# Patient Record
Sex: FEMALE | Race: WHITE | Employment: FULL TIME | URBAN - METROPOLITAN AREA
[De-identification: names, ages, dates, MRNs, and addresses within clinical notes are randomized per-mention and may not be internally consistent; named-entity substitution may affect disease eponyms.]

---

## 2019-12-02 ENCOUNTER — HOSPITAL ENCOUNTER (OUTPATIENT)
Dept: GENERAL RADIOLOGY | Age: 59
Discharge: HOME | End: 2019-12-02
Attending: SURGERY
Payer: COMMERCIAL

## 2019-12-02 ENCOUNTER — HOSPITAL ENCOUNTER (OUTPATIENT)
Dept: PREADMISSION TESTING | Age: 59
Discharge: HOME | End: 2019-12-02
Payer: COMMERCIAL

## 2019-12-02 ENCOUNTER — ANESTHESIA EVENT (OUTPATIENT)
Dept: SURGERY | Age: 59
DRG: 254 | End: 2019-12-02
Payer: COMMERCIAL

## 2019-12-02 VITALS
WEIGHT: 114 LBS | DIASTOLIC BLOOD PRESSURE: 81 MMHG | TEMPERATURE: 98 F | BODY MASS INDEX: 20.98 KG/M2 | HEIGHT: 62 IN | HEART RATE: 80 BPM | SYSTOLIC BLOOD PRESSURE: 119 MMHG

## 2019-12-02 LAB
ALBUMIN SERPL-MCNC: 3.7 G/DL (ref 3.5–5)
ALBUMIN/GLOB SERPL: 1.1 {RATIO} (ref 1.1–2.2)
ALP SERPL-CCNC: 55 U/L (ref 45–117)
ALT SERPL-CCNC: 26 U/L (ref 12–78)
ANION GAP SERPL CALC-SCNC: 6 MMOL/L (ref 5–15)
APTT PPP: 26.7 SEC (ref 22.1–32)
AST SERPL-CCNC: 16 U/L (ref 15–37)
ATRIAL RATE: 76 BPM
BASOPHILS # BLD: 0.1 K/UL (ref 0–0.1)
BASOPHILS NFR BLD: 1 % (ref 0–1)
BILIRUB SERPL-MCNC: 0.3 MG/DL (ref 0.2–1)
BUN SERPL-MCNC: 11 MG/DL (ref 6–20)
BUN/CREAT SERPL: 17 (ref 12–20)
CALCIUM SERPL-MCNC: 9.1 MG/DL (ref 8.5–10.1)
CALCULATED P AXIS, ECG09: 72 DEGREES
CALCULATED R AXIS, ECG10: 69 DEGREES
CALCULATED T AXIS, ECG11: 70 DEGREES
CHLORIDE SERPL-SCNC: 109 MMOL/L (ref 97–108)
CO2 SERPL-SCNC: 24 MMOL/L (ref 21–32)
CREAT SERPL-MCNC: 0.64 MG/DL (ref 0.55–1.02)
DIAGNOSIS, 93000: NORMAL
DIFFERENTIAL METHOD BLD: NORMAL
EOSINOPHIL # BLD: 0.4 K/UL (ref 0–0.4)
EOSINOPHIL NFR BLD: 4 % (ref 0–7)
ERYTHROCYTE [DISTWIDTH] IN BLOOD BY AUTOMATED COUNT: 12.2 % (ref 11.5–14.5)
EST. AVERAGE GLUCOSE BLD GHB EST-MCNC: 131 MG/DL
GLOBULIN SER CALC-MCNC: 3.3 G/DL (ref 2–4)
GLUCOSE SERPL-MCNC: 127 MG/DL (ref 65–100)
HBA1C MFR BLD: 6.2 % (ref 4–5.6)
HCT VFR BLD AUTO: 45.5 % (ref 35–47)
HGB BLD-MCNC: 15.3 G/DL (ref 11.5–16)
IMM GRANULOCYTES # BLD AUTO: 0 K/UL (ref 0–0.04)
IMM GRANULOCYTES NFR BLD AUTO: 0 % (ref 0–0.5)
INR PPP: 1 (ref 0.9–1.1)
LYMPHOCYTES # BLD: 3 K/UL (ref 0.8–3.5)
LYMPHOCYTES NFR BLD: 29 % (ref 12–49)
MCH RBC QN AUTO: 30.4 PG (ref 26–34)
MCHC RBC AUTO-ENTMCNC: 33.6 G/DL (ref 30–36.5)
MCV RBC AUTO: 90.5 FL (ref 80–99)
MONOCYTES # BLD: 0.7 K/UL (ref 0–1)
MONOCYTES NFR BLD: 7 % (ref 5–13)
NEUTS SEG # BLD: 6.2 K/UL (ref 1.8–8)
NEUTS SEG NFR BLD: 59 % (ref 32–75)
NRBC # BLD: 0 K/UL (ref 0–0.01)
NRBC BLD-RTO: 0 PER 100 WBC
P-R INTERVAL, ECG05: 166 MS
PLATELET # BLD AUTO: 340 K/UL (ref 150–400)
PMV BLD AUTO: 12 FL (ref 8.9–12.9)
POTASSIUM SERPL-SCNC: 4.3 MMOL/L (ref 3.5–5.1)
PROT SERPL-MCNC: 7 G/DL (ref 6.4–8.2)
PROTHROMBIN TIME: 9.7 SEC (ref 9–11.1)
Q-T INTERVAL, ECG07: 370 MS
QRS DURATION, ECG06: 74 MS
QTC CALCULATION (BEZET), ECG08: 416 MS
RBC # BLD AUTO: 5.03 M/UL (ref 3.8–5.2)
SODIUM SERPL-SCNC: 139 MMOL/L (ref 136–145)
THERAPEUTIC RANGE,PTTT: NORMAL SECS (ref 58–77)
VENTRICULAR RATE, ECG03: 76 BPM
WBC # BLD AUTO: 10.4 K/UL (ref 3.6–11)

## 2019-12-02 PROCEDURE — 80053 COMPREHEN METABOLIC PANEL: CPT

## 2019-12-02 PROCEDURE — 71046 X-RAY EXAM CHEST 2 VIEWS: CPT

## 2019-12-02 PROCEDURE — 85610 PROTHROMBIN TIME: CPT

## 2019-12-02 PROCEDURE — 36415 COLL VENOUS BLD VENIPUNCTURE: CPT

## 2019-12-02 PROCEDURE — 93005 ELECTROCARDIOGRAM TRACING: CPT

## 2019-12-02 PROCEDURE — 85730 THROMBOPLASTIN TIME PARTIAL: CPT

## 2019-12-02 PROCEDURE — 85025 COMPLETE CBC W/AUTO DIFF WBC: CPT

## 2019-12-02 PROCEDURE — 83036 HEMOGLOBIN GLYCOSYLATED A1C: CPT

## 2019-12-02 RX ORDER — GLIPIZIDE 10 MG/1
10 TABLET ORAL DAILY
COMMUNITY
End: 2021-10-18 | Stop reason: ALTCHOICE

## 2019-12-02 RX ORDER — ATORVASTATIN CALCIUM 20 MG/1
20 TABLET, FILM COATED ORAL DAILY
COMMUNITY
End: 2021-10-21

## 2019-12-03 ENCOUNTER — HOSPITAL ENCOUNTER (INPATIENT)
Age: 59
LOS: 1 days | Discharge: HOME | DRG: 254 | End: 2019-12-04
Attending: SURGERY | Admitting: SURGERY
Payer: COMMERCIAL

## 2019-12-03 ENCOUNTER — ANESTHESIA (OUTPATIENT)
Dept: SURGERY | Age: 59
DRG: 254 | End: 2019-12-03
Payer: COMMERCIAL

## 2019-12-03 DIAGNOSIS — I99.8 ISCHEMIA OF RIGHT LOWER EXTREMITY: Primary | ICD-10-CM

## 2019-12-03 LAB
GLUCOSE BLD STRIP.AUTO-MCNC: 157 MG/DL (ref 65–100)
GLUCOSE BLD STRIP.AUTO-MCNC: 197 MG/DL (ref 65–100)
SERVICE CMNT-IMP: ABNORMAL
SERVICE CMNT-IMP: ABNORMAL

## 2019-12-03 PROCEDURE — 74011250636 HC RX REV CODE- 250/636: Performed by: NURSE ANESTHETIST, CERTIFIED REGISTERED

## 2019-12-03 PROCEDURE — 65660000000 HC RM CCU STEPDOWN

## 2019-12-03 PROCEDURE — 74011250636 HC RX REV CODE- 250/636

## 2019-12-03 PROCEDURE — 76210000017 HC OR PH I REC 1.5 TO 2 HR: Performed by: SURGERY

## 2019-12-03 PROCEDURE — 77030014008 HC SPNG HEMSTAT J&J -C: Performed by: SURGERY

## 2019-12-03 PROCEDURE — 74011250636 HC RX REV CODE- 250/636: Performed by: SURGERY

## 2019-12-03 PROCEDURE — 77030020256 HC SOL INJ NACL 0.9%  500ML: Performed by: SURGERY

## 2019-12-03 PROCEDURE — 77030031139 HC SUT VCRL2 J&J -A: Performed by: SURGERY

## 2019-12-03 PROCEDURE — 74011000250 HC RX REV CODE- 250: Performed by: NURSE ANESTHETIST, CERTIFIED REGISTERED

## 2019-12-03 PROCEDURE — 82962 GLUCOSE BLOOD TEST: CPT

## 2019-12-03 PROCEDURE — 74011250636 HC RX REV CODE- 250/636: Performed by: ANESTHESIOLOGY

## 2019-12-03 PROCEDURE — 77030040922 HC BLNKT HYPOTHRM STRY -A

## 2019-12-03 PROCEDURE — 77030002986 HC SUT PROL J&J -A: Performed by: SURGERY

## 2019-12-03 PROCEDURE — 041K0JL BYPASS RIGHT FEMORAL ARTERY TO POPLITEAL ARTERY WITH SYNTHETIC SUBSTITUTE, OPEN APPROACH: ICD-10-PCS | Performed by: SURGERY

## 2019-12-03 PROCEDURE — 77030008462 HC STPLR SKN PROX J&J -A: Performed by: SURGERY

## 2019-12-03 PROCEDURE — 77030005513 HC CATH URETH FOL11 MDII -B: Performed by: SURGERY

## 2019-12-03 PROCEDURE — 77030008684 HC TU ET CUF COVD -B: Performed by: NURSE ANESTHETIST, CERTIFIED REGISTERED

## 2019-12-03 PROCEDURE — 76060000035 HC ANESTHESIA 2 TO 2.5 HR: Performed by: SURGERY

## 2019-12-03 PROCEDURE — 77030026438 HC STYL ET INTUB CARD -A: Performed by: NURSE ANESTHETIST, CERTIFIED REGISTERED

## 2019-12-03 PROCEDURE — 74011250637 HC RX REV CODE- 250/637: Performed by: ANESTHESIOLOGY

## 2019-12-03 PROCEDURE — 76010000162 HC OR TIME 1.5 TO 2 HR INTENSV-TIER 1: Performed by: SURGERY

## 2019-12-03 PROCEDURE — C1768 GRAFT, VASCULAR: HCPCS | Performed by: SURGERY

## 2019-12-03 PROCEDURE — 74011250637 HC RX REV CODE- 250/637: Performed by: SURGERY

## 2019-12-03 PROCEDURE — 77030018846 HC SOL IRR STRL H20 ICUM -A: Performed by: SURGERY

## 2019-12-03 PROCEDURE — 77030011640 HC PAD GRND REM COVD -A: Performed by: SURGERY

## 2019-12-03 DEVICE — PROPATEN VASCULAR GRAFT TW 7MMX40CM HEPARIN
Type: IMPLANTABLE DEVICE | Site: LEG | Status: FUNCTIONAL
Brand: GORE PROPATEN VASCULAR GRAFT

## 2019-12-03 RX ORDER — MIDAZOLAM HYDROCHLORIDE 1 MG/ML
1 INJECTION, SOLUTION INTRAMUSCULAR; INTRAVENOUS
Status: DISCONTINUED | OUTPATIENT
Start: 2019-12-03 | End: 2019-12-03 | Stop reason: HOSPADM

## 2019-12-03 RX ORDER — SODIUM CHLORIDE 0.9 % (FLUSH) 0.9 %
5-40 SYRINGE (ML) INJECTION AS NEEDED
Status: DISCONTINUED | OUTPATIENT
Start: 2019-12-03 | End: 2019-12-03 | Stop reason: HOSPADM

## 2019-12-03 RX ORDER — NALOXONE HYDROCHLORIDE 0.4 MG/ML
0.4 INJECTION, SOLUTION INTRAMUSCULAR; INTRAVENOUS; SUBCUTANEOUS AS NEEDED
Status: DISCONTINUED | OUTPATIENT
Start: 2019-12-03 | End: 2019-12-04 | Stop reason: HOSPADM

## 2019-12-03 RX ORDER — PROTAMINE SULFATE 10 MG/ML
INJECTION, SOLUTION INTRAVENOUS AS NEEDED
Status: DISCONTINUED | OUTPATIENT
Start: 2019-12-03 | End: 2019-12-03 | Stop reason: HOSPADM

## 2019-12-03 RX ORDER — SODIUM CHLORIDE, SODIUM LACTATE, POTASSIUM CHLORIDE, CALCIUM CHLORIDE 600; 310; 30; 20 MG/100ML; MG/100ML; MG/100ML; MG/100ML
125 INJECTION, SOLUTION INTRAVENOUS CONTINUOUS
Status: DISCONTINUED | OUTPATIENT
Start: 2019-12-03 | End: 2019-12-03 | Stop reason: HOSPADM

## 2019-12-03 RX ORDER — SODIUM CHLORIDE 0.9 % (FLUSH) 0.9 %
5-40 SYRINGE (ML) INJECTION EVERY 8 HOURS
Status: DISCONTINUED | OUTPATIENT
Start: 2019-12-03 | End: 2019-12-03 | Stop reason: HOSPADM

## 2019-12-03 RX ORDER — HEPARIN SODIUM 1000 [USP'U]/ML
INJECTION, SOLUTION INTRAVENOUS; SUBCUTANEOUS AS NEEDED
Status: DISCONTINUED | OUTPATIENT
Start: 2019-12-03 | End: 2019-12-03 | Stop reason: HOSPADM

## 2019-12-03 RX ORDER — POTASSIUM CHLORIDE AND SODIUM CHLORIDE 450; 150 MG/100ML; MG/100ML
INJECTION, SOLUTION INTRAVENOUS CONTINUOUS
Status: DISCONTINUED | OUTPATIENT
Start: 2019-12-03 | End: 2019-12-04 | Stop reason: HOSPADM

## 2019-12-03 RX ORDER — LIDOCAINE HYDROCHLORIDE 10 MG/ML
0.5 INJECTION, SOLUTION EPIDURAL; INFILTRATION; INTRACAUDAL; PERINEURAL AS NEEDED
Status: DISCONTINUED | OUTPATIENT
Start: 2019-12-03 | End: 2019-12-03 | Stop reason: HOSPADM

## 2019-12-03 RX ORDER — ONDANSETRON 2 MG/ML
INJECTION INTRAMUSCULAR; INTRAVENOUS AS NEEDED
Status: DISCONTINUED | OUTPATIENT
Start: 2019-12-03 | End: 2019-12-03 | Stop reason: HOSPADM

## 2019-12-03 RX ORDER — DEXTROSE, SODIUM CHLORIDE, SODIUM LACTATE, POTASSIUM CHLORIDE, AND CALCIUM CHLORIDE 5; .6; .31; .03; .02 G/100ML; G/100ML; G/100ML; G/100ML; G/100ML
125 INJECTION, SOLUTION INTRAVENOUS CONTINUOUS
Status: DISCONTINUED | OUTPATIENT
Start: 2019-12-03 | End: 2019-12-03 | Stop reason: HOSPADM

## 2019-12-03 RX ORDER — ATORVASTATIN CALCIUM 20 MG/1
20 TABLET, FILM COATED ORAL DAILY
Status: DISCONTINUED | OUTPATIENT
Start: 2019-12-03 | End: 2019-12-04 | Stop reason: HOSPADM

## 2019-12-03 RX ORDER — DIPHENHYDRAMINE HYDROCHLORIDE 50 MG/ML
12.5 INJECTION, SOLUTION INTRAMUSCULAR; INTRAVENOUS AS NEEDED
Status: DISCONTINUED | OUTPATIENT
Start: 2019-12-03 | End: 2019-12-03 | Stop reason: HOSPADM

## 2019-12-03 RX ORDER — SODIUM CHLORIDE 0.9 % (FLUSH) 0.9 %
5-40 SYRINGE (ML) INJECTION AS NEEDED
Status: DISCONTINUED | OUTPATIENT
Start: 2019-12-03 | End: 2019-12-04 | Stop reason: HOSPADM

## 2019-12-03 RX ORDER — GLIPIZIDE 5 MG/1
10 TABLET, FILM COATED, EXTENDED RELEASE ORAL DAILY
Status: DISCONTINUED | OUTPATIENT
Start: 2019-12-03 | End: 2019-12-04 | Stop reason: HOSPADM

## 2019-12-03 RX ORDER — OXYCODONE HYDROCHLORIDE 5 MG/1
5 TABLET ORAL AS NEEDED
Status: DISCONTINUED | OUTPATIENT
Start: 2019-12-03 | End: 2019-12-03 | Stop reason: HOSPADM

## 2019-12-03 RX ORDER — PHENYLEPHRINE HCL IN 0.9% NACL 0.4MG/10ML
SYRINGE (ML) INTRAVENOUS AS NEEDED
Status: DISCONTINUED | OUTPATIENT
Start: 2019-12-03 | End: 2019-12-03 | Stop reason: HOSPADM

## 2019-12-03 RX ORDER — MIDAZOLAM HYDROCHLORIDE 1 MG/ML
1 INJECTION, SOLUTION INTRAMUSCULAR; INTRAVENOUS AS NEEDED
Status: DISCONTINUED | OUTPATIENT
Start: 2019-12-03 | End: 2019-12-03 | Stop reason: HOSPADM

## 2019-12-03 RX ORDER — PROPOFOL 10 MG/ML
INJECTION, EMULSION INTRAVENOUS AS NEEDED
Status: DISCONTINUED | OUTPATIENT
Start: 2019-12-03 | End: 2019-12-03 | Stop reason: HOSPADM

## 2019-12-03 RX ORDER — MIDAZOLAM HYDROCHLORIDE 1 MG/ML
INJECTION, SOLUTION INTRAMUSCULAR; INTRAVENOUS AS NEEDED
Status: DISCONTINUED | OUTPATIENT
Start: 2019-12-03 | End: 2019-12-03 | Stop reason: HOSPADM

## 2019-12-03 RX ORDER — DEXAMETHASONE SODIUM PHOSPHATE 4 MG/ML
INJECTION, SOLUTION INTRA-ARTICULAR; INTRALESIONAL; INTRAMUSCULAR; INTRAVENOUS; SOFT TISSUE AS NEEDED
Status: DISCONTINUED | OUTPATIENT
Start: 2019-12-03 | End: 2019-12-03 | Stop reason: HOSPADM

## 2019-12-03 RX ORDER — DEXMEDETOMIDINE HYDROCHLORIDE 100 UG/ML
INJECTION, SOLUTION INTRAVENOUS AS NEEDED
Status: DISCONTINUED | OUTPATIENT
Start: 2019-12-03 | End: 2019-12-03 | Stop reason: HOSPADM

## 2019-12-03 RX ORDER — LIDOCAINE HYDROCHLORIDE 20 MG/ML
INJECTION, SOLUTION EPIDURAL; INFILTRATION; INTRACAUDAL; PERINEURAL AS NEEDED
Status: DISCONTINUED | OUTPATIENT
Start: 2019-12-03 | End: 2019-12-03 | Stop reason: HOSPADM

## 2019-12-03 RX ORDER — SUCCINYLCHOLINE CHLORIDE 20 MG/ML
INJECTION INTRAMUSCULAR; INTRAVENOUS AS NEEDED
Status: DISCONTINUED | OUTPATIENT
Start: 2019-12-03 | End: 2019-12-03 | Stop reason: HOSPADM

## 2019-12-03 RX ORDER — POTASSIUM CHLORIDE AND SODIUM CHLORIDE 450; 150 MG/100ML; MG/100ML
INJECTION, SOLUTION INTRAVENOUS
Status: COMPLETED
Start: 2019-12-03 | End: 2019-12-03

## 2019-12-03 RX ORDER — METOPROLOL TARTRATE 5 MG/5ML
INJECTION INTRAVENOUS AS NEEDED
Status: DISCONTINUED | OUTPATIENT
Start: 2019-12-03 | End: 2019-12-03

## 2019-12-03 RX ORDER — TRAMADOL HYDROCHLORIDE 50 MG/1
50 TABLET ORAL
Status: DISCONTINUED | OUTPATIENT
Start: 2019-12-03 | End: 2019-12-04 | Stop reason: HOSPADM

## 2019-12-03 RX ORDER — ACETAMINOPHEN 325 MG/1
650 TABLET ORAL
Status: DISCONTINUED | OUTPATIENT
Start: 2019-12-03 | End: 2019-12-04 | Stop reason: HOSPADM

## 2019-12-03 RX ORDER — HYDROMORPHONE HYDROCHLORIDE 1 MG/ML
0.5 INJECTION, SOLUTION INTRAMUSCULAR; INTRAVENOUS; SUBCUTANEOUS
Status: DISCONTINUED | OUTPATIENT
Start: 2019-12-03 | End: 2019-12-04 | Stop reason: HOSPADM

## 2019-12-03 RX ORDER — LISINOPRIL 20 MG/1
20 TABLET ORAL DAILY
Status: DISCONTINUED | OUTPATIENT
Start: 2019-12-03 | End: 2019-12-04 | Stop reason: HOSPADM

## 2019-12-03 RX ORDER — ESMOLOL HYDROCHLORIDE 10 MG/ML
INJECTION INTRAVENOUS AS NEEDED
Status: DISCONTINUED | OUTPATIENT
Start: 2019-12-03 | End: 2019-12-03 | Stop reason: HOSPADM

## 2019-12-03 RX ORDER — EPHEDRINE SULFATE/0.9% NACL/PF 50 MG/5 ML
SYRINGE (ML) INTRAVENOUS AS NEEDED
Status: DISCONTINUED | OUTPATIENT
Start: 2019-12-03 | End: 2019-12-03 | Stop reason: HOSPADM

## 2019-12-03 RX ORDER — SODIUM CHLORIDE 0.9 % (FLUSH) 0.9 %
5-40 SYRINGE (ML) INJECTION EVERY 8 HOURS
Status: DISCONTINUED | OUTPATIENT
Start: 2019-12-03 | End: 2019-12-04 | Stop reason: HOSPADM

## 2019-12-03 RX ORDER — FENTANYL CITRATE 50 UG/ML
25 INJECTION, SOLUTION INTRAMUSCULAR; INTRAVENOUS
Status: COMPLETED | OUTPATIENT
Start: 2019-12-03 | End: 2019-12-03

## 2019-12-03 RX ORDER — FENTANYL CITRATE 50 UG/ML
50 INJECTION, SOLUTION INTRAMUSCULAR; INTRAVENOUS AS NEEDED
Status: DISCONTINUED | OUTPATIENT
Start: 2019-12-03 | End: 2019-12-03 | Stop reason: HOSPADM

## 2019-12-03 RX ORDER — ROCURONIUM BROMIDE 10 MG/ML
INJECTION, SOLUTION INTRAVENOUS AS NEEDED
Status: DISCONTINUED | OUTPATIENT
Start: 2019-12-03 | End: 2019-12-03 | Stop reason: HOSPADM

## 2019-12-03 RX ORDER — ONDANSETRON 2 MG/ML
4 INJECTION INTRAMUSCULAR; INTRAVENOUS AS NEEDED
Status: DISCONTINUED | OUTPATIENT
Start: 2019-12-03 | End: 2019-12-03 | Stop reason: HOSPADM

## 2019-12-03 RX ORDER — MORPHINE SULFATE 10 MG/ML
2 INJECTION, SOLUTION INTRAMUSCULAR; INTRAVENOUS
Status: DISCONTINUED | OUTPATIENT
Start: 2019-12-03 | End: 2019-12-03 | Stop reason: HOSPADM

## 2019-12-03 RX ORDER — FENTANYL CITRATE 50 UG/ML
INJECTION, SOLUTION INTRAMUSCULAR; INTRAVENOUS AS NEEDED
Status: DISCONTINUED | OUTPATIENT
Start: 2019-12-03 | End: 2019-12-03 | Stop reason: HOSPADM

## 2019-12-03 RX ORDER — ENOXAPARIN SODIUM 100 MG/ML
40 INJECTION SUBCUTANEOUS EVERY 24 HOURS
Status: DISCONTINUED | OUTPATIENT
Start: 2019-12-04 | End: 2019-12-04 | Stop reason: HOSPADM

## 2019-12-03 RX ORDER — ACETAMINOPHEN 325 MG/1
650 TABLET ORAL ONCE
Status: COMPLETED | OUTPATIENT
Start: 2019-12-03 | End: 2019-12-03

## 2019-12-03 RX ADMIN — FENTANYL CITRATE 25 MCG: 50 INJECTION, SOLUTION INTRAMUSCULAR; INTRAVENOUS at 08:58

## 2019-12-03 RX ADMIN — FENTANYL CITRATE 25 MCG: 50 INJECTION, SOLUTION INTRAMUSCULAR; INTRAVENOUS at 10:30

## 2019-12-03 RX ADMIN — FENTANYL CITRATE 25 MCG: 50 INJECTION, SOLUTION INTRAMUSCULAR; INTRAVENOUS at 09:49

## 2019-12-03 RX ADMIN — POTASSIUM CHLORIDE AND SODIUM CHLORIDE 75 ML: 450; 150 INJECTION, SOLUTION INTRAVENOUS at 10:47

## 2019-12-03 RX ADMIN — PROTAMINE SULFATE 30 MG: 10 INJECTION, SOLUTION INTRAVENOUS at 08:54

## 2019-12-03 RX ADMIN — Medication 80 MCG: at 08:15

## 2019-12-03 RX ADMIN — Medication 80 MCG: at 08:21

## 2019-12-03 RX ADMIN — ONDANSETRON HYDROCHLORIDE 4 MG: 2 INJECTION, SOLUTION INTRAMUSCULAR; INTRAVENOUS at 09:01

## 2019-12-03 RX ADMIN — ROCURONIUM BROMIDE 25 MG: 10 SOLUTION INTRAVENOUS at 07:51

## 2019-12-03 RX ADMIN — Medication 40 MCG: at 08:43

## 2019-12-03 RX ADMIN — FENTANYL CITRATE 25 MCG: 50 INJECTION, SOLUTION INTRAMUSCULAR; INTRAVENOUS at 10:00

## 2019-12-03 RX ADMIN — SUGAMMADEX 200 MG: 100 INJECTION, SOLUTION INTRAVENOUS at 09:04

## 2019-12-03 RX ADMIN — DEXAMETHASONE SODIUM PHOSPHATE 8 MG: 4 INJECTION, SOLUTION INTRAMUSCULAR; INTRAVENOUS at 08:01

## 2019-12-03 RX ADMIN — ROCURONIUM BROMIDE 5 MG: 10 SOLUTION INTRAVENOUS at 07:38

## 2019-12-03 RX ADMIN — LIDOCAINE HYDROCHLORIDE 60 MG: 20 INJECTION, SOLUTION EPIDURAL; INFILTRATION; INTRACAUDAL; PERINEURAL at 07:38

## 2019-12-03 RX ADMIN — HYDROMORPHONE HYDROCHLORIDE 0.5 MG: 1 INJECTION, SOLUTION INTRAMUSCULAR; INTRAVENOUS; SUBCUTANEOUS at 20:28

## 2019-12-03 RX ADMIN — Medication 80 MCG: at 08:23

## 2019-12-03 RX ADMIN — FENTANYL CITRATE 25 MCG: 50 INJECTION, SOLUTION INTRAMUSCULAR; INTRAVENOUS at 07:55

## 2019-12-03 RX ADMIN — ESMOLOL HYDROCHLORIDE 30 MG: 10 INJECTION, SOLUTION INTRAVENOUS at 08:02

## 2019-12-03 RX ADMIN — TRAMADOL HYDROCHLORIDE 50 MG: 50 TABLET, FILM COATED ORAL at 18:18

## 2019-12-03 RX ADMIN — DEXMEDETOMIDINE HYDROCHLORIDE 4 MCG: 100 INJECTION, SOLUTION, CONCENTRATE INTRAVENOUS at 07:56

## 2019-12-03 RX ADMIN — PROPOFOL 40 MG: 10 INJECTION, EMULSION INTRAVENOUS at 08:57

## 2019-12-03 RX ADMIN — LISINOPRIL 20 MG: 20 TABLET ORAL at 12:09

## 2019-12-03 RX ADMIN — SUCCINYLCHOLINE CHLORIDE 120 MG: 20 INJECTION, SOLUTION INTRAMUSCULAR; INTRAVENOUS at 07:38

## 2019-12-03 RX ADMIN — ROCURONIUM BROMIDE 10 MG: 10 SOLUTION INTRAVENOUS at 08:30

## 2019-12-03 RX ADMIN — GLIPIZIDE 10 MG: 5 TABLET, FILM COATED, EXTENDED RELEASE ORAL at 12:09

## 2019-12-03 RX ADMIN — Medication 40 MCG: at 08:41

## 2019-12-03 RX ADMIN — ACETAMINOPHEN 650 MG: 325 TABLET ORAL at 15:50

## 2019-12-03 RX ADMIN — Medication 10 MG: at 09:36

## 2019-12-03 RX ADMIN — PROPOFOL 130 MG: 10 INJECTION, EMULSION INTRAVENOUS at 07:38

## 2019-12-03 RX ADMIN — HYDROMORPHONE HYDROCHLORIDE 0.5 MG: 1 INJECTION, SOLUTION INTRAMUSCULAR; INTRAVENOUS; SUBCUTANEOUS at 15:50

## 2019-12-03 RX ADMIN — PROPOFOL 30 MG: 10 INJECTION, EMULSION INTRAVENOUS at 08:35

## 2019-12-03 RX ADMIN — ATORVASTATIN CALCIUM 20 MG: 20 TABLET, FILM COATED ORAL at 12:09

## 2019-12-03 RX ADMIN — FENTANYL CITRATE 25 MCG: 50 INJECTION, SOLUTION INTRAMUSCULAR; INTRAVENOUS at 10:45

## 2019-12-03 RX ADMIN — DEXMEDETOMIDINE HYDROCHLORIDE 4 MCG: 100 INJECTION, SOLUTION, CONCENTRATE INTRAVENOUS at 09:08

## 2019-12-03 RX ADMIN — VANCOMYCIN HYDROCHLORIDE 1000 MG: 1 INJECTION, POWDER, LYOPHILIZED, FOR SOLUTION INTRAVENOUS at 20:28

## 2019-12-03 RX ADMIN — MIDAZOLAM 2 MG: 1 INJECTION INTRAMUSCULAR; INTRAVENOUS at 07:31

## 2019-12-03 RX ADMIN — Medication 10 MG: at 09:26

## 2019-12-03 RX ADMIN — SODIUM CHLORIDE, SODIUM LACTATE, POTASSIUM CHLORIDE, AND CALCIUM CHLORIDE 125 ML/HR: 600; 310; 30; 20 INJECTION, SOLUTION INTRAVENOUS at 07:27

## 2019-12-03 RX ADMIN — DEXMEDETOMIDINE HYDROCHLORIDE 4 MCG: 100 INJECTION, SOLUTION, CONCENTRATE INTRAVENOUS at 07:53

## 2019-12-03 RX ADMIN — FENTANYL CITRATE 50 MCG: 50 INJECTION, SOLUTION INTRAMUSCULAR; INTRAVENOUS at 09:05

## 2019-12-03 RX ADMIN — ACETAMINOPHEN 650 MG: 325 TABLET ORAL at 07:19

## 2019-12-03 RX ADMIN — HEPARIN SODIUM 7500 UNITS: 1000 INJECTION, SOLUTION INTRAVENOUS; SUBCUTANEOUS at 08:08

## 2019-12-03 RX ADMIN — DEXMEDETOMIDINE HYDROCHLORIDE 4 MCG: 100 INJECTION, SOLUTION, CONCENTRATE INTRAVENOUS at 08:02

## 2019-12-03 RX ADMIN — VANCOMYCIN HYDROCHLORIDE 1 G: 1 INJECTION, POWDER, LYOPHILIZED, FOR SOLUTION INTRAVENOUS at 07:47

## 2019-12-03 RX ADMIN — PHENYLEPHRINE HYDROCHLORIDE 80 MCG/MIN: 10 INJECTION INTRAVENOUS at 08:25

## 2019-12-03 NOTE — BRIEF OP NOTE
BRIEF OPERATIVE NOTE    Date of Procedure: 12/3/2019   Preoperative Diagnosis: ARTERIAL OCCLUSION  Postoperative Diagnosis: ARTERIAL OCCLUSION    Procedure(s):  RIGHT FEMORAL-POPLITEAL BYPASS WITH POLYTETRAFLUOROETHYLENE (PTFE)  Surgeon(s) and Role:     * Madisyn Gomez MD - Primary         Surgical Assistant: *Chuyita Meier**    Surgical Staff:  Circ-1: Sander Boyd RN  Circ-Relief: Mesfin Carpenter RN  Scrub Tech-1: Kieran Yip  Surg Asst-1: Yeni Quesada  Event Time In Time Out   Incision Start 0801    Incision Close 0911      Anesthesia: General   Estimated Blood Loss: **100mls*  Specimens: * No specimens in log *   Findings: **good flow*   Complications: *nne**  Implants:   Implant Name Type Inv.  Item Serial No.  Lot No. LRB No. Used Action   GRAFT TW STRTCH [id number] -- Shruti Profit  GRAFT TW STRTCH [id number] -- Naty Berkowitz [id number]  [employer] NA Right 1 Implanted

## 2019-12-03 NOTE — ANESTHESIA PREPROCEDURE EVALUATION
Relevant Problems   No relevant active problems       Anesthetic History   No history of anesthetic complications            Review of Systems / Medical History  Patient summary reviewed, nursing notes reviewed and pertinent labs reviewed    Pulmonary  Within defined limits        Smoker         Neuro/Psych   Within defined limits           Cardiovascular  Within defined limits  Hypertension                   GI/Hepatic/Renal  Within defined limits              Endo/Other  Within defined limits  Diabetes    Arthritis     Other Findings              Physical Exam    Airway  Mallampati: II  TM Distance: > 6 cm  Neck ROM: normal range of motion   Mouth opening: Normal     Cardiovascular  Regular rate and rhythm,  S1 and S2 normal,  no murmur, click, rub, or gallop             Dental  No notable dental hx       Pulmonary  Breath sounds clear to auscultation               Abdominal  GI exam deferred       Other Findings            Anesthetic Plan    ASA: 2  Anesthesia type: general          Induction: Intravenous  Anesthetic plan and risks discussed with: Patient

## 2019-12-03 NOTE — PERIOP NOTES
TRANSFER - OUT REPORT:    Verbal report given to Milena(name) duong Craft  being transferred to 440(unit) for routine post - op       Report consisted of patients Situation, Background, Assessment and   Recommendations(SBAR). Time Pre op antibiotic ZAFLA:9670  Anesthesia Stop time: 1679  Blair Present on Transfer to floor:no  Order for Blair on Chart:no  Discharge Prescriptions with Chart:no    Information from the following report(s) Procedure Summary and Accordion was reviewed with the receiving nurse. Opportunity for questions and clarification was provided. Is the patient on 02? NO       Is the patient on a monitor? YES    Is the nurse transporting with the patient? YES    Surgical Waiting Area notified of patient's transfer from PACU?  YES      The following personal items collected during your admission accompanied patient upon transfer:   Dental Appliance: Dental Appliances: Uppers  Vision:    Hearing Aid:    Jewelry:    Clothes, 1 denture, and glasses returned to pt in pacu  Other Valuables:    Valuables sent to safe:    1

## 2019-12-03 NOTE — PROGRESS NOTES
TRANSFER - IN REPORT:    Verbal report received from Daisy(name) on Irais Cantor  being received from [facility]) for routine post - op      Report consisted of patients Situation, Background, Assessment and   Recommendations(SBAR). Information from the following report(s) SBAR, OR Summary, Procedure Summary, MAR and Cardiac Rhythm normal sinus was reviewed with the receiving nurse. Opportunity for questions and clarification was provided. Assessment completed upon patients arrival to unit and care assumed.

## 2019-12-03 NOTE — OP NOTES
[address]   OPERATIVE REPORT    Name:  Cheng Crowley  MR#:  [id number]  :  1960  ACCOUNT #:  [de-identified]  DATE OF SERVICE:  2019      PREOPERATIVE DIAGNOSES:  Ischemic rest pain and short distance claudication, right leg. POSTOPERATIVE DIAGNOSES:  Ischemic rest pain and short distance claudication, right leg. PROCEDURE PERFORMED:  Right common femoral to above-knee popliteal bypass with 7 mm Propaten PTFE. SURGEON:  Fly Edwards MD    ASSISTANT:  Chucho Agarwal    ANESTHESIA:  General endotracheal.    ANESTHESIOLOGIST:  Dyan Smith MD    COMPLICATIONS:  None. SPECIMENS REMOVED:  None. IMPLANTS:  7-mm Propaten PTFE [place]fo graft. ESTIMATED BLOOD LOSS:  100 mL. DRAINS:  None. INDICATIONS:  A 78-year-old woman with heavy smoking history who presented following an arteriogram with long segment SFA occlusion, short distance claudication, and some nighttime tingling at her foot. PROCEDURE:  After informed consent, she was taken to the operating room and under general endotracheal anesthesia, Blair was placed. She was prepped and draped and a thorough time-out was done. Incisions were made first in the femoral triangle and in the distal thigh medially. The common femoral, superficial femoral, and profunda femoral vessels were dissected free as was the above-knee popliteal.  The patient was given 7500 units heparin. Three minutes later, the above-knee popliteal artery was clamped and opened. A 7-mm PTFE Propaten bypass graft was fashioned and sutured end-to-side with 5-0 Prolene. One additional suture was required in the heel. It was then tunneled using an aortic clamp to the groin where the common femoral artery was clamped and opened. An end-to-side anastomosis with running 5-0 Prolene was performed. Flow was then restored with a good Doppler signal that was biphasic.   Hemostasis was obtained with 30 mg of protamine sulfate and Surgicel. One additional suture was required at the heel of the graft in the groin also. Wounds were then irrigated. Final lap, Ray-Nadia, instrument, and needle counts were announced as correct twice. The wound was then closed with 2 running layers of 2-0 Vicryl and the skin with staples. Sterile dressings were applied. The patient tolerated the procedure well and was taken to recovery room with the warm foot.         Jr Dickens MD      [id number]  D:  12/03/2019 9:19  T:  12/03/2019 11:38  JOB #:  [id number]

## 2019-12-03 NOTE — ROUTINE PROCESS
Patient: Zackary Dennis MRN: [id number]  SSN: [id number] YOB: 1960  Age: 63 y.o. Sex: female Patient is status post Procedure(s): RIGHT FEMORAL-POPLITEAL BYPASS WITH POLYTETRAFLUOROETHYLENE (PTFE). Surgeon(s) and Role: Jj Thomas MD - Primary Local/Dose/Irrigation:   
 
             
Peripheral IV 12/03/19 Left Wrist (Active) Airway - Endotracheal Tube 12/03/19 Oral (Active) Dressing/Packing:  Wound Leg Right-Dressing Type: 4 x 4;Special tape (comment); Staples(Medipore tape) (12/03/19 0906) Splint/Cast:  ] Other:

## 2019-12-04 VITALS
OXYGEN SATURATION: 99 % | HEART RATE: 79 BPM | TEMPERATURE: 98.1 F | RESPIRATION RATE: 18 BRPM | DIASTOLIC BLOOD PRESSURE: 71 MMHG | SYSTOLIC BLOOD PRESSURE: 128 MMHG | BODY MASS INDEX: 20.77 KG/M2 | HEIGHT: 62 IN | WEIGHT: 112.88 LBS

## 2019-12-04 LAB
ANION GAP SERPL CALC-SCNC: 7 MMOL/L (ref 5–15)
BUN SERPL-MCNC: 10 MG/DL (ref 6–20)
BUN/CREAT SERPL: 16 (ref 12–20)
CALCIUM SERPL-MCNC: 8.3 MG/DL (ref 8.5–10.1)
CHLORIDE SERPL-SCNC: 107 MMOL/L (ref 97–108)
CO2 SERPL-SCNC: 22 MMOL/L (ref 21–32)
CREAT SERPL-MCNC: 0.63 MG/DL (ref 0.55–1.02)
ERYTHROCYTE [DISTWIDTH] IN BLOOD BY AUTOMATED COUNT: 12.3 % (ref 11.5–14.5)
GLUCOSE SERPL-MCNC: 100 MG/DL (ref 65–100)
HCT VFR BLD AUTO: 39 % (ref 35–47)
HGB BLD-MCNC: 12.8 G/DL (ref 11.5–16)
MCH RBC QN AUTO: 29.9 PG (ref 26–34)
MCHC RBC AUTO-ENTMCNC: 32.8 G/DL (ref 30–36.5)
MCV RBC AUTO: 91.1 FL (ref 80–99)
NRBC # BLD: 0 K/UL (ref 0–0.01)
NRBC BLD-RTO: 0 PER 100 WBC
PLATELET # BLD AUTO: 300 K/UL (ref 150–400)
PMV BLD AUTO: 11.9 FL (ref 8.9–12.9)
POTASSIUM SERPL-SCNC: 4.9 MMOL/L (ref 3.5–5.1)
RBC # BLD AUTO: 4.28 M/UL (ref 3.8–5.2)
SODIUM SERPL-SCNC: 136 MMOL/L (ref 136–145)
WBC # BLD AUTO: 17.9 K/UL (ref 3.6–11)

## 2019-12-04 PROCEDURE — 85027 COMPLETE CBC AUTOMATED: CPT

## 2019-12-04 PROCEDURE — 80048 BASIC METABOLIC PNL TOTAL CA: CPT

## 2019-12-04 PROCEDURE — 74011250636 HC RX REV CODE- 250/636: Performed by: SURGERY

## 2019-12-04 PROCEDURE — 97161 PT EVAL LOW COMPLEX 20 MIN: CPT

## 2019-12-04 PROCEDURE — 97116 GAIT TRAINING THERAPY: CPT

## 2019-12-04 PROCEDURE — 36415 COLL VENOUS BLD VENIPUNCTURE: CPT

## 2019-12-04 PROCEDURE — 74011250637 HC RX REV CODE- 250/637: Performed by: SURGERY

## 2019-12-04 RX ORDER — GUAIFENESIN 100 MG/5ML
81 LIQUID (ML) ORAL DAILY
Status: DISCONTINUED | OUTPATIENT
Start: 2019-12-04 | End: 2019-12-04 | Stop reason: HOSPADM

## 2019-12-04 RX ORDER — CLOPIDOGREL BISULFATE 75 MG/1
75 TABLET ORAL
Status: COMPLETED | OUTPATIENT
Start: 2019-12-04 | End: 2019-12-04

## 2019-12-04 RX ORDER — CLOPIDOGREL BISULFATE 75 MG/1
75 TABLET ORAL DAILY
Status: DISCONTINUED | OUTPATIENT
Start: 2019-12-04 | End: 2019-12-04 | Stop reason: HOSPADM

## 2019-12-04 RX ORDER — TRAMADOL HYDROCHLORIDE 50 MG/1
50 TABLET ORAL
Qty: 15 TAB | Refills: 1 | Status: SHIPPED | OUTPATIENT
Start: 2019-12-04 | End: 2019-12-18

## 2019-12-04 RX ORDER — GUAIFENESIN 100 MG/5ML
81 LIQUID (ML) ORAL DAILY
Qty: 60 TAB | Refills: 5 | Status: SHIPPED | OUTPATIENT
Start: 2019-12-04 | End: 2021-10-18 | Stop reason: ALTCHOICE

## 2019-12-04 RX ORDER — CLOPIDOGREL BISULFATE 75 MG/1
75 TABLET ORAL DAILY
Qty: 60 TAB | Refills: 5 | Status: SHIPPED | OUTPATIENT
Start: 2019-12-04 | End: 2021-10-18 | Stop reason: ALTCHOICE

## 2019-12-04 RX ADMIN — CLOPIDOGREL BISULFATE 75 MG: 75 TABLET ORAL at 08:33

## 2019-12-04 RX ADMIN — HYDROMORPHONE HYDROCHLORIDE 0.5 MG: 1 INJECTION, SOLUTION INTRAMUSCULAR; INTRAVENOUS; SUBCUTANEOUS at 05:00

## 2019-12-04 RX ADMIN — CLOPIDOGREL BISULFATE 75 MG: 75 TABLET ORAL at 07:05

## 2019-12-04 RX ADMIN — HYDROMORPHONE HYDROCHLORIDE 0.5 MG: 1 INJECTION, SOLUTION INTRAMUSCULAR; INTRAVENOUS; SUBCUTANEOUS at 00:56

## 2019-12-04 RX ADMIN — Medication 10 ML: at 05:04

## 2019-12-04 RX ADMIN — POTASSIUM CHLORIDE AND SODIUM CHLORIDE: 450; 150 INJECTION, SOLUTION INTRAVENOUS at 04:36

## 2019-12-04 RX ADMIN — GLIPIZIDE 10 MG: 5 TABLET, FILM COATED, EXTENDED RELEASE ORAL at 08:45

## 2019-12-04 RX ADMIN — ASPIRIN 81 MG 81 MG: 81 TABLET ORAL at 08:33

## 2019-12-04 RX ADMIN — LISINOPRIL 20 MG: 20 TABLET ORAL at 08:33

## 2019-12-04 RX ADMIN — ENOXAPARIN SODIUM 40 MG: 40 INJECTION SUBCUTANEOUS at 08:33

## 2019-12-04 RX ADMIN — ATORVASTATIN CALCIUM 20 MG: 20 TABLET, FILM COATED ORAL at 08:33

## 2019-12-04 RX ADMIN — TRAMADOL HYDROCHLORIDE 50 MG: 50 TABLET, FILM COATED ORAL at 08:37

## 2019-12-04 NOTE — PROGRESS NOTES
Problem: Falls - Risk of  Goal: *Absence of Falls  Description  Document Otilia Mojica Fall Risk and appropriate interventions in the flowsheet.   Outcome: Progressing Towards Goal  Note: Fall Risk Interventions:  Mobility Interventions: Patient to call before getting OOB         Medication Interventions: Patient to call before getting OOB, Teach patient to arise slowly                   Problem: Patient Education: Go to Patient Education Activity  Goal: Patient/Family Education  Outcome: Progressing Towards Goal     Problem: Patient Education: Go to Patient Education Activity  Goal: Patient/Family Education  Outcome: Progressing Towards Goal     Problem: LE Bypass: Day of Surgery/Post-Op (Initiate SCIP Measures for Post-Op Care)  Goal: Off Pathway (Use only if patient is Off Pathway)  Outcome: Progressing Towards Goal  Goal: Activity/Safety  Outcome: Progressing Towards Goal  Goal: Consults, if ordered  Outcome: Progressing Towards Goal  Goal: Nutrition/Diet  Outcome: Progressing Towards Goal  Goal: Medications  Outcome: Progressing Towards Goal  Goal: Respiratory  Outcome: Progressing Towards Goal  Goal: Treatments/Interventions/Procedures  Outcome: Progressing Towards Goal  Goal: Psychosocial  Outcome: Progressing Towards Goal  Goal: *Lungs clear or at baseline  Outcome: Progressing Towards Goal  Goal: *Hemodynamically stable  Outcome: Progressing Towards Goal  Goal: *Optimal pain control at patient's stated goal  Outcome: Progressing Towards Goal  Goal: *Tolerating diet  Outcome: Progressing Towards Goal  Goal: *Demonstrates progressive activity  Outcome: Progressing Towards Goal     Problem: LE Bypass: Post-Op Day 1  Goal: Off Pathway (Use only if patient is Off Pathway)  Outcome: Progressing Towards Goal  Goal: Activity/Safety  Outcome: Progressing Towards Goal  Goal: Consults, if ordered  Outcome: Progressing Towards Goal  Goal: Diagnostic Test/Procedures  Outcome: Progressing Towards Goal  Goal: Nutrition/Diet  Outcome: Progressing Towards Goal  Goal: Discharge Planning  Outcome: Progressing Towards Goal  Goal: Medications  Outcome: Progressing Towards Goal  Goal: Respiratory  Outcome: Progressing Towards Goal  Goal: Treatments/Interventions/Procedures  Outcome: Progressing Towards Goal  Goal: Psychosocial  Outcome: Progressing Towards Goal  Goal: *Lungs clear or at baseline  Outcome: Progressing Towards Goal  Goal: *Hemodynamically stable  Outcome: Progressing Towards Goal  Goal: *Optimal pain control at patient's stated goal  Outcome: Progressing Towards Goal  Goal: *Tolerating diet  Outcome: Progressing Towards Goal  Goal: *Demonstrates progressive activity  Outcome: Progressing Towards Goal  Goal: *Adequate urinary output (equal to or greater than 30 milliliters/hour)  Outcome: Progressing Towards Goal     Problem: LE Bypass: Post-Op Day 2  Goal: Off Pathway (Use only if patient is Off Pathway)  Outcome: Progressing Towards Goal  Goal: Activity/Safety  Outcome: Progressing Towards Goal  Goal: Nutrition/Diet  Outcome: Progressing Towards Goal  Goal: Discharge Planning  Outcome: Progressing Towards Goal  Goal: Medications  Outcome: Progressing Towards Goal  Goal: Respiratory  Outcome: Progressing Towards Goal  Goal: Treatments/Interventions/Procedures  Outcome: Progressing Towards Goal  Goal: Psychosocial  Outcome: Progressing Towards Goal  Goal: *Lungs clear or at baseline  Outcome: Progressing Towards Goal  Goal: *Hemodynamically stable  Outcome: Progressing Towards Goal  Goal: *Optimal pain control at patient's stated goal  Outcome: Progressing Towards Goal  Goal: *Tolerating diet  Outcome: Progressing Towards Goal  Goal: *Demonstrates progressive activity  Outcome: Progressing Towards Goal     Problem: LE Bypass: Post-Op Day 3  Goal: Off Pathway (Use only if patient is Off Pathway)  Outcome: Progressing Towards Goal  Goal: Activity/Safety  Outcome: Progressing Towards Goal  Goal: Nutrition/Diet  Outcome: Progressing Towards Goal  Goal: Discharge Planning  Outcome: Progressing Towards Goal  Goal: Medications  Outcome: Progressing Towards Goal  Goal: Respiratory  Outcome: Progressing Towards Goal  Goal: Treatments/Interventions/Procedures  Outcome: Progressing Towards Goal  Goal: Psychosocial  Outcome: Progressing Towards Goal  Goal: *Lungs clear or at baseline  Outcome: Progressing Towards Goal  Goal: *Hemodynamically stable  Outcome: Progressing Towards Goal  Goal: *Optimal pain control at patient's stated goal  Outcome: Progressing Towards Goal  Goal: *Tolerating diet  Outcome: Progressing Towards Goal  Goal: *Demonstrates progressive activity  Outcome: Progressing Towards Goal     Problem: LE Bypass: Post-Op Day 4  Goal: Off Pathway (Use only if patient is Off Pathway)  Outcome: Progressing Towards Goal  Goal: Activity/Safety  Outcome: Progressing Towards Goal  Goal: Nutrition/Diet  Outcome: Progressing Towards Goal  Goal: Discharge Planning  Outcome: Progressing Towards Goal  Goal: Medications  Outcome: Progressing Towards Goal  Goal: Respiratory  Outcome: Progressing Towards Goal  Goal: Treatments/Interventions/Procedures  Outcome: Progressing Towards Goal  Goal: Psychosocial  Outcome: Progressing Towards Goal     Problem: LE Bypass: Discharge Outcomes  Goal: *Lungs clear or at baseline  Outcome: Progressing Towards Goal  Goal: *Hemodynamically stable  Outcome: Progressing Towards Goal  Goal: *Optimal pain control at patient's stated goal  Outcome: Progressing Towards Goal  Goal: *Tolerating diet  Outcome: Progressing Towards Goal  Goal: *Demonstrates progressive activity  Outcome: Progressing Towards Goal  Goal: *Verbalizes name, dosage, time, side effects, and number of days to continue medications  Outcome: Progressing Towards Goal

## 2019-12-04 NOTE — PROGRESS NOTES
Bedside and Verbal shift change report given to Myesha (oncoming nurse) by Gaye Diehl (offgoing nurse). Report included the following information SBAR, OR Summary, Procedure Summary, Intake/Output, MAR and Cardiac Rhythm normal sinus.

## 2019-12-04 NOTE — PROGRESS NOTES
Bedside shift change report given to [address] [place] [address] (oncoming nurse) by Renee Childers RN (offgoing nurse). Report included the following information SBAR, Kardex, OR Summary, Recent Results and Cardiac Rhythm NSR.

## 2019-12-04 NOTE — PROGRESS NOTES
Vascular  VSS  Voiding well  Foot hot and pink with good DP signal  Labs OK  Ambulatory already  Wishes to go home on plavix and aspiring  Return to office in 2 weeks

## 2019-12-04 NOTE — PROGRESS NOTES
Orders received, chart reviewed and patient evaluated by physical therapy. Pending progression with skilled acute physical therapy, recommend:  No skilled physical therapy/ follow up rehabilitation needs identified at this time. Recommend with nursing patient to complete as able in order to maintain strength, endurance and independence: OOB to chair 3x/day with SBA and ambulating with SBA, no device. Thank you for your assistance. Full evaluation to follow.      Siomara Diallo PT, DPT  Geriatric Clinical Specialist

## 2019-12-04 NOTE — PROGRESS NOTES
PHYSICAL THERAPY EVALUATION/DISCHARGE  Patient: Sean Gerber (80 y.o. female)  Date: 12/4/2019  Primary Diagnosis: Ischemia of right lower extremity [I99.8]  Procedure(s) (LRB):  RIGHT FEMORAL-POPLITEAL BYPASS WITH POLYTETRAFLUOROETHYLENE (PTFE) (Right) 1 Day Post-Op   Precautions:          ASSESSMENT  Based on the objective data described below, the patient presents with RLE pain s/p right fem pop bypass yesterday. Though antalgic gait pattern is noticeable, patient is ambulating well in the hallway without device. Cleared the stairs. Discussed using a cane if pain continues. However, patient is cleared to return home with assist from neighbors and Mu-[employer] as needed. /77 post activity. No further PT needs identified. Functional Outcome Measure: The patient scored 22/28 on the Tinetti balance outcome measure which is indicative of low fall risk. Other factors to consider for discharge: lives alone, but friends will be with her upon discharge for smooth transition home     Further skilled acute physical therapy is not indicated at this time. PLAN :  Recommendation for discharge: (in order for the patient to meet his/her long term goals)  No skilled physical therapy/ follow up rehabilitation needs identified at this time. This discharge recommendation:  Has been made in collaboration with the attending provider and/or case management    IF patient discharges home will need the following DME: none       SUBJECTIVE:   Patient stated It hurts, but it will get better.     OBJECTIVE DATA SUMMARY:   HISTORY:    Past Medical History:   Diagnosis Date    Arthritis     Diabetes ([facility] 2019    Hypertension     Peripheral vascular disease ([facility] [address] [facility]      Past Surgical History:   Procedure Laterality Date    HX CATARACT REMOVAL  2019       Prior level of function: independent without device, driving, no falls  Personal factors and/or comorbidities impacting plan of care: HTN, PVD, DM    Home Situation  Home Environment: Trailer/mobile home  # Steps to Enter: 6  Rails to Enter: Yes  Hand Rails : Bilateral  One/Two Story Residence: One story  Living Alone: Yes  Support Systems: Aundrea Grant / [employer] community, Friends \ neighbors  Patient Expects to be Discharged to[de-identified] Trailer/mobile home  Current DME Used/Available at Home: None    EXAMINATION/PRESENTATION/DECISION MAKING:   Hearing:   Auditory  Auditory Impairment: None    Strength:    Strength: Generally decreased, functional    Tone & Sensation:   Tone: Normal    Coordination:  Coordination: Within functional limits    Functional Mobility:  Bed Mobility:  Supine to Sit: Supervision  Sit to Supine: Supervision     Transfers:  Sit to Stand: Stand-by assistance  Stand to Sit: Stand-by assistance  Bed to Chair: Stand-by assistance     Balance:   Sitting: Intact  Standing: Intact     Ambulation/Gait Training:  Distance (ft): 180 Feet (ft)  Assistive Device: Gait belt  Ambulation - Level of Assistance: Stand-by assistance  Gait Abnormalities: Antalgic;Decreased step clearance  Base of Support: Widened  Stance: Right decreased  Speed/Ebonie: Slow  Step Length: Left shortened     Stairs:  Number of Stairs Trained: 4  Stairs - Level of Assistance: Stand-by assistance   Rail Use: Right       Functional Measure:  Tinetti test:    Sitting Balance: 1  Arises: 1  Attempts to Rise: 2  Immediate Standing Balance: 2  Standing Balance: 2  Nudged: 2  Eyes Closed: 1  Turn 360 Degrees - Continuous/Discontinuous: 1  Turn 360 Degrees - Steady/Unsteady: 1  Sitting Down: 1  Balance Score: 14 Balance total score  Indication of Gait: 1  R Step Length/Height: 0  L Step Length/Height: 1  R Foot Clearance: 1  L Foot Clearance: 1  Step Symmetry: 0  Step Continuity: 1  Path: 1  Trunk: 2  Walking Time: 0  Gait Score: 8 Gait total score  Total Score: 22/28 Overall total score         Tinetti Tool Score Risk of Falls  <19 = High Fall Risk  19-24 = Moderate Fall Risk  25-28 = Low Fall Risk  Isaac WERNER. Performance-Oriented Assessment of Mobility Problems in Elderly Patients. [facility]; [id number]. (Scoring Description: PT Bulletin Feb. 10, 1993)    Older adults: Miya Carson et al, 2009; n = 1000 [facility] elderly evaluated with ABC, LOUIS, ADL, and IADL)  · Mean LOUIS score for males aged 69-68 years = 26.21(3.40)  · Mean LOUIS score for females age 69-68 years = 25.16(4.30)  · Mean LOUIS score for males over 80 years = 23.29(6.02)  · Mean LOUIS score for females over 80 years = 17.20(8.32)          Physical Therapy Evaluation Charge Determination   History Examination Presentation Decision-Making   MEDIUM  Complexity : 1-2 comorbidities / personal factors will impact the outcome/ POC  LOW Complexity : 1-2 Standardized tests and measures addressing body structure, function, activity limitation and / or participation in recreation  LOW Complexity : Stable, uncomplicated  LOW Complexity : FOTO score of       Based on the above components, the patient evaluation is determined to be of the following complexity level: LOW     Pain Ratin/10 with mobility    Activity Tolerance:   Good and SpO2 stable on RA  Please refer to the flowsheet for vital signs taken during this treatment. After treatment patient left in no apparent distress:   Supine in bed and Call bell within reach    COMMUNICATION/EDUCATION:   The patients plan of care was discussed with: Registered Nurse and . Fall prevention education was provided and the patient/caregiver indicated understanding., Patient/family have participated as able in goal setting and plan of care. and Patient/family agree to work toward stated goals and plan of care.     Thank you for this referral.  Kandace Poole PT, DPT  Geriatric Clinical Specialist    Time Calculation: 25 mins

## 2019-12-04 NOTE — DISCHARGE INSTRUCTIONS
Patient Education        Femoropopliteal Bypass: What to Expect at [address] will have some pain from the cuts (incisions) the doctor made. This usually gets better after about 1 week. Your doctor will give you pain medicine for this. You can expect your leg to be swollen at first. This is a normal part of recovery and may last 2 or 3 months. You will have stitches or staples in the incisions. If you have stitches, they may dissolve on their own. Or your doctor may take them out 7 to 14 days after your surgery. You will need to take it easy for 2 to 6 weeks at home. It may take 6 to 12 weeks to fully recover. After surgery, blood may flow better throughout your leg, which can decrease leg pain, numbness, and cramping. You will need to have regular checkups with your doctor to make sure the graft is working. This care sheet gives you a general idea about how long it will take for you to recover. But each person recovers at a different pace. Follow the steps below to get better as quickly as possible. How can you care for yourself at home? Activity    · Rest when you feel tired. Getting enough sleep will help you recover.     · Try to walk each day or as often as your doctor tells you. Start by walking a little more than you did the day before. Bit by bit, increase the amount you walk. Walking boosts blood flow and helps prevent pneumonia and constipation.     · Avoid strenuous activities, such as bicycle riding, jogging, weight lifting, or aerobic exercise, until your doctor says it is okay.     · Ask your doctor when you can drive again.     · If you work, you will probably need to take 2 to 6 weeks off, depending on your job.     · You may shower, if your doctor says it is okay. Do not take a bath for the first 2 weeks, or until your doctor tells you it is okay. Diet    · You can eat your normal diet.  If your stomach is upset, try bland, low-fat foods like plain rice, broiled chicken, toast, and yogurt.     · Drink plenty of fluids (unless your doctor tells you not to).     · You may notice that your bowel movements are not regular right after your surgery. This is common. You may want to take a fiber supplement every day. If you have not had a bowel movement after a couple of days, ask your doctor about taking a mild laxative. Medicines    · Your doctor will tell you if and when you can restart your medicines. He or she will also give you instructions about taking any new medicines.     · If you take blood thinners, such as warfarin (Coumadin), clopidogrel (Plavix), or aspirin, be sure to talk to your doctor. He or she will tell you if and when to start taking those medicines again. Make sure that you understand exactly what your doctor wants you to do.     · Be safe with medicines. Take your medicines exactly as prescribed. Call your doctor if you think you are having a problem with your medicine.     · Take pain medicines exactly as directed. ? If the doctor gave you a prescription medicine for pain, take it as prescribed. ? If you are not taking a prescription pain medicine, ask your doctor if you can take an over-the-counter medicine.     · If you think your pain medicine is making you sick to your stomach:  ? Take your medicine after meals (unless your doctor has told you not to). ? Ask your doctor for a different pain medicine.     · If your doctor prescribed antibiotics, take them as directed. Do not stop taking them just because you feel better. You need to take the full course of antibiotics.     · Your doctor may prescribe a blood thinner when you go home. This helps prevent blood clots. Be sure you get instructions about how to take your medicine safely. Blood thinners can cause serious bleeding problems.    Incision care    · If you have bandages on the incisions, follow your doctor's instructions about changing them.     · If you have strips of tape on the incisions, leave the tape on for a week or until it falls off.     · Wash the area daily with warm, soapy water, and pat it dry. Don't use hydrogen peroxide or alcohol, which can slow healing. You may cover the area with a gauze bandage if it weeps or rubs against clothing. Change the bandage every day.     · Keep the area clean and dry.    Elevation    · Prop up your leg on a pillow anytime you sit or lie down for the first 3 days. Try to keep it above the level of your heart. This will help reduce swelling. Follow-up care is a key part of your treatment and safety. Be sure to make and go to all appointments, and call your doctor if you are having problems. It's also a good idea to know your test results and keep a list of the medicines you take. When should you call for help? Call 911 anytime you think you may need emergency care. For example, call if:    · You passed out (lost consciousness).     · You have trouble breathing.    Call your doctor now or seek immediate medical care if:    · You have severe pain in your leg, or it becomes cold, pale, blue, tingly, or numb.     · You have pain that does not get better after you take pain medicine.     · You have loose stitches, or your incisions come open.     · You are bleeding a lot from the incisions.     · You have signs of infection, such as:  ? Increased pain, swelling, warmth, or redness. ? Red streaks leading from the incision. ? Pus draining from the incision. ? A fever.     · You are sick to your stomach or cannot keep fluids down.    Watch closely for any changes in your health, and be sure to contact your doctor if:    · You do not get better as expected. Where can you learn more? Go to http://rhoda-armand.info/. Enter D448 in the search box to learn more about \"Femoropopliteal Bypass: What to Expect at Home. \"  Current as of: April 9, 2019  Content Version: 12.2  © 1215-2696 [employer].  Care instructions adapted under license by [employer] (which disclaims liability or warranty for this information). If you have questions about a medical condition or this instruction, always ask your healthcare professional. [address] any warranty or liability for your use of this information.

## 2019-12-05 NOTE — DISCHARGE SUMMARY
[facility] [address] SUMMARY    Name:  Renee Sal  MR#:  [id number]  :  1960  ACCOUNT #:  [de-identified]  ADMIT DATE:  2019  DISCHARGE DATE:  2019      ADMITTING DIAGNOSES:  Ischemic rest pain and short distance claudication, right leg. PROCEDURE:  Right common femoral to above-knee popliteal bypass with 7 mm Propaten polytetrafluoroethylene. HOSPITAL COURSE:  A 46-year-old who had a recent arteriogram, which showed long segment SFA occlusion and severe tibial disease. It was opted to proceed with femoral popliteal bypass. On the day of surgery, she underwent an uncomplicated above-knee femoral popliteal bypass. Postoperatively, she had a warm pink foot with good dorsalis pedis, Doppler signal, and weak pulse. She was able to void. She was able to ambulate with minimal assistance. Her wounds were dressed but will be changed prior to her discharge. She was given instructions regarding the activity and hygiene. She was sent home on tramadol for pain and started on aspirin and Plavix. I will see the patient in 2 weeks.       Quoc Thompson MD      [id number]  D:  2019 6:18  T:  2019 2:32  JOB #:  [id number]  CC:  MD Kishan Kent MD

## 2020-01-17 ENCOUNTER — IP HISTORICAL/CONVERTED ENCOUNTER (OUTPATIENT)
Dept: OTHER | Age: 60
End: 2020-01-17

## 2020-02-19 LAB — CREATININE, EXTERNAL: 0.66

## 2020-03-06 ENCOUNTER — OFFICE VISIT (OUTPATIENT)
Dept: ENDOCRINOLOGY | Age: 60
End: 2020-03-06

## 2020-03-06 VITALS
HEART RATE: 96 BPM | WEIGHT: 111.2 LBS | RESPIRATION RATE: 20 BRPM | HEIGHT: 62 IN | BODY MASS INDEX: 20.46 KG/M2 | DIASTOLIC BLOOD PRESSURE: 95 MMHG | OXYGEN SATURATION: 98 % | TEMPERATURE: 97.5 F | SYSTOLIC BLOOD PRESSURE: 146 MMHG

## 2020-03-06 DIAGNOSIS — D49.7 ADRENAL TUMOR: Primary | ICD-10-CM

## 2020-03-06 RX ORDER — ACETAMINOPHEN 325 MG/1
TABLET ORAL
COMMUNITY

## 2020-03-06 NOTE — PROGRESS NOTES
HISTORY OF PRESENT ILLNESS  Katherine Keys is a 63 y.o. female. HPI  Initial visit  For adrenal nodule   Referred by pcp   Incidentally found on CT abdomen which was done in the hospital      Pt got hospitalized for hematochezia -   On plavix  And  ASA  Since she had a repair for iliac stenosis     She bled until  Her Hgb  Dropped down  to  3 gm .    She got transfusions   She is being posted  For colonoscopy  As OP    She has abdominal pain and holding her belly up in order to work    she has pain on right  LE - where she had stent placed on right iliac artery           Past Medical History:   Diagnosis Date    Arthritis     Diabetes ([facility] [address] 2019    Hypertension     [facility]        Social History     Socioeconomic History    Marital status: SINGLE     Spouse name: Not on file    Number of children: Not on file    Years of education: Not on file    Highest education level: Not on file   Occupational History    Not on file   Social Needs    Financial resource strain: Not on file    Food insecurity:     Worry: Not on file     Inability: Not on file    Transportation needs:     Medical: Not on file     Non-medical: Not on file   Tobacco Use    Smoking status: Current Every Day Smoker     Packs/day: 1.00     Years: 40.00     Pack years: 40.00    Smokeless tobacco: Never Used   Substance and Sexual Activity    Alcohol use: Never     Frequency: Never    Drug use: Never    Sexual activity: Not on file   Lifestyle    Physical activity:     Days per week: Not on file     Minutes per session: Not on file    Stress: Not on file   Relationships    Social connections:     Talks on phone: Not on file     Gets together: Not on file     Attends Mandaen service: Not on file     Active member of club or organization: Not on file     Attends meetings of clubs or organizations: Not on file     Relationship status: Not on file    Intimate partner violence:     Fear of current or ex partner: Not on file     Emotionally abused: Not on file     Physically abused: Not on file     Forced sexual activity: Not on file   Other Topics Concern    Not on file   Social History Narrative    Not on file       Family History   Problem Relation Age of Onset    Cancer Mother         BREAST    Lung Disease Mother     COPD Mother     Heart Disease Mother     Hypertension Mother     Diabetes Mother     Arthritis-osteo Mother     Parkinson's Disease Father     Lung Disease Father     Hypertension Father     Heart Disease Father     Diabetes Father     COPD Other     Parkinson's Disease Other     MS Other     Cancer Brother         PROSTATE    Heart Disease Brother     No Known Problems Son     No Known Problems Son    Ruel Martin MS Son     Anesth Problems Neg Hx        Review of Systems   Constitutional: Positive for malaise/fatigue and weight loss. HENT: Negative. Eyes: Negative. Respiratory: Negative. Cardiovascular: Negative. Gastrointestinal: Positive for abdominal pain. Genitourinary: Negative. Musculoskeletal: Negative. Skin: Negative. Neurological: Negative. Endo/Heme/Allergies: Negative. Psychiatric/Behavioral: Negative. Physical Exam  Constitutional:       Appearance: She is well-developed. HENT:      Head: Normocephalic and atraumatic. Eyes:      Conjunctiva/sclera: Conjunctivae normal.      Pupils: Pupils are equal, round, and reactive to light. Neck:      Musculoskeletal: Normal range of motion and neck supple. Cardiovascular:      Rate and Rhythm: Normal rate and regular rhythm. Pulmonary:      Effort: Pulmonary effort is normal.      Breath sounds: Normal breath sounds. Abdominal:      Tenderness: There is abdominal tenderness. Musculoskeletal: Normal range of motion. Skin:     General: Skin is warm and dry. Neurological:      Mental Status: She is alert and oriented to person, place, and time.       Deep Tendon Reflexes: Reflexes are normal and symmetric. ASSESSMENT and PLAN    1. Left adrenal nodule : 2 cm by 1.3 cm , Incidentally found on CT abdomen  Jan 17 2020  Educated her about the management plan of the nodule from labs, to surgery   Patient is in lot of pain, requiring that be addressed first   Will assess for functionality of the tumor after her abdominal pain is addressed . Labs ordered for future   Will do f/u Ct adrenal in 6 months to 1 year     2. Has severe atherosclerotic disease ( cutting back on smoking )  Has h/o stent on right LE  And splanchnic circulation     3.  Diabetes : managed by pcp     > 50 % of time is spent on counseling   Patient voiced understanding her plan of care

## 2020-03-06 NOTE — LETTER
3/8/20 Patient: Getachew Bennett YOB: 1960 Date of Visit: 3/6/2020 Polina Youngblood MD 
[address] [facility] [address] [facility] [address] VIA Facsimile: [phone] Dear Polina Youngblood MD, Thank you for referring Ms. Getachew Bennett to [address] [place] [address] for evaluation. My notes for this consultation are attached. If you have questions, please do not hesitate to call me. I look forward to following your patient along with you. Sincerely, Dixon Padilla MD

## 2020-03-06 NOTE — PROGRESS NOTES
1. Have you been to the ER, urgent care clinic since your last visit? [facility]/January 2020/GI Bleed Hospitalized since your last visit? ICU 4 days    2. Have you seen or consulted any other health care providers outside of the [address] [place] [address] since your last visit? Include any pap smears or colon screening. No    Wt Readings from Last 3 Encounters:   03/06/20 111 lb 3.2 oz (50.4 kg)   12/04/19 112 lb 14 oz (51.2 kg)   12/02/19 114 lb (51.7 kg)     Temp Readings from Last 3 Encounters:   03/06/20 97.5 °F (36.4 °C) (Oral)   12/04/19 98.1 °F (36.7 °C)   12/02/19 98 °F (36.7 °C)     BP Readings from Last 3 Encounters:   03/06/20 (!) 146/95   12/04/19 128/71   12/02/19 119/81     Pulse Readings from Last 3 Encounters:   03/06/20 96   12/04/19 79   12/02/19 80     Patient c/o severe abdominal pain.

## 2020-03-06 NOTE — PATIENT INSTRUCTIONS
Glipizide has to be taken twenty minutes before eating b-fast and dinner   Do not take it if you are not eating   Cut the pill to half if eating lesser than normal   Do not avoid lunches

## 2020-03-13 ENCOUNTER — OP HISTORICAL/CONVERTED ENCOUNTER (OUTPATIENT)
Dept: OTHER | Age: 60
End: 2020-03-13

## 2020-10-16 ENCOUNTER — OFFICE VISIT (OUTPATIENT)
Dept: ENDOCRINOLOGY | Age: 60
End: 2020-10-16
Payer: COMMERCIAL

## 2020-10-16 VITALS
OXYGEN SATURATION: 100 % | BODY MASS INDEX: 20.83 KG/M2 | SYSTOLIC BLOOD PRESSURE: 183 MMHG | HEART RATE: 74 BPM | RESPIRATION RATE: 18 BRPM | HEIGHT: 62 IN | WEIGHT: 113.2 LBS | DIASTOLIC BLOOD PRESSURE: 101 MMHG | TEMPERATURE: 98.6 F

## 2020-10-16 DIAGNOSIS — I10 ESSENTIAL HYPERTENSION: ICD-10-CM

## 2020-10-16 DIAGNOSIS — D49.7 ADRENAL TUMOR: Primary | ICD-10-CM

## 2020-10-16 LAB
ALBUMIN SERPL-MCNC: 4.4 G/DL (ref 3.5–5)
ALBUMIN/GLOB SERPL: 1.3 {RATIO} (ref 1.1–2.2)
ALP SERPL-CCNC: 68 U/L (ref 45–117)
ALT SERPL-CCNC: 22 U/L (ref 12–78)
ANION GAP SERPL CALC-SCNC: 6 MMOL/L (ref 5–15)
AST SERPL-CCNC: 19 U/L (ref 15–37)
BILIRUB SERPL-MCNC: 0.3 MG/DL (ref 0.2–1)
BUN SERPL-MCNC: 11 MG/DL (ref 6–20)
BUN/CREAT SERPL: 18 (ref 12–20)
CALCIUM SERPL-MCNC: 10 MG/DL (ref 8.5–10.1)
CHLORIDE SERPL-SCNC: 105 MMOL/L (ref 97–108)
CO2 SERPL-SCNC: 29 MMOL/L (ref 21–32)
CREAT SERPL-MCNC: 0.6 MG/DL (ref 0.55–1.02)
GLOBULIN SER CALC-MCNC: 3.3 G/DL (ref 2–4)
GLUCOSE SERPL-MCNC: 76 MG/DL (ref 65–100)
POTASSIUM SERPL-SCNC: 4.8 MMOL/L (ref 3.5–5.1)
PROT SERPL-MCNC: 7.7 G/DL (ref 6.4–8.2)
SODIUM SERPL-SCNC: 140 MMOL/L (ref 136–145)

## 2020-10-16 PROCEDURE — 99214 OFFICE O/P EST MOD 30 MIN: CPT | Performed by: INTERNAL MEDICINE

## 2020-10-16 RX ORDER — PANTOPRAZOLE SODIUM 40 MG/1
40 TABLET, DELAYED RELEASE ORAL DAILY
COMMUNITY

## 2020-10-16 RX ORDER — DEXAMETHASONE 0.5 MG/1
TABLET ORAL
Qty: 1 TAB | Refills: 0 | Status: SHIPPED | OUTPATIENT
Start: 2020-10-16 | End: 2020-11-09 | Stop reason: SDUPTHER

## 2020-10-16 NOTE — PROGRESS NOTES
1. Have you been to the ER, urgent care clinic since your last visit? No Hospitalized since your last visit? No    2. Have you seen or consulted any other health care providers outside of the [address] [place] [address] since your last visit? Include any pap smears or colon screening.  No    Wt Readings from Last 3 Encounters:   10/16/20 113 lb 3.2 oz (51.3 kg)   03/06/20 111 lb 3.2 oz (50.4 kg)   12/04/19 112 lb 14 oz (51.2 kg)     Temp Readings from Last 3 Encounters:   10/16/20 98.6 °F (37 °C) (Temporal)   03/06/20 97.5 °F (36.4 °C) (Oral)   12/04/19 98.1 °F (36.7 °C)     BP Readings from Last 3 Encounters:   10/16/20 (!) 183/101   03/06/20 (!) 146/95   12/04/19 128/71     Pulse Readings from Last 3 Encounters:   10/16/20 74   03/06/20 96   12/04/19 79

## 2020-10-16 NOTE — LETTER
10/18/20 Patient: Dang Thomas YOB: 1960 Date of Visit: 10/16/2020 Mari Pritchett MD 
[address] [facility] [address] [place] [address] VIA Facsimile: [phone] Dear Mari Pritchett MD, Thank you for referring Ms. Bella Brenner to [address] [place] [address] for evaluation. My notes for this consultation are attached. If you have questions, please do not hesitate to call me. I look forward to following your patient along with you. Sincerely, Jodi Ortega MD

## 2020-10-16 NOTE — PROGRESS NOTES
HISTORY OF PRESENT ILLNESS  Sage Ellington is a 63 y.o. female. HPI  First follow  Up after  Initial visit  For adrenal nodule  From Feb 2020    Pt works in [employer]   She lost weight during [employer] time as she was not eating and wearing jump suits every day   Her abdominal pain at last visit was from GERD       March 2020 :    Referred by pcp   Incidentally found on CT abdomen which was done in the hospital    Pt got hospitalized for hematochezia -   On plavix  And  ASA  Since she had a repair for iliac stenosis   She bled until  Her Hgb  Dropped down  to  3 gm .    She got transfusions   She is being posted  For colonoscopy  As OP  She has abdominal pain and holding her belly up in order to work    she has pain on right  LE - where she had stent placed on right iliac artery           Past Medical History:   Diagnosis Date    Arthritis     Diabetes ([facility] [address] 2019    Hypertension     Peripheral vascular disease ([facility]        Social History     Socioeconomic History    Marital status:      Spouse name: Not on file    Number of children: Not on file    Years of education: Not on file    Highest education level: Not on file   Occupational History    Not on file   Social Needs    Financial resource strain: Not on file    Food insecurity     Worry: Not on file     Inability: Not on file    Transportation needs     Medical: Not on file     Non-medical: Not on file   Tobacco Use    Smoking status: Current Every Day Smoker     Packs/day: 1.00     Years: 40.00     Pack years: 40.00    Smokeless tobacco: Never Used   Substance and Sexual Activity    Alcohol use: Never     Frequency: Never    Drug use: Never    Sexual activity: Not on file   Lifestyle    Physical activity     Days per week: Not on file     Minutes per session: Not on file    Stress: Not on file   Relationships    Social connections     Talks on phone: Not on file     Gets together: Not on file     Attends Mormonism service: Not on file Active member of club or organization: Not on file     Attends meetings of clubs or organizations: Not on file     Relationship status: Not on file    Intimate partner violence     Fear of current or ex partner: Not on file     Emotionally abused: Not on file     Physically abused: Not on file     Forced sexual activity: Not on file   Other Topics Concern    Not on file   Social History Narrative    Not on file       Family History   Problem Relation Age of Onset    Cancer Mother         BREAST    Lung Disease Mother     COPD Mother     Heart Disease Mother     Hypertension Mother     Diabetes Mother     Arthritis-osteo Mother     Parkinson's Disease Father     Lung Disease Father     Hypertension Father     Heart Disease Father     Diabetes Father     COPD Other     Parkinson's Disease Other     MS Other     Cancer Brother         PROSTATE    Heart Disease Brother     No Known Problems Son     No Known Problems Son    [facility] MS Son     Anesth Problems Neg Hx        Review of Systems   HENT: Negative. Eyes: Negative. Respiratory: Negative. Cardiovascular: Negative. Genitourinary: Negative. Musculoskeletal: Negative. Skin: Negative. Neurological: Negative. Endo/Heme/Allergies: Negative. Psychiatric/Behavioral: Negative. Physical Exam  Constitutional:       Appearance: She is well-developed. HENT:      Head: Normocephalic and atraumatic. Eyes:      Conjunctiva/sclera: Conjunctivae normal.      Pupils: Pupils are equal, round, and reactive to light. Neck:      Musculoskeletal: Normal range of motion and neck supple. Cardiovascular:      Rate and Rhythm: Normal rate and regular rhythm. Pulmonary:      Effort: Pulmonary effort is normal.      Breath sounds: Normal breath sounds. Musculoskeletal: Normal range of motion. Skin:     General: Skin is warm and dry. Neurological:      Mental Status: She is alert and oriented to person, place, and time. Deep Tendon Reflexes: Reflexes are normal and symmetric. ASSESSMENT and PLAN      1. Repeat falls : she says from circulation issues in LE     2. Left adrenal nodule : 2 cm by 1.3 cm , Incidentally found on CT abdomen  Jan 17 2020  Educated her about the management plan of the nodule from labs, to surgery     March 2020 : Patient is in lot of pain, requiring that be addressed first   Will assess for functionality of the tumor after her abdominal pain is addressed . Labs ordered for future   Will do f/u Ct adrenal in 6 months to 1 year     Oct 2020 : pending  bio-chemical evaluation   and I  ordered CT adrenal  Today       2. Has severe atherosclerotic disease ( cutting back on smoking )  Has h/o stent on right LE  And splanchnic circulation     3. Diabetes : managed by pcp     4.  HTN : not well controlled -  pt claims it is  because of  \" rainy day \"  And having to drive to new location   Only on one med - lisinopril 20 mg        > 50 % of time is spent on counseling   Patient voiced understanding her plan of care

## 2020-10-23 LAB
METANEPH FREE SERPL-MCNC: 21.8 PG/ML (ref 0–88)
NORMETANEPHRINE SERPL-MCNC: 158.5 PG/ML (ref 0–136.8)

## 2020-11-06 ENCOUNTER — HOSPITAL ENCOUNTER (OUTPATIENT)
Dept: CT IMAGING | Age: 60
Discharge: HOME | End: 2020-11-06
Attending: INTERNAL MEDICINE
Payer: COMMERCIAL

## 2020-11-06 DIAGNOSIS — D49.7 ADRENAL TUMOR: ICD-10-CM

## 2020-11-06 PROCEDURE — 74150 CT ABDOMEN W/O CONTRAST: CPT

## 2020-11-09 ENCOUNTER — TELEPHONE (OUTPATIENT)
Dept: ENDOCRINOLOGY | Age: 60
End: 2020-11-09

## 2020-11-09 ENCOUNTER — DOCUMENTATION ONLY (OUTPATIENT)
Dept: ENDOCRINOLOGY | Age: 60
End: 2020-11-09

## 2020-11-09 DIAGNOSIS — I10 ESSENTIAL HYPERTENSION: ICD-10-CM

## 2020-11-09 DIAGNOSIS — D49.7 ADRENAL TUMOR: Primary | ICD-10-CM

## 2020-11-09 RX ORDER — DEXAMETHASONE 0.5 MG/1
TABLET ORAL
Qty: 2 TAB | Refills: 0 | Status: SHIPPED | OUTPATIENT
Start: 2020-11-09 | End: 2020-12-28 | Stop reason: SDUPTHER

## 2020-11-09 NOTE — PROGRESS NOTES
Inform patient that the left adrenal lesion is of the same size which is 2 cm x 1.3 cm.     The adrenal tumor has not grown in size but she has abnormal labs and I need to repeat labs on her    She needs -     Salivary cortisol random nights     24 hr urine for free cortisol     AFTER THE ABOVE TWO TESTS, 11 pm decadron  Pill of 1 mg - AM cortisol next day

## 2020-11-09 NOTE — PROGRESS NOTES
Her labs which are done for adrenal tumor has come back to be ABnormal.  I have ordered repeat patient of labs as sometimes the labs could be falsely positive     I NEED REPEAT PLASMA METANEPHRINES TOO AGAIN - forgot it when I just sent the result notes   Separate lab draw in AM, fasting     She likely needs explanation for all labs - write down the details with pen and label as 1, 2, 3           Boyd Sanches MD

## 2020-11-20 ENCOUNTER — DOCUMENTATION ONLY (OUTPATIENT)
Dept: ENDOCRINOLOGY | Age: 60
End: 2020-11-20

## 2020-11-20 NOTE — PROGRESS NOTES
Patient has left adrenal adenoma, Hounsfield units are not mentioned on the CT scan she had  She does have elevated plasma metanephrines mild and  Abnormal 1 mg DST      Await repeat  Xochitl Garvey MD

## 2020-11-29 LAB
CORTIS F 24H UR-MRATE: 28 UG/24 HR (ref 6–42)
CORTIS F UR-MCNC: 14 UG/L

## 2020-12-01 LAB
METANEPH FREE SERPL-MCNC: <10 PG/ML (ref 0–88)
NORMETANEPHRINE SERPL-MCNC: 100.3 PG/ML (ref 0–136.8)

## 2020-12-04 NOTE — PROGRESS NOTES
Inform pt that repeat metanephrines came back normal , thus far only one cortisol AM CAME ABNORMAL   It requires a repeat -  Taking 1 mg decadron at 11 pm- next day AM cortisol- does she have a lab slip and pill  ?

## 2020-12-04 NOTE — PROGRESS NOTES
Spoke to patient, patient states she took Decadron pill last night and had fasting labs drawn this morning.

## 2020-12-05 LAB — CORTIS AM PEAK SERPL-MCNC: 8.7 UG/DL (ref 6.2–19.4)

## 2020-12-28 DIAGNOSIS — I10 ESSENTIAL HYPERTENSION: ICD-10-CM

## 2020-12-28 DIAGNOSIS — D49.7 ADRENAL TUMOR: Primary | ICD-10-CM

## 2020-12-28 RX ORDER — DEXAMETHASONE 0.5 MG/1
TABLET ORAL
Qty: 2 TAB | Refills: 0 | Status: CANCELLED | OUTPATIENT
Start: 2020-12-28

## 2020-12-28 RX ORDER — DEXAMETHASONE 0.5 MG/1
TABLET ORAL
Qty: 2 TAB | Refills: 0 | Status: SHIPPED | OUTPATIENT
Start: 2020-12-28 | End: 2021-10-18 | Stop reason: ALTCHOICE

## 2020-12-28 NOTE — TELEPHONE ENCOUNTER
Spoke with pt, informed AM Cortisol needs to be repeated, per pt lab order printed and mailed to home address. Rx for Decadron refilled.

## 2020-12-28 NOTE — TELEPHONE ENCOUNTER
----- Message from Laura Buitrago MD sent at 12/28/2020  8:19 AM EST -----  In November - a prescription for 1 mg decadron was given and the next day  AM cortisol CAME ABNORMAL   Looks like we had another cortisol drawn, but by its level I CAN SAY IT WAS just AM cortisol , without decadron the previous night   I just sent for a repeat AM cortisol after 11 pm decadron at night

## 2021-03-18 LAB
ALDOST SERPL-MCNC: <1 NG/DL (ref 0–30)
ALDOST/RENIN PLAS-RTO: <0.9 {RATIO} (ref 0–30)
RENIN PLAS-CCNC: 1.09 NG/ML/HR (ref 0.17–5.38)

## 2021-10-18 ENCOUNTER — APPOINTMENT (OUTPATIENT)
Dept: CT IMAGING | Age: 61
DRG: 065 | End: 2021-10-18
Attending: EMERGENCY MEDICINE
Payer: COMMERCIAL

## 2021-10-18 ENCOUNTER — APPOINTMENT (OUTPATIENT)
Dept: MRI IMAGING | Age: 61
DRG: 065 | End: 2021-10-18
Attending: INTERNAL MEDICINE
Payer: COMMERCIAL

## 2021-10-18 ENCOUNTER — APPOINTMENT (OUTPATIENT)
Dept: CT IMAGING | Age: 61
DRG: 065 | End: 2021-10-18
Attending: INTERNAL MEDICINE
Payer: COMMERCIAL

## 2021-10-18 ENCOUNTER — HOSPITAL ENCOUNTER (INPATIENT)
Age: 61
LOS: 3 days | Discharge: HOME | DRG: 065 | End: 2021-10-21
Attending: EMERGENCY MEDICINE | Admitting: INTERNAL MEDICINE
Payer: COMMERCIAL

## 2021-10-18 DIAGNOSIS — I63.9 CEREBROVASCULAR ACCIDENT (CVA), UNSPECIFIED MECHANISM (HCC): Primary | ICD-10-CM

## 2021-10-18 DIAGNOSIS — R47.9 DIFFICULTY WITH SPEECH: ICD-10-CM

## 2021-10-18 DIAGNOSIS — R29.898 LEFT ARM WEAKNESS: ICD-10-CM

## 2021-10-18 LAB
ALBUMIN SERPL-MCNC: 3.7 G/DL (ref 3.5–5)
ALBUMIN/GLOB SERPL: 1 {RATIO} (ref 1.1–2.2)
ALP SERPL-CCNC: 72 U/L (ref 45–117)
ALT SERPL-CCNC: 18 U/L (ref 12–78)
ANION GAP SERPL CALC-SCNC: 8 MMOL/L (ref 5–15)
APTT PPP: 31.9 SEC (ref 21.2–34.1)
AST SERPL W P-5'-P-CCNC: 14 U/L (ref 15–37)
BASOPHILS # BLD: 0.1 K/UL (ref 0–0.1)
BASOPHILS NFR BLD: 1 % (ref 0–1)
BILIRUB SERPL-MCNC: 0.2 MG/DL (ref 0.2–1)
BUN SERPL-MCNC: 16 MG/DL (ref 6–20)
BUN/CREAT SERPL: 22 (ref 12–20)
CA-I BLD-MCNC: 9.5 MG/DL (ref 8.5–10.1)
CHLORIDE SERPL-SCNC: 108 MMOL/L (ref 97–108)
CO2 SERPL-SCNC: 22 MMOL/L (ref 21–32)
CREAT SERPL-MCNC: 0.74 MG/DL (ref 0.55–1.02)
DIFFERENTIAL METHOD BLD: NORMAL
EOSINOPHIL # BLD: 0.2 K/UL (ref 0–0.4)
EOSINOPHIL NFR BLD: 2 % (ref 0–7)
ERYTHROCYTE [DISTWIDTH] IN BLOOD BY AUTOMATED COUNT: 12.5 % (ref 11.5–14.5)
EST. AVERAGE GLUCOSE BLD GHB EST-MCNC: 143 MG/DL
GLOBULIN SER CALC-MCNC: 3.7 G/DL (ref 2–4)
GLUCOSE BLD STRIP.AUTO-MCNC: 143 MG/DL (ref 65–117)
GLUCOSE BLD STRIP.AUTO-MCNC: 144 MG/DL (ref 65–117)
GLUCOSE SERPL-MCNC: 149 MG/DL (ref 65–100)
HBA1C MFR BLD: 6.6 % (ref 4–5.6)
HCT VFR BLD AUTO: 40.6 % (ref 35–47)
HGB BLD-MCNC: 13 G/DL (ref 11.5–16)
IMM GRANULOCYTES # BLD AUTO: 0 K/UL (ref 0–0.04)
IMM GRANULOCYTES NFR BLD AUTO: 0 % (ref 0–0.5)
INR PPP: 0.9 (ref 0.9–1.1)
LYMPHOCYTES # BLD: 2.7 K/UL (ref 0.8–3.5)
LYMPHOCYTES NFR BLD: 33 % (ref 12–49)
MCH RBC QN AUTO: 29.1 PG (ref 26–34)
MCHC RBC AUTO-ENTMCNC: 32 G/DL (ref 30–36.5)
MCV RBC AUTO: 91 FL (ref 80–99)
MONOCYTES # BLD: 0.8 K/UL (ref 0–1)
MONOCYTES NFR BLD: 10 % (ref 5–13)
NEUTS SEG # BLD: 4.5 K/UL (ref 1.8–8)
NEUTS SEG NFR BLD: 54 % (ref 32–75)
NRBC # BLD: 0 K/UL (ref 0–0.01)
NRBC BLD-RTO: 0 PER 100 WBC
PERFORMED BY, TECHID: ABNORMAL
PERFORMED BY, TECHID: ABNORMAL
PLATELET # BLD AUTO: 369 K/UL (ref 150–400)
PMV BLD AUTO: 11.7 FL (ref 8.9–12.9)
POTASSIUM SERPL-SCNC: 4.5 MMOL/L (ref 3.5–5.1)
PROT SERPL-MCNC: 7.4 G/DL (ref 6.4–8.2)
PROTHROMBIN TIME: 11.6 SEC (ref 11.9–14.7)
RBC # BLD AUTO: 4.46 M/UL (ref 3.8–5.2)
SODIUM SERPL-SCNC: 138 MMOL/L (ref 136–145)
THERAPEUTIC RANGE,PTTT: NORMAL SEC (ref 82–109)
TROPONIN-HIGH SENSITIVITY: 9 NG/L (ref 0–51)
WBC # BLD AUTO: 8.2 K/UL (ref 3.6–11)

## 2021-10-18 PROCEDURE — 36415 COLL VENOUS BLD VENIPUNCTURE: CPT

## 2021-10-18 PROCEDURE — 99222 1ST HOSP IP/OBS MODERATE 55: CPT | Performed by: SURGERY

## 2021-10-18 PROCEDURE — 74011636637 HC RX REV CODE- 636/637: Performed by: INTERNAL MEDICINE

## 2021-10-18 PROCEDURE — 74011250637 HC RX REV CODE- 250/637: Performed by: INTERNAL MEDICINE

## 2021-10-18 PROCEDURE — 92610 EVALUATE SWALLOWING FUNCTION: CPT

## 2021-10-18 PROCEDURE — 84484 ASSAY OF TROPONIN QUANT: CPT

## 2021-10-18 PROCEDURE — 99285 EMERGENCY DEPT VISIT HI MDM: CPT

## 2021-10-18 PROCEDURE — 70498 CT ANGIOGRAPHY NECK: CPT

## 2021-10-18 PROCEDURE — 85730 THROMBOPLASTIN TIME PARTIAL: CPT

## 2021-10-18 PROCEDURE — 74011000636 HC RX REV CODE- 636: Performed by: INTERNAL MEDICINE

## 2021-10-18 PROCEDURE — 74011250636 HC RX REV CODE- 250/636: Performed by: INTERNAL MEDICINE

## 2021-10-18 PROCEDURE — 85610 PROTHROMBIN TIME: CPT

## 2021-10-18 PROCEDURE — 85025 COMPLETE CBC W/AUTO DIFF WBC: CPT

## 2021-10-18 PROCEDURE — 70551 MRI BRAIN STEM W/O DYE: CPT

## 2021-10-18 PROCEDURE — 65270000029 HC RM PRIVATE

## 2021-10-18 PROCEDURE — 70450 CT HEAD/BRAIN W/O DYE: CPT

## 2021-10-18 PROCEDURE — 96372 THER/PROPH/DIAG INJ SC/IM: CPT

## 2021-10-18 PROCEDURE — 82962 GLUCOSE BLOOD TEST: CPT

## 2021-10-18 PROCEDURE — 80053 COMPREHEN METABOLIC PANEL: CPT

## 2021-10-18 PROCEDURE — 83036 HEMOGLOBIN GLYCOSYLATED A1C: CPT

## 2021-10-18 RX ORDER — MAGNESIUM SULFATE 100 %
4 CRYSTALS MISCELLANEOUS AS NEEDED
Status: DISCONTINUED | OUTPATIENT
Start: 2021-10-18 | End: 2021-10-21 | Stop reason: HOSPADM

## 2021-10-18 RX ORDER — ACETAMINOPHEN 650 MG/1
650 SUPPOSITORY RECTAL
Status: DISCONTINUED | OUTPATIENT
Start: 2021-10-18 | End: 2021-10-21 | Stop reason: HOSPADM

## 2021-10-18 RX ORDER — ATORVASTATIN CALCIUM 20 MG/1
20 TABLET, FILM COATED ORAL DAILY
Status: DISCONTINUED | OUTPATIENT
Start: 2021-10-18 | End: 2021-10-18

## 2021-10-18 RX ORDER — PANTOPRAZOLE SODIUM 40 MG/1
40 TABLET, DELAYED RELEASE ORAL DAILY
Status: DISCONTINUED | OUTPATIENT
Start: 2021-10-18 | End: 2021-10-21 | Stop reason: HOSPADM

## 2021-10-18 RX ORDER — INSULIN LISPRO 100 [IU]/ML
INJECTION, SOLUTION INTRAVENOUS; SUBCUTANEOUS
Status: DISCONTINUED | OUTPATIENT
Start: 2021-10-18 | End: 2021-10-18 | Stop reason: SDUPTHER

## 2021-10-18 RX ORDER — DEXTROSE 50 % IN WATER (D50W) INTRAVENOUS SYRINGE
25-50 AS NEEDED
Status: DISCONTINUED | OUTPATIENT
Start: 2021-10-18 | End: 2021-10-18 | Stop reason: SDUPTHER

## 2021-10-18 RX ORDER — GLIPIZIDE 10 MG/1
10 TABLET, FILM COATED, EXTENDED RELEASE ORAL DAILY
COMMUNITY
Start: 2021-08-13

## 2021-10-18 RX ORDER — MAGNESIUM SULFATE 100 %
4 CRYSTALS MISCELLANEOUS AS NEEDED
Status: DISCONTINUED | OUTPATIENT
Start: 2021-10-18 | End: 2021-10-18 | Stop reason: SDUPTHER

## 2021-10-18 RX ORDER — ATORVASTATIN CALCIUM 40 MG/1
80 TABLET, FILM COATED ORAL DAILY
Status: DISCONTINUED | OUTPATIENT
Start: 2021-10-19 | End: 2021-10-19 | Stop reason: SDUPTHER

## 2021-10-18 RX ORDER — DEXTROSE 50 % IN WATER (D50W) INTRAVENOUS SYRINGE
25-50 AS NEEDED
Status: DISCONTINUED | OUTPATIENT
Start: 2021-10-18 | End: 2021-10-21 | Stop reason: HOSPADM

## 2021-10-18 RX ORDER — ACETAMINOPHEN 325 MG/1
650 TABLET ORAL
Status: DISCONTINUED | OUTPATIENT
Start: 2021-10-18 | End: 2021-10-21 | Stop reason: HOSPADM

## 2021-10-18 RX ORDER — LISINOPRIL 20 MG/1
20 TABLET ORAL DAILY
Status: DISCONTINUED | OUTPATIENT
Start: 2021-10-18 | End: 2021-10-21 | Stop reason: HOSPADM

## 2021-10-18 RX ORDER — INSULIN LISPRO 100 [IU]/ML
INJECTION, SOLUTION INTRAVENOUS; SUBCUTANEOUS
Status: DISCONTINUED | OUTPATIENT
Start: 2021-10-18 | End: 2021-10-21 | Stop reason: HOSPADM

## 2021-10-18 RX ORDER — MULTIVITAMIN
1 TABLET ORAL DAILY
Status: DISCONTINUED | OUTPATIENT
Start: 2021-10-18 | End: 2021-10-21 | Stop reason: HOSPADM

## 2021-10-18 RX ORDER — HEPARIN SODIUM 5000 [USP'U]/ML
5000 INJECTION, SOLUTION INTRAVENOUS; SUBCUTANEOUS EVERY 8 HOURS
Status: DISCONTINUED | OUTPATIENT
Start: 2021-10-18 | End: 2021-10-21 | Stop reason: HOSPADM

## 2021-10-18 RX ADMIN — IOPAMIDOL 100 ML: 755 INJECTION, SOLUTION INTRAVENOUS at 08:24

## 2021-10-18 RX ADMIN — MULTIVITAMIN TABLET 1 TABLET: TABLET at 08:53

## 2021-10-18 RX ADMIN — ATORVASTATIN CALCIUM 20 MG: 20 TABLET, FILM COATED ORAL at 08:53

## 2021-10-18 RX ADMIN — HEPARIN SODIUM 5000 UNITS: 5000 INJECTION INTRAVENOUS; SUBCUTANEOUS at 08:53

## 2021-10-18 RX ADMIN — LISINOPRIL 20 MG: 20 TABLET ORAL at 08:53

## 2021-10-18 RX ADMIN — ACETAMINOPHEN 650 MG: 325 TABLET ORAL at 21:21

## 2021-10-18 RX ADMIN — INSULIN LISPRO 2 UNITS: 100 INJECTION, SOLUTION INTRAVENOUS; SUBCUTANEOUS at 17:46

## 2021-10-18 RX ADMIN — PANTOPRAZOLE SODIUM 40 MG: 40 TABLET, DELAYED RELEASE ORAL at 08:53

## 2021-10-18 NOTE — CONSULTS
Consult Date: 10/18/2021    Consults Ms. Shirlene Maza is a 62year old woman with DM, adrenal gland tumor, HTN, PVD, smoker who comes in with sudden onset slurred speech that started at 230 pm yesterday but has since resolved. She awoke at 230 am this morning with left arm and leg weakness and she could not control the movements in her left arm. She came to ER where Ct head was WNL and CTa head and neck showed no LVO. She has never had a stroke before. CHARLIE elevated at 174/106.      Subjective     Past Medical History:   Diagnosis Date    Arthritis     Diabetes ([facility] [address] 2019    Hypertension     Peripheral vascular disease ([facility] [address]       Past Surgical History:   Procedure Laterality Date    HX CATARACT REMOVAL  2019     Family History   Problem Relation Age of Onset    Cancer Mother         BREAST    Lung Disease Mother     COPD Mother     Heart Disease Mother     Hypertension Mother     Diabetes Mother     Arthritis-osteo Mother     Parkinson's Disease Father     Lung Disease Father     Hypertension Father     Heart Disease Father     Diabetes Father     COPD Other     Parkinson's Disease Other     MS Other     Cancer Brother         PROSTATE    Heart Disease Brother     No Known Problems Son     No Known Problems Son    Junior Macias MS Son     Anesth Problems Neg Hx       Social History     Tobacco Use    Smoking status: Current Every Day Smoker     Packs/day: 1.00     Years: 40.00     Pack years: 40.00    Smokeless tobacco: Never Used   Substance Use Topics    Alcohol use: Never       Current Facility-Administered Medications   Medication Dose Route Frequency Provider Last Rate Last Admin    pantoprazole (PROTONIX) tablet 40 mg  40 mg Oral DAILY Don Wong MD        multivitamin with folic acid (ONE DAILY WITH FOLIC ACID) tablet 1 Tablet  1 Tablet Oral DAILY Don Wong MD        lisinopriL (PRINIVIL, ZESTRIL) tablet 20 mg  20 mg Oral DAILY Marvin, 93Sergo Brush MD        atorvastatin (LIPITOR) tablet 20 mg  20 mg Oral DAILY Don Wong MD        glucose chewable tablet 16 g  4 Tablet Oral PRN Rodríguez Blackwell MD        dextrose (D50W) injection syrg 12.5-25 g  25-50 mL IntraVENous PRN Rodríguez Blackwell MD        glucagon [facility]) injection 1 mg  1 mg IntraMUSCular PRN Rodríguez Blackwell MD        insulin lispro (HUMALOG) injection   SubCUTAneous AC&HS Rodríguez Blackwell MD        acetaminophen (TYLENOL) tablet 650 mg  650 mg Oral Q4H PRN Rodríguez Blackwell MD        Or    acetaminophen (TYLENOL) solution 650 mg  650 mg Per NG tube Q4H PRN Rodríguez Blackwell MD        Or    acetaminophen (TYLENOL) suppository 650 mg  650 mg Rectal Q4H PRN Rodríguez Blackwell MD        heparin (porcine) injection 5,000 Units  5,000 Units SubCUTAneous Filipe Nelson MD         Current Outpatient Medications   Medication Sig Dispense Refill    dexAMETHasone (DECADRON) 0.5 mg tablet Take 2 tabs at 11pm and go to lab next day for fasting labs 2 Tab 0    pantoprazole (PROTONIX) 40 mg tablet Take 40 mg by mouth daily.  acetaminophen (TYLENOL) 325 mg tablet Take  by mouth every four (4) hours as needed for Pain.  mv-mn/folic acid/vit [id number] (ALIVE ONCE DAILY WOMEN 50 PLUS PO) Take  by mouth.  aspirin 81 mg chewable tablet Take 1 Tab by mouth daily. Indications: stroke prevention 60 Tab 5    clopidogrel (PLAVIX) 75 mg tab Take 1 Tab by mouth daily. Indications: Treatment to Prevent Peripheral Artery Thromboembolism 60 Tab 5    atorvastatin (LIPITOR) 20 mg tablet Take 20 mg by mouth daily.  glipiZIDE (GLUCOTROL) 10 mg tablet Take 10 mg by mouth daily.  lisinopril (PRINIVIL, ZESTRIL) 20 mg tablet Take  by mouth daily. Review of Systems   Neurological: Positive for weakness and numbness.        Objective     Vital signs for last 24 hours:  Visit Vitals  BP (!) 174/106   Pulse 94   Temp 98.3 °F (36.8 °C)   Resp 16   Ht 5' 1\" (1.549 m)   Wt 61.2 kg (135 lb)   SpO2 98%   BMI 25.51 kg/m²       Intake/Output this shift:  Current Shift: No intake/output data recorded. Last 3 Shifts: No intake/output data recorded. Data Review:   Recent Results (from the past 24 hour(s))   CBC WITH AUTOMATED DIFF    Collection Time: 10/18/21  7:00 AM   Result Value Ref Range    WBC 8.2 3.6 - 11.0 K/uL    RBC 4.46 3.80 - 5.20 M/uL    HGB 13.0 11.5 - 16.0 g/dL    HCT 40.6 35.0 - 47.0 %    MCV 91.0 80.0 - 99.0 FL    MCH 29.1 26.0 - 34.0 PG    MCHC 32.0 30.0 - 36.5 g/dL    RDW 12.5 11.5 - 14.5 %    PLATELET 232 493 - 241 K/uL    MPV 11.7 8.9 - 12.9 FL    NRBC 0.0 0.0  WBC    ABSOLUTE NRBC 0.00 0.00 - 0.01 K/uL    NEUTROPHILS 54 32 - 75 %    LYMPHOCYTES 33 12 - 49 %    MONOCYTES 10 5 - 13 %    EOSINOPHILS 2 0 - 7 %    BASOPHILS 1 0 - 1 %    IMMATURE GRANULOCYTES 0 0 - 0.5 %    ABS. NEUTROPHILS 4.5 1.8 - 8.0 K/UL    ABS. LYMPHOCYTES 2.7 0.8 - 3.5 K/UL    ABS. MONOCYTES 0.8 0.0 - 1.0 K/UL    ABS. EOSINOPHILS 0.2 0.0 - 0.4 K/UL    ABS. BASOPHILS 0.1 0.0 - 0.1 K/UL    ABS. IMM. GRANS. 0.0 0.00 - 0.04 K/UL    DF AUTOMATED     METABOLIC PANEL, COMPREHENSIVE    Collection Time: 10/18/21  7:00 AM   Result Value Ref Range    Sodium 138 136 - 145 mmol/L    Potassium 4.5 3.5 - 5.1 mmol/L    Chloride 108 97 - 108 mmol/L    CO2 22 21 - 32 mmol/L    Anion gap 8 5 - 15 mmol/L    Glucose 149 (H) 65 - 100 mg/dL    BUN 16 6 - 20 mg/dL    Creatinine 0.74 0.55 - 1.02 mg/dL    BUN/Creatinine ratio 22 (H) 12 - 20      GFR est AA >60 >60 ml/min/1.73m2    GFR est non-AA >60 >60 ml/min/1.73m2    Calcium 9.5 8.5 - 10.1 mg/dL    Bilirubin, total 0.2 0.2 - 1.0 mg/dL    AST (SGOT) 14 (L) 15 - 37 U/L    ALT (SGPT) 18 12 - 78 U/L    Alk.  phosphatase 72 45 - 117 U/L    Protein, total 7.4 6.4 - 8.2 g/dL    Albumin 3.7 3.5 - 5.0 g/dL    Globulin 3.7 2.0 - 4.0 g/dL    A-G Ratio 1.0 (L) 1.1 - 2.2     PROTHROMBIN TIME + INR    Collection Time: 10/18/21  7:00 AM   Result Value Ref Range    Prothrombin time 11.6 (L) 11.9 - 14.7 sec    INR 0.9 0.9 - 1.1     PTT    Collection Time: 10/18/21  7:00 AM   Result Value Ref Range    aPTT 31.9 21.2 - 34.1 sec    aPTT, therapeutic range   82 - 109 sec       Physical Exam    Neuro Physical Exam      General: Well developed, well nourished. Patient in no apparent distress. Neurological Exam:  Mental Status: AAOX4, normal speech   Cranial Nerves:   Intact visual fields. PERRL, EOM's full, no nystagmus, no ptosis. Facial sensation is normal. Facial movement is symmetric. Palate is midline. Normal sternocleidomastoid strength. Tongue is midline. Hearing is intact bilaterally. Motor:  5/5 RUE and RLE. 4/5 biceps and triceps, weak hand graso. RLE: 4-/ hip flexion, 4/5 dorsi flexion   Reflexes:   Deep tendon reflexes 2+/4 and symmetrical.   Sensory:   Decreased light touch in left arm and leg    Gait:  Not tested    Tremor:   No tremor noted. Cerebellar:  No cerebellar signs present. Babinski:      Down b/l     Assessment and Plan:  is a 62year old woman who is an active smoker, HTN, DM who comes in with left hemiparesis. Likely has a right basal ganglia lacunar stroke. - MRI brain w/o contrast  - TTE  - cannot have antiplt or OAC because of prior h/o GI bleed reported by patient.    - atorvastatin 80mg  - BP goal < 150/90  - hga1c and lipid panel  - cardiac monitoring  -pt/ot/speech

## 2021-10-18 NOTE — ED TRIAGE NOTES
Started yesterday at approx [phone] [phone] with slurred speech, woke up around 0230 this am with involuntary movements in lt arm, pt reports unsteady gait. Pt complaining of headaches.

## 2021-10-18 NOTE — ROUTINE PROCESS
TRANSFER - OUT REPORT:    Verbal report given to Jeanna Damon RN(name) on Best Peterson  being transferred to [facility](unit) for routine progression of care       Report consisted of patients Situation, Background, Assessment and   Recommendations(SBAR). Information from the following report(s) SBAR was reviewed with the receiving nurse. Lines:       Opportunity for questions and clarification was provided.       Patient transported with:   [employer]

## 2021-10-18 NOTE — PROGRESS NOTES
Problem: Dysphagia (Adult)  Goal: *Acute Goals and Plan of Care (Insert Text)  Description: Speech Therapy Goals  Initiated 10/18/2021  -Patient will tolerate regular diet with thin liquids without signs/symptoms of aspiration given no cues within 7 day(s). [ ] Not met  [ ]  MET   [ ] Progressing  [ ] Dmitri Coral  -Patient will demonstrate understanding of swallow safety precautions and aspiration precautions, diet recs with no cues within 7 day(s). [ ] Not met  [ ]  MET   [ ] Tessie Madden  [ ] Discontinue  Patient will participate in cog-ling assessment within 7 days. Outcome: Not Met   SPEECH LANGUAGE PATHOLOGY BEDSIDE SWALLOW EVALUATION  Patient: Emily Farmer (41 y.o. female)  Date: 10/18/2021  Primary Diagnosis: CVA (cerebral vascular accident) [facility]) [I63.9]        Precautions: aspiration       ASSESSMENT :  Based on the objective data described below, the patient presents with oropharyngeal sw function WNL. Conversational word finding deficits and frustration noted, mild. CVA workup in progress, MRI results noted. Pt seen for bedside sw evaluation, positioned upright in bed. Pt with top dentures, no bottom teeth, oral motor function and voicing WNL. Pt is a smoker. Pt is able to self-feed with right hand trials of thin via cup/straw, puree and solids. Oropharyngeal sw function WNL and pt tolerates all trials without overt s/s aspiration. Pt reports speech production has returned to baseline but is noting difficulty with cognition and word finding. Patient will benefit from skilled intervention to address the above impairments. Patients rehabilitation potential is considered to be Excellent     PLAN :  Recommendations and Planned Interventions: At this time, recommend cont current regular/thin diet with aspiration and GERD precautions. Recommend complete cog-ling assessment, follow-up after d/c from acute care.    Frequency/Duration: Patient will be followed by speech-language pathology 3 times a week to address goals. Discharge Recommendations: recommend SLP intervention after d/c from acute. SUBJECTIVE:   Patient alert, agreeable, pleasant. Pt works as a CNA at [employer]. OBJECTIVE:     CT Results  (Last 48 hours)                 10/18/21 0823  CTA HEAD NECK W CONT Final result    Impression: Atherosclerotic changes aortic arch and proximal brachiocephalic   vessels as described above. Prominent atherosclerotic changes left subclavian   artery. Narrowing of the origin of the left vertebral artery. High-grade stenoses proximal right internal carotid artery according to the   NASCET as described above. Atherosclerotic changes proximal left internal carotid artery as described above   and with no hemodynamically significant stenoses demonstrated. Focal chronic   dissection left carotid bulb. INTRACRANIAL CTA WITH AND WITHOUT CONTRAST AND WITH 3-D RECONSTRUCTIONS :          All CT scans at this facility are performed using dose reduction optimization   techniques as appropriate to a performed exam including the following: Automated   exposure control, adjustments of the mA and/or kV according to patient size, or   use of iterative reconstruction technique. 100 cc of Isovue-370 were injected intravenously. Thin axial and coronal and sagittal reconstructions were obtained. 3-D   reconstructions were also obtained. CLINICAL HISTORY: Left upper extremity weakness, slurred speech. Prominent atherosclerotic changes of the carotid siphons are noted bilaterally   with almost 60% stenoses of the lumen bilaterally. There are atherosclerotic   changes of the M1 segments of both middle cerebral arteries although no   high-grade stenoses is demonstrated. Atherosclerotic changes of the distal   branches of the anterior and middle cerebral arteries are noted.        There is a hypoplastic right vertebral artery and a dominant left vertebral artery. The basilar artery is patent with no significant stenoses demonstrated. Both posterior cerebral arteries are patent with no significant stenoses   demonstrated. No aneurysm was demonstrated. Major dural venous sinuses are patent. The   superior sagittal sinus drains preferentially into the left transverse sinus   with the left transverse sinus being larger than the right and representing a   normal variant. IMPRESSION: Atherosclerotic changes carotid siphons and proximal and distal   anterior and middle cerebral arteries as described above. No major arterial   occlusion was demonstrated. Report has been communicated to Dr. Rock Mauro. Narrative:      CTA NECK WITH AND WITHOUT CONTRAST AND WITH 2 D CORONAL AND SAGITTAL   RECONSTRUCTIONS:       All CT scans at this facility are performed using dose reduction optimization   techniques as appropriate to a performed exam including the following: Automated   exposure control, adjustments of the mA and/or kV according to patient size, or   use of iterative reconstruction technique. 100 cc of Isovue-370 were injected intravenously. Thin axial and coronal and   sagittal reconstructions were obtained. CLINICAL HISTORY: Left upper extremity weakness, slurred speech. AORTIC ARCH: There are atherosclerotic changes of the aortic arch and proximal   brachiocephalic vessels. There is almost 50% stenoses of the origin of the left   common carotid artery. There is a smooth plaque involving the midportion of the   right common carotid artery with approximately 40% stenoses of the lumen. Calcified plaque at the origin of the left subclavian artery is narrowing the   lumen by 50%. Both vertebral arteries are patent. Narrowing of the origin of the   left vertebral artery by 60% is noted. The right vertebral artery is a   hypoplastic vessel.  There are atherosclerotic changes of the subclavian arteries   that are more pronounced on the left with almost 60-70% stenoses of the distal   left subclavian artery (5 mm in length plaque). The distal right subclavian   artery is not visualized due to overlying right subclavian vein       RIGHT CAROTID BIFURCATION: There is a calcified plaque in the distal common   carotid artery narrowing the lumen by 50%. 50% stenoses of the lumen of the   origin of the right internal carotid artery is also noted. 5 mm above the origin   of the right internal carotid artery, there is a plaque that is 3 mm in length   and shows 80-90% stenoses of the lumen and according to the NASCET criteria. More distally the right internal carotid artery is patent with no significant   stenoses demonstrated. LEFT CAROTID BIFURCATION: There is a calcified plaque at the origin of the left   internal carotid artery with less than 50% stenoses and according to the NASCET   criteria. In the region of the carotid bulb there appears to be a chronic   linear dissection in the posterior wall that does not extend into the more   distal segment. More distally the left internal carotid artery is patent with no   significant stenoses demonstrated. There is high-grade stenoses of the proximal   left external carotid artery. There are changes of cervical spondylosis at C5-C6 and C6-7 levels with moderate   stenoses of the neuroforamina. 10/18/21 0719  CT CODE NEURO HEAD WO CONTRAST Final result    Impression:  No acute intracranial process. Other findings as above. Findings conveyed to [employer], charge nurse on behalf of Dr. Diego Graf, on   10/18/2021 7:41 AM.               Narrative:  CT head, 10/18/2021       History: Slurred speech. Left arm involuntary movements. Altered gait. Technique: No intravenous contrast was administered. Multiple contiguous axial   images were acquired from the vertex to the skull base.  Coronal and sagittal   reconstruction was made.       All CT scans at this facility are performed using dose reduction optimization   techniques as appropriate to perform the exam including the following: Automated   exposure control, adjustments of the mA and/or kV according to patient size, or   use iterative reconstruction technique. Comparison: CT head 7/29/2009. Findings:  Cortical volume and ventricular system size are within normal limits. Periventricular and subcortical low attenuation is noted, probably small vessel   ischemic disease. Gray-white differentiation is preserved. No acute   intracranial hemorrhage or midline shift is identified. The calvarium is intact. The included orbits and globes are intact. The visualized paranasal sinuses and   mastoid air cells are clear. Past Medical History:   Diagnosis Date    Arthritis     Diabetes ([facility] [address] 2019    Hypertension     Peripheral vascular disease ([facility] [address]      Past Surgical History:   Procedure Laterality Date    HX CATARACT REMOVAL  2019     Prior Level of Function/Home Situation:   Home Situation  Home Environment: Private residence  One/Two Story Residence: One story  Living Alone: No  Support Systems: Other Family Member(s), Child(maykel)  Patient Expects to be Discharged to[de-identified] House  Current DME Used/Available at Home: None  Diet prior to admission: regular/thin  Current Diet:  regular/thin   Cognitive and Communication Status:  Neurologic State: Alert  Orientation Level: Oriented X4  Cognition: Appropriate decision making, Appropriate for age attention/concentration, Appropriate safety awareness, Follows commands      Pain:  Pain Scale 1: Numeric (0 - 10)  Pain Intensity 1: 8  Pain Location 1: Head    After treatment:   Patient left in no apparent distress in bed, Call bell within reach, and Nursing notified    COMMUNICATION/EDUCATION:   Patient was educated regarding purpose of SLP assessment, POC, diet recs and sw safety precautions.  Patient demonstrated Good understanding as evidenced by verbal responsiveness. The patient's plan of care including recommendations, planned interventions, and recommended diet changes were discussed with: Registered nurse. Patient/family have participated as able in goal setting and plan of care. Patient/family agree to work toward stated goals and plan of care.     Thank you for this referral.  Shelley Milligan M.S. CCC-SLP  Time Calculation: 11 mins

## 2021-10-18 NOTE — CONSULTS
Gastroenterology Consult     Referring Physician: Parag Duque MD     Consult Date: 10/18/2021     Subjective:     Chief Complaint: Extremity weakness    History of Present Illness: Marvel Babin is a 62 y.o. female who is seen in consultation for History of GI bleed. Patient was admitted to the hospital with complaints of slurred speech and left arm weakness. Patient started with slurred speech yesterday about 2:30 pm for 30 minutes that resolved. Around 2:30 this morning she woked up with  involuntary movement of left arm and arm weakness. In the ED, CT head was negative, CTA neck showed Atherosclerotic changes carotid siphons and proximal and distal anterior and middle cerebral arteries as described above. No major arterial occlusion was demonstrated. MRI brain showed Acute infarctions right frontal lobe, parietal lobe including right  inferior parietal lobule and posterior temporal lobe. Denies history of  Stroke. She was a level 2 stroke alert as she was not a candidate for TPA. Neurology saw patient and noted cannot have antiplatelet due to prior GI bleed. -Initial labs showed normal CBC, normal liver and kidney function tests, elevated A1C 6.6. On exam, patient is awake and alert. Denies acute pain. She has left sided weakness and ataxia. Complaints of headache. Vascular surgery saw her for a possible carotid stenosis surgery.    Past Medical History:   Diagnosis Date    Arthritis     Diabetes ([address] 2019    Hypertension     Peripheral vascular disease ([address]      Past Surgical History:   Procedure Laterality Date    HX CATARACT REMOVAL  2019      Family History   Problem Relation Age of Onset    Cancer Mother         BREAST    Lung Disease Mother     COPD Mother     Heart Disease Mother     Hypertension Mother     Diabetes Mother     Arthritis-osteo Mother     Parkinson's Disease Father     Lung Disease Father     Hypertension Father     Heart Disease Father     Diabetes Father  COPD Other     Parkinson's Disease Other     MS Other     Cancer Brother         PROSTATE    Heart Disease Brother     No Known Problems Son     No Known Problems Son    [facility] MS Son     Anesth Problems Neg Hx      Social History     Tobacco Use    Smoking status: Current Every Day Smoker     Packs/day: 1.00     Years: 40.00     Pack years: 40.00    Smokeless tobacco: Never Used   Substance Use Topics    Alcohol use: Never      Allergies   Allergen Reactions    Codeine Other (comments)     Makes me \"loopy\"    Pcn [Penicillins] Nausea and Vomiting     Current Facility-Administered Medications   Medication Dose Route Frequency    pantoprazole (PROTONIX) tablet 40 mg  40 mg Oral DAILY    multivitamin with folic acid (ONE DAILY WITH FOLIC ACID) tablet 1 Tablet  1 Tablet Oral DAILY    lisinopriL (PRINIVIL, ZESTRIL) tablet 20 mg  20 mg Oral DAILY    insulin lispro (HUMALOG) injection   SubCUTAneous AC&HS    acetaminophen (TYLENOL) tablet 650 mg  650 mg Oral Q4H PRN    Or    acetaminophen (TYLENOL) solution 650 mg  650 mg Per NG tube Q4H PRN    Or    acetaminophen (TYLENOL) suppository 650 mg  650 mg Rectal Q4H PRN    heparin (porcine) injection 5,000 Units  5,000 Units SubCUTAneous Q8H    [START ON 10/19/2021] atorvastatin (LIPITOR) tablet 80 mg  80 mg Oral DAILY    influenza vaccine 2021-22 (6 mos+)(PF) (FLUARIX/FLULAVAL/FLUZONE QUAD) injection 0.5 mL  1 Each IntraMUSCular PRIOR TO DISCHARGE    glucose chewable tablet 16 g  4 Tablet Oral PRN    dextrose (D50W) injection syrg 12.5-25 g  25-50 mL IntraVENous PRN    glucagon (GLUCAGEN) injection 1 mg  1 mg IntraMUSCular PRN        Review of Systems:  A detailed 10 organ review of systems is obtained with pertinent positives as listed in the History of Present Illness and Past Medical History. All others are negative.     Objective:     Physical Exam:  Visit Vitals  /71 (BP 1 Location: Right upper arm, BP Patient Position: At rest)   Pulse 88 Temp 97.5 °F (36.4 °C)   Resp 18   Ht 5' 1\" (1.549 m)   Wt 61.2 kg (135 lb)   SpO2 99%   BMI 25.51 kg/m²        Skin:  Extremities and face reveal no rashes. No velasquez erythema. No telangiectasias on the chest wall. HEENT: Sclerae anicteric. Extra-occular muscles are intact. No oral ulcers. No abnormal pigmentation of the lips. The neck is supple. Cardiovascular: Regular rate and rhythm. No murmurs, gallops, or rubs. PMI nondisplaced. Carotids without bruits. Respiratory:  Comfortable breathing with no accessory muscle use. Clear breath sounds with no wheezes, rales, or rhonchi. GI:  Abdomen nondistended, soft, and nontender. Normal active bowel sounds. No enlargement of the liver or spleen. No masses palpable. Rectal:  Deferred  Musculoskeletal:  No pitting edema of the lower legs. Extremities have good range of motion. No costovertebral tenderness. Neurological:  Gross memory appears intact. Patient is alert and oriented. Psychiatric:  Mood appears appropriate with judgement intact. Lymphatic:  No cervical or supraclavicular adenopathy. Lab/Data Review:  Recent Results (from the past 24 hour(s))   CBC WITH AUTOMATED DIFF    Collection Time: 10/18/21  7:00 AM   Result Value Ref Range    WBC 8.2 3.6 - 11.0 K/uL    RBC 4.46 3.80 - 5.20 M/uL    HGB 13.0 11.5 - 16.0 g/dL    HCT 40.6 35.0 - 47.0 %    MCV 91.0 80.0 - 99.0 FL    MCH 29.1 26.0 - 34.0 PG    MCHC 32.0 30.0 - 36.5 g/dL    RDW 12.5 11.5 - 14.5 %    PLATELET 042 890 - 219 K/uL    MPV 11.7 8.9 - 12.9 FL    NRBC 0.0 0.0  WBC    ABSOLUTE NRBC 0.00 0.00 - 0.01 K/uL    NEUTROPHILS 54 32 - 75 %    LYMPHOCYTES 33 12 - 49 %    MONOCYTES 10 5 - 13 %    EOSINOPHILS 2 0 - 7 %    BASOPHILS 1 0 - 1 %    IMMATURE GRANULOCYTES 0 0 - 0.5 %    ABS. NEUTROPHILS 4.5 1.8 - 8.0 K/UL    ABS. LYMPHOCYTES 2.7 0.8 - 3.5 K/UL    ABS. MONOCYTES 0.8 0.0 - 1.0 K/UL    ABS. EOSINOPHILS 0.2 0.0 - 0.4 K/UL    ABS. BASOPHILS 0.1 0.0 - 0.1 K/UL    ABS. IMM.  GRANS. 0.0 0.00 - 0.04 K/UL    DF AUTOMATED     METABOLIC PANEL, COMPREHENSIVE    Collection Time: 10/18/21  7:00 AM   Result Value Ref Range    Sodium 138 136 - 145 mmol/L    Potassium 4.5 3.5 - 5.1 mmol/L    Chloride 108 97 - 108 mmol/L    CO2 22 21 - 32 mmol/L    Anion gap 8 5 - 15 mmol/L    Glucose 149 (H) 65 - 100 mg/dL    BUN 16 6 - 20 mg/dL    Creatinine 0.74 0.55 - 1.02 mg/dL    BUN/Creatinine ratio 22 (H) 12 - 20      GFR est AA >60 >60 ml/min/1.73m2    GFR est non-AA >60 >60 ml/min/1.73m2    Calcium 9.5 8.5 - 10.1 mg/dL    Bilirubin, total 0.2 0.2 - 1.0 mg/dL    AST (SGOT) 14 (L) 15 - 37 U/L    ALT (SGPT) 18 12 - 78 U/L    Alk. phosphatase 72 45 - 117 U/L    Protein, total 7.4 6.4 - 8.2 g/dL    Albumin 3.7 3.5 - 5.0 g/dL    Globulin 3.7 2.0 - 4.0 g/dL    A-G Ratio 1.0 (L) 1.1 - 2.2     PROTHROMBIN TIME + INR    Collection Time: 10/18/21  7:00 AM   Result Value Ref Range    Prothrombin time 11.6 (L) 11.9 - 14.7 sec    INR 0.9 0.9 - 1.1     PTT    Collection Time: 10/18/21  7:00 AM   Result Value Ref Range    aPTT 31.9 21.2 - 34.1 sec    aPTT, therapeutic range   82 - 109 sec   TROPONIN-HIGH SENSITIVITY    Collection Time: 10/18/21  9:43 AM   Result Value Ref Range    Troponin-High Sensitivity 9 0 - 51 ng/L   HEMOGLOBIN A1C WITH EAG    Collection Time: 10/18/21  9:43 AM   Result Value Ref Range    Hemoglobin A1c 6.6 (H) 4.0 - 5.6 %    Est. average glucose 143 mg/dL        MRI BRAIN WO CONT   Final Result   Acute infarctions right frontal lobe, parietal lobe including right   inferior parietal lobule and posterior temporal lobe as described above. CTA HEAD NECK W CONT   Final Result   Atherosclerotic changes aortic arch and proximal brachiocephalic   vessels as described above. Prominent atherosclerotic changes left subclavian   artery. Narrowing of the origin of the left vertebral artery. High-grade stenoses proximal right internal carotid artery according to the   NASCET as described above. Atherosclerotic changes proximal left internal carotid artery as described above   and with no hemodynamically significant stenoses demonstrated. Focal chronic   dissection left carotid bulb. INTRACRANIAL CTA WITH AND WITHOUT CONTRAST AND WITH 3-D RECONSTRUCTIONS :         All CT scans at this facility are performed using dose reduction optimization   techniques as appropriate to a performed exam including the following: Automated   exposure control, adjustments of the mA and/or kV according to patient size, or   use of iterative reconstruction technique. 100 cc of Isovue-370 were injected intravenously. Thin axial and coronal and sagittal reconstructions were obtained. 3-D   reconstructions were also obtained. CLINICAL HISTORY: Left upper extremity weakness, slurred speech. Prominent atherosclerotic changes of the carotid siphons are noted bilaterally   with almost 60% stenoses of the lumen bilaterally. There are atherosclerotic   changes of the M1 segments of both middle cerebral arteries although no   high-grade stenoses is demonstrated. Atherosclerotic changes of the distal   branches of the anterior and middle cerebral arteries are noted. There is a hypoplastic right vertebral artery and a dominant left vertebral   artery. The basilar artery is patent with no significant stenoses demonstrated. Both posterior cerebral arteries are patent with no significant stenoses   demonstrated. No aneurysm was demonstrated. Major dural venous sinuses are patent. The   superior sagittal sinus drains preferentially into the left transverse sinus   with the left transverse sinus being larger than the right and representing a   normal variant. IMPRESSION: Atherosclerotic changes carotid siphons and proximal and distal   anterior and middle cerebral arteries as described above. No major arterial   occlusion was demonstrated. Report has been communicated to Dr. Lance Sanchez. CT CODE NEURO HEAD WO CONTRAST   Final Result   No acute intracranial process. Other findings as above. Findings conveyed to [facility], charge nurse on behalf of Dr. Tamara Prasad, on   10/18/2021 7:41 AM.                    Assessment/Plan:   1. History of GI bleed      -Denies active bleeding.      -Hgb stable at 13.0      -concerns for GI bleed with regards to starting antiplatelet in relation to recent stroke.      -We recommend starting antiplatelet therapy. Monitor hgb and bleeding episodes. 2. CVA       -came in with stroke symptoms. Denies history of stroke. -CT head negative,       -CTA neck and head with Atherosclerotic changes carotid siphons and proximal and distal anterior and middle cerebral arteries. -MRI brain Acute infarctions right frontal lobe, parietal lobe including right  inferior parietal lobule and posterior temporal lobe.      -Dr. Bolanos seen patient for a possible carotid stenosis surgery.       -Neurology is following, input appreciated. -PT/OT consult has been placed  3. Hypertension      -Continue anti-hypertensive medication. 4. Diabetes       -insulin sliding scale. 5.GERD      -continue PPI. Active Problems:    CVA (cerebral vascular accident) ([facility] [address] (10/18/2021)         IP CONSULT TO HOSPITALIST  IP CONSULT TO NEUROLOGY  IP CONSULT TO GASTROENTEROLOGY  IP CONSULT TO VASCULAR SURGERY       Patient discussed with Dr Hansel Marion and agrees to above impression and plan.   Thank you for allowing me to participate in this patients care      Cc Referring Physician   Marquis Trinity MD

## 2021-10-18 NOTE — PROGRESS NOTES
Reason for Admission:  CVA                     RUR Score:    8%                 Plan for utilizing home health:  Awaiting rehab  Evals/recommendations/uses no DME/has no home health. PCP: First and Last name:  Kory Rodriguez MD     Name of Practice:    Are you a current patient: Yes/No: Yes   Approximate date of last visit: Seen a month ago, f/u 11/11/21. Can you participate in a virtual visit with your PCP: Yes/Call                    Current Advanced Directive/Advance Care Plan: Full Code      Healthcare Decision Maker:                Primary Decision Maker: [facility] [phone]                  Transition of Care Plan: D/C Pln is home & a family member to transport upon discharge.

## 2021-10-18 NOTE — PROGRESS NOTES
Problem: Falls - Risk of  Goal: *Absence of Falls  Description: Document Abdifatah Armijo Fall Risk and appropriate interventions in the flowsheet.   Outcome: Progressing Towards Goal  Note: Fall Risk Interventions:  Mobility Interventions: Bed/chair exit alarm, OT consult for ADLs, PT Consult for mobility concerns                             Problem: Patient Education: Go to Patient Education Activity  Goal: Patient/Family Education  Outcome: Progressing Towards Goal     Problem: Patient Education: Go to Patient Education Activity  Goal: Patient/Family Education  Outcome: Progressing Towards Goal     Problem: TIA/CVA Stroke: 0-24 hours  Goal: Off Pathway (Use only if patient is Off Pathway)  Outcome: Progressing Towards Goal  Goal: Activity/Safety  Outcome: Progressing Towards Goal  Goal: Consults, if ordered  Outcome: Progressing Towards Goal  Goal: Diagnostic Test/Procedures  Outcome: Progressing Towards Goal  Goal: Nutrition/Diet  Outcome: Progressing Towards Goal  Goal: Discharge Planning  Outcome: Progressing Towards Goal  Goal: Medications  Outcome: Progressing Towards Goal  Goal: Respiratory  Outcome: Progressing Towards Goal  Goal: Treatments/Interventions/Procedures  Outcome: Progressing Towards Goal  Goal: Minimize risk of bleeding post-thrombolytic infusion  Outcome: Progressing Towards Goal  Goal: Monitor for complications post-thrombolytic infusion  Outcome: Progressing Towards Goal  Goal: Psychosocial  Outcome: Progressing Towards Goal  Goal: *Hemodynamically stable  Outcome: Progressing Towards Goal  Goal: *Neurologically stable  Description: Absence of additional neurological deficits    Outcome: Progressing Towards Goal  Goal: *Verbalizes anxiety and depression are reduced or absent  Outcome: Progressing Towards Goal  Goal: *Absence of Signs of Aspiration on Current Diet  Outcome: Progressing Towards Goal  Goal: *Absence of deep venous thrombosis signs and symptoms(Stroke Metric)  Outcome: Progressing Towards Goal  Goal: *Ability to perform ADLs and demonstrates progressive mobility and function  Outcome: Progressing Towards Goal  Goal: *Stroke education started(Stroke Metric)  Outcome: Progressing Towards Goal  Goal: *Dysphagia screen performed(Stroke Metric)  Outcome: Progressing Towards Goal  Goal: *Rehab consulted(Stroke Metric)  Outcome: Progressing Towards Goal     Problem: TIA/CVA Stroke: Day 2 Until Discharge  Goal: Off Pathway (Use only if patient is Off Pathway)  Outcome: Progressing Towards Goal  Goal: Activity/Safety  Outcome: Progressing Towards Goal  Goal: Diagnostic Test/Procedures  Outcome: Progressing Towards Goal  Goal: Nutrition/Diet  Outcome: Progressing Towards Goal  Goal: Discharge Planning  Outcome: Progressing Towards Goal  Goal: Medications  Outcome: Progressing Towards Goal  Goal: Respiratory  Outcome: Progressing Towards Goal  Goal: Treatments/Interventions/Procedures  Outcome: Progressing Towards Goal  Goal: Psychosocial  Outcome: Progressing Towards Goal  Goal: *Verbalizes anxiety and depression are reduced or absent  Outcome: Progressing Towards Goal  Goal: *Absence of aspiration  Outcome: Progressing Towards Goal  Goal: *Absence of deep venous thrombosis signs and symptoms(Stroke Metric)  Outcome: Progressing Towards Goal  Goal: *Optimal pain control at patient's stated goal  Outcome: Progressing Towards Goal  Goal: *Tolerating diet  Outcome: Progressing Towards Goal  Goal: *Ability to perform ADLs and demonstrates progressive mobility and function  Outcome: Progressing Towards Goal  Goal: *Stroke education continued(Stroke Metric)  Outcome: Progressing Towards Goal     Problem: Ischemic Stroke: Discharge Outcomes  Goal: *Verbalizes anxiety and depression are reduced or absent  Outcome: Progressing Towards Goal  Goal: *Verbalize understanding of risk factor modification(Stroke Metric)  Outcome: Progressing Towards Goal  Goal: *Hemodynamically stable  Outcome: Progressing Towards Goal  Goal: *Absence of aspiration pneumonia  Outcome: Progressing Towards Goal  Goal: *Aware of needed dietary changes  Outcome: Progressing Towards Goal  Goal: *Verbalize understanding of prescribed medications including anti-coagulants, anti-lipid, and/or anti-platelets(Stroke Metric)  Outcome: Progressing Towards Goal  Goal: *Tolerating diet  Outcome: Progressing Towards Goal  Goal: *Aware of follow-up diagnostics related to anticoagulants  Outcome: Progressing Towards Goal  Goal: *Ability to perform ADLs and demonstrates progressive mobility and function  Outcome: Progressing Towards Goal  Goal: *Absence of DVT(Stroke Metric)  Outcome: Progressing Towards Goal  Goal: *Absence of aspiration  Outcome: Progressing Towards Goal  Goal: *Optimal pain control at patient's stated goal  Outcome: Progressing Towards Goal  Goal: *Home safety concerns addressed  Outcome: Progressing Towards Goal  Goal: *Describes available resources and support systems  Outcome: Progressing Towards Goal  Goal: *Verbalizes understanding of activation of EMS(911) for stroke symptoms(Stroke Metric)  Outcome: Progressing Towards Goal  Goal: *Understands and describes signs and symptoms to report to providers(Stroke Metric)  Outcome: Progressing Towards Goal  Goal: *Neurolgocially stable (absence of additional neurological deficits)  Outcome: Progressing Towards Goal  Goal: *Verbalizes importance of follow-up with primary care physician(Stroke Metric)  Outcome: Progressing Towards Goal  Goal: *Smoking cessation discussed,if applicable(Stroke Metric)  Outcome: Progressing Towards Goal  Goal: *Depression screening completed(Stroke Metric)  Outcome: Progressing Towards Goal

## 2021-10-18 NOTE — PROGRESS NOTES
10/18/21. PCP is MATHIEU Stewart - seen last month & f/u 11/11/21. D/C Plan is home & a family member to transport upon discharge. Awaiting rehab evals/recommendations. Uses no DME/has no home health.

## 2021-10-18 NOTE — H&P
GENERAL GENERIC H&P/CONSULT  Presenting complaint: Slurred speech/Left sided weakness    Subjective: 67-year-old female with past medical history multiple comorbidities presents to [facility] with complaints of slurred speech as well as left-sided weakness. Patient states she was in her usual state of health until yesterday evening when she start sprinting sudden onset persistent slurred speech, patient states subsequently when she woke up this morning she experienced left upper as well as left lower extremity weakness which was sudden onset persistent nonresolving following which patient presented to the ED. Patient denies any fevers chills nausea vomiting lightheadedness dizziness dyspnea orthopnea paroxysmal nocturnal dyspnea chest pain palpitations headache loss of sensation auditory or visual symptoms abdominal stool or urinary complaints or any other associated symptoms. Patient endorses no recent sick contacts or travel activity  Past Medical History:   Diagnosis Date    Arthritis     Diabetes ([facility] [address] 2019    Hypertension     Peripheral vascular disease ([facility] [address]       Past Surgical History:   Procedure Laterality Date    HX CATARACT REMOVAL  2019      Prior to Admission medications    Medication Sig Start Date End Date Taking? Authorizing Provider   glipiZIDE SR (GLUCOTROL XL) 10 mg CR tablet Take 10 mg by mouth daily. 8/13/21  Yes Provider, Historical   pantoprazole (PROTONIX) 40 mg tablet Take 40 mg by mouth daily. Yes Provider, Historical   mv-mn/folic acid/vit [id number] (ALIVE ONCE DAILY WOMEN 50 PLUS PO) Take 1 Tablet by mouth daily (with breakfast). Yes Provider, Historical   atorvastatin (LIPITOR) 20 mg tablet Take 20 mg by mouth daily. Yes Provider, Historical   lisinopril (PRINIVIL, ZESTRIL) 20 mg tablet Take  by mouth daily. Yes Other, MD Rene   acetaminophen (TYLENOL) 325 mg tablet Take  by mouth every four (4) hours as needed for Pain.     Provider, Historical Allergies   Allergen Reactions    Codeine Other (comments)     Makes me \"loopy\"    Pcn [Penicillins] Nausea and Vomiting      Social History     Tobacco Use    Smoking status: Current Every Day Smoker     Packs/day: 1.00     Years: 40.00     Pack years: 40.00    Smokeless tobacco: Never Used   Substance Use Topics    Alcohol use: Never      Family History   Problem Relation Age of Onset    Cancer Mother         BREAST    Lung Disease Mother     COPD Mother     Heart Disease Mother     Hypertension Mother     Diabetes Mother     Arthritis-osteo Mother     Parkinson's Disease Father     Lung Disease Father     Hypertension Father     Heart Disease Father     Diabetes Father     COPD Other     Parkinson's Disease Other     MS Other     Cancer Brother         PROSTATE    Heart Disease Brother     No Known Problems Son     No Known Problems Son    [place] MS Son     Anesth Problems Neg Hx       Review of Systems   Constitutional: Positive for activity change, appetite change and fatigue. Negative for chills, diaphoresis, fever and unexpected weight change. HENT: Negative for congestion, dental problem, drooling, ear discharge, ear pain, facial swelling, hearing loss, mouth sores, nosebleeds, postnasal drip, rhinorrhea, sinus pressure, sinus pain, sneezing, sore throat, tinnitus, trouble swallowing and voice change. Eyes: Negative for photophobia, pain, discharge, redness, itching and visual disturbance. Respiratory: Negative for apnea, cough, choking, chest tightness, shortness of breath, wheezing and stridor. Cardiovascular: Negative for chest pain, palpitations and leg swelling. Gastrointestinal: Negative for abdominal distention, abdominal pain, anal bleeding, blood in stool, constipation, diarrhea, nausea, rectal pain and vomiting. Endocrine: Negative for cold intolerance, heat intolerance, polydipsia, polyphagia and polyuria.    Genitourinary: Negative for decreased urine volume, difficulty urinating, dyspareunia, dysuria, enuresis, flank pain, frequency, genital sores, hematuria, menstrual problem, pelvic pain, urgency, vaginal bleeding, vaginal discharge and vaginal pain. Musculoskeletal: Negative for arthralgias, back pain, gait problem, joint swelling, myalgias, neck pain and neck stiffness. Skin: Negative for color change, pallor, rash and wound. Allergic/Immunologic: Negative for environmental allergies, food allergies and immunocompromised state. Neurological: Positive for speech difficulty and weakness. Negative for dizziness, tremors, seizures, syncope, facial asymmetry, light-headedness, numbness and headaches. Hematological: Negative for adenopathy. Does not bruise/bleed easily. Psychiatric/Behavioral: Negative for agitation, behavioral problems, confusion, decreased concentration, dysphoric mood, hallucinations, self-injury, sleep disturbance and suicidal ideas. The patient is not nervous/anxious and is not hyperactive. Objective:    No intake/output data recorded. No intake/output data recorded. Patient Vitals for the past 8 hrs:   BP Temp Pulse Resp SpO2   10/18/21 1440 118/71 97.5 °F (36.4 °C) 88 18 99 %   10/18/21 0730     98 %     Physical Exam  Vitals reviewed. Constitutional:       General: She is not in acute distress. Appearance: She is normal weight. She is ill-appearing. She is not toxic-appearing or diaphoretic. HENT:      Head: Normocephalic and atraumatic. Nose: Nose normal. No congestion or rhinorrhea. Mouth/Throat:      Mouth: Mucous membranes are moist.      Pharynx: Oropharynx is clear. No oropharyngeal exudate or posterior oropharyngeal erythema. Eyes:      General: No scleral icterus. Right eye: No discharge. Left eye: No discharge. Extraocular Movements: Extraocular movements intact. Conjunctiva/sclera: Conjunctivae normal.      Pupils: Pupils are equal, round, and reactive to light. Neck:      Vascular: No carotid bruit. Cardiovascular:      Rate and Rhythm: Normal rate and regular rhythm. Pulses: Normal pulses. Heart sounds: Normal heart sounds. No murmur heard. No friction rub. No gallop. Pulmonary:      Effort: Pulmonary effort is normal. No respiratory distress. Breath sounds: Normal breath sounds. No stridor. No wheezing, rhonchi or rales. Chest:      Chest wall: No tenderness. Abdominal:      General: Abdomen is flat. Bowel sounds are normal. There is no distension. Palpations: Abdomen is soft. There is no mass. Tenderness: There is no abdominal tenderness. There is no right CVA tenderness, left CVA tenderness, guarding or rebound. Hernia: No hernia is present. Musculoskeletal:         General: No swelling, tenderness, deformity or signs of injury. Normal range of motion. Cervical back: Normal range of motion and neck supple. No rigidity or tenderness. Right lower leg: No edema. Left lower leg: No edema. Lymphadenopathy:      Cervical: No cervical adenopathy. Skin:     General: Skin is warm. Capillary Refill: Capillary refill takes less than 2 seconds. Coloration: Skin is not jaundiced or pale. Findings: No bruising, erythema, lesion or rash. Neurological:      Mental Status: She is alert and oriented to person, place, and time. Cranial Nerves: No cranial nerve deficit. Sensory: No sensory deficit. Motor: Weakness (Power 4/5 for all movements of LUE/LLE) present. Coordination: Coordination normal.      Gait: Gait normal.      Deep Tendon Reflexes: Reflexes normal.   Psychiatric:         Mood and Affect: Mood normal.         Behavior: Behavior normal.         Thought Content:  Thought content normal.         Judgment: Judgment normal.          Labs:    Recent Results (from the past 24 hour(s))   CBC WITH AUTOMATED DIFF    Collection Time: 10/18/21  7:00 AM   Result Value Ref Range    WBC 8.2 3.6 - 11.0 K/uL    RBC 4.46 3.80 - 5.20 M/uL    HGB 13.0 11.5 - 16.0 g/dL    HCT 40.6 35.0 - 47.0 %    MCV 91.0 80.0 - 99.0 FL    MCH 29.1 26.0 - 34.0 PG    MCHC 32.0 30.0 - 36.5 g/dL    RDW 12.5 11.5 - 14.5 %    PLATELET 959 493 - 998 K/uL    MPV 11.7 8.9 - 12.9 FL    NRBC 0.0 0.0  WBC    ABSOLUTE NRBC 0.00 0.00 - 0.01 K/uL    NEUTROPHILS 54 32 - 75 %    LYMPHOCYTES 33 12 - 49 %    MONOCYTES 10 5 - 13 %    EOSINOPHILS 2 0 - 7 %    BASOPHILS 1 0 - 1 %    IMMATURE GRANULOCYTES 0 0 - 0.5 %    ABS. NEUTROPHILS 4.5 1.8 - 8.0 K/UL    ABS. LYMPHOCYTES 2.7 0.8 - 3.5 K/UL    ABS. MONOCYTES 0.8 0.0 - 1.0 K/UL    ABS. EOSINOPHILS 0.2 0.0 - 0.4 K/UL    ABS. BASOPHILS 0.1 0.0 - 0.1 K/UL    ABS. IMM. GRANS. 0.0 0.00 - 0.04 K/UL    DF AUTOMATED     METABOLIC PANEL, COMPREHENSIVE    Collection Time: 10/18/21  7:00 AM   Result Value Ref Range    Sodium 138 136 - 145 mmol/L    Potassium 4.5 3.5 - 5.1 mmol/L    Chloride 108 97 - 108 mmol/L    CO2 22 21 - 32 mmol/L    Anion gap 8 5 - 15 mmol/L    Glucose 149 (H) 65 - 100 mg/dL    BUN 16 6 - 20 mg/dL    Creatinine 0.74 0.55 - 1.02 mg/dL    BUN/Creatinine ratio 22 (H) 12 - 20      GFR est AA >60 >60 ml/min/1.73m2    GFR est non-AA >60 >60 ml/min/1.73m2    Calcium 9.5 8.5 - 10.1 mg/dL    Bilirubin, total 0.2 0.2 - 1.0 mg/dL    AST (SGOT) 14 (L) 15 - 37 U/L    ALT (SGPT) 18 12 - 78 U/L    Alk.  phosphatase 72 45 - 117 U/L    Protein, total 7.4 6.4 - 8.2 g/dL    Albumin 3.7 3.5 - 5.0 g/dL    Globulin 3.7 2.0 - 4.0 g/dL    A-G Ratio 1.0 (L) 1.1 - 2.2     PROTHROMBIN TIME + INR    Collection Time: 10/18/21  7:00 AM   Result Value Ref Range    Prothrombin time 11.6 (L) 11.9 - 14.7 sec    INR 0.9 0.9 - 1.1     PTT    Collection Time: 10/18/21  7:00 AM   Result Value Ref Range    aPTT 31.9 21.2 - 34.1 sec    aPTT, therapeutic range   82 - 109 sec   TROPONIN-HIGH SENSITIVITY    Collection Time: 10/18/21  9:43 AM   Result Value Ref Range    Troponin-High Sensitivity 9 0 - 51 ng/L       ECG: nonspecific ST and T waves changes     CT head:IMPRESSION  No acute intracranial process. Other findings as above. CTA head and neck:IMPRESSION: Atherosclerotic changes carotid siphons and proximal and distal  anterior and middle cerebral arteries as described above. No major arterial  occlusion was demonstrated. MRI brain:IMPRESSION  Acute infarctions right frontal lobe, parietal lobe including right  inferior parietal lobule and posterior temporal lobe as described above. Assessment:  Active Problems:    CVA (cerebral vascular accident) ([address] (10/18/2021)        Plan:    Cerebrovascular accidentpatient presents with above-mentioned symptomatology and based on patient's clinical presentation since his cerebrovascular accident, of note concerns for embolic phenomenon given multiple right-sided acute infarctions with evidence of critical carotid artery stenosis  Will need to have discussion with neurology as well as gastroenterology with regards to starting antiplatelets when patient has a history of severe GI bleed  Increase Lipitor to 80 mg once daily  CTA head reviewed  Obtain transthoracic echo to rule out PFO  Aspiration seizure precautions  Physical therapy Occupational Therapy evaluation  Neurology consult further evaluation    Diabetesrecommend insulin sliding scale    Hypertensioncontinue home antihypertensive medications    Gastroesophageal reflux diseasecontinue medications    ProphylaxisHSQ  FENcardiac diet, replete potassium and magnesium  Full code, will clarify about surrogate decision-maker, admitted to telemetry further management      Signed:   Michelle Rinne, MD 10/18/2021

## 2021-10-18 NOTE — ACP (ADVANCE CARE PLANNING)
Advance Care Planning   Healthcare Decision Maker:       Primary Decision Maker:  Adelfo.[facility] - [phone]

## 2021-10-18 NOTE — ED PROVIDER NOTES
EMERGENCY DEPARTMENT HISTORY AND PHYSICAL EXAM      Date: 10/18/2021  Patient Name: Cathy Duque      History of Presenting Illness     Chief Complaint   Patient presents with    Extremity Weakness       History Provided By: Patient    HPI: Cathy Duque, 62 y.o. female with a past medical history significant diabetes and hypertension presents to the ED with cc of arm weakness. Patient states yesterday at 2:30 PM she had slurred speech for about 30 minutes. It resolved. She had the rest the day was normal however 2:30 a.m. this morning she woke up with left arm weakness and clumsiness. She has no pain. No history of stroke. Otherwise been in her normal state of health. There are no other complaints, changes, or physical findings at this time.     PCP: Pastor Gary MD    Current Facility-Administered Medications   Medication Dose Route Frequency Provider Last Rate Last Admin    pantoprazole (PROTONIX) tablet 40 mg  40 mg Oral DAILY Don Wong MD   40 mg at 10/18/21 1991    multivitamin with folic acid (ONE DAILY WITH FOLIC ACID) tablet 1 Tablet  1 Tablet Oral DAILY Don Wong MD   1 Tablet at 10/18/21 0853    lisinopriL (PRINIVIL, ZESTRIL) tablet 20 mg  20 mg Oral DAILY Lexi Thayer MD   20 mg at 10/18/21 3886    insulin lispro (HUMALOG) injection   SubCUTAneous AC&HS Lexi Thayer MD        acetaminophen (TYLENOL) tablet 650 mg  650 mg Oral Q4H PRN Lexi Thayer MD        Or    acetaminophen (TYLENOL) solution 650 mg  650 mg Per NG tube Q4H PRN Lexi Thayer MD        Or    acetaminophen (TYLENOL) suppository 650 mg  650 mg Rectal Q4H PRN Lexi Thayer MD        heparin (porcine) injection 5,000 Units  5,000 Units SubCUTAneous Ronaldo Noel MD   5,000 Units at 10/18/21 0853    [START ON 10/19/2021] atorvastatin (LIPITOR) tablet 80 mg  80 mg Oral DAILY Fany Camacho MD  influenza vaccine 2021-22 (6 mos+)(PF) (FLUARIX/FLULAVAL/FLUZONE QUAD) injection 0.5 mL  1 Each IntraMUSCular PRIOR TO DISCHARGE Don Wong MD        glucose chewable tablet 16 g  4 Tablet Oral PRN Mady Nation MD        dextrose (D50W) injection syrg 12.5-25 g  25-50 mL IntraVENous PRN Don Wong MD        glucagon [facility] [place]) injection 1 mg  1 mg IntraMUSCular PRN Lulu Spicer MD           Past History     Past Medical History:  Past Medical History:   Diagnosis Date    Arthritis     Diabetes ([address] 2019    Hypertension     Peripheral vascular disease ([facility] [address]        Past Surgical History:  Past Surgical History:   Procedure Laterality Date    HX CATARACT REMOVAL  2019       Family History:  Family History   Problem Relation Age of Onset    Cancer Mother         BREAST    Lung Disease Mother     COPD Mother     Heart Disease Mother     Hypertension Mother     Diabetes Mother     Arthritis-osteo Mother     Parkinson's Disease Father     Lung Disease Father     Hypertension Father     Heart Disease Father     Diabetes Father     COPD Other     Parkinson's Disease Other     MS Other     Cancer Brother         PROSTATE    Heart Disease Brother     No Known Problems Son     No Known Problems Son    Harshal Mejias MS Son     Anesth Problems Neg Hx        Social History:  Social History     Tobacco Use    Smoking status: Current Every Day Smoker     Packs/day: 1.00     Years: 40.00     Pack years: 40.00    Smokeless tobacco: Never Used   Substance Use Topics    Alcohol use: Never    Drug use: Never       Allergies: Allergies   Allergen Reactions    Codeine Other (comments)     Makes me \"loopy\"    Pcn [Penicillins] Nausea and Vomiting         Review of Systems     Review of Systems   Constitutional: Negative for activity change and fever. HENT: Negative for rhinorrhea and sore throat. Eyes: Negative for visual disturbance. Respiratory: Negative for cough. Cardiovascular: Negative for chest pain. Gastrointestinal: Negative for abdominal pain, diarrhea, nausea and vomiting. Genitourinary: Negative for dysuria. Musculoskeletal: Negative for arthralgias and myalgias. Skin: Negative for rash and wound. Neurological: Positive for speech difficulty and weakness. Negative for syncope and headaches. Psychiatric/Behavioral: Negative for confusion. All other systems reviewed and are negative. Physical Exam     Physical Exam  Vitals and nursing note reviewed. Constitutional:       Appearance: Normal appearance. She is normal weight. HENT:      Head: Normocephalic and atraumatic. Nose: Nose normal.      Mouth/Throat:      Mouth: Mucous membranes are moist.   Eyes:      Conjunctiva/sclera: Conjunctivae normal.   Cardiovascular:      Rate and Rhythm: Normal rate. Pulses: Normal pulses. Pulmonary:      Effort: Pulmonary effort is normal. No respiratory distress. Musculoskeletal:         General: No swelling or deformity. Normal range of motion. Skin:     General: Skin is warm and dry. Findings: No rash. Neurological:      Mental Status: She is alert and oriented to person, place, and time. Cranial Nerves: No cranial nerve deficit. Sensory: No sensory deficit. Motor: Weakness (Left upper extremity) present. Comments: Normal speech. No facial droop.    Psychiatric:         Mood and Affect: Mood normal.         Behavior: Behavior normal.         Lab and Diagnostic Study Results     Labs -     Recent Results (from the past 12 hour(s))   CBC WITH AUTOMATED DIFF    Collection Time: 10/18/21  7:00 AM   Result Value Ref Range    WBC 8.2 3.6 - 11.0 K/uL    RBC 4.46 3.80 - 5.20 M/uL    HGB 13.0 11.5 - 16.0 g/dL    HCT 40.6 35.0 - 47.0 %    MCV 91.0 80.0 - 99.0 FL    MCH 29.1 26.0 - 34.0 PG    MCHC 32.0 30.0 - 36.5 g/dL    RDW 12.5 11.5 - 14.5 %    PLATELET 803 265 - 997 K/uL    MPV 11.7 8.9 - 12.9 FL    NRBC 0.0 0.0  WBC    ABSOLUTE NRBC 0.00 0.00 - 0.01 K/uL    NEUTROPHILS 54 32 - 75 %    LYMPHOCYTES 33 12 - 49 %    MONOCYTES 10 5 - 13 %    EOSINOPHILS 2 0 - 7 %    BASOPHILS 1 0 - 1 %    IMMATURE GRANULOCYTES 0 0 - 0.5 %    ABS. NEUTROPHILS 4.5 1.8 - 8.0 K/UL    ABS. LYMPHOCYTES 2.7 0.8 - 3.5 K/UL    ABS. MONOCYTES 0.8 0.0 - 1.0 K/UL    ABS. EOSINOPHILS 0.2 0.0 - 0.4 K/UL    ABS. BASOPHILS 0.1 0.0 - 0.1 K/UL    ABS. IMM. GRANS. 0.0 0.00 - 0.04 K/UL    DF AUTOMATED     METABOLIC PANEL, COMPREHENSIVE    Collection Time: 10/18/21  7:00 AM   Result Value Ref Range    Sodium 138 136 - 145 mmol/L    Potassium 4.5 3.5 - 5.1 mmol/L    Chloride 108 97 - 108 mmol/L    CO2 22 21 - 32 mmol/L    Anion gap 8 5 - 15 mmol/L    Glucose 149 (H) 65 - 100 mg/dL    BUN 16 6 - 20 mg/dL    Creatinine 0.74 0.55 - 1.02 mg/dL    BUN/Creatinine ratio 22 (H) 12 - 20      GFR est AA >60 >60 ml/min/1.73m2    GFR est non-AA >60 >60 ml/min/1.73m2    Calcium 9.5 8.5 - 10.1 mg/dL    Bilirubin, total 0.2 0.2 - 1.0 mg/dL    AST (SGOT) 14 (L) 15 - 37 U/L    ALT (SGPT) 18 12 - 78 U/L    Alk. phosphatase 72 45 - 117 U/L    Protein, total 7.4 6.4 - 8.2 g/dL    Albumin 3.7 3.5 - 5.0 g/dL    Globulin 3.7 2.0 - 4.0 g/dL    A-G Ratio 1.0 (L) 1.1 - 2.2     PROTHROMBIN TIME + INR    Collection Time: 10/18/21  7:00 AM   Result Value Ref Range    Prothrombin time 11.6 (L) 11.9 - 14.7 sec    INR 0.9 0.9 - 1.1     PTT    Collection Time: 10/18/21  7:00 AM   Result Value Ref Range    aPTT 31.9 21.2 - 34.1 sec    aPTT, therapeutic range   82 - 109 sec   TROPONIN-HIGH SENSITIVITY    Collection Time: 10/18/21  9:43 AM   Result Value Ref Range    Troponin-High Sensitivity 9 0 - 51 ng/L       Radiologic Studies -   [unfilled]  CT Results  (Last 48 hours)               10/18/21 0823  CTA HEAD NECK W CONT Final result    Impression: Atherosclerotic changes aortic arch and proximal brachiocephalic   vessels as described above.  Prominent atherosclerotic changes left subclavian   artery. Narrowing of the origin of the left vertebral artery. High-grade stenoses proximal right internal carotid artery according to the   NASCET as described above. Atherosclerotic changes proximal left internal carotid artery as described above   and with no hemodynamically significant stenoses demonstrated. Focal chronic   dissection left carotid bulb. INTRACRANIAL CTA WITH AND WITHOUT CONTRAST AND WITH 3-D RECONSTRUCTIONS :          All CT scans at this facility are performed using dose reduction optimization   techniques as appropriate to a performed exam including the following: Automated   exposure control, adjustments of the mA and/or kV according to patient size, or   use of iterative reconstruction technique. 100 cc of Isovue-370 were injected intravenously. Thin axial and coronal and sagittal reconstructions were obtained. 3-D   reconstructions were also obtained. CLINICAL HISTORY: Left upper extremity weakness, slurred speech. Prominent atherosclerotic changes of the carotid siphons are noted bilaterally   with almost 60% stenoses of the lumen bilaterally. There are atherosclerotic   changes of the M1 segments of both middle cerebral arteries although no   high-grade stenoses is demonstrated. Atherosclerotic changes of the distal   branches of the anterior and middle cerebral arteries are noted. There is a hypoplastic right vertebral artery and a dominant left vertebral   artery. The basilar artery is patent with no significant stenoses demonstrated. Both posterior cerebral arteries are patent with no significant stenoses   demonstrated. No aneurysm was demonstrated. Major dural venous sinuses are patent. The   superior sagittal sinus drains preferentially into the left transverse sinus   with the left transverse sinus being larger than the right and representing a   normal variant. IMPRESSION: Atherosclerotic changes carotid siphons and proximal and distal   anterior and middle cerebral arteries as described above. No major arterial   occlusion was demonstrated. Report has been communicated to Dr. Joseph Garcia. Narrative:      CTA NECK WITH AND WITHOUT CONTRAST AND WITH 2 D CORONAL AND SAGITTAL   RECONSTRUCTIONS:       All CT scans at this facility are performed using dose reduction optimization   techniques as appropriate to a performed exam including the following: Automated   exposure control, adjustments of the mA and/or kV according to patient size, or   use of iterative reconstruction technique. 100 cc of Isovue-370 were injected intravenously. Thin axial and coronal and   sagittal reconstructions were obtained. CLINICAL HISTORY: Left upper extremity weakness, slurred speech. AORTIC ARCH: There are atherosclerotic changes of the aortic arch and proximal   brachiocephalic vessels. There is almost 50% stenoses of the origin of the left   common carotid artery. There is a smooth plaque involving the midportion of the   right common carotid artery with approximately 40% stenoses of the lumen. Calcified plaque at the origin of the left subclavian artery is narrowing the   lumen by 50%. Both vertebral arteries are patent. Narrowing of the origin of the   left vertebral artery by 60% is noted. The right vertebral artery is a   hypoplastic vessel. There are atherosclerotic changes of the subclavian arteries   that are more pronounced on the left with almost 60-70% stenoses of the distal   left subclavian artery (5 mm in length plaque). The distal right subclavian   artery is not visualized due to overlying right subclavian vein       RIGHT CAROTID BIFURCATION: There is a calcified plaque in the distal common   carotid artery narrowing the lumen by 50%.  50% stenoses of the lumen of the   origin of the right internal carotid artery is also noted. 5 mm above the origin   of the right internal carotid artery, there is a plaque that is 3 mm in length   and shows 80-90% stenoses of the lumen and according to the NASCET criteria. More distally the right internal carotid artery is patent with no significant   stenoses demonstrated. LEFT CAROTID BIFURCATION: There is a calcified plaque at the origin of the left   internal carotid artery with less than 50% stenoses and according to the NASCET   criteria. In the region of the carotid bulb there appears to be a chronic   linear dissection in the posterior wall that does not extend into the more   distal segment. More distally the left internal carotid artery is patent with no   significant stenoses demonstrated. There is high-grade stenoses of the proximal   left external carotid artery. There are changes of cervical spondylosis at C5-C6 and C6-7 levels with moderate   stenoses of the neuroforamina. 10/18/21 0719  CT CODE NEURO HEAD WO CONTRAST Final result    Impression:  No acute intracranial process. Other findings as above. Findings conveyed to [employer], charge nurse on behalf of Dr. Leroy Steven, on   10/18/2021 7:41 AM.               Narrative:  CT head, 10/18/2021       History: Slurred speech. Left arm involuntary movements. Altered gait. Technique: No intravenous contrast was administered. Multiple contiguous axial   images were acquired from the vertex to the skull base. Coronal and sagittal   reconstruction was made. All CT scans at this facility are performed using dose reduction optimization   techniques as appropriate to perform the exam including the following: Automated   exposure control, adjustments of the mA and/or kV according to patient size, or   use iterative reconstruction technique. Comparison: CT head 7/29/2009. Findings:  Cortical volume and ventricular system size are within normal limits. Periventricular and subcortical low attenuation is noted, probably small vessel   ischemic disease. Gray-white differentiation is preserved. No acute   intracranial hemorrhage or midline shift is identified. The calvarium is intact. The included orbits and globes are intact. The visualized paranasal sinuses and   mastoid air cells are clear. CXR Results  (Last 48 hours)    None          Medical Decision Making and ED Course   - I am the first and primary provider for this patient AND AM THE PRIMARY PROVIDER OF RECORD. - I reviewed the vital signs, available nursing notes, past medical history, past surgical history, family history and social history. - Initial assessment performed. The patients presenting problems have been discussed, and the staff are in agreement with the care plan formulated and outlined with them. I have encouraged them to ask questions as they arise throughout their visit. Vital Signs-Reviewed the patient's vital signs. Patient Vitals for the past 12 hrs:   Temp Pulse Resp BP SpO2   10/18/21 1440 97.5 °F (36.4 °C) 88 18 118/71 99 %   10/18/21 0730     98 %   10/18/21 0655 98.3 °F (36.8 °C) 94 16 (!) 174/106 98 %     Records Reviewed: Nursing Notes and Old Medical Records        ED Course:       ED Course as of Oct 18 1519   Mon Oct 18, 2021   17 67-year-old female who presents for evaluation of slurred speech yesterday at 2:30 PM x 30 mins as well of the left lower extremity weakness that started 2:30 AM.  Patient endorses compliance of medications diet. No history of CVA. On exam does have 5 out of 5 strength bilaterally but her left upper extremity feels weaker to her and her fine motor skills are very difficult on that side. Speech is normal.  Concern for TIA versus CVA versus hypoglycemia. Getting labs including CBC, BMP, troponin, coags as well as CT scan. Patient will likely require admission for MRI and further testing.   She is a level 2 stroke alert as she will not be a candidate for TPA. [LW]   [id number] Dr. Adri White from teleneuro at bedside. Recommends MRI, carotid imaging, as well as echo. No ASA/plavix for now 2/2 hx of major GI bleeding. Dr. Lori Ordonez accepts admission. [LW]      ED Course User Index  [LW] Wale Zepeda MD           Consultations:       Consultations: -  Hospitalist Consultant: Dr. Lori Ordonez: We have asked for emergent assistance with regard to this patient. We have discussed the patients HPI, ROS, PE and results this far. They will come and evaluate the patient for admission. and - Tele-Neurology Consultant: Dr. Adri White: We have asked for emergent assistance with regard to this patient. We have discussed the acute and any chronic neurologic presentations of this patient with our Neurologist partner as well as the findings on the imaging and labs studies available thus far. They are recommending MRI, carotid imaging, echo          Disposition     Disposition: Admitted to Floor Medical Floor the case was discussed with the admitting physician Lori Ordonez. Admitted      Diagnosis     Clinical Impression:   1. Cerebrovascular accident (CVA), unspecified mechanism ([address]    2. Left arm weakness    3. Difficulty with speech        Attestations:    Shanice Felix MD    Please note that this dictation was completed with [employer], the computer voice recognition software. Quite often unanticipated grammatical, syntax, homophones, and other interpretive errors are inadvertently transcribed by the computer software. Please disregard these errors. Please excuse any errors that have escaped final proofreading. Thank you.

## 2021-10-19 ENCOUNTER — APPOINTMENT (OUTPATIENT)
Dept: NON INVASIVE DIAGNOSTICS | Age: 61
DRG: 065 | End: 2021-10-19
Attending: INTERNAL MEDICINE
Payer: COMMERCIAL

## 2021-10-19 LAB
ANION GAP SERPL CALC-SCNC: 7 MMOL/L (ref 5–15)
BUN SERPL-MCNC: 15 MG/DL (ref 6–20)
BUN/CREAT SERPL: 22 (ref 12–20)
CA-I BLD-MCNC: 9.5 MG/DL (ref 8.5–10.1)
CHLORIDE SERPL-SCNC: 108 MMOL/L (ref 97–108)
CHOLEST SERPL-MCNC: 156 MG/DL
CO2 SERPL-SCNC: 23 MMOL/L (ref 21–32)
CREAT SERPL-MCNC: 0.68 MG/DL (ref 0.55–1.02)
ECHO AO ROOT DIAM: 2.5 CM
ECHO AV PEAK GRADIENT: 8 MMHG
ECHO AV PEAK VELOCITY: 143 CM/S
ECHO EST RA PRESSURE: 3 MMHG
ECHO LA AREA 4C: 13.37 CM2
ECHO LA MAJOR AXIS: 2.7 CM
ECHO LA MINOR AXIS: 1.69 CM
ECHO LV E' SEPTAL VELOCITY: 8.19 CM/S
ECHO LV EDV A2C: 25.2 CM3
ECHO LV EJECTION FRACTION BIPLANE: 73 % (ref 55–100)
ECHO LV ESV A2C: 5.27 CM3
ECHO LV INTERNAL DIMENSION DIASTOLIC: 2.93 CM (ref 3.9–5.3)
ECHO LV INTERNAL DIMENSION SYSTOLIC: 1.74 CM
ECHO LV IVSD: 1.17 CM (ref 0.6–0.9)
ECHO LV MASS 2D: 93.3 G (ref 67–162)
ECHO LV MASS INDEX 2D: 58.3 G/M2 (ref 43–95)
ECHO LV POSTERIOR WALL DIASTOLIC: 1.05 CM (ref 0.6–0.9)
ECHO LVOT PEAK GRADIENT: 4 MMHG
ECHO LVOT PEAK VELOCITY: 98.7 CM/S
ECHO MV A VELOCITY: 112 CM/S
ECHO MV AREA PHT: 2.59 CM2
ECHO MV E DECELERATION TIME (DT): 250 MS
ECHO MV E VELOCITY: 79.6 CM/S
ECHO MV E/A RATIO: 0.71
ECHO MV E/E' SEPTAL: 9.72
ECHO MV MAX VELOCITY: 109 CM/S
ECHO MV MEAN GRADIENT: 2 MMHG
ECHO MV PEAK GRADIENT: 5 MMHG
ECHO MV PRESSURE HALF TIME (PHT): 85 MS
ECHO MV VTI: 23.8 CM
ECHO PV MAX VELOCITY: 104 CM/S
ECHO PV PEAK INSTANTANEOUS GRADIENT SYSTOLIC: 4 MMHG
ECHO RA AREA 4C: 9.85 CM2
ECHO RIGHT VENTRICULAR SYSTOLIC PRESSURE (RVSP): 21 MMHG
ECHO RV INTERNAL DIMENSION: 2.69 CM
ECHO RV TAPSE: 1.4 CM (ref 1.5–2)
ECHO TV MAX VELOCITY: 210 CM/S
ECHO TV REGURGITANT PEAK GRADIENT: 18 MMHG
ERYTHROCYTE [DISTWIDTH] IN BLOOD BY AUTOMATED COUNT: 12.5 % (ref 11.5–14.5)
EST. AVERAGE GLUCOSE BLD GHB EST-MCNC: 148 MG/DL
GLUCOSE BLD STRIP.AUTO-MCNC: 143 MG/DL (ref 65–117)
GLUCOSE BLD STRIP.AUTO-MCNC: 146 MG/DL (ref 65–117)
GLUCOSE BLD STRIP.AUTO-MCNC: 152 MG/DL (ref 65–117)
GLUCOSE BLD STRIP.AUTO-MCNC: 152 MG/DL (ref 65–117)
GLUCOSE SERPL-MCNC: 150 MG/DL (ref 65–100)
HBA1C MFR BLD: 6.8 % (ref 4–5.6)
HCT VFR BLD AUTO: 40.7 % (ref 35–47)
HDLC SERPL-MCNC: 54 MG/DL
HDLC SERPL: 2.9 {RATIO} (ref 0–5)
HGB BLD-MCNC: 13 G/DL (ref 11.5–16)
LDLC SERPL CALC-MCNC: 72.2 MG/DL (ref 0–100)
LIPID PROFILE,FLP: NORMAL
MAGNESIUM SERPL-MCNC: 2.2 MG/DL (ref 1.6–2.4)
MCH RBC QN AUTO: 28.9 PG (ref 26–34)
MCHC RBC AUTO-ENTMCNC: 31.9 G/DL (ref 30–36.5)
MCV RBC AUTO: 90.4 FL (ref 80–99)
MV DEC SLOPE: 2860 MM/S2
MV DEC SLOPE: 2860 MM/S2
NRBC # BLD: 0 K/UL (ref 0–0.01)
NRBC BLD-RTO: 0 PER 100 WBC
PERFORMED BY, TECHID: ABNORMAL
PLATELET # BLD AUTO: 374 K/UL (ref 150–400)
PMV BLD AUTO: 11.4 FL (ref 8.9–12.9)
POTASSIUM SERPL-SCNC: 4.8 MMOL/L (ref 3.5–5.1)
RBC # BLD AUTO: 4.5 M/UL (ref 3.8–5.2)
SODIUM SERPL-SCNC: 138 MMOL/L (ref 136–145)
TRIGL SERPL-MCNC: 149 MG/DL (ref ?–150)
VLDLC SERPL CALC-MCNC: 29.8 MG/DL
WBC # BLD AUTO: 9.9 K/UL (ref 3.6–11)

## 2021-10-19 PROCEDURE — 36415 COLL VENOUS BLD VENIPUNCTURE: CPT

## 2021-10-19 PROCEDURE — 97161 PT EVAL LOW COMPLEX 20 MIN: CPT

## 2021-10-19 PROCEDURE — 97530 THERAPEUTIC ACTIVITIES: CPT

## 2021-10-19 PROCEDURE — 74011636637 HC RX REV CODE- 636/637: Performed by: INTERNAL MEDICINE

## 2021-10-19 PROCEDURE — 82962 GLUCOSE BLOOD TEST: CPT

## 2021-10-19 PROCEDURE — 96374 THER/PROPH/DIAG INJ IV PUSH: CPT

## 2021-10-19 PROCEDURE — 74011250637 HC RX REV CODE- 250/637: Performed by: INTERNAL MEDICINE

## 2021-10-19 PROCEDURE — 74011250636 HC RX REV CODE- 250/636: Performed by: INTERNAL MEDICINE

## 2021-10-19 PROCEDURE — 80061 LIPID PANEL: CPT

## 2021-10-19 PROCEDURE — 97165 OT EVAL LOW COMPLEX 30 MIN: CPT

## 2021-10-19 PROCEDURE — 80048 BASIC METABOLIC PNL TOTAL CA: CPT

## 2021-10-19 PROCEDURE — 85027 COMPLETE CBC AUTOMATED: CPT

## 2021-10-19 PROCEDURE — 65270000029 HC RM PRIVATE

## 2021-10-19 PROCEDURE — 83735 ASSAY OF MAGNESIUM: CPT

## 2021-10-19 PROCEDURE — 74011250637 HC RX REV CODE- 250/637: Performed by: SURGERY

## 2021-10-19 PROCEDURE — 99232 SBSQ HOSP IP/OBS MODERATE 35: CPT | Performed by: SURGERY

## 2021-10-19 PROCEDURE — 83036 HEMOGLOBIN GLYCOSYLATED A1C: CPT

## 2021-10-19 RX ORDER — ASPIRIN 325 MG
325 TABLET, DELAYED RELEASE (ENTERIC COATED) ORAL DAILY
Status: DISCONTINUED | OUTPATIENT
Start: 2021-10-19 | End: 2021-10-21 | Stop reason: HOSPADM

## 2021-10-19 RX ORDER — ATORVASTATIN CALCIUM 40 MG/1
80 TABLET, FILM COATED ORAL DAILY
Status: DISCONTINUED | OUTPATIENT
Start: 2021-10-19 | End: 2021-10-21 | Stop reason: HOSPADM

## 2021-10-19 RX ADMIN — ATORVASTATIN CALCIUM 80 MG: 40 TABLET, FILM COATED ORAL at 09:52

## 2021-10-19 RX ADMIN — INSULIN LISPRO 2 UNITS: 100 INJECTION, SOLUTION INTRAVENOUS; SUBCUTANEOUS at 12:37

## 2021-10-19 RX ADMIN — ASPIRIN 325 MG: 325 TABLET, COATED ORAL at 09:52

## 2021-10-19 RX ADMIN — HEPARIN SODIUM 5000 UNITS: 5000 INJECTION INTRAVENOUS; SUBCUTANEOUS at 17:26

## 2021-10-19 RX ADMIN — LISINOPRIL 20 MG: 20 TABLET ORAL at 09:52

## 2021-10-19 RX ADMIN — PANTOPRAZOLE SODIUM 40 MG: 40 TABLET, DELAYED RELEASE ORAL at 09:52

## 2021-10-19 RX ADMIN — INSULIN LISPRO 2 UNITS: 100 INJECTION, SOLUTION INTRAVENOUS; SUBCUTANEOUS at 09:52

## 2021-10-19 RX ADMIN — HEPARIN SODIUM 5000 UNITS: 5000 INJECTION INTRAVENOUS; SUBCUTANEOUS at 09:52

## 2021-10-19 RX ADMIN — MULTIVITAMIN TABLET 1 TABLET: TABLET at 09:52

## 2021-10-19 RX ADMIN — HEPARIN SODIUM 5000 UNITS: 5000 INJECTION INTRAVENOUS; SUBCUTANEOUS at 23:18

## 2021-10-19 RX ADMIN — INSULIN LISPRO 2 UNITS: 100 INJECTION, SOLUTION INTRAVENOUS; SUBCUTANEOUS at 17:26

## 2021-10-19 NOTE — CONSULTS
History and Physical    Chief complaints: Stroke   History of Presenting Illness:  Ardyth Moritz is a 62 y.o. very pleasant woman presents emergency room with unable to speak. With left arm weakness. Patient is examined this afternoon I was requested to evaluate patient because of high-grade stenosis right internal carotid artery. Patient examined at bedside. Patient says her speech has returned to almost normal. However left arm has curled up unable to stretch. Patient looks comfortable patient is coherent. Patient also states that she is very forgetful. Patient denies any chest pain shortness of breath. Patient is a smoker. Patient is also diabetic. Patient also has a history of peripheral vascular disease     Per records the patient apparently had a symptoms of GI bleed cannot have antiplatelet therapy. Past Medical History:   Diagnosis Date    Arthritis     Diabetes ([facility] 2019    Hypertension     Peripheral vascular disease ([facility] [address]       Past Surgical History:   Procedure Laterality Date    HX CATARACT REMOVAL  2019     Family History   Problem Relation Age of Onset    Cancer Mother         BREAST    Lung Disease Mother     COPD Mother     Heart Disease Mother     Hypertension Mother     Diabetes Mother     Arthritis-osteo Mother     Parkinson's Disease Father     Lung Disease Father     Hypertension Father     Heart Disease Father     Diabetes Father     COPD Other     Parkinson's Disease Other     MS Other     Cancer Brother         PROSTATE    Heart Disease Brother     No Known Problems Son     No Known Problems Son    [facility] MS Son     Anesth Problems Neg Hx       Social History     Tobacco Use    Smoking status: Current Every Day Smoker     Packs/day: 1.00     Years: 40.00     Pack years: 40.00    Smokeless tobacco: Never Used   Substance Use Topics    Alcohol use: Never       Prior to Admission medications    Medication Sig Start Date End Date Taking?  Authorizing Provider   glipiZIDE SR (GLUCOTROL XL) 10 mg CR tablet Take 10 mg by mouth daily. 8/13/21  Yes Provider, Historical   pantoprazole (PROTONIX) 40 mg tablet Take 40 mg by mouth daily. Yes Provider, Historical   mv-mn/folic acid/vit E/DQNT630 (ALIVE ONCE DAILY WOMEN 50 PLUS PO) Take 1 Tablet by mouth daily (with breakfast). Yes Provider, Historical   atorvastatin (LIPITOR) 20 mg tablet Take 20 mg by mouth daily. Yes Provider, Historical   lisinopril (PRINIVIL, ZESTRIL) 20 mg tablet Take  by mouth daily. Yes Other, MD Rene   acetaminophen (TYLENOL) 325 mg tablet Take  by mouth every four (4) hours as needed for Pain. Provider, Historical     Allergies   Allergen Reactions    Codeine Other (comments)     Makes me \"loopy\"    Pcn [Penicillins] Nausea and Vomiting        Review of Systems:  Pertinent review of systems discussed in HPI, and rest of organ systems personally reviewed and they are negative. Objective:   Vital signs reviewed:      Visit Vitals  /70 (BP 1 Location: Left upper arm, BP Patient Position: At rest)   Pulse 100   Temp 98.2 °F (36.8 °C)   Resp 17   Ht 5' 1\" (1.549 m)   Wt 135 lb (61.2 kg)   SpO2 98%   BMI 25.51 kg/m²       Physical Exam:   General appearance:   Patient is awake and alert  Head and neck atraumatic normocephalic  Cardiac system regular rate  Pulmonary no audible wheeze  ENT oral mucosa normal no jaundice no hoarse voice  Chest wall excursion normal with respiration cycle  Eyes pupil equal gaze appropriate. Abdomen soft nontender distended  Neurologically motor function deficit noted on the left arm. Cranial nerve seems to be intact  Musculoskeletal system otherwise lower extremities moving  Skin is warm and moist.  Hematologic system no bruising  Psychosocial appropriate cooperative  Vascular distally well perfused with warm foot. Data Review: Labs are reviewed.  Discussed  Recent Results (from the past 24 hour(s))   CBC WITH AUTOMATED DIFF Collection Time: 10/18/21  7:00 AM   Result Value Ref Range    WBC 8.2 3.6 - 11.0 K/uL    RBC 4.46 3.80 - 5.20 M/uL    HGB 13.0 11.5 - 16.0 g/dL    HCT 40.6 35.0 - 47.0 %    MCV 91.0 80.0 - 99.0 FL    MCH 29.1 26.0 - 34.0 PG    MCHC 32.0 30.0 - 36.5 g/dL    RDW 12.5 11.5 - 14.5 %    PLATELET 489 007 - 394 K/uL    MPV 11.7 8.9 - 12.9 FL    NRBC 0.0 0.0  WBC    ABSOLUTE NRBC 0.00 0.00 - 0.01 K/uL    NEUTROPHILS 54 32 - 75 %    LYMPHOCYTES 33 12 - 49 %    MONOCYTES 10 5 - 13 %    EOSINOPHILS 2 0 - 7 %    BASOPHILS 1 0 - 1 %    IMMATURE GRANULOCYTES 0 0 - 0.5 %    ABS. NEUTROPHILS 4.5 1.8 - 8.0 K/UL    ABS. LYMPHOCYTES 2.7 0.8 - 3.5 K/UL    ABS. MONOCYTES 0.8 0.0 - 1.0 K/UL    ABS. EOSINOPHILS 0.2 0.0 - 0.4 K/UL    ABS. BASOPHILS 0.1 0.0 - 0.1 K/UL    ABS. IMM. GRANS. 0.0 0.00 - 0.04 K/UL    DF AUTOMATED     METABOLIC PANEL, COMPREHENSIVE    Collection Time: 10/18/21  7:00 AM   Result Value Ref Range    Sodium 138 136 - 145 mmol/L    Potassium 4.5 3.5 - 5.1 mmol/L    Chloride 108 97 - 108 mmol/L    CO2 22 21 - 32 mmol/L    Anion gap 8 5 - 15 mmol/L    Glucose 149 (H) 65 - 100 mg/dL    BUN 16 6 - 20 mg/dL    Creatinine 0.74 0.55 - 1.02 mg/dL    BUN/Creatinine ratio 22 (H) 12 - 20      GFR est AA >60 >60 ml/min/1.73m2    GFR est non-AA >60 >60 ml/min/1.73m2    Calcium 9.5 8.5 - 10.1 mg/dL    Bilirubin, total 0.2 0.2 - 1.0 mg/dL    AST (SGOT) 14 (L) 15 - 37 U/L    ALT (SGPT) 18 12 - 78 U/L    Alk.  phosphatase 72 45 - 117 U/L    Protein, total 7.4 6.4 - 8.2 g/dL    Albumin 3.7 3.5 - 5.0 g/dL    Globulin 3.7 2.0 - 4.0 g/dL    A-G Ratio 1.0 (L) 1.1 - 2.2     PROTHROMBIN TIME + INR    Collection Time: 10/18/21  7:00 AM   Result Value Ref Range    Prothrombin time 11.6 (L) 11.9 - 14.7 sec    INR 0.9 0.9 - 1.1     PTT    Collection Time: 10/18/21  7:00 AM   Result Value Ref Range    aPTT 31.9 21.2 - 34.1 sec    aPTT, therapeutic range   82 - 109 sec   TROPONIN-HIGH SENSITIVITY    Collection Time: 10/18/21  9:43 AM Result Value Ref Range    Troponin-High Sensitivity 9 0 - 51 ng/L   HEMOGLOBIN A1C WITH EAG    Collection Time: 10/18/21  9:43 AM   Result Value Ref Range    Hemoglobin A1c 6.6 (H) 4.0 - 5.6 %    Est. average glucose 143 mg/dL   GLUCOSE, POC    Collection Time: 10/18/21  5:07 PM   Result Value Ref Range    Glucose (POC) 143 (H) 65 - 117 mg/dL    Performed by [address], POC    Collection Time: 10/18/21  8:56 PM   Result Value Ref Range    Glucose (POC) 144 (H) 65 - 117 mg/dL    Performed by [id number] reviewed: discussed as below. Assessment:     Active Problems:    CVA (cerebral vascular accident) ([address] (10/18/2021)        Plan:     I reviewed the MRI as well. Patient has a significant right frontal and occipital parietal multiple focal ischemic changes. However with the size of the multiple infarct in the MRI clinically patient seems to be reasonably stable doing pretty good. Her speech has returned normal almost.  Patient does have a high-grade stenosis what looks like a soft and hard calcified plaque right proximal ICA and carotid bifurcation area. Distal ICA seems to be patent on the right side system. I do recommend either Plavix and aspirin due to significant infarct and right hemispheric brain. Probably a good idea to have a GI consultation look at stomach probably as outpatient after she recovers from stroke. Patient is currently not on statin medication. I recommended. I also recommend a cardiac consultation. Echocardiogram is currently pending. Full cardiac work-up if the patient is reasonable risk, I will offer patient right-sided carotid endarterectomy. Try to avoid secondary insult such as hypotension and hypoxia. I thank you for consultation and let me participate in the care of this patient.

## 2021-10-19 NOTE — PROGRESS NOTES
Progress Note    Patient: Ade Shine MRN: [id number]  SSN: [id number]    YOB: 1960  Age: 62 y.o. Sex: female      Admit Date: 10/18/2021    LOS: 1 day     Subjective:   GI in consultation for history of GI Bleed    Patient seen eating lunch. Her speech has improved today. She does have better control over her left arm today but it remains weak. She is followed by Neurology. MRI shows significant right frontal and occipital parietal multiple focal ischemic changes. Vascular surgery is following. Patient reports she is scheduled for right sided carotid endarterectomy next week. HgB remains stable, she denies rectal bleeding. She is currently on aspirin. Patient was admitted to the hospital with complaints of slurred speech and left arm weakness. Patient started with slurred speech yesterday about 2:30 pm for 30 minutes that resolved. Around 2:30 this morning she woked up with  involuntary movement of left arm and arm weakness. In the ED, CT head was negative, CTA neck showed Atherosclerotic changes carotid siphons and proximal and distal anterior and middle cerebral arteries as described above. No major arterial occlusion was demonstrated. MRI brain showed Acute infarctions right frontal lobe, parietal lobe including right inferior parietal lobule and posterior temporal lobe. Denies history of  Stroke. She was a level 2 stroke alert as she was not a candidate for TPA. Neurology saw patient and noted cannot have antiplatelet due to prior GI bleed. Objective:     Vitals:    10/19/21 0720 10/19/21 0800 10/19/21 0949 10/19/21 1200   BP: 98/65  104/66    Pulse: 87 90 90 92   Resp: 16  18    Temp: 98 °F (36.7 °C)      SpO2: 97%  99%    Weight:       Height:            Intake and Output:  Current Shift: No intake/output data recorded. Last three shifts: No intake/output data recorded. Physical Exam:   Skin:  Extremities and face reveal no rashes. No velasquez erythema.    HEENT: Sclerae anicteric. Extra-occular muscles are intact. No abnormal pigmentation of the lips. The neck is supple. Cardiovascular: Regular rate and rhythm. Respiratory:  Comfortable breathing with no accessory muscle use. GI:  Abdomen nondistended, soft, and nontender. No enlargement of the liver or spleen. No masses palpable. Rectal:  Deferred  Musculoskeletal: Left side weakness  Neurological:  Gross memory appears intact. Patient is alert and oriented. Psychiatric:  Mood appears appropriate with judgement intact.   Lymphatic:  No visible adenopathy      Lab/Data Review:  Recent Results (from the past 24 hour(s))   GLUCOSE, POC    Collection Time: 10/18/21  5:07 PM   Result Value Ref Range    Glucose (POC) 143 (H) 65 - 117 mg/dL    Performed by [address], POC    Collection Time: 10/18/21  8:56 PM   Result Value Ref Range    Glucose (POC) 144 (H) 65 - 117 mg/dL    Performed by MASHA AJ    LIPID PANEL    Collection Time: 10/19/21  6:55 AM   Result Value Ref Range    LIPID PROFILE        Cholesterol, total 156 <200 mg/dL    Triglyceride 149 <150 mg/dL    HDL Cholesterol 54 mg/dL    LDL, calculated 72.2 0 - 100 mg/dL    VLDL, calculated 29.8 mg/dL    CHOL/HDL Ratio 2.9 0.0 - 5.0     CBC W/O DIFF    Collection Time: 10/19/21  6:55 AM   Result Value Ref Range    WBC 9.9 3.6 - 11.0 K/uL    RBC 4.50 3.80 - 5.20 M/uL    HGB 13.0 11.5 - 16.0 g/dL    HCT 40.7 35.0 - 47.0 %    MCV 90.4 80.0 - 99.0 FL    MCH 28.9 26.0 - 34.0 PG    MCHC 31.9 30.0 - 36.5 g/dL    RDW 12.5 11.5 - 14.5 %    PLATELET 287 103 - 438 K/uL    MPV 11.4 8.9 - 12.9 FL    NRBC 0.0 0.0  WBC    ABSOLUTE NRBC 0.00 0.00 - 7.41 K/uL   METABOLIC PANEL, BASIC    Collection Time: 10/19/21  6:55 AM   Result Value Ref Range    Sodium 138 136 - 145 mmol/L    Potassium 4.8 3.5 - 5.1 mmol/L    Chloride 108 97 - 108 mmol/L    CO2 23 21 - 32 mmol/L    Anion gap 7 5 - 15 mmol/L    Glucose 150 (H) 65 - 100 mg/dL    BUN 15 6 - 20 mg/dL    Creatinine 0. 68 0.55 - 1.02 mg/dL    BUN/Creatinine ratio 22 (H) 12 - 20      GFR est AA >60 >60 ml/min/1.73m2    GFR est non-AA >60 >60 ml/min/1.73m2    Calcium 9.5 8.5 - 10.1 mg/dL   MAGNESIUM    Collection Time: 10/19/21  6:55 AM   Result Value Ref Range    Magnesium 2.2 1.6 - 2.4 mg/dL   GLUCOSE, POC    Collection Time: 10/19/21  7:30 AM   Result Value Ref Range    Glucose (POC) 152 (H) 65 - 117 mg/dL    Performed by Dana Galindo    ECHO ADULT COMPLETE    Collection Time: 10/19/21  9:24 AM   Result Value Ref Range    Aortic Valve Systolic Peak Velocity 664.40 cm/s    AoV PG 8.00 mmHg    Ao Root D 2.50 cm    IVSd 1.17 (A) 0.60 - 0.90 cm    LVIDd 2.93 (A) 3.90 - 5.30 cm    LVIDs 1.74 cm    LVOT Peak Velocity 98.70 cm/s    LVOT Peak Gradient 4.00 mmHg    LVPWd 1.05 (A) 0.60 - 0.90 cm    LV E' Septal Velocity 8.19 cm/s    LV ED Vol A2C 25.20 cm3    LV ES Vol A2C 5.27 cm3    E/E' septal 9.72     Left Atrium Major Axis 2.70 cm    Mitral Valve Deceleration Jessamine 2,860.00 mm/s2    Mitral Valve Deceleration Jessamine 2,860.00 mm/s2    Mitral Valve E Wave Deceleration Time 250.00 ms    Mitral Valve Pressure Half-time 85.00 ms    MV A Elroy 112.00 cm/s    MV E Elroy 79.60 cm/s    MV Mean Gradient 2.00 mmHg    Mitral Valve Annulus Velocity Time Integral 23.80 cm    Mitral Valve Max Velocity 109.00 cm/s    MV Peak Gradient 5.00 mmHg    MVA (PHT) 2.59 cm2    MV E/A 0.71     Pulmonic Valve Max Velocity 104.00 cm/s    Pulmonic Valve Systolic Peak Instantaneous Gradient 4.00 mmHg    Est. RA Pressure 3.00 mmHg    RVIDd 2.69 cm    RVSP 21.00 mmHg    Tricuspid Valve Max Velocity 210.00 cm/s    Triscuspid Valve Regurgitation Peak Gradient 18.00 mmHg    Tapse 1.40 (A) 1.50 - 2.00 cm    Right Atrial Area 4C 9.85 cm2    LA Area 4C 13.37 cm2    BP EF 73.0 55.0 - 100.0 %    LV Mass AL 93.3 67.0 - 162.0 g    LV Mass AL Index 58.3 43.0 - 95.0 g/m2    Left Atrium Minor Axis 1.69 cm   GLUCOSE, POC    Collection Time: 10/19/21 11:27 AM   Result Value Ref Range    Glucose (POC) 143 (H) 65 - 117 mg/dL    Performed by Baljinder Boggs               MRI BRAIN WO CONT   Final Result   Acute infarctions right frontal lobe, parietal lobe including right   inferior parietal lobule and posterior temporal lobe as described above. CTA HEAD NECK W CONT   Final Result   Atherosclerotic changes aortic arch and proximal brachiocephalic   vessels as described above. Prominent atherosclerotic changes left subclavian   artery. Narrowing of the origin of the left vertebral artery. High-grade stenoses proximal right internal carotid artery according to the   NASCET as described above. Atherosclerotic changes proximal left internal carotid artery as described above   and with no hemodynamically significant stenoses demonstrated. Focal chronic   dissection left carotid bulb. INTRACRANIAL CTA WITH AND WITHOUT CONTRAST AND WITH 3-D RECONSTRUCTIONS :         All CT scans at this facility are performed using dose reduction optimization   techniques as appropriate to a performed exam including the following: Automated   exposure control, adjustments of the mA and/or kV according to patient size, or   use of iterative reconstruction technique. 100 cc of Isovue-370 were injected intravenously. Thin axial and coronal and sagittal reconstructions were obtained. 3-D   reconstructions were also obtained. CLINICAL HISTORY: Left upper extremity weakness, slurred speech. Prominent atherosclerotic changes of the carotid siphons are noted bilaterally   with almost 60% stenoses of the lumen bilaterally. There are atherosclerotic   changes of the M1 segments of both middle cerebral arteries although no   high-grade stenoses is demonstrated. Atherosclerotic changes of the distal   branches of the anterior and middle cerebral arteries are noted. There is a hypoplastic right vertebral artery and a dominant left vertebral   artery.  The basilar artery is patent with no significant stenoses demonstrated. Both posterior cerebral arteries are patent with no significant stenoses   demonstrated. No aneurysm was demonstrated. Major dural venous sinuses are patent. The   superior sagittal sinus drains preferentially into the left transverse sinus   with the left transverse sinus being larger than the right and representing a   normal variant. IMPRESSION: Atherosclerotic changes carotid siphons and proximal and distal   anterior and middle cerebral arteries as described above. No major arterial   occlusion was demonstrated. Report has been communicated to Dr. Lori Ordonez. CT CODE NEURO HEAD WO CONTRAST   Final Result   No acute intracranial process. Other findings as above. Findings conveyed to [email or web address], charge nurse on behalf of Dr. Tho Kim, on   10/18/2021 7:41 AM.                 Assessment:     Active Problems:    CVA (cerebral vascular accident) ([address] (10/18/2021)        Plan:   1. Hx of GI Bleed    Denies active bleeding     HgB stable at 13.0     Started on aspirin 325 mg daily      Concerns for GI bleed with regards to starting antiplatelet in relation to recent stroke       GI recommends starting antiplatelet therapy       Monitor HgB and bleeding   2. CVA     CT head negative     CTA neck and head with Atherosclerotic changes carotid siphons and proximal distal anterior and middle cerebral arteries      MRI Brain: Acute infarctions right frontal lobe, parietal lobe including right inferior parietal lobule and posterior temporal lobe. Vascular surgery input appreciated      Neurology input appreciated      right sided carotid endarterectomy next week with Dr. Moira Pfeiffer  3. Hypertension      Continue current antihypertensive medications  4. Type 2 Diabetes      HgB A1C 6.6       Continue bedside glucose monitoring       Sliding scale coverage    5.  GERD        Continue PPI    Thank you for allowing me to participate in this patients care   Plan discussed with  and he approves.     Signed By: Vj Miller NP     October 19, 2021

## 2021-10-19 NOTE — PROGRESS NOTES
PHYSICAL THERAPY EVALUATION  Patient: Kingston Huang (77 y.o. female)  Date: 10/19/2021  Primary Diagnosis: CVA (cerebral vascular accident) [facility]) [I63.9]        Precautions:falls    ASSESSMENT  This is a 61 y/o female who came to  [address] [place] [address]  ED with c/o slurred speech as well as left-sided weakness , admitted on 10/19/21 for CVA. In the ED, CT head was negative . MRI brain shows acute infarctions right frontal lobe, parietal lobe including right  inferior parietal lobule and posterior temporal lobe . Pt has PMH of arthritis , DM , HTN , and PVD. Pt received semi-supine in bed , agreeable for PT eval/tx . Pt is A& O x 4 , able to tell year with cues , per pt report , she lives with her mother  in a trailer with 5 steps to enter , HR on L side when going up  ,  was IND with ADL's and IND for functional transfers/mobility with no AD prior to admission . Based on the objective data described below, the patient presents with  decreased strength , 3+/5 grossly in L LE , R LE - WFL ,  balance deficits , generalized weakness , coordination deficits  , weak  on L hand , decreased activity tolerance and increased need for assist with functional mobility ( needs SBA for rolling from side to side  , SBA for supine >sit transfers , intact static sitting balance ,CGA for sit <>stand and SPT , good static  standing balance with support , is able to ambulate - 15' with RW, CGA/Deanne with slow ita and  decreased step length ) . Pt tearful , provided with emotional support . Pt  educated on sourav L E HEP in prep for safe transfers/mobility  with pt demonstrating and verbalizing understanding. Pt would benefit from continued skilled PT services to address current impairments and improve overall IND and safety with  functional transfers/mobility. Recommend d/c to IRF when medically appropriate .     Current Level of Function Impacting Discharge (mobility/balance): Requires CGA for transfers and CGA/Deanne for ambulation. Functional Outcome Measure: The patient scored 18 on the AM PAC basic mobility outcome measure which is indicative of medium complexity. Other factors to consider for discharge: PLOF , severity of deficits , time since onset         PLAN :  Recommendations and Planned Interventions: bed mobility training, transfer training, gait training, therapeutic exercises, neuromuscular re-education, patient and family training/education and therapeutic activities      Frequency/Duration: Patient will be followed by physical therapy:  3-5x/week to address goals. Recommendation for discharge: (in order for the patient to meet his/her long term goals)  [facility]     This discharge recommendation:  Has been made in collaboration with the attending provider and/or case management    IF patient discharges home will need the following DME: rolling walker         SUBJECTIVE:   Patient stated My L side is weak    OBJECTIVE DATA SUMMARY:   HISTORY:    Past Medical History:   Diagnosis Date    Arthritis     Diabetes ([facility] 2019    Hypertension     Peripheral vascular disease ([facility]      Past Surgical History:   Procedure Laterality Date    HX CATARACT REMOVAL  2019       Personal factors and/or comorbidities impacting plan of care:     Home Situation  Home Environment: Trailer/mobile home  # Steps to Enter: 5  Rails to Enter: Yes  Hand Rails : Left  Wheelchair Ramp: No  One/Two Story Residence: One story  Living Alone: No  Support Systems: Parent(s) (mother lives with her )  Patient Expects to be Discharged to[de-identified] House  Current DME Used/Available at Home: None    PLOF: Pt IND for ADLS/IADLS, IND with mobility prior to admission. EXAMINATION/PRESENTATION/DECISION MAKING:   Critical Behavior:  Neurologic State: Alert  Orientation Level: Oriented X4 (able to tell time with cues)  Cognition: Follows commands     Hearing:   Auditory  Auditory Impairment: None  Skin:  Intact where exposed    Range Of Motion:  AROM: Within functional limits  Strength:    Strength: Generally decreased, functional ( 3+/5 grossly for L LE , R LE - WFL)  Tone & Sensation:   Tone: Normal  Sensation: Intact    Coordination:  Coordination: Generally decreased, functional (L LE)  Functional Mobility:  Bed Mobility:  Rolling: Stand-by assistance  Supine to Sit: Stand-by assistance  Sit to Supine: Stand-by assistance  Scooting: Stand-by assistance  Transfers:  Sit to Stand: Contact guard assistance  Stand to Sit: Contact guard assistance        Bed to Chair: Contact guard assistance  Balance:   Sitting: Intact; Without support  Standing: Impaired; Without support  Standing - Static: Good;Constant support  Standing - Dynamic : Fair;Constant support  Ambulation/Gait Training:  Distance (ft): 15 Feet (ft)  Assistive Device: Walker, rolling  Ambulation - Level of Assistance: Contact guard assistance;Minimal assistance  Speed/Ebonie: Slow  Step Length: Right shortened;Left shortened    Functional Measure:    [address] [place] [id number] AM-PAC 6 Clicks         Basic Mobility Inpatient Short Form  How much difficulty does the patient currently have. .. Unable A Lot A Little None   1. Turning over in bed (including adjusting bedclothes, sheets and blankets)? [] 1   [] 2   [x] 3   [] 4   2. Sitting down on and standing up from a chair with arms ( e.g., wheelchair, bedside commode, etc.)   [] 1   [] 2   [x] 3   [] 4   3. Moving from lying on back to sitting on the side of the bed? [] 1   [] 2   [x] 3   [] 4          How much help from another person does the patient currently need. .. Total A Lot A Little None   4. Moving to and from a bed to a chair (including a wheelchair)? [] 1   [] 2   [x] 3   [] 4   5. Need to walk in hospital room? [] 1   [] 2   [x] 3   [] 4   6. Climbing 3-5 steps with a railing? [] 1   [] 2   [x] 3   [] 4   © 2007, Trustees of [address] [place] [id number], under license to [email or web address].  All rights reserved Score:  Initial: 18 Most Recent: X (Date: -10/19/21- )   Interpretation of Tool:  Represents activities that are increasingly more difficult (i.e. Bed mobility, Transfers, Gait). Score 24 23 22-20 19-15 14-10 9-7 6   Modifier CH CI CJ CK CL CM CN          Physical Therapy Evaluation Charge Determination   History Examination Presentation Decision-Making   MEDIUM  Complexity : 1-2 comorbidities / personal factors will impact the outcome/ POC  MEDIUM Complexity : 3 Standardized tests and measures addressing body structure, function, activity limitation and / or participation in recreation  LOW Complexity : Stable, uncomplicated  Other Functional Measure [facility] 6 medium complexity      Based on the above components, the patient evaluation is determined to be of the following complexity level: LOW     Pain Ratin/10    Activity Tolerance:   Fair  Please refer to the flowsheet for vital signs taken during this treatment. After treatment patient left in no apparent distress:   Sitting in chair and Call bell within reach    COMMUNICATION/EDUCATION:   The patients plan of care was discussed with: Occupational therapist.     Fall prevention education was provided and the patient/caregiver indicated understanding. and Patient/family agree to work toward stated goals and plan of care. Problem: Mobility Impaired (Adult and Pediatric)  Goal: *Acute Goals and Plan of Care (Insert Text)  Description: Pt will be I with LE HEP in 7 days. Pt will perform bed mobility with mod I in 7 days. Pt will perform transfers with mod I in 7 days. Pt will amb >200 feet with LRAD safely with mod I in 7 days.       Outcome: Not Met       Thank you for this referral.  Monet Melgar   Time Calculation: 38 mins

## 2021-10-19 NOTE — PROGRESS NOTES
Cm met with patient at bedside to discuss recommendations for IRF. Patient is aware of IRF recommendations but is politely refusing at this time. Patient is agreeable to home health with choice obtained for 'no preference'. CM informed patient that it may be difficult to find home health with her insurance. If home health cannot be secured prior to discharge, patient agrees with out patient therapy. CM team will continue to follow case. Current pending accepting home health company.

## 2021-10-19 NOTE — PROGRESS NOTES
Progress Note    Patient: Abby Jaramillo MRN: [id number]  SSN: [id number]    YOB: 1960  Age: 62 y.o. Sex: female      Admit Date: 10/18/2021    LOS: 1 day     Subjective:       60M, h/o HTN and DMII with slurred speech and left sided weakness s/t right MCA embolic infarct    Right carotid severe stenosis    Started on Aspirin 325, with H/o GI bleed in past, Gi was consulted and recommend outpatient f/u    Plans for CEA planned next Wednesday after cardiac clearance    PT recommended IRF, but family politely declined. Plans for DC tomorrow [facility] PT         Review of Systems:  Constitutional: Positive for activity change, appetite change and fatigue. Negative for chills, diaphoresis, fever and unexpected weight change. HENT: Negative for congestion, dental problem, drooling, ear discharge, ear pain, facial swelling, hearing loss, mouth sores, nosebleeds, postnasal drip, rhinorrhea, sinus pressure, sinus pain, sneezing, sore throat, tinnitus, trouble swallowing and voice change. Eyes: Negative for photophobia, pain, discharge, redness, itching and visual disturbance. Respiratory: Negative for apnea, cough, choking, chest tightness, shortness of breath, wheezing and stridor. Cardiovascular: Negative for chest pain, palpitations and leg swelling. Gastrointestinal: Negative for abdominal distention, abdominal pain, anal bleeding, blood in stool, constipation, diarrhea, nausea, rectal pain and vomiting. Endocrine: Negative for cold intolerance, heat intolerance, polydipsia, polyphagia and polyuria. Genitourinary: Negative for decreased urine volume, difficulty urinating, dyspareunia, dysuria, enuresis, flank pain, frequency, genital sores, hematuria, menstrual problem, pelvic pain, urgency, vaginal bleeding, vaginal discharge and vaginal pain. Musculoskeletal: Negative for arthralgias, back pain, gait problem, joint swelling, myalgias, neck pain and neck stiffness.    Skin: Negative for color change, pallor, rash and wound. Allergic/Immunologic: Negative for environmental allergies, food allergies and immunocompromised state. Neurological: Positive for speech difficulty and weakness. Negative for dizziness, tremors, seizures, syncope, facial asymmetry, light-headedness, numbness and headaches. Hematological: Negative for adenopathy. Does not bruise/bleed easily. Psychiatric/Behavioral: Negative for agitation, behavioral problems, confusion, decreased concentration, dysphoric mood, hallucinations, self-injury, sleep disturbance and suicidal ideas. The patient is not nervous/anxious and is not hyperactive. Objective:     Vitals:    10/19/21 0800 10/19/21 0949 10/19/21 1200 10/19/21 1446   BP:  104/66  111/75   Pulse: 90 90 92 93   Resp:  18  18   Temp:    98.5 °F (36.9 °C)   SpO2:  99%  97%   Weight:       Height:            Intake and Output:  Current Shift: No intake/output data recorded. Last three shifts: No intake/output data recorded. Physical Exam:   Physical Exam  Vitals reviewed. Constitutional:       General: She is not in acute distress. Appearance: She is normal weight. She is ill-appearing. She is not toxic-appearing or diaphoretic. HENT:      Head: Normocephalic and atraumatic. Nose: Nose normal. No congestion or rhinorrhea. Mouth/Throat:      Mouth: Mucous membranes are moist.      Pharynx: Oropharynx is clear. No oropharyngeal exudate or posterior oropharyngeal erythema. Eyes:      General: No scleral icterus. Right eye: No discharge. Left eye: No discharge. Extraocular Movements: Extraocular movements intact. Conjunctiva/sclera: Conjunctivae normal.      Pupils: Pupils are equal, round, and reactive to light. Neck:      Vascular: No carotid bruit. Cardiovascular:      Rate and Rhythm: Normal rate and regular rhythm. Pulses: Normal pulses. Heart sounds: Normal heart sounds. No murmur heard. No friction rub. No gallop. Pulmonary:      Effort: Pulmonary effort is normal. No respiratory distress. Breath sounds: Normal breath sounds. No stridor. No wheezing, rhonchi or rales. Chest:      Chest wall: No tenderness. Abdominal:      General: Abdomen is flat. Bowel sounds are normal. There is no distension. Palpations: Abdomen is soft. There is no mass. Tenderness: There is no abdominal tenderness. There is no right CVA tenderness, left CVA tenderness, guarding or rebound. Hernia: No hernia is present. Musculoskeletal:         General: No swelling, tenderness, deformity or signs of injury. Normal range of motion. Cervical back: Normal range of motion and neck supple. No rigidity or tenderness. Right lower leg: No edema. Left lower leg: No edema. Lymphadenopathy:      Cervical: No cervical adenopathy. Skin:     General: Skin is warm. Capillary Refill: Capillary refill takes less than 2 seconds. Coloration: Skin is not jaundiced or pale. Findings: No bruising, erythema, lesion or rash. Neurological:      Mental Status: She is alert and oriented to person, place, and time. Cranial Nerves: No cranial nerve deficit. Sensory: No sensory deficit. Motor: Weakness (Power 4/5 for all movements of LUE/LLE) present. Coordination: Coordination normal.      Gait: Gait normal.      Deep Tendon Reflexes: Reflexes normal.   Psychiatric:         Mood and Affect: Mood normal.         Behavior: Behavior normal.         Thought Content:  Thought content normal.         Judgment: Judgment normal.          Lab/Data Review:  Recent Results (from the past 24 hour(s))   GLUCOSE, POC    Collection Time: 10/18/21  5:07 PM   Result Value Ref Range    Glucose (POC) 143 (H) 65 - 117 mg/dL    Performed by [address] [place] [address] POC    Collection Time: 10/18/21  8:56 PM   Result Value Ref Range    Glucose (POC) 144 (H) 65 - 117 mg/dL    Performed by MASHA AJ    LIPID PANEL    Collection Time: 10/19/21  6:55 AM   Result Value Ref Range    LIPID PROFILE        Cholesterol, total 156 <200 mg/dL    Triglyceride 149 <150 mg/dL    HDL Cholesterol 54 mg/dL    LDL, calculated 72.2 0 - 100 mg/dL    VLDL, calculated 29.8 mg/dL    CHOL/HDL Ratio 2.9 0.0 - 5.0     CBC W/O DIFF    Collection Time: 10/19/21  6:55 AM   Result Value Ref Range    WBC 9.9 3.6 - 11.0 K/uL    RBC 4.50 3.80 - 5.20 M/uL    HGB 13.0 11.5 - 16.0 g/dL    HCT 40.7 35.0 - 47.0 %    MCV 90.4 80.0 - 99.0 FL    MCH 28.9 26.0 - 34.0 PG    MCHC 31.9 30.0 - 36.5 g/dL    RDW 12.5 11.5 - 14.5 %    PLATELET 210 309 - 808 K/uL    MPV 11.4 8.9 - 12.9 FL    NRBC 0.0 0.0  WBC    ABSOLUTE NRBC 0.00 0.00 - 5.73 K/uL   METABOLIC PANEL, BASIC    Collection Time: 10/19/21  6:55 AM   Result Value Ref Range    Sodium 138 136 - 145 mmol/L    Potassium 4.8 3.5 - 5.1 mmol/L    Chloride 108 97 - 108 mmol/L    CO2 23 21 - 32 mmol/L    Anion gap 7 5 - 15 mmol/L    Glucose 150 (H) 65 - 100 mg/dL    BUN 15 6 - 20 mg/dL    Creatinine 0.68 0.55 - 1.02 mg/dL    BUN/Creatinine ratio 22 (H) 12 - 20      GFR est AA >60 >60 ml/min/1.73m2    GFR est non-AA >60 >60 ml/min/1.73m2    Calcium 9.5 8.5 - 10.1 mg/dL   MAGNESIUM    Collection Time: 10/19/21  6:55 AM   Result Value Ref Range    Magnesium 2.2 1.6 - 2.4 mg/dL   GLUCOSE, POC    Collection Time: 10/19/21  7:30 AM   Result Value Ref Range    Glucose (POC) 152 (H) 65 - 117 mg/dL    Performed by Luisito Poole    ECHO ADULT COMPLETE    Collection Time: 10/19/21  9:24 AM   Result Value Ref Range    Aortic Valve Systolic Peak Velocity 006.69 cm/s    AoV PG 8.00 mmHg    Ao Root D 2.50 cm    IVSd 1.17 (A) 0.60 - 0.90 cm    LVIDd 2.93 (A) 3.90 - 5.30 cm    LVIDs 1.74 cm    LVOT Peak Velocity 98.70 cm/s    LVOT Peak Gradient 4.00 mmHg    LVPWd 1.05 (A) 0.60 - 0.90 cm    LV E' Septal Velocity 8.19 cm/s    LV ED Vol A2C 25.20 cm3    LV ES Vol A2C 5.27 cm3    E/E' septal 9.72 Left Atrium Major Axis 2.70 cm    Mitral Valve Deceleration Hale 2,860.00 mm/s2    Mitral Valve Deceleration Hale 2,860.00 mm/s2    Mitral Valve E Wave Deceleration Time 250.00 ms    Mitral Valve Pressure Half-time 85.00 ms    MV A Elroy 112.00 cm/s    MV E Elroy 79.60 cm/s    MV Mean Gradient 2.00 mmHg    Mitral Valve Annulus Velocity Time Integral 23.80 cm    Mitral Valve Max Velocity 109.00 cm/s    MV Peak Gradient 5.00 mmHg    MVA (PHT) 2.59 cm2    MV E/A 0.71     Pulmonic Valve Max Velocity 104.00 cm/s    Pulmonic Valve Systolic Peak Instantaneous Gradient 4.00 mmHg    Est. RA Pressure 3.00 mmHg    RVIDd 2.69 cm    RVSP 21.00 mmHg    Tricuspid Valve Max Velocity 210.00 cm/s    Triscuspid Valve Regurgitation Peak Gradient 18.00 mmHg    Tapse 1.40 (A) 1.50 - 2.00 cm    Right Atrial Area 4C 9.85 cm2    LA Area 4C 13.37 cm2    BP EF 73.0 55.0 - 100.0 %    LV Mass AL 93.3 67.0 - 162.0 g    LV Mass AL Index 58.3 43.0 - 95.0 g/m2    Left Atrium Minor Axis 1.69 cm   GLUCOSE, POC    Collection Time: 10/19/21 11:27 AM   Result Value Ref Range    Glucose (POC) 143 (H) 65 - 117 mg/dL    Performed by [address]          Assessment and plan:        (1) stroke, s/t right MCA embolic infarct. On aspirin. GI consulted due to h/o GI bleed. On statin.     (2) high grade carotid stenosis: CEA planned on next Wednesday    (3) HTN: lisinopril     (4) HLD: atorvastatin    DVT ppx: [facility] tomorrow    Signed By: Amy Grubbs MD     October 19, 2021

## 2021-10-19 NOTE — PROGRESS NOTES
Problem: Falls - Risk of  Goal: *Absence of Falls  Description: Document Harmeet Sol Fall Risk and appropriate interventions in the flowsheet.   Outcome: Progressing Towards Goal  Note: Fall Risk Interventions:  Mobility Interventions: Bed/chair exit alarm, OT consult for ADLs, PT Consult for mobility concerns              Elimination Interventions: Bed/chair exit alarm, Call light in reach              Problem: Patient Education: Go to Patient Education Activity  Goal: Patient/Family Education  Outcome: Progressing Towards Goal     Problem: Patient Education: Go to Patient Education Activity  Goal: Patient/Family Education  Outcome: Progressing Towards Goal     Problem: TIA/CVA Stroke: 0-24 hours  Goal: Off Pathway (Use only if patient is Off Pathway)  Outcome: Progressing Towards Goal  Goal: Activity/Safety  Outcome: Progressing Towards Goal  Goal: Consults, if ordered  Outcome: Progressing Towards Goal  Goal: Diagnostic Test/Procedures  Outcome: Progressing Towards Goal  Goal: Nutrition/Diet  Outcome: Progressing Towards Goal  Goal: Discharge Planning  Outcome: Progressing Towards Goal  Goal: Medications  Outcome: Progressing Towards Goal  Goal: Respiratory  Outcome: Progressing Towards Goal  Goal: Treatments/Interventions/Procedures  Outcome: Progressing Towards Goal  Goal: Minimize risk of bleeding post-thrombolytic infusion  Outcome: Progressing Towards Goal  Goal: Monitor for complications post-thrombolytic infusion  Outcome: Progressing Towards Goal  Goal: Psychosocial  Outcome: Progressing Towards Goal  Goal: *Hemodynamically stable  Outcome: Progressing Towards Goal  Goal: *Neurologically stable  Description: Absence of additional neurological deficits    Outcome: Progressing Towards Goal  Goal: *Verbalizes anxiety and depression are reduced or absent  Outcome: Progressing Towards Goal  Goal: *Absence of Signs of Aspiration on Current Diet  Outcome: Progressing Towards Goal  Goal: *Absence of deep venous thrombosis signs and symptoms(Stroke Metric)  Outcome: Progressing Towards Goal  Goal: *Ability to perform ADLs and demonstrates progressive mobility and function  Outcome: Progressing Towards Goal  Goal: *Stroke education started(Stroke Metric)  Outcome: Progressing Towards Goal  Goal: *Dysphagia screen performed(Stroke Metric)  Outcome: Progressing Towards Goal  Goal: *Rehab consulted(Stroke Metric)  Outcome: Progressing Towards Goal     Problem: TIA/CVA Stroke: Day 2 Until Discharge  Goal: Off Pathway (Use only if patient is Off Pathway)  Outcome: Progressing Towards Goal  Goal: Activity/Safety  Outcome: Progressing Towards Goal  Goal: Diagnostic Test/Procedures  Outcome: Progressing Towards Goal  Goal: Nutrition/Diet  Outcome: Progressing Towards Goal  Goal: Discharge Planning  Outcome: Progressing Towards Goal  Goal: Medications  Outcome: Progressing Towards Goal  Goal: Respiratory  Outcome: Progressing Towards Goal  Goal: Treatments/Interventions/Procedures  Outcome: Progressing Towards Goal  Goal: Psychosocial  Outcome: Progressing Towards Goal  Goal: *Verbalizes anxiety and depression are reduced or absent  Outcome: Progressing Towards Goal  Goal: *Absence of aspiration  Outcome: Progressing Towards Goal  Goal: *Absence of deep venous thrombosis signs and symptoms(Stroke Metric)  Outcome: Progressing Towards Goal  Goal: *Optimal pain control at patient's stated goal  Outcome: Progressing Towards Goal  Goal: *Tolerating diet  Outcome: Progressing Towards Goal  Goal: *Ability to perform ADLs and demonstrates progressive mobility and function  Outcome: Progressing Towards Goal  Goal: *Stroke education continued(Stroke Metric)  Outcome: Progressing Towards Goal     Problem: Ischemic Stroke: Discharge Outcomes  Goal: *Verbalizes anxiety and depression are reduced or absent  Outcome: Progressing Towards Goal  Goal: *Verbalize understanding of risk factor modification(Stroke Metric)  Outcome: Progressing Towards Goal  Goal: *Hemodynamically stable  Outcome: Progressing Towards Goal  Goal: *Absence of aspiration pneumonia  Outcome: Progressing Towards Goal  Goal: *Aware of needed dietary changes  Outcome: Progressing Towards Goal  Goal: *Verbalize understanding of prescribed medications including anti-coagulants, anti-lipid, and/or anti-platelets(Stroke Metric)  Outcome: Progressing Towards Goal  Goal: *Tolerating diet  Outcome: Progressing Towards Goal  Goal: *Aware of follow-up diagnostics related to anticoagulants  Outcome: Progressing Towards Goal  Goal: *Ability to perform ADLs and demonstrates progressive mobility and function  Outcome: Progressing Towards Goal  Goal: *Absence of DVT(Stroke Metric)  Outcome: Progressing Towards Goal  Goal: *Absence of aspiration  Outcome: Progressing Towards Goal  Goal: *Optimal pain control at patient's stated goal  Outcome: Progressing Towards Goal  Goal: *Home safety concerns addressed  Outcome: Progressing Towards Goal  Goal: *Describes available resources and support systems  Outcome: Progressing Towards Goal  Goal: *Verbalizes understanding of activation of EMS(911) for stroke symptoms(Stroke Metric)  Outcome: Progressing Towards Goal  Goal: *Understands and describes signs and symptoms to report to providers(Stroke Metric)  Outcome: Progressing Towards Goal  Goal: *Neurolgocially stable (absence of additional neurological deficits)  Outcome: Progressing Towards Goal  Goal: *Verbalizes importance of follow-up with primary care physician(Stroke Metric)  Outcome: Progressing Towards Goal  Goal: *Smoking cessation discussed,if applicable(Stroke Metric)  Outcome: Progressing Towards Goal  Goal: *Depression screening completed(Stroke Metric)  Outcome: Progressing Towards Goal     Problem: Diabetes Self-Management  Goal: *Disease process and treatment process  Description: Define diabetes and identify own type of diabetes; list 3 options for treating diabetes.   Outcome: Progressing Towards Goal  Goal: *Incorporating nutritional management into lifestyle  Description: Describe effect of type, amount and timing of food on blood glucose; list 3 methods for planning meals. Outcome: Progressing Towards Goal  Goal: *Incorporating physical activity into lifestyle  Description: State effect of exercise on blood glucose levels. Outcome: Progressing Towards Goal  Goal: *Developing strategies to promote health/change behavior  Description: Define the ABC's of diabetes; identify appropriate screenings, schedule and personal plan for screenings. Outcome: Progressing Towards Goal  Goal: *Using medications safely  Description: State effect of diabetes medications on diabetes; name diabetes medication taking, action and side effects. Outcome: Progressing Towards Goal  Goal: *Monitoring blood glucose, interpreting and using results  Description: Identify recommended blood glucose targets  and personal targets. Outcome: Progressing Towards Goal  Goal: *Prevention, detection, treatment of acute complications  Description: List symptoms of hyper- and hypoglycemia; describe how to treat low blood sugar and actions for lowering  high blood glucose level. Outcome: Progressing Towards Goal  Goal: *Prevention, detection and treatment of chronic complications  Description: Define the natural course of diabetes and describe the relationship of blood glucose levels to long term complications of diabetes.   Outcome: Progressing Towards Goal  Goal: *Developing strategies to address psychosocial issues  Description: Describe feelings about living with diabetes; identify support needed and support network  Outcome: Progressing Towards Goal  Goal: *Insulin pump training  Outcome: Progressing Towards Goal  Goal: *Sick day guidelines  Outcome: Progressing Towards Goal  Goal: *Patient Specific Goal (EDIT GOAL, INSERT TEXT)  Outcome: Progressing Towards Goal     Problem: Patient Education: Go to Patient Education Activity  Goal: Patient/Family Education  Outcome: Progressing Towards Goal

## 2021-10-19 NOTE — PROGRESS NOTES
Two person admission skin assessment performed by Rae Caceres LPN and Kimani Gonsales RN. Patient's skin clean, dry and intact.

## 2021-10-19 NOTE — PROGRESS NOTES
Problem: Self Care Deficits Care Plan (Adult)  Goal: *Acute Goals and Plan of Care (Insert Text)  Description: Pt will be MI sup<->sit in prep for EOB ADL's  Pt will be MI  LB dressing sitting/standing level  Pt will be MI  grooming EOB level  Pt will be Mi  sit<-> prep for toilet transfer  Pt will be Mi  toilet transfer with LRAD  Pt will be MI  toileting/cloth mgmt LRAD  Pt will be MI  grooming standing sink  Pt will be MI bathing sitting/standing sink LRAD  Pt will be MI sourav UE HEP in prep for self care tasks    Outcome: Not Met     OCCUPATIONAL THERAPY EVALUATION  Patient: Estefany Martin (31 y.o. female)  Date: 10/19/2021  Primary Diagnosis: CVA (cerebral vascular accident) [facility]) [I63.9]        Precautions: falls       ASSESSMENT  Patient is 61 y/o female came to [facility] with sudden onset slurred speech 2:30pm prior day but since resolve,d woke up at 2:30am with LUE/LE weakness and adm 10/18/2021 for CVA (MRI showing acute infarctions right frontal lobe, parietal lobe including inferior parietal lobile and posterior temporal lobe, per neuro note right MCA embolic strokes), right ICA critical stenosis awaiting carotid endarterectomy next wednesday (per Dr. Luke Sandhu note). Pt has hx of arthritis, DM, HTN, PVD, cataract removal. Pt received semi supine in bed A&Ox4 and agreeable for OT eval.tx. Per pt report, pt lives with mother (pt assists with care for mother, currently son staying with mother) in mobile home with 5 steps left rail to enter and is independent for self care and functional transfers/mobility (has shower chair if needed).      Pt currently presents with decreased balance, decreased activity tolerance, decreased left sided coordination (finger to nose and finger opposition slow/impaired), impaired light touch sensation LUE (mid forearm and distally light touch impaired/lighter compared to RUE), left sided weakness (LUE 3/5) and increased need for assist with self care (SBA simple grooming EOB increased time, SBA self feeding with set up, min A LB dressing EOB) and functional transfers/mobility (SBA sup->sit and scooting EOB, CGA sit<->stand and toilet transfer with Rw and gait belt). Pt would benefit from skilled OT services while at [facility] in order to increase safety and independence with self care and functional transfers/mobility. Recommend discharge to IRF when medically appropriate. Other factors to consider for discharge: time since onset, severity of deficits, PLOF independent        PLAN :  Recommendations and Planned Interventions: self care training, functional mobility training, therapeutic exercise, balance training, therapeutic activities, endurance activities, patient education, and home safety training    Frequency/Duration: Patient will be followed by occupational therapy 3-5x/week to address goals. Recommendation for discharge: (in order for the patient to meet his/her long term goals)  [address] [facility]     This discharge recommendation:  Has been made in collaboration with the attending provider and/or case management    IF patient discharges home will need the following DME: TBD       SUBJECTIVE:   Patient stated I really want to go home.     OBJECTIVE DATA SUMMARY:   HISTORY:   Past Medical History:   Diagnosis Date    Arthritis     Diabetes ([facility] [address] 2019    Hypertension     Peripheral vascular disease ([facility] [address]      Past Surgical History:   Procedure Laterality Date    HX CATARACT REMOVAL  2019       Expanded or extensive additional review of patient history:     Home Situation  Home Environment: Trailer/mobile home  # Steps to Enter: 5  Rails to Enter: Yes  Hand Rails : Left  Wheelchair Ramp: No  One/Two Story Residence: One story  Living Alone: No  Support Systems: Parent(s), Child(maykel) (lives with mother, helps care for her)  Patient Expects to be Discharged to[de-identified] Trailer/mobile home  Current DME Used/Available at Home: Shower chair  Tub or Shower Type: Tub/Shower combination    PLOF: Pt independent for ADLS/IADLS, independent with mobility prior to admission. Hand dominance: Right    EXAMINATION OF PERFORMANCE DEFICITS:  Cognitive/Behavioral Status:  Neurologic State: Alert  Orientation Level: Oriented X4  Cognition: Appropriate decision making; Appropriate for age attention/concentration; Appropriate safety awareness; Follows commands     Hearing: Auditory  Auditory Impairment: None    Range of Motion:  AROM: Within functional limits     Strength:  Strength: Generally decreased, functional (RUE WFL, LUE 3/5)     Coordination:  Coordination: Generally decreased, functional (LUE finger opposition slower, finger to nose impaired)       Tone & Sensation:  Tone: Normal  Sensation: Impaired (LUE light touch sensation impaired forearm and distally)     Balance:  Sitting: Intact; Without support  Standing: Impaired; With support  Standing - Static: Constant support;Good  Standing - Dynamic : Constant support; Fair    Functional Mobility and Transfers for ADLs:  Bed Mobility:  Rolling: Stand-by assistance  Supine to Sit: Stand-by assistance  Sit to Supine: Stand-by assistance  Scooting: Stand-by assistance    Transfers:  Sit to Stand: Contact guard assistance  Stand to Sit: Contact guard assistance  Bed to Chair: Contact guard assistance  Bathroom Mobility: Contact guard assistance  Toilet Transfer : Contact guard assistance  Assistive Device : Gait Belt;Walker, rolling    ADL Assessment:  Feeding: Stand-by assistance (increased time)    Oral Facial Hygiene/Grooming: Stand-by assistance (seated EOB increased time)    Lower Body Dressing: Minimum assistance (EOB)    ADL Intervention and task modifications:  Feeding  Feeding Assistance: Stand-by assistance  Container Management: Set-up  Cutting Food: Set-up  Utensil Management: Stand-by assistance  Food to Mouth: Stand-by assistance  Drink to Mouth: Stand-by assistance    Grooming  Grooming Assistance: Stand-by assistance  Position Performed: Seated edge of bed  Washing Face: Stand-by assistance  Washing Hands: Stand-by assistance    Lower Body Dressing Assistance  Socks: Minimum assistance  Leg Crossed Method Used: Yes  Position Performed: Seated edge of bed    Therapeutic Exercise:  Pt educated on LUE HEP for coordination training and strength in prep for self care tasks     [address] [place] [id number] AM-PACTM \"6 Clicks\"                                                       Daily Activity Inpatient Short Form  How much help from another person does the patient currently need. .. Total; A Lot A Little None   1. Putting on and taking off regular lower body clothing? []  1 []  2 [x]  3 []  4   2. Bathing (including washing, rinsing, drying)? []  1 []  2 [x]  3 []  4   3. Toileting, which includes using toilet, bedpan or urinal? [] 1 []  2 [x]  3 []  4   4. Putting on and taking off regular upper body clothing? []  1 []  2 [x]  3 []  4   5. Taking care of personal grooming such as brushing teeth? []  1 []  2 [x]  3 []  4   6. Eating meals? []  1 []  2 []  3 [x]  4   © 2007, Trustees of [address] [place] [address] [address] [address], under license to [employer]. All rights reserved     Score: 19/24     Interpretation of Tool:  Represents clinically-significant functional categories (i.e. Activities of daily living). Percentage of Impairment CH    0%   CI    1-19% CJ    20-39% CK    40-59% CL    60-79% CM    80-99% CN     100%   [facility]  Score 6-24 24 23 20-22 15-19 10-14 7-9 6        Occupational Therapy Evaluation Charge Determination   History Examination Decision-Making   LOW Complexity : Brief history review  MEDIUM Complexity : 3-5 performance deficits relating to physical, cognitive , or psychosocial skils that result in activity limitations and / or participation restrictions MEDIUM Complexity : Patient may present with comorbidities that affect occupational performnce.  Miniml to moderate modification of tasks or assistance (eg, physical or verbal ) with assesment(s) is necessary to enable patient to complete evaluation       Based on the above components, the patient evaluation is determined to be of the following complexity level: LOW   Pain Ratin/10 neck pain    Activity Tolerance:   Good  Please refer to the flowsheet for vital signs taken during this treatment. After treatment patient left in no apparent distress:    Supine in bed, Call bell within reach, and Bed / chair alarm activated    COMMUNICATION/EDUCATION:   The patients plan of care was discussed with: Physical therapist, Registered nurse, and Case management. Home safety education was provided and the patient/caregiver indicated understanding. This patients plan of care is appropriate for delegation to [facility].     Thank you for this referral.  Clive Garnett  Time Calculation: 30 mins

## 2021-10-19 NOTE — PROGRESS NOTES
Problem: Falls - Risk of  Goal: *Absence of Falls  Description: Document Leafy Powell Fall Risk and appropriate interventions in the flowsheet.   Outcome: Progressing Towards Goal  Note: Fall Risk Interventions:  Mobility Interventions: Bed/chair exit alarm, Patient to call before getting OOB

## 2021-10-19 NOTE — PROGRESS NOTES
Spoke with patient's niece- Faye Retana [phone]. Niece stated that she was going to try to be present when doctor makes rounds tomorrow morning to have all questions answered. Also stated that if she happens to miss the doctor she would appreciate a phone call from the doctor to keep her updated.

## 2021-10-19 NOTE — PROGRESS NOTES
PROGRESS NOTE      Chief Complaints:  No new complaints. HPI and  Objective:    Patient is doing pretty good. Her speech is pretty good as well. Patient's left arm strength and movement and range of motion has a significant improved as well. I talked to her about her bleeding complication from antiplatelet therapy. She was hospitalized with. She had a both upper and lower endoscopy could not find source of bleeding. Currently patient's hemoglobin is 13. Hematocrit 40%. There is no signs of GI bleeding]. Patient denies chest pain shortness breath abdominal pain. Review of Systems:  Rest of review systems negative, personally reviewed. EXAM:  Visit Vitals  BP 98/65 (BP 1 Location: Left upper arm, BP Patient Position: Lying)   Pulse 87   Temp 98 °F (36.7 °C)   Resp 16   Ht 5' 1\" (1.549 m)   Wt 135 lb (61.2 kg)   SpO2 97%   BMI 25.51 kg/m²       Patient is awake   Head and neck atraumatic, normocephalic. ENT: No hoarse voice  Cardiac system regular rate rhythm. Pulmonary no audible wheeze  Chest wall excursion normal with respiration cycle  Abdomen is soft not particularly distended. Neurologically nonfocal.  Skin is warm and moist.  Psychosocial: Cooperative. Vascular examination as previously noted no changes.     Recent Results (from the past 24 hour(s))   TROPONIN-HIGH SENSITIVITY    Collection Time: 10/18/21  9:43 AM   Result Value Ref Range    Troponin-High Sensitivity 9 0 - 51 ng/L   HEMOGLOBIN A1C WITH EAG    Collection Time: 10/18/21  9:43 AM   Result Value Ref Range    Hemoglobin A1c 6.6 (H) 4.0 - 5.6 %    Est. average glucose 143 mg/dL   GLUCOSE, POC    Collection Time: 10/18/21  5:07 PM   Result Value Ref Range    Glucose (POC) 143 (H) 65 - 117 mg/dL    Performed by [address] [place], POC    Collection Time: 10/18/21  8:56 PM   Result Value Ref Range    Glucose (POC) 144 (H) 65 - 117 mg/dL    Performed by MASHA AJ        ASSESSMENT:   Patient is 62 y.o. with diagnosis of : Active Problems:    CVA (cerebral vascular accident) ([facility] [address] (10/18/2021)        PLAN:                 I would recommend start on aspirin 325 mg daily for now. And arrange outpatient upper and lower endoscopy once the patient completely recovered from stroke. And also recommend a cardiac consultation. And atorvastatin 80 mg. Try to avoid secondary insult such as hypoxia and hypertension, hypotension, and also blood pressure target goal 130/80. Most likely patient will be arranged carotid endarterectomy next Wednesday if patient cardiac performance is reasonable.

## 2021-10-19 NOTE — CONSULTS
CONSULTATION    REASON FOR CONSULT:  Pre-operative risk assessment    REQUESTING PROVIDER:  Dr. Cris Norwood:    Chief Complaint   Patient presents with    Extremity Weakness         HISTORY OF PRESENT ILLNESS:  Jhonathan Kramer is a 62y.o. year-old female with past medical history significant for hypertension, hyperlipidemia, arthritis, and PVD  who presented to ED for evaluation of slurred speech and left-sided weakness. CT of the head/neck that showed high-grade stenoses of the proximal internal carotid artery. A cardiac consult was called for pre-operative clearance for a possible carotid endarterectomy. Records from hospital admission course thus far reviewed. PAST MEDICAL HISTORY:    Past Medical History:   Diagnosis Date    Arthritis     Diabetes ([facility] [address] 2019    Hypertension     Peripheral vascular disease ([facility] [address]        PAST SURGICAL HISTORY:   Past Surgical History:   Procedure Laterality Date    HX CATARACT REMOVAL  2019       ALLERGIES:    Allergies   Allergen Reactions    Codeine Other (comments)     Makes me \"loopy\"    Pcn [Penicillins] Nausea and Vomiting       FAMILY HISTORY:    Family History   Problem Relation Age of Onset   Jhonathan Kramer Cancer Mother         BREAST    Lung Disease Mother     COPD Mother     Heart Disease Mother     Hypertension Mother     Diabetes Mother    Jhonathan Kramer Arthritis-osteo Mother     Parkinson's Disease Father     Lung Disease Father     Hypertension Father     Heart Disease Father     Diabetes Father     COPD Other     Parkinson's Disease Other     MS Other     Cancer Brother         PROSTATE    Heart Disease Brother     No Known Problems Son     No Known Problems Son    Jhonathan Self MS Son     Anesth Problems Neg Hx        SOCIAL HISTORY:    Tobacco: no   Drugs: no  ETOH: no    HOME MEDICATIONS:    Prior to Admission Medications   Prescriptions Last Dose Informant Patient Reported?  Taking?   acetaminophen (TYLENOL) 325 mg tablet   Yes No   Sig: Take by mouth every four (4) hours as needed for Pain. atorvastatin (LIPITOR) 20 mg tablet 10/17/2021 at Unknown time  Yes Yes   Sig: Take 20 mg by mouth daily. glipiZIDE SR (GLUCOTROL XL) 10 mg CR tablet 10/17/2021 at Unknown time  Yes Yes   Sig: Take 10 mg by mouth daily. lisinopril (PRINIVIL, ZESTRIL) 20 mg tablet 10/17/2021 at Unknown time  Yes Yes   Sig: Take  by mouth daily. mv-mn/folic acid/vit [id number] (ALIVE ONCE DAILY WOMEN 50 PLUS PO) 10/17/2021 at Unknown time  Yes Yes   Sig: Take 1 Tablet by mouth daily (with breakfast). pantoprazole (PROTONIX) 40 mg tablet 10/17/2021 at Unknown time  Yes Yes   Sig: Take 40 mg by mouth daily. Facility-Administered Medications: None       REVIEW OF SYSTEMS:  Complete review of systems performed, pertinents noted above, all other systems are negative. Patient Vitals for the past 24 hrs:   Temp Pulse Resp BP SpO2   10/19/21 1446 98.5 °F (36.9 °C) 93 18 111/75 97 %   10/19/21 1200  92      10/19/21 0949  90 18 104/66 99 %   10/19/21 0800  90      10/19/21 0720 98 °F (36.7 °C) 87 16 98/65 97 %   10/19/21 0040 98.2 °F (36.8 °C) 100 17 115/70 98 %   10/18/21 1951 98.4 °F (36.9 °C) (!) 102 17 136/81 99 %   10/18/21 1600  92          PHYSICAL EXAMINATION:    General: Well nourished, well developed, NAD, A&O  HEENT: Normocephalic, PERRL, no drainage  Neck: Supple, Trachea midline, No JVD  RESP: CTA bilaterally. + Symmetrical chest movement. No SOB or distress. On room air  Cardiovascular: RRR no MRG  PVS: No rubor, cyanosis, no edema  ABD:  soft, NT, Normoactive BS  Derm: Warm/Dry/Intact with no lesions  Neuro: positive for weakness and numbness  PSYCH: No anxiety or agitation    Recent labs results and imaging reviewed.       Recent Results (from the past 24 hour(s))   GLUCOSE, POC    Collection Time: 10/18/21  5:07 PM   Result Value Ref Range    Glucose (POC) 143 (H) 65 - 117 mg/dL    Performed by [facility] [place], POC    Collection Time: 10/18/21  8:56 PM   Result Value Ref Range    Glucose (POC) 144 (H) 65 - 117 mg/dL    Performed by MASHA AJ    LIPID PANEL    Collection Time: 10/19/21  6:55 AM   Result Value Ref Range    LIPID PROFILE        Cholesterol, total 156 <200 mg/dL    Triglyceride 149 <150 mg/dL    HDL Cholesterol 54 mg/dL    LDL, calculated 72.2 0 - 100 mg/dL    VLDL, calculated 29.8 mg/dL    CHOL/HDL Ratio 2.9 0.0 - 5.0     CBC W/O DIFF    Collection Time: 10/19/21  6:55 AM   Result Value Ref Range    WBC 9.9 3.6 - 11.0 K/uL    RBC 4.50 3.80 - 5.20 M/uL    HGB 13.0 11.5 - 16.0 g/dL    HCT 40.7 35.0 - 47.0 %    MCV 90.4 80.0 - 99.0 FL    MCH 28.9 26.0 - 34.0 PG    MCHC 31.9 30.0 - 36.5 g/dL    RDW 12.5 11.5 - 14.5 %    PLATELET 291 073 - 262 K/uL    MPV 11.4 8.9 - 12.9 FL    NRBC 0.0 0.0  WBC    ABSOLUTE NRBC 0.00 0.00 - 9.62 K/uL   METABOLIC PANEL, BASIC    Collection Time: 10/19/21  6:55 AM   Result Value Ref Range    Sodium 138 136 - 145 mmol/L    Potassium 4.8 3.5 - 5.1 mmol/L    Chloride 108 97 - 108 mmol/L    CO2 23 21 - 32 mmol/L    Anion gap 7 5 - 15 mmol/L    Glucose 150 (H) 65 - 100 mg/dL    BUN 15 6 - 20 mg/dL    Creatinine 0.68 0.55 - 1.02 mg/dL    BUN/Creatinine ratio 22 (H) 12 - 20      GFR est AA >60 >60 ml/min/1.73m2    GFR est non-AA >60 >60 ml/min/1.73m2    Calcium 9.5 8.5 - 10.1 mg/dL   MAGNESIUM    Collection Time: 10/19/21  6:55 AM   Result Value Ref Range    Magnesium 2.2 1.6 - 2.4 mg/dL   GLUCOSE, POC    Collection Time: 10/19/21  7:30 AM   Result Value Ref Range    Glucose (POC) 152 (H) 65 - 117 mg/dL    Performed by Lizabeth Cooks    ECHO ADULT COMPLETE    Collection Time: 10/19/21  9:24 AM   Result Value Ref Range    Aortic Valve Systolic Peak Velocity 458.99 cm/s    AoV PG 8.00 mmHg    Ao Root D 2.50 cm    IVSd 1.17 (A) 0.60 - 0.90 cm    LVIDd 2.93 (A) 3.90 - 5.30 cm    LVIDs 1.74 cm    LVOT Peak Velocity 98.70 cm/s    LVOT Peak Gradient 4.00 mmHg    LVPWd 1.05 (A) 0.60 - 0.90 cm    LV E' Septal Velocity 8.19 cm/s    LV ED Vol A2C 25.20 cm3    LV ES Vol A2C 5.27 cm3    E/E' septal 9.72     Left Atrium Major Axis 2.70 cm    Mitral Valve Deceleration [place] 2,860.00 mm/s2    Mitral Valve Deceleration [place] 2,860.00 mm/s2    Mitral Valve E Wave Deceleration Time 250.00 ms    Mitral Valve Pressure Half-time 85.00 ms    MV A [place] 112.00 cm/s    MV E [place] 79.60 cm/s    MV Mean Gradient 2.00 mmHg    Mitral Valve Annulus Velocity Time Integral 23.80 cm    Mitral Valve Max Velocity 109.00 cm/s    MV Peak Gradient 5.00 mmHg    MVA (PHT) 2.59 cm2    MV E/A 0.71     Pulmonic Valve Max Velocity 104.00 cm/s    Pulmonic Valve Systolic Peak Instantaneous Gradient 4.00 mmHg    Est. RA Pressure 3.00 mmHg    RVIDd 2.69 cm    RVSP 21.00 mmHg    Tricuspid Valve Max Velocity 210.00 cm/s    Triscuspid Valve Regurgitation Peak Gradient 18.00 mmHg    Tapse 1.40 (A) 1.50 - 2.00 cm    Right Atrial Area 4C 9.85 cm2    LA Area 4C 13.37 cm2    BP EF 73.0 55.0 - 100.0 %    LV Mass AL 93.3 67.0 - 162.0 g    LV Mass AL Index 58.3 43.0 - 95.0 g/m2    Left Atrium Minor Axis 1.69 cm   GLUCOSE, POC    Collection Time: 10/19/21 11:27 AM   Result Value Ref Range    Glucose (POC) 143 (H) 65 - 117 mg/dL    Performed by CHRISTOPH DE LA CRUZ        XR Results (maximum last 3): Results from [facility] encounter on 03/18/20    XR CHEST PORT      Results from [place] encounter on 12/02/19    XR CHEST PA LAT    Impression  IMPRESSION:    No acute cardiopulmonary process. Increased thoracic spine dextroscoliosis. Results from [place] encounter on 07/29/09    XR CHEST PORTABLE      CT Results (maximum last 3): Results from [place] encounter on 10/18/21    CTA HEAD NECK W CONT    Impression  Atherosclerotic changes aortic arch and proximal brachiocephalic  vessels as described above. Prominent atherosclerotic changes left subclavian  artery. Narrowing of the origin of the left vertebral artery.     High-grade stenoses proximal right internal carotid artery according to the  NASCET as described above. Atherosclerotic changes proximal left internal carotid artery as described above  and with no hemodynamically significant stenoses demonstrated. Focal chronic  dissection left carotid bulb. INTRACRANIAL CTA WITH AND WITHOUT CONTRAST AND WITH 3-D RECONSTRUCTIONS :    All CT scans at this facility are performed using dose reduction optimization  techniques as appropriate to a performed exam including the following: Automated  exposure control, adjustments of the mA and/or kV according to patient size, or  use of iterative reconstruction technique. 100 cc of Isovue-370 were injected intravenously. Thin axial and coronal and sagittal reconstructions were obtained. 3-D  reconstructions were also obtained. CLINICAL HISTORY: Left upper extremity weakness, slurred speech. Prominent atherosclerotic changes of the carotid siphons are noted bilaterally  with almost 60% stenoses of the lumen bilaterally. There are atherosclerotic  changes of the M1 segments of both middle cerebral arteries although no  high-grade stenoses is demonstrated. Atherosclerotic changes of the distal  branches of the anterior and middle cerebral arteries are noted. There is a hypoplastic right vertebral artery and a dominant left vertebral  artery. The basilar artery is patent with no significant stenoses demonstrated. Both posterior cerebral arteries are patent with no significant stenoses  demonstrated. No aneurysm was demonstrated. Major dural venous sinuses are patent. The  superior sagittal sinus drains preferentially into the left transverse sinus  with the left transverse sinus being larger than the right and representing a  normal variant. IMPRESSION: Atherosclerotic changes carotid siphons and proximal and distal  anterior and middle cerebral arteries as described above.  No major arterial  occlusion was demonstrated. Report has been communicated to Dr. Ariadne Lund. CT CODE NEURO HEAD WO CONTRAST    Impression  No acute intracranial process. Other findings as above. Findings conveyed to [facility], charge nurse on behalf of Dr. Yadira Muñiz, on  10/18/2021 7:41 AM.      Results from [place] encounter on 11/06/20    CT ABD WO CONT    Impression  IMPRESSION:  Left adrenal adenoma without significant change in size. Justine Bogota MRI Results (maximum last 3): Results from [place] encounter on 10/18/21    MRI BRAIN WO CONT    Impression  Acute infarctions right frontal lobe, parietal lobe including right  inferior parietal lobule and posterior temporal lobe as described above.       Results from Hospital Encounter encounter on 09/04/09    MRI LUMBAR SPINE W/O CONT        Current Facility-Administered Medications:     aspirin delayed-release tablet 325 mg, 325 mg, Oral, DAILY, Andrea, Jesusita Garrido MD, 325 mg at 10/19/21 7649    atorvastatin (LIPITOR) tablet 80 mg, 80 mg, Oral, DAILY, Andrea, Jesusita Garrido MD, 80 mg at 10/19/21 6416    pantoprazole (PROTONIX) tablet 40 mg, 40 mg, Oral, DAILY, Filipe Taylor MD, 40 mg at 10/19/21 0582    multivitamin with folic acid (ONE DAILY WITH FOLIC ACID) tablet 1 Tablet, 1 Tablet, Oral, DAILY, Rodríguez Blackwell MD, 1 Tablet at 10/19/21 6859    lisinopriL (PRINIVIL, ZESTRIL) tablet 20 mg, 20 mg, Oral, DAILY, Rodríguez Blackwell MD, 20 mg at 10/19/21 1107    insulin lispro (HUMALOG) injection, , SubCUTAneous, AC&HS, Rodríguez Blackwell MD, 2 Units at 10/19/21 1237    acetaminophen (TYLENOL) tablet 650 mg, 650 mg, Oral, Q4H PRN, 650 mg at 10/18/21 2121 **OR** acetaminophen (TYLENOL) solution 650 mg, 650 mg, Per NG tube, Q4H PRN **OR** acetaminophen (TYLENOL) suppository 650 mg, 650 mg, Rectal, Q4H PRN, Rodríguez Blackwell MD    heparin (porcine) injection 5,000 Units, 5,000 Units, SubCUTAneous, Filipe Vogel MD, 5,000 Units at 10/19/21 0952    influenza vaccine 2021-22 (6 mos+)(PF) (FLUARIX/FLULAVAL/FLUZONE QUAD) injection 0.5 mL, 1 Each, IntraMUSCular, PRIOR TO DISCHARGE, Don Wong MD    glucose chewable tablet 16 g, 4 Tablet, Oral, PRN, Don Wong MD    dextrose (D50W) injection syrg 12.5-25 g, 25-50 mL, IntraVENous, PRN, Don Wong MD    glucagon [facility]) injection 1 mg, 1 mg, IntraMUSCular, PRN, Noe Myers MD    Case discussed with collaborating physician Dr. Cathy Andrews and our impression and recommendations are as follows:     1. Pre-operative risk assessment:   - Co-morbidities include hypertension, hyperlipidemia, and DM type II.   - 2-D echo showed EF 73% with normal diastolic function. No ASD with bubble study.   - Patient is considered low risk for cardiac events given lack of shortness of breath, palpitations, or chest pain. Patient is able to perform 4 METS. 2. Hypertension:   - blood pressure at goal.     3. Hyperlipidemia:   - continue high dose statin. 4. CVA:   - neurology on the case. - continue asa and high dose statin    Thank you for involving us in the care of this patient. Please do not hesitate to call if additional questions arise. If after hours please call [phone].     Bobbi Bunch MD, MAVIS Hennessy  Structural Heart Disease  Endovascular and Vascular Medicine  Interventional Cardiology  [facility]  [phone]

## 2021-10-20 LAB
GLUCOSE BLD STRIP.AUTO-MCNC: 160 MG/DL (ref 65–117)
GLUCOSE BLD STRIP.AUTO-MCNC: 162 MG/DL (ref 65–117)
GLUCOSE BLD STRIP.AUTO-MCNC: 170 MG/DL (ref 65–117)
GLUCOSE BLD STRIP.AUTO-MCNC: 175 MG/DL (ref 65–117)
PERFORMED BY, TECHID: ABNORMAL

## 2021-10-20 PROCEDURE — 74011250636 HC RX REV CODE- 250/636: Performed by: INTERNAL MEDICINE

## 2021-10-20 PROCEDURE — 74011250637 HC RX REV CODE- 250/637: Performed by: INTERNAL MEDICINE

## 2021-10-20 PROCEDURE — 97110 THERAPEUTIC EXERCISES: CPT

## 2021-10-20 PROCEDURE — 97530 THERAPEUTIC ACTIVITIES: CPT

## 2021-10-20 PROCEDURE — 97116 GAIT TRAINING THERAPY: CPT

## 2021-10-20 PROCEDURE — 74011636637 HC RX REV CODE- 636/637: Performed by: INTERNAL MEDICINE

## 2021-10-20 PROCEDURE — 99232 SBSQ HOSP IP/OBS MODERATE 35: CPT | Performed by: SURGERY

## 2021-10-20 PROCEDURE — 65270000029 HC RM PRIVATE

## 2021-10-20 PROCEDURE — 82962 GLUCOSE BLOOD TEST: CPT

## 2021-10-20 PROCEDURE — 74011250637 HC RX REV CODE- 250/637: Performed by: SURGERY

## 2021-10-20 RX ORDER — ASPIRIN 325 MG
325 TABLET, DELAYED RELEASE (ENTERIC COATED) ORAL DAILY
Qty: 30 TABLET | Refills: 5 | Status: SHIPPED | OUTPATIENT
Start: 2021-10-21

## 2021-10-20 RX ORDER — ATORVASTATIN CALCIUM 80 MG/1
80 TABLET, FILM COATED ORAL DAILY
Qty: 30 TABLET | Refills: 4 | Status: SHIPPED | OUTPATIENT
Start: 2021-10-20

## 2021-10-20 RX ORDER — ATORVASTATIN CALCIUM 80 MG/1
80 TABLET, FILM COATED ORAL DAILY
Qty: 30 TABLET | Refills: 4 | Status: SHIPPED | OUTPATIENT
Start: 2021-10-20 | End: 2021-10-20 | Stop reason: SDUPTHER

## 2021-10-20 RX ADMIN — INSULIN LISPRO 2 UNITS: 100 INJECTION, SOLUTION INTRAVENOUS; SUBCUTANEOUS at 18:05

## 2021-10-20 RX ADMIN — ASPIRIN 325 MG: 325 TABLET, COATED ORAL at 08:03

## 2021-10-20 RX ADMIN — PANTOPRAZOLE SODIUM 40 MG: 40 TABLET, DELAYED RELEASE ORAL at 08:03

## 2021-10-20 RX ADMIN — HEPARIN SODIUM 5000 UNITS: 5000 INJECTION INTRAVENOUS; SUBCUTANEOUS at 18:05

## 2021-10-20 RX ADMIN — INSULIN LISPRO 2 UNITS: 100 INJECTION, SOLUTION INTRAVENOUS; SUBCUTANEOUS at 08:06

## 2021-10-20 RX ADMIN — ATORVASTATIN CALCIUM 80 MG: 40 TABLET, FILM COATED ORAL at 08:03

## 2021-10-20 RX ADMIN — HEPARIN SODIUM 5000 UNITS: 5000 INJECTION INTRAVENOUS; SUBCUTANEOUS at 22:59

## 2021-10-20 RX ADMIN — HEPARIN SODIUM 5000 UNITS: 5000 INJECTION INTRAVENOUS; SUBCUTANEOUS at 08:03

## 2021-10-20 RX ADMIN — MULTIVITAMIN TABLET 1 TABLET: TABLET at 08:03

## 2021-10-20 RX ADMIN — ACETAMINOPHEN 650 MG: 325 TABLET ORAL at 22:09

## 2021-10-20 RX ADMIN — LISINOPRIL 20 MG: 20 TABLET ORAL at 08:03

## 2021-10-20 RX ADMIN — INSULIN LISPRO 2 UNITS: 100 INJECTION, SOLUTION INTRAVENOUS; SUBCUTANEOUS at 11:55

## 2021-10-20 NOTE — ROUTINE PROCESS
Two person LOS skin assessment performed by myself and Wilian Coe RN. Patient's skin is clean, dry, and intact with no signs of breakdown.

## 2021-10-20 NOTE — DISCHARGE INSTRUCTIONS
INSTRUCTIONS ON DISCHARGE     (1) Please note that the following are new medications added to your regimen:                     ASPIRIN 325mg daily                      LIPITOR 80 mg daily (you were taking lipitor 20mg, we increased it to 80 mg)\    (2) you do has blockage in the right neck vein, Dr. Maryanne Hernandez has arranged a vascular procedure next Wednesday. Some one from the office will reach out to you    (3) please follow-up with Dr. Helen Bustillo and gastroenterology in 1-2 weeks.     (4) please also note that given the history of bleeding, if you notice lightheadedness, blood in stools,please call [phone]

## 2021-10-20 NOTE — PROGRESS NOTES
Problem: Self Care Deficits Care Plan (Adult)  Goal: *Acute Goals and Plan of Care (Insert Text)  Description: Pt will be MI sup<->sit in prep for EOB ADL's  Pt will be MI  LB dressing sitting/standing level  Pt will be MI  grooming EOB level  Pt will be Mi  sit<-> prep for toilet transfer  Pt will be Mi  toilet transfer with LRAD  Pt will be MI  toileting/cloth mgmt LRAD  Pt will be MI  grooming standing sink  Pt will be MI bathing sitting/standing sink LRAD  Pt will be MI sourav UE HEP in prep for self care tasks    OCCUPATIONAL THERAPY TREATMENT  Patient: Yaneli Reid (3 y.o. female)  Date: 10/20/2021  Diagnosis: CVA (cerebral vascular accident) ([facility] [address] [I63.9] <principal problem not specified>       Precautions: FALL  Chart, occupational therapy assessment, plan of care, and goals were reviewed. ASSESSMENT  Patient continues with skilled OT services and is progressing towards goals. Patient received semi supine in bed upon BORREGO'S arrival and agreeable to work with therapist.  Patient mod I for bed mobility, supine to sit EOB and scooting. MOD I for LB dressing, EOB. STS using gait belt only with CGA, bed ->sink->toilet->bed tf with CGA and vc's to pace safe for safety. Standing at sink pt required CGA for grooming task (oral/facial care and combing hair). No LOB noted. SBA for toileting hygiene and cloth mgmt. Pt returned to EOB, Patient educated on and completed bilateral UE HEP to increase strength/endurance needed for ADL'S and functional transfers, see grid below. Pt educated on neuroplasticity and the importance of repetitive movements to form new pathways. Pt continues to be limited by decrease L UE coordination and decreased motor planning. Pt min a for donning pull over shirt 2/2 decreased motor planning and [facility]. Pt informed therapist that she would like to go to [facility] now as before she declined.  Patient would benefit from continued skilled Occupational services while at [facility] in order to increase safety and independence with self care and functional tranfers/mobility. Recommend at discharge to IRF when medically appropriate. Goal #1 met    Current Level of Function Impacting Discharge (ADLs): CGA for grooming, SBA for toileting, min A for LB up dressing and MOD I for LB dressing    Other factors to consider for discharge: Time of onset, medical prognosis/diagnosis, severity of deficits, PLOF, home environment, and family support          PLAN :  Patient continues to benefit from skilled intervention to address the above impairments. Continue treatment per established plan of care. to address goals. Recommend next OT session: HEP for L UE    Recommendation for discharge: (in order for the patient to meet his/her long term goals)  [facility]     This discharge recommendation:  Has been made in collaboration with the attending provider and/or case management    IF patient discharges home will need the following DME: TBD       SUBJECTIVE:   Patient stated i'm doing better    OBJECTIVE DATA SUMMARY:   Cognitive/Behavioral Status:  Neurologic State: Alert  Orientation Level: Oriented X4  Cognition: Appropriate for age attention/concentration             Functional Mobility and Transfers for ADLs:  Bed Mobility:  Rolling: Modified independent  Supine to Sit: Modified independent  Scooting: Modified independent    Transfers:  Sit to Stand: Contact guard assistance  Functional Transfers  Bathroom Mobility: Contact guard assistance  Toilet Transfer : Contact guard assistance       Balance:  Sitting: Intact; Without support  Standing: Impaired  Standing - Static: Good  Standing - Dynamic : Fair    ADL Intervention:       Grooming  Grooming Assistance: Contact guard assistance  Position Performed: Standing  Washing Face: Contact guard assistance  Washing Hands: Contact guard assistance  Brushing Teeth: Contact guard assistance  Brushing/Combing Hair: Contact guard assistance Upper Body Dressing Assistance  Dressing Assistance: Minimum assistance  Pullover Shirt: Minimum assistance  Cues: Verbal cues provided    Lower Body Dressing Assistance  Dressing Assistance: Modified independent  Socks: Modified independent  Position Performed: Seated edge of bed    Toileting  Toileting Assistance: Stand-by assistance  Bladder Hygiene: Stand-by assistance  Clothing Management: Stand-by assistance         Therapeutic Exercises:   Exercise Sets Reps AROM AAROM PROM Self PROM Comments   Shoulder flex/ext 1 10 [] [x] [] [] L UE, to achieve full ROM   Towel walks 1 5 [x] [] [] [] L hand   Opposition  1 10 [x] [] [] []    Hand F/E 1 10 [] [] [] []         Pain:  8/10 anterior side of shoulder    Activity Tolerance:   Good  Please refer to the flowsheet for vital signs taken during this treatment. After treatment patient left in no apparent distress:   Seated EOB with family and call bell    COMMUNICATION/COLLABORATION:   The patients plan of care was discussed with: Registered nurse.      ELMO Garcia  Time Calculation: 26 mins

## 2021-10-20 NOTE — PROGRESS NOTES
Progress Note    Patient: Dave Martínez MRN: [id number]  SSN: [id number]    YOB: 1960  Age: 62 y.o. Sex: female      Admit Date: 10/18/2021    LOS: 2 days     Subjective:   GI in consultation for history of GI Bleed     Patient seen resting comfortably. She states her left arm weakness is improving. She does have better control. She states she has decided to go to In patient rehab when discharged. MRI shows significant right frontal and occipital parietal multiple focal ischemic changes. Vascular surgery is following. Patient reports she is scheduled for right sided carotid endarterectomy next week. HgB remains stable, she denies rectal bleeding. She is currently on aspirin. Patient was admitted to the hospital with complaints of slurred speech and left arm weakness. Patient started with slurred speech yesterday about 2:30 pm for 30 minutes that resolved. Around 2:30 this morning she woked up with  involuntary movement of left arm and arm weakness. In the ED, CT head was negative, CTA neck showed Atherosclerotic changes carotid siphons and proximal and distal anterior and middle cerebral arteries as described above. No major arterial occlusion was demonstrated. MRI brain showed Acute infarctions right frontal lobe, parietal lobe including right inferior parietal lobule and posterior temporal lobe. Denies history of  Stroke. She was a level 2 stroke alert as she was not a candidate for TPA. Neurology saw patient and noted cannot have antiplatelet due to prior GI bleed. Objective:     Vitals:    10/19/21 2000 10/20/21 0000 10/20/21 0751 10/20/21 1526   BP:   108/79 124/73   Pulse: 71 68 96 85   Resp:   16 18   Temp:   98.4 °F (36.9 °C) 98.3 °F (36.8 °C)   SpO2:   95% 99%   Weight:       Height:            Intake and Output:  Current Shift: No intake/output data recorded. Last three shifts: No intake/output data recorded.     Physical Exam:   Skin:  Extremities and face reveal no rashes. No velasquez erythema. HEENT: Sclerae anicteric. Extra-occular muscles are intact. No abnormal pigmentation of the lips. The neck is supple. Cardiovascular: Regular rate and rhythm. Respiratory:  Comfortable breathing with no accessory muscle use. GI:  Abdomen nondistended, soft, and nontender. No enlargement of the liver or spleen. No masses palpable. Rectal:  Deferred  Musculoskeletal: Left side weakness  Neurological:  Gross memory appears intact. Patient is alert and oriented. Psychiatric:  Mood appears appropriate with judgement intact. Lymphatic:  No visible adenopathy      Lab/Data Review:  Recent Results (from the past 24 hour(s))   GLUCOSE, POC    Collection Time: 10/19/21  4:30 PM   Result Value Ref Range    Glucose (POC) 146 (H) 65 - 117 mg/dL    Performed by Rodriguez Villanueva, POC    Collection Time: 10/19/21  7:19 PM   Result Value Ref Range    Glucose (POC) 152 (H) 65 - 117 mg/dL    Performed by Kati Rutledge, POC    Collection Time: 10/20/21  7:56 AM   Result Value Ref Range    Glucose (POC) 175 (H) 65 - 117 mg/dL    Performed by Varney Gitelman, POC    Collection Time: 10/20/21 11:23 AM   Result Value Ref Range    Glucose (POC) 160 (H) 65 - 117 mg/dL    Performed by Cindie Phoenix               MRI BRAIN WO CONT   Final Result   Acute infarctions right frontal lobe, parietal lobe including right   inferior parietal lobule and posterior temporal lobe as described above. CTA HEAD NECK W CONT   Final Result   Atherosclerotic changes aortic arch and proximal brachiocephalic   vessels as described above. Prominent atherosclerotic changes left subclavian   artery. Narrowing of the origin of the left vertebral artery. High-grade stenoses proximal right internal carotid artery according to the   NASCET as described above.       Atherosclerotic changes proximal left internal carotid artery as described above   and with no hemodynamically significant stenoses demonstrated. Focal chronic   dissection left carotid bulb. INTRACRANIAL CTA WITH AND WITHOUT CONTRAST AND WITH 3-D RECONSTRUCTIONS :         All CT scans at this facility are performed using dose reduction optimization   techniques as appropriate to a performed exam including the following: Automated   exposure control, adjustments of the mA and/or kV according to patient size, or   use of iterative reconstruction technique. 100 cc of Isovue-370 were injected intravenously. Thin axial and coronal and sagittal reconstructions were obtained. 3-D   reconstructions were also obtained. CLINICAL HISTORY: Left upper extremity weakness, slurred speech. Prominent atherosclerotic changes of the carotid siphons are noted bilaterally   with almost 60% stenoses of the lumen bilaterally. There are atherosclerotic   changes of the M1 segments of both middle cerebral arteries although no   high-grade stenoses is demonstrated. Atherosclerotic changes of the distal   branches of the anterior and middle cerebral arteries are noted. There is a hypoplastic right vertebral artery and a dominant left vertebral   artery. The basilar artery is patent with no significant stenoses demonstrated. Both posterior cerebral arteries are patent with no significant stenoses   demonstrated. No aneurysm was demonstrated. Major dural venous sinuses are patent. The   superior sagittal sinus drains preferentially into the left transverse sinus   with the left transverse sinus being larger than the right and representing a   normal variant. IMPRESSION: Atherosclerotic changes carotid siphons and proximal and distal   anterior and middle cerebral arteries as described above. No major arterial   occlusion was demonstrated. Report has been communicated to Dr. Harmony Bowser. CT CODE NEURO HEAD WO CONTRAST   Final Result   No acute intracranial process. Other findings as above. Findings conveyed to [facility] [employer], charge nurse on behalf of Dr. Rose Marie Quintero, on   10/18/2021 7:41 AM.                 Assessment:     Active Problems:    CVA (cerebral vascular accident) ([address] (10/18/2021)        Plan:   1. Hx of GI Bleed    Denies active bleeding     HgB stable at 13.0     Started on aspirin 325 mg daily      Concerns for GI bleed with regards to starting antiplatelet in relation to recent stroke       GI recommends starting antiplatelet therapy       Monitor HgB and bleeding   2. CVA     CT head negative     CTA neck and head with Atherosclerotic changes carotid siphons and proximal distal anterior and middle cerebral arteries      MRI Brain: Acute infarctions right frontal lobe, parietal lobe including right inferior parietal lobule and posterior temporal lobe. Vascular surgery input appreciated      Neurology input appreciated      right sided carotid endarterectomy next week with Dr. Rocio Blanc  3. Hypertension      Continue current antihypertensive medications  4. Type 2 Diabetes      HgB A1C 6.6       Continue bedside glucose monitoring       Sliding scale coverage    5. GERD        Continue PPI    Patient's hgB remains stable. From GI standpoint patient is clear for discharge. GI signing off. Please re-consult if needed. Thank you for allowing me to participate in this patients care. Plan discussed with Dr. Phillip Rothman and he approves.     Signed By: Alejandro Vasquez NP     October 20, 2021

## 2021-10-20 NOTE — PROGRESS NOTES
Problem: Mobility Impaired (Adult and Pediatric)  Goal: *Acute Goals and Plan of Care (Insert Text)  Description: Pt will be I with LE HEP in 7 days. Pt will perform bed mobility with mod I in 7 days. Pt will perform transfers with mod I in 7 days. Pt will amb >200 feet with LRAD safely with mod I in 7 days. Outcome: Progressing Towards Goal   PHYSICAL THERAPY TREATMENT  Patient: Jaswant Loser (58 y.o. female)  Date: 10/20/2021  Diagnosis: CVA (cerebral vascular accident) ([facility] [address] [I63.9] <principal problem not specified>       Precautions:    Chart, physical therapy assessment, plan of care and goals were reviewed. ASSESSMENT  Patient continues with skilled PT services and is progressing towards goals. Pt. Continues to demonstrate L sided weakness in UE/LE. Pt. Pt. Able to Tf to chair with CGA X 1. Pt. Ambulated 200' with RW and CGA X 1. Pt. Required min assist while  turning with RW due to LOB X 1. Pt. Ambulated 20' without RW and needed min assist X 1 for episodes of LOB. Pt. Wanted to try to ambulate without RW depite PT recommendation. Pt. Nima Agent at this time it is not safe to ambulate without RW due to safety. Pt. With decreased safety awareness with ambulation. Current Level of Function Impacting Discharge (mobility/balance): Decreased mobility and assistance needed    Other factors to consider for discharge: safety         PLAN :  Patient continues to benefit from skilled intervention to address the above impairments. Continue treatment per established plan of care. to address goals. Recommendation for discharge: (in order for the patient to meet his/her long term goals)  [address] [facility]     This discharge recommendation:  Has been made in collaboration with the attending provider and/or case management    IF patient discharges home will need the following DME: rolling walker       SUBJECTIVE:   Patient stated I want to walk without this walker. \" I want to do all I can for myself. \"    OBJECTIVE DATA SUMMARY:   Critical Behavior:  Neurologic State: Alert  Orientation Level: Oriented X4  Cognition: Appropriate for age attention/concentration, Follows commands     Functional Mobility Training:  Bed Mobility:  Rolling: Modified independent  Supine to Sit: Modified independent     Scooting: Modified independent        Transfers:  Sit to Stand: Contact guard assistance  Stand to Sit: Contact guard assistance        Bed to Chair: Contact guard assistance                    Balance:  Sitting: Intact; Without support  Standing: Impaired; With support  Standing - Static: Good  Standing - Dynamic : Fair  Ambulation/Gait Training:  Distance (ft): 200 Feet (ft)  Assistive Device: Walker, rolling  Ambulation - Level of Assistance: Contact guard assistance                       Speed/Ebonie: Slow  Step Length: Right shortened;Left shortened              Therapeutic Exercises: Therapeutic Exercises:       EXERCISE   Sets   Reps   Active Active Assist   Passive Self ROM   Comments   Ankle Pumps  20 [x] [] [] []    Quad Sets/Glut Sets   [] [] [] []    Hamstring Sets   [] [] [] []    Short Arc Quads   [] [] [] []    Heel Slides   [] [] [] []    Straight Leg Raises   [] [] [] []    Hip abd/add  12 [x] [] [] [] standing   Long Arc Quads  20 [x] [] [] [] seated   Marching  20 [x] [] [] [] seated   Side stepping  Heel raises  SLB on L and R with holding onto window sill  Hip extension  25'  12  5 each    12 [x] [] [] [] standing        Pain Ratin/10      Activity Tolerance:   Good  Please refer to the flowsheet for vital signs taken during this treatment. After treatment patient left in no apparent distress:   Sitting in chair, Call bell within reach, Caregiver / family present, and Notified RN of RX.    COMMUNICATION/COLLABORATION:   The patients plan of care was discussed with: Registered nurse.      Alyssia Appiah   Time Calculation: 27 mins

## 2021-10-20 NOTE — DISCHARGE SUMMARY
Physician Discharge Summary     Patient ID:    Zannie Cowden  [id number]  8 y.o.  1960    Admit date: 10/18/2021    Discharge date : 10/20/2021    Chronic Diagnoses:    Problem List as of 10/20/2021 Date Reviewed: 10/18/2020        Codes Class Noted - Resolved    CVA (cerebral vascular accident) [facility]) ICD-10-CM: I63.9  ICD-9-CM: 434.91  10/18/2021 - Present        Ischemia of right lower extremity ICD-10-CM: I99.8  ICD-9-CM: 459.9  12/3/2019 - Present          22    Final Diagnoses:   CVA (cerebral vascular accident) ([facility] [address] [I63.9]    Reason for Hospitalization:  (1) stroke, s/t right MCA embolic infarct. On aspirin. GI consulted due to h/o GI bleed. On statin.     (2) high grade carotid stenosis: CEA planned on next Wednesday     (3) HTN: lisinopril      (4) HLD: atorvastatin      Hospital Course:         60M, h/o HTN and DMII with slurred speech and left sided weakness s/t right MCA embolic infarct     Right carotid severe stenosis     Started on Aspirin 325, with H/o GI bleed in past, Gi was consulted and recommend outpatient f/u     Plans for CEA planned next Wednesday after cardiac clearance     PT recommended IRF, but family politely declined. INSTRUCTIONS ON DISCHARGE     (1) Please note that the following are new medications added to your regimen:                     ASPIRIN 325mg daily                      LIPITOR 80 mg daily (you were taking lipitor 20mg, we increased it to 80 mg)\    (2) you do has blockage in the right neck vein, Dr. Soto Mendoza has arranged a vascular procedure next Wednesday. Some one from the office will reach out to you    (3) please follow-up with Dr. Isa Mi and gastroenterology in 1-2 weeks.     (4) please also note that given the history of bleeding, if you notice lightheadedness, blood in stools,please call [phone]        Discharge Medications:   Current Discharge Medication List      START taking these medications    Details   aspirin delayed-release 325 mg tablet Take 1 Tablet by mouth daily. Qty: 30 Tablet, Refills: 5  Start date: 10/21/2021         CONTINUE these medications which have CHANGED    Details   atorvastatin (Lipitor) 80 mg tablet Take 1 Tablet by mouth daily. Qty: 30 Tablet, Refills: 4  Start date: 10/20/2021         CONTINUE these medications which have NOT CHANGED    Details   glipiZIDE SR (GLUCOTROL XL) 10 mg CR tablet Take 10 mg by mouth daily. pantoprazole (PROTONIX) 40 mg tablet Take 40 mg by mouth daily. mv-mn/folic acid/vit [id number] (ALIVE ONCE DAILY WOMEN 50 PLUS PO) Take 1 Tablet by mouth daily (with breakfast). lisinopril (PRINIVIL, ZESTRIL) 20 mg tablet Take  by mouth daily. acetaminophen (TYLENOL) 325 mg tablet Take  by mouth every four (4) hours as needed for Pain. STOP taking these medications       dexAMETHasone (DECADRON) 0.5 mg tablet Comments:   Reason for Stopping:         aspirin 81 mg chewable tablet Comments:   Reason for Stopping:         clopidogrel (PLAVIX) 75 mg tab Comments:   Reason for Stopping:         glipiZIDE (GLUCOTROL) 10 mg tablet Comments:   Reason for Stopping: Follow up Care:    1. Evy Gonsales MD in 1-2 weeks. Please call to set up an appointment shortly after discharge. Diet:  Diabetic     Disposition:  HH PT OT     Advanced Directive:   FULL x   DNR      Discharge Exam:  Physical Exam  Vitals reviewed. Constitutional:       General: She is not in acute distress.     Appearance: She is normal weight. She is ill-appearing. She is not toxic-appearing or diaphoretic. HENT:      Head: Normocephalic and atraumatic.      Nose: Nose normal. No congestion or rhinorrhea.      Mouth/Throat:      Mouth: Mucous membranes are moist.      Pharynx: Oropharynx is clear. No oropharyngeal exudate or posterior oropharyngeal erythema. Eyes:      General: No scleral icterus.         Right eye: No discharge.         Left eye: No discharge.      Extraocular Movements: Extraocular movements intact.      Conjunctiva/sclera: Conjunctivae normal.      Pupils: Pupils are equal, round, and reactive to light. Neck:      Vascular: No carotid bruit. Cardiovascular:      Rate and Rhythm: Normal rate and regular rhythm.      Pulses: Normal pulses.      Heart sounds: Normal heart sounds. No murmur heard. No friction rub. No gallop.    Pulmonary:      Effort: Pulmonary effort is normal. No respiratory distress.      Breath sounds: Normal breath sounds. No stridor. No wheezing, rhonchi or rales. Chest:      Chest wall: No tenderness. Abdominal:      General: Abdomen is flat. Bowel sounds are normal. There is no distension.      Palpations: Abdomen is soft. There is no mass.      Tenderness: There is no abdominal tenderness. There is no right CVA tenderness, left CVA tenderness, guarding or rebound.      Hernia: No hernia is present. Musculoskeletal:         General: No swelling, tenderness, deformity or signs of injury. Normal range of motion.      Cervical back: Normal range of motion and neck supple. No rigidity or tenderness.      Right lower leg: No edema.      Left lower leg: No edema. Lymphadenopathy:      Cervical: No cervical adenopathy. Skin:     General: Skin is warm.      Capillary Refill: Capillary refill takes less than 2 seconds.      Coloration: Skin is not jaundiced or pale.      Findings: No bruising, erythema, lesion or rash. Neurological:      Mental Status: She is alert and oriented to person, place, and time.      Cranial Nerves: No cranial nerve deficit.      Sensory: No sensory deficit.      Motor: Weakness (Power 4/5 for all movements of LUE/LLE) present.      Coordination: Coordination normal.      Gait: Gait normal.      Deep Tendon Reflexes: Reflexes normal.   Psychiatric:         Mood and Affect: Mood normal.         BehaviorRicci Ruby.         Thought Content:  Thought content normal.         Judgment: Judgment normal.     CONSULTATIONS: cardiology, neurology, vascular     Significant Diagnostic Studies:     Radiologic Studies -   Results from Abstract encounter on 03/18/20    XR CHEST PORT     CT Results  (Last 48 hours)    None              Discharge time spent 35 minutes    Signed:  Oneil Luciano MD  10/20/2021  9:48 AM

## 2021-10-20 NOTE — PROGRESS NOTES
CM met with patient at bedside. Patient is now agreeable to IRF. Choice obtained for [facility] in [place], [place]. CM sent referral, pending acceptance and insurance authorization.

## 2021-10-20 NOTE — PROGRESS NOTES
Problem: Falls - Risk of  Goal: *Absence of Falls  Description: Document Juan Manzano Fall Risk and appropriate interventions in the flowsheet.   Outcome: Progressing Towards Goal  Note: Fall Risk Interventions:  Mobility Interventions: Bed/chair exit alarm              Elimination Interventions: Bed/chair exit alarm

## 2021-10-20 NOTE — PROGRESS NOTES
PROGRESS NOTE      Chief Complaints:  Patient has no new complaints. HPI and  Objective:  Patient states that her speech and left arm strength become almost normal.  Patient denies chest pain shortness breath denies nausea abdominal pain. Patient was started on aspirin therapy yesterday. No signs of GI bleed. Review of Systems:  Rest of review of system negative personally reviewed. EXAM:  Visit Vitals  /72 (BP 1 Location: Right upper arm, BP Patient Position: At rest)   Pulse 68   Temp 98.2 °F (36.8 °C)   Resp 18   Ht 5' 1\" (1.549 m)   Wt 135 lb (61.2 kg)   SpO2 97%   BMI 25.51 kg/m²       Patient is awake   Head and neck atraumatic, normocephalic. ENT: No hoarse voice  Cardiac system regular rate rhythm. Pulmonary no audible wheeze  Chest wall excursion normal with respiration cycle  Abdomen is soft not particularly distended. Neurologically nonfocal.  Skin is warm and moist.  Psychosocial: Cooperative. Vascular examination as previously noted no changes.     Recent Results (from the past 24 hour(s))   ECHO ADULT COMPLETE    Collection Time: 10/19/21  9:24 AM   Result Value Ref Range    Aortic Valve Systolic Peak Velocity 249.70 cm/s    AoV PG 8.00 mmHg    Ao Root D 2.50 cm    IVSd 1.17 (A) 0.60 - 0.90 cm    LVIDd 2.93 (A) 3.90 - 5.30 cm    LVIDs 1.74 cm    LVOT Peak Velocity 98.70 cm/s    LVOT Peak Gradient 4.00 mmHg    LVPWd 1.05 (A) 0.60 - 0.90 cm    LV E' Septal Velocity 8.19 cm/s    LV ED Vol A2C 25.20 cm3    LV ES Vol A2C 5.27 cm3    E/E' septal 9.72     Left Atrium Major Axis 2.70 cm    Mitral Valve Deceleration Giles 2,860.00 mm/s2    Mitral Valve Deceleration Giles 2,860.00 mm/s2    Mitral Valve E Wave Deceleration Time 250.00 ms    Mitral Valve Pressure Half-time 85.00 ms    MV A Elroy 112.00 cm/s    MV E Elroy 79.60 cm/s    MV Mean Gradient 2.00 mmHg    Mitral Valve Annulus Velocity Time Integral 23.80 cm    Mitral Valve Max Velocity 109.00 cm/s    MV Peak Gradient 5.00 mmHg    MVA (PHT) 2.59 cm2    MV E/A 0.71     Pulmonic Valve Max Velocity 104.00 cm/s    Pulmonic Valve Systolic Peak Instantaneous Gradient 4.00 mmHg    Est. RA Pressure 3.00 mmHg    RVIDd 2.69 cm    RVSP 21.00 mmHg    Tricuspid Valve Max Velocity 210.00 cm/s    Triscuspid Valve Regurgitation Peak Gradient 18.00 mmHg    Tapse 1.40 (A) 1.50 - 2.00 cm    Right Atrial Area 4C 9.85 cm2    LA Area 4C 13.37 cm2    BP EF 73.0 55.0 - 100.0 %    LV Mass AL 93.3 67.0 - 162.0 g    LV Mass AL Index 58.3 43.0 - 95.0 g/m2    Left Atrium Minor Axis 1.69 cm   GLUCOSE, POC    Collection Time: 10/19/21 11:27 AM   Result Value Ref Range    Glucose (POC) 143 (H) 65 - 117 mg/dL    Performed by Lg Balderrama, POC    Collection Time: 10/19/21  4:30 PM   Result Value Ref Range    Glucose (POC) 146 (H) 65 - 117 mg/dL    Performed by Mamadou Wolff, POC    Collection Time: 10/19/21  7:19 PM   Result Value Ref Range    Glucose (POC) 152 (H) 65 - 117 mg/dL    Performed by Aislinn Arizmendi        ASSESSMENT:   Patient is 62 y.o. with diagnosis of : Active Problems:    CVA (cerebral vascular accident) ([facility] [address] (10/18/2021)        PLAN:                 Cardiology input appreciated. Continue aspirin therapy high-dose statin. Patient is a scheduled for right carotid endarterectomy next Wednesday 11:00. Patient can go home today with outpatient physical therapy. Patient prefers going home with outpatient physical therapy as an inpatient. If PT says outpatient therapy is okay I think that is reasonable. Please discharge patient home with aspirin 325 mg and do not stop this medication during perioperatively. Please set up appointment to come see Dr. Priti Benoit next Monday finalize perioperative assessment. Again patient scheduled for right carotid endarterectomy next Wednesday.

## 2021-10-20 NOTE — PROGRESS NOTES
Problem: Falls - Risk of  Goal: *Absence of Falls  Description: Document Green Salvia Fall Risk and appropriate interventions in the flowsheet.   10/20/2021 0944 by Alicja Estrada RN  Outcome: Progressing Towards Goal  Note: Fall Risk Interventions:  Mobility Interventions: Bed/chair exit alarm              Elimination Interventions: Bed/chair exit alarm           10/20/2021 0941 by Alicja Estrada RN  Outcome: Progressing Towards Goal  Note: Fall Risk Interventions:  Mobility Interventions: Bed/chair exit alarm              Elimination Interventions: Bed/chair exit alarm

## 2021-10-21 VITALS
SYSTOLIC BLOOD PRESSURE: 108 MMHG | BODY MASS INDEX: 25.49 KG/M2 | TEMPERATURE: 98.3 F | DIASTOLIC BLOOD PRESSURE: 73 MMHG | WEIGHT: 135 LBS | OXYGEN SATURATION: 97 % | HEART RATE: 69 BPM | RESPIRATION RATE: 18 BRPM | HEIGHT: 61 IN

## 2021-10-21 LAB
BASOPHILS # BLD: 0 K/UL (ref 0–0.1)
BASOPHILS NFR BLD: 1 % (ref 0–1)
DIFFERENTIAL METHOD BLD: NORMAL
EOSINOPHIL # BLD: 0.2 K/UL (ref 0–0.4)
EOSINOPHIL NFR BLD: 2 % (ref 0–7)
ERYTHROCYTE [DISTWIDTH] IN BLOOD BY AUTOMATED COUNT: 12 % (ref 11.5–14.5)
GLUCOSE BLD STRIP.AUTO-MCNC: 135 MG/DL (ref 65–117)
GLUCOSE BLD STRIP.AUTO-MCNC: 147 MG/DL (ref 65–117)
HCT VFR BLD AUTO: 39.2 % (ref 35–47)
HGB BLD-MCNC: 12.6 G/DL (ref 11.5–16)
IMM GRANULOCYTES # BLD AUTO: 0 K/UL (ref 0–0.04)
IMM GRANULOCYTES NFR BLD AUTO: 0 % (ref 0–0.5)
LYMPHOCYTES # BLD: 2.5 K/UL (ref 0.8–3.5)
LYMPHOCYTES NFR BLD: 40 % (ref 12–49)
MCH RBC QN AUTO: 29 PG (ref 26–34)
MCHC RBC AUTO-ENTMCNC: 32.1 G/DL (ref 30–36.5)
MCV RBC AUTO: 90.3 FL (ref 80–99)
MONOCYTES # BLD: 0.8 K/UL (ref 0–1)
MONOCYTES NFR BLD: 13 % (ref 5–13)
NEUTS SEG # BLD: 2.7 K/UL (ref 1.8–8)
NEUTS SEG NFR BLD: 44 % (ref 32–75)
NRBC # BLD: 0 K/UL (ref 0–0.01)
NRBC BLD-RTO: 0 PER 100 WBC
PERFORMED BY, TECHID: ABNORMAL
PERFORMED BY, TECHID: ABNORMAL
PLATELET # BLD AUTO: 332 K/UL (ref 150–400)
PMV BLD AUTO: 11.9 FL (ref 8.9–12.9)
RBC # BLD AUTO: 4.34 M/UL (ref 3.8–5.2)
WBC # BLD AUTO: 6.2 K/UL (ref 3.6–11)

## 2021-10-21 PROCEDURE — 90686 IIV4 VACC NO PRSV 0.5 ML IM: CPT | Performed by: INTERNAL MEDICINE

## 2021-10-21 PROCEDURE — 74011250636 HC RX REV CODE- 250/636: Performed by: INTERNAL MEDICINE

## 2021-10-21 PROCEDURE — 36415 COLL VENOUS BLD VENIPUNCTURE: CPT

## 2021-10-21 PROCEDURE — 90471 IMMUNIZATION ADMIN: CPT

## 2021-10-21 PROCEDURE — 82962 GLUCOSE BLOOD TEST: CPT

## 2021-10-21 PROCEDURE — 74011250637 HC RX REV CODE- 250/637: Performed by: SURGERY

## 2021-10-21 PROCEDURE — 74011250637 HC RX REV CODE- 250/637: Performed by: INTERNAL MEDICINE

## 2021-10-21 PROCEDURE — 85025 COMPLETE CBC W/AUTO DIFF WBC: CPT

## 2021-10-21 PROCEDURE — 74011636637 HC RX REV CODE- 636/637: Performed by: INTERNAL MEDICINE

## 2021-10-21 RX ADMIN — ATORVASTATIN CALCIUM 80 MG: 40 TABLET, FILM COATED ORAL at 08:50

## 2021-10-21 RX ADMIN — MULTIVITAMIN TABLET 1 TABLET: TABLET at 08:50

## 2021-10-21 RX ADMIN — INFLUENZA VIRUS VACCINE 0.5 ML: 15; 15; 15; 15 SUSPENSION INTRAMUSCULAR at 11:23

## 2021-10-21 RX ADMIN — PANTOPRAZOLE SODIUM 40 MG: 40 TABLET, DELAYED RELEASE ORAL at 08:50

## 2021-10-21 RX ADMIN — LISINOPRIL 20 MG: 20 TABLET ORAL at 08:50

## 2021-10-21 RX ADMIN — INSULIN LISPRO 2 UNITS: 100 INJECTION, SOLUTION INTRAVENOUS; SUBCUTANEOUS at 07:30

## 2021-10-21 RX ADMIN — HEPARIN SODIUM 5000 UNITS: 5000 INJECTION INTRAVENOUS; SUBCUTANEOUS at 08:50

## 2021-10-21 RX ADMIN — ASPIRIN 325 MG: 325 TABLET, COATED ORAL at 08:50

## 2021-10-21 NOTE — PROGRESS NOTES
Patient discharged home self-care. Education on medications and follow up care (Carotid  endarterectomy) next week. Patient verbalized understanding. Discharge plan of care/case management plan validated with provider discharge order.

## 2021-10-21 NOTE — PROGRESS NOTES
Patient denied for admission to IRF due to pending Carotid endarterectomy procedure. Patient informed and would like to discharge home at this time. Patient will discharge home/self. Patients niece will  this afternoon.

## 2021-10-21 NOTE — CONSULTS
Consult Date: 10/21/2021    Consults Ms. Mohini Irene is a 62year old woman with DM, adrenal gland tumor, HTN, PVD, smoker who comes in with sudden onset slurred speech that started at 230 pm yesterday but has since resolved. She awoke at 230 am this morning with left arm and leg weakness and she could not control the movements in her left arm. She came to ER where Ct head was WNL and CTa head and neck showed no LVO. She has never had a stroke before. BP elevated at 174/106. Subjective  right ICA critical stenosis. Right mca embolic strokes. Results dw patient. Complains of left hemiparesis.      Past Medical History:   Diagnosis Date    Arthritis     Diabetes ([address] 2019    Hypertension     Peripheral vascular disease ([address]       Past Surgical History:   Procedure Laterality Date    HX CATARACT REMOVAL  2019     Family History   Problem Relation Age of Onset    Cancer Mother         BREAST    Lung Disease Mother     COPD Mother     Heart Disease Mother     Hypertension Mother     Diabetes Mother     Arthritis-osteo Mother     Parkinson's Disease Father     Lung Disease Father     Hypertension Father     Heart Disease Father     Diabetes Father     COPD Other     Parkinson's Disease Other     MS Other     Cancer Brother         PROSTATE    Heart Disease Brother     No Known Problems Son     No Known Problems Son    [facility] MS Son     Anesth Problems Neg Hx       Social History     Tobacco Use    Smoking status: Current Every Day Smoker     Packs/day: 1.00     Years: 40.00     Pack years: 40.00    Smokeless tobacco: Never Used   Substance Use Topics    Alcohol use: Never       Current Facility-Administered Medications   Medication Dose Route Frequency Provider Last Rate Last Admin    aspirin delayed-release tablet 325 mg  325 mg Oral DAILY Andrea, Bob Meade MD   325 mg at 10/21/21 0850    atorvastatin (LIPITOR) tablet 80 mg  80 mg Oral DAILY Bob Mendez MD   80 mg at 10/21/21 0850    pantoprazole (PROTONIX) tablet 40 mg  40 mg Oral DAILY Don Wong MD   40 mg at 10/21/21 0850    multivitamin with folic acid (ONE DAILY WITH FOLIC ACID) tablet 1 Tablet  1 Tablet Oral DAILY Don Wong MD   1 Tablet at 10/21/21 0850    lisinopriL (PRINIVIL, ZESTRIL) tablet 20 mg  20 mg Oral DAILY Don Wong MD   20 mg at 10/21/21 0850    insulin lispro (HUMALOG) injection   SubCUTAneous AC&HS Shabana Diego MD   2 Units at 10/20/21 1805    acetaminophen (TYLENOL) tablet 650 mg  650 mg Oral Q4H PRN Shabana Diego MD   650 mg at 10/20/21 2209    Or    acetaminophen (TYLENOL) solution 650 mg  650 mg Per NG tube Q4H PRN Shabana Diego MD        Or    acetaminophen (TYLENOL) suppository 650 mg  650 mg Rectal Q4H PRN Shabana Diego MD        heparin (porcine) injection 5,000 Units  5,000 Units SubCUTAneous Don Reinoso MD   5,000 Units at 10/21/21 0850    influenza vaccine 2021-22 (6 mos+)(PF) (FLUARIX/FLULAVAL/FLUZONE QUAD) injection 0.5 mL  1 Each IntraMUSCular PRIOR TO DISCHARGE Don Wong MD        glucose chewable tablet 16 g  4 Tablet Oral PRN Shabana Diego MD        dextrose (D50W) injection syrg 12.5-25 g  25-50 mL IntraVENous PRN Shabana Diego MD        glucagon [facility]) injection 1 mg  1 mg IntraMUSCular PRN Shabana Diego MD            Review of Systems   Neurological: Positive for weakness and numbness. Objective     Vital signs for last 24 hours:  Visit Vitals  /71 (BP 1 Location: Left upper arm, BP Patient Position: At rest)   Pulse 92   Temp 97.9 °F (36.6 °C)   Resp 18   Ht 5' 1\" (1.549 m)   Wt 61.2 kg (135 lb)   SpO2 99%   BMI 25.51 kg/m²       Intake/Output this shift:  Current Shift: No intake/output data recorded. Last 3 Shifts: No intake/output data recorded.     Data Review: Recent Results (from the past 24 hour(s))   GLUCOSE, POC    Collection Time: 10/20/21 11:23 AM   Result Value Ref Range    Glucose (POC) 160 (H) 65 - 117 mg/dL    Performed by Antonio 150, POC    Collection Time: 10/20/21  3:29 PM   Result Value Ref Range    Glucose (POC) 162 (H) 65 - 117 mg/dL    Performed by Antonio 150, POC    Collection Time: 10/20/21  7:24 PM   Result Value Ref Range    Glucose (POC) 170 (H) 65 - 117 mg/dL    Performed by Kathlean Dubin    CBC WITH AUTOMATED DIFF    Collection Time: 10/21/21  7:20 AM   Result Value Ref Range    WBC 6.2 3.6 - 11.0 K/uL    RBC 4.34 3.80 - 5.20 M/uL    HGB 12.6 11.5 - 16.0 g/dL    HCT 39.2 35.0 - 47.0 %    MCV 90.3 80.0 - 99.0 FL    MCH 29.0 26.0 - 34.0 PG    MCHC 32.1 30.0 - 36.5 g/dL    RDW 12.0 11.5 - 14.5 %    PLATELET 782 381 - 977 K/uL    MPV 11.9 8.9 - 12.9 FL    NRBC 0.0 0.0  WBC    ABSOLUTE NRBC 0.00 0.00 - 0.01 K/uL    NEUTROPHILS 44 32 - 75 %    LYMPHOCYTES 40 12 - 49 %    MONOCYTES 13 5 - 13 %    EOSINOPHILS 2 0 - 7 %    BASOPHILS 1 0 - 1 %    IMMATURE GRANULOCYTES 0 0 - 0.5 %    ABS. NEUTROPHILS 2.7 1.8 - 8.0 K/UL    ABS. LYMPHOCYTES 2.5 0.8 - 3.5 K/UL    ABS. MONOCYTES 0.8 0.0 - 1.0 K/UL    ABS. EOSINOPHILS 0.2 0.0 - 0.4 K/UL    ABS. BASOPHILS 0.0 0.0 - 0.1 K/UL    ABS. IMM. GRANS. 0.0 0.00 - 0.04 K/UL    DF AUTOMATED     GLUCOSE, POC    Collection Time: 10/21/21  8:01 AM   Result Value Ref Range    Glucose (POC) 147 (H) 65 - 117 mg/dL    Performed by Letitia Cortez        Physical Exam    Neuro Physical Exam      General: Well developed, well nourished. Patient in no apparent distress. Neurological Exam:  Mental Status: AAOX4, normal speech   Cranial Nerves:   Intact visual fields. PERRL, EOM's full, no nystagmus, no ptosis. Facial sensation is normal. Facial movement is symmetric. Palate is midline. Normal sternocleidomastoid strength. Tongue is midline. Hearing is intact bilaterally.    Motor: 5/5 RUE and RLE. 4/5 biceps and triceps, weak hand graso. RLE: 4-/ hip flexion, 4/5 dorsi flexion   Reflexes:   Deep tendon reflexes 2+/4 and symmetrical.   Sensory:   Decreased light touch in left arm and leg    Gait:  Not tested    Tremor:   No tremor noted. Cerebellar:  No cerebellar signs present. Babinski:      Down b/l     Assessment and Plan:  is a 62year old woman who is an active smoker, HTN, DM who comes in with left hemiparesis. Likely has a right basal ganglia lacunar stroke. - MRI brain w/o contrast: right MCA embolic strokes  - TTE: EF 73%. No PFO  - stared on Asa 325mg  - atorvastatin 80mg  - BP goal < 150/90  - hga1c: 6.8  and lipid panel: LDL 72  - cardiac monitoring  -pt/ot/speech  - right ICA stenosis: CEA planned.    - advised to quit smoking

## 2021-10-22 ENCOUNTER — TELEPHONE (OUTPATIENT)
Dept: SURGERY | Age: 61
End: 2021-10-22

## 2021-10-22 NOTE — TELEPHONE ENCOUNTER
I tried to call patient to inform of estimate for surgery. $1500 Ded/0 met $6550 oop/105 met 20% coinsurance. $3238 is estimate of cost at 20% is $647.60. I left message for patient to call back.

## 2021-10-25 ENCOUNTER — HOSPITAL ENCOUNTER (OUTPATIENT)
Dept: PREADMISSION TESTING | Age: 61
Discharge: HOME | End: 2021-10-25
Payer: COMMERCIAL

## 2021-10-25 ENCOUNTER — TELEPHONE (OUTPATIENT)
Dept: SURGERY | Age: 61
End: 2021-10-25

## 2021-10-25 VITALS
RESPIRATION RATE: 18 BRPM | OXYGEN SATURATION: 100 % | TEMPERATURE: 98.5 F | HEIGHT: 62 IN | SYSTOLIC BLOOD PRESSURE: 140 MMHG | BODY MASS INDEX: 24.88 KG/M2 | DIASTOLIC BLOOD PRESSURE: 76 MMHG | HEART RATE: 81 BPM | WEIGHT: 135.2 LBS

## 2021-10-25 LAB
ABO + RH BLD: NORMAL
ALBUMIN SERPL-MCNC: 3.7 G/DL (ref 3.5–5)
ALBUMIN/GLOB SERPL: 1.1 {RATIO} (ref 1.1–2.2)
ALP SERPL-CCNC: 68 U/L (ref 45–117)
ALT SERPL-CCNC: 30 U/L (ref 12–78)
ANION GAP SERPL CALC-SCNC: 7 MMOL/L (ref 5–15)
APTT PPP: 32.7 SEC (ref 21.2–34.1)
AST SERPL W P-5'-P-CCNC: 21 U/L (ref 15–37)
ATRIAL RATE: 68 BPM
BILIRUB SERPL-MCNC: 0.3 MG/DL (ref 0.2–1)
BLOOD GROUP ANTIBODIES SERPL: NORMAL
BLOOD GROUP ANTIBODIES SERPL: POSITIVE
BUN SERPL-MCNC: 12 MG/DL (ref 6–20)
BUN/CREAT SERPL: 16 (ref 12–20)
CA-I BLD-MCNC: 9.2 MG/DL (ref 8.5–10.1)
CALCULATED P AXIS, ECG09: 68 DEGREES
CALCULATED R AXIS, ECG10: 58 DEGREES
CALCULATED T AXIS, ECG11: 76 DEGREES
CHLORIDE SERPL-SCNC: 108 MMOL/L (ref 97–108)
CO2 SERPL-SCNC: 21 MMOL/L (ref 21–32)
CREAT SERPL-MCNC: 0.75 MG/DL (ref 0.55–1.02)
DIAGNOSIS, 93000: NORMAL
GLOBULIN SER CALC-MCNC: 3.5 G/DL (ref 2–4)
GLUCOSE SERPL-MCNC: 146 MG/DL (ref 65–100)
INR PPP: 1 (ref 0.9–1.1)
MRSA DNA SPEC QL NAA+PROBE: NOT DETECTED
P-R INTERVAL, ECG05: 182 MS
POTASSIUM SERPL-SCNC: 4.7 MMOL/L (ref 3.5–5.1)
PROT SERPL-MCNC: 7.2 G/DL (ref 6.4–8.2)
PROTHROMBIN TIME: 12.8 SEC (ref 11.9–14.7)
Q-T INTERVAL, ECG07: 396 MS
QRS DURATION, ECG06: 66 MS
QTC CALCULATION (BEZET), ECG08: 421 MS
SARS-COV-2, COV2: NORMAL
SODIUM SERPL-SCNC: 136 MMOL/L (ref 136–145)
SPECIMEN EXP DATE BLD: NORMAL
THERAPEUTIC RANGE,PTTT: NORMAL SEC (ref 82–109)
VENTRICULAR RATE, ECG03: 68 BPM
XXAUTO CONTROL: NEGATIVE

## 2021-10-25 PROCEDURE — 80053 COMPREHEN METABOLIC PANEL: CPT

## 2021-10-25 PROCEDURE — 85730 THROMBOPLASTIN TIME PARTIAL: CPT

## 2021-10-25 PROCEDURE — 36415 COLL VENOUS BLD VENIPUNCTURE: CPT

## 2021-10-25 PROCEDURE — 86901 BLOOD TYPING SEROLOGIC RH(D): CPT

## 2021-10-25 PROCEDURE — 87641 MR-STAPH DNA AMP PROBE: CPT

## 2021-10-25 PROCEDURE — 93005 ELECTROCARDIOGRAM TRACING: CPT

## 2021-10-25 PROCEDURE — 86870 RBC ANTIBODY IDENTIFICATION: CPT

## 2021-10-25 PROCEDURE — 85610 PROTHROMBIN TIME: CPT

## 2021-10-25 PROCEDURE — U0005 INFEC AGEN DETEC AMPLI PROBE: HCPCS

## 2021-10-25 NOTE — TELEPHONE ENCOUNTER
Telma Moreno from preadmission testing called stated patient is there now for her appointment this morning. Stated needs orders for the patient.  Please call preadmission test [phone]

## 2021-10-26 LAB
SARS-COV-2, XPLCVT: NOT DETECTED
SOURCE, COVRS: NORMAL

## 2021-10-27 ENCOUNTER — ANESTHESIA (OUTPATIENT)
Dept: SURGERY | Age: 61
End: 2021-10-27
Payer: COMMERCIAL

## 2021-10-27 ENCOUNTER — HOSPITAL ENCOUNTER (OUTPATIENT)
Age: 61
Setting detail: OBSERVATION
Discharge: HOME | End: 2021-10-28
Attending: SURGERY | Admitting: SURGERY
Payer: COMMERCIAL

## 2021-10-27 ENCOUNTER — ANESTHESIA EVENT (OUTPATIENT)
Dept: SURGERY | Age: 61
End: 2021-10-27
Payer: COMMERCIAL

## 2021-10-27 PROBLEM — Z48.812 POSTOP CAROTID ENDARTERECTOMY SURVEILLANCE, ENCOUNTER FOR: Status: ACTIVE | Noted: 2021-10-27

## 2021-10-27 PROBLEM — Z86.2 HISTORY OF BLEEDING DISORDER: Status: ACTIVE | Noted: 2021-10-27

## 2021-10-27 PROBLEM — I63.9 STROKE (HCC): Status: ACTIVE | Noted: 2021-10-27

## 2021-10-27 LAB
GLUCOSE BLD STRIP.AUTO-MCNC: 149 MG/DL (ref 65–117)
GLUCOSE BLD STRIP.AUTO-MCNC: 152 MG/DL (ref 65–117)
PERFORMED BY, TECHID: ABNORMAL
PERFORMED BY, TECHID: ABNORMAL

## 2021-10-27 PROCEDURE — 74011250637 HC RX REV CODE- 250/637: Performed by: SURGERY

## 2021-10-27 PROCEDURE — 99218 HC RM OBSERVATION: CPT

## 2021-10-27 PROCEDURE — 36415 COLL VENOUS BLD VENIPUNCTURE: CPT

## 2021-10-27 PROCEDURE — 96374 THER/PROPH/DIAG INJ IV PUSH: CPT

## 2021-10-27 PROCEDURE — 99219 PR INITIAL OBSERVATION CARE/DAY 50 MINUTES: CPT | Performed by: SURGERY

## 2021-10-27 PROCEDURE — 74011250636 HC RX REV CODE- 250/636: Performed by: SURGERY

## 2021-10-27 PROCEDURE — 82962 GLUCOSE BLOOD TEST: CPT

## 2021-10-27 RX ORDER — SODIUM CHLORIDE 0.9 % (FLUSH) 0.9 %
5-40 SYRINGE (ML) INJECTION AS NEEDED
Status: DISCONTINUED | OUTPATIENT
Start: 2021-10-27 | End: 2021-10-28 | Stop reason: HOSPADM

## 2021-10-27 RX ORDER — SODIUM CHLORIDE 0.9 % (FLUSH) 0.9 %
5-40 SYRINGE (ML) INJECTION EVERY 8 HOURS
Status: DISCONTINUED | OUTPATIENT
Start: 2021-10-27 | End: 2021-10-28 | Stop reason: HOSPADM

## 2021-10-27 RX ORDER — SODIUM CHLORIDE, SODIUM LACTATE, POTASSIUM CHLORIDE, CALCIUM CHLORIDE 600; 310; 30; 20 MG/100ML; MG/100ML; MG/100ML; MG/100ML
20 INJECTION, SOLUTION INTRAVENOUS CONTINUOUS
Status: DISCONTINUED | OUTPATIENT
Start: 2021-10-27 | End: 2021-10-28 | Stop reason: HOSPADM

## 2021-10-27 RX ORDER — ONDANSETRON 2 MG/ML
4 INJECTION INTRAMUSCULAR; INTRAVENOUS
Status: DISCONTINUED | OUTPATIENT
Start: 2021-10-27 | End: 2021-10-28 | Stop reason: HOSPADM

## 2021-10-27 RX ORDER — MULTIVITAMIN
1 TABLET ORAL
Status: DISCONTINUED | OUTPATIENT
Start: 2021-10-28 | End: 2021-10-28 | Stop reason: HOSPADM

## 2021-10-27 RX ORDER — SODIUM CHLORIDE 9 MG/ML
75 INJECTION, SOLUTION INTRAVENOUS CONTINUOUS
Status: DISCONTINUED | OUTPATIENT
Start: 2021-10-27 | End: 2021-10-28 | Stop reason: HOSPADM

## 2021-10-27 RX ORDER — DIPHENHYDRAMINE HCL 25 MG
25 CAPSULE ORAL
Status: DISCONTINUED | OUTPATIENT
Start: 2021-10-27 | End: 2021-10-28 | Stop reason: HOSPADM

## 2021-10-27 RX ORDER — ACETAMINOPHEN 650 MG/1
650 SUPPOSITORY RECTAL
Status: DISCONTINUED | OUTPATIENT
Start: 2021-10-27 | End: 2021-10-28 | Stop reason: HOSPADM

## 2021-10-27 RX ORDER — LISINOPRIL 20 MG/1
20 TABLET ORAL DAILY
Status: DISCONTINUED | OUTPATIENT
Start: 2021-10-28 | End: 2021-10-28 | Stop reason: HOSPADM

## 2021-10-27 RX ORDER — ASPIRIN 325 MG
325 TABLET, DELAYED RELEASE (ENTERIC COATED) ORAL DAILY
Status: DISCONTINUED | OUTPATIENT
Start: 2021-10-28 | End: 2021-10-28 | Stop reason: HOSPADM

## 2021-10-27 RX ORDER — POLYETHYLENE GLYCOL 3350 17 G/17G
17 POWDER, FOR SOLUTION ORAL DAILY PRN
Status: DISCONTINUED | OUTPATIENT
Start: 2021-10-27 | End: 2021-10-28 | Stop reason: HOSPADM

## 2021-10-27 RX ORDER — ONDANSETRON 4 MG/1
4 TABLET, ORALLY DISINTEGRATING ORAL
Status: DISCONTINUED | OUTPATIENT
Start: 2021-10-27 | End: 2021-10-28 | Stop reason: HOSPADM

## 2021-10-27 RX ORDER — ATORVASTATIN CALCIUM 40 MG/1
80 TABLET, FILM COATED ORAL DAILY
Status: DISCONTINUED | OUTPATIENT
Start: 2021-10-28 | End: 2021-10-28 | Stop reason: HOSPADM

## 2021-10-27 RX ORDER — GLIPIZIDE 5 MG/1
10 TABLET ORAL
Status: DISCONTINUED | OUTPATIENT
Start: 2021-10-28 | End: 2021-10-28 | Stop reason: HOSPADM

## 2021-10-27 RX ORDER — PANTOPRAZOLE SODIUM 40 MG/1
40 TABLET, DELAYED RELEASE ORAL DAILY
Status: DISCONTINUED | OUTPATIENT
Start: 2021-10-28 | End: 2021-10-28 | Stop reason: HOSPADM

## 2021-10-27 RX ORDER — ACETAMINOPHEN 325 MG/1
325 TABLET ORAL
Status: DISCONTINUED | OUTPATIENT
Start: 2021-10-27 | End: 2021-10-27 | Stop reason: SDUPTHER

## 2021-10-27 RX ORDER — ACETAMINOPHEN 325 MG/1
650 TABLET ORAL
Status: DISCONTINUED | OUTPATIENT
Start: 2021-10-27 | End: 2021-10-28 | Stop reason: HOSPADM

## 2021-10-27 RX ADMIN — Medication 10 ML: at 14:22

## 2021-10-27 RX ADMIN — DIPHENHYDRAMINE HYDROCHLORIDE 25 MG: 25 CAPSULE ORAL at 14:48

## 2021-10-27 RX ADMIN — SODIUM CHLORIDE 75 ML/HR: 9 INJECTION, SOLUTION INTRAVENOUS at 14:21

## 2021-10-27 RX ADMIN — SODIUM CHLORIDE, POTASSIUM CHLORIDE, SODIUM LACTATE AND CALCIUM CHLORIDE 20 ML/HR: 600; 310; 30; 20 INJECTION, SOLUTION INTRAVENOUS at 10:35

## 2021-10-27 RX ADMIN — Medication 10 ML: at 21:09

## 2021-10-27 RX ADMIN — VANCOMYCIN HYDROCHLORIDE 1000 MG: 1 INJECTION, POWDER, LYOPHILIZED, FOR SOLUTION INTRAVENOUS at 10:36

## 2021-10-27 RX ADMIN — DIPHENHYDRAMINE HYDROCHLORIDE 25 MG: 25 CAPSULE ORAL at 21:07

## 2021-10-27 RX ADMIN — Medication 10 ML: at 14:21

## 2021-10-27 NOTE — ANESTHESIA PREPROCEDURE EVALUATION
Relevant Problems   NEUROLOGY   (+) CVA (cerebral vascular accident) [facility])       Anesthetic History   No history of anesthetic complications            Review of Systems / Medical History  Patient summary reviewed, nursing notes reviewed and pertinent labs reviewed    Pulmonary          Smoker (half pk daily. )         Neuro/Psych       CVA (LEFT SIDED WEAKNESS. )  TIA     Cardiovascular    Hypertension          PAD    Exercise tolerance: >4 METS  Comments: ECHO (10-19-21)  Interpretation Summary  · LV: Calculated LVEF is 73%. Normal cavity size and diastolic function. Mild concentric hypertrophy. Hyperdynamic systolic function. · MV: Mitral annular calcification. Mild mitral valve regurgitation is present. · Saline contrast was given to evaluate for intracardiac shunt. · IAS: No atrial septal defect present. Agitated saline contrast study was performed. There was no shunting at baseline. GI/Hepatic/Renal     GERD           Endo/Other    Diabetes    Arthritis     Other Findings   Comments: NECK PAIN \"FEELS LIKE WHIPLASH\"    Hx OF BLEEDING AFTER FEM-POP SURGERY. Hx of prolong menstrual periods. Easy bruisability. Physical Exam    Airway  Mallampati: II  TM Distance: > 6 cm  Neck ROM: normal range of motion   Mouth opening: Normal     Cardiovascular    Rhythm: regular  Rate: normal      Pertinent negatives: No murmur   Dental    Dentition: Edentulous     Pulmonary  Breath sounds clear to auscultation               Abdominal  GI exam deferred       Other Findings            Anesthetic Plan    ASA: 2  Anesthesia type: general    Monitoring Plan: Arterial line        Anesthetic plan and risks discussed with: Patient      Surgeon postponed the case pending hematology consultation.

## 2021-10-27 NOTE — ROUTINE PROCESS
Bedside and Verbal shift change report given to Nati Ennis LPN (oncoming nurse) by Bruno Fairchild (offgoing nurse). Report included the following information SBAR.

## 2021-10-27 NOTE — H&P
History and Physical     Chief complaints: Stroke   History of Presenting Illness:  Calvin Munguia is a 62 y.o. very pleasant emergency room with unable to speak. With left arm weakness about 10 days ago. Patient has recovered from this. Patient was noted to have high-grade stenosis of the right carotid system. Currently resolved the speech and left arm weakness is improving. Patient also seen by the cardiology cleared to have a right carotid endarterectomy. Patient currently on aspirin therapy. No clinical signs of GI bleed. Patient has history of GI bleed. Patient denies weight loss chest pain shortness of breath currently.            Past Medical History:   Diagnosis Date    Arthritis      [address] ([facility] 2019    Hypertension      Peripheral vascular disease ([facility]              Past Surgical History:   Procedure Laterality Date    HX CATARACT REMOVAL   2019            Family History   Problem Relation Age of Onset    Cancer Mother           BREAST    Lung Disease Mother      COPD Mother      Heart Disease Mother      Hypertension Mother      Diabetes Mother      Arthritis-osteo Mother      Parkinson's Disease Father      Lung Disease Father      Hypertension Father      Heart Disease Father      Diabetes Father      COPD Other      Parkinson's Disease Other      MS Other      Cancer Brother           PROSTATE    Heart Disease Brother      No Known Problems Son      No Known Problems Son      MS Son      Anesth Problems Neg Hx        Social History            Tobacco Use    Smoking status: Current Every Day Smoker       Packs/day: 1.00       Years: 40.00       Pack years: 40.00    Smokeless tobacco: Never Used   Substance Use Topics    Alcohol use: Never               Prior to Admission medications    Medication Sig Start Date End Date Taking? Authorizing Provider   glipiZIDE SR (GLUCOTROL XL) 10 mg CR tablet Take 10 mg by mouth daily.  8/13/21   Yes Provider, Historical pantoprazole (PROTONIX) 40 mg tablet Take 40 mg by mouth daily.     Yes Provider, Historical   mv-mn/folic acid/vit F/TTRS630 (ALIVE ONCE DAILY WOMEN 50 PLUS PO) Take 1 Tablet by mouth daily (with breakfast).     Yes Provider, Historical   atorvastatin (LIPITOR) 20 mg tablet Take 20 mg by mouth daily.     Yes Provider, Historical   lisinopril (PRINIVIL, ZESTRIL) 20 mg tablet Take  by mouth daily.     Yes Other, MD Rene   acetaminophen (TYLENOL) 325 mg tablet Take  by mouth every four (4) hours as needed for Pain.       Provider, Historical            Allergies   Allergen Reactions    Codeine Other (comments)       Makes me \"loopy\"    Pcn [Penicillins] Nausea and Vomiting         Review of Systems:  Pertinent review of systems discussed in HPI, and rest of organ systems personally reviewed and they are negative.     Objective:   Vital signs reviewed:        Visit Vitals  /70 (BP 1 Location: Left upper arm, BP Patient Position: At rest)   Pulse 100   Temp 98.2 °F (36.8 °C)   Resp 17   Ht 5' 1\" (1.549 m)   Wt 135 lb (61.2 kg)   SpO2 98%   BMI 25.51 kg/m²         Physical Exam:   General appearance:   Patient is awake and alert  Head and neck atraumatic normocephalic  Cardiac system regular rate  Pulmonary no audible wheeze  ENT oral mucosa normal no jaundice no hoarse voice  Chest wall excursion normal with respiration cycle  Eyes pupil equal gaze appropriate. Abdomen soft nontender distended  Neurologically motor function deficit noted on the left arm. Cranial nerve seems to be intact  Musculoskeletal system otherwise lower extremities moving  Skin is warm and moist.  Hematologic system no bruising  Psychosocial appropriate cooperative  Vascular distally well perfused with warm foot.                       Data Review: Labs are reviewed.  Discussed  Recent Results         Recent Results (from the past 24 hour(s))   CBC WITH AUTOMATED DIFF     Collection Time: 10/18/21  7:00 AM   Result Value Ref Range     WBC 8.2 3.6 - 11.0 K/uL     RBC 4.46 3.80 - 5.20 M/uL     HGB 13.0 11.5 - 16.0 g/dL     HCT 40.6 35.0 - 47.0 %     MCV 91.0 80.0 - 99.0 FL     MCH 29.1 26.0 - 34.0 PG     MCHC 32.0 30.0 - 36.5 g/dL     RDW 12.5 11.5 - 14.5 %     PLATELET 976 833 - 847 K/uL     MPV 11.7 8.9 - 12.9 FL     NRBC 0.0 0.0  WBC     ABSOLUTE NRBC 0.00 0.00 - 0.01 K/uL     NEUTROPHILS 54 32 - 75 %     LYMPHOCYTES 33 12 - 49 %     MONOCYTES 10 5 - 13 %     EOSINOPHILS 2 0 - 7 %     BASOPHILS 1 0 - 1 %     IMMATURE GRANULOCYTES 0 0 - 0.5 %     ABS. NEUTROPHILS 4.5 1.8 - 8.0 K/UL     ABS. LYMPHOCYTES 2.7 0.8 - 3.5 K/UL     ABS. MONOCYTES 0.8 0.0 - 1.0 K/UL     ABS. EOSINOPHILS 0.2 0.0 - 0.4 K/UL     ABS. BASOPHILS 0.1 0.0 - 0.1 K/UL     ABS. IMM. GRANS. 0.0 0.00 - 0.04 K/UL     DF AUTOMATED     METABOLIC PANEL, COMPREHENSIVE     Collection Time: 10/18/21  7:00 AM   Result Value Ref Range     Sodium 138 136 - 145 mmol/L     Potassium 4.5 3.5 - 5.1 mmol/L     Chloride 108 97 - 108 mmol/L     CO2 22 21 - 32 mmol/L     Anion gap 8 5 - 15 mmol/L     Glucose 149 (H) 65 - 100 mg/dL     BUN 16 6 - 20 mg/dL     Creatinine 0.74 0.55 - 1.02 mg/dL     BUN/Creatinine ratio 22 (H) 12 - 20       GFR est AA >60 >60 ml/min/1.73m2     GFR est non-AA >60 >60 ml/min/1.73m2     Calcium 9.5 8.5 - 10.1 mg/dL     Bilirubin, total 0.2 0.2 - 1.0 mg/dL     AST (SGOT) 14 (L) 15 - 37 U/L     ALT (SGPT) 18 12 - 78 U/L     Alk.  phosphatase 72 45 - 117 U/L     Protein, total 7.4 6.4 - 8.2 g/dL     Albumin 3.7 3.5 - 5.0 g/dL     Globulin 3.7 2.0 - 4.0 g/dL     A-G Ratio 1.0 (L) 1.1 - 2.2     PROTHROMBIN TIME + INR     Collection Time: 10/18/21  7:00 AM   Result Value Ref Range     Prothrombin time 11.6 (L) 11.9 - 14.7 sec     INR 0.9 0.9 - 1.1     PTT     Collection Time: 10/18/21  7:00 AM   Result Value Ref Range     aPTT 31.9 21.2 - 34.1 sec     aPTT, therapeutic range   82 - 109 sec   TROPONIN-HIGH SENSITIVITY     Collection Time: 10/18/21  9:43 AM   Result Value Ref Range     Troponin-High Sensitivity 9 0 - 51 ng/L   HEMOGLOBIN A1C WITH EAG     Collection Time: 10/18/21  9:43 AM   Result Value Ref Range     Hemoglobin A1c 6.6 (H) 4.0 - 5.6 %     Est. average glucose 143 mg/dL   GLUCOSE, POC     Collection Time: 10/18/21  5:07 PM   Result Value Ref Range     Glucose (POC) 143 (H) 65 - 117 mg/dL     Performed by [facility] MENDEZ     GLUCOSE, POC     Collection Time: 10/18/21  8:56 PM   Result Value Ref Range     Glucose (POC) 144 (H) 65 - 117 mg/dL     Performed by Kiley reviewed: discussed as below.     Assessment:      Active Problems:    CVA (cerebral vascular accident) ([address] (10/18/2021)           Plan:      Patient has recovered from acute stroke with speech problems with left arm weakness. Currently the symptoms seem to be improved. She is currently on aspirin therapy. Her blood pressure has been well under control. Patient has high-grade stenosis of right carotid system. Patient was discussed rational for the procedures of carotid endarterectomy. Patient has seen by cardiologist cleared to have surgery. Patient cardiopulmonary status is otherwise pretty good. Patient was discussed rational for the procedure risks benefits and complication. Patient has agreed to undergo surgery. Patient scheduled for surgery October 27, 2021. This is an addendum to H&P. Patient apparently had extensive bleeding complication from prior surgery and the prolonged menorrhagia, currently we do not have work-up for possible blood dyscrasia. Anesthesia team recommend hematology consult prior to surgical intervention. So we will postpone the surgery we will have the patient come into the hospital will have a urgent hematology consultation. I have informed the patient about this and she is agreeable.

## 2021-10-27 NOTE — PROGRESS NOTES
DR Mendez in to  See pt  , pt agreed to  Stay in hospital for hemotology  Work up   Waiting for bed assignment

## 2021-10-27 NOTE — PROGRESS NOTES
Pt  Taken to  Room 227 DR Mendez Called to  Come to talk with pt  About why  surgery was cancelled  Suad varela at bedside per pt  Request , face to face Bedside report given to  Bhupinder Mahoney RN

## 2021-10-27 NOTE — PROGRESS NOTES
Reason for Admission: Postop carotid endarterectomy surveillance, encounter for, History of bleeding disorder, Stroke ([facility] [address]              RUR Score:    8%              PCP: First and Last name:   Babar Washburn MD     Name of Practice:    Are you a current patient: Yes/No: yes   Approximate date of last visit: august 2021   Can you participate in a virtual visit if needed: yes    Do you (patient/family) have any concerns for transition/discharge? Concerns for discharge with going to [facility]. Plan for utilizing home health:   Not at this moment, IRF at this time    Current Advanced Directive/Advance Care Plan:  Full Code      Healthcare Decision Maker:   Click here to complete [id number] including selection of the Healthcare Decision Maker Relationship (ie \"Primary\")            Primary Decision Maker: ChidiGabby - Parent - [phone]   César Santana [phone], call first    Transition of Care Plan:        Met with patient at bedside. She lives with her mother in a 1 story house. She does not use any DMEs at this time. No prior use of home health. Came in today for out patient procedure with plans to go to [facility] and rehab afterwards. Surgery put on hold due to lab work. Was driving before stroke last week, now relies on friends. Plans are to go to [facility] after surgery. Referral and choice obtained and sent to [facility]. DC plan is [facility].

## 2021-10-28 VITALS
DIASTOLIC BLOOD PRESSURE: 63 MMHG | WEIGHT: 135.2 LBS | HEIGHT: 62 IN | TEMPERATURE: 98.6 F | OXYGEN SATURATION: 96 % | BODY MASS INDEX: 24.88 KG/M2 | SYSTOLIC BLOOD PRESSURE: 100 MMHG | HEART RATE: 86 BPM | RESPIRATION RATE: 18 BRPM

## 2021-10-28 LAB
APTT PPP: 32.7 SEC (ref 21.2–34.1)
FIBRINOGEN PPP-MCNC: 374 MG/DL (ref 220–535)
INR PPP: 0.9 (ref 0.9–1.1)
PROTHROMBIN TIME: 12.4 SEC (ref 11.9–14.7)
THERAPEUTIC RANGE,PTTT: NORMAL SEC (ref 82–109)

## 2021-10-28 PROCEDURE — 36415 COLL VENOUS BLD VENIPUNCTURE: CPT

## 2021-10-28 PROCEDURE — 99217 PR OBSERVATION CARE DISCHARGE MANAGEMENT: CPT | Performed by: SURGERY

## 2021-10-28 PROCEDURE — 85240 CLOT FACTOR VIII AHG 1 STAGE: CPT

## 2021-10-28 PROCEDURE — 85610 PROTHROMBIN TIME: CPT

## 2021-10-28 PROCEDURE — 99218 HC RM OBSERVATION: CPT

## 2021-10-28 PROCEDURE — 85384 FIBRINOGEN ACTIVITY: CPT

## 2021-10-28 PROCEDURE — 85246 CLOT FACTOR VIII VW ANTIGEN: CPT

## 2021-10-28 PROCEDURE — 74011250637 HC RX REV CODE- 250/637: Performed by: SURGERY

## 2021-10-28 PROCEDURE — 85730 THROMBOPLASTIN TIME PARTIAL: CPT

## 2021-10-28 RX ADMIN — MULTIVITAMIN TABLET 1 TABLET: TABLET at 09:36

## 2021-10-28 RX ADMIN — Medication 10 ML: at 06:48

## 2021-10-28 RX ADMIN — GLIPIZIDE 10 MG: 5 TABLET ORAL at 09:37

## 2021-10-28 RX ADMIN — Medication 10 ML: at 00:55

## 2021-10-28 RX ADMIN — ASPIRIN 325 MG: 325 TABLET, COATED ORAL at 09:40

## 2021-10-28 RX ADMIN — PANTOPRAZOLE SODIUM 40 MG: 40 TABLET, DELAYED RELEASE ORAL at 09:36

## 2021-10-28 RX ADMIN — Medication 10 ML: at 06:43

## 2021-10-28 RX ADMIN — LISINOPRIL 20 MG: 20 TABLET ORAL at 09:35

## 2021-10-28 RX ADMIN — ATORVASTATIN CALCIUM 80 MG: 40 TABLET, FILM COATED ORAL at 09:39

## 2021-10-28 NOTE — CONSULTS
Hematology and Oncology Inpatient Consult Note     Patient: Jackie Weir MRN: [id number]  SSN: [id number]    YOB: 1960  Age: 62 y.o. Sex: female    Chief Complaint: Patient was admitted for carotid endarterectomy    Reason for consult: Evaluation and management of patient for bleeding diathesis    Subjective:      Jackie Weir is a 62 y.o. white female who recently had stroke with left-sided weakness and patient was admitted for endarterectomy which was canceled because of some concern about possible bleeding diathesis. I was asked to see her for further hematologic evaluation. According to patient she had surgery at [facility] by 1/2 years ago for her right vein stripping and then she had bleeding complication after that and she actually came to the [facility] ER and was given blood transfusion. According to patient she never saw a hematologist and there was no diagnosis made of any specific bleeding diathesis. With his history anesthesiologist today had quite concerned and wanted to get hematologic clearance. Patient does not have any mucosal bleeding and she is very anxious to go for her surgery.       Past Medical History:   Diagnosis Date    Arthritis     Coagulation disorder ([address]     possible , pt had surgery on leg and bled out    Diabetes ([address] 2019    Hypertension     Left-sided weakness     Nodule of kidney     left kidney    Peripheral vascular disease ([address]     Stroke ([address]     recent stroke on 10/16/2021     Past Surgical History:   Procedure Laterality Date    HX CATARACT REMOVAL  2019    VASCULAR SURGERY PROCEDURE UNLIST        Family History   Problem Relation Age of Onset    Cancer Mother         BREAST    Lung Disease Mother     COPD Mother     Heart Disease Mother     Hypertension Mother     Diabetes Mother     Arthritis-osteo Mother     Parkinson's Disease Father     Lung Disease Father     Hypertension Father     Heart Disease Father     Diabetes Father     COPD Other     Parkinson's Disease Other     MS Other     Cancer Brother         PROSTATE    Heart Disease Brother     No Known Problems Son     No Known Problems Son    Kingston Huang MS Son     Anesth Problems Neg Hx    Patient's mother and grandmother had some sort of bleeding problem but that was more like GI bleeding. Patient could not tell me if they were diagnosed with any specific bleeding diathesis or not. Occupational history: She is working as a CNA at [employer]. Social History     Tobacco Use    Smoking status: Current Every Day Smoker     Packs/day: 1.00     Years: 40.00     Pack years: 40.00    Smokeless tobacco: Never Used   Substance Use Topics    Alcohol use: Never      -Patient is not currently smoking cigarette but she only quit in third week of October 2021.   She has no history of alcohol abuse    Current Facility-Administered Medications   Medication Dose Route Frequency Provider Last Rate Last Admin    lactated Ringers infusion  20 mL/hr IntraVENous CONTINUOUS Padmini Mendez MD 20 mL/hr at 10/27/21 1035 20 mL/hr at 10/27/21 1035    sodium chloride (NS) flush 5-40 mL  5-40 mL IntraVENous Q8H Padmini Mendez MD   10 mL at 10/27/21 1422    sodium chloride (NS) flush 5-40 mL  5-40 mL IntraVENous PRN Padmini Mendez MD        sodium chloride (NS) flush 5-40 mL  5-40 mL IntraVENous Q8H Padmini Mendez MD   10 mL at 10/27/21 2109    sodium chloride (NS) flush 5-40 mL  5-40 mL IntraVENous PRN Padmini Mendez MD        acetaminophen (TYLENOL) tablet 650 mg  650 mg Oral Q6H PRN Padmini Mendez MD        Or    acetaminophen (TYLENOL) suppository 650 mg  650 mg Rectal Q6H PRN Padmini Mendez MD        polyethylene glycol [facility]) packet 17 g  17 g Oral DAILY PRN Padmini Mendez MD        ondansetron [facility] ODT) tablet 4 mg  4 mg Oral Q8H PRN Padmini Mendez MD        Or    ondansetron [facility]) injection 4 mg  4 mg IntraVENous Q6H PRN Raul Mendez MD        0.9% sodium chloride infusion  75 mL/hr IntraVENous CONTINUOUS Raul Mendez MD 75 mL/hr at 10/27/21 1421 75 mL/hr at 10/27/21 1421    diphenhydrAMINE (BENADRYL) capsule 25 mg  25 mg Oral Q6H PRN Raul Mendez MD   25 mg at 10/27/21 2107    [START ON 10/28/2021] aspirin delayed-release tablet 325 mg  325 mg Oral DAILY Raul Mendez MD        [START ON 10/28/2021] atorvastatin (LIPITOR) tablet 80 mg  80 mg Oral DAILY Raul Mendez MD        [START ON 10/28/2021] glipiZIDE (GLUCOTROL) tablet 10 mg  10 mg Oral DAILY AFTER BREAKFAST Raul Mendez MD        [START ON 10/28/2021] lisinopriL (PRINIVIL, ZESTRIL) tablet 20 mg  20 mg Oral DAILY Raul Mendez MD        [START ON 10/28/2021] multivitamin with folic acid (ONE DAILY WITH FOLIC ACID) tablet 1 Tablet  1 Tablet Oral DAILY AFTER BREAKFAST Raul Mendez MD        [START ON 10/28/2021] pantoprazole (PROTONIX) tablet 40 mg  40 mg Oral DAILY Raul Mendez MD            Allergies   Allergen Reactions    Adhesive Rash    Codeine Other (comments)     Makes me \"loopy\"    Pcn [Penicillins] Nausea and Vomiting       Review of Systems:  CONSTITUTIONAL: No fever or chills. She is feeling tired. HEENT: No mouth sores. No Epistaxis. Mild hearing impairment. No change in taste or smell sensations. CARDIOVASCULAR: No  palpitations or chest pain. No edema. No syncope. RESPIRATORY: No cough. She does have mild dyspnea on exertion. No Hemoptysis. No wheezing. No hoarseness of voice. GI: No nausea or vomiting. No diarrhea, constipation, no bright red blood per rectum. No hematemesis or melena. No weight loss. No dysphagia. : No dysuria, no hematuria. No frequency of urination. Patient used to have heavy menstruation bleeding lasting for 2 to 3 weeks. She had undergone menopause about 10 years ago. INTEGUMENTARY: No skin rash or palpable lumps or bumps.   HEMATOLOGIC: No history of easy bruisability. No gingival bleeding  NEURO: No focal weakness, No paresthesia. No headache or seizures. MUSCULOSKELETAL: No back pain. Objective:     Vitals:    10/27/21 0950 10/27/21 0951 10/27/21 1414 10/27/21 2030   BP:  111/71 115/78 116/64   Pulse:  79 83 95   Resp:  18 18 18   Temp:  97.6 °F (36.4 °C) 97.6 °F (36.4 °C) 98.5 °F (36.9 °C)   SpO2:  98% 98% 98%   Weight: 61.3 kg (135 lb 3.2 oz)      Height: 5' 1.81\" (1.57 m)           Physical Exam:  Constitutional: Middle-aged white female not in acute distress or pain. Eyes: Sclerae anicteric. Conjunctivae no pallor. ENMT: Oral mucosa is moist, no thrush, mucositis, or petechiae. Neck: No adenopathy, JVD or thyromegaly. Hematologic/Lymphatic: Bilateral axillary regions showed no adenopathy. Respiratory: Lungs are clear bilaterally. Cardiovascular: Normal sinus rhythm; no gallop or murmur; peripheral pulses are palpable. Abdomen: Soft, nontender, no hepatosplenomegaly. No guarding or rigidity. Bowel sounds present. Extremities: No edema, cyanosis or clubbing. Skin: No petechiae; no skin rash. Neurologic: Alert/oriented x 3.   Patient has residual left-sided weakness    Recent Results (from the past 24 hour(s))   GLUCOSE, POC    Collection Time: 10/27/21 10:31 AM   Result Value Ref Range    Glucose (POC) 149 (H) 65 - 117 mg/dL    Performed by [id number], POC    Collection Time: 10/27/21  4:04 PM   Result Value Ref Range    Glucose (POC) 152 (H) 65 - 117 mg/dL    Performed by Tal Khan         No orders to display          Assessment:     Hospital Problems  Date Reviewed: 10/18/2020        Codes Class Noted POA    Postop carotid endarterectomy surveillance, encounter for ICD-10-CM: Z48.812  ICD-9-CM: V58.73  10/27/2021 Unknown        Stroke [facility]) ICD-10-CM: I63.9  ICD-9-CM: 434.91  10/27/2021 Unknown        History of bleeding disorder ICD-10-CM: Z86.2  ICD-9-CM: V12.3  10/27/2021 Unknown              Assessment & Plan: 67-year-old white female who was supposed to go for carotid endarterectomy which was postponed because of concern about bleeding diathesis. Patient had a bleeding after her right leg vein stripping about 18 months ago. I do not have all the details about her bleeding complication but it looks like bleeding did not happen right away after the surgery. And as per patient she was not diagnosed with any definite hematologic condition or bleeding diathesis. She did not have hematologic evaluation at that time.  -Patient's mother and grandmother had history of bleeding but it looks like those bleeding were from GI track rather than generalized bleeding from coagulopathy.  -Patient had a 3 full-term pregnancy with a delivery and did not have any history of bleeding complications during that. She had all 3 pregnancies with normal delivery. She did have a heavy menstruation bleeding.  -Based on this information be do not have any definite diagnosis of bleeding diathesis and we actually have to start work-up for that. I will start with a basic work-up including von Willebrand disease panel, PT, PTT, fibrinogen activity, factor VIII activity, factor IX activity, platelet function assay and platelet count to start with.  -We will also try to get records from her previous bleeding episode if it is available. -In any case these blood tests results will not be available for at least 1 week to 10 days. Many of these tests will have to be sent out to the special coagulation lab. So I do not think we can have any definite hematologic conclusion in next 1 to 2 days. And I have conveyed this information to attending physician Dr. Jaymie Gilbert.  -Also discussed above with patient that her work-up may take some time and I cannot make any definite recommendations until I have this blood test results back. Patient is very upset that she is now going for surgery and asked me to talk to her niece Ms. Alfonso Young at phone #[phone]. So I immediately called Ms. Timur Del Valle and had a very long discussion with her about my impression and my plan and explained to her why I cannot get this results right away. -Ms. Herberth Bains and Ms. Timur Del Valle are worried about whether she is going to get to the rehab quickly as she was planning or not. -Coagulation diagnosis is based on labs and most of those test are not done here locally at [facility] lab in the hospital.  -Also discussed with patient's nurse. This dictation was done by dragon, computer voice recognition software. Often unanticipated grammatical, syntax, phones and other interpretive errors are inadvertently transcribed. Please excuse errors that have escaped final proofreading.      Signed By: Noe Osorio MD     October 27, 2021

## 2021-10-28 NOTE — PROGRESS NOTES
Discharge plan of care/case management plan validated with provider discharge order. Discharged home to self care,Discharge instructions given and patient verbalized understanding. IV removed with no complications. Awaiting transportation home,No complains for now.

## 2021-10-28 NOTE — DISCHARGE SUMMARY
.  This is discharge summary for Keri Garcia, patient YOB: 1960. Brief Hospital course: Ms. Sands Eis admitted to hospital after cancellation of the carotid endarterectomy. Anesthesia recommend a hematology consultation as an inpatient. Patient seen by hematology. Most of the blood tests will be done as outpatient because of the these labs has to be sent out outside facility. Patient doing pretty good this morning. No other new neurological deficits from previous stroke. Patient will continue aspirin therapy. We will hold off aspirin for 5 days prior to surgery due to prior history of a bleeding complication. Patient will be rearranged for surgery after cleared from hematology.

## 2021-10-28 NOTE — PROGRESS NOTES
Problem: Diabetes Self-Management  Goal: *Disease process and treatment process  Description: Define diabetes and identify own type of diabetes; list 3 options for treating diabetes. Outcome: Progressing Towards Goal  Goal: *Incorporating nutritional management into lifestyle  Description: Describe effect of type, amount and timing of food on blood glucose; list 3 methods for planning meals. Outcome: Progressing Towards Goal  Goal: *Incorporating physical activity into lifestyle  Description: State effect of exercise on blood glucose levels. Outcome: Progressing Towards Goal  Goal: *Developing strategies to promote health/change behavior  Description: Define the ABC's of diabetes; identify appropriate screenings, schedule and personal plan for screenings. Outcome: Progressing Towards Goal  Goal: *Using medications safely  Description: State effect of diabetes medications on diabetes; name diabetes medication taking, action and side effects. Outcome: Progressing Towards Goal  Goal: *Monitoring blood glucose, interpreting and using results  Description: Identify recommended blood glucose targets  and personal targets. Outcome: Progressing Towards Goal  Goal: *Prevention, detection, treatment of acute complications  Description: List symptoms of hyper- and hypoglycemia; describe how to treat low blood sugar and actions for lowering  high blood glucose level. Outcome: Progressing Towards Goal  Goal: *Prevention, detection and treatment of chronic complications  Description: Define the natural course of diabetes and describe the relationship of blood glucose levels to long term complications of diabetes.   Outcome: Progressing Towards Goal  Goal: *Developing strategies to address psychosocial issues  Description: Describe feelings about living with diabetes; identify support needed and support network  Outcome: Progressing Towards Goal  Goal: *Insulin pump training  Outcome: Progressing Towards Goal  Goal: *Sick day guidelines  Outcome: Progressing Towards Goal  Goal: *Patient Specific Goal (EDIT GOAL, INSERT TEXT)  Outcome: Progressing Towards Goal     Problem: Patient Education: Go to Patient Education Activity  Goal: Patient/Family Education  Outcome: Progressing Towards Goal     Problem: General Infection Care Plan (Adult and Pediatric)  Goal: Improvement in signs and symptoms of infection  Outcome: Progressing Towards Goal  Goal: *Optimize nutritional status  Outcome: Progressing Towards Goal     Problem: Patient Education: Go to Patient Education Activity  Goal: Patient/Family Education  Outcome: Progressing Towards Goal     Problem: Upper and Lower GI Bleed: Day 1  Goal: Off Pathway (Use only if patient is Off Pathway)  Outcome: Progressing Towards Goal  Goal: Activity/Safety  Outcome: Progressing Towards Goal  Goal: Consults, if ordered  Outcome: Progressing Towards Goal  Goal: Diagnostic Test/Procedures  Outcome: Progressing Towards Goal  Goal: Nutrition/Diet  Outcome: Progressing Towards Goal  Goal: Discharge Planning  Outcome: Progressing Towards Goal  Goal: Medications  Outcome: Progressing Towards Goal  Goal: Respiratory  Outcome: Progressing Towards Goal  Goal: Treatments/Interventions/Procedures  Outcome: Progressing Towards Goal  Goal: Psychosocial  Outcome: Progressing Towards Goal  Goal: *Optimal pain control at patient's stated goal  Outcome: Progressing Towards Goal  Goal: *Hemodynamically stable  Outcome: Progressing Towards Goal  Goal: *Demonstrates progressive activity  Outcome: Progressing Towards Goal     Problem: Patient Education: Go to Patient Education Activity  Goal: Patient/Family Education  Outcome: Progressing Towards Goal     Problem: Upper and Lower GI Bleed: Day 2  Goal: Off Pathway (Use only if patient is Off Pathway)  Outcome: Progressing Towards Goal  Goal: Activity/Safety  Outcome: Progressing Towards Goal  Goal: Consults, if ordered  Outcome: Progressing Towards Goal  Goal: Diagnostic Test/Procedures  Outcome: Progressing Towards Goal  Goal: Nutrition/Diet  Outcome: Progressing Towards Goal  Goal: Discharge Planning  Outcome: Progressing Towards Goal  Goal: Medications  Outcome: Progressing Towards Goal  Goal: Respiratory  Outcome: Progressing Towards Goal  Goal: Treatments/Interventions/Procedures  Outcome: Progressing Towards Goal  Goal: Psychosocial  Outcome: Progressing Towards Goal  Goal: *Optimal pain control at patient's stated goal  Outcome: Progressing Towards Goal  Goal: *Hemodynamically stable  Outcome: Progressing Towards Goal  Goal: *Tolerating diet  Outcome: Progressing Towards Goal  Goal: *Demonstrates progressive activity  Outcome: Progressing Towards Goal     Problem: Upper and Lower GI Bleed: Day 3  Goal: Off Pathway (Use only if patient is Off Pathway)  Outcome: Progressing Towards Goal  Goal: Activity/Safety  Outcome: Progressing Towards Goal  Goal: Diagnostic Test/Procedures  Outcome: Progressing Towards Goal  Goal: Nutrition/Diet  Outcome: Progressing Towards Goal  Goal: Discharge Planning  Outcome: Progressing Towards Goal  Goal: Medications  Outcome: Progressing Towards Goal  Goal: Treatments/Interventions/Procedures  Outcome: Progressing Towards Goal  Goal: Psychosocial  Outcome: Progressing Towards Goal     Problem: Upper and Lower GI Bleed:  Discharge Outcomes  Goal: *Hemodynamically stable  Outcome: Progressing Towards Goal  Goal: *Lungs clear or at baseline  Outcome: Progressing Towards Goal  Goal: *Demonstrates independent activity or return to baseline  Outcome: Progressing Towards Goal  Goal: *Pain is controlled to three or less  Outcome: Progressing Towards Goal  Goal: *Verbalizes understanding and describes prescribed diet  Outcome: Progressing Towards Goal  Goal: *Tolerating diet  Outcome: Progressing Towards Goal  Goal: *Verbalizes name, dosage, time, side effects, and number of days to continue medications  Outcome: Progressing Towards Goal  Goal: *Anxiety reduced or absent  Outcome: Progressing Towards Goal  Goal: *Understands and describes signs and symptoms to report to providers(Stroke Metric)  Outcome: Progressing Towards Goal  Goal: *Describes follow-up/return visits to physicians  Outcome: Progressing Towards Goal  Goal: *Describes available resources and support systems  Outcome: Progressing Towards Goal     Problem: Falls - Risk of  Goal: *Absence of Falls  Description: Document Reynaldo Castañeda Fall Risk and appropriate interventions in the flowsheet.   Outcome: Progressing Towards Goal  Note: Fall Risk Interventions:            Medication Interventions: Teach patient to arise slowly                   Problem: Patient Education: Go to Patient Education Activity  Goal: Patient/Family Education  Outcome: Progressing Towards Goal

## 2021-11-04 ENCOUNTER — OFFICE VISIT (OUTPATIENT)
Dept: SURGERY | Age: 61
End: 2021-11-04
Payer: COMMERCIAL

## 2021-11-04 VITALS
WEIGHT: 133.4 LBS | BODY MASS INDEX: 25.19 KG/M2 | OXYGEN SATURATION: 98 % | HEIGHT: 61 IN | HEART RATE: 92 BPM | DIASTOLIC BLOOD PRESSURE: 89 MMHG | TEMPERATURE: 97.8 F | SYSTOLIC BLOOD PRESSURE: 123 MMHG

## 2021-11-04 DIAGNOSIS — I63.9 CEREBROVASCULAR ACCIDENT (CVA), UNSPECIFIED MECHANISM (HCC): ICD-10-CM

## 2021-11-04 DIAGNOSIS — Z86.2 HISTORY OF BLEEDING DISORDER: Primary | ICD-10-CM

## 2021-11-04 PROCEDURE — 99213 OFFICE O/P EST LOW 20 MIN: CPT | Performed by: SURGERY

## 2021-11-04 RX ORDER — DIPHENHYDRAMINE HCL 25 MG
25 CAPSULE ORAL
COMMUNITY

## 2021-11-04 NOTE — PROGRESS NOTES
Chief Complaint   Patient presents with    Follow-up     discuss scheduling surgery     Visit Vitals  /89 (BP 1 Location: Right arm, BP Patient Position: Sitting, BP Cuff Size: Adult)   Pulse 92   Temp 97.8 °F (36.6 °C) (Temporal)   Ht 5' 1\" (1.549 m)   Wt 133 lb 6.4 oz (60.5 kg)   SpO2 98%   BMI 25.21 kg/m²

## 2021-11-05 NOTE — PROGRESS NOTES
VASCULAR FOLLOW UP      Subjective:   CHIEF COMPLAINTS:  Stroke  PRESENTATION OF ILLNESS:  Selwyn Perry is a 62 y.o. very pleasant woman is scheduled for carotid endarterectomy but was canceled due to anesthesia recommend a hematology consult for possible blood dyscrasia. Patient currently waiting for blood test results and to see oncologist.  And also family is concerned about the post procedure rehab placement. Past Medical History:   Diagnosis Date    Arthritis     Coagulation disorder ([address]     possible , pt had surgery on leg and bled out    Diabetes ([facility] [address] 2019    Hypertension     Left-sided weakness     Nodule of kidney     left kidney    Peripheral vascular disease ([facility] [address]     Stroke ([facility] [address]     recent stroke on 10/16/2021      Past Surgical History:   Procedure Laterality Date    HX CATARACT REMOVAL  2019    VASCULAR SURGERY PROCEDURE UNLIST       Family History   Problem Relation Age of Onset    Cancer Mother         BREAST    Lung Disease Mother     COPD Mother     Heart Disease Mother     Hypertension Mother     Diabetes Mother     Arthritis-osteo Mother     Parkinson's Disease Father     Lung Disease Father     Hypertension Father     Heart Disease Father     Diabetes Father     COPD Other     Parkinson's Disease Other     Mult Sclerosis Other     Cancer Brother         PROSTATE    Heart Disease Brother     No Known Problems Son     No Known Problems Son     Mult Sclerosis Son     Anesth Problems Neg Hx       Social History     Tobacco Use    Smoking status: Former Smoker     Packs/day: 1.00     Years: 40.00     Pack years: 40.00     Quit date: 11/3/2021    Smokeless tobacco: Never Used   Substance Use Topics    Alcohol use: Never       Prior to Admission medications    Medication Sig Start Date End Date Taking? Authorizing Provider   diphenhydrAMINE (BenadryL) 25 mg capsule Take 25 mg by mouth every six (6) hours as needed for Itching.    Yes Provider, Historical atorvastatin (Lipitor) 80 mg tablet Take 1 Tablet by mouth daily. 10/20/21  Yes Zayra Zapien MD   glipiZIDE SR (GLUCOTROL XL) 10 mg CR tablet Take 10 mg by mouth daily. 8/13/21  Yes Provider, Historical   pantoprazole (PROTONIX) 40 mg tablet Take 40 mg by mouth daily. Yes Provider, Historical   acetaminophen (TYLENOL) 325 mg tablet Take  by mouth every four (4) hours as needed for Pain. Yes Provider, Historical   lisinopril (PRINIVIL, ZESTRIL) 20 mg tablet Take  by mouth daily. Yes Other, MD Rene   aspirin delayed-release 325 mg tablet Take 1 Tablet by mouth daily. Patient not taking: Reported on 11/4/2021 10/21/21   Zayra Zapien MD   mv-mn/folic acid/vit [id number]/EDHL856 (ALIVE ONCE DAILY WOMEN 50 PLUS PO) Take 1 Tablet by mouth daily (with breakfast). Patient not taking: Reported on 10/27/2021    Provider, Historical     Allergies   Allergen Reactions    Adhesive Rash    Pcn [Penicillins] Nausea and Vomiting    Codeine Other (comments)     Makes me \"loopy\"        Review of Systems:  I reviewed the rest of organ systems personally and they were negative signed by Dr. Mann [place]    Objective:     Visit Vitals  /89 (BP 1 Location: Right arm, BP Patient Position: Sitting, BP Cuff Size: Adult)   Pulse 92   Temp 97.8 °F (36.6 °C) (Temporal)   Ht 5' 1\" (1.549 m)   Wt 133 lb 6.4 oz (60.5 kg)   SpO2 98%   BMI 25.21 kg/m²     VITAL SIGNS REVIEWED. Physical Exam:  Patient is well-nourished pleasant in conversation is appropriate. Head and neck examination atraumatic, normocephalic. Gaze appropriate. Conversation appropriate. Neck examination shows supple. No mass. No obvious carotid bruit. Chest examination shows lungs are clear bilaterally well-expanded, no crackles or wheezes. Cardiovascular system regular rate, no obvious murmur. Skin warm to touch  and moist, no skin lesions. Abdomen is soft ,not tender or distended bowel sounds present. No palpable mass.   Neurological examinations, no focal neuro deficits moving all 4 extremities. Cranial nerves intact. Sensation is intact as well. Hematologic: No obvious bruise or swelling or obvious lymphadenopathy. Psychosocial: Appropriate. Has good effect. Musculoskeletal system: No muscle wasting, appropriate movements upper and lower extremity. Data Review:   Admission on 10/27/2021, Discharged on 10/28/2021   Component Date Value Ref Range Status    Glucose (POC) 10/27/2021 149* 65 - 117 mg/dL Final    Performed by 10/27/2021 [facility]   Final    Glucose (POC) 10/27/2021 152* 65 - 117 mg/dL Final    Performed by 10/27/2021 Chad Espana   Final    Fibrinogen 10/28/2021 374  220 - 535 mg/dL Final    Prothrombin time 10/28/2021 12.4  11.9 - 14.7 sec Final    INR 10/28/2021 0.9  0.9 - 1.1   Final    aPTT 10/28/2021 32.7  21.2 - 34.1 sec Final    aPTT, therapeutic range 10/28/2021    82 - 109 sec Final   Hospital Outpatient Visit on 10/25/2021   Component Date Value Ref Range Status    Sodium 10/25/2021 136  136 - 145 mmol/L Final    Potassium 10/25/2021 4.7  3.5 - 5.1 mmol/L Final    Chloride 10/25/2021 108  97 - 108 mmol/L Final    CO2 10/25/2021 21  21 - 32 mmol/L Final    Anion gap 10/25/2021 7  5 - 15 mmol/L Final    Glucose 10/25/2021 146* 65 - 100 mg/dL Final    BUN 10/25/2021 12  6 - 20 mg/dL Final    Creatinine 10/25/2021 0.75  0.55 - 1.02 mg/dL Final    BUN/Creatinine ratio 10/25/2021 16  12 - 20   Final    GFR est AA 10/25/2021 >60  >60 ml/min/1.73m2 Final    GFR est non-AA 10/25/2021 >60  >60 ml/min/1.73m2 Final    Calcium 10/25/2021 9.2  8.5 - 10.1 mg/dL Final    Bilirubin, total 10/25/2021 0.3  0.2 - 1.0 mg/dL Final    AST (SGOT) 10/25/2021 21  15 - 37 U/L Final    ALT (SGPT) 10/25/2021 30  12 - 78 U/L Final    Alk.  phosphatase 10/25/2021 68  45 - 117 U/L Final    Protein, total 10/25/2021 7.2  6.4 - 8.2 g/dL Final    Albumin 10/25/2021 3.7  3.5 - 5.0 g/dL Final    Globulin 10/25/2021 3.5  2.0 - 4.0 g/dL Final    A-G Ratio 10/25/2021 1.1  1.1 - 2.2   Final    Prothrombin time 10/25/2021 12.8  11.9 - 14.7 sec Final    INR 10/25/2021 1.0  0.9 - 1.1   Final    aPTT 10/25/2021 32.7  21.2 - 34.1 sec Final    aPTT, therapeutic range 10/25/2021    82 - 109 sec Final    SARS-CoV-2 10/25/2021 Please find results under separate order    Final    Crossmatch Expiration 10/25/2021 10/30/2021,2359   Final    ABO/Rh(D) 10/25/2021 A Positive   Final    Antibody screen 10/25/2021 Positive   Final    Antibody ID 10/25/2021 Anti-E   Final    XXAUTO CONTROL 10/25/2021 Negative   Final    Ventricular Rate 10/25/2021 68  BPM Final    Atrial Rate 10/25/2021 68  BPM Final    P-R Interval 10/25/2021 182  ms Final    QRS Duration 10/25/2021 66  ms Final    Q-T Interval 10/25/2021 396  ms Final    QTC Calculation (Bezet) 10/25/2021 421  ms Final    Calculated P Axis 10/25/2021 68  degrees Final    Calculated R Axis 10/25/2021 58  degrees Final    Calculated T Axis 10/25/2021 76  degrees Final    Diagnosis 10/25/2021    Final                    Value:Normal sinus rhythm  Normal ECG  When compared with ECG of 17-JAN-2020 15:23,  Vent.  rate has decreased BY  51 BPM  Confirmed by Shea Minaya MD, [facility] ([id number]) on 10/25/2021 9:28:34 PM      MRSA by PCR, Nasal 10/25/2021 Not Detected  Not Detected   Final    Specimen source 10/25/2021 Nasopharyngeal    Final    SARS-CoV-2 10/25/2021 Not detected  Not detected Final   Admission on 10/18/2021, Discharged on 10/21/2021   Component Date Value Ref Range Status    WBC 10/18/2021 8.2  3.6 - 11.0 K/uL Final    RBC 10/18/2021 4.46  3.80 - 5.20 M/uL Final    HGB 10/18/2021 13.0  11.5 - 16.0 g/dL Final    HCT 10/18/2021 40.6  35.0 - 47.0 % Final    MCV 10/18/2021 91.0  80.0 - 99.0 FL Final    MCH 10/18/2021 29.1  26.0 - 34.0 PG Final    MCHC 10/18/2021 32.0  30.0 - 36.5 g/dL Final    RDW 10/18/2021 12.5  11.5 - 14.5 % Final    PLATELET 24/82/8685 677  150 - 400 K/uL Final  MPV 10/18/2021 11.7  8.9 - 12.9 FL Final    NRBC 10/18/2021 0.0  0.0  WBC Final    ABSOLUTE NRBC 10/18/2021 0.00  0.00 - 0.01 K/uL Final    NEUTROPHILS 10/18/2021 54  32 - 75 % Final    LYMPHOCYTES 10/18/2021 33  12 - 49 % Final    MONOCYTES 10/18/2021 10  5 - 13 % Final    EOSINOPHILS 10/18/2021 2  0 - 7 % Final    BASOPHILS 10/18/2021 1  0 - 1 % Final    IMMATURE GRANULOCYTES 10/18/2021 0  0 - 0.5 % Final    ABS. NEUTROPHILS 10/18/2021 4.5  1.8 - 8.0 K/UL Final    ABS. LYMPHOCYTES 10/18/2021 2.7  0.8 - 3.5 K/UL Final    ABS. MONOCYTES 10/18/2021 0.8  0.0 - 1.0 K/UL Final    ABS. EOSINOPHILS 10/18/2021 0.2  0.0 - 0.4 K/UL Final    ABS. BASOPHILS 10/18/2021 0.1  0.0 - 0.1 K/UL Final    ABS. IMM. GRANS. 10/18/2021 0.0  0.00 - 0.04 K/UL Final    DF 10/18/2021 AUTOMATED    Final    Sodium 10/18/2021 138  136 - 145 mmol/L Final    Potassium 10/18/2021 4.5  3.5 - 5.1 mmol/L Final    Chloride 10/18/2021 108  97 - 108 mmol/L Final    CO2 10/18/2021 22  21 - 32 mmol/L Final    Anion gap 10/18/2021 8  5 - 15 mmol/L Final    Glucose 10/18/2021 149* 65 - 100 mg/dL Final    BUN 10/18/2021 16  6 - 20 mg/dL Final    Creatinine 10/18/2021 0.74  0.55 - 1.02 mg/dL Final    BUN/Creatinine ratio 10/18/2021 22* 12 - 20   Final    GFR est AA 10/18/2021 >60  >60 ml/min/1.73m2 Final    GFR est non-AA 10/18/2021 >60  >60 ml/min/1.73m2 Final    Calcium 10/18/2021 9.5  8.5 - 10.1 mg/dL Final    Bilirubin, total 10/18/2021 0.2  0.2 - 1.0 mg/dL Final    AST (SGOT) 10/18/2021 14* 15 - 37 U/L Final    ALT (SGPT) 10/18/2021 18  12 - 78 U/L Final    Alk.  phosphatase 10/18/2021 72  45 - 117 U/L Final    Protein, total 10/18/2021 7.4  6.4 - 8.2 g/dL Final    Albumin 10/18/2021 3.7  3.5 - 5.0 g/dL Final    Globulin 10/18/2021 3.7  2.0 - 4.0 g/dL Final    A-G Ratio 10/18/2021 1.0* 1.1 - 2.2   Final    Prothrombin time 10/18/2021 11.6* 11.9 - 14.7 sec Final    INR 10/18/2021 0.9  0.9 - 1.1   Final    aPTT 10/18/2021 31.9  21.2 - 34.1 sec Final    aPTT, therapeutic range 10/18/2021    82 - 109 sec Final    Troponin-High Sensitivity 10/18/2021 9  0 - 51 ng/L Final    Hemoglobin A1c 10/18/2021 6.6* 4.0 - 5.6 % Final    Est. average glucose 10/18/2021 143  mg/dL Final    Aortic Valve Systolic Peak Velocity 06/12/6552 143.00  cm/s Final    AoV PG 10/19/2021 8.00  mmHg Final    Ao Root D 10/19/2021 2.50  cm Final    IVSd 10/19/2021 1.17* 0.60 - 0.90 cm Final    LVIDd 10/19/2021 2.93* 3.90 - 5.30 cm Final    LVIDs 10/19/2021 1.74  cm Final    LVOT Peak Velocity 10/19/2021 98.70  cm/s Final    LVOT Peak Gradient 10/19/2021 4.00  mmHg Final    LVPWd 10/19/2021 1.05* 0.60 - 0.90 cm Final    LV E' Septal Velocity 10/19/2021 8.19  cm/s Final    LV ED Vol A2C 10/19/2021 25.20  cm3 Final    LV ES Vol A2C 10/19/2021 5.27  cm3 Final    E/E' septal 10/19/2021 9.72   Final    Left Atrium Major Axis 10/19/2021 2.70  cm Final    Mitral Valve Deceleration [place] 10/19/2021 2,860.00  mm/s2 Final    Mitral Valve Deceleration [place] 10/19/2021 2,860.00  mm/s2 Final    Mitral Valve E Wave Deceleration T* 10/19/2021 250.00  ms Final    Mitral Valve Pressure Half-time 10/19/2021 85.00  ms Final    MV A Elroy 10/19/2021 112.00  cm/s Final    MV E Elroy 10/19/2021 79.60  cm/s Final    MV Mean Gradient 10/19/2021 2.00  mmHg Final    Mitral Valve Annulus Velocity Time* 10/19/2021 23.80  cm Final    Mitral Valve Max Velocity 10/19/2021 109.00  cm/s Final    MV Peak Gradient 10/19/2021 5.00  mmHg Final    MVA (PHT) 10/19/2021 2.59  cm2 Final    MV E/A 10/19/2021 0.71   Final    Pulmonic Valve Max Velocity 10/19/2021 104.00  cm/s Final    Pulmonic Valve Systolic Peak Insta* 95/87/1675 4.00  mmHg Final    Est. RA Pressure 10/19/2021 3.00  mmHg Final    RVIDd 10/19/2021 2.69  cm Final    RVSP 10/19/2021 21.00  mmHg Final    Tricuspid Valve Max Velocity 10/19/2021 210.00  cm/s Final    Triscuspid Valve Regurgitation Pea* 10/19/2021 18.00  mmHg Final    Tapse 10/19/2021 1.40* 1.50 - 2.00 cm Final    Right Atrial Area 4C 10/19/2021 9.85  cm2 Final    LA Area 4C 10/19/2021 13.37  cm2 Final    BP EF 10/19/2021 73.0  55 - 100 % Final    LV Mass AL 10/19/2021 93.3  67 - 162 g Final    LV Mass AL Index 10/19/2021 58.3  43 - 95 g/m2 Final    Left Atrium Minor Axis 10/19/2021 1.69  cm Final    Glucose (POC) 10/18/2021 143* 65 - 117 mg/dL Final    Performed by 10/18/2021 [facility]   Final    Glucose (POC) 10/18/2021 144* 65 - 117 mg/dL Final    Performed by 10/18/2021 MASHA AJ   Final    LIPID PROFILE 10/19/2021      Final    Cholesterol, total 10/19/2021 156  <200 mg/dL Final    Triglyceride 10/19/2021 149  <150 mg/dL Final    HDL Cholesterol 10/19/2021 54  mg/dL Final    LDL, calculated 10/19/2021 72.2  0 - 100 mg/dL Final    VLDL, calculated 10/19/2021 29.8  mg/dL Final    CHOL/HDL Ratio 10/19/2021 2.9  0.0 - 5.0   Final    Hemoglobin A1c 10/19/2021 6.8* 4.0 - 5.6 % Final    Est. average glucose 10/19/2021 148  mg/dL Final    WBC 10/19/2021 9.9  3.6 - 11.0 K/uL Final    RBC 10/19/2021 4.50  3.80 - 5.20 M/uL Final    HGB 10/19/2021 13.0  11.5 - 16.0 g/dL Final    HCT 10/19/2021 40.7  35.0 - 47.0 % Final    MCV 10/19/2021 90.4  80.0 - 99.0 FL Final    MCH 10/19/2021 28.9  26.0 - 34.0 PG Final    MCHC 10/19/2021 31.9  30.0 - 36.5 g/dL Final    RDW 10/19/2021 12.5  11.5 - 14.5 % Final    PLATELET 28/98/2501 610  150 - 400 K/uL Final    MPV 10/19/2021 11.4  8.9 - 12.9 FL Final    NRBC 10/19/2021 0.0  0.0  WBC Final    ABSOLUTE NRBC 10/19/2021 0.00  0.00 - 0.01 K/uL Final    Sodium 10/19/2021 138  136 - 145 mmol/L Final    Potassium 10/19/2021 4.8  3.5 - 5.1 mmol/L Final    Chloride 10/19/2021 108  97 - 108 mmol/L Final    CO2 10/19/2021 23  21 - 32 mmol/L Final    Anion gap 10/19/2021 7  5 - 15 mmol/L Final    Glucose 10/19/2021 150* 65 - 100 mg/dL Final    BUN 10/19/2021 15  6 - 20 mg/dL Final    Creatinine 10/19/2021 0.68  0.55 - 1.02 mg/dL Final    BUN/Creatinine ratio 10/19/2021 22* 12 - 20   Final    GFR est AA 10/19/2021 >60  >60 ml/min/1.73m2 Final    GFR est non-AA 10/19/2021 >60  >60 ml/min/1.73m2 Final    Calcium 10/19/2021 9.5  8.5 - 10.1 mg/dL Final    Magnesium 10/19/2021 2.2  1.6 - 2.4 mg/dL Final    Glucose (POC) 10/19/2021 152* 65 - 117 mg/dL Final    [facility]   Final    Glucose (POC) 10/19/2021 143* 65 - 117 mg/dL Final    [facility]   Final    Glucose (POC) 10/19/2021 146* 65 - 117 mg/dL Final    Performed by 10/19/2021 Desmond Baljinder   Final    Glucose (POC) 10/19/2021 152* 65 - 117 mg/dL Final    Performed by 10/19/2021 GRAYSON BEN   Final    Glucose (POC) 10/20/2021 175* 65 - 117 mg/dL Final    Performed by 10/20/2021 MARANDA CURIEL   Final    Glucose (POC) 10/20/2021 160* 65 - 117 mg/dL Final    Performed by 10/20/2021 Maria Ines Hoyt TRICHANA   Final    Glucose (POC) 10/20/2021 162* 65 - 117 mg/dL Final    Performed by 10/20/2021 Maria Ines Hoyt TRICHANA   Final    Glucose (POC) 10/20/2021 170* 65 - 117 mg/dL Final    Performed by 10/20/2021 Stevie Duel   Final    WBC 10/21/2021 6.2  3.6 - 11.0 K/uL Final    RBC 10/21/2021 4.34  3.80 - 5.20 M/uL Final    HGB 10/21/2021 12.6  11.5 - 16.0 g/dL Final    HCT 10/21/2021 39.2  35.0 - 47.0 % Final    MCV 10/21/2021 90.3  80.0 - 99.0 FL Final    MCH 10/21/2021 29.0  26.0 - 34.0 PG Final    MCHC 10/21/2021 32.1  30.0 - 36.5 g/dL Final    RDW 10/21/2021 12.0  11.5 - 14.5 % Final    PLATELET 54/18/6206 204  150 - 400 K/uL Final    MPV 10/21/2021 11.9  8.9 - 12.9 FL Final    NRBC 10/21/2021 0.0  0.0  WBC Final    ABSOLUTE NRBC 10/21/2021 0.00  0.00 - 0.01 K/uL Final    NEUTROPHILS 10/21/2021 44  32 - 75 % Final    LYMPHOCYTES 10/21/2021 40  12 - 49 % Final    MONOCYTES 10/21/2021 13  5 - 13 % Final    EOSINOPHILS 10/21/2021 2  0 - 7 % Final    BASOPHILS 10/21/2021 1  0 - 1 % Final    IMMATURE GRANULOCYTES 10/21/2021 0  0 - 0.5 % Final    ABS. NEUTROPHILS 10/21/2021 2.7  1.8 - 8.0 K/UL Final    ABS. LYMPHOCYTES 10/21/2021 2.5  0.8 - 3.5 K/UL Final    ABS. MONOCYTES 10/21/2021 0.8  0.0 - 1.0 K/UL Final    ABS. EOSINOPHILS 10/21/2021 0.2  0.0 - 0.4 K/UL Final    ABS. BASOPHILS 10/21/2021 0.0  0.0 - 0.1 K/UL Final    ABS. IMM. GRANS. 10/21/2021 0.0  0.00 - 0.04 K/UL Final    DF 10/21/2021 AUTOMATED    Final    Glucose (POC) 10/21/2021 147* 65 - 117 mg/dL Final    Performed by 10/21/2021 Tam Peresin   Final    Glucose (POC) 10/21/2021 135* 65 - 117 mg/dL Final    Performed by 10/21/2021 Sujata Hagen   Final        Assessment:     Problem List Items Addressed This Visit        Circulatory    CVA (cerebral vascular accident) ([facility] [address]       Other    History of bleeding disorder - Primary    Relevant Orders    REFERRAL TO HEMATOLOGY ONCOLOGY              Plan: We talked to Dr. Brenda Polk and made arrangement video conference next Friday after finalized blood test results come back. And meanwhile patient is scheduled for carotid endarterectomy on November 17, 2021.         Darien Beaver MD

## 2021-11-11 ENCOUNTER — HOSPITAL ENCOUNTER (OUTPATIENT)
Dept: PREADMISSION TESTING | Age: 61
Discharge: HOME | End: 2021-11-11
Payer: COMMERCIAL

## 2021-11-11 VITALS
OXYGEN SATURATION: 96 % | HEART RATE: 87 BPM | WEIGHT: 136.69 LBS | DIASTOLIC BLOOD PRESSURE: 71 MMHG | HEIGHT: 62 IN | BODY MASS INDEX: 25.15 KG/M2 | RESPIRATION RATE: 18 BRPM | TEMPERATURE: 98.5 F | SYSTOLIC BLOOD PRESSURE: 124 MMHG

## 2021-11-11 LAB
ABO + RH BLD: NORMAL
ALBUMIN SERPL-MCNC: 3.6 G/DL (ref 3.5–5)
ALBUMIN/GLOB SERPL: 1 {RATIO} (ref 1.1–2.2)
ALP SERPL-CCNC: 71 U/L (ref 45–117)
ALT SERPL-CCNC: 21 U/L (ref 12–78)
ANION GAP SERPL CALC-SCNC: 7 MMOL/L (ref 5–15)
AST SERPL W P-5'-P-CCNC: 15 U/L (ref 15–37)
BASOPHILS # BLD: 0.1 K/UL (ref 0–0.1)
BASOPHILS NFR BLD: 1 % (ref 0–1)
BILIRUB SERPL-MCNC: 0.2 MG/DL (ref 0.2–1)
BLOOD GROUP ANTIBODIES SERPL: NORMAL
BLOOD GROUP ANTIBODIES SERPL: POSITIVE
BUN SERPL-MCNC: 11 MG/DL (ref 6–20)
BUN/CREAT SERPL: 14 (ref 12–20)
CA-I BLD-MCNC: 9.4 MG/DL (ref 8.5–10.1)
CHLORIDE SERPL-SCNC: 105 MMOL/L (ref 97–108)
CO2 SERPL-SCNC: 21 MMOL/L (ref 21–32)
CREAT SERPL-MCNC: 0.81 MG/DL (ref 0.55–1.02)
DIFFERENTIAL METHOD BLD: NORMAL
EOSINOPHIL # BLD: 0.3 K/UL (ref 0–0.4)
EOSINOPHIL NFR BLD: 4 % (ref 0–7)
ERYTHROCYTE [DISTWIDTH] IN BLOOD BY AUTOMATED COUNT: 12.2 % (ref 11.5–14.5)
GLOBULIN SER CALC-MCNC: 3.6 G/DL (ref 2–4)
GLUCOSE SERPL-MCNC: 203 MG/DL (ref 65–100)
HCT VFR BLD AUTO: 38.9 % (ref 35–47)
HGB BLD-MCNC: 12.4 G/DL (ref 11.5–16)
IMM GRANULOCYTES # BLD AUTO: 0 K/UL (ref 0–0.04)
IMM GRANULOCYTES NFR BLD AUTO: 0 % (ref 0–0.5)
INR PPP: 0.9 (ref 0.9–1.1)
LYMPHOCYTES # BLD: 2.2 K/UL (ref 0.8–3.5)
LYMPHOCYTES NFR BLD: 30 % (ref 12–49)
MCH RBC QN AUTO: 28.4 PG (ref 26–34)
MCHC RBC AUTO-ENTMCNC: 31.9 G/DL (ref 30–36.5)
MCV RBC AUTO: 89.2 FL (ref 80–99)
MONOCYTES # BLD: 0.7 K/UL (ref 0–1)
MONOCYTES NFR BLD: 10 % (ref 5–13)
NEUTS SEG # BLD: 4.1 K/UL (ref 1.8–8)
NEUTS SEG NFR BLD: 55 % (ref 32–75)
NRBC # BLD: 0 K/UL (ref 0–0.01)
NRBC BLD-RTO: 0 PER 100 WBC
PLATELET # BLD AUTO: 357 K/UL (ref 150–400)
PMV BLD AUTO: 11.6 FL (ref 8.9–12.9)
POTASSIUM SERPL-SCNC: 4.8 MMOL/L (ref 3.5–5.1)
PROT SERPL-MCNC: 7.2 G/DL (ref 6.4–8.2)
PROTHROMBIN TIME: 11.5 SEC (ref 11.9–14.7)
RBC # BLD AUTO: 4.36 M/UL (ref 3.8–5.2)
SARS-COV-2, COV2: NORMAL
SODIUM SERPL-SCNC: 133 MMOL/L (ref 136–145)
SPECIMEN EXP DATE BLD: NORMAL
WBC # BLD AUTO: 7.3 K/UL (ref 3.6–11)
XXAUTO CONTROL: NEGATIVE

## 2021-11-11 PROCEDURE — 85610 PROTHROMBIN TIME: CPT

## 2021-11-11 PROCEDURE — 80053 COMPREHEN METABOLIC PANEL: CPT

## 2021-11-11 PROCEDURE — 86870 RBC ANTIBODY IDENTIFICATION: CPT

## 2021-11-11 PROCEDURE — U0005 INFEC AGEN DETEC AMPLI PROBE: HCPCS

## 2021-11-11 PROCEDURE — 36415 COLL VENOUS BLD VENIPUNCTURE: CPT

## 2021-11-11 PROCEDURE — 85025 COMPLETE CBC W/AUTO DIFF WBC: CPT

## 2021-11-11 PROCEDURE — 86901 BLOOD TYPING SEROLOGIC RH(D): CPT

## 2021-11-12 ENCOUNTER — TELEPHONE (OUTPATIENT)
Dept: SURGERY | Age: 61
End: 2021-11-12

## 2021-11-12 LAB
SARS-COV-2, XPLCVT: NOT DETECTED
SOURCE, COVRS: NORMAL

## 2021-11-12 NOTE — TELEPHONE ENCOUNTER
I tried to call patient regarding upcoming surgery. I need to give her estimate on surgery. $1500 DED/$640.06 met $6050 OOP/$745.00 met 20% coinsurance per Idania at [address]. $3238 at 20% coinsurance is $647.60. I left message for patient to return my call.

## 2021-11-15 ENCOUNTER — HOSPITAL ENCOUNTER (OUTPATIENT)
Dept: LAB | Age: 61
Discharge: HOME | End: 2021-11-15

## 2021-11-15 ENCOUNTER — TRANSCRIBE ORDER (OUTPATIENT)
Dept: REGISTRATION | Age: 61
End: 2021-11-15

## 2021-11-15 ENCOUNTER — TELEPHONE (OUTPATIENT)
Dept: SURGERY | Age: 61
End: 2021-11-15

## 2021-11-15 DIAGNOSIS — R68.89 ABNORMALITY ON SCREENING TEST: Primary | ICD-10-CM

## 2021-11-15 DIAGNOSIS — R68.89 ABNORMALITY ON SCREENING TEST: ICD-10-CM

## 2021-11-15 NOTE — TELEPHONE ENCOUNTER
Patients lavon Geronimo called stated patients surgery is scheduled for 11/17/2021 and Dr Watson Duque has ordered extra blood work and would like to know if this will cause her surgery to be rescheduled.  Please call Portia Geronimo [phone]

## 2021-11-15 NOTE — TELEPHONE ENCOUNTER
Spoke with patient and let her know that Dr. Beth Harada and Dr. Ana Luisa Mednes had already spoken and that her surgery may need to be postponed. Patient verbalized understanding. I provided her with a contact number for Dr. Beth Harada to speak with her more.

## 2021-11-15 NOTE — TELEPHONE ENCOUNTER
Spoke with Dr. Moira Pfeiffer - he spoke with Dr. Rachel Harrell and is aware the blood work isn't back yet. He states the patient may need to postpone her surgery and requested to have the patient call him.

## 2021-11-23 NOTE — PERIOP NOTES
18 Dr. Gillian Jerome office called re: hematology clearance, requested for clearance note to be faxed to PAT dept as soon as possible.

## 2021-11-24 NOTE — PERIOP NOTES
[id number] Message received from Dr. Liban Rome office stating patient is hematologically cleared to proceed with surgery with Dr. Ivelisse Yarbrough on 12/1/2021, office to fax note to PAT dept. Patient called, delmy't made for PAT for pre-testing on 11/26/2021, pt verbalized understanding. Dr. Ivelisse Yarbrough called, made aware pt is cleared as noted above, stated he is aware.

## 2021-11-26 ENCOUNTER — HOSPITAL ENCOUNTER (OUTPATIENT)
Dept: PREADMISSION TESTING | Age: 61
Discharge: HOME | End: 2021-11-26
Payer: COMMERCIAL

## 2021-11-26 LAB
ABO + RH BLD: NORMAL
ALBUMIN SERPL-MCNC: 3.3 G/DL (ref 3.5–5)
ALBUMIN/GLOB SERPL: 0.9 {RATIO} (ref 1.1–2.2)
ALP SERPL-CCNC: 66 U/L (ref 45–117)
ALT SERPL-CCNC: 27 U/L (ref 12–78)
ANION GAP SERPL CALC-SCNC: 5 MMOL/L (ref 5–15)
AST SERPL W P-5'-P-CCNC: 20 U/L (ref 15–37)
BASOPHILS # BLD: 0 K/UL (ref 0–0.1)
BASOPHILS NFR BLD: 1 % (ref 0–1)
BILIRUB SERPL-MCNC: 0.3 MG/DL (ref 0.2–1)
BLOOD GROUP ANTIBODIES SERPL: NORMAL
BLOOD GROUP ANTIBODIES SERPL: POSITIVE
BUN SERPL-MCNC: 13 MG/DL (ref 6–20)
BUN/CREAT SERPL: 15 (ref 12–20)
CA-I BLD-MCNC: 8.8 MG/DL (ref 8.5–10.1)
CHLORIDE SERPL-SCNC: 109 MMOL/L (ref 97–108)
CO2 SERPL-SCNC: 22 MMOL/L (ref 21–32)
CREAT SERPL-MCNC: 0.89 MG/DL (ref 0.55–1.02)
DIFFERENTIAL METHOD BLD: NORMAL
EOSINOPHIL # BLD: 0.2 K/UL (ref 0–0.4)
EOSINOPHIL NFR BLD: 3 % (ref 0–7)
ERYTHROCYTE [DISTWIDTH] IN BLOOD BY AUTOMATED COUNT: 12.4 % (ref 11.5–14.5)
GLOBULIN SER CALC-MCNC: 3.7 G/DL (ref 2–4)
GLUCOSE SERPL-MCNC: 205 MG/DL (ref 65–100)
HCT VFR BLD AUTO: 38.4 % (ref 35–47)
HGB BLD-MCNC: 11.8 G/DL (ref 11.5–16)
IMM GRANULOCYTES # BLD AUTO: 0 K/UL (ref 0–0.04)
IMM GRANULOCYTES NFR BLD AUTO: 0 % (ref 0–0.5)
INR PPP: 0.9 (ref 0.9–1.1)
LYMPHOCYTES # BLD: 2.4 K/UL (ref 0.8–3.5)
LYMPHOCYTES NFR BLD: 30 % (ref 12–49)
MCH RBC QN AUTO: 28.2 PG (ref 26–34)
MCHC RBC AUTO-ENTMCNC: 30.7 G/DL (ref 30–36.5)
MCV RBC AUTO: 91.6 FL (ref 80–99)
MONOCYTES # BLD: 0.7 K/UL (ref 0–1)
MONOCYTES NFR BLD: 8 % (ref 5–13)
MRSA DNA SPEC QL NAA+PROBE: NOT DETECTED
NEUTS SEG # BLD: 4.8 K/UL (ref 1.8–8)
NEUTS SEG NFR BLD: 58 % (ref 32–75)
NRBC # BLD: 0 K/UL (ref 0–0.01)
NRBC BLD-RTO: 0 PER 100 WBC
PLATELET # BLD AUTO: 372 K/UL (ref 150–400)
PMV BLD AUTO: 11.7 FL (ref 8.9–12.9)
POTASSIUM SERPL-SCNC: 4.4 MMOL/L (ref 3.5–5.1)
PROT SERPL-MCNC: 7 G/DL (ref 6.4–8.2)
PROTHROMBIN TIME: 11.8 SEC (ref 11.9–14.7)
RBC # BLD AUTO: 4.19 M/UL (ref 3.8–5.2)
SODIUM SERPL-SCNC: 136 MMOL/L (ref 136–145)
SPECIMEN EXP DATE BLD: NORMAL
WBC # BLD AUTO: 8.1 K/UL (ref 3.6–11)
XXAUTO CONTROL: NEGATIVE

## 2021-11-26 PROCEDURE — 85610 PROTHROMBIN TIME: CPT

## 2021-11-26 PROCEDURE — 36415 COLL VENOUS BLD VENIPUNCTURE: CPT

## 2021-11-26 PROCEDURE — 86901 BLOOD TYPING SEROLOGIC RH(D): CPT

## 2021-11-26 PROCEDURE — 87641 MR-STAPH DNA AMP PROBE: CPT

## 2021-11-26 PROCEDURE — 85025 COMPLETE CBC W/AUTO DIFF WBC: CPT

## 2021-11-26 PROCEDURE — 80053 COMPREHEN METABOLIC PANEL: CPT

## 2021-11-26 PROCEDURE — 86870 RBC ANTIBODY IDENTIFICATION: CPT

## 2021-12-01 ENCOUNTER — ANESTHESIA (OUTPATIENT)
Dept: SURGERY | Age: 61
DRG: 038 | End: 2021-12-01
Payer: COMMERCIAL

## 2021-12-01 ENCOUNTER — HOSPITAL ENCOUNTER (INPATIENT)
Age: 61
LOS: 6 days | Discharge: NURSING, REHAB OR LONG-TERM CARE | DRG: 038 | End: 2021-12-07
Attending: SURGERY | Admitting: SURGERY
Payer: COMMERCIAL

## 2021-12-01 ENCOUNTER — ANESTHESIA EVENT (OUTPATIENT)
Dept: SURGERY | Age: 61
DRG: 038 | End: 2021-12-01
Payer: COMMERCIAL

## 2021-12-01 DIAGNOSIS — Z48.812 POSTOP CAROTID ENDARTERECTOMY SURVEILLANCE, ENCOUNTER FOR: Primary | ICD-10-CM

## 2021-12-01 LAB
ALBUMIN SERPL-MCNC: 3 G/DL (ref 3.5–5)
ALBUMIN/GLOB SERPL: 0.9 {RATIO} (ref 1.1–2.2)
ALP SERPL-CCNC: 61 U/L (ref 45–117)
ALT SERPL-CCNC: 29 U/L (ref 12–78)
ANION GAP SERPL CALC-SCNC: 4 MMOL/L (ref 5–15)
AST SERPL W P-5'-P-CCNC: 23 U/L (ref 15–37)
BASOPHILS # BLD: 0 K/UL (ref 0–0.1)
BASOPHILS NFR BLD: 0 % (ref 0–1)
BILIRUB SERPL-MCNC: 0.3 MG/DL (ref 0.2–1)
BUN SERPL-MCNC: 10 MG/DL (ref 6–20)
BUN/CREAT SERPL: 15 (ref 12–20)
CA-I BLD-MCNC: 8.8 MG/DL (ref 8.5–10.1)
CHLORIDE SERPL-SCNC: 112 MMOL/L (ref 97–108)
CO2 SERPL-SCNC: 24 MMOL/L (ref 21–32)
CREAT SERPL-MCNC: 0.67 MG/DL (ref 0.55–1.02)
DIFFERENTIAL METHOD BLD: ABNORMAL
EOSINOPHIL # BLD: 0 K/UL (ref 0–0.4)
EOSINOPHIL NFR BLD: 0 % (ref 0–7)
ERYTHROCYTE [DISTWIDTH] IN BLOOD BY AUTOMATED COUNT: 12.7 % (ref 11.5–14.5)
GLOBULIN SER CALC-MCNC: 3.5 G/DL (ref 2–4)
GLUCOSE BLD STRIP.AUTO-MCNC: 138 MG/DL (ref 65–117)
GLUCOSE BLD STRIP.AUTO-MCNC: 157 MG/DL (ref 65–117)
GLUCOSE SERPL-MCNC: 178 MG/DL (ref 65–100)
HCT VFR BLD AUTO: 35.1 % (ref 35–47)
HGB BLD-MCNC: 11.1 G/DL (ref 11.5–16)
IMM GRANULOCYTES # BLD AUTO: 0.1 K/UL (ref 0–0.04)
IMM GRANULOCYTES NFR BLD AUTO: 0 % (ref 0–0.5)
LYMPHOCYTES # BLD: 1.1 K/UL (ref 0.8–3.5)
LYMPHOCYTES NFR BLD: 9 % (ref 12–49)
MCH RBC QN AUTO: 28.5 PG (ref 26–34)
MCHC RBC AUTO-ENTMCNC: 31.6 G/DL (ref 30–36.5)
MCV RBC AUTO: 90 FL (ref 80–99)
MONOCYTES # BLD: 0.4 K/UL (ref 0–1)
MONOCYTES NFR BLD: 3 % (ref 5–13)
NEUTS SEG # BLD: 10.5 K/UL (ref 1.8–8)
NEUTS SEG NFR BLD: 88 % (ref 32–75)
NRBC # BLD: 0 K/UL (ref 0–0.01)
NRBC BLD-RTO: 0 PER 100 WBC
PERFORMED BY, TECHID: ABNORMAL
PERFORMED BY, TECHID: ABNORMAL
PLATELET # BLD AUTO: 398 K/UL (ref 150–400)
PMV BLD AUTO: 11.2 FL (ref 8.9–12.9)
POTASSIUM SERPL-SCNC: 4.7 MMOL/L (ref 3.5–5.1)
PROT SERPL-MCNC: 6.5 G/DL (ref 6.4–8.2)
RBC # BLD AUTO: 3.9 M/UL (ref 3.8–5.2)
SODIUM SERPL-SCNC: 140 MMOL/L (ref 136–145)
WBC # BLD AUTO: 12 K/UL (ref 3.6–11)

## 2021-12-01 PROCEDURE — 74011250637 HC RX REV CODE- 250/637: Performed by: SURGERY

## 2021-12-01 PROCEDURE — 74011000250 HC RX REV CODE- 250

## 2021-12-01 PROCEDURE — 76060000037 HC ANESTHESIA 3 TO 3.5 HR: Performed by: SURGERY

## 2021-12-01 PROCEDURE — 77030031139 HC SUT VCRL2 J&J -A: Performed by: SURGERY

## 2021-12-01 PROCEDURE — 87086 URINE CULTURE/COLONY COUNT: CPT

## 2021-12-01 PROCEDURE — 74011250636 HC RX REV CODE- 250/636: Performed by: ANESTHESIOLOGY

## 2021-12-01 PROCEDURE — 74011000250 HC RX REV CODE- 250: Performed by: NURSE ANESTHETIST, CERTIFIED REGISTERED

## 2021-12-01 PROCEDURE — 77030002987 HC SUT PROL J&J -B: Performed by: SURGERY

## 2021-12-01 PROCEDURE — 35301 RECHANNELING OF ARTERY: CPT | Performed by: SURGERY

## 2021-12-01 PROCEDURE — 03CK0ZZ EXTIRPATION OF MATTER FROM RIGHT INTERNAL CAROTID ARTERY, OPEN APPROACH: ICD-10-PCS | Performed by: SURGERY

## 2021-12-01 PROCEDURE — 80053 COMPREHEN METABOLIC PANEL: CPT

## 2021-12-01 PROCEDURE — 77030002996 HC SUT SLK J&J -A: Performed by: SURGERY

## 2021-12-01 PROCEDURE — 77030002988: Performed by: SURGERY

## 2021-12-01 PROCEDURE — 74011250636 HC RX REV CODE- 250/636: Performed by: SURGERY

## 2021-12-01 PROCEDURE — 85025 COMPLETE CBC W/AUTO DIFF WBC: CPT

## 2021-12-01 PROCEDURE — C1768 GRAFT, VASCULAR: HCPCS | Performed by: SURGERY

## 2021-12-01 PROCEDURE — 77030041919 HC SHNT CAROTID FLEMV -G: Performed by: SURGERY

## 2021-12-01 PROCEDURE — 74011000258 HC RX REV CODE- 258: Performed by: SURGERY

## 2021-12-01 PROCEDURE — 77030040361 HC SLV COMPR DVT MDII -B: Performed by: SURGERY

## 2021-12-01 PROCEDURE — 76210000002 HC OR PH I REC 3 TO 3.5 HR: Performed by: SURGERY

## 2021-12-01 PROCEDURE — 74011000250 HC RX REV CODE- 250: Performed by: SURGERY

## 2021-12-01 PROCEDURE — 77030002916 HC SUT ETHLN J&J -A: Performed by: SURGERY

## 2021-12-01 PROCEDURE — 88304 TISSUE EXAM BY PATHOLOGIST: CPT

## 2021-12-01 PROCEDURE — 76010000173 HC OR TIME 3 TO 3.5 HR INTENSV-TIER 1: Performed by: SURGERY

## 2021-12-01 PROCEDURE — 77030013567 HC DRN WND RESERV BARD -A: Performed by: SURGERY

## 2021-12-01 PROCEDURE — 03UK0JZ SUPPLEMENT RIGHT INTERNAL CAROTID ARTERY WITH SYNTHETIC SUBSTITUTE, OPEN APPROACH: ICD-10-PCS | Performed by: SURGERY

## 2021-12-01 PROCEDURE — 74011000258 HC RX REV CODE- 258: Performed by: NURSE ANESTHETIST, CERTIFIED REGISTERED

## 2021-12-01 PROCEDURE — 77030005530 HC CATH URETH FOL40 BARD -B: Performed by: SURGERY

## 2021-12-01 PROCEDURE — 82962 GLUCOSE BLOOD TEST: CPT

## 2021-12-01 PROCEDURE — 2709999900 HC NON-CHARGEABLE SUPPLY: Performed by: SURGERY

## 2021-12-01 PROCEDURE — 77030038552 HC DRN WND MDII -A: Performed by: SURGERY

## 2021-12-01 PROCEDURE — 77030010512 HC APPL CLP LIG J&J -C: Performed by: SURGERY

## 2021-12-01 PROCEDURE — 74011250636 HC RX REV CODE- 250/636: Performed by: NURSE ANESTHETIST, CERTIFIED REGISTERED

## 2021-12-01 PROCEDURE — 77030002933 HC SUT MCRYL J&J -A: Performed by: SURGERY

## 2021-12-01 PROCEDURE — 65610000006 HC RM INTENSIVE CARE

## 2021-12-01 DEVICE — XENOSURE BIOLOGIC PATCH, 0.8CM X 8CM, EIFU
Type: IMPLANTABLE DEVICE | Site: CAROTID | Status: FUNCTIONAL
Brand: XENOSURE BIOLOGIC PATCH

## 2021-12-01 RX ORDER — MIDAZOLAM HYDROCHLORIDE 1 MG/ML
INJECTION, SOLUTION INTRAMUSCULAR; INTRAVENOUS
Status: COMPLETED
Start: 2021-12-01 | End: 2021-12-01

## 2021-12-01 RX ORDER — FENTANYL CITRATE 50 UG/ML
INJECTION, SOLUTION INTRAMUSCULAR; INTRAVENOUS
Status: COMPLETED
Start: 2021-12-01 | End: 2021-12-01

## 2021-12-01 RX ORDER — REMIFENTANIL HYDROCHLORIDE 1 MG/ML
INJECTION, POWDER, LYOPHILIZED, FOR SOLUTION INTRAVENOUS
Status: DISCONTINUED | OUTPATIENT
Start: 2021-12-01 | End: 2021-12-01 | Stop reason: HOSPADM

## 2021-12-01 RX ORDER — SODIUM CHLORIDE 0.9 % (FLUSH) 0.9 %
5-40 SYRINGE (ML) INJECTION AS NEEDED
Status: DISCONTINUED | OUTPATIENT
Start: 2021-12-01 | End: 2021-12-01 | Stop reason: HOSPADM

## 2021-12-01 RX ORDER — HEPARIN SODIUM 1000 [USP'U]/ML
INJECTION, SOLUTION INTRAVENOUS; SUBCUTANEOUS AS NEEDED
Status: DISCONTINUED | OUTPATIENT
Start: 2021-12-01 | End: 2021-12-01 | Stop reason: HOSPADM

## 2021-12-01 RX ORDER — FENTANYL CITRATE 50 UG/ML
50 INJECTION, SOLUTION INTRAMUSCULAR; INTRAVENOUS
Status: DISCONTINUED | OUTPATIENT
Start: 2021-12-01 | End: 2021-12-01 | Stop reason: HOSPADM

## 2021-12-01 RX ORDER — PROPOFOL 10 MG/ML
INJECTION, EMULSION INTRAVENOUS AS NEEDED
Status: DISCONTINUED | OUTPATIENT
Start: 2021-12-01 | End: 2021-12-01 | Stop reason: HOSPADM

## 2021-12-01 RX ORDER — GLYCOPYRROLATE 0.2 MG/ML
INJECTION INTRAMUSCULAR; INTRAVENOUS AS NEEDED
Status: DISCONTINUED | OUTPATIENT
Start: 2021-12-01 | End: 2021-12-01 | Stop reason: HOSPADM

## 2021-12-01 RX ORDER — ONDANSETRON 2 MG/ML
4 INJECTION INTRAMUSCULAR; INTRAVENOUS AS NEEDED
Status: DISCONTINUED | OUTPATIENT
Start: 2021-12-01 | End: 2021-12-01 | Stop reason: HOSPADM

## 2021-12-01 RX ORDER — DEXTROSE MONOHYDRATE AND SODIUM CHLORIDE 5; .9 G/100ML; G/100ML
75 INJECTION, SOLUTION INTRAVENOUS CONTINUOUS
Status: DISCONTINUED | OUTPATIENT
Start: 2021-12-01 | End: 2021-12-07 | Stop reason: HOSPADM

## 2021-12-01 RX ORDER — SODIUM CHLORIDE 0.9 % (FLUSH) 0.9 %
5-40 SYRINGE (ML) INJECTION AS NEEDED
Status: DISCONTINUED | OUTPATIENT
Start: 2021-12-01 | End: 2021-12-07 | Stop reason: HOSPADM

## 2021-12-01 RX ORDER — CLINDAMYCIN PHOSPHATE 900 MG/50ML
900 INJECTION INTRAVENOUS ONCE
Status: COMPLETED | OUTPATIENT
Start: 2021-12-01 | End: 2021-12-01

## 2021-12-01 RX ORDER — MIDAZOLAM HYDROCHLORIDE 1 MG/ML
INJECTION, SOLUTION INTRAMUSCULAR; INTRAVENOUS AS NEEDED
Status: DISCONTINUED | OUTPATIENT
Start: 2021-12-01 | End: 2021-12-01 | Stop reason: HOSPADM

## 2021-12-01 RX ORDER — SODIUM CHLORIDE, SODIUM LACTATE, POTASSIUM CHLORIDE, CALCIUM CHLORIDE 600; 310; 30; 20 MG/100ML; MG/100ML; MG/100ML; MG/100ML
INJECTION, SOLUTION INTRAVENOUS
Status: DISCONTINUED | OUTPATIENT
Start: 2021-12-01 | End: 2021-12-01 | Stop reason: HOSPADM

## 2021-12-01 RX ORDER — MORPHINE SULFATE 4 MG/ML
4 INJECTION INTRAVENOUS
Status: DISCONTINUED | OUTPATIENT
Start: 2021-12-01 | End: 2021-12-07 | Stop reason: HOSPADM

## 2021-12-01 RX ORDER — FENTANYL CITRATE 50 UG/ML
INJECTION, SOLUTION INTRAMUSCULAR; INTRAVENOUS AS NEEDED
Status: DISCONTINUED | OUTPATIENT
Start: 2021-12-01 | End: 2021-12-01 | Stop reason: HOSPADM

## 2021-12-01 RX ORDER — ALBUTEROL SULFATE 0.83 MG/ML
2.5 SOLUTION RESPIRATORY (INHALATION) AS NEEDED
Status: DISCONTINUED | OUTPATIENT
Start: 2021-12-01 | End: 2021-12-01 | Stop reason: HOSPADM

## 2021-12-01 RX ORDER — SODIUM CHLORIDE 0.9 % (FLUSH) 0.9 %
5-40 SYRINGE (ML) INJECTION EVERY 8 HOURS
Status: DISCONTINUED | OUTPATIENT
Start: 2021-12-01 | End: 2021-12-07 | Stop reason: HOSPADM

## 2021-12-01 RX ORDER — ONDANSETRON 2 MG/ML
INJECTION INTRAMUSCULAR; INTRAVENOUS AS NEEDED
Status: DISCONTINUED | OUTPATIENT
Start: 2021-12-01 | End: 2021-12-01 | Stop reason: HOSPADM

## 2021-12-01 RX ORDER — SODIUM CHLORIDE, SODIUM LACTATE, POTASSIUM CHLORIDE, CALCIUM CHLORIDE 600; 310; 30; 20 MG/100ML; MG/100ML; MG/100ML; MG/100ML
20 INJECTION, SOLUTION INTRAVENOUS CONTINUOUS
Status: DISCONTINUED | OUTPATIENT
Start: 2021-12-01 | End: 2021-12-02

## 2021-12-01 RX ORDER — DEXAMETHASONE SODIUM PHOSPHATE 4 MG/ML
INJECTION, SOLUTION INTRA-ARTICULAR; INTRALESIONAL; INTRAMUSCULAR; INTRAVENOUS; SOFT TISSUE AS NEEDED
Status: DISCONTINUED | OUTPATIENT
Start: 2021-12-01 | End: 2021-12-01 | Stop reason: HOSPADM

## 2021-12-01 RX ORDER — ROCURONIUM BROMIDE 10 MG/ML
INJECTION, SOLUTION INTRAVENOUS AS NEEDED
Status: DISCONTINUED | OUTPATIENT
Start: 2021-12-01 | End: 2021-12-01 | Stop reason: HOSPADM

## 2021-12-01 RX ORDER — DIPHENHYDRAMINE HYDROCHLORIDE 50 MG/ML
12.5 INJECTION, SOLUTION INTRAMUSCULAR; INTRAVENOUS AS NEEDED
Status: DISCONTINUED | OUTPATIENT
Start: 2021-12-01 | End: 2021-12-01 | Stop reason: HOSPADM

## 2021-12-01 RX ORDER — LIDOCAINE HYDROCHLORIDE 10 MG/ML
INJECTION, SOLUTION EPIDURAL; INFILTRATION; INTRACAUDAL; PERINEURAL
Status: COMPLETED
Start: 2021-12-01 | End: 2021-12-01

## 2021-12-01 RX ORDER — HYDROMORPHONE HYDROCHLORIDE 1 MG/ML
0.5 INJECTION, SOLUTION INTRAMUSCULAR; INTRAVENOUS; SUBCUTANEOUS
Status: DISCONTINUED | OUTPATIENT
Start: 2021-12-01 | End: 2021-12-01 | Stop reason: HOSPADM

## 2021-12-01 RX ORDER — SODIUM CHLORIDE 9 MG/ML
75 INJECTION, SOLUTION INTRAVENOUS CONTINUOUS
Status: DISCONTINUED | OUTPATIENT
Start: 2021-12-01 | End: 2021-12-01 | Stop reason: HOSPADM

## 2021-12-01 RX ORDER — SODIUM CHLORIDE 0.9 % (FLUSH) 0.9 %
5-40 SYRINGE (ML) INJECTION EVERY 8 HOURS
Status: DISCONTINUED | OUTPATIENT
Start: 2021-12-01 | End: 2021-12-01 | Stop reason: HOSPADM

## 2021-12-01 RX ORDER — HYDROCODONE BITARTRATE AND ACETAMINOPHEN 5; 325 MG/1; MG/1
2 TABLET ORAL AS NEEDED
Status: DISCONTINUED | OUTPATIENT
Start: 2021-12-01 | End: 2021-12-01 | Stop reason: HOSPADM

## 2021-12-01 RX ORDER — LIDOCAINE HYDROCHLORIDE 10 MG/ML
INJECTION INFILTRATION; PERINEURAL AS NEEDED
Status: DISCONTINUED | OUTPATIENT
Start: 2021-12-01 | End: 2021-12-01 | Stop reason: HOSPADM

## 2021-12-01 RX ORDER — LIDOCAINE HYDROCHLORIDE 20 MG/ML
INJECTION, SOLUTION EPIDURAL; INFILTRATION; INTRACAUDAL; PERINEURAL AS NEEDED
Status: DISCONTINUED | OUTPATIENT
Start: 2021-12-01 | End: 2021-12-01 | Stop reason: HOSPADM

## 2021-12-01 RX ORDER — HYDROCODONE BITARTRATE AND ACETAMINOPHEN 5; 325 MG/1; MG/1
1 TABLET ORAL
Status: DISCONTINUED | OUTPATIENT
Start: 2021-12-01 | End: 2021-12-07 | Stop reason: HOSPADM

## 2021-12-01 RX ADMIN — Medication 10 ML: at 23:38

## 2021-12-01 RX ADMIN — ROCURONIUM BROMIDE 50 MG: 50 INJECTION, SOLUTION INTRAVENOUS at 11:04

## 2021-12-01 RX ADMIN — LIDOCAINE HYDROCHLORIDE: 10 INJECTION, SOLUTION EPIDURAL; INFILTRATION; INTRACAUDAL; PERINEURAL at 11:00

## 2021-12-01 RX ADMIN — PHENYLEPHRINE HYDROCHLORIDE 2500 MCG: 10 INJECTION INTRAVENOUS at 11:32

## 2021-12-01 RX ADMIN — CLINDAMYCIN PHOSPHATE 900 MG: 900 INJECTION, SOLUTION INTRAVENOUS at 11:24

## 2021-12-01 RX ADMIN — PHENYLEPHRINE HYDROCHLORIDE 40 MCG/MIN: 10 INJECTION INTRAVENOUS at 11:22

## 2021-12-01 RX ADMIN — DEXAMETHASONE SODIUM PHOSPHATE 4 MG: 4 INJECTION, SOLUTION INTRA-ARTICULAR; INTRALESIONAL; INTRAMUSCULAR; INTRAVENOUS; SOFT TISSUE at 11:04

## 2021-12-01 RX ADMIN — PROPOFOL 150 MG: 10 INJECTION, EMULSION INTRAVENOUS at 11:03

## 2021-12-01 RX ADMIN — SODIUM CHLORIDE, POTASSIUM CHLORIDE, SODIUM LACTATE AND CALCIUM CHLORIDE 20 ML/HR: 600; 310; 30; 20 INJECTION, SOLUTION INTRAVENOUS at 09:16

## 2021-12-01 RX ADMIN — REMIFENTANIL HYDROCHLORIDE 0.05 MCG/KG/MIN: 1 INJECTION, POWDER, LYOPHILIZED, FOR SOLUTION INTRAVENOUS at 11:33

## 2021-12-01 RX ADMIN — ONDANSETRON 4 MG: 2 INJECTION INTRAMUSCULAR; INTRAVENOUS at 11:04

## 2021-12-01 RX ADMIN — PHENYLEPHRINE HYDROCHLORIDE 1600 MCG: 10 INJECTION INTRAVENOUS at 11:33

## 2021-12-01 RX ADMIN — HYDROCODONE BITARTRATE AND ACETAMINOPHEN 1 TABLET: 5; 325 TABLET ORAL at 20:31

## 2021-12-01 RX ADMIN — GLYCOPYRROLATE 0.2 MG: 0.2 INJECTION, SOLUTION INTRAMUSCULAR; INTRAVENOUS at 11:24

## 2021-12-01 RX ADMIN — LIDOCAINE HYDROCHLORIDE 100 MG: 20 INJECTION, SOLUTION EPIDURAL; INFILTRATION; INTRACAUDAL; PERINEURAL at 11:03

## 2021-12-01 RX ADMIN — FENTANYL CITRATE 50 MCG: 0.05 INJECTION, SOLUTION INTRAMUSCULAR; INTRAVENOUS at 15:46

## 2021-12-01 RX ADMIN — MIDAZOLAM HYDROCHLORIDE 2 MG: 2 INJECTION, SOLUTION INTRAMUSCULAR; INTRAVENOUS at 10:50

## 2021-12-01 RX ADMIN — FENTANYL CITRATE 100 MCG: 50 INJECTION, SOLUTION INTRAMUSCULAR; INTRAVENOUS at 13:48

## 2021-12-01 RX ADMIN — PHENYLEPHRINE HYDROCHLORIDE 1600 MCG: 10 INJECTION INTRAVENOUS at 11:18

## 2021-12-01 RX ADMIN — DEXTROSE AND SODIUM CHLORIDE 75 ML/HR: 5; 900 INJECTION, SOLUTION INTRAVENOUS at 14:36

## 2021-12-01 RX ADMIN — HEPARIN SODIUM 6000 UNITS: 1000 INJECTION, SOLUTION INTRAVENOUS; SUBCUTANEOUS at 12:33

## 2021-12-01 RX ADMIN — FENTANYL CITRATE 100 MCG: 50 INJECTION, SOLUTION INTRAMUSCULAR; INTRAVENOUS at 10:50

## 2021-12-01 RX ADMIN — SODIUM CHLORIDE, POTASSIUM CHLORIDE, SODIUM LACTATE AND CALCIUM CHLORIDE: 600; 310; 30; 20 INJECTION, SOLUTION INTRAVENOUS at 10:49

## 2021-12-01 RX ADMIN — PHENYLEPHRINE HYDROCHLORIDE 50 MCG: 10 INJECTION INTRAVENOUS at 12:26

## 2021-12-01 NOTE — ROUTINE PROCESS
Pt to Surgical overflow from PACU (pt in holding area of unit) waiting for ICU bed  [facility] bedside to [facility]

## 2021-12-01 NOTE — ANESTHESIA PREPROCEDURE EVALUATION
Relevant Problems   NEUROLOGY   (+) CVA (cerebral vascular accident) ([facility] [address]   (+) Stroke [facility])       Anesthetic History   No history of anesthetic complications            Review of Systems / Medical History  Patient summary reviewed, nursing notes reviewed and pertinent labs reviewed    Pulmonary          Smoker (half pk daily. )         Neuro/Psych       CVA (LEFT SIDED WEAKNESS. )  TIA     Cardiovascular    Hypertension          PAD and hyperlipidemia    Exercise tolerance: >4 METS  Comments: ECHO (10-19-21)  Interpretation Summary  · LV: Calculated LVEF is 73%. Normal cavity size and diastolic function. Mild concentric hypertrophy. Hyperdynamic systolic function. · MV: Mitral annular calcification. Mild mitral valve regurgitation is present. · Saline contrast was given to evaluate for intracardiac shunt. · IAS: No atrial septal defect present. Agitated saline contrast study was performed. There was no shunting at baseline. GI/Hepatic/Renal     GERD           Endo/Other    Diabetes: type 2    Arthritis     Other Findings   Comments:     Hx OF BLEEDING AFTER FEM-POP SURGERY. Hx of prolong menstrual periods. Easy bruisability. Physical Exam    Airway  Mallampati: II  TM Distance: > 6 cm  Neck ROM: normal range of motion   Mouth opening: Normal     Cardiovascular    Rhythm: regular  Rate: normal         Dental    Dentition: Edentulous     Pulmonary  Breath sounds clear to auscultation               Abdominal  GI exam deferred       Other Findings   Comments: Results for Laura Ward (MRN [id number]) as of 12/1/2021 10:09    11/26/2021 08:50  WBC: 8.1  NRBC: 0.0  RBC: 4.19  HGB: 11.8  HCT: 38.4  MCV: 91.6  MCH: 28.2  MCHC: 30.7  RDW: 12.4  PLATELET: 686  MPV: 20.7  NEUTROPHILS: 58  LYMPHOCYTES: 30  MONOCYTES: 8  EOSINOPHILS: 3  BASOPHILS: 1  IMMATURE GRANULOCYTES: 0  DF: AUTOMATED  ABSOLUTE NRBC: 0.00  ABS. NEUTROPHILS: 4.8  ABS. IMM. GRANS.: 0.0  ABS. LYMPHOCYTES: 2.4  ABS.  MONOCYTES: 0. 7  ABS. EOSINOPHILS: 0.2  ABS.  BASOPHILS: 0.0    Results for Geovanni Basilio (MRN [id number]) as of 12/1/2021 10:09    11/26/2021 08:50  INR: 0.9  Prothrombin time: 11.8 (L)           Anesthetic Plan    ASA: 3  Anesthesia type: general    Monitoring Plan: Arterial line        Anesthetic plan and risks discussed with: Patient

## 2021-12-01 NOTE — ANESTHESIA POSTPROCEDURE EVALUATION
Procedure(s):  RIGHT SIDE CAROTID ARTERY ENDARTERECTOMY.     general    Anesthesia Post Evaluation      Multimodal analgesia: multimodal analgesia not used between 6 hours prior to anesthesia start to PACU discharge  Patient location during evaluation: PACU  Patient participation: complete - patient participated  Level of consciousness: awake and alert  Pain score: 0  Pain management: adequate  Airway patency: patent  Anesthetic complications: no  Cardiovascular status: acceptable, blood pressure returned to baseline and hemodynamically stable  Respiratory status: acceptable, nonlabored ventilation, spontaneous ventilation, unassisted and nasal cannula  Hydration status: acceptable  Post anesthesia nausea and vomiting:  none  Final Post Anesthesia Temperature Assessment:  Normothermia (36.0-37.5 degrees C)      INITIAL Post-op Vital signs:   Vitals Value Taken Time   /68 12/01/21 1630   Temp     Pulse 82 12/01/21 1630   Resp 14 12/01/21 1630   SpO2 100 % 12/01/21 1630

## 2021-12-01 NOTE — ANESTHESIA PROCEDURE NOTES
Arterial Line Placement    Start time: 12/1/2021 11:25 AM  End time: 12/1/2021 11:32 AM  Performed by: Lev Calixto MD  Authorized by:  Lev Calixto MD     Pre-Procedure  Indications:  Arterial pressure monitoring and blood sampling  Preanesthetic Checklist: patient identified, risks and benefits discussed, anesthesia consent, site marked, patient being monitored, timeout performed and patient being monitored      Procedure:   Prep:  ChloraPrep  Seldinger Technique?: Yes    Orientation:  Left  Location:  Radial artery  Catheter size:  20 G  Number of attempts:  2  Cont Cardiac Output Sensor: No      Assessment:   Post-procedure:  Line secured and sterile dressing applied  Patient Tolerance:  Patient tolerated the procedure well with no immediate complications

## 2021-12-01 NOTE — H&P
VASCULAR FOLLOW UP        Subjective:   CHIEF COMPLAINTS:  Stroke  PRESENTATION OF ILLNESS:  Renzo Favorite is a 62 y.o. very pleasant woman is scheduled for carotid endarterectomy but was canceled due to anesthesia recommend a hematology consult for possible blood dyscrasia. Patient currently waiting for blood test results and to see oncologist.  And also family is concerned about the post procedure rehab placement.          Past Medical History:   Diagnosis Date    Arthritis      Coagulation disorder ([address]       possible , pt had surgery on leg and bled out    Diabetes ([address] 2019    Hypertension      Left-sided weakness      Nodule of kidney       left kidney    Peripheral vascular disease ([address]      [facility]roke [facility] [address]       recent stroke on 10/16/2021            Past Surgical History:   Procedure Laterality Date    HX CATARACT REMOVAL   2019    VASCULAR SURGERY PROCEDURE UNLIST                Family History   Problem Relation Age of Onset    Cancer Mother           BREAST    Lung Disease Mother      COPD Mother      Heart Disease Mother      Hypertension Mother      Diabetes Mother      Arthritis-osteo Mother      Parkinson's Disease Father      Lung Disease Father      Hypertension Father      Heart Disease Father      Diabetes Father      COPD Other      Parkinson's Disease Other      Mult Sclerosis Other      Cancer Brother           PROSTATE    Heart Disease Brother      No Known Problems Son      No Known Problems Son      Mult Sclerosis Son      Anesth Problems Neg Hx        Social History            Tobacco Use    Smoking status: Former Smoker       Packs/day: 1.00       Years: 40.00       Pack years: 40.00       Quit date: 11/3/2021    Smokeless tobacco: Never Used   Substance Use Topics    Alcohol use: Never               Prior to Admission medications    Medication Sig Start Date End Date Taking?  Authorizing Provider   diphenhydrAMINE (BenadryL) 25 mg capsule Take 25 mg by mouth every six (6) hours as needed for Itching.     Yes Provider, Historical   atorvastatin (Lipitor) 80 mg tablet Take 1 Tablet by mouth daily. 10/20/21   Yes Benedict Lujan MD   glipiZIDE SR (GLUCOTROL XL) 10 mg CR tablet Take 10 mg by mouth daily. 8/13/21   Yes Provider, Historical   pantoprazole (PROTONIX) 40 mg tablet Take 40 mg by mouth daily.     Yes Provider, Historical   acetaminophen (TYLENOL) 325 mg tablet Take  by mouth every four (4) hours as needed for Pain.     Yes Provider, Historical   lisinopril (PRINIVIL, ZESTRIL) 20 mg tablet Take  by mouth daily.     Yes Other, MD Rene   aspirin delayed-release 325 mg tablet Take 1 Tablet by mouth daily. Patient not taking: Reported on 11/4/2021 10/21/21     Benedict Lujan MD   mv-mn/folic acid/vit U/TRHE993 (ALIVE ONCE DAILY WOMEN 50 PLUS PO) Take 1 Tablet by mouth daily (with breakfast). Patient not taking: Reported on 10/27/2021       Provider, Historical            Allergies   Allergen Reactions    Adhesive Rash    Pcn [Penicillins] Nausea and Vomiting    Codeine Other (comments)       Makes me \"loopy\"         Review of Systems:  I reviewed the rest of organ systems personally and they were negative signed by Dr. Babin Sites     Objective:      Visit Vitals  /89 (BP 1 Location: Right arm, BP Patient Position: Sitting, BP Cuff Size: Adult)   Pulse 92   Temp 97.8 °F (36.6 °C) (Temporal)   Ht 5' 1\" (1.549 m)   Wt 133 lb 6.4 oz (60.5 kg)   SpO2 98%   BMI 25.21 kg/m²      VITAL SIGNS REVIEWED.     Physical Exam:  Patient is well-nourished pleasant in conversation is appropriate. Head and neck examination atraumatic, normocephalic. Gaze appropriate. Conversation appropriate. Neck examination shows supple. No mass. No obvious carotid bruit. Chest examination shows lungs are clear bilaterally well-expanded, no crackles or wheezes. Cardiovascular system regular rate, no obvious murmur. Skin warm to touch  and moist, no skin lesions.   Abdomen is soft ,not tender or distended bowel sounds present. No palpable mass. Neurological examinations, no focal neuro deficits moving all 4 extremities. Cranial nerves intact. Sensation is intact as well. Hematologic: No obvious bruise or swelling or obvious lymphadenopathy. Psychosocial: Appropriate. Has good effect. Musculoskeletal system: No muscle wasting, appropriate movements upper and lower extremity.             Data Review:            Admission on 10/27/2021, Discharged on 10/28/2021   Component Date Value Ref Range Status     Glucose (POC) 10/27/2021 149* 65 - 117 mg/dL Final    Performed by 10/27/2021 [facility] F[facility]    Final    Glucose (POC) 10/27/2021 152* 65 - 117 mg/dL Final    Performed by 10/27/2021 Abby Proper    Final    Fibrinogen 10/28/2021 374  220 - 535 mg/dL Final    Prothrombin time 10/28/2021 12.4  11.9 - 14.7 sec Final    INR 10/28/2021 0.9  0.9 - 1.1   Final    aPTT 10/28/2021 32.7  21.2 - 34.1 sec Final    aPTT, therapeutic range 10/28/2021    82 - 109 sec Final   Hospital Outpatient Visit on 10/25/2021   Component Date Value Ref Range Status     Sodium 10/25/2021 136  136 - 145 mmol/L Final    Potassium 10/25/2021 4.7  3.5 - 5.1 mmol/L Final    Chloride 10/25/2021 108  97 - 108 mmol/L Final    CO2 10/25/2021 21  21 - 32 mmol/L Final    Anion gap 10/25/2021 7  5 - 15 mmol/L Final    Glucose 10/25/2021 146* 65 - 100 mg/dL Final    BUN 10/25/2021 12  6 - 20 mg/dL Final    Creatinine 10/25/2021 0.75  0.55 - 1.02 mg/dL Final    BUN/Creatinine ratio 10/25/2021 16  12 - 20   Final    GFR est AA 10/25/2021 >60  >60 ml/min/1.73m2 Final    GFR est non-AA 10/25/2021 >60  >60 ml/min/1.73m2 Final    Calcium 10/25/2021 9.2  8.5 - 10.1 mg/dL Final    Bilirubin, total 10/25/2021 0.3  0.2 - 1.0 mg/dL Final    AST (SGOT) 10/25/2021 21  15 - 37 U/L Final    ALT (SGPT) 10/25/2021 30  12 - 78 U/L Final    Alk.  phosphatase 10/25/2021 68  45 - 117 U/L Final    Protein, total 10/25/2021 7.2  6.4 - 8.2 g/dL Final    Albumin 10/25/2021 3.7  3.5 - 5.0 g/dL Final    Globulin 10/25/2021 3.5  2.0 - 4.0 g/dL Final    A-G Ratio 10/25/2021 1.1  1.1 - 2.2   Final    Prothrombin time 10/25/2021 12.8  11.9 - 14.7 sec Final    INR 10/25/2021 1.0  0.9 - 1.1   Final    aPTT 10/25/2021 32.7  21.2 - 34.1 sec Final    aPTT, therapeutic range 10/25/2021    82 - 109 sec Final    SARS-CoV-2 10/25/2021 Please find results under separate order    Final    Crossmatch Expiration 10/25/2021 10/30/2021,2359    Final    ABO/Rh(D) 10/25/2021 A Positive    Final    Antibody screen 10/25/2021 Positive    Final    Antibody ID 10/25/2021 Anti-E    Final    XXAUTO CONTROL 10/25/2021 Negative    Final    Ventricular Rate 10/25/2021 68  BPM Final    Atrial Rate 10/25/2021 68  BPM Final    P-R Interval 10/25/2021 182  ms Final    QRS Duration 10/25/2021 66  ms Final    Q-T Interval 10/25/2021 396  ms Final    QTC Calculation (Bezet) 10/25/2021 421  ms Final    Calculated P Axis 10/25/2021 68  degrees Final    Calculated R Axis 10/25/2021 58  degrees Final    Calculated T Axis 10/25/2021 76  degrees Final    Diagnosis 10/25/2021     Final                    Value:Normal sinus rhythm  Normal ECG  When compared with ECG of 17-JAN-2020 15:23,  Vent.  rate has decreased BY  51 BPM  Confirmed by Zaheer Dowell MD, [id number] ([id number]) on 10/25/2021 9:28:34 PM       MRSA by PCR, Nasal 10/25/2021 Not Detected  Not Detected   Final    Specimen source 10/25/2021 Nasopharyngeal    Final    SARS-CoV-2 10/25/2021 Not detected  Not detected Final   Admission on 10/18/2021, Discharged on 10/21/2021   Component Date Value Ref Range Status     WBC 10/18/2021 8.2  3.6 - 11.0 K/uL Final    RBC 10/18/2021 4.46  3.80 - 5.20 M/uL Final    HGB 10/18/2021 13.0  11.5 - 16.0 g/dL Final    HCT 10/18/2021 40.6  35.0 - 47.0 % Final    MCV 10/18/2021 91.0  80.0 - 99.0 FL Final    MCH 10/18/2021 29.1  26.0 - 34.0 PG Final    MCHC 10/18/2021 32.0  30.0 - 36.5 g/dL Final    RDW 10/18/2021 12.5  11.5 - 14.5 % Final    PLATELET 28/28/2727 834  150 - 400 K/uL Final    MPV 10/18/2021 11.7  8.9 - 12.9 FL Final    NRBC 10/18/2021 0.0  0.0  WBC Final    ABSOLUTE NRBC 10/18/2021 0.00  0.00 - 0.01 K/uL Final    NEUTROPHILS 10/18/2021 54  32 - 75 % Final    LYMPHOCYTES 10/18/2021 33  12 - 49 % Final    MONOCYTES 10/18/2021 10  5 - 13 % Final    EOSINOPHILS 10/18/2021 2  0 - 7 % Final    BASOPHILS 10/18/2021 1  0 - 1 % Final    IMMATURE GRANULOCYTES 10/18/2021 0  0 - 0.5 % Final    ABS. NEUTROPHILS 10/18/2021 4.5  1.8 - 8.0 K/UL Final    ABS. LYMPHOCYTES 10/18/2021 2.7  0.8 - 3.5 K/UL Final    ABS. MONOCYTES 10/18/2021 0.8  0.0 - 1.0 K/UL Final    ABS. EOSINOPHILS 10/18/2021 0.2  0.0 - 0.4 K/UL Final    ABS. BASOPHILS 10/18/2021 0.1  0.0 - 0.1 K/UL Final    ABS. IMM. GRANS. 10/18/2021 0.0  0.00 - 0.04 K/UL Final    DF 10/18/2021 AUTOMATED    Final    Sodium 10/18/2021 138  136 - 145 mmol/L Final    Potassium 10/18/2021 4.5  3.5 - 5.1 mmol/L Final    Chloride 10/18/2021 108  97 - 108 mmol/L Final    CO2 10/18/2021 22  21 - 32 mmol/L Final    Anion gap 10/18/2021 8  5 - 15 mmol/L Final    Glucose 10/18/2021 149* 65 - 100 mg/dL Final    BUN 10/18/2021 16  6 - 20 mg/dL Final    Creatinine 10/18/2021 0.74  0.55 - 1.02 mg/dL Final    BUN/Creatinine ratio 10/18/2021 22* 12 - 20   Final    GFR est AA 10/18/2021 >60  >60 ml/min/1.73m2 Final    GFR est non-AA 10/18/2021 >60  >60 ml/min/1.73m2 Final    Calcium 10/18/2021 9.5  8.5 - 10.1 mg/dL Final    Bilirubin, total 10/18/2021 0.2  0.2 - 1.0 mg/dL Final    AST (SGOT) 10/18/2021 14* 15 - 37 U/L Final    ALT (SGPT) 10/18/2021 18  12 - 78 U/L Final    Alk.  phosphatase 10/18/2021 72  45 - 117 U/L Final    Protein, total 10/18/2021 7.4  6.4 - 8.2 g/dL Final    Albumin 10/18/2021 3.7  3.5 - 5.0 g/dL Final    Globulin 10/18/2021 3.7  2.0 - 4.0 g/dL Final    A-G Ratio 10/18/2021 1.0* 1.1 - 2.2   Final    Prothrombin time 10/18/2021 11.6* 11.9 - 14.7 sec Final    INR 10/18/2021 0.9  0.9 - 1.1   Final    aPTT 10/18/2021 31.9  21.2 - 34.1 sec Final    aPTT, therapeutic range 10/18/2021    82 - 109 sec Final    Troponin-High Sensitivity 10/18/2021 9  0 - 51 ng/L Final    Hemoglobin A1c 10/18/2021 6.6* 4.0 - 5.6 % Final    Est. average glucose 10/18/2021 143  mg/dL Final    Aortic Valve Systolic Peak Velocity 47/79/2179 143.00  cm/s Final    AoV PG 10/19/2021 8.00  mmHg Final    Ao Root D 10/19/2021 2.50  cm Final    IVSd 10/19/2021 1.17* 0.60 - 0.90 cm Final    LVIDd 10/19/2021 2.93* 3.90 - 5.30 cm Final    LVIDs 10/19/2021 1.74  cm Final    LVOT Peak Velocity 10/19/2021 98.70  cm/s Final    LVOT Peak Gradient 10/19/2021 4.00  mmHg Final    LVPWd 10/19/2021 1.05* 0.60 - 0.90 cm Final    LV E' Septal Velocity 10/19/2021 8.19  cm/s Final    LV ED Vol A2C 10/19/2021 25.20  cm3 Final    LV ES Vol A2C 10/19/2021 5.27  cm3 Final    E/E' septal 10/19/2021 9.72    Final    Left Atrium Major Axis 10/19/2021 2.70  cm Final    Mitral Valve Deceleration [place] 10/19/2021 2,860.00  mm/s2 Final    Mitral Valve Deceleration [place] 10/19/2021 2,860.00  mm/s2 Final    Mitral Valve E Wave Deceleration T* 10/19/2021 250.00  ms Final    Mitral Valve Pressure Half-time 10/19/2021 85.00  ms Final    MV A [place]roy 10/19/2021 112.00  cm/s Final    MV E [place] 10/19/2021 79.60  cm/s Final    MV Mean Gradient 10/19/2021 2.00  mmHg Final    Mitral Valve Annulus Velocity Time* 10/19/2021 23.80  cm Final    Mitral Valve Max Velocity 10/19/2021 109.00  cm/s Final    MV Peak Gradient 10/19/2021 5.00  mmHg Final    MVA (PHT) 10/19/2021 2.59  cm2 Final    MV E/A 10/19/2021 0.71    Final    Pulmonic Valve Max Velocity 10/19/2021 104.00  cm/s Final    Pulmonic Valve Systolic Peak Insta* 22/67/6035 4.00  mmHg Final    Est. RA Pressure 10/19/2021 3.00  mmHg Final    RVIDd 10/19/2021 2.69  cm Final    RVSP 10/19/2021 21.00  mmHg Final    Tricuspid Valve Max Velocity 10/19/2021 210.00  cm/s Final    Triscuspid Valve Regurgitation Pea* 10/19/2021 18.00  mmHg Final    Tapse 10/19/2021 1.40* 1.50 - 2.00 cm Final    Right Atrial Area 4C 10/19/2021 9.85  cm2 Final    LA Area 4C 10/19/2021 13.37  cm2 Final    BP EF 10/19/2021 73.0  55 - 100 % Final    LV Mass AL 10/19/2021 93.3  67 - 162 g Final    LV Mass AL Index 10/19/2021 58.3  43 - 95 g/m2 Final    Left Atrium Minor Axis 10/19/2021 1.69  cm Final    Glucose (POC) 10/18/2021 143* 65 - 117 mg/dL Final    Performed by 10/18/2021 [facility]    Final    Glucose (POC) 10/18/2021 144* 65 - 117 mg/dL Final    Performed by 10/18/2021 MASHA AJ    Final    LIPID PROFILE 10/19/2021      Final    Cholesterol, total 10/19/2021 156  <200 mg/dL Final    Triglyceride 10/19/2021 149  <150 mg/dL Final    HDL Cholesterol 10/19/2021 54  mg/dL Final    LDL, calculated 10/19/2021 72.2  0 - 100 mg/dL Final    VLDL, calculated 10/19/2021 29.8  mg/dL Final    CHOL/HDL Ratio 10/19/2021 2.9  0.0 - 5.0   Final    Hemoglobin A1c 10/19/2021 6.8* 4.0 - 5.6 % Final    Est. average glucose 10/19/2021 148  mg/dL Final    WBC 10/19/2021 9.9  3.6 - 11.0 K/uL Final    RBC 10/19/2021 4.50  3.80 - 5.20 M/uL Final    HGB 10/19/2021 13.0  11.5 - 16.0 g/dL Final    HCT 10/19/2021 40.7  35.0 - 47.0 % Final    MCV 10/19/2021 90.4  80.0 - 99.0 FL Final    MCH 10/19/2021 28.9  26.0 - 34.0 PG Final    MCHC 10/19/2021 31.9  30.0 - 36.5 g/dL Final    RDW 10/19/2021 12.5  11.5 - 14.5 % Final    PLATELET 53/96/6367 006  150 - 400 K/uL Final    MPV 10/19/2021 11.4  8.9 - 12.9 FL Final    NRBC 10/19/2021 0.0  0.0  WBC Final    ABSOLUTE NRBC 10/19/2021 0.00  0.00 - 0.01 K/uL Final    Sodium 10/19/2021 138  136 - 145 mmol/L Final    Potassium 10/19/2021 4.8  3.5 - 5.1 mmol/L Final    Chloride 10/19/2021 108  97 - 108 mmol/L Final    CO2 10/19/2021 23  21 - 32 mmol/L Final    Anion gap 10/19/2021 7  5 - 15 mmol/L Final    Glucose 10/19/2021 150* 65 - 100 mg/dL Final    BUN 10/19/2021 15  6 - 20 mg/dL Final    Creatinine 10/19/2021 0.68  0.55 - 1.02 mg/dL Final    BUN/Creatinine ratio 10/19/2021 22* 12 - 20   Final    GFR est AA 10/19/2021 >60  >60 ml/min/1.73m2 Final    GFR est non-AA 10/19/2021 >60  >60 ml/min/1.73m2 Final    Calcium 10/19/2021 9.5  8.5 - 10.1 mg/dL Final    Magnesium 10/19/2021 2.2  1.6 - 2.4 mg/dL Final    Glucose (POC) 10/19/2021 152* 65 - 117 mg/dL Final    [facility]    Final    Glucose (POC) 10/19/2021 143* 65 - 117 mg/dL Final    [facility]    Final    Glucose (POC) 10/19/2021 146* 65 - 117 mg/dL Final    Performed by 10/19/2021 [place] [facility]    Final    Glucose (POC) 10/19/2021 152* 65 - 117 mg/dL Final    Performed by 10/19/2021 GRAYSON CONTRERAS    Final    Glucose (POC) 10/20/2021 175* 65 - 117 mg/dL Final    Performed by 10/20/2021 MARANDA CURIEL    Final    Glucose (POC) 10/20/2021 160* 65 - 117 mg/dL Final    Performed by 10/20/2021 [facility] TRICHANA    Final    Glucose (POC) 10/20/2021 162* 65 - 117 mg/dL Final    Performed by 10/20/2021 [facility] TRICHANA    Final    Glucose (POC) 10/20/2021 170* 65 - 117 mg/dL Final    Performed by 10/20/2021 ALEXUS BRADFORD    Final    WBC 10/21/2021 6.2  3.6 - 11.0 K/uL Final    RBC 10/21/2021 4.34  3.80 - 5.20 M/uL Final    HGB 10/21/2021 12.6  11.5 - 16.0 g/dL Final    HCT 10/21/2021 39.2  35.0 - 47.0 % Final    MCV 10/21/2021 90.3  80.0 - 99.0 FL Final    MCH 10/21/2021 29.0  26.0 - 34.0 PG Final    MCHC 10/21/2021 32.1  30.0 - 36.5 g/dL Final    RDW 10/21/2021 12.0  11.5 - 14.5 % Final    PLATELET 38/59/4340 074  150 - 400 K/uL Final    MPV 10/21/2021 11.9  8.9 - 12.9 FL Final    NRBC 10/21/2021 0.0  0.0  WBC Final    ABSOLUTE NRBC 10/21/2021 0.00  0.00 - 0.01 K/uL Final    NEUTROPHILS 10/21/2021 44  32 - 75 % Final    LYMPHOCYTES 10/21/2021 40  12 - 49 % Final    MONOCYTES 10/21/2021 13  5 - 13 % Final    EOSINOPHILS 10/21/2021 2  0 - 7 % Final    BASOPHILS 10/21/2021 1  0 - 1 % Final    IMMATURE GRANULOCYTES 10/21/2021 0  0 - 0.5 % Final    ABS. NEUTROPHILS 10/21/2021 2.7  1.8 - 8.0 K/UL Final    ABS. LYMPHOCYTES 10/21/2021 2.5  0.8 - 3.5 K/UL Final    ABS. MONOCYTES 10/21/2021 0.8  0.0 - 1.0 K/UL Final    ABS. EOSINOPHILS 10/21/2021 0.2  0.0 - 0.4 K/UL Final    ABS. BASOPHILS 10/21/2021 0.0  0.0 - 0.1 K/UL Final    ABS. IMM. GRANS. 10/21/2021 0.0  0.00 - 0.04 K/UL Final    DF 10/21/2021 AUTOMATED    Final    Glucose (POC) 10/21/2021 147* 65 - 117 mg/dL Final    Performed by 10/21/2021 Geralynn Splinter    Final    Glucose (POC) 10/21/2021 135* 65 - 117 mg/dL Final    Performed by 10/21/2021 Akbar Perez    Final         Assessment:           Problem List Items Addressed This Visit                 Circulatory      CVA (cerebral vascular accident) ([facility] [address]            Other      History of bleeding disorder - Primary      Relevant Orders      REFERRAL TO HEMATOLOGY ONCOLOGY                 Plan:      We talked to Dr. Liz Grijalva and made arrangement video conference next Friday after finalized blood test results come back. And meanwhile patient is scheduled for carotid endarterectomy on November 17, 2021.     This is an addendum: Ms. Herberth Bains has suffered from relatively minor stroke back in October 2021 with multiple focal ischemic changes right frontal and occipital parietal lobes. Patient has deficits of dysarthria which has resolved currently. Patient had work-up done which shows high-grade stenosis with soft and hard calcified plaque right proximal ICA area. Patient was recommended that time inpatient carotid endarterectomy however surgery was canceled due to suspected blood dyscrasia and bleeding disorder. Patient was started on aspirin therapy.   And she did well without any GI bleeding. Patient has also complete work-up done for hematology including coagulation panel studies, factor VIII, fibrinogen, and other blood dyscrasia panel. All these results and are to be normal.  Dr. Brenda Polk has personally called me and work-up is complete and patient is cleared to have surgery. Patient currently scheduled for right carotid endarterectomy on December 1, 2021. Last visit in the office patient was discussed rational for the surgery risks benefits and complications. We also talked about perioperative MI, stroke rates as well. Patient will be visiting postop rehab to continue recovery from stroke residual deficits involving upper extremity after she has recovered from the surgery as inpatient.

## 2021-12-02 LAB
BACTERIA SPEC CULT: NORMAL
GLUCOSE BLD STRIP.AUTO-MCNC: 107 MG/DL (ref 65–117)
GLUCOSE BLD STRIP.AUTO-MCNC: 131 MG/DL (ref 65–117)
GLUCOSE BLD STRIP.AUTO-MCNC: 182 MG/DL (ref 65–117)
PERFORMED BY, TECHID: ABNORMAL
PERFORMED BY, TECHID: ABNORMAL
PERFORMED BY, TECHID: NORMAL
SPECIAL REQUESTS,SREQ: NORMAL

## 2021-12-02 PROCEDURE — 74011250637 HC RX REV CODE- 250/637: Performed by: SURGERY

## 2021-12-02 PROCEDURE — 74011000258 HC RX REV CODE- 258: Performed by: SURGERY

## 2021-12-02 PROCEDURE — 65270000032 HC RM SEMIPRIVATE

## 2021-12-02 PROCEDURE — 99024 POSTOP FOLLOW-UP VISIT: CPT | Performed by: SURGERY

## 2021-12-02 PROCEDURE — 82962 GLUCOSE BLOOD TEST: CPT

## 2021-12-02 PROCEDURE — 74011250636 HC RX REV CODE- 250/636: Performed by: SURGERY

## 2021-12-02 RX ORDER — HYDROCODONE BITARTRATE AND ACETAMINOPHEN 5; 325 MG/1; MG/1
1 TABLET ORAL
Qty: 28 TABLET | Refills: 0 | Status: SHIPPED | OUTPATIENT
Start: 2021-12-02 | End: 2021-12-09

## 2021-12-02 RX ORDER — INSULIN LISPRO 100 [IU]/ML
INJECTION, SOLUTION INTRAVENOUS; SUBCUTANEOUS EVERY 6 HOURS
Status: DISCONTINUED | OUTPATIENT
Start: 2021-12-02 | End: 2021-12-05

## 2021-12-02 RX ORDER — ASPIRIN 325 MG
325 TABLET, DELAYED RELEASE (ENTERIC COATED) ORAL DAILY
Status: DISCONTINUED | OUTPATIENT
Start: 2021-12-02 | End: 2021-12-07 | Stop reason: HOSPADM

## 2021-12-02 RX ORDER — DEXTROSE 50 % IN WATER (D50W) INTRAVENOUS SYRINGE
25-50 AS NEEDED
Status: DISCONTINUED | OUTPATIENT
Start: 2021-12-02 | End: 2021-12-07 | Stop reason: HOSPADM

## 2021-12-02 RX ORDER — MAGNESIUM SULFATE 100 %
4 CRYSTALS MISCELLANEOUS AS NEEDED
Status: DISCONTINUED | OUTPATIENT
Start: 2021-12-02 | End: 2021-12-07 | Stop reason: HOSPADM

## 2021-12-02 RX ORDER — GLIPIZIDE 5 MG/1
10 TABLET ORAL DAILY
Status: DISCONTINUED | OUTPATIENT
Start: 2021-12-02 | End: 2021-12-07 | Stop reason: HOSPADM

## 2021-12-02 RX ORDER — LISINOPRIL 20 MG/1
20 TABLET ORAL DAILY
Status: DISCONTINUED | OUTPATIENT
Start: 2021-12-02 | End: 2021-12-07 | Stop reason: HOSPADM

## 2021-12-02 RX ORDER — ATORVASTATIN CALCIUM 40 MG/1
80 TABLET, FILM COATED ORAL DAILY
Status: DISCONTINUED | OUTPATIENT
Start: 2021-12-02 | End: 2021-12-07 | Stop reason: HOSPADM

## 2021-12-02 RX ORDER — PANTOPRAZOLE SODIUM 40 MG/1
40 TABLET, DELAYED RELEASE ORAL DAILY
Status: DISCONTINUED | OUTPATIENT
Start: 2021-12-02 | End: 2021-12-07 | Stop reason: HOSPADM

## 2021-12-02 RX ADMIN — MORPHINE SULFATE 4 MG: 4 INJECTION, SOLUTION INTRAMUSCULAR; INTRAVENOUS at 23:54

## 2021-12-02 RX ADMIN — Medication 10 ML: at 22:04

## 2021-12-02 RX ADMIN — GLIPIZIDE 10 MG: 5 TABLET ORAL at 10:30

## 2021-12-02 RX ADMIN — LISINOPRIL 20 MG: 20 TABLET ORAL at 08:51

## 2021-12-02 RX ADMIN — PANTOPRAZOLE SODIUM 40 MG: 40 TABLET, DELAYED RELEASE ORAL at 08:51

## 2021-12-02 RX ADMIN — MORPHINE SULFATE 4 MG: 4 INJECTION, SOLUTION INTRAMUSCULAR; INTRAVENOUS at 14:53

## 2021-12-02 RX ADMIN — Medication 10 ML: at 15:20

## 2021-12-02 RX ADMIN — HYDROCODONE BITARTRATE AND ACETAMINOPHEN 1 TABLET: 5; 325 TABLET ORAL at 05:16

## 2021-12-02 RX ADMIN — ATORVASTATIN CALCIUM 80 MG: 40 TABLET, FILM COATED ORAL at 08:51

## 2021-12-02 RX ADMIN — HYDROCODONE BITARTRATE AND ACETAMINOPHEN 1 TABLET: 5; 325 TABLET ORAL at 15:38

## 2021-12-02 RX ADMIN — MORPHINE SULFATE 4 MG: 4 INJECTION, SOLUTION INTRAMUSCULAR; INTRAVENOUS at 19:46

## 2021-12-02 RX ADMIN — DEXTROSE AND SODIUM CHLORIDE 75 ML/HR: 5; 900 INJECTION, SOLUTION INTRAVENOUS at 15:19

## 2021-12-02 RX ADMIN — ASPIRIN 325 MG: 325 TABLET, COATED ORAL at 08:51

## 2021-12-02 NOTE — PROGRESS NOTES
Patient is a postop right carotid endarterectomy. Patient has been hemodynamic stable. Patient currently off Stanley-Synephrine drip. Patient is neurologically intact. JEAN PIERRE drainage removed. We will have PT OT therapy and the discharge plan for rehab.

## 2021-12-02 NOTE — DISCHARGE INSTRUCTIONS
Remove the dressing from the right neck incision site in 2 days. And leave open to air. May take shower.

## 2021-12-02 NOTE — DISCHARGE SUMMARY
.  This is discharge summary for Zechariah Rogers, patient's YOB: 1960. Brief Hospital course: Patient underwent elective right carotid endarterectomy for stroke and high-grade stenosis of the right proximal ICA. Postop patient did well. Patient did not have any neurological deficits or complication. Patient will need PT OT therapy for previous stroke. And most likely patient be discharge planning for rehab. Patient will follow up with Dr. Tono Avery in 2 weeks to address postop issues.

## 2021-12-02 NOTE — PROGRESS NOTES
Reason for Admission:  Postop                     RUR Score:   11%                  Plan for utilizing home health:  None        PCP: First and Last name:  Marquis Trinity MD     Name of Practice:    Are you a current patient: Yes/No:    Approximate date of last visit:    Can you participate in a virtual visit with your PCP:                     Current Advanced Directive/Advance Care Plan: Prior      Healthcare Decision Maker:   Click here to complete [employer] including selection of the Healthcare Decision Maker Relationship (ie \"Primary\")             Primary Decision Maker: Yumiko  Ping - [phone]                  Transition of Care Plan:                    CM discussed discharge planning with patient. She will go to [facility] at discharge per MD orders. Patient agreed. Choice letter reviewed, signed and on chart. Patient states she had a CVA six months ago. She was unable to have surgery at that time. She is now postop from surgery yesterday   Patient lives in a single level home with her mother. Patient is her mother's caregiver. Patient has no real support system. Her son is staying at her home until she recovers. He is unable to drive. She still complete own ADL/IADL task as before. She continues to drive although she has deficits. Son is unable to drive. Patient unsure of how she will transport home after hospitalization.

## 2021-12-02 NOTE — OP NOTES
This is operative report on Anitra Wills, patient's YOB: 1960. Date of surgery: December 1, 2021. Surgeons: Dr. Mackenzie Renteria. Preop diagnosis:  1  Completed stroke. 2.  High-grade stenosis right proximal ICA. Postop diagnosis:    As above. Procedure:    Right carotid endarterectomy with a biologic patch angioplasty. Anesthesia: General  With endotracheal tube. Anesthesiologist: Dr. Rebecca Sibley  Assistant: Mr. Schultzel Adelina  EBL:  200 cc. Urine output and fluids: Please see anesthesia record. Specimen: Plaque removed from carotid system. Complication: None. Condition: Stable. Disposition: PACU. Indication for surgery: Ms. Reina Bloch had a stroke back in October 2021. Patient was found to have high-grade stenosis right proximal ICA. Patient has completed stroke. Patient will require definitive treatment plan for high-grade stenosis right proximal ICA. Patient had work-up done including cardiac clearance and also hematology work-up for possible bleeding dyscrasia which turned out to be negative. Patient was discussed rational for the surgery risks benefits and complication. Patient has signed surgical consent. Please refer to my history and physical documentation details about rationale for the surgery. Description of procedure: Patient was brought to the operating table comfortable supine position general anesthesia was initiated. Patient had invasive lines were placed including a cerebral oxygen perfusion monitor devices. Patient is appropriate positioned. Patient's right side neck and upper chest was prepped usual sterile technique using DuraPrep's followed by sterile drapes applied usual fashion. At this time timeout was called after confirmation was made and procedure commenced. I used 1% lidocaine was utilized for local anesthetics. Followed by skin incision made anterior border of sternocleidomastoid muscle.   Followed by platysma muscle was divided using electrocautery. Dissection was continued until jugular vein was found dissected out and retracted laterally. Followed by carotid sheath was opened up. Common carotid artery and its branches were dissected the usual fashion. After the endpoint of the internal carotid was dissected out vascular clamp was applied after patient was systemically heparinized 100 units/kg. Arteriotomy was made from the common carotid artery extending to the proximal internal carotid artery. Followed by heavy chunk of the calcified plaque was removed. Followed by #8 outling Castañeda vascular shunt was placed usual fashion. During the shunting procedure there is no significant changes in cerebral oxygen perfusion level. After completion of the endarterectomy endpoint of the internal carotid artery of the plaque was a stabilized with a 7-0 Prolene sutures. Followed by biologic patch was utilized to do angioplasty. 6-0 Prolene was used to do angioplasty. Before the patch was closed shot shunt was removed usual fashion. After hemostasis obtained blood flows back to internal carotid artery usual fashion. During this procedure there is no significant changes cerebral oxygen perfusion level. After hemostasis completely obtained, and I placed a small drain, by wounds are closed in layers using 3-0 Vicryl sutures skin was closed with 4-0 Monocryl sutures. Patient tolerated procedure well without any complications. At the end of case instrument counts were correct. Patient is awakened extubated in the OR transferred to recovery area stable condition.   Neurologic exam postop shows completely intact neurologic examination

## 2021-12-03 LAB
GLUCOSE BLD STRIP.AUTO-MCNC: 121 MG/DL (ref 65–117)
GLUCOSE BLD STRIP.AUTO-MCNC: 143 MG/DL (ref 65–117)
GLUCOSE BLD STRIP.AUTO-MCNC: 145 MG/DL (ref 65–117)
GLUCOSE BLD STRIP.AUTO-MCNC: 146 MG/DL (ref 65–117)
GLUCOSE BLD STRIP.AUTO-MCNC: 180 MG/DL (ref 65–117)
PERFORMED BY, TECHID: ABNORMAL

## 2021-12-03 PROCEDURE — 97530 THERAPEUTIC ACTIVITIES: CPT

## 2021-12-03 PROCEDURE — 74011636637 HC RX REV CODE- 636/637: Performed by: SURGERY

## 2021-12-03 PROCEDURE — 65270000032 HC RM SEMIPRIVATE

## 2021-12-03 PROCEDURE — 74011250636 HC RX REV CODE- 250/636: Performed by: SURGERY

## 2021-12-03 PROCEDURE — 97165 OT EVAL LOW COMPLEX 30 MIN: CPT

## 2021-12-03 PROCEDURE — 97161 PT EVAL LOW COMPLEX 20 MIN: CPT

## 2021-12-03 PROCEDURE — 82962 GLUCOSE BLOOD TEST: CPT

## 2021-12-03 PROCEDURE — 74011250637 HC RX REV CODE- 250/637: Performed by: SURGERY

## 2021-12-03 RX ADMIN — PANTOPRAZOLE SODIUM 40 MG: 40 TABLET, DELAYED RELEASE ORAL at 09:03

## 2021-12-03 RX ADMIN — HYDROCODONE BITARTRATE AND ACETAMINOPHEN 1 TABLET: 5; 325 TABLET ORAL at 16:12

## 2021-12-03 RX ADMIN — Medication 5 ML: at 14:00

## 2021-12-03 RX ADMIN — Medication 10 ML: at 21:23

## 2021-12-03 RX ADMIN — ASPIRIN 325 MG: 325 TABLET, COATED ORAL at 09:14

## 2021-12-03 RX ADMIN — MORPHINE SULFATE 4 MG: 4 INJECTION, SOLUTION INTRAMUSCULAR; INTRAVENOUS at 09:00

## 2021-12-03 RX ADMIN — INSULIN LISPRO 3 UNITS: 100 INJECTION, SOLUTION INTRAVENOUS; SUBCUTANEOUS at 01:12

## 2021-12-03 RX ADMIN — Medication 10 ML: at 05:43

## 2021-12-03 RX ADMIN — ATORVASTATIN CALCIUM 80 MG: 40 TABLET, FILM COATED ORAL at 09:03

## 2021-12-03 RX ADMIN — GLIPIZIDE 10 MG: 5 TABLET ORAL at 09:03

## 2021-12-03 RX ADMIN — LISINOPRIL 20 MG: 20 TABLET ORAL at 09:03

## 2021-12-03 RX ADMIN — MORPHINE SULFATE 4 MG: 4 INJECTION, SOLUTION INTRAMUSCULAR; INTRAVENOUS at 21:23

## 2021-12-03 NOTE — PROGRESS NOTES
OCCUPATIONAL THERAPY EVALUATION  Patient: Selwyn Perry (47 y.o. female)  Date: 12/3/2021  Primary Diagnosis: Stroke [facility]) [I63.9]  Postop carotid endarterectomy surveillance, encounter for [Z48.812]  Procedure(s) (LRB):  RIGHT SIDE CAROTID ARTERY ENDARTERECTOMY (Right) 2 Days Post-Op   Precautions: fall risk       ASSESSMENT  Pt is a 63 y/o F with PMH significant for CVA with L-sided deficits (recent admission Oct 2021 and d/c home following hospital stay), found to have high-grade stenosis right proximal ICA; admitted to [facility] 12/1/21 following work up/cardiac clearance for elective right carotid enterectomy on 12/1/21 with Dr. Sammy Byrd. Pt received semi-supine in bed upon arrival, AXO x4, and agreeable to OT/PT evaluations at this time. Per pt report, pt lives with mother whom she is the primary caretaker for (son currently staying with mother) in a one-story mobile home with 5 JOSE and R HR, was managing ADLs IND at [facility] with pt reporting improved but continued LUE deficits affecting [address] (buttons/zippers) and fatigue after activity. Pt states she was ambulatory without DME and does not own any DME at this time. Based on current observations, pt presents with deficits in generalized strength/AROM (L UE/L LE), static/dynamic standing balance, functional activity tolerance, coordination (L UE impaired finger to nose), and c/o discomfort at surgical site impacting overall performance of ADLs and functional transfers/mobility. Pt currently requires SBA for bed mobility, CGA STS transfers to/from EOB and chair with gait belt donned for safety, however no LOB with bathroom mobility. Pt performs standing grooming task including washing face s/p setup/SBA, UB setup/SBA, and yong area CGA-SBA for balance/safety, returning to chair to don socks s/p setup with additional time 2' to L UE fine motor impairments. Overall, pt tolerates session well with no c/o dizziness or SOB during evaluation today.  Pt would benefit from continued skilled OT services as she is motivated to work with therapy to maximize strength/coordination deficits and regain functional IND. Recommend discharge to IRF once medically appropriate. Other factors to consider for discharge: family support, DME, time since onset, severity of deficits, IND at baseline      Patient will benefit from skilled therapy intervention to address the above noted impairments. PLAN :  Recommendations and Planned Interventions: self care training, functional mobility training, therapeutic exercise, balance training, therapeutic activities, endurance activities, neuromuscular re-education, patient education, home safety training, and family training/education    Frequency/Duration: Patient will be followed by occupational therapy:  3-5x/week to address goals. 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     OBJECTIVE DATA SUMMARY:   HISTORY:   Past Medical History:   Diagnosis Date    Arthritis     Coagulation disorder ([address]     possible , pt had surgery on leg and bled out    Diabetes ([address] 2019    Hypertension     Left-sided weakness     Nodule of kidney     left kidney    Peripheral vascular disease ([address]     Stroke ([address]     recent stroke on 10/16/2021     Past Surgical History:   Procedure Laterality Date    HX CATARACT REMOVAL  2019    VASCULAR SURGERY PROCEDURE UNLIST         Expanded or extensive additional review of patient history:     Home Situation  Home Environment: Trailer/mobile home  # Steps to Enter: 5  Rails to Enter: Yes  Hand Rails : Right  One/Two Story Residence: One story  Living Alone: No  Support Systems: Parent(s) (patient is primary caregiver for elderly mother)  Patient Expects to be Discharged to[de-identified] House  Current DME Used/Available at Home: None  Tub or Shower Type: Tub/Shower combination    Hand dominance: Right    EXAMINATION OF PERFORMANCE DEFICITS:  Cognitive/Behavioral Status:  Neurologic State: Alert  Orientation Level: Oriented X4         Hearing: Auditory  Auditory Impairment: None    Vision/Perceptual:      Corrective Lenses: Glasses    Range of Motion:  AROM: Generally decreased, functional (Limited sh flexion L UE 2' to hx CVA)  PROM: Generally decreased, functional     Strength:  Strength: Generally decreased, functional (R UE 4+/5 grossly, L UE 4/5)      Coordination:  Coordination: Generally decreased, functional (L UE impaired finger to nose)  Fine Motor Skills-Upper: Right Intact; Left Impaired    Gross Motor Skills-Upper: Right Intact; Left Intact    Tone & Sensation:  Tone: Normal  Sensation: Intact                      Balance:  Sitting: Intact; Without support  Standing: Intact; With support  Standing - Static: Good  Standing - Dynamic : Fair; Occasional    Functional Mobility and Transfers for ADLs:  Bed Mobility:  Rolling: Stand-by assistance  Supine to Sit: Stand-by assistance  Scooting: Stand-by assistance    Transfers:  Sit to Stand: Contact guard assistance  Stand to Sit: Contact guard assistance  Bed to Chair: Contact guard assistance  Bathroom Mobility: Contact guard assistance    ADL Intervention and task modifications:    Grooming  Grooming Assistance: Stand-by assistance  Position Performed: Standing  Washing Face: Stand-by assistance    Upper Body Bathing  Bathing Assistance: Stand-by assistance  Position Performed: Standing    Lower Body Bathing  Perineal  : Contact guard assistance; Stand-by assistance  Position Performed: Standing         Lower Body Dressing Assistance  Socks: Set-up;  Stand-by assistance  Leg Crossed Method Used: Yes  Position Performed: Seated in chair      Therapeutic Exercise:  Pt would benefit from UE HEP to improve overall UE AROM/strength and can be further educated in next treatment session. Functional Measure:    [address] [place] [id number] AM-PACTM \"6 Clicks\"                                                       Daily Activity Inpatient Short Form  How much help from another person does the patient currently need. .. Total; A Lot A Little None   1. Putting on and taking off regular lower body clothing? []  1 []  2 [x]  3 []  4   2. Bathing (including washing, rinsing, drying)? []  1 []  2 [x]  3 []  4   3. Toileting, which includes using toilet, bedpan or urinal? [] 1 []  2 [x]  3 []  4   4. Putting on and taking off regular upper body clothing? []  1 []  2 [x]  3 []  4   5. Taking care of personal grooming such as brushing teeth? []  1 []  2 [x]  3 []  4   6. Eating meals? []  1 []  2 []  3 [x]  4   © 2007, Trustees of [address] [place] [id number], under license to [employer]. All rights reserved     Score: 19/24     Interpretation of Tool:  Represents clinically-significant functional categories (i.e. Activities of daily living). Percentage of Impairment CH    0%   CI    1-19% CJ    20-39% CK    40-59% CL    60-79% CM    80-99% CN     100%   [facility]  Score 6-24 24 23 20-22 15-19 10-14 7-9 6         Occupational Therapy Evaluation Charge Determination   History Examination Decision-Making   LOW Complexity : Brief history review  LOW Complexity : 1-3 performance deficits relating to physical, cognitive , or psychosocial skils that result in activity limitations and / or participation restrictions  MEDIUM Complexity : Patient may present with comorbidities that affect occupational performnce.  Miniml to moderate modification of tasks or assistance (eg, physical or verbal ) with assesment(s) is necessary to enable patient to complete evaluation       Based on the above components, the patient evaluation is determined to be of the following complexity level: LOW   Pain Rating:  Pt c/o discomfort at neck area/surgical site, no formal rating provided at this time     Activity Tolerance:   Good    After treatment patient left in no apparent distress:    Sitting in chair and Call bell within reach    COMMUNICATION/EDUCATION:   The patients plan of care was discussed with: Physical therapist and Registered nurse. Patient/family have participated as able in goal setting and plan of care. and Patient/family agree to work toward stated goals and plan of care. This patients plan of care is appropriate for delegation to [facility].     OT/PT sessions occurred together for increased patient and clinician safety    Thank you for this referral.  Willy Coy  Time Calculation: 28 mins    Problem: Self Care Deficits Care Plan (Adult)  Goal: *Acute Goals and Plan of Care (Insert Text)  Description: Pt will be IND sup <> sit in prep for EOB ADLs  Pt will be Mod I grooming standing sink side   Pt will be Mod I LE dressing sitting EOB/long sit  Pt will be IND sit <>  prep for toileting   Pt will be Mod I toileting/toilet transfer/cloth mgmt   Pt will be IND following UE HEP in prep for self care tasks   Outcome: Not Met

## 2021-12-03 NOTE — PROGRESS NOTES
PHYSICAL THERAPY EVALUATION  Patient: Cathy Duque (89 y.o. female)  Date: 12/3/2021  Primary Diagnosis: Stroke [facility]) [I63.9]  Postop carotid endarterectomy surveillance, encounter for [Z48.812]  Procedure(s) (LRB):  RIGHT SIDE CAROTID ARTERY ENDARTERECTOMY (Right) 2 Days Post-Op   Precautions: falls       ASSESSMENT  Pt is a 61 y/o F with PMH significant for CVA with L-sided deficits (recent admission Oct 2021 and d/c home following hospital stay with no [facility] services), found to have high-grade stenosis right proximal ICA; admitted to [facility] 12/1/21 following work up/cardiac clearance for elective right carotid enterectomy on 12/1/21 with Dr. David Cowart. Pt received semi-supine in bed upon arrival, AXO x4, and agreeable to PT/OT evaluations at this time. Per pt report, pt lives with mother whom she is the primary caretaker for (son currently staying with mother) in a one-story mobile home with 5 JOSE and R HR, was managing ADLs IND at [facility] with pt reporting improved but continued gross motor control of LLE and LUE deficits affecting [address] (buttons/zippers) as well as fatigue after activity, stating she has to plan her big tasks for am due to extreme fatigue in afternoon. Pt states she was ambulatory without DME and does not own any DME at this time. 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 Based on the objective data described below, the patient presents with LLE and LUE weakness, impaired functional mobility, impaired amb with toe drag and increased ER on left compared to right, impaired balance, impaired LUE coordination and decreased activity tolerance. Pt required SBA for bed mobility, SBA supine > sit, CGA sit <> stand transfers. Pt amb 75 feet with gt belt, no AD, and CGA; demonstrating slow, steady, cautious step through gt pattern with left toe drag and increased ER with decreased left stance time noted.  Pt demonstrates 4+/5 RLE MMT, left knee flexion 3-/5, left hip flexion 3-/5, left knee extension 4/5, left DF 3-/5. Pt did fair with session today with standing rest breaks required with mobility as well as reports of fatigue following amb. Pt will benefit from continued skilled PT to address above deficits and return to PLOF. Current PT DC recommendation IRF. Current Level of Function Impacting Discharge (mobility/balance): SBA to CGA    Other factors to consider for discharge: severity of defiicts, time since onset      PLAN :  Recommendations and Planned Interventions: bed mobility training, transfer training, gait training, therapeutic exercises, neuromuscular re-education, patient and family training/education and therapeutic activities      Frequency/Duration: Patient will be followed by physical therapy:  3-5x/week to address goals. Recommendation for discharge: (in order for the patient to meet his/her long term goals)  [address] [facility],[address]     This discharge recommendation:  Has been made in collaboration with the attending provider and/or case management    IF patient discharges home will need the following DME: to be determined (TBD)         SUBJECTIVE:   Patient stated i need to return to work.     OBJECTIVE DATA SUMMARY:   HISTORY:    Past Medical History:   Diagnosis Date    Arthritis     Coagulation disorder ([address]     possible , pt had surgery on leg and bled out    Diabetes ([address] 2019    Hypertension     Left-sided weakness     Nodule of kidney     left kidney    Peripheral vascular disease ([address]     St[facility]     recent stroke on 10/16/2021     Past Surgical History:   Procedure Laterality Date    HX CATARACT REMOVAL  2019    VASCULAR SURGERY PROCEDURE UNLIST         Home Situation  Home Environment: Trailer/mobile home  # Steps to Enter: 5  Rails to Enter: Yes  Hand Rails : Right  One/Two Story Residence: One story  Living Alone: No  Support Systems: Parent(s) (patient is primary caregiver for elderly mother)  Patient Expects to be Discharged to[de-identified] House  Current DME Used/Available at Home: None  Tub or Shower Type: Tub/Shower combination    EXAMINATION/PRESENTATION/DECISION MAKING:   Critical Behavior:  Neurologic State: Alert  Orientation Level: Oriented X4  Cognition: Appropriate decision making, Appropriate for age attention/concentration, Appropriate safety awareness     Hearing: Auditory  Auditory Impairment: None  Skin:  Bandage on right anterior/lateral neck minimal drainage noted on bandage, no active bleeding noted throughout session  Edema: none noted   Range Of Motion:  AROM: Generally decreased, functional           PROM: Generally decreased, functional           Strength:    Strength: Generally decreased, functional                    Tone & Sensation:   Tone: Normal              Sensation: Intact               Coordination:  Coordination: Generally decreased, functional  Vision:      Functional Mobility:  Bed Mobility:  Rolling: Stand-by assistance  Supine to Sit: Stand-by assistance     Scooting: Stand-by assistance  Transfers:  Sit to Stand: Contact guard assistance  Stand to Sit: Contact guard assistance                       Balance:   Sitting: Intact; Without support  Standing: Intact; With support  Standing - Static: Good  Standing - Dynamic : Fair; Occasional  Ambulation/Gait Training:  Distance (ft): 75 Feet (ft)  Assistive Device: Gait belt  Ambulation - Level of Assistance: Contact guard assistance     Gait Description (WDL): Exceptions to WDL  Gait Abnormalities: Other (left foot drag with ER and decreased stance on left)        Base of Support: Widened     Speed/Ebonie: Slow        Therapeutic Exercises:   Not completed this session    Functional Measure:  [facility] AM-PAC 6 Clicks         Basic Mobility Inpatient Short Form  How much difficulty does the patient currently have. .. Unable A Lot A Little None   1.   Turning over in bed (including adjusting bedclothes, sheets and blankets)? [] 1   [] 2   [x] 3   [] 4   2. Sitting down on and standing up from a chair with arms ( e.g., wheelchair, bedside commode, etc.)   [] 1   [] 2   [x] 3   [] 4   3. Moving from lying on back to sitting on the side of the bed? [] 1   [] 2   [x] 3   [] 4          How much help from another person does the patient currently need. .. Total A Lot A Little None   4. Moving to and from a bed to a chair (including a wheelchair)? [] 1   [] 2   [x] 3   [] 4   5. Need to walk in hospital room? [] 1   [] 2   [x] 3   [] 4   6. Climbing 3-5 steps with a railing? [] 1   [x] 2   [] 3   [] 4   © , Trustees of [address] [place] [address], under license to [employer]. All rights reserved     Score:  Initial:  Most Recent: X (Date: 12/3/21 )   Interpretation of Tool:  Represents activities that are increasingly more difficult (i.e. Bed mobility, Transfers, Gait). Score 24 23 22-20 19-15 14-10 9-7 6   Modifier CH CI CJ CK CL CM CN         Physical Therapy Evaluation Charge Determination   History Examination Presentation Decision-Making   HIGH Complexity :3+ comorbidities / personal factors will impact the outcome/ POC  HIGH Complexity : 4+ Standardized tests and measures addressing body structure, function, activity limitation and / or participation in recreation  LOW Complexity : Stable, uncomplicated  Other outcome measures ampac 6  mod      Based on the above components, the patient evaluation is determined to be of the following complexity level: LOW     Pain Ratin/10 reported     Activity Tolerance:   Fair and requires rest breaks    After treatment patient left in no apparent distress:   Sitting in chair and Call bell within reach and nsg updated. GOALS:    Problem: Mobility Impaired (Adult and Pediatric)  Goal: *Acute Goals and Plan of Care (Insert Text)  Description: Pt will be I with LE HEP in 7 days. Pt will perform bed mobility with mod I in 7 days.   Pt will perform transfers with mod I in 7 days. Pt will amb  feet with LRAD safely with mod I in 7 days. Pt will demonstrate improvement in dynamic standing balance from min A to SBA in 7 days. Outcome: Not Met       COMMUNICATION/EDUCATION:   The patients plan of care was discussed with: Occupational therapist, Registered nurse and Case management. Fall prevention education was provided and the patient/caregiver indicated understanding., Patient/family have participated as able in goal setting and plan of care. and Patient/family agree to work toward stated goals and plan of care. PT/OT sessions occurred together for increased safety of pt and clinician.        Thank you for this referral.  Aziza Ventura, PT, DPT   Time Calculation: 28 mins

## 2021-12-03 NOTE — PROGRESS NOTES
Patient is s/p carotid endarterectomy w/ hx of recent CVA. No hx of dysphagia. Please place ST consult as medically indicated. TELE

## 2021-12-03 NOTE — PROGRESS NOTES
JUDY spoke with Yanet Pierce at [facility]. She will start auth today for patient admission to IRF.

## 2021-12-04 LAB
GLUCOSE BLD STRIP.AUTO-MCNC: 121 MG/DL (ref 65–117)
GLUCOSE BLD STRIP.AUTO-MCNC: 127 MG/DL (ref 65–117)
GLUCOSE BLD STRIP.AUTO-MCNC: 134 MG/DL (ref 65–117)
GLUCOSE BLD STRIP.AUTO-MCNC: 141 MG/DL (ref 65–117)
GLUCOSE BLD STRIP.AUTO-MCNC: 149 MG/DL (ref 65–117)
PERFORMED BY, TECHID: ABNORMAL

## 2021-12-04 PROCEDURE — 82962 GLUCOSE BLOOD TEST: CPT

## 2021-12-04 PROCEDURE — 99024 POSTOP FOLLOW-UP VISIT: CPT | Performed by: SURGERY

## 2021-12-04 PROCEDURE — 97110 THERAPEUTIC EXERCISES: CPT

## 2021-12-04 PROCEDURE — 74011250636 HC RX REV CODE- 250/636: Performed by: SURGERY

## 2021-12-04 PROCEDURE — 97116 GAIT TRAINING THERAPY: CPT

## 2021-12-04 PROCEDURE — 65270000032 HC RM SEMIPRIVATE

## 2021-12-04 PROCEDURE — 74011250637 HC RX REV CODE- 250/637: Performed by: SURGERY

## 2021-12-04 RX ADMIN — GLIPIZIDE 10 MG: 5 TABLET ORAL at 08:33

## 2021-12-04 RX ADMIN — MORPHINE SULFATE 4 MG: 4 INJECTION, SOLUTION INTRAMUSCULAR; INTRAVENOUS at 23:55

## 2021-12-04 RX ADMIN — PANTOPRAZOLE SODIUM 40 MG: 40 TABLET, DELAYED RELEASE ORAL at 08:34

## 2021-12-04 RX ADMIN — MORPHINE SULFATE 4 MG: 4 INJECTION, SOLUTION INTRAMUSCULAR; INTRAVENOUS at 14:22

## 2021-12-04 RX ADMIN — HYDROCODONE BITARTRATE AND ACETAMINOPHEN 1 TABLET: 5; 325 TABLET ORAL at 20:43

## 2021-12-04 RX ADMIN — ATORVASTATIN CALCIUM 80 MG: 40 TABLET, FILM COATED ORAL at 08:33

## 2021-12-04 RX ADMIN — LISINOPRIL 20 MG: 20 TABLET ORAL at 08:33

## 2021-12-04 RX ADMIN — ASPIRIN 325 MG: 325 TABLET, COATED ORAL at 08:36

## 2021-12-04 RX ADMIN — Medication 10 ML: at 20:33

## 2021-12-04 RX ADMIN — HYDROCODONE BITARTRATE AND ACETAMINOPHEN 1 TABLET: 5; 325 TABLET ORAL at 18:31

## 2021-12-04 RX ADMIN — Medication 10 ML: at 14:26

## 2021-12-04 RX ADMIN — MORPHINE SULFATE 4 MG: 4 INJECTION, SOLUTION INTRAMUSCULAR; INTRAVENOUS at 08:37

## 2021-12-04 RX ADMIN — MORPHINE SULFATE 4 MG: 4 INJECTION, SOLUTION INTRAMUSCULAR; INTRAVENOUS at 19:20

## 2021-12-04 NOTE — PROGRESS NOTES
Problem: Mobility Impaired (Adult and Pediatric)  Goal: *Acute Goals and Plan of Care (Insert Text)  Description: Pt will be I with LE HEP in 7 days. Pt will perform bed mobility with mod I in 7 days. Pt will perform transfers with mod I in 7 days. Pt will amb  feet with LRAD safely with mod I in 7 days. Pt will demonstrate improvement in dynamic standing balance from min A to SBA in 7 days. Outcome: Progressing Towards Goal   PHYSICAL THERAPY TREATMENT  Patient: Tessie Alves (48 y.o. female)  Date: 12/4/2021  Diagnosis: Stroke [facility]) [I63.9]  Postop carotid endarterectomy surveillance, encounter for [Z48.812]   <principal problem not specified>  Procedure(s) (LRB):  RIGHT SIDE CAROTID ARTERY ENDARTERECTOMY (Right) 3 Days Post-Op  Precautions:    Chart, physical therapy assessment, plan of care and goals were reviewed. ASSESSMENT  Patient continues with skilled PT services and is progressing towards goals. Upon entry into room, patient resting comfortably in bed but agreeable to work with PT. Bed mobility performed independently and sit<>stand mod I with incr time. Patient performed gait training in hallway for 350 feet without AD and supervision for safety but no LOB or buckling noted and patient appeared steady on her feet. Slow ita during gait training but no rest breaks needed. Patient returned to sit up on EOB and performed LE therex with good recall and instructed her to perform throughout the day for improving strength. She will benefit from continued skilled PT services to improve her strength and endurance to return to PLOF and hopefully return to work as CNA. Current Level of Function Impacting Discharge (mobility/balance): decr endurance, decr strength     Other factors to consider for discharge: CVA deficits, medical hx, caretaker for mother          PLAN :  Patient continues to benefit from skilled intervention to address the above impairments.   Continue treatment per established plan of care. to address goals. Recommendation for discharge: (in order for the patient to meet his/her long term goals)  [address]     This discharge recommendation:  Has been made in collaboration with the attending provider and/or case management    IF patient discharges home will need the following DME: to be determined (TBD)       SUBJECTIVE:   Patient stated sure, I will walk a little, that would be good for me.     OBJECTIVE DATA SUMMARY:   Critical Behavior:  Neurologic State: Alert  Orientation Level: Oriented X4  Cognition: Appropriate decision making, Follows commands     Functional Mobility Training:  Bed Mobility:  Rolling: Independent  Supine to Sit: Independent     Scooting: Independent        Transfers:  Sit to Stand: Modified independent  Stand to Sit: Modified independent       Balance:  Sitting: Intact  Standing: Intact  Standing - Static: Good  Standing - Dynamic : Good  Ambulation/Gait Training:  Distance (ft): 350 Feet (ft)  Assistive Device: Gait belt    Base of Support: Widened      Therapeutic Exercises: In sitting, 15x bilaterally: LAQ, marching, heel raises, toe raises  Pain Rating:  No pain     Activity Tolerance:   Good  Please refer to the flowsheet for vital signs taken during this treatment.     After treatment patient left in no apparent distress:   Supine in bed and Call bell within reach    COMMUNICATION/COLLABORATION:   The patients plan of care was discussed with: Physical therapist.     Roya Pérez   Time Calculation: 27 mins

## 2021-12-04 NOTE — PROGRESS NOTES
Patient examined this morning. She looks good. She has no new focal deficits. Vital signs stable. Wounds are clean and dry on the right neck. Patient currently getting therapy and awaiting for rehab placement.

## 2021-12-04 NOTE — PROGRESS NOTES
Patient reporting headache and pain to right side of neck. Pain medications given as per documented in [facility]. Provided patient teaching about standing up slowly to prevent dizziness. In addition, removed abdo pad dressing. Steri strips insitu. Problem: Pressure Injury - Risk of  Goal: *Prevention of pressure injury  Description: Document Munir Scale and appropriate interventions in the flowsheet.   Outcome: Progressing Towards Goal  Note: Pressure Injury Interventions:  Sensory Interventions: Assess changes in LOC         Activity Interventions: PT/OT evaluation    Mobility Interventions: PT/OT evaluation    Nutrition Interventions: Document food/fluid/supplement intake    Friction and Shear Interventions: Apply protective barrier, creams and emollients                Problem: Patient Education: Go to Patient Education Activity  Goal: Patient/Family Education  Outcome: Progressing Towards Goal

## 2021-12-05 LAB
GLUCOSE BLD STRIP.AUTO-MCNC: 118 MG/DL (ref 65–117)
GLUCOSE BLD STRIP.AUTO-MCNC: 126 MG/DL (ref 65–117)
GLUCOSE BLD STRIP.AUTO-MCNC: 134 MG/DL (ref 65–117)
GLUCOSE BLD STRIP.AUTO-MCNC: 160 MG/DL (ref 65–117)
PERFORMED BY, TECHID: ABNORMAL

## 2021-12-05 PROCEDURE — 82962 GLUCOSE BLOOD TEST: CPT

## 2021-12-05 PROCEDURE — 97116 GAIT TRAINING THERAPY: CPT

## 2021-12-05 PROCEDURE — 97110 THERAPEUTIC EXERCISES: CPT

## 2021-12-05 PROCEDURE — 74011636637 HC RX REV CODE- 636/637: Performed by: SURGERY

## 2021-12-05 PROCEDURE — 65270000032 HC RM SEMIPRIVATE

## 2021-12-05 PROCEDURE — 74011250636 HC RX REV CODE- 250/636: Performed by: SURGERY

## 2021-12-05 PROCEDURE — 74011250637 HC RX REV CODE- 250/637: Performed by: SURGERY

## 2021-12-05 PROCEDURE — 99024 POSTOP FOLLOW-UP VISIT: CPT | Performed by: SURGERY

## 2021-12-05 RX ORDER — INSULIN LISPRO 100 [IU]/ML
INJECTION, SOLUTION INTRAVENOUS; SUBCUTANEOUS
Status: DISCONTINUED | OUTPATIENT
Start: 2021-12-05 | End: 2021-12-07 | Stop reason: HOSPADM

## 2021-12-05 RX ADMIN — ASPIRIN 325 MG: 325 TABLET, COATED ORAL at 09:25

## 2021-12-05 RX ADMIN — GLIPIZIDE 10 MG: 5 TABLET ORAL at 09:24

## 2021-12-05 RX ADMIN — Medication 10 ML: at 18:23

## 2021-12-05 RX ADMIN — Medication 10 ML: at 21:18

## 2021-12-05 RX ADMIN — HYDROCODONE BITARTRATE AND ACETAMINOPHEN 1 TABLET: 5; 325 TABLET ORAL at 13:23

## 2021-12-05 RX ADMIN — LISINOPRIL 20 MG: 20 TABLET ORAL at 09:24

## 2021-12-05 RX ADMIN — HYDROCODONE BITARTRATE AND ACETAMINOPHEN 1 TABLET: 5; 325 TABLET ORAL at 09:24

## 2021-12-05 RX ADMIN — HYDROCODONE BITARTRATE AND ACETAMINOPHEN 1 TABLET: 5; 325 TABLET ORAL at 00:15

## 2021-12-05 RX ADMIN — HYDROCODONE BITARTRATE AND ACETAMINOPHEN 1 TABLET: 5; 325 TABLET ORAL at 21:16

## 2021-12-05 RX ADMIN — ATORVASTATIN CALCIUM 80 MG: 40 TABLET, FILM COATED ORAL at 09:24

## 2021-12-05 RX ADMIN — PANTOPRAZOLE SODIUM 40 MG: 40 TABLET, DELAYED RELEASE ORAL at 09:25

## 2021-12-05 RX ADMIN — MORPHINE SULFATE 4 MG: 4 INJECTION, SOLUTION INTRAMUSCULAR; INTRAVENOUS at 04:02

## 2021-12-05 RX ADMIN — INSULIN LISPRO 3 UNITS: 100 INJECTION, SOLUTION INTRAVENOUS; SUBCUTANEOUS at 16:36

## 2021-12-05 RX ADMIN — Medication 10 ML: at 06:00

## 2021-12-05 NOTE — PROGRESS NOTES
Pain management ongoing to neck and head. Control blood pressure. Dressing intact with steri-strips to right side of neck. Problem: Pressure Injury - Risk of  Goal: *Prevention of pressure injury  Description: Document Munir Scale and appropriate interventions in the flowsheet. Outcome: Progressing Towards Goal  Note: Pressure Injury Interventions:  Sensory Interventions: Assess changes in LOC    Moisture Interventions: Assess need for specialty bed, Absorbent underpads    Activity Interventions: Assess need for specialty bed, Pressure redistribution bed/mattress(bed type)    Mobility Interventions: Float heels, Assess need for specialty bed    Nutrition Interventions: Document food/fluid/supplement intake    Friction and Shear Interventions: HOB 30 degrees or less                Problem: Patient Education: Go to Patient Education Activity  Goal: Patient/Family Education  Outcome: Progressing Towards Goal     Problem: Falls - Risk of  Goal: *Absence of Falls  Description: Document Jim Fall Risk and appropriate interventions in the flowsheet.   Outcome: Progressing Towards Goal  Note: Fall Risk Interventions:            Medication Interventions: Bed/chair exit alarm    Elimination Interventions: Bed/chair exit alarm              Problem: Patient Education: Go to Patient Education Activity  Goal: Patient/Family Education  Outcome: Progressing Towards Goal     Problem: Patient Education: Go to Patient Education Activity  Goal: Patient/Family Education  Outcome: Progressing Towards Goal     Problem: Patient Education: Go to Patient Education Activity  Goal: Patient/Family Education  Outcome: Progressing Towards Goal

## 2021-12-05 NOTE — PROGRESS NOTES
Patient examined this morning. She looks well, no issues overnight. Patient did participate physical therapy yesterday. Patient has no new focal deficit. Patient currently waiting for placement.

## 2021-12-05 NOTE — PROGRESS NOTES
Problem: Mobility Impaired (Adult and Pediatric)  Goal: *Acute Goals and Plan of Care (Insert Text)  Description: Pt will be I with LE HEP in 7 days. Pt will perform bed mobility with mod I in 7 days. Pt will perform transfers with mod I in 7 days. Pt will amb  feet with LRAD safely with mod I in 7 days. Pt will demonstrate improvement in dynamic standing balance from min A to SBA in 7 days. Outcome: Progressing Towards Goal   PHYSICAL THERAPY TREATMENT  Patient: Andrea Riggs (82 y.o. female)  Date: 12/5/2021  Diagnosis: Stroke [facility]) [I63.9]  Postop carotid endarterectomy surveillance, encounter for [Z48.812]   <principal problem not specified>  Procedure(s) (LRB):  RIGHT SIDE CAROTID ARTERY ENDARTERECTOMY (Right) 4 Days Post-Op  Precautions:    Chart, physical therapy assessment, plan of care and goals were reviewed. ASSESSMENT  Patient continues with skilled PT services and is progressing towards goals. IND with bed mobility, transfers, and amb. Pt states she has been amb on her own, and has required no assistance transferring to/from BR on her own. Pt increased amb distance today, 450ft with no AD, steady even ita, no LOB noted, no rest breaks taken. Improvements noted with stride length, no L toe drag noted this tx. Pt participated with standing therex, 1-2 UE supported lightly on window. No rest breaks required, no LOB noted with standing exercise. Pt with mild c/o L hand weakness that is still lingering from prior stroke, but states L leg weakness has been improving. Discussed with supervising PT, will change D/C rec to OPPT as most appropriate D/C plan at this time. PLAN :  Patient continues to benefit from skilled intervention to address the above impairments. Continue treatment per established plan of care. to address goals.     Recommendation for discharge: (in order for the patient to meet his/her long term goals)  Outpatient Therapy            SUBJECTIVE:   Patient stated I was hoping to get out of here by now.     OBJECTIVE DATA SUMMARY:   Critical Behavior:  Neurologic State: Alert  Orientation Level: Oriented X4  Cognition: Appropriate decision making     Functional Mobility Training:  Bed Mobility:     Supine to Sit: Independent     Scooting: Independent        Transfers:  Sit to Stand: Independent  Stand to Sit: Independent        Bed to Chair: Independent                    Balance:  Sitting: Intact  Standing: Intact  Standing - Static: Good  Standing - Dynamic : Good  Ambulation/Gait Training:  Distance (ft): 450 Feet (ft)     Ambulation - Level of Assistance: Independent           Therapeutic Exercises:   Standing:   Heel raises x15   Hip abd x10   Hip extension x10  Pain Ratin/10    Activity Tolerance:   Good      After treatment patient left in no apparent distress:   Sitting in chair and Call bell within reach    COMMUNICATION/COLLABORATION:   The patients plan of care was discussed with: Physical therapist and Occupational therapist.     Rogers Jaimes   Time Calculation: 28 mins

## 2021-12-05 NOTE — PROGRESS NOTES
CM noted discharge order. Patient is pending authorization at [facility].  CM has reached out to IRF to get update on auth status and will continue to follow.

## 2021-12-06 LAB
GLUCOSE BLD STRIP.AUTO-MCNC: 123 MG/DL (ref 65–117)
GLUCOSE BLD STRIP.AUTO-MCNC: 133 MG/DL (ref 65–117)
GLUCOSE BLD STRIP.AUTO-MCNC: 178 MG/DL (ref 65–117)
GLUCOSE BLD STRIP.AUTO-MCNC: 79 MG/DL (ref 65–117)
PERFORMED BY, TECHID: ABNORMAL
PERFORMED BY, TECHID: NORMAL

## 2021-12-06 PROCEDURE — 74011250637 HC RX REV CODE- 250/637: Performed by: SURGERY

## 2021-12-06 PROCEDURE — 65270000032 HC RM SEMIPRIVATE

## 2021-12-06 PROCEDURE — 82962 GLUCOSE BLOOD TEST: CPT

## 2021-12-06 PROCEDURE — 99024 POSTOP FOLLOW-UP VISIT: CPT | Performed by: SURGERY

## 2021-12-06 PROCEDURE — 74011636637 HC RX REV CODE- 636/637: Performed by: SURGERY

## 2021-12-06 PROCEDURE — 97530 THERAPEUTIC ACTIVITIES: CPT

## 2021-12-06 RX ADMIN — ATORVASTATIN CALCIUM 80 MG: 40 TABLET, FILM COATED ORAL at 08:22

## 2021-12-06 RX ADMIN — Medication 10 ML: at 13:12

## 2021-12-06 RX ADMIN — INSULIN LISPRO 3 UNITS: 100 INJECTION, SOLUTION INTRAVENOUS; SUBCUTANEOUS at 21:24

## 2021-12-06 RX ADMIN — HYDROCODONE BITARTRATE AND ACETAMINOPHEN 1 TABLET: 5; 325 TABLET ORAL at 20:31

## 2021-12-06 RX ADMIN — HYDROCODONE BITARTRATE AND ACETAMINOPHEN 1 TABLET: 5; 325 TABLET ORAL at 04:28

## 2021-12-06 RX ADMIN — PANTOPRAZOLE SODIUM 40 MG: 40 TABLET, DELAYED RELEASE ORAL at 08:22

## 2021-12-06 RX ADMIN — Medication 10 ML: at 06:01

## 2021-12-06 RX ADMIN — GLIPIZIDE 10 MG: 5 TABLET ORAL at 08:22

## 2021-12-06 RX ADMIN — ASPIRIN 325 MG: 325 TABLET, COATED ORAL at 08:22

## 2021-12-06 RX ADMIN — HYDROCODONE BITARTRATE AND ACETAMINOPHEN 1 TABLET: 5; 325 TABLET ORAL at 13:12

## 2021-12-06 RX ADMIN — Medication 10 ML: at 21:25

## 2021-12-06 RX ADMIN — LISINOPRIL 20 MG: 20 TABLET ORAL at 08:22

## 2021-12-06 NOTE — PROGRESS NOTES
Patient awaiting rehab placement- insurance authorization. Patient's pain controlled with po PRN medicationsas per MAR. Participating with PT and up with stand by assistance. Problem: Pressure Injury - Risk of  Goal: *Prevention of pressure injury  Description: Document Munir Scale and appropriate interventions in the flowsheet. Outcome: Progressing Towards Goal  Note: Pressure Injury Interventions:  Sensory Interventions: Maintain/enhance activity level    Moisture Interventions: Maintain skin hydration (lotion/cream)    Activity Interventions: Increase time out of bed    Mobility Interventions: PT/OT evaluation, HOB 30 degrees or less    Nutrition Interventions: Document food/fluid/supplement intake    Friction and Shear Interventions: HOB 30 degrees or less                Problem: Patient Education: Go to Patient Education Activity  Goal: Patient/Family Education  Outcome: Progressing Towards Goal     Problem: Falls - Risk of  Goal: *Absence of Falls  Description: Document Jim Fall Risk and appropriate interventions in the flowsheet.   Outcome: Progressing Towards Goal  Note: Fall Risk Interventions:            Medication Interventions: Teach patient to arise slowly, Patient to call before getting OOB    Elimination Interventions: Call light in reach              Problem: Patient Education: Go to Patient Education Activity  Goal: Patient/Family Education  Outcome: Progressing Towards Goal     Problem: Patient Education: Go to Patient Education Activity  Goal: Patient/Family Education  Outcome: Progressing Towards Goal

## 2021-12-06 NOTE — PROGRESS NOTES
Patient examined this morning. No issues overnight. Patient is participating with physical therapy. Currently waiting for placement.

## 2021-12-06 NOTE — PROGRESS NOTES
1:49pm CM communicated with [facility] Rep via [facility]. Patient has authorization. No beds available today. Plan for bed tomorrow. 7 will be available. CM communicated with IRF via [employer]. IRF is still waiting to hear from patient insurance company to complete authorization. CM sent updated notes.

## 2021-12-06 NOTE — PROGRESS NOTES
OCCUPATIONAL THERAPY TREATMENT  Patient: Renzo Hernandez (3 y.o. female)  Date: 12/6/2021  Diagnosis: Stroke [facility]) [I63.9]  Postop carotid endarterectomy surveillance, encounter for [Z48.812]   <principal problem not specified>  Procedure(s) (LRB):  RIGHT SIDE CAROTID ARTERY ENDARTERECTOMY (Right) 5 Days Post-Op  Precautions:    Chart, occupational therapy assessment, plan of care, and goals were reviewed. ASSESSMENT  Patient continues with skilled OT services and is progressing towards goals. Upon [facility] arrival, pt semi supine in bed and agreeable to tx session. Pt completed bed mobility with Mod I.  LB dressing completed at EOB with supervision/SBA. Pt completed sit>stand from EOB with Mod I and ambulated to bathroom with SBA. Pt completed standing UB bathing with supervision at sink and pt noted with no LOB at this time. Pt completed standing oral hygiene, face washing, and hair brushing with supervision. Pt completed toilet transfer and toileting with Mod I.  Pt completed ambulation in hallway in preparation for household mobility. Pt returned to room and completed chair transfer with Mod I. Linens and michelle pad changed on bed. Pt left sitting in chair within reach. Will continue to follow pt throughout remainder of stay and progress towards OT goals. Recommending IRF at discharge when medically appropriate. Current Level of Function Impacting Discharge (ADLs): Mod I bed mobility, supervision-Mod I transfers, setup A UB dressing    Other factors to consider for discharge: family support, DME, time since onset, severity of deficits, IND at baseline          PLAN :  Patient continues to benefit from skilled intervention to address the above impairments. Continue treatment per established plan of care. to address goals.     Recommend next OT session: standing grooming, balance activity    Recommendation for discharge: (in order for the patient to meet his/her long term goals)  Inpatient Rehabilitation Facility     This discharge recommendation:  Has been made in collaboration with the attending provider and/or case management    IF patient discharges home will need the following DME: TBD       SUBJECTIVE:   Patient stated I want to get over to [facility].     OBJECTIVE DATA SUMMARY:   Cognitive/Behavioral Status:  Neurologic State: Alert  Orientation Level: Oriented X4  Cognition: Follows commands    Functional Mobility and Transfers for ADLs:  Bed Mobility:  Rolling: Modified independent  Supine to Sit: Modified independent  Scooting: Modified independent    Transfers:  Sit to Stand: Modified independent  Functional Transfers  Bathroom Mobility: Supervision/set up  Toilet Transfer : Modified independent  Bed to Chair: Modified independent    Balance:  Sitting: Intact; Without support  Standing: Intact; With support  Standing - Static: Good  Standing - Dynamic : Fair; Occasional    ADL Intervention:    Grooming  Position Performed: Standing  Washing Face: Supervision  Brushing Teeth: Supervision  Brushing/Combing Hair: Supervision    Upper Body Bathing  Bathing Assistance: Supervision  Position Performed: Standing    Upper Body [address]: Set-up    Lower Body Dressing Assistance  Socks: Supervision; Stand-by assistance    Toileting  Bladder Hygiene: Modified independent    Pain:  0/10    Activity Tolerance:   Good  Please refer to the flowsheet for vital signs taken during this treatment. After treatment patient left in no apparent distress:   Sitting in chair and Call bell within reach    COMMUNICATION/COLLABORATION:   The patients plan of care was discussed with: Registered nurse and Nursing Student .      ELMO Cline  Time Calculation: 29 mins    Problem: Self Care Deficits Care Plan (Adult)  Goal: *Acute Goals and Plan of Care (Insert Text)  Description: Pt will be IND sup <> sit in prep for EOB ADLs  Pt will be Mod I grooming standing sink side   Pt will be Mod I LE dressing sitting EOB/long sit  Pt will be IND sit <>  prep for toileting   Pt will be Mod I toileting/toilet transfer/cloth mgmt   Pt will be IND following UE HEP in prep for self care tasks   Outcome: Progressing Towards Goal

## 2021-12-07 VITALS
DIASTOLIC BLOOD PRESSURE: 88 MMHG | WEIGHT: 136.69 LBS | RESPIRATION RATE: 18 BRPM | OXYGEN SATURATION: 99 % | SYSTOLIC BLOOD PRESSURE: 133 MMHG | HEART RATE: 88 BPM | BODY MASS INDEX: 25.15 KG/M2 | TEMPERATURE: 98.4 F

## 2021-12-07 LAB
GLUCOSE BLD STRIP.AUTO-MCNC: 132 MG/DL (ref 65–117)
GLUCOSE BLD STRIP.AUTO-MCNC: 146 MG/DL (ref 65–117)
GLUCOSE BLD STRIP.AUTO-MCNC: 189 MG/DL (ref 65–117)
PERFORMED BY, TECHID: ABNORMAL

## 2021-12-07 PROCEDURE — 82962 GLUCOSE BLOOD TEST: CPT

## 2021-12-07 PROCEDURE — 97530 THERAPEUTIC ACTIVITIES: CPT

## 2021-12-07 PROCEDURE — 74011250637 HC RX REV CODE- 250/637: Performed by: SURGERY

## 2021-12-07 PROCEDURE — 74011636637 HC RX REV CODE- 636/637: Performed by: SURGERY

## 2021-12-07 RX ADMIN — INSULIN LISPRO 3 UNITS: 100 INJECTION, SOLUTION INTRAVENOUS; SUBCUTANEOUS at 17:08

## 2021-12-07 RX ADMIN — PANTOPRAZOLE SODIUM 40 MG: 40 TABLET, DELAYED RELEASE ORAL at 08:31

## 2021-12-07 RX ADMIN — GLIPIZIDE 10 MG: 5 TABLET ORAL at 08:30

## 2021-12-07 RX ADMIN — Medication 10 ML: at 05:08

## 2021-12-07 RX ADMIN — ASPIRIN 325 MG: 325 TABLET, COATED ORAL at 08:31

## 2021-12-07 RX ADMIN — LISINOPRIL 20 MG: 20 TABLET ORAL at 08:31

## 2021-12-07 RX ADMIN — ATORVASTATIN CALCIUM 80 MG: 40 TABLET, FILM COATED ORAL at 08:30

## 2021-12-07 RX ADMIN — Medication 10 ML: at 17:08

## 2021-12-07 NOTE — PROGRESS NOTES
OCCUPATIONAL THERAPY TREATMENT  Patient: Glen Shah (44 y.o. female)  Date: 12/7/2021  Diagnosis: Stroke [facility]) [I63.9]  Postop carotid endarterectomy surveillance, encounter for [Z48.812]   <principal problem not specified>  Procedure(s) (LRB):  RIGHT SIDE CAROTID ARTERY ENDARTERECTOMY (Right) 6 Days Post-Op  Precautions:    Chart, occupational therapy assessment, plan of care, and goals were reviewed. ASSESSMENT  Patient continues with skilled OT services and is progressing towards goals. Upon B[facility] arrival, pt ambulating in bathroom and agreeable to tx session. Pt completed donning of socks with Mod I while sitting at EOB. Pt completed standing crossing midline activity using LUE hand. Pt noted with difficulty with holding weight with LUE due to decreased strength and coordination. Pt able to complete transfer of water pitcher from table on L side to table on R side x10 times. Pt reporting some muscular pain with this activity. Pt completed seated squeezes with towel roll using LUE. Pt completed PNF movements (D1 and D2) x10 times. Pt able to complete LUE squeezes on 7 lb Digi-Flex x10 times and noted with decrease strength in digits. Pt reporting a challenge with these activities and demonstrating understanding of needing to continue working outside of therapy session. Will continue to follow pt throughout remainder of stay and progress towards OT goals. Recommending IRF at discharge when medically appropriate. Other factors to consider for discharge: family support, DME, time since onset, severity of deficits, IND at baseline          PLAN :  Patient continues to benefit from skilled intervention to address the above impairments. Continue treatment per established plan of care. to address goals.     Recommendation for discharge: (in order for the patient to meet his/her long term goals)  [address]     This discharge recommendation:  Has been made in collaboration with the attending provider and/or case management    IF patient discharges home will need the following DME: TBD       SUBJECTIVE:   Patient stated Paola farias for showing me that exercise.     OBJECTIVE DATA SUMMARY:   Cognitive/Behavioral Status:  Neurologic State: Alert  Orientation Level: Oriented X4  Cognition: Appropriate decision making; Follows commands    Functional Mobility and Transfers for ADLs:  Bed Mobility:  Rolling: Independent  Supine to Sit: Independent  Scooting: Independent    Transfers:  Sit to Stand: Independent    Balance:  Sitting: Intact; Without support  Standing: Intact; With support  Standing - Static: Good; Unsupported  Standing - Dynamic : Good; Occasional    ADL Intervention:    Lower Body Dressing Assistance  Socks: Modified independent    Therapeutic Exercises:   Exercise Sets Reps AROM AAROM PROM Self PROM Comments   Elbow flex/ext 1 5 [x] [] [] []    D1/D2 1 10 [x] [] [] []    Digit flex/ext 1 10 X         Pain:  0/10    Activity Tolerance:   Fair and requires rest breaks  Please refer to the flowsheet for vital signs taken during this treatment.     After treatment patient left in no apparent distress:   Call bell within reach and sitting EOB    ELMO Cline  Time Calculation: 34 mins    Problem: Self Care Deficits Care Plan (Adult)  Goal: *Acute Goals and Plan of Care (Insert Text)  Description: Pt will be IND sup <> sit in prep for EOB ADLs  Pt will be Mod I grooming standing sink side   Pt will be Mod I LE dressing sitting EOB/long sit  Pt will be IND sit <>  prep for toileting   Pt will be Mod I toileting/toilet transfer/cloth mgmt   Pt will be IND following UE HEP in prep for self care tasks   Outcome: Progressing Towards Goal

## 2021-12-07 NOTE — PROGRESS NOTES
CM communicated with [facility] rep via [phone]. Patient can be admitted this evening. Patient to be transported to [facility] at 6:30pm. Nurse to call report to [phone].

## 2021-12-07 NOTE — ADT AUTH CERT NOTES
Comments  Comment            Patient Demographics    Patient Name   Mary Self   [id number] Legal Sex   Female    1960 Address   [address] [facility] [address] [address] Phone   [phone] [facility])   [phone] (Mobile) *Preferred*     Patient Demographics    Patient Name   Mary Kramer   [id number] Legal Sex   Female    1960 Address   [address] [facility] [address] [address] Phone   [phone] (Home)   [phone] (Mobile) *Preferred*   CSN:   [id number]     Admit Date: Admit Time Room Bed   Dec 1, 2021  8:04  Illa Stalls 01 [[id number]]       Attending Providers    Provider Pager From To   Bob Mendez MD  21      Emergency Contact(s)    Name [address] Other Relative [phone]  [address] [facility],[address], [address] Other Relative [phone]   [facility] Parent [phone]       Utilization Reviews         Carotid Endarterectomy - Care Day 6 (2021) by Celi Loya       Review Entered Review Status   2021 09:46 Completed      Criteria Review      Care Day: 6 Care Date: 2021 Level of Care: Inpatient Floor    Guideline Day 2    Level Of Care    ( ) Floor to discharge    2021 09:43:51 EST by Celi Loya      IP FLOOR    Clinical Status    (X) * Procedure completed    2021 09:44:05 EST by Celi Loya      Right carotid endarterectomy with a biologic patch angioplasty    (X) * Hemodynamic stability    2021 09:44:36 EST by Celi Loya      HR 81-86, //70    (X) * No new neurologic deficits    ( ) * No swallowing difficulty    (X) * No evidence of postoperative or surgical site infection    (X) * No bleeding or growing hematoma at operative site    ( ) * No evidence of myocardial ischemia or infarct    ( ) * Pain absent or managed    ( ) * Discharge plans and education understood    Activity    (X) * Ambulatory or acceptable for next level of care    12/7/2021 09:44:45 EST by Abimael Daniel      complete bedrest- activity allowed pt/ot    Routes    (X) * Oral hydration    12/7/2021 09:44:59 EST by Abimael Daniel      adult diet    (X) * Oral medications or regimen acceptable for next level of care    12/7/2021 09:45:59 EST by Abimael Daniel      glipizide 10 mg po qd, protonix 40 mg po qd, humalog sq q6hr prn, norco 5-325 mg po q4hr prn x3    (X) * Oral diet or acceptable for next level of care    12/7/2021 09:44:59 EST by Abimael Daniel      adult diet    (X) Advance diet    12/7/2021 09:44:59 EST by Abimael Daniel      adult diet    Interventions    (X) Neurologic checks    Medications    (X) Aspirin    12/7/2021 09:45:23 EST by Abimael Daniel      aspirin 325 mg po qd    (X) Statin    12/7/2021 09:45:23 EST by Abimael Daniel      lipitor 80 mg po qd    (X) Antihypertensive medication as needed    12/7/2021 09:45:23 EST by Abimael Daniel      lisinopril 20 mg po qd    (X) Possible beta-blocker    * Milestone   Additional Notes   12/6/21     SURGICAL      TRAUMA SURGERY NOTE   Patient examined this morning.  No issues overnight.  Patient is participating with physical therapy.    Currently waiting for placement      NO LABS/IMAGING 12/6      VS:T 98.5F, HR 81-86, //70, RR 20, 99% RA   adult diet, complete bedrest- activity allowed pt/ot, i/o, poc glucose        Carotid Endarterectomy - Care Day 5 (12/5/2021) by Abimael Daniel       Review Entered Review Status   12/7/2021 09:43 Completed      Criteria Review      Care Day: 5 Care Date: 12/5/2021 Level of Care: Inpatient Floor    Guideline Day 2    Level Of Care    ( ) Floor to discharge    12/7/2021 09:41:00 EST by Abimael Daniel      IP FLOOR    Clinical Status    (X) * Procedure completed    12/7/2021 09:41:00 EST by Abimael Daniel      Right carotid endarterectomy with a biologic patch angioplasty    (X) * Hemodynamic stability    12/7/2021 09:43:30 EST by Profitect      HR 78-87, //81    12/7/2021 09:41:00 EST by Profitect      HR 78-87, //8    (X) * No new neurologic deficits    ( ) * No swallowing difficulty    (X) * No evidence of postoperative or surgical site infection    (X) * No bleeding or growing hematoma at operative site    ( ) * No evidence of myocardial ischemia or infarct    ( ) * Pain absent or managed    ( ) * Discharge plans and education understood    Activity    ( ) * Ambulatory or acceptable for next level of care    12/7/2021 09:41:00 EST by Profitect      complete bedrest- activity allowed pt/ot    Routes    (X) * Oral hydration    12/7/2021 09:41:00 EST by Profitect      adult diet    (X) * Oral medications or regimen acceptable for next level of care    12/7/2021 09:41:46 EST by Profitect      glipizide 10 mg po qd, protonix 40 mg po qd, humalog sq q6hr prn, norco 5-325 mg po q4hr prn x4, morphine 4 mg iv q4hr prn x1    (X) * Oral diet or acceptable for next level of care    12/7/2021 09:41:00 EST by Profitect      adult diet    (X) Advance diet    12/7/2021 09:41:00 EST by Profitect      adult diet    Interventions    (X) Neurologic checks    Medications    (X) Aspirin    12/7/2021 09:41:24 EST by Profitect      aspirin 325 mg po qd    (X) Statin    12/7/2021 09:41:24 EST by Profitect      lipitor 80 mg po qd    (X) Antihypertensive medication as needed    12/7/2021 09:41:24 EST by Profitect      lisinopril 20 mg po qd    (X) Possible beta-blocker    * Milestone   Additional Notes   12/5/21     SURGICAL      TRAUMA SURGERY NOTE   Patient examined this morning.  She looks well, no issues overnight.  Patient did participate physical therapy yesterday. Emmy Gao has no new focal deficit.  Patient currently waiting for placement. PHYSICAL THERAPY NOTE   Patient continues with skilled PT services and is progressing towards goals.  IND with bed mobility, transfers, and amb. Pt states she has been amb on her own, and has required no assistance transferring to/from BR on her own. Pt increased amb distance today, 450ft with no AD, steady even ita, no LOB noted, no rest breaks taken. Improvements noted with stride length, no L toe drag noted this tx. Pt participated with standing therex, 1-2 UE supported lightly on window. No rest breaks required, no LOB noted with standing exercise.  Pt with mild c/o L hand weakness that is still lingering from prior stroke, but states L leg weakness has been improving      NO LABS/IMAGING 12/5      VS:T 98.4F, HR 78-87, //89, RR 18, 99% RA   adult diet, complete bedrest- activity allowed pt/ot, i/o, poc glucose        Carotid Endarterectomy - Care Day 4 (12/4/2021) by Nikky Monk       Review Entered Review Status   12/6/2021 16:28 Completed      Criteria Review      Care Day: 4 Care Date: 12/4/2021 Level of Care: Inpatient Floor    Guideline Day 2    Level Of Care    ( ) Floor to discharge    12/6/2021 16:27:35 EST by Nikky Monk      IP FLOOR    Clinical Status    (X) * Procedure completed    12/6/2021 16:27:35 EST by Nikky Monk      Right carotid endarterectomy with a biologic patch angioplasty    (X) * Hemodynamic stability    12/6/2021 16:27:35 EST by Nikky Monk      HR 77-92, //80    (X) * No new neurologic deficits    ( ) * No swallowing difficulty    (X) * No evidence of postoperative or surgical site infection    (X) * No bleeding or growing hematoma at operative site    ( ) * No evidence of myocardial ischemia or infarct    ( ) * Pain absent or managed    ( ) * Discharge plans and education understood    Activity    ( ) * Ambulatory or acceptable for next level of care    12/6/2021 16:27:51 EST by Nikky Monk      complete bedrest- activity allowed pt/ot    Routes    (X) * Oral hydration    12/6/2021 16:27:51 EST by Nikky Monk      adult diet    (X) * Oral medications or regimen acceptable for next level of care    12/6/2021 16:28:17 EST by Harlo Blind      glipizide 10 mg po qd, protonix 40 mg po qd,norco 5-325 mg po q4hr prn x2    (X) * Oral diet or acceptable for next level of care    12/6/2021 16:27:51 EST by Harlo Blind      adult diet    (X) Advance diet    12/6/2021 16:27:51 EST by Harlo Blind      adult diet    Interventions    (X) Neurologic checks    Medications    (X) Aspirin    12/6/2021 16:28:05 EST by Harlo Blind      aspirin 325 mg po qd    (X) Statin    12/6/2021 16:28:05 EST by Harlo Blind      lipitor 80 mg po qd    (X) Antihypertensive medication as needed    (X) Possible beta-blocker    * Milestone   Additional Notes   12/4/21    IP SURGICAL      TRAUMA SURGERY NOTE   Patient examined this morning.  She looks good.  She has no new focal deficits.  Vital signs stable.  Wounds are clean and dry on the right neck.  Patient currently getting therapy and awaiting for rehab placement. PHYSICAL THERAPY NOTE   Patient continues with skilled PT services and is progressing towards goals. Upon entry into room, patient resting comfortably in bed but agreeable to work with PT. Bed mobility performed independently and sit<>stand mod I with incr time. Patient performed gait training in hallway for 350 feet without AD and supervision for safety but no LOB or buckling noted and patient appeared steady on her feet. Slow ita during gait training but no rest breaks needed. Patient returned to sit up on EOB and performed LE therex with good recall and instructed her to perform throughout the day for improving strength.       NO LABS/IMAGING 12/4      VS:T 98.3F, HR 77-92, //80, RR 18-20, 98% RA   adult diet, complete bedrest- activity allowed pt/ot, i/o, poc glucose        Carotid Endarterectomy - Care Day 3 (12/3/2021) by Harlo Blind       Review Entered Review Status   12/6/2021 16:26 Completed      Criteria Review Care Day: 3 Care Date: 12/3/2021 Level of Care: Inpatient Floor    Guideline Day 2    Level Of Care    ( ) Floor to discharge    12/6/2021 16:26:16 EST by Kathie De La Rosa      IP FLOOR    Clinical Status    (X) * Procedure completed    12/6/2021 16:26:16 EST by Kathie De La Rosa      Right carotid endarterectomy with a biologic patch angioplasty    (X) * Hemodynamic stability    12/6/2021 16:26:16 EST by Kathie De La Rosa      HR 80-92, //68    (X) * No new neurologic deficits    ( ) * No swallowing difficulty    (X) * No evidence of postoperative or surgical site infection    (X) * No bleeding or growing hematoma at operative site    ( ) * No evidence of myocardial ischemia or infarct    ( ) * Pain absent or managed    ( ) * Discharge plans and education understood    Activity    ( ) * Ambulatory or acceptable for next level of care    12/6/2021 16:26:16 EST by Kathie De La Rosa      complete bedrest- activity allowed pt/ot    Routes    (X) * Oral hydration    12/6/2021 16:26:16 EST by Kathie De La Rosa      adult diet    (X) * Oral medications or regimen acceptable for next level of care    12/6/2021 16:26:16 EST by Kathie De La Rosa      norco 5-325 mg po q4hr prn x4, morphine 4 mg iv q4hr prn x1, glipizide 10 mg po qd, protonix 40 mg po qd    (X) * Oral diet or acceptable for next level of care    12/6/2021 16:26:16 EST by Kathie De La Rosa      adult diet    (X) Advance diet    12/6/2021 16:26:16 EST by Kathie De La Rosa      adult diet    Interventions    (X) Neurologic checks    Medications    (X) Aspirin    12/6/2021 16:26:16 EST by Kathie De La Rosa      aspirin 325 mg po qd    (X) Statin    12/6/2021 16:26:16 EST by Kathie De La Rosa      lipitor 80 mg po qd    (X) Antihypertensive medication as needed    (X) Possible beta-blocker    * Milestone   Additional Notes   12/3/21     SURGICAL      PHYSICAL THERAPY NOTE   Based on the objective data described below, the patient presents with LLE and LUE weakness, impaired functional mobility, impaired amb with toe drag and increased ER on left compared to right, impaired balance, impaired LUE coordination and decreased activity tolerance. Pt required SBA for bed mobility, SBA supine > sit, CGA sit <> stand transfers. Pt amb 75 feet with gt belt, no AD, and CGA; demonstrating slow, steady, cautious step through gt pattern with left toe drag and increased ER with decreased left stance time noted. Pt demonstrates 4+/5 RLE MMT, left knee flexion 3-/5, left hip flexion 3-/5, left knee extension 4/5, left DF 3-/5. Pt did fair with session today with standing rest breaks required with mobility as well as reports of fatigue following amb.  Pt will benefit from continued skilled PT to address above deficits and return to PLOF      NO LABS/IMAGING 12/3      VS:T 98.1F, HR 80-92, //68, RR 16-18, 97% RA   adult diet, complete bedrest- activity allowed pt/ot, i/o, poc glucose

## 2021-12-07 NOTE — PROGRESS NOTES
1316h: Provided discharge report to receiving nurse, Ariana Vaughn RN, at [facility]. Medical transport arranged and booked for 1830h.

## 2021-12-08 NOTE — ADT AUTH CERT NOTES
Comments  Comment            Patient Demographics    Patient Name   Suzy Reddy   [id number] Legal Sex   Female    1960 Address   [address] [facility]   [address] [facility] [address] Phone   [phone] (Home)   [phone] (Mobile) *Preferred*     Patient Demographics    Patient Name   Suzy Reddy   [id number] Legal Sex   Female    1960 Address   [address] [facility]   [address] [address] Phone   [phone] (Home)   [phone] (Mobile) *Preferred*   CSN:   [id number]     Admit Date: Admit Time Room Bed   Dec 1, 2021  8:04  Silvia Dagoberto 01 [[id number]]       Attending Providers    Provider Pager From To   Leila Morillo MD  21     Emergency Contact(s)    Name Relation Home Work Mobile   Springer Other Relative [phone]   Phillip rGeene Other Relative [phone]   [facility] Parent [phone]       Utilization Reviews         Carotid Endarterectomy - Care Day 7 (2021) by Heladio Solano       Review Entered Review Status   2021 16:34 Completed      Criteria Review      Care Day: 7 Care Date: 2021 Level of Care: Inpatient Floor    Guideline Day 2    Level Of Care    (X) Floor to discharge    2021 16:34:16 EST by Heladio Solano      IP FLOOR    Clinical Status    (X) * Procedure completed    (X) * Hemodynamic stability    (X) * No new neurologic deficits    ( ) * No swallowing difficulty    (X) * No evidence of postoperative or surgical site infection    (X) * No bleeding or growing hematoma at operative site    ( ) * No evidence of myocardial ischemia or infarct    ( ) * Pain absent or managed    ( ) * Discharge plans and education understood    Activity    (X) * Ambulatory or acceptable for next level of care    Routes    (X) * Oral hydration    (X) * Oral medications or regimen acceptable for next level of care    (X) * Oral diet or acceptable for next level of care    (X) Advance diet    Interventions    (X) Neurologic checks    Medications    (X) Aspirin    (X) Statin    (X) Antihypertensive medication as needed    (X) Possible beta-blocker    * Milestone   Additional Notes   12/7/21    IP SURGICAL      OCCUPATIONAL THERAPY TREATMENT NOTE   Patient continues with skilled OT services and is progressing towards goals. [address] arrival, pt ambulating in bathroom and agreeable to tx session.  Pt completed donning of socks with Mod I while sitting at EOB.  Pt completed standing crossing midline activity using LUE hand.  Pt noted with difficulty with holding weight with LUE due to decreased strength and coordination.  Pt able to complete transfer of water pitcher from table on L side to table on R side x10 times.  Pt reporting some muscular pain with this activity.  Pt completed seated squeezes with towel roll using LUE.  Pt completed PNF movements (D1 and D2) x10 times.  Pt able to complete LUE squeezes on 7 lb Digi-Flex x10 times and noted with decrease strength in digits.  Pt reporting a challenge with these activities and demonstrating understanding of needing to continue working outside of therapy session. John Wilde continue to follow pt throughout remainder of stay and progress towards OT goals      NO LABS/IMAGING 12/7      VS:T 98.4F, HR 83-88, //90, RR 17-18, 99% RA   adult diet, complete bedrest- activity allowed pt/ot, i/o, poc glucose        Carotid Endarterectomy - Care Day 6 (12/6/2021) by Kiara Johnson       Review Entered Review Status   12/7/2021 09:46 Completed      Criteria Review      Care Day: 6 Care Date: 12/6/2021 Level of Care: Inpatient Floor    Guideline Day 2    Level Of Care    ( ) Floor to discharge    12/7/2021 09:43:51 EST by Kiara Johnson      IP FLOOR    Clinical Status    (X) * Procedure completed    12/7/2021 09:44:05 EST by Kiara Johnson      Right carotid endarterectomy with a biologic patch angioplasty    (X) * Hemodynamic stability 12/7/2021 09:44:36 EST by Nikky Monk      HR 81-86, //70    (X) * No new neurologic deficits    ( ) * No swallowing difficulty    (X) * No evidence of postoperative or surgical site infection    (X) * No bleeding or growing hematoma at operative site    ( ) * No evidence of myocardial ischemia or infarct    ( ) * Pain absent or managed    ( ) * Discharge plans and education understood    Activity    (X) * Ambulatory or acceptable for next level of care    12/7/2021 09:44:45 EST by Nikky Monk      complete bedrest- activity allowed pt/ot    Routes    (X) * Oral hydration    12/7/2021 09:44:59 EST by Nikky Monk      adult diet    (X) * Oral medications or regimen acceptable for next level of care    12/7/2021 09:45:59 EST by Nikky Monk      glipizide 10 mg po qd, protonix 40 mg po qd, humalog sq q6hr prn, norco 5-325 mg po q4hr prn x3    (X) * Oral diet or acceptable for next level of care    12/7/2021 09:44:59 EST by Nikky Monk      adult diet    (X) Advance diet    12/7/2021 09:44:59 EST by BakariAttention Pointniko      adult diet    Interventions    (X) Neurologic checks    Medications    (X) Aspirin    12/7/2021 09:45:23 EST by Nikky Monk      aspirin 325 mg po qd    (X) Statin    12/7/2021 09:45:23 EST by Nikky Monk      lipitor 80 mg po qd    (X) Antihypertensive medication as needed    12/7/2021 09:45:23 EST by BakariAttention Pointniko      lisinopril 20 mg po qd    (X) Possible beta-blocker    * Milestone   Additional Notes   12/6/21    IP SURGICAL      TRAUMA SURGERY NOTE   Patient examined this morning.  No issues overnight.  Patient is participating with physical therapy.    Currently waiting for placement      NO LABS/IMAGING 12/6      VS:T 98.5F, HR 81-86, //70, RR 20, 99% RA   adult diet, complete bedrest- activity allowed pt/ot, i/o, poc glucose

## 2022-01-07 ENCOUNTER — HOSPITAL ENCOUNTER (OUTPATIENT)
Dept: PHYSICAL THERAPY | Age: 62
Discharge: HOME | End: 2022-01-07
Payer: MEDICAID

## 2022-01-07 PROCEDURE — 97166 OT EVAL MOD COMPLEX 45 MIN: CPT

## 2022-01-07 PROCEDURE — 97162 PT EVAL MOD COMPLEX 30 MIN: CPT

## 2022-01-07 NOTE — PROGRESS NOTES
[address] [address] [facility] [address] [facility], [address] [place] [address]  Ph: [phone]    Fax: [phone]    Initial Evaluation/Plan of Care/Statement of Necessity for Occupational Therapy Services     Patient name: Chito Kurtz   : 1960  [x]  Patient  Verified Provider#: [id number]    Start of Care: 22         Referral source: Ruben Ramirez MD Return visit to MD: 22     Medical/Treatment Diagnosis: Muscle weakness (generalized) [M62.81]    Payor: Asaf Tripp / Plan: Debra Stoney PPO / Product Type: PPO /       Prior Hospitalization: see medical history     Comorbidities: DM, arthritis, PVD      Prior Level of Function: independent  Medications: Verified on Patient Summary List          Patient / Family readiness to learn indicated by: asking questions, trying to perform skills and interest    Persons(s) to be included in education: patient (P)    Barriers to Learning/Limitations: yes;  cognitive    Patient Self Reported Health Status: poor    Rehabilitation Potential: good    Previous Treatment/Compliance: [facility] 1 week    PMHx/Surgical Hx: Right carotid endarterectomy 21    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 Patient states when she woke up, her left arm was \"flailing around\". Her nephew took her  to [facility]. This was 10/18/21. MRI indicated acute infarctions in the right frontal lobe, the  right inferior parietal lobule and posterior temporal lobe.   Patient hospitalized for approximately 3-4 days. She was discharged home without therapy services. Patient was  set up for surgery (endarterectomy) but it was delayed due to medical issues. Patient underwent a right carotid endarterectomy on 12/1/21. Following surgery, patient received inpatient rehab services at [facility] for approximately 1 week. She is now being referred to outpatient OT and PTservices. Patient states she feels like she is going  To fall over when she bends forward to tie her shoes. Patient states it is hard to cut food, write, and 'think. \"  Sleeping is difficult as it is painful to sleep on the L side, she is unable to sleep on the right side due to the recent surgery. Patient states she fatigues by 5 pm.  Mild dysarthria noted during evaluation. PAIN:  Area of pain: L shoulder, base of neck  Pain Descriptors: constant ; shoulder \"pops\" all the time  Pain Level (0-10 scale)   Worst: 10/10  Least: 5/10  Things that worsen pain: raising arm up above shoulder height   Things that ease pain: OTC medication, heat  Pain Level (0-10 scale) pre treatment: 6-7/10, L shoulder/neck Pain Level (0-10 scale) post treatment: 6-7/10      OBJECTIVE:      ADL/IADLs:    Eating:  Difficulty reported with cutting food    UB Dressing: Mod I: increased time/pain  LB  Dressing: Mod I--reports dizziness when bending down to tie shoes             Toileting: Mod I  Bathing: Mod I--difficult reported with washing hair/using L hand  Grooming: mainly uses the R hand  Light Meal Prep: Mod I  Laundry:  Mod I  Driving:  independent  Handwriting:  Fair quality         Sleep:  Reports difficulty/pain with sleeping on the L side      DME/AE: none  Orthotics/Prosthetics:n/a    Mobility: mod I/slow  Transfers:mod I      VISION:  --patient wearing single lens vision glasses (distance)  --uses reading glasses  --patient reports change  In vision; distance vision sometimes blurry; reports fatigue/some blurriness with near vision  Vision screen to be performed next visit      Upper Extremity Assessment:    Hand Dominance: right    Opposition:    Right: intact   Left: intact, decreased speed     Measurements: Taken with Moises Dynamometer, in lbs   Level 2 DATE  1/7/22 DATE DATE DATE DATE   Right 34       Left 25         Pinch Measurements: Taken with Pinch Gauge, in lbs    Date  1/7/22 Date Date   3 pt      Right 6     Left  3     Lateral      Right 8     Left 6     Tip      Right 2     Left 2       Fine Motor:     [employer] HAND FUNCTION TEST (time in seconds)     Hand / Date Right-1/7/22 Left--1/7/22     Writing 21 n/a     Cards 7 10     Small objects 9 21         SENSATION  Light Touch [x]WFL          [] Impaired    Pain/Temperature [x]WFL          [] Impaired    --patient reports tingling in the LUE at night      GMC: impaired, LUE             Intact, RUE    Muscle Tone:normal  Edema:  Not clinically significant    ROM:        Active Active/Passive   DATE:   1/7 1/7       Norms Right Left Right Left   Shoulder Flex 0-180       Ext 0-60 WFL 41      abd 0-180 WFL 90      IR 0-70 S2 S2-slow      ER 0-90 [facility] WFL     Elbow Flex 0-150 WFL WFL      Ext 0 WFL WFL     Forearm Supination 0-80 WFL WFL      Pronation 0-80 WFL WFL     Wrist Flex 0-80 WFL WFL      Ext 0-70 WFL WFL     Finger ABD/ADD  [facility]      Flex/ext  [facility] WFL             Strength:  RUE: grossly 3+/5  LUE: grossly 3-/5 to 3/5      ASSESSMENT/Changes in Function:   Patient is a 63 y/o right hand dominant female referred to Occupational therapy services due to decreased ability to independently engage in all daily activities following 3 strokes that occurred in October 2021. Patient was independent with all activities (including working full time as well as caring for elderly family member). Patient currently uses no DME/AE. She presents with limitations in LUE ROM, strength, GMC  and dexterity; impaired balance, L shoulder pain,  alterations in sensation--LUE.    Suspect visual impairment (to be further assessed at next visit). Feel patient will benefit from a speech therapy evaluation to address dysarthria and possible cognitive impairments. Recommend occupational therapy services to promote improved QOL, maximize independence and ability to engage in desired daily activities. Am-Pac:71.17%    Problem List/Impairments: Pain effecting function, Decreased range of motion, Decreased strength, Decreased coordination/prehension, Decreased ADL/functional abilities , Decreased activity tolerance, Decreased transfer abilities, Sensability and Other; visual impairment    Frequency / Duration: Patient to be seen 1-2 times per week for 24 treatments. Certification Period: 1/7/22 - 3/5/22    Treatment Plan may include any combination of the following: Therapeutic exercise, Therapeutic activities, Neuromuscular re-education, Physical agent/modality, Manual therapy, Splinting/orthoses, Patient education and ADLs/IADLs    Patient/ Caregiver education and instruction: exercises and other OT POC     Patient Goal (s): get back to 100 % if possible to return to work, dance, feel better\"    Short Term Goals: To be accomplished in 10 treatments:   1. Patient will be mod independent with home exercise program to promote gains between sessions    [] Met [] Not met [] Partially met    2. Patient will demonstrate left pinch strength that is WFL's for her age range in order to use the left hand effectively during ADL tasks    [] Met [] Not met [] Partially met    3. Patient will be able to engage in 8 + minutes of sustained standing activities to promote increased independence with IADLs    [] Met [] Not met [] Partially met     Long Term Goals: To be accomplished in 24 treatments:   1. Patient will be able to sleep on her left side with minimal pain/difficulty    [] Met [] Not met [] Partially met    2.   Patient will be able to write information/fill out forms in a timely and legible manner    [] Met [] Not met [] Partially met    3. Patient will be independent with cutting food    [] Met [] Not met [] Partially met    4. Patient will be independent with completing all BADLs    [] Met [] Not met [] Partially met     [x]  Plan of care will be reviewed with ELMO. The Plan of Care is based on information from the initial evaluation. [address] [facility], OTR/L 1/7/2022   ________________________________________________________________________    I certify that the above Therapy Services are being furnished while the patient is under my care. I agree with the treatment plan and certify that this therapy is necessary.     [de-identified] Signature:____________________  Date:____________Time: _________

## 2022-01-07 NOTE — PROGRESS NOTES
[address] [address] [facility] [address] [facility], [address] [place] [address]  Ph: [phone]    Fax: [phone]    Initial Evaluation/Plan of Care/Statement of Necessity for Physical Therapy Services     Patient name: Natalie Berkowitz    Date/Start of Care:2022  : 1960  [x]  Patient  Verified  Provider#: [id number]          Referral source: Bruon Rogers MD    Return visit to MD: 22   Medical/Treatment Diagnosis: Muscle weakness (generalized) [M62.81]    Payor: Norma Tyson / Plan: [employer] PPO / Product Type: PPO /       Prior Hospitalization: see medical history     Comorbidities: see chart   Prior Level of Function: Independent without AD, but reports to be slow. Medications: Verified on Patient Summary List          Patient / Family readiness to learn indicated by: asking questions, trying to perform skills and interest  Persons(s) to be included in education: patient (P)  Barriers to Learning/Limitations: yes;  other reports some memory issues due to CVA. 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 Living Situation: Patient lives with mom (who is currently in a nursing home after having CHF) , trailer, with 5 tall steps to enter with 1 rail. Barriers to progress: weakness   Motivation: good  Substance use: N/A  Cognition: A & O x 3-4 (forgerfullness, recall)   Onset Date: 10/2021  SUBJECTIVE  Patient was referred to therapy for continuation of her rehabiliation s/p CVA with (L) weakness on 10/18. She was in bed sleeping when she woke up and her left hand was flaccid and weak and she couldn't raise it.   She went to ER right the next day and dx her with an acute infarctions of right frontal lobe, parietal lobe including right inferior parietal lobule and posterior temporal lobe as described above. From the hospitalization she was sent home due to blood work issues and she couldn't go to STR until that was resolved and she did a neck surgery. Then she was sent to [facility]   for 2 days and since she was \" a \"walker\" she was D/C . Patient stated she recently she received her  outpatient prescription. Patient stated she did all her rehab at home by herself. She currenlty reports constant nerve pain in the  (L)shoulder, neck and(L) leg. She reports having decreased balance,decreased coordination when she walks, she has difficulty differentiating between R/L sometimes,  difficulty walking, she gets too tired and everything takes her longer to do. Patient stated she is using a heating pad to help her pain level. Functional limitation: ADL's, walking, stairs, and climing stairs with groceries and decreased endurance. Also reports speech issues, fear of falling and getting in/out of the tube. Mechanism of Injury: CVA  Area of pain: L side weakness and nerve pain  Pain Descriptors:  constant achyness    Pain Level (0-10 scale)  At rest: 6/10  With activity: 8-91/0   Worst: 3/10  Least: 9/10   Goal: \" To get stronger so I can go back to work, I am a CNA\".    Objective/Functional Measures including ROM/MMT:   Physical Findings   Ortho:   Posture: trunk leaning towards left,   Gait and Functional Mobility:  Step to, to step through drags (L) LE  Palpation: tight heel cords   Gross findings:  Left handed, R shoulder TTP at shoulder girdle with noticable  post subluxations (AROM limited to 80deg flexion/abd p!)   Swelling: (at night reports more swelling)   Spine:   TRUNK ROM:     Flexion:                         [] N/A  []WNL  []Minimal  [x]Moderate  [] Major (back pain reports to have some disc problem due to her work as a CNA)  Extension:                     [] N/A  []WNL  [x]Minimal  []Moderate  [] Major  (hypomobile but no pain)  Right Lateral Flexion:    [] N/A  [x]WNL  []Minimal  []Moderate  [] Major    Left Lateral Flexion:   [] N/A  [x]WNL  []Minimal  []Moderate  [] Major   Rotation to the Right:  [] N/A  [x]WNL  []Minimal  []Moderate  [] Major   Rotation to the Left:   [] N/A  [x]WNL  []Minimal  []Moderate  [] Major   Right Side Glide:           [] N/A  []WNL  []Minimal  []Moderate  [] Major  Left Side Glide:   [] N/A  []WNL  []Minimal  []Moderate  [] Major  Additional comments: diff bending forward and reports fear of falling. Specific joints: *normal values in ()    Hip      AROM       PROM             MMT   R L R L R L   Flexion (120)     4 4-   Extension (15)     4- 3+   Abduction (40)     4- 4-   Adduction (30)         IR (40)         ER (40)         Additional comments:   PROM WNL no tone noted. Stated her toes curl up at night time. SLR no lag but weakness  Bridge full bridge but uses primarily her R LE      Knee          AROM          PROM                       MMT   R L R L R L   Extension (0)      4+ 4-   Flexion (145)     4 4-   Additional comments: increased knee     Ankle                                 AROM                       PROM                             MMT   R L R L R L   Dorsiflexion (15)  5   4+ 3+   Plantarflexion (50)  50         Inversion (35)   40       Eversion (25)  20       Additional comments:  Patient reports functional limitations with:walking, stairs and ADLs     Neurological: Reflexes / Sensations: slight decreased sensation on the (L) arm/ leg about 80% compared to R. And Tingling sensation on the left leg.   Special Tests: NA     Gait and Functional Mobility:  Transfers:   Bed mobility: independent with increased time  Sit to/from Supine: independnet with increased awarenss about L shoulder   Sit to/from stands: able to stand without UE support   Gait: (L) ankle wip decreased heel to toe pus h off  Assistive device:  None  Gait speed: NT  TUG test: 15  Stairs: negotiated 8'' steps (since that  What she has at home) SOS ascending difficulty pushing up and  STS descending sideway pattern. Sitting Balance: (unsupported)  Static:      [] Normal [x] Good [] Fair [] Poor  Dynamic: [] Normal [x] Good [] Fair [] Poor  Standing Balance:   Static:     [] Normal [x] Good [x] Fair [] Poor WBOS x 1 min/ NBOS 1 min but mild sway and left side lean   Dynamic: [] Normal [] Good [x] Fair [] Poor    Modified Clinical Test of Sensory Interaction (CTSIB-M):    Eyes open/firm surface: 1 min    Eyes closed/firm surface: mild post lean    Eyes open/on foam: NT   Eyes closed/on foam:NT    Tandem Stand: (eyes open/eyes closed)   R foot forward: 3 sec  EO   L foot forward: 1 sec - difficulty with foot placement noted and mild ataxia    Single limb stand:   R: 5 sec  L: 1-2 sec     Functional Reach   Feet Placement: WBOS   Score:  7 inch    <7 inches indicates poor functional balance  COMMENTS (include gait belt, level of assistance): With csv     Five times sit to stand:    5 reps x 27 sec     Normative averages:  Clients younger than 61years old  £  10 seconds = Normal   Clients older than 61years old £ 14.2 seconds = Normal   Change of ³ 2.3 seconds shows a significant clinical improvement    Sj RW. Reference values for the five repetition sit to stand test: a descriptive metaanalysis of data from elders. Percept Mot Skills 2006; 103(1):215-222. Endurance esteban: 2 min walk able to ambulte 280ft with no device - noted increased left ankle lateral weep, decreased heel to toe gait pattern and push off and sligth dysmetria. _ will assess an AFO next visit. Ampac Score:   49.81%   ASSESSMENT/Changes in Function:   Patient is a 63 y/o female referred to skilled PT services with dx of R CVA with (L) HP, patient presents with decreased (L) LE strength, decreased AROM, decreased balance, decreased coordination,decerased endurance, decreased sensation, nerve pain, mild apraxia and gait impairments. Patient will benefit from skilled PT services to address above.    Problem List/Impairments: pain affecting function, decrease ROM, decrease strength, impaired gait/ balance, decrease ADL/ functional abilitiies, decrease activity tolerance, decrease flexibility/ joint mobility and decrease transfer abilities  Treatment Plan may include any combination of the following: Therapeutic exercise, Neuromuscular re-education, Physical agent/modality, Gait/balance training, Manual therapy, Patient education, Functional mobility training and Stair training  Patient/ Caregiver education and instruction: exercises  Frequency / Duration: Patient to be seen 2 times per week for 16-24  treatments. 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     [] Met [] Not met [] Partially met   Short Term Goals: To be accomplished in  2-6 treatments. 1. Patient will be independent with her HEP to progress with POC. [] Met [] Not met [] Partially met   2. Patient nerve pain will decreased to <=3/10 with mobility. [] Met [] Not met [] Partially met   3. Long Term Goals: To be accomplished in 16-27  treatments. 1. Patient will gain 1/2 to 1 grade of LE strength to improve stability. [] Met [] Not met [] Partially met   2. Patient will be able to perform a SLS on each leg for 10 sec, to improve balance. [] Met [] Not met [] Partially met   3. Patient will be able to ambulate > 400 ft with LRAD and (L) AFO? Independently. [] Met [] Not met [] Partially met   4. Patient sit to stand time will decreased by 3-5 sec to improve LE functional strength. [] Met [] Not met [] Partially met   5. Patient will be able to negotiate 4(8'') steps with 1 rail and SOS ascending/desceneding. [] Met [] Not met [] Partially met   6 Patient will be able to perform her household ADLs: vacuuming, picking up items from the floor and getting in/out of the tube with more ease.  [] Met [] Not met [] Partially met   TODAY'S TREATMENT: EVALUATION completed   Visit #: 1  In time: 2:05  Out time: 3:05 Total Treatment Time (min): 60   Total Timed Codes (min): 0 1:1 Treatment Time ( only): 0  Pain Level (0-10 scale) pre treatment: 6/10   Pain Level (0-10 scale) post treatment: 6/10     With   [] TE   [] TA   [] Neuro   [] SC   [] other: Patient Education: [] Review HEP    [] Progressed/Changed HEP based on:   [] positioning   [] body mechanics   [] transfers   [] heat/ice application    [] other: POC and progression with exercises. [x]  Plan of care has been reviewed with PTA. The Plan of Care is based on information from the initial evaluation. Richelle Magdaleno, PT, DPT 1/7/2022   ________________________________________________________________________    I certify that the above Therapy Services are being furnished while the patient is under my care. I agree with the treatment plan and certify that this therapy is necessary.     Physician's Signature:_________________________________________________  Date:____________Time: ____________     Devin Simpson MD

## 2022-01-18 ENCOUNTER — HOSPITAL ENCOUNTER (OUTPATIENT)
Dept: PHYSICAL THERAPY | Age: 62
Discharge: HOME | End: 2022-01-18
Payer: MEDICAID

## 2022-01-18 ENCOUNTER — APPOINTMENT (OUTPATIENT)
Dept: PHYSICAL THERAPY | Age: 62
End: 2022-01-18
Payer: MEDICAID

## 2022-01-18 PROCEDURE — 97110 THERAPEUTIC EXERCISES: CPT

## 2022-01-18 PROCEDURE — 97530 THERAPEUTIC ACTIVITIES: CPT

## 2022-01-18 NOTE — PROGRESS NOTES
OT DAILY TREATMENT NOTE  3-16    Patient Name: Van Vasques  Date:2022  : 1960  [x]  Patient  Verified  Payor: Veronica Luz / Plan: Anais Son / Product Type: Commerical /    In time:105   Out time: 150  Total Treatment Time (min): 45  Visit #: 2 of 24    Treatment Area: Muscle weakness (generalized) [M62.81]    SUBJECTIVE  Pain Level (0-10 scale): 4/10--L shoulder  Any medication changes, allergies to medications, adverse drug reactions, diagnosis change, or new procedure performed?: [x] No    [] Yes (see summary sheet for update)  Subjective functional status/changes:   [x] No changes reported  \"This works everything: your mind, your vision, your arms\" states patient re; card activity    OBJECTIVE    15 min Therapeutic Exercise:  [x] See flow sheet :   Rationale: increase ROM and increase strength to improve the patients ability to engage independently in ADL/IADLs    30 min Therapeutic Activity:  [x]  See flow sheet :   Rationale: increase ROM and improve coordination  to improve the patients ability to engage independently in ADL/IADLs    With   [] TE   [x] TA   [] neuro   [] other: Patient Education: [x] initiated  HEP for dexterity/coordination/ROM   [] Progressed/Changed HEP based on:   [] positioning   [] body mechanics   [] transfers   [] heat/ice application   [] Splint wear/care   [] Sensory re-education   [] scar management      [] other:               Pain Level (0-10 scale) post treatment: 4/10, L shoulder    ASSESSMENT/Changes in Function: Patient tolerance to treatment:  [] poor  [] fair  [x] good  []  excellent  Patient demonstrated decreased speed /dexterity/FMC when manipulating objects with the left hand.   Mild L shoulder hiking noted with reaching tasks  Patient will continue to benefit from skilled OT services to modify and progress therapeutic interventions, address ROM deficits, address strength deficits, analyze and address soft tissue restrictions, analyze and cue movement patterns and address balance, dexterity, visual limitations to attain remaining goals. [x]  See Plan of Care  []  See progress note/recertification  []  See Discharge Summary         Progress towards goals / Updated goals:  Short Term Goals: To be accomplished in 10 treatments:              1.  Patient will be mod independent with home exercise program to promote gains between sessions                          []? Met []? Not met [x]? Partially met               2.  Patient will demonstrate left pinch strength that is WFL's for her age range in order to use the left hand effectively during ADL tasks                          []? Met []? Not met []? Partially met               3.  Patient will be able to engage in 8 + minutes of sustained standing activities to promote increased independence with IADLs                          []? Met []? Not met []? Partially met      Long Term Goals: To be accomplished in 24 treatments:              1.  Patient will be able to sleep on her left side with minimal pain/difficulty                          []? Met []? Not met []? Partially met               2.  Patient will be able to write information/fill out forms in a timely and legible manner                          []? Met []? Not met []? Partially met               3.  Patient will be independent with cutting food                          []? Met []? Not met []? Partially met               4.  Patient will be independent with completing all BADLs                          []? Met []? Not met []?  Partially met     PLAN  [x]  Upgrade activities as tolerated     [x]  Continue plan of care  []  Update interventions per flow sheet       []  Discharge due to:_  []  Other:_      CHITO Barton/ALE 1/18/2022

## 2022-01-18 NOTE — PROGRESS NOTES
PT DAILY TREATMENT NOTE - [facility]     Patient Name: Agustin Amaya  Date:2022  : 1960  [x]  Patient  Verified  Payor: Conchis Granados / Plan: [employer] / Product Type: Commerical /    Treatment Area: Muscle weakness (generalized) [M62.81]   Next MD APPT:   In time:0146pm  Out time:0230pm  Total Treatment Time (min): 44  Total Timed Codes (min): 44  1:1 Treatment Time ( only): 44   Visit #: 2 1    SUBJECTIVE  Pain Level (0-10 scale) pre treatment: 3/10 Pain Level (0-10 scale) post treatment: 5/10  Any medication changes, allergies to medications, adverse drug reactions, diagnosis change, or new procedure performed?:   [] No    [] Yes (see summary sheet for update)  Subjective functional status/changes:   [] No changes reported  Pt is excited to get started with therapy. She is disappointed she has not been able to get much therapy yet since her stroke was back in October. She reports fatigue beginning around 4-5pm each day. She assists her mother during the day. Before her stroke, she was also working the night shift at [employer] in [place]. OBJECTIVE    44 min Therapeutic Exercise:  [x] See flow sheet :   Rationale: increase ROM, increase strength, improve coordination, improve balance and increase proprioception to improve the patients ability to ambulate with decrease risk of falls. With   [x] TE   [] TA   [] Neuro   [] SC   [] other: Patient Education: [x] Review HEP    [] Progressed/Changed HEP based on:   [] positioning   [] body mechanics   [] transfers   [] Use of heat/ice    [] other:          Other Objective/Functional Measures:      ASSESSMENT/Changes in Function:   Pt tolerated session well. Some increase in Bilateral knee pain and stiffness with exercises, especially step-ups/bike. Pt participates well and is highly motivated. Difficulty with balance exercises including tandem walking. Provided pt with a HEP at today's visit.   Patient will continue to benefit from skilled PT services to modify and progress therapeutic interventions, address functional mobility deficits, address strength deficits and instruct in home and community integration to attain remaining goals. 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     []? Met []? Not met []? Partially met   Short Term Goals: To be accomplished in  2-6 treatments. 1. Patient will be independent with her HEP to progress with POC. []? Met []? Not met []? Partially met   2. Patient nerve pain will decreased to <=3/10 with mobility. []? Met []? Not met []? Partially met   Long Term Goals: To be accomplished in 16-27  treatments. 1. Patient will gain 1/2 to 1 grade of LE strength to improve stability. []? Met []? Not met []? Partially met   2. Patient will be able to perform a SLS on each leg for 10 sec, to improve balance. []? Met []? Not met []? Partially met   3. Patient will be able to ambulate > 400 ft with LRAD and (L) AFO? Independently. []? Met []? Not met []? Partially met   4. Patient sit to stand time will decreased by 3-5 sec to improve LE functional strength. []? Met []? Not met []? Partially met   5. Patient will be able to negotiate 4(8'') steps with 1 rail and SOS ascending/desceneding. []? Met []? Not met []? Partially met   6 Patient will be able to perform her household ADLs: vacuuming, picking up items from the floor and getting in/out of the tube with more ease. []? Met []? Not met []?  Partially met     PLAN  []  Upgrade activities as tolerated     [x]  Continue plan of care  [x]  Update interventions per flow sheet       []  Discharge due to:_  []  Other:_      Devon Mills, PT, DPT 1/18/2022

## 2022-01-21 ENCOUNTER — APPOINTMENT (OUTPATIENT)
Dept: PHYSICAL THERAPY | Age: 62
End: 2022-01-21
Payer: MEDICAID

## 2022-01-25 ENCOUNTER — HOSPITAL ENCOUNTER (OUTPATIENT)
Dept: PHYSICAL THERAPY | Age: 62
Discharge: HOME | End: 2022-01-25
Payer: MEDICAID

## 2022-01-25 PROCEDURE — 97110 THERAPEUTIC EXERCISES: CPT

## 2022-01-25 PROCEDURE — 97530 THERAPEUTIC ACTIVITIES: CPT

## 2022-01-25 NOTE — PROGRESS NOTES
PT DAILY TREATMENT NOTE - [facility]     Patient Name: Ximena Douglas  ENTU:  : 1960  [x]  Patient  Verified  Payor: Bhupinder Tabares / Plan: Agustin Putty / Product Type: Commerical /    Treatment Area: Muscle weakness (generalized) [M62.81]   Next MD APPT:   In time:2:48pm   Out time: 3:20pm  Total Treatment Time (min): 32  Total Timed Codes (min): 32  1:1 Treatment Time ( only): 32  Visit #: 3    SUBJECTIVE  Pain Level (0-10 scale) pre treatment: 4/10 Pain Level (0-10 scale) post treatment: 4/10  Any medication changes, allergies to medications, adverse drug reactions, diagnosis change, or new procedure performed?:   [] No    [] Yes (see summary sheet for update)  Subjective functional status/changes:   [] No changes reported  Pt states she has pain on the left side all the time. Pt states she did pretty good after last session. Pt states she is very fatigued by 4-5 pm and she is worried about her job because she works the 11pm-7am shift at a SNF. OBJECTIVE    32 min Therapeutic Exercise:  [x] See flow sheet :   Rationale: increase ROM, increase strength, improve coordination, improve balance and increase proprioception to improve the patients ability to ambulate with decrease risk of falls. With   [x] TE   [] TA   [] Neuro   [] SC   [] other: Patient Education: [x] Review HEP    [] Progressed/Changed HEP based on:   [] positioning   [] body mechanics   [] transfers   [] Use of heat/ice    [] other:          Other Objective/Functional Measures: Added LE stretches     ASSESSMENT/Changes in Function:   Pt with complaints of LE pain/fatigue with exercises. Noted muscular tightness in the quads and calf. Added some stretching to assess if this helps with the pain/fatigue in the LEs. Noted sourav knee crepitus. Gait characterized with increase lateral lean to the R during stance phase. Good L foot clearance noted this session. Will continue to progress exercises as tolerated.     Patient will continue to benefit from skilled PT services to modify and progress therapeutic interventions, address functional mobility deficits, address strength deficits and instruct in home and community integration to attain remaining goals. 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     []? Met []? Not met []? Partially met     Short Term Goals: To be accomplished in  2-6 treatments. 1. Patient will be independent with her HEP to progress with POC. [x]? Met []? Not met []? Partially met 1/25  2. Patient nerve pain will decreased to <=3/10 with mobility. []? Met []? Not met []? Partially met     Long Term Goals: To be accomplished in 16-27  treatments. 1. Patient will gain 1/2 to 1 grade of LE strength to improve stability. []? Met []? Not met []? Partially met   2. Patient will be able to perform a SLS on each leg for 10 sec, to improve balance. []? Met []? Not met []? Partially met   3. Patient will be able to ambulate > 400 ft with LRAD and (L) AFO? Independently. []? Met []? Not met []? Partially met   4. Patient sit to stand time will decreased by 3-5 sec to improve LE functional strength. []? Met []? Not met []? Partially met   5. Patient will be able to negotiate 4(8'') steps with 1 rail and SOS ascending/desceneding. []? Met []? Not met []? Partially met   6 Patient will be able to perform her household ADLs: vacuuming, picking up items from the floor and getting in/out of the tube with more ease. []? Met []? Not met []?  Partially met     PLAN  [x]  Upgrade activities as tolerated     [x]  Continue plan of care  [x]  Update interventions per flow sheet       []  Discharge due to:_  []  Other:_      Genny Holloway, PT, DPT 1/25/2022

## 2022-01-25 NOTE — PROGRESS NOTES
From: Lucia Hansen  To: VIKTORIA Nguyen  Sent: 5/2/2018 12:29 PM CDT  Subject: Meds    Hi Kristen,  I think the fluoxitine is beginning to work, as I've been starting to have good days here & there.  But, I'm wondering if we might increase my dosage on the Naltrexone? It worked great the first week for controlling my cravings,but isn't as effective anymore.  I'm continuing to see my AODA counselor 2x/week as well as my other counselor & going to at the very least 1 A/A mtg/week. So, I am working hard but feel that I need the extra help of the meds.  Please Lmk your thoughts.    Thanks much!   OT DAILY TREATMENT NOTE - [facility]     Patient Name: Natalie Berkowitz  Date:2022  : 1960  [x]  Patient  Verified  Payor: Norma Tyson / Plan: Roselia Hinds / Product Type: Commerical /    In time: 325  Out time  413  Total Treatment Time (min): 48  Total Timed Codes (min): 48  1:1 Treatment Time ( only): 48   Visit #: 3    Treatment Area: Muscle weakness (generalized) [M62.81]    SUBJECTIVE  Pain Level (0-10 scale): 0/10  Any medication changes, allergies to medications, adverse drug reactions, diagnosis change, or new procedure performed?: [x] No    [] Yes (see summary sheet for update)  Subjective functional status/changes:   [x] No changes reported  --patient states she can now cut her nails for the first the time since having the stroke    OBJECTIVE    48 min Therapeutic Activity:  [x]  See flow sheet :   Rationale: improve coordination and iprove visual skills  to improve the patients ability to engage independently in ADL/IADL/leisure tasks    With   [] TE   [x] TA   [] neuro   [] other: Patient Education: [x] initiated HEP --FMC/dexterity   [] Progressed/Changed HEP based on:   [] positioning   [] body mechanics   [] transfers   [] heat/ice application   [] Splint wear/care   [] Sensory re-education   [] scar management      [x] other: purpose of visual screen           Other Objective/Functional Measures:    Visual screen:  Fixation-WFL  Ocular alignment: WFL  Saccades: WNL  Convergence: BFL (11 inches-double)  Ocular ROM: WFL  Visual field: monocular test , R eye open: decreased  left  Vision  (~ at midline)    Pain Level (0-10 scale) post treatment: 5/10--LUE    ASSESSMENT/Changes in Function: Patient tolerance to treatment:  [] poor  [] fair  [] good  [x]  Excellent  Patient reportsrecent  ability to cut her nails--first time since the CVA. Decreased dexterity/FMC noted in the left hand/UE.   Convergence is BFL's which can impact patient's ability to independently engage in ADL and mobility tasks. Patient reports occasional numbness on the dorsal surface of the LUE, neck/shoulder to back of hand--will add nerve glides/sensory re-education to tx    Patient will continue to benefit from skilled OT services to modify and progress therapeutic interventions, address ROM deficits, address strength deficits, analyze and cue movement patterns and address pain, dexterity, balance, and visual impairments to attain remaining goals. [x]  See Plan of Care  []  See progress note/recertification  []  See Discharge Summary         Progress towards goals / Updated goals:    Short Term Goals: To be accomplished in 10 treatments:              5.  Patient will be mod independent with home exercise program to promote gains between sessions                          []? ? Met []? ? Not met [x]? ? Partially met               2. Lucien Lis will demonstrate left pinch strength that is WFL's for her age range in order to use the left hand effectively during ADL tasks                          []? ? Met []? ? Not met []? ? Partially met               3. Lucien Lis will be able to engage in 8 + minutes of sustained standing activities to promote increased independence with IADLs                          []? ? Met []? ? Not met []? ? Partially met      Long Term Goals: To be accomplished in 24 treatments:              1.  Patient will be able to sleep on her left side with minimal pain/difficulty                          []? ? Met []? ? Not met []? ? Partially met               2. Lucien Lis will be able to write information/fill out forms in a timely and legible manner                          []? ? Met []? ? Not met []? ? Partially met               3. Lucien Lis will be independent with cutting food                          []? ? Met []? ? Not met []? ? Partially met               4. Lucien Lis will be independent with completing all BADLs                          []? ? Met []? ? Not met []? ? Partially met   PLAN  [x]  Upgrade activities as tolerated     [x]  Continue plan of care  []  Update interventions per flow sheet       []  Discharge due to:_  [x]  Other: radial nerve glides?/sensory re-education?       Betty Abbott OTR/L 1/25/2022

## 2022-01-28 ENCOUNTER — APPOINTMENT (OUTPATIENT)
Dept: PHYSICAL THERAPY | Age: 62
End: 2022-01-28
Payer: MEDICAID

## 2022-02-02 ENCOUNTER — HOSPITAL ENCOUNTER (OUTPATIENT)
Dept: PHYSICAL THERAPY | Age: 62
Discharge: HOME | End: 2022-02-02
Payer: MEDICAID

## 2022-02-02 PROCEDURE — 97535 SELF CARE MNGMENT TRAINING: CPT

## 2022-02-02 PROCEDURE — 97110 THERAPEUTIC EXERCISES: CPT

## 2022-02-02 NOTE — PROGRESS NOTES
OT DAILY TREATMENT NOTE  3-16    Patient Name: Rian Haines  Date:2022  : 1960  [x]  Patient  Verified  Payor: Monie Wood / Plan: Ileana Abrams / Product Type: Commerical /    In time:  325  Out time: 415  Total Treatment Time (min): 50  Visit #:  3    Treatment Area: Cerebrovascular disease, unspecified [I67.9]  Muscle weakness (generalized) [M62.81]  Type 2 diabetes mellitus with hyperglycemia [E11.65]  Essential (primary) hypertension [I10]    SUBJECTIVE  Pain Level (0-10 scale): 0/10  Any medication changes, allergies to medications, adverse drug reactions, diagnosis change, or new procedure performed?: [x] No    [] Yes (see summary sheet for update)  Subjective functional status/changes:   [x] No changes reported  \"I fell on Saturday\" states patient  --patient states she fell/partially fell  in the yard after coming down the stairs (while letting her dog outside)    OBJECTIVE    35 min Therapeutic Exercise:  [x] See flow sheet :   Rationale: increase ROM and increase strength to improve the patients ability to engage independently and safely in daily activities    15 min Self Care/Home Management: see flow sheet/below    Rationale: increase knowledge, decrease pain in the LUE  to improve the patients ability to sleep with less interruption; perform bed mobility with less difficulty     With   [x] TE   [] TA   [] neuro   [] other: Patient Education: [] Review HEP    [x] Progressed/Changed HEP based on: radial nerve glides  [] positioning   [] body mechanics   [] transfers   [] heat/ice application   [] Splint wear/care   [] Sensory re-education   [] scar management      [] other:             Pain Level (0-10 scale) post treatment: 0/10/ unrated. Patient did note her L shoulder/arm \"felt better\"    ASSESSMENT/Changes in Function: Patient tolerance to treatment:  [] poor  [] fair  [x] good  []  Excellent  Patient reports a recent fall  In the yard.   Concerned about her balance/need for an AFO--deferred to PT services which is scheduled later in the week. Patient reports difficulty/discomfort  In the LUE while sleeping in various positions--education provided on LUE positioning to assist with decreasing pain while sleeping        Patient will continue to benefit from skilled OT services to modify and progress therapeutic interventions, address functional mobility deficits, address ROM deficits, address strength deficits, analyze and address soft tissue restrictions, analyze and cue movement patterns, analyze and modify body mechanics/ergonomics and address pain; coordination deficits, balance impairment to attain remaining goals. [x]  See Plan of Care  []  See progress note/recertification  []  See Discharge Summary         Progress towards goals / Updated goals:  Short Term Goals: To be accomplished in 10 treatments:              7.  Patient will be mod independent with home exercise program to promote gains between sessions                          []? ?? Met []? ?? Not met [x]??? Partially met               2.  Patient will demonstrate left pinch strength that is WFL's for her age range in order to use the left hand effectively during ADL tasks                          []? ?? Met []? ?? Not met []? ?? Partially met               3.  Patient will be able to engage in 8 + minutes of sustained standing activities to promote increased independence with IADLs                          []? ?? Met []? ?? Not met []? ?? Partially met      Long Term Goals: To be accomplished in 24 treatments:              1.  Patient will be able to sleep on her left side with minimal pain/difficulty                          []? ?? Met []? ?? Not met []? ?? Partially met               2.  Patient will be able to write information/fill out forms in a timely and legible manner                          []? ?? Met []? ?? Not met []? ?? Partially met               3.  Patient will be independent with cutting food                          []? ?? Met []? ?? Not met []? ?? Partially met               4. Diamond Patel will be independent with completing all BADLs                          []? ?? Met []? ?? Not met []? ?? Partially met     PLAN  [x]  Upgrade activities as tolerated     [x]  Continue plan of care  []  Update interventions per flow sheet       []  Discharge due to:_  []  Other:_      CHITO Fitzpatrick/L 2/2/2022

## 2022-02-04 ENCOUNTER — HOSPITAL ENCOUNTER (OUTPATIENT)
Dept: PHYSICAL THERAPY | Age: 62
Discharge: HOME | End: 2022-02-04
Payer: MEDICAID

## 2022-02-04 PROCEDURE — 97110 THERAPEUTIC EXERCISES: CPT

## 2022-02-04 NOTE — PROGRESS NOTES
PT DAILY TREATMENT NOTE - [facility]     Patient Name: Lisa Sepulveda  REWY:[id number]  : 1960  [x]  Patient  Verified  Payor: Melody Gurrola / Plan: Teresa Julio / Product Type: Commerical /    Treatment Area: Disorder of vein, unspecified [I87.9]  Type 2 diabetes mellitus with hyperglycemia [E11.65]  Essential (primary) hypertension [I10]  Muscle weakness (generalized) [M62.81]   Next MD APPT:   In time: 0100 pm Out time: 0155 pm  Total Treatment Time (min): 55  Total Timed Codes (min): 41  1:1 Treatment Time ( only): 41  Visit #: 3    SUBJECTIVE  Pain Level (0-10 scale) pre treatment: 4/10 Pain Level (0-10 scale) post treatment: 4/10  Any medication changes, allergies to medications, adverse drug reactions, diagnosis change, or new procedure performed?:   [] No    [] Yes (see summary sheet for update)  Subjective functional status/changes:   [] No changes reported  My left side feels weak and I think both feet are starting to turn in when I walk. I'm fearful of falling. OBJECTIVE  40 min Therapeutic Exercise:  [x] See flow sheet :   Rationale: increase ROM, increase strength, improve coordination, improve balance and increase proprioception to improve the patients ability to ambulate with decrease risk of falls. With   [] TE   [] TA   [] Neuro   [] SC   [] other: Patient Education: [x] Review HEP    [] Progressed/Changed HEP based on:   [] positioning   [] body mechanics   [] transfers   [] Use of heat/ice    [] other:          Other Objective/Functional Measures:    Gait speed is within functional limits and without AD     ASSESSMENT/Changes in Function:   Gait was improved with use of right AFO but patient has active dorsiflexion. She tolerated exercises and reported slightly decreased discomfort at the conclusion of her visit.     Patient will continue to benefit from skilled PT services to modify and progress therapeutic interventions, address functional mobility deficits, address strength deficits and instruct in home and community integration to attain remaining goals. 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     []? Met []? Not met []? Partially met     Short Term Goals: To be accomplished in  2-6 treatments. 1. Patient will be independent with her HEP to progress with POC. [x]? Met []? Not met []? Partially met 1/25  2. Patient nerve pain will decreased to <=3/10 with mobility. []? Met []? Not met []? Partially met     Long Term Goals: To be accomplished in 16-27  treatments. 1. Patient will gain 1/2 to 1 grade of LE strength to improve stability. []? Met []? Not met []? Partially met   2. Patient will be able to perform a SLS on each leg for 10 sec, to improve balance. []? Met []? Not met []? Partially met   3. Patient will be able to ambulate > 400 ft with LRAD and (L) AFO? Independently. []? Met []? Not met []? Partially met   4. Patient sit to stand time will decreased by 3-5 sec to improve LE functional strength. []? Met []? Not met []? Partially met   5. Patient will be able to negotiate 4(8'') steps with 1 rail and SOS ascending/desceneding. []? Met []? Not met []? Partially met   6 Patient will be able to perform her household ADLs: vacuuming, picking up items from the floor and getting in/out of the tube with more ease. []? Met []? Not met []?  Partially met     PLAN  [x]  Upgrade activities as tolerated     [x]  Continue plan of care  [x]  Update interventions per flow sheet       []  Discharge due to:_  []  Other:_      Graciela Mcdonough, PTA 2/4/2022

## 2022-02-07 ENCOUNTER — APPOINTMENT (OUTPATIENT)
Dept: PHYSICAL THERAPY | Age: 62
End: 2022-02-07
Payer: MEDICAID

## 2022-02-09 ENCOUNTER — HOSPITAL ENCOUNTER (OUTPATIENT)
Dept: PHYSICAL THERAPY | Age: 62
Discharge: HOME | End: 2022-02-09
Payer: MEDICAID

## 2022-02-09 PROCEDURE — 97530 THERAPEUTIC ACTIVITIES: CPT

## 2022-02-09 PROCEDURE — 97110 THERAPEUTIC EXERCISES: CPT

## 2022-02-10 NOTE — PROGRESS NOTES
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]  Type 2 diabetes mellitus with hyperglycemia [E11.65]  Essential (primary) hypertension [I10]  Muscle weakness (generalized) [M62.81]   Next MD APPT:   In time: 0115 pm Out time: 200 pm  Total Treatment Time (min): 45  Total Timed Codes (min): 40    Visit #: 3+1    SUBJECTIVE  Pain Level (0-10 scale) pre treatment: 710 Pain Level (0-10 scale) post treatment: 7/10  Any medication changes, allergies to medications, adverse drug reactions, diagnosis change, or new procedure performed?:   [] No    [] Yes (see summary sheet for update)  Subjective functional status/changes:   [x] No changes reported  EASIER FOR ME TO COME TO [facility] THAN [address]    OBJECTIVE  40 min Therapeutic Exercise:  [x] See flow sheet :   Rationale: increase ROM, increase strength, improve coordination, improve balance and increase proprioception to improve the patients ability to ambulate with decrease risk of falls. With   [] TE   [] TA   [] Neuro   [] SC   [] other: Patient Education: [x] Review HEP    [] Progressed/Changed HEP based on:   [] positioning   [] body mechanics   [] transfers   [] Use of heat/ice    [] other:          Other Objective/Functional Measures:    Gait speed is within functional limits and without AD     ASSESSMENT/Changes in Function:       Patient will continue to benefit from skilled PT services to modify and progress therapeutic interventions, address functional mobility deficits, address strength deficits and instruct in home and community integration to attain remaining goals.         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     []? Met []? Not met []? Partially met     Short Term Goals: To be accomplished in  2-6 treatments. 1. Patient will be independent with her HEP to progress with POC. [x]? Met []? Not met []? Partially met 1/25  2. Patient nerve pain will decreased to <=3/10 with mobility. []? Met []? Not met []? Partially met     Long Term Goals: To be accomplished in 16-27  treatments. 1. Patient will gain 1/2 to 1 grade of LE strength to improve stability. []? Met []? Not met []? Partially met   2. Patient will be able to perform a SLS on each leg for 10 sec, to improve balance. []? Met []? Not met []? Partially met   3. Patient will be able to ambulate > 400 ft with LRAD and (L) AFO? Independently. []? Met []? Not met []? Partially met   4. Patient sit to stand time will decreased by 3-5 sec to improve LE functional strength. []? Met []? Not met []? Partially met   5. Patient will be able to negotiate 4(8'') steps with 1 rail and SOS ascending/desceneding. []? Met []? Not met []? Partially met   6 Patient will be able to perform her household ADLs: vacuuming, picking up items from the floor and getting in/out of the tube with more ease. []? Met []? Not met []?  Partially met     PLAN  [x]  Upgrade activities as tolerated     [x]  Continue plan of care  [x]  Update interventions per flow sheet       []  Discharge due to:_  []  Other:_      Jailyn Ellis, PT 2/10/2022

## 2022-02-14 NOTE — PROGRESS NOTES
OT DAILY TREATMENT NOTE  3-16    Patient Name: Natalie Berkowitz  Date:2022  : 1960  [x]  Patient  Verified  Payor: Norma Tyson / Plan: Roselia Hinds / Product Type: Commerical /    In time: 240p  Out time: 320 p  Total Treatment Time (min): 40  Visit #: 5       Late entry of note due to internet down on day of treatment      Treatment Area: Cerebrovascular disease, unspecified [I67.9]  Muscle weakness (generalized) [M62.81]  Type 2 diabetes mellitus with hyperglycemia [E11.65]  Essential (primary) hypertension [I10]    SUBJECTIVE  Pain Level (0-10 scale): 0/10  Any medication changes, allergies to medications, adverse drug reactions, diagnosis change, or new procedure performed?: [x] No    [] Yes (see summary sheet for update)  Subjective functional status/changes:   [x] No changes reported  Patient states she was tired following OT and PT treatment sessions today but it \"felt good\" to get a work out. OBJECTIVE    25 min Therapeutic Exercise:  [x] See flow sheet :   Rationale: increase ROM and increase strength to improve the patients ability to engage independently in daily activities    15 min Therapeutic Activity:  [x]  See flow sheet :   Rationale: increase ROM, improve coordination and improve visual skills  to improve the patients ability to engage independently in daily activities    With   [] TE   [] TA   [] neuro   [] other: Patient Education: [x] Review HEP    [] Progressed/Changed HEP based on:   [] positioning   [] body mechanics   [] transfers   [] heat/ice application   [] Splint wear/care   [] Sensory re-education   [] scar management      [] other:                  Pain Level (0-10 scale) post treatment: 0/10    ASSESSMENT/Changes in Function: Patient tolerance to treatment:  [] poor  [] fair  [x] good  []  excellent  Patient states she liked reaching for items on the overhead shelves--it made her left arm \"work' hard.  Patient located 12/12 items at Mod I level (visual scanning). Patient able to perform 4 radial nerve glides without difficulty--no increase in pain/discomfort reported. Patient will continue to benefit from skilled OT services to modify and progress therapeutic interventions, address functional mobility deficits, address ROM deficits, address strength deficits, analyze and address soft tissue restrictions, analyze and cue movement patterns, analyze and modify body mechanics/ergonomics and address pain, coordination, balance to attain remaining goals.      [x]  See Plan of Care  []  See progress note/recertification  []  See Discharge Summary         Progress towards goals / Updated goals:  Short Term Goals: To be accomplished in 10 treatments:              6.  Patient will be mod independent with home exercise program to promote gains between sessions                          []???? Met []???? Not met [x]???? Partially met               2.  Patient will demonstrate left pinch strength that is WFL's for her age range in order to use the left hand effectively during ADL tasks                          []???? Met []???? Not met []???? Partially met               3.  Patient will be able to engage in 8 + minutes of sustained standing activities to promote increased independence with IADLs                          []???? Met []???? Not met []???? Partially met      Long Term Goals: To be accomplished in 24 treatments:              1.  Patient will be able to sleep on her left side with minimal pain/difficulty                          []???? Met []???? Not met []???? Partially met               2.  Patient will be able to write information/fill out forms in a timely and legible manner                          []???? Met []???? Not met []???? Partially met               3.  Patient will be independent with cutting food                          []???? Met []???? Not met []???? Partially met               4. Johnson Castaneda will be independent with completing all Vick                          []???? Met []???? Not met []???? Partially met     PLAN  [x]  Upgrade activities as tolerated     [x]  Continue plan of care  []  Update interventions per flow sheet       []  Discharge due to:_  []  Other:_      CHITO Fitzpatrick/L 2/14/2022

## 2022-02-16 ENCOUNTER — HOSPITAL ENCOUNTER (OUTPATIENT)
Dept: PHYSICAL THERAPY | Age: 62
Discharge: HOME | End: 2022-02-16
Payer: MEDICAID

## 2022-02-16 PROCEDURE — 97110 THERAPEUTIC EXERCISES: CPT

## 2022-02-16 PROCEDURE — 97530 THERAPEUTIC ACTIVITIES: CPT

## 2022-02-16 NOTE — PROGRESS NOTES
OT DAILY TREATMENT NOTE - [facility]     Patient Name: Anastasiya Valdez  Date:2022  : 1960  [x]  Patient  Verified  Payor: Noemi Myers / Plan: Sia Swanson / Product Type: Commerical /    In time: 255  Out time:345   Total Treatment Time (min): 50    Visit #: 6    Treatment Area: Cerebrovascular disease, unspecified [I67.9]  Muscle weakness (generalized) [M62.81]  Type 2 diabetes mellitus with hyperglycemia [E11.65]  Essential (primary) hypertension [I10]    SUBJECTIVE  Pain Level (0-10 scale): 7/10, generalized pain  Any medication changes, allergies to medications, adverse drug reactions, diagnosis change, or new procedure performed?: [x] No    [] Yes (see summary sheet for update)  Subjective functional status/changes:   [x] No changes reported  Patient arrived 25 minutes late to session due to traffic  Patient states she was sore the evening of her last session but felt great the next day as well as felt she could move her left arm better.     OBJECTIVE    20 min Therapeutic Exercise:  [x] See flow sheet :   Rationale: increase ROM and increase strength to improve the patients ability to engage independently in daily activities    30 min Therapeutic Activity:  [x]  See flow sheet :   Rationale: increase ROM, increase strength, improve coordination, improve balance and improve dexterity, visual skills  to improve the patients ability to engage independently in daily activities    With   [x] TE   [x] TA   [] neuro   [] other: Patient Education: [x] Review HEP    [] Progressed/Changed HEP based on:   [] positioning   [] body mechanics   [] transfers   [] heat/ice application   [] Splint wear/care   [] Sensory re-education   [] scar management      [x] other: discussed result of re-assessment           Other Objective/Functional Measures:    Measurements: Taken with Moises Dynamometer, in lbs   Level 2 DATE  22 DATE  22 DATE DATE DATE   Right 34  40         Left 25  33            Pinch Measurements: Taken with Pinch Gauge, in lbs     Date  1/7/22 Date  2/16 Date   3 pt         Right 6  9     Left  3 9      Lateral         Right 8 11     Left 6 11      Tip         Right 2  8     Left 2 8         Fine Motor:      JAMIREN HAND FUNCTION TEST (time in seconds)      Hand / Date Right-1/7/22 Left--1/7/22  right-2/16 Left-2/16   Writing 21 n/a n/t  n/t   Cards 7 10  10  11   Small objects 9 21  9  13         ROM:            Active Active   DATE:     1/7 1/7 2/16          Norms Right Left left left   Shoulder Flex 0-180   116       Ext 0-60 WFL 41  30       abd 0-180 WFL 90  150             Pain Level (0-10 scale) post treatment: 6/10, generalized pain    ASSESSMENT/Changes in Function: Patient tolerance to treatment:  [] poor  [] fair  [x] good  []  excellent  Patient seen for monthly re-assessment. See above for details. Patient demonstrates improvement in bilateral hand strength, improved L shoulder ROM, improved L hand dexterity. Patient reports L shoulder pain is diminishing some; she is positioning her LUE different when sleeping on the L side. Patient will continue to benefit from skilled OT services to modify and progress therapeutic interventions, address functional mobility deficits, address ROM deficits, address strength deficits, analyze and address soft tissue restrictions, analyze and cue movement patterns, analyze and modify body mechanics/ergonomics and address visual  limitations to attain remaining goals. []  See Plan of Care  [x]  See progress note/recertification  []  See Discharge Summary         Progress towards goals / Updated goals:  Short Term Goals: To be accomplished in 10 treatments:              1.  Patient will be mod independent with home exercise program to promote gains between sessions                          []? Met []? Not met [x]?  Partially met               2.  Patient will demonstrate left pinch strength that is WFL's for her age range in order to use the left hand effectively during ADL tasks                          []? Met []? Not met [x]? Partially met               3.  Patient will be able to engage in 8 + minutes of sustained standing activities to promote increased independence with IADLs                          []? Met []? Not met []? Partially met      Long Term Goals: To be accomplished in 24 treatments:              1.  Patient will be able to sleep on her left side with minimal pain/difficulty--progressing                          []? Met []? Not met [x]? Partially met               2.  Patient will be able to write information/fill out forms in a timely and legible manner                          []? Met []? Not met [x]? Partially met               3.  Patient will be independent with cutting food                          []? Met [x]? Not met []? Partially met               4.  Patient will be independent with completing all BADLs                          []? Met []? Not met []?  Partially met     PLAN  [x]  Upgrade activities as tolerated     [x]  Continue plan of care  []  Update interventions per flow sheet       []  Discharge due to:_  []  Other:_      CHITO Roque/ALE 2/16/2022

## 2022-02-16 NOTE — PROGRESS NOTES
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]  Type 2 diabetes mellitus with hyperglycemia [E11.65]  Essential (primary) hypertension [I10]  Muscle weakness (generalized) [M62.81]   Next MD APPT:   In time: 345 Out time: 425  Total Treatment Time (min): 40  Total Timed Codes (min): 40    Visit #: 3+1    SUBJECTIVE  Pain Level (0-10 scale) pre treatment: 7/10 Pain Level (0-10 scale) post treatment: 7/10  Any medication changes, allergies to medications, adverse drug reactions, diagnosis change, or new procedure performed?:   [] No    [] Yes (see summary sheet for update)  Subjective functional status/changes:   [x] No changes reported  EASIER FOR ME TO COME TO [facility] THAN [address]  Both calves sore again today. OBJECTIVE  40 min Therapeutic Exercise:  [x] See flow sheet :   Rationale: increase ROM, increase strength, improve coordination, improve balance and increase proprioception to improve the patients ability to ambulate with decrease risk of falls. With   [] TE   [] TA   [] Neuro   [] SC   [] other: Patient Education: [x] Review HEP    [] Progressed/Changed HEP based on:   [] positioning   [] body mechanics   [] transfers   [] Use of heat/ice    [] other:          Other Objective/Functional Measures:    Gait speed is within functional limits and without AD     ASSESSMENT/Changes in Function:       Patient will continue to benefit from skilled PT services to modify and progress therapeutic interventions, address functional mobility deficits, address strength deficits and instruct in home and community integration to attain remaining goals.         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     []? Met []? Not met []? Partially met     Short Term Goals: To be accomplished in  2-6 treatments. 1. Patient will be independent with her HEP to progress with POC. [x]? Met []? Not met []? Partially met 1/25  2. Patient nerve pain will decreased to <=3/10 with mobility. []? Met []? Not met []? Partially met     Long Term Goals: To be accomplished in 16-27  treatments. 1. Patient will gain 1/2 to 1 grade of LE strength to improve stability. []? Met []? Not met []? Partially met   2. Patient will be able to perform a SLS on each leg for 10 sec, to improve balance. []? Met []? Not met []? Partially met   3. Patient will be able to ambulate > 400 ft with LRAD and (L) AFO? Independently. []? Met []? Not met []? Partially met   4. Patient sit to stand time will decreased by 3-5 sec to improve LE functional strength. []? Met []? Not met []? Partially met   5. Patient will be able to negotiate 4(8'') steps with 1 rail and SOS ascending/desceneding. []? Met []? Not met []? Partially met   6 Patient will be able to perform her household ADLs: vacuuming, picking up items from the floor and getting in/out of the tube with more ease. []? Met []? Not met []?  Partially met     PLAN  [x]  Upgrade activities as tolerated     [x]  Continue plan of care  [x]  Update interventions per flow sheet       []  Discharge due to:_  []  Other:_      Reagan Powell, PT 2/16/2022

## 2022-02-16 NOTE — PROGRESS NOTES
[address] [facility], [address] [facility]  [place]  Ph: [phone]    Fax: [phone]    Occupational Therapy Progress Report  Name: López Schultz   : 1960   MD: Jermaine Manrique MD     Treatment Diagnosis: Cerebrovascular disease, unspecified [I67.9]  Muscle weakness (generalized) [M62.81]  Type 2 diabetes mellitus with hyperglycemia [E11.65]  Essential (primary) hypertension [I10]     Start of Care: 22  Visits from Start of Care: 6  Missed Visits: 3    Summary of Care/Goals:  Patient referred to occupational therapy services due to decreased independence performing daily activities as a result of a CVA. Patient initially was evaluated and seen at the [facility] location; however, patient changed to the [facility] due to convenience/closer to her home. Patient seen for monthly re-evaluation on 22. See below for details. Patient demonstrates improvement in bilateral hand strength, improved L shoulder ROM, improved L hand dexterity. Patient reports L shoulder pain is diminishing some; she is positioning her LUE different when sleeping on the L side. Patient is progressing towards goals. See below for details. Feel patient will continue to benefit from skilled OT services to address limitations, promote maximal level of independence with daily activities, improve QOL.        Measurements: Taken with Moises Dynamometer, in lbs   Level 2 DATE  22 DATE  22 DATE DATE DATE   Right 34  40         Left 25  33            Pinch Measurements: Taken with Pinch Gauge, in lbs     Date  22 Date   Date   3 pt         Right 6  9     Left  3 9      Lateral         Right 8 11     Left 6 11      Tip         Right 2  8     Left 2 8         Fine Motor:      LOLABSEN HAND FUNCTION TEST (time in seconds)      Hand / Date Right-22 Left--22  right- Left-   Writing 21 n/a n/t  n/t   Cards 7 10  10  11   Small objects 9 21  9  13         ROM:                     Active Active   DATE:     1/7 1/7 2/16          Norms Right Left left left   Shoulder Flex 0-180   116       Ext 0-60 WFL 41  30       abd 0-180 WFL 90  150          Short Term Goals: To be accomplished in 10 treatments:              1.  Patient will be mod independent with home exercise program to promote gains between sessions                          []? ? Met []? ? Not met [x]? ? Partially met               2. Linda Jean-Baptiste will demonstrate left pinch strength that is WFL's for her age range in order to use the left hand effectively during ADL tasks                          []? ? Met []? ? Not met [x]? ? Partially met               3. Linda Jean-Baptiste will be able to engage in 8 + minutes of sustained standing activities to promote increased independence with IADLs                          []? ? Met []? ? Not met []? ? Partially met      Long Term Goals: To be accomplished in 24 treatments:              1.  Patient will be able to sleep on her left side with minimal pain/difficulty--progressing                          []? ? Met []? ? Not met [x]? ? Partially met               2. Linda Jean-Baptiste will be able to write information/fill out forms in a timely and legible manner                          []? ? Met []? ? Not met [x]? ? Partially met               3. Linda Jean-Baptiste will be independent with cutting food                          []? ? Met [x]? ? Not met []? ? Partially met               4. Linda Jean-Baptiste will be independent with completing all BADLs                          []? ? Met []? ? Not met []? ? Partially met        [facility]:  71.17 %    Recommendations: continue OT services per POC (3/5/22)    [x]  Plan of care will be  reviewed with ELMO. [address] [facility], OTR/L 2/16/2022     ________________________________________________________________________     Please retain this original for your records.

## 2022-02-23 ENCOUNTER — HOSPITAL ENCOUNTER (OUTPATIENT)
Dept: PHYSICAL THERAPY | Age: 62
Discharge: HOME | End: 2022-02-23
Payer: MEDICAID

## 2022-02-23 PROCEDURE — 97110 THERAPEUTIC EXERCISES: CPT

## 2022-02-23 PROCEDURE — 97530 THERAPEUTIC ACTIVITIES: CPT

## 2022-02-23 PROCEDURE — 97535 SELF CARE MNGMENT TRAINING: CPT

## 2022-02-23 NOTE — PROGRESS NOTES
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]  Type 2 diabetes mellitus with hyperglycemia [E11.65]  Essential (primary) hypertension [I10]  Muscle weakness (generalized) [M62.81]   Next MD APPT:   In time: 345 Out time: 425  Total Treatment Time (min): 40  Total Timed Codes (min): 40    Visit #: 3+1    SUBJECTIVE  Pain Level (0-10 scale) pre treatment: 7/10 Pain Level (0-10 scale) post treatment: 7/10  Any medication changes, allergies to medications, adverse drug reactions, diagnosis change, or new procedure performed?:   [] No    [] Yes (see summary sheet for update)  Subjective functional status/changes:   [x] No changes reported  EASIER FOR ME TO COME TO [facility] THAN [address]  Both calves sore again today. OBJECTIVE  40 min Therapeutic Exercise:  [x] See flow sheet :   Rationale: increase ROM, increase strength, improve coordination, improve balance and increase proprioception to improve the patients ability to ambulate with decrease risk of falls. With   [] TE   [] TA   [] Neuro   [] SC   [] other: Patient Education: [x] Review HEP    [] Progressed/Changed HEP based on:   [] positioning   [] body mechanics   [] transfers   [] Use of heat/ice    [] other:          Other Objective/Functional Measures:    Gait speed is within functional limits and without AD     ASSESSMENT/Changes in Function:       Patient will continue to benefit from skilled PT services to modify and progress therapeutic interventions, address functional mobility deficits, address strength deficits and instruct in home and community integration to attain remaining goals.         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     []? Met []? Not met []? Partially met     Short Term Goals: To be accomplished in  2-6 treatments. 1. Patient will be independent with her HEP to progress with POC. [x]? Met []? Not met []? Partially met 1/25  2. Patient nerve pain will decreased to <=3/10 with mobility. []? Met []? Not met []? Partially met     Long Term Goals: To be accomplished in 16-27  treatments. 1. Patient will gain 1/2 to 1 grade of LE strength to improve stability. []? Met []? Not met []? Partially met   2. Patient will be able to perform a SLS on each leg for 10 sec, to improve balance. []? Met []? Not met []? Partially met   3. Patient will be able to ambulate > 400 ft with LRAD and (L) AFO? Independently. []? Met []? Not met []? Partially met   4. Patient sit to stand time will decreased by 3-5 sec to improve LE functional strength. []? Met []? Not met []? Partially met   5. Patient will be able to negotiate 4(8'') steps with 1 rail and SOS ascending/desceneding. []? Met []? Not met []? Partially met   6 Patient will be able to perform her household ADLs: vacuuming, picking up items from the floor and getting in/out of the tube with more ease. []? Met []? Not met []?  Partially met     PLAN  [x]  Upgrade activities as tolerated     [x]  Continue plan of care  [x]  Update interventions per flow sheet       []  Discharge due to:_  []  Other:_      Jailyn Ellis, PT 2/23/2022

## 2022-02-23 NOTE — PROGRESS NOTES
PT DAILY TREATMENT NOTE - [facility]     Patient Name: Efrain Rogers  Date:2022 PATIENT SEEN ON 22 - UNABLE TO OPEN NOTE ON THAT DAY DUE TO POWER OUTAGE  : 1960  [x]  Patient  Verified  Payor: Peggy Velazquez / Plan: Genita Gave / Product Type: Commerical /    Treatment Area: Cerebrovascular disease, unspecified [I67.9]  Muscle weakness (generalized) [M62.81]  Type 2 diabetes mellitus with hyperglycemia [E11.65]  Essential (primary) hypertension [I10]   Next MD APPT:   In time: 0115 pm Out time: 200 pm  Total Treatment Time (min): 45  Total Timed Codes (min): 40    Visit #: 6    SUBJECTIVE  Pain Level (0-10 scale) pre treatment: 5/10 Pain Level (0-10 scale) post treatment: 4/10  Any medication changes, allergies to medications, adverse drug reactions, diagnosis change, or new procedure performed?:   [] No    [] Yes (see summary sheet for update)  Subjective functional status/changes:   [x] No changes reported  EASIER FOR ME TO COME TO 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 With   [] TE   [] TA   [] Neuro   [] SC   [] other: Patient Education: [x] Review HEP    [] Progressed/Changed HEP based on:   [] positioning   [] body mechanics   [] transfers   [] Use of heat/ice    [] other:          Other Objective/Functional Measures:  GAIT; 2 MPH. RIGHT LIMP . Gait speed is within functional limits and without AD .     ASSESSMENT/Changes in Function:       Patient will continue to benefit from skilled PT services to modify and progress therapeutic interventions, address functional mobility deficits, address strength deficits and instruct in home and community integration to attain remaining goals. 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     []? Met []? Not met []? Partially met     Short Term Goals: To be accomplished in  2-6 treatments. 1. Patient will be independent with her HEP to progress with POC. [x]? Met []? Not met []? Partially met 1/25  2. Patient nerve pain will decreased to <=3/10 with mobility. []? Met []? Not met []? Partially met     Long Term Goals: To be accomplished in 16-27  treatments. 1. Patient will gain 1/2 to 1 grade of LE strength to improve stability. []? Met []? Not met []? Partially met   2. Patient will be able to perform a SLS on each leg for 10 sec, to improve balance. []? Met []? Not met []? Partially met   3. Patient will be able to ambulate > 400 ft with LRAD and (L) AFO? Independently. []? Met []? Not met []? Partially met   4. Patient sit to stand time will decreased by 3-5 sec to improve LE functional strength. []? Met []? Not met []? Partially met   5. Patient will be able to negotiate 4(8'') steps with 1 rail and SOS ascending/desceneding. []? Met []? Not met []? Partially met   6 Patient will be able to perform her household ADLs: vacuuming, picking up items from the floor and getting in/out of the tube with more ease. []? Met []? Not met []?  Partially met     PLAN  [x]  Upgrade activities as tolerated     [x]  Continue plan of care  [x]  Update interventions per flow sheet       []  Discharge due to:_  []  Other:_      Juan Márquez, PT 2/23/2022

## 2022-02-23 NOTE — PROGRESS NOTES
PT DAILY TREATMENT NOTE - 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]  Type 2 diabetes mellitus with hyperglycemia [E11.65]  Essential (primary) hypertension [I10]  Muscle weakness (generalized) [M62.81]   Next MD APPT:   In time: 023 Out time: 315  Total Treatment Time (min): 45  Total Timed Codes (min): 40    Visit #: 3+1    SUBJECTIVE  Pain Level (0-10 scale) pre treatment: 5/10 Pain Level (0-10 scale) post treatment: 4/10  Any medication changes, allergies to medications, adverse drug reactions, diagnosis change, or new procedure performed?:   [] No    [] Yes (see summary sheet for update)  Subjective functional status/changes:   [x] No changes reported  EASIER FOR ME TO COME TO [facility] PT THAN [facility] [address]  NEED TO RETURN TO WORK, BUT I HAVE TO BE MADE STRONGER WITH PT AND OT FIRST. OBJECTIVE  40 min Therapeutic Exercise:  [x] See flow sheet :   Rationale: increase ROM, increase strength, improve coordination, improve balance and increase proprioception to improve the patients ability to ambulate with decrease risk of falls. With   [] TE   [] TA   [] Neuro   [] SC   [] other: Patient Education: [x] Review HEP    [] Progressed/Changed HEP based on:   [] positioning   [] body mechanics   [] transfers   [] Use of heat/ice    [] other:          Other Objective/Functional Measures:  WALK SPEED 2 MPH. VARIABLE LIMP. ENDURANCE TODAY 125 FEET X 10 NONSTOP  ASSESSMENT/Changes in Function:   BETTER WALKING SPEED AND ENDURANCE AS ABOVE.  TO O.T. AFTER TODAY'S PT    Patient will continue to benefit from skilled PT services to modify and progress therapeutic interventions, address functional mobility deficits, address strength deficits and instruct in home and community integration to attain remaining goals.         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     []? Met [x]? Not met []? Partially met     Short Term Goals: To be accomplished in  2-6 treatments. 1. Patient will be independent with her HEP to progress with POC. [x]? Met []? Not met []? Partially met 1/25  2. Patient nerve pain will decreased to <=3/10 with mobility. []? Met []? Not met []? Partially met     Long Term Goals: To be accomplished in 16-27  treatments. 1. Patient will gain 1/2 to 1 grade of LE strength to improve stability. []? Met []? Not met []? Partially met   2. Patient will be able to perform a SLS on each leg for 10 sec, to improve balance. []? Met []? Not met []? Partially met   3. Patient will be able to ambulate > 400 ft with LRAD and (L) AFO? Independently. []? Met []? Not met []? Partially met   4. Patient sit to stand time will decreased by 3-5 sec to improve LE functional strength. []? Met []? Not met []? Partially met   5. Patient will be able to negotiate 4(8'') steps with 1 rail and SOS ascending/desceneding. []? Met []? Not met []? Partially met   6 Patient will be able to perform her household ADLs: vacuuming, picking up items from the floor and getting in/out of the tube with more ease. []? Met []? Not met []?  Partially met     PLAN  [x]  Upgrade activities as tolerated     [x]  Continue plan of care  [x]  Update interventions per flow sheet         Juan Márquez, PT 2/23/2022

## 2022-02-23 NOTE — PROGRESS NOTES
OT DAILY TREATMENT NOTE  3-16    Patient Name: Natalie Berkowitz  Date:2022  : 1960  [x]  Patient  Verified  Payor: Norma Tyson / Plan: Roselia Hinds / Product Type: Commerical /    In time: 317  Out time:  404  Total Treatment Time (min): 52  Visit #:  7    Treatment Area: Cerebrovascular disease, unspecified [I67.9]  Muscle weakness (generalized) [M62.81]  Type 2 diabetes mellitus with hyperglycemia [E11.65]  Essential (primary) hypertension [I10]    SUBJECTIVE  Pain Level (0-10 scale): 4- 510, LUE  Any medication changes, allergies to medications, adverse drug reactions, diagnosis change, or new procedure performed?: [x] No    [] Yes (see summary sheet for update)  Subjective functional status/changes:   [x] No changes reported  --Patient to see PCP on 3/1/22  --Patient states her left arm is getting better.  She is able to place and keep her L hand on the steering wheel now while driving      OBJECTIVE    20 min Therapeutic Exercise:  [x] See flow sheet :   Rationale: increase ROM and increase strength to improve the patients ability to be independent with ADL/IADLs    15 min Therapeutic Activity:  [x]  See flow sheet :   Rationale: improve coordination  to improve the patients ability to independently engage in IADLs    12 min Self Care/Home Management: see flow sheet   Rationale: improve coordination  to improve the patients ability to independently cut food during meal time    With   [] TE   [] TA   [] neuro   [x] other: Patient Education: [x] Review HEP    [] Progressed/Changed HEP based on:   [] positioning   [] body mechanics   [] transfers   [] heat/ice application   [] Splint wear/care   [] Sensory re-education   [] scar management      [x] other: cutting food; using AE                Pain Level (0-10 scale) post treatment: 7/10--L shoulder    ASSESSMENT/Changes in Function: Patient tolerance to treatment:  [] poor  [] fair  [x] good  []  excellent  Patient reports difficulty cutting her food during mealtime. Provided patient with Dycem and red cylindrical foam for her utensil(s) at home. Patient demonstrated ability to cut \"food\" at mod I level. Patient demonstrates decreased L hand fine motor/dexterity skills--provided ideas for tasks/exercises at home. Patient will continue to benefit from skilled OT services to modify and progress therapeutic interventions, address ROM deficits, address strength deficits, analyze and address soft tissue restrictions, analyze and cue movement patterns and address pain, coordination, and visual limitations to attain remaining goals. [x]  See Plan of Care  []  See progress note/recertification  []  See Discharge Summary         Progress towards goals / Updated goals:  Short Term Goals: To be accomplished in 10 treatments:              7.  Patient will be mod independent with home exercise program to promote gains between sessions                          [x]? ?? Met []? ?? Not met []? ?? Partially met               2. Shary Schirmer will demonstrate left pinch strength that is WFL's for her age range in order to use the left hand effectively during ADL tasks                          []? ?? Met []? ?? Not met [x]??? Partially met               3.  Patient will be able to engage in 8 + minutes of sustained standing activities to promote increased independence with IADLs                          []? ?? Met []? ?? Not met []? ?? Partially met      Long Term Goals: To be accomplished in 24 treatments:              1.  Patient will be able to sleep on her left side with minimal pain/difficulty--progressing                          []? ?? Met []? ?? Not met [x]??? Partially met               2.  Patient will be able to write information/fill out forms in a timely and legible manner                          []? ?? Met []? ?? Not met [x]??? Partially met               3.  Patient will be independent with cutting food                          []? ?? Met []? ?? Not met [x]??? Partially met               4. Juanito Zapata will be independent with completing all BADLs                          []? ?? Met []? ?? Not met []? ?? Partially met     PLAN  [x]  Upgrade activities as tolerated     [x]  Continue plan of care  []  Update interventions per flow sheet       []  Discharge due to:_  []  Other:_      Pinellas Scar, OTR/L 2/23/2022

## 2022-02-28 ENCOUNTER — HOSPITAL ENCOUNTER (OUTPATIENT)
Dept: PHYSICAL THERAPY | Age: 62
Discharge: HOME | End: 2022-02-28
Payer: MEDICAID

## 2022-02-28 PROCEDURE — 97530 THERAPEUTIC ACTIVITIES: CPT

## 2022-02-28 PROCEDURE — 97110 THERAPEUTIC EXERCISES: CPT

## 2022-02-28 NOTE — PROGRESS NOTES
PT PROGRESS NOTE - [facility]     Patient Name: Joe Zheng  Date:2022   : 1960  Visit #: 7    SUBJECTIVE  Pain Level (0-10 scale) pre treatment: 5/10 Pain Level (0-10 scale) post treatment: 4/10  Reports that she feels pain all over but mostly in her legs. Doesn't feel increase during PT but feels it later after she gets home. R side is worse than L. 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 OBJECTIVE  WALK SPEED 2 MPH. VARIABLE LIMP. ENDURANCE TODAY 125 FEET X 10 NONSTOP    ASSESSMENT/Changes in Function:   BETTER WALKING SPEED AND ENDURANCE AS ABOVE. But limps on the R LE that worsens as she fatigues. Can perform sit to stand without UE assist if she is sitting on the edge of the chair. Fatigues at the end of each specific exercise. Patient will continue to benefit from skilled PT services to modify and progress therapeutic interventions, address functional mobility deficits, address strength deficits and instruct in home and community integration to attain remaining goals. 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     []? Met [x]? Not met []? Partially met     Short Term Goals: To be accomplished in  2-6 treatments. 1. Patient will be independent with her HEP to progress with POC. [x]? Met []? Not met []? Partially met   2. Patient nerve pain will decreased to <=3/10 with mobility. []? Met [x]? Not met []? Partially met     Long Term Goals: To be accomplished in 16-27  treatments. 1. Patient will gain 1/2 to 1 grade of LE strength to improve stability. []? Met []? Not met [x]? Partially met   2. Patient will be able to perform a SLS on each leg for 10 sec, to improve balance. []? Met []? Not met [x]? Partially met  can stand for 3-5 seconds  3. Patient will be able to ambulate > 400 ft with LRAD and (L) AFO? Independently. [x]? Met []? Not met []? Partially met 2/28 but has limp  4. Patient sit to stand time will decreased by 3-5 sec to improve LE functional strength. [x]? Met []? Not met []? Partially met   5. Patient will be able to negotiate 4(8'') steps with 1 rail and SOS ascending/desceneding. [x]? Met []? Not met []? Partially met   6 Patient will be able to perform her household ADLs: vacuuming, picking up items from the floor and getting in/out of the tube with more ease. []? Met []? Not met [x]?  Partially met     PLAN  [x]  Upgrade activities as tolerated     [x]  Continue plan of care  [x]  Update interventions per flow sheet         Kim Greco, PT 2/28/2022

## 2022-02-28 NOTE — PROGRESS NOTES
OT DAILY TREATMENT NOTE  3-16    Patient Name: Leisa Stewart  Date:2022  : 1960  [x]  Patient  Verified  Payor: Yves Tirado / Plan: Lennox Nephew / Product Type: Commerical /    In time: 145 p  Out time: 230  Total Treatment Time (min): 45  Visit #: 8       Visit count could not be calculated. Make sure you are using a visit which is associated with an episode. Treatment Area: Cerebrovascular disease, unspecified [I67.9]  Muscle weakness (generalized) [M62.81]  Type 2 diabetes mellitus with hyperglycemia [E11.65]  Essential (primary) hypertension [I10]    SUBJECTIVE  Pain Level (0-10 scale): 0/10  Any medication changes, allergies to medications, adverse drug reactions, diagnosis change, or new procedure performed?: [x] No    [] Yes (see summary sheet for update)  Subjective functional status/changes:   [x] No changes reported  --patient to see her PCP tomorrow (3/1/22)  --patient states she is able to use tweezers now    OBJECTIVE    20 min Therapeutic Exercise:  [x] See flow sheet :   Rationale: increase ROM and increase strength to improve the patients ability to engage independently in ADLs/IADLs    25 min Therapeutic Activity:  [x]  See flow sheet :   Rationale: increase ROM, improve coordination and increase visual skills  to improve the patients ability to engage independently in daily activities    With   [] TE   [x] TA   [] neuro   [] other: Patient Education: [x] Review HEP    [] Progressed/Changed HEP based on:   [] positioning   [] body mechanics   [] transfers   [] heat/ice application   [] Splint wear/care   [] Sensory re-education   [] scar management      [x] other: dexterity [address] tasks             Pain Level (0-10 scale) post treatment: 0/10    ASSESSMENT/Changes in Function: Patient tolerance to treatment:  [] poor  [] fair  [] good  []  Excellent  Patient states she is able to use tweezers now. Min difficulty noted when  manipulating small objects with the left hand. Patient with decreased LUE strength as well as focus/attention to the left side as noted by L hand /UE slowing slipping over dowel lori, decreased range on second half of 10 rep exercises. Patient will continue to benefit from skilled OT services to modify and progress therapeutic interventions, address ROM deficits, address strength deficits, analyze and cue movement patterns, assess and modify postural abnormalities and address coordination and visual limitations to attain remaining goals. [x]  See Plan of Care  []  See progress note/recertification  []  See Discharge Summary         Progress towards goals / Updated goals:  Short Term Goals: To be accomplished in 10 treatments:              5.  Patient will be mod independent with home exercise program to promote gains between sessions                          [x]? ??? Met []???? Not met []???? Partially met               2.  Patient will demonstrate left pinch strength that is WFL's for her age range in order to use the left hand effectively during ADL tasks                          []???? Met []???? Not met [x]???? Partially met               3.  Patient will be able to engage in 8 + minutes of sustained standing activities to promote increased independence with IADLs                          []???? Met []???? Not met []???? Partially met      Long Term Goals: To be accomplished in 24 treatments:              1.  Patient will be able to sleep on her left side with minimal pain/difficulty--progressing                          []???? Met []???? Not met [x]???? Partially met               2.  Patient will be able to write information/fill out forms in a timely and legible manner                          []???? Met []???? Not met [x]???? Partially met               3.  Patient will be independent with cutting food                          []???? Met []???? Not met [x]???? Partially met               4. Sarai Haque will be independent with completing all Vick                          []???? Met []???? Not met []???? Partially met     PLAN  [x]  Upgrade activities as tolerated     [x]  Continue plan of care  []  Update interventions per flow sheet       []  Discharge due to:_  [x]  Other: re-assess for [employer], OTR/L 2/28/2022

## 2022-02-28 NOTE — PROGRESS NOTES
PT DAILY TREATMENT NOTE - [facility]     Patient Name: Alexa Lamas  Date:2022   : 1960  [x]  Patient  Verified  Payor: Refugio Ordonez / Plan: Omero Abreu / Product Type: Commerical /    Treatment Area: Disorder of vein, unspecified [I87.9]  Type 2 diabetes mellitus with hyperglycemia [E11.65]  Essential (primary) hypertension [I10]  Muscle weakness (generalized) [M62.81]   Next MD APPT:   In time: 235 Out time: 330  Total Treatment Time (min): 55  Total Timed Codes (min): 50    Visit #: 3+1    SUBJECTIVE  Pain Level (0-10 scale) pre treatment: 5/10 Pain Level (0-10 scale) post treatment: 4/10  Any medication changes, allergies to medications, adverse drug reactions, diagnosis change, or new procedure performed?:   [] No    [] Yes (see summary sheet for update)  Subjective functional status/changes:   [x] No changes reported  Reports that she feels pain all over but mostly in her legs. Doesn't feel increase during PT but feels it later after she gets home. R side is worse than L. 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 OBJECTIVE  50 min Therapeutic Exercise:  [x] See flow sheet :   Rationale: increase ROM, increase strength, improve coordination, improve balance and increase proprioception to improve the patients ability to ambulate with decrease risk of falls. With   [] TE   [] TA   [] Neuro   [] SC   [] other: Patient Education: [x] Review HEP    [] Progressed/Changed HEP based on:   [] positioning   [] body mechanics   [] transfers   [] Use of heat/ice    [] other:          Other Objective/Functional Measures:  WALK SPEED 2 MPH. VARIABLE LIMP. ENDURANCE TODAY 125 FEET X 10 NONSTOP    ASSESSMENT/Changes in Function:   BETTER WALKING SPEED AND ENDURANCE AS ABOVE. But limps on the R LE that worsens as she fatigues. Can perform sit to stand without UE assist if she is sitting on the edge of the chair.   Fatigues at the end of each specific exercise. Patient will continue to benefit from skilled PT services to modify and progress therapeutic interventions, address functional mobility deficits, address strength deficits and instruct in home and community integration to attain remaining goals. 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     []? Met [x]? Not met []? Partially met     Short Term Goals: To be accomplished in  2-6 treatments. 1. Patient will be independent with her HEP to progress with POC. [x]? Met []? Not met []? Partially met 2/28  2. Patient nerve pain will decreased to <=3/10 with mobility. []? Met [x]? Not met []? Partially met     Long Term Goals: To be accomplished in 16-27  treatments. 1. Patient will gain 1/2 to 1 grade of LE strength to improve stability. []? Met []? Not met [x]? Partially met   2. Patient will be able to perform a SLS on each leg for 10 sec, to improve balance. []? Met []? Not met [x]? Partially met 2/28 can stand for 3-5 seconds  3. Patient will be able to ambulate > 400 ft with LRAD and (L) AFO? Independently. [x]? Met []? Not met []? Partially met 2/28 but has limp  4. Patient sit to stand time will decreased by 3-5 sec to improve LE functional strength. [x]? Met []? Not met []? Partially met   5. Patient will be able to negotiate 4(8'') steps with 1 rail and SOS ascending/desceneding. [x]? Met []? Not met []? Partially met   6 Patient will be able to perform her household ADLs: vacuuming, picking up items from the floor and getting in/out of the tube with more ease. []? Met []? Not met [x]?  Partially met     PLAN  [x]  Upgrade activities as tolerated     [x]  Continue plan of care  [x]  Update interventions per flow sheet         Emeli Chen, PT 2/28/2022

## 2022-03-02 ENCOUNTER — HOSPITAL ENCOUNTER (OUTPATIENT)
Dept: PHYSICAL THERAPY | Age: 62
Discharge: HOME | End: 2022-03-02
Payer: MEDICAID

## 2022-03-02 PROCEDURE — 97530 THERAPEUTIC ACTIVITIES: CPT

## 2022-03-02 PROCEDURE — 97110 THERAPEUTIC EXERCISES: CPT

## 2022-03-02 NOTE — PROGRESS NOTES
OT DAILY TREATMENT NOTE  3-16    Patient Name: Courtenay Spurling  Date:3/2/2022  : 1960  [x]  Patient  Verified  Payor: Haley Garza / Plan: Mark Fernandez / Product Type: Commerical /    In time: 100 p  Out time: 145 p  Total Treatment Time (min): 45  Visit #: 9    Treatment Area: Cerebrovascular disease, unspecified [I67.9]  Muscle weakness (generalized) [M62.81]  Type 2 diabetes mellitus with hyperglycemia [E11.65]  Essential (primary) hypertension [I10]    SUBJECTIVE  Pain Level (0-10 scale): 0/10  Any medication changes, allergies to medications, adverse drug reactions, diagnosis change, or new procedure performed?: [x] No    [] Yes (see summary sheet for update)  Subjective functional status/changes:   [x] No changes reported  -patient states she was seen by her PCP yesterday  --patient states her STD runs out at the end of the month.   She does not feel as if she can't safely  return to work right now    OBJECTIVE    25 min Therapeutic Exercise:  [x] See flow sheet :   Rationale: increase ROM and increase strength to improve the patients ability to engage in daily activities with less difficulty  -re-assessment for  [facility]    15 min Therapeutic Activity:  [x]  See flow sheet :   Rationale: improve coordination  to improve the patients ability to write legibly  --re-assessment for  [facility]    With   [] TE   [] TA   [] neuro   [x] other: Patient Education: [x] Review HEP    [] Progressed/Changed HEP based on:   [] positioning   [] body mechanics   [] transfers   [] heat/ice application   [] Splint wear/care   [] Sensory re-education   [] scar management      [x] other: results of re-assessment; goals, POC            Other Objective/Functional Measures:       Measurements: Taken with Moises Dynamometer, in lbs   Level 2 DATE  22 DATE  22 DATE  3/2 DATE DATE   Right 34  40 41        Left 25  33  33          Pinch Measurements: Taken with Pinch Gauge, in lbs     Date  22 Date   Date  3/2 3 pt         Right 6  9  9   Left  3 9   9   Lateral         Right 8 11  12   Left 6 11   11   Tip         Right 2  8 8    Left 2 8   8      Fine Motor:      JEBSEN HAND FUNCTION TEST (time in seconds)      Hand / Date Right-1/7/22 Left--1/7/22  right-2/16 Left-2/16 R, 3/2/22 L, 3/2   Writing 21 n/a n/t  n/t 21 n/a   Cards 7 10  10  11 7 10   Small objects 9 21  9  13 8 11         ROM:                            Active Active   DATE:     1/7 1/7 2/16   3/2       Norms Right Left left Left/right   Shoulder Flex 0-180   116  121 / 170     Ext 0-60 WFL 41  30 30 / 54     abd 0-180 WFL 90  150  158 /166        Pain Level (0-10 scale) post treatment: 0/10    ASSESSMENT/Changes in Function: Patient tolerance to treatment:  [] poor  [] fair  [x] good  []  excellent  Patient seen for re-assessment for UPOC. Patient demonstrates small gains in left shoulder ROM. Overall no changes noted in bilateral hand strength. Small gains noted in dexterity /coordination. Patient is progressing towards goals--see below for details. Patient will continue to benefit from skilled OT services to modify and progress therapeutic interventions, address ROM deficits, address strength deficits, analyze and address soft tissue restrictions, analyze and cue movement patterns, analyze and modify body mechanics/ergonomics and address visual and coordination limitations to attain remaining goals.      []  See Plan of Care  [x]  See progress note/recertification  []  See Discharge Summary         Progress towards goals / Updated goals:  Short Term Goals: To be accomplished in 10 treatments:              5.  Patient will be mod independent with home exercise program to promote gains between sessions                          [x]????? Met []????? Not met []????? Partially met               2.  Patient will demonstrate left pinch strength that is WFL's for her age range in order to use the left hand effectively during ADL tasks-at/near low end of bell curve for her age                           []????? Met []????? Not met [x]????? Partially met               3.  Patient will be able to engage in 8 + minutes of sustained standing activities to promote increased independence with IADLs                          [x]????? Met []????? Not met []????? Partially met      Long Term Goals: To be accomplished in 24 treatments:              1.  Patient will be able to sleep on her left side with minimal pain/difficulty--progressing                          []????? Met []????? Not met [x]????? Partially met               2.  Patient will be able to write information/fill out forms in a timely and legible manner                          []????? Met []????? Not met [x]????? Partially met               3.  Patient will be independent with cutting food                          []????? Met []????? Not met [x]????? Partially met               4. Adrian Montaño will be independent with completing all BADLs--progressing/increased time needed to complete tasks                          []????? Met []????? Not met [x]????? Partially met     PLAN  [x]  Upgrade activities as tolerated     [x]  Continue plan of care  []  Update interventions per flow sheet       []  Discharge due to:_  []  Other:_      Harding Scar, OTR/L 3/2/2022

## 2022-03-02 NOTE — PROGRESS NOTES
[address] [facility], [facility] [place]  Ph: [phone]   Fax: [phone]    Continued Plan of Care/Re-certification  for Occupational Therapy Services    Patient name: Niraj Marte   :  1960   Referral source: Fredis Cunningham MD   Medical Diagnosis: Cerebrovascular disease, unspecified [I67.9]  Muscle weakness (generalized) [M62.81]  Type 2 diabetes mellitus with hyperglycemia [E11.65]  Essential (primary) hypertension [I10]     Treatment Diagnosis: Left side weakness/CVA      Start of Care:22   Visits from Start of Care: 9   Missed visits: 3    Re-certification Dates: 3/5/22 - 22    Objective key information/Summary of Care:   Patient has been seen by occupational therapy services a total of 9 visits since start of care (approximately 8 weeks). Patient being seen once a week due to limited number of OT visits available. Patient seen for re-assessment on 3/2/22--see below for details. Patient demonstrates small gains in left shoulder ROM. Patient demonstrates gains in bilateral hand strength; however, strength remains below functional limits for her age range. Small gains noted in left hand  dexterity /coordination. Patient reports generalized pain/left shoulder pain which does decrease with exercise/activity. Patient is progressing towards goals: all long term goals partially met at this time. Feel patient will continue to benefit from skilled OT services to further address goals/promote maximal level of function with daily activities.          Measurements: Taken with Mioses Dynamometer, in lbs   Level 2 DATE  22 DATE  22 DATE  3/2 DATE DATE   Right 34  40 41        Left 25  33  33          Pinch Measurements: Taken with Pinch Gauge, in lbs     Date  22 Date   Date  3/2      3 pt         Right 6  9  9   Left  3 9   9   Lateral         Right 8 11  12   Left 6 11   11   Tip         Right 2  8 8  Left 2 8   8      Fine Motor:      JEBSEN HAND FUNCTION TEST (time in seconds)      Hand / Date Right-1/7/22 Left--1/7/22  right-2/16 Left-2/16 R, 3/2/22 L, 3/2   Writing 21 n/a n/t  n/t 21 n/a   Cards 7 10  10  11 7 10   Small objects 9 21  9  13 8 11         ROM:                            Active Active   DATE:     1/7 1/7 2/16   3/2       Norms Right Left left Left/right   Shoulder Flex 0-180   116  121 / 170     Ext 0-60 WFL 41  30 30 / 54     abd 0-180 WFL 90  150  158 /166           Recommendations: continue OT services as noted below    Problem List/Impairments: Pain effecting function, Decreased range of motion, Decreased strength, Decreased coordination/prehension, Decreased ADL/functional abilities , Decreased activity tolerance, Sensability and Other; visual limitations    Treatment Plan may include any combination of the following: Therapeutic exercise, Therapeutic activities, Neuromuscular re-education, Physical agent/modality, Patient education and ADLs/IADLs     Goal Status:  Short Term Goals: To be accomplished in 10 treatments:              1.  Patient will be mod independent with home exercise program to promote gains between sessions                          [x]?????? Met []?????? Not met []?????? Partially met               2.  Patient will demonstrate left pinch strength that is WFL's for her age range in order to use the left hand effectively during ADL tasks-at/near low end of bell curve for her age                           []?????? Met []?????? Not met [x]?????? Partially met               3.  Patient will be able to engage in 8 + minutes of sustained standing activities to promote increased independence with IADLs                          [x]?????? Met []?????? Not met []?????? Partially met      Long Term Goals: To be accomplished in 24 treatments:              1.  Patient will be able to sleep on her left side with minimal pain/difficulty--progressing                          []?????? Met []?????? Not met [x]?????? Partially met               2.  Patient will be able to write information/fill out forms in a timely and legible manner                          []?????? Met []?????? Not met [x]?????? Partially met               3.  Patient will be independent with cutting food                          []?????? Met []?????? Not met [x]?????? Partially met               4. Lizbeth Michele will be independent with completing all BADLs--progressing/increased time needed to complete tasks                          []?????? Met []?????? Not met [x]?????? Partially met     Frequency / Duration: Patient to be seen 1 - 2 times per week for 16 treatments:    LEONARDO Rosales 3/2/2022       Retain this original for your records. If you are unable to process this request in 24 hours,  please contact our office.   ________________________________________________________________________  NOTE TO PHYSICIAN:  Please complete the following and fax to: [address]:  Fax: [phone]    _____ I certify that the above Therapy Services are being furnished while the patient is under my care. I agree with the treatment plan and certify that this therapy is necessary. _____ I have read the above report and request that  my patient continue therapy   with the following changes/special instructions:     _____I have read the above report and request that my patient be discharged from therapy.      Physician's Signature:______________________________________________Date:___________________Time:_________________

## 2022-03-02 NOTE — PROGRESS NOTES
PHYSICAL THERAPY PROGRESS REPORT AFTER 8 VISITS OF PT     Patient Name: Van Vasques  Date:3/2/2022   : 1960    SUBJECTIVE    PT NEEDS TO WORK ME OUT TO GET READY TO RETURN TO WORK. RIGHT LEG AND HIP OFTEN HAVE BAD PAIN. OBJECTIVE  WALK SPEED 2 MPH. VARIABLE  MILD LIMP. ENDURANCE  1/4 MILE  NONSTOP  ALSO SEEING OCCUPATIONAL THERAPY     ASSESSMENT/Changes in Function:    FOTO/FUNCTIONAL SCORES ARE IMPROVING WITH PT.       GOALS/Progress towards goals: \"MAKING PROGRESS BUT I HAVE A WAYS TO GO SO I CAN RETURN TO WORK. 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     []? Met [x]? Not met []? Partially met     Short Term Goals: To be accomplished in  2-6 treatments. 1. Patient will be independent with her HEP to progress with POC. [x]? Met []? Not met []? Partially met   2. Patient nerve pain will decreased to <=3/10 with mobility. []? Met [x]? Not met []? Partially met     Long Term Goals: To be accomplished in 16-27  treatments. 1. Patient will gain 1/2 to 1 grade of LE strength to improve stability. []? Met []? Not met [x]? Partially met   2. Patient will be able to perform a SLS on each leg for 10 sec, to improve balance. []? Met []? Not met [x]? Partially met  can stand for 3-5 seconds  3. Patient will be able to ambulate > 400 ft with LRAD and (L) AFO? Independently. [x]? Met []? Not met []? Partially met  but has limp  4. Patient sit to stand time will decreased by 3-5 sec to improve LE functional strength. [x]? Met []? Not met []? Partially met   5. Patient will be able to negotiate 4(8'') steps with 1 rail and SOS ascending/desceneding. [x]? Met []? Not met []? Partially met   6 Patient will be able to perform her household ADLs: vacuuming, picking up items from the floor and getting in/out of the tube with more ease. []? Met []? Not met [x]?  Partially met     PLAN  [x]  Upgrade activities as tolerated     [x]  Continue plan of care  [x]  Update interventions per flow sheet         Gerard De Los Santos, PT 3/2/2022

## 2022-03-02 NOTE — PROGRESS NOTES
PT PROGRESS NOTE - [facility]     Patient Name: Peyton Schwab  Date:3/2/2022   : 1960  Visit #: 8  SUBJECTIVE  Pain Level (0-10 scale) pre treatment: 4/10 Pain Level (0-10 scale) post treatment: 4/10  PT NEEDS TO WORK ME OUT TO GET READY TO RETURN TO WORK. WHAT WE DID TODAY IS WHAT I WANT. OBJECTIVE  WALK SPEED 2 MPH. VARIABLE  MILD LIMP. ENDURANCE TODAY 125 FEET X 10 NONSTOP  40 MIN THERAPEUTIC EXERS. DONE. SEE FLOW SHEET. PURPOSE; BUILD STRENGTH AND ENDURANCE. ASSESSMENT/Changes in Function:   BETTER WALKING SPEED AND ENDURANCE AS ABOVE. Patient will continue to benefit from skilled PT services to modify and progress therapeutic interventions, address functional mobility deficits, address strength deficits and instruct in home and community integration to attain remaining goals. GOALS/Progress towards goals: \"MAKING PROGRESS BUT I HAVE A WAYS TO GO SO I CAN RETURN TO WORK. 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     []? Met [x]? Not met []? Partially met     Short Term Goals: To be accomplished in  2-6 treatments. 1. Patient will be independent with her HEP to progress with POC. [x]? Met []? Not met []? Partially met   2. Patient nerve pain will decreased to <=3/10 with mobility. []? Met [x]? Not met []? Partially met     Long Term Goals: To be accomplished in 16-27  treatments. 1. Patient will gain 1/2 to 1 grade of LE strength to improve stability. []? Met []? Not met [x]? Partially met   2. Patient will be able to perform a SLS on each leg for 10 sec, to improve balance. []? Met []? Not met [x]? Partially met  can stand for 3-5 seconds  3. Patient will be able to ambulate > 400 ft with LRAD and (L) AFO? Independently. [x]? Met []? Not met []? Partially met  but has limp  4. Patient sit to stand time will decreased by 3-5 sec to improve LE functional strength. [x]? Met []? Not met []? Partially met   5.  Patient will be able to negotiate 4(8'') steps with 1 rail and SOS ascending/desceneding. [x]? Met []? Not met []? Partially met   6 Patient will be able to perform her household ADLs: vacuuming, picking up items from the floor and getting in/out of the tube with more ease. []? Met []? Not met [x]?  Partially met     PLAN  [x]  Upgrade activities as tolerated     [x]  Continue plan of care  [x]  Update interventions per flow sheet         Earnestine Esposito, PT 3/2/2022

## 2022-03-07 ENCOUNTER — HOSPITAL ENCOUNTER (OUTPATIENT)
Dept: PHYSICAL THERAPY | Age: 62
Discharge: HOME | End: 2022-03-07
Payer: MEDICAID

## 2022-03-07 PROCEDURE — 97530 THERAPEUTIC ACTIVITIES: CPT

## 2022-03-07 PROCEDURE — 97110 THERAPEUTIC EXERCISES: CPT

## 2022-03-07 NOTE — PROGRESS NOTES
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]  Type 2 diabetes mellitus with hyperglycemia [E11.65]  Essential (primary) hypertension [I10]  Muscle weakness (generalized) [M62.81]   Next MD APPT:   In time: 145 Out time: 230  Total Treatment Time (min): 45  Total Timed Codes (min): 40    Visit #: 9    SUBJECTIVE  Pain Level (0-10 scale) pre treatment: 4/10 Pain Level (0-10 scale) post treatment: 4/10  Any medication changes, allergies to medications, adverse drug reactions, diagnosis change, or new procedure performed?:   [] No    [] Yes (see summary sheet for update)  Subjective functional status/changes:   [x] No changes reported  MOVED A SOFA YESTERDAY. GETTING STRONGER. OBJECTIVE  40 min Therapeutic Exercise:  [x] See flow sheet :   Rationale: increase ROM, increase strength, improve coordination, improve balance and increase proprioception to improve the patients ability to ambulate with decrease risk of falls. With   [x] TE   [] TA   [] Neuro   [] SC   [] other: Patient Education: [x] Review HEP  WRITTEN HEP ON CHART  [] Progressed/Changed HEP based on:   [] positioning   [] body mechanics   [] transfers   [] Use of heat/ice    [] other:          Other Objective/Functional Measures:  WALK SPEED 2 MPH. ENDURANCE TODAY 125 FEET X 14 NONSTOP    ASSESSMENT/Changes in Function:   BETTER WALKING SPEED AND ENDURANCE AS ABOVE. Patient will continue to benefit from skilled PT services to modify and progress therapeutic interventions, address functional mobility deficits, address strength deficits and instruct in home and community integration to attain remaining goals.         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     []? Met [x]? Not met []? Partially met     Short Term Goals: To be accomplished in  2-6 treatments. 1. Patient will be independent with her HEP to progress with POC. [x]? Met []? Not met []? Partially met 2/28  2. Patient nerve pain will decreased to <=3/10 with mobility. []? Met [x]? Not met []? Partially met     Long Term Goals: To be accomplished in 16-27  treatments. 1. Patient will gain 1/2 to 1 grade of LE strength to improve stability. []? Met []? Not met [x]? Partially met   2. Patient will be able to perform a SLS on each leg for 10 sec, to improve balance. []? Met []? Not met [x]? Partially met 2/28 can stand for 3-5 seconds  3. Patient will be able to ambulate > 400 ft with LRAD and (L) AFO? Independently. [x]? Met []? Not met []? Partially met 2/28 but has limp  4. Patient sit to stand time will decreased by 3-5 sec to improve LE functional strength. [x]? Met []? Not met []? Partially met   5. Patient will be able to negotiate 4(8'') steps with 1 rail and SOS ascending/desceneding. [x]? Met []? Not met []? Partially met   6 Patient will be able to perform her household ADLs: vacuuming, picking up items from the floor and getting in/out of the tube with more ease. []? Met []? Not met [x]?  Partially met     PLAN  [x]  Upgrade activities as tolerated     [x]  Continue plan of care  [x]  Update interventions per flow sheet         Althea Nelson, PT 3/7/2022

## 2022-03-07 NOTE — PROGRESS NOTES
OT DAILY TREATMENT NOTE  3-    Patient Name: Ede Veloz  Date:3/7/2022  : 1960  [x]  Patient  Verified  Payor: Otto Whelan / Plan: Hillary Stephens / Product Type: Commerical /    In time: 100  Out time: 150  Total Treatment Time (min): 50  Visit #: 10    Treatment Area: Cerebrovascular disease, unspecified [I67.9]  Muscle weakness (generalized) [M62.81]  Type 2 diabetes mellitus with hyperglycemia [E11.65]  Essential (primary) hypertension [I10]    SUBJECTIVE  Pain Level (0-10 scale):   7/10--LUE  Any medication changes, allergies to medications, adverse drug reactions, diagnosis change, or new procedure performed?: [x] No    [] Yes (see summary sheet for update)  Subjective functional status/changes:   [x] No changes reported  --patient states her L arm is sore as she  moved furniture (sofa) around in her home this weekend due to  her mother  being d/c'ed from a SNF on 3/10 and will be staying with the patient     OBJECTIVE  --moist heat applied to L shoulder while patient engaged in visual scanning tasks      15 min Therapeutic Exercise:  [x] See flow sheet :   Rationale: increase strength to improve the patients ability to engage in lifting/carrying tasks with less difficulty     35 min Therapeutic Activity:  [x]  See flow sheet :   Rationale: increase ROM, improve coordination and improve coordination, visual skills  to improve the patients ability to perform ADL/IADL tasks with less difficulty    With   [] TE   [] TA   [] neuro   [] other: Patient Education: [x] Review HEP    [] Progressed/Changed HEP based on:   [] positioning   [] body mechanics   [] transfers   [] heat/ice application   [] Splint wear/care   [] Sensory re-education   [] scar management      [] other:                  Pain Level (0-10 scale) post treatment:  0/10--L shoulder    ASSESSMENT/Changes in Function: Patient tolerance to treatment:  [] poor  [] fair  [x] good  []  excellent  Patient reported L shoulder pain today following a weekend where she moved furniture around in her home in preparation for her mother coming home. Patient  Demonstrated decreased sustained attention/focus on task with errors at beginning and end of task. Min/mod difficulty holding/carrying ~ 8 pound object (gallon jug) with the left hand. Patient will continue to benefit from skilled OT services to modify and progress therapeutic interventions, address ROM deficits, address strength deficits, analyze and address soft tissue restrictions, analyze and cue movement patterns, assess and modify postural abnormalities and address visual , censory, and coordination deficits to attain remaining goals.      [x]  See Plan of Care  []  See progress note/recertification  []  See Discharge Summary         Progress towards goals / Updated goals:  Short Term Goals: To be accomplished in 10 treatments:              8.  Patient will be mod independent with home exercise program to promote gains between sessions                          [x]??????? Met []??????? Not met []??????? Partially met               2.  Patient will demonstrate left pinch strength that is WFL's for her age range in order to use the left hand effectively during ADL tasks-at/near low end of bell curve for her age                           []??????? Met []??????? Not met [x]??????? Partially met               3.  Patient will be able to engage in 8 + minutes of sustained standing activities to promote increased independence with IADLs                          [x]??????? Met []??????? Not met []??????? Partially met      Long Term Goals: To be accomplished in 24 treatments:              1.  Patient will be able to sleep on her left side with minimal pain/difficulty--progressing                          []??????? Met []??????? Not met [x]??????? Partially met               2.  Patient will be able to write information/fill out forms in a timely and legible manner                          []??????? Met []??????? Not met [x]??????? Partially met               3.  Patient will be independent with cutting food                          []??????? Met []??????? Not met [x]??????? Partially met               4. Barbra Frost will be independent with completing all BADLs--progressing/increased time needed to complete tasks                          []??????? Met []??????? Not met [x]??????? Partially met     PLAN  [x]  Upgrade activities as tolerated     [x]  Continue plan of care  []  Update interventions per flow sheet       []  Discharge due to:_  []  Other:_      [address] [facility], OTR/L 3/7/2022

## 2022-03-09 ENCOUNTER — APPOINTMENT (OUTPATIENT)
Dept: PHYSICAL THERAPY | Age: 62
End: 2022-03-09
Payer: MEDICAID

## 2022-03-16 ENCOUNTER — HOSPITAL ENCOUNTER (OUTPATIENT)
Dept: PHYSICAL THERAPY | Age: 62
Discharge: HOME | End: 2022-03-16
Payer: MEDICAID

## 2022-03-16 PROCEDURE — 97110 THERAPEUTIC EXERCISES: CPT | Performed by: PHYSICAL THERAPIST

## 2022-03-16 PROCEDURE — 97530 THERAPEUTIC ACTIVITIES: CPT

## 2022-03-16 PROCEDURE — 97110 THERAPEUTIC EXERCISES: CPT

## 2022-03-16 NOTE — PROGRESS NOTES
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 s    Visit #: 10     SUBJECTIVE  Pain Level (0-10 scale) pre treatment: 4/10 Pain Level (0-10 scale) post treatment: 4/10  Any medication changes, allergies to medications, adverse drug reactions, diagnosis change, or new procedure performed?:   [] No    [] Yes (see summary sheet for update)  Subjective functional status/changes:   [x] No changes reported  Do I need an AFO? OBJECTIVE  45 min Therapeutic Exercise:  [x] See flow sheet :   Rationale: increase ROM, increase strength, improve coordination, improve balance and increase proprioception to improve the patients ability to ambulate with decrease risk of falls. With   [x] TE   [] TA   [] Neuro   [] SC   [] other: Patient Education: [x] Review HEP  Discussed pros and cons of     [] Progressed/Changed HEP based on:   [] positioning   [] body mechanics   [] transfers   [] Use of heat/ice    [] other:          Other Objective/Functional Measures:  WALK SPEED 2 MPH. ENDURANCE TODAY 125 FEET X 14 NONSTOP  Gait :  No observed foot drop. Discussed cons of wearing an AFO. ASSESSMENT/Changes in Function:   BETTER WALKING SPEED AND ENDURANCE AS ABOVE. Patient will continue to benefit from skilled PT services to modify and progress therapeutic interventions, address functional mobility deficits, address strength deficits and instruct in home and community integration to attain remaining goals.         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     []? Met [x]? Not met []? Partially met     Short Term Goals: To be accomplished in  2-6 treatments. 1. Patient will be independent with her HEP to progress with POC. [x]? Met []? Not met []? Partially met 2/28  2. Patient nerve pain will decreased to <=3/10 with mobility. []? Met [x]? Not met []? Partially met     Long Term Goals: To be accomplished in 16-27  treatments. 1. Patient will gain 1/2 to 1 grade of LE strength to improve stability. []? Met []? Not met [x]? Partially met   2. Patient will be able to perform a SLS on each leg for 10 sec, to improve balance. []? Met []? Not met [x]? Partially met 2/28 can stand for 3-5 seconds  3. Patient will be able to ambulate > 400 ft with LRAD and (L) AFO? Independently. [x]? Met []? Not met []? Partially met 2/28 but has limp  4. Patient sit to stand time will decreased by 3-5 sec to improve LE functional strength. [x]? Met []? Not met []? Partially met   5. Patient will be able to negotiate 4(8'') steps with 1 rail and SOS ascending/desceneding. [x]? Met []? Not met []? Partially met   6 Patient will be able to perform her household ADLs: vacuuming, picking up items from the floor and getting in/out of the tube with more ease. []? Met []? Not met [x]?  Partially met     PLAN  [x]  Upgrade activities as tolerated     [x]  Continue plan of care  [x]  Update interventions per flow sheet         Elena Thomas, PT 3/16/2022

## 2022-03-18 PROBLEM — Z48.812 POSTOP CAROTID ENDARTERECTOMY SURVEILLANCE, ENCOUNTER FOR: Status: ACTIVE | Noted: 2021-10-27

## 2022-03-18 PROBLEM — I99.8 ISCHEMIA OF RIGHT LOWER EXTREMITY: Status: ACTIVE | Noted: 2019-12-03

## 2022-03-19 PROBLEM — I63.9 STROKE (HCC): Status: ACTIVE | Noted: 2021-10-27

## 2022-03-20 PROBLEM — Z86.2 HISTORY OF BLEEDING DISORDER: Status: ACTIVE | Noted: 2021-10-27

## 2022-03-20 PROBLEM — I63.9 CVA (CEREBRAL VASCULAR ACCIDENT) (HCC): Status: ACTIVE | Noted: 2021-10-18

## 2022-03-21 ENCOUNTER — HOSPITAL ENCOUNTER (OUTPATIENT)
Dept: PHYSICAL THERAPY | Age: 62
Discharge: HOME | End: 2022-03-21
Payer: MEDICAID

## 2022-03-21 PROCEDURE — 97530 THERAPEUTIC ACTIVITIES: CPT

## 2022-03-21 PROCEDURE — 97110 THERAPEUTIC EXERCISES: CPT

## 2022-03-21 NOTE — PROGRESS NOTES
OT DAILY TREATMENT NOTE  3-16    Patient Name: Guadalupe Rowland  Date:3/21/2022  : 1960  [x]  Patient  Verified  Payor: Victoriano Berrios / Plan: Favio Barrera / Product Type: Commerical /    In time: 325  Out time: 425  Total Treatment Time (min): 60  Visit #: 12    Treatment Area: Cerebrovascular disease, unspecified [I67.9]  Muscle weakness (generalized) [M62.81]  Type 2 diabetes mellitus with hyperglycemia [E11.65]  Essential (primary) hypertension [I10]    SUBJECTIVE  Pain Level (0-10 scale): 4/10--L shoulder  Any medication changes, allergies to medications, adverse drug reactions, diagnosis change, or new procedure performed?: [x] No    [] Yes (see summary sheet for update)  Subjective functional status/changes:   [x] No changes reported  --patient reports her shoulders hurt today. Patient has been caring for her mother who recently was discharged from an area SNF    OBJECTIVE    30 min Therapeutic Exercise:  [x] See flow sheet :   Rationale: increase ROM and increase strength to improve the patients ability to engage in daily activities with less difficulty    30 min Therapeutic Activity:  [x]  See flow sheet :   Rationale: improve coordination and improve cognitive and visual skills  to improve the patients ability to independently  engage in ADL, IADL, work tasks      With   [x] TE   [] TA   [] neuro   [] other: Patient Education: [x] Review HEP    [] Progressed/Changed HEP based on:   [] positioning   [] body mechanics   [] transfers   [] heat/ice application   [] Splint wear/care   [] Sensory re-education   [] scar management      [] other:                 Pain Level (0-10 scale) post treatment: 0/10    ASSESSMENT/Changes in Function: Patient tolerance to treatment:  [] poor  [] fair  [x] good  []  excellent  Patient required brief rest break/pause during bilateral UE exercises (2 sets of 5 only, 2 exercises ) involving approximately 2 pounds of resistance.  Patient located items during scanning task at 58 % on round 1; required verbal cue to look left for targets--patient then scored > 80 %. Patient will continue to benefit from skilled OT services to modify and progress therapeutic interventions, address strength deficits, analyze and cue movement patterns, analyze and modify body mechanics/ergonomics and address visual and cognitive limitations to attain remaining goals.      [x]  See Plan of Care  []  See progress note/recertification  []  See Discharge Summary         Progress towards goals / Updated goals:  Short Term Goals: To be accomplished in 10 treatments:              2.  Patient will be mod independent with home exercise program to promote gains between sessions                          [x]????????? Met []????????? Not met []????????? Partially met               2.  Patient will demonstrate left pinch strength that is WFL's for her age range in order to use the left hand effectively during ADL tasks-at/near low end of bell curve for her age                           []????????? Met []????????? Not met [x]????????? Partially met               3.  Patient will be able to engage in 8 + minutes of sustained standing activities to promote increased independence with IADLs                          [x]????????? Met []????????? Not met []????????? Partially met      Long Term Goals: To be accomplished in 24 treatments:              1.  Patient will be able to sleep on her left side with minimal pain/difficulty--progressing                          []????????? Met []????????? Not met [x]????????? Partially met               2.  Patient will be able to write information/fill out forms in a timely and legible manner                          []????????? Met []????????? Not met [x]????????? Partially met               3.  Patient will be independent with cutting food                          []????????? Met []????????? Not met [x]????????? Partially met               4.  Patient will be independent with completing all BADLs--progressing/increased time needed to complete tasks                          []????????? Met []????????? Not met [x]????????? Partially met     PLAN  []  Upgrade activities as tolerated     [x]  Continue plan of care  []  Update interventions per flow sheet       []  Discharge due to:_  [x]  Other:_ re-assess for ?  Discharge (only 4 -5 visits left under insurance coverage)     CHITO Fitzpatrick/L 3/21/2022

## 2022-03-23 ENCOUNTER — HOSPITAL ENCOUNTER (OUTPATIENT)
Dept: PHYSICAL THERAPY | Age: 62
Discharge: HOME | End: 2022-03-23
Payer: MEDICAID

## 2022-03-23 PROCEDURE — 97110 THERAPEUTIC EXERCISES: CPT

## 2022-03-23 PROCEDURE — 97530 THERAPEUTIC ACTIVITIES: CPT

## 2022-03-23 NOTE — PROGRESS NOTES
OT DAILY TREATMENT NOTE  3-16    Patient Name: Nataliya Lugo  Date:3/23/2022  : 1960  [x]  Patient  Verified  Payor: David Purchase / Plan: Vickeyclemente Ramirez / Product Type: PPO /    In time: 320  Out time: 404  Total Treatment Time (min): 44  Visit #:     Treatment Area: Cerebrovascular disease, unspecified [I67.9]  Muscle weakness (generalized) [M62.81]  Type 2 diabetes mellitus with hyperglycemia [E11.65]  Essential (primary) hypertension [I10]    SUBJECTIVE  Pain Level (0-10 scale):   0/10  Any medication changes, allergies to medications, adverse drug reactions, diagnosis change, or new procedure performed?: [x] No    [] Yes (see summary sheet for update)  Subjective functional status/changes:   [x] No changes reported  \"Let's use them all up\" states patient re; insurance visits    OBJECTIVE    45 min Therapeutic Activity:  [x]  See flow sheet :   Rationale: improve coordination and improve vision, cognition  to improve the patients ability to engage in ADL/IADL, work tasks with more independence     With   [] TE   [x] TA   [] neuro   [x] other: Patient Education: [x] Review HEP    [] Progressed/Changed HEP based on:   [] positioning   [] body mechanics   [] transfers   [] heat/ice application   [] Splint wear/care   [] Sensory re-education   [] scar management      [x] other: planning last tx visits/insurance auth/                 Pain Level (0-10 scale) post treatment:     atient tolerance to treatment:  [] poor  [] fair  [x] good  []  excellent    ASSESSMENT/Changes in Function:   Patient currently has 6 remaining visits left  Between OT and PT. Patient demonstrates improved ability to engage in divided attention tasks. Patient aware of limitation and states she needs to \"slow down and focus\".   Patient less pressured/rushed during treatment activities--mod I with handling money, papers, etc.      atient will continue to benefit from skilled OT services to address strength deficits, analyze and cue movement patterns and address coordination, cognitive, and visual limitations to attain remaining goals.    []  See Plan of Care  [x]  See progress note/recertification  []  See Discharge Summary         Progress towards goals / Updated goals:    Short Term Goals: To be accomplished in 10 treatments:              7.  Patient will be mod independent with home exercise program to promote gains between sessions                          [x]?????????? Met []?????????? Not met []?????????? Partially met               2.  Patient will demonstrate left pinch strength that is WFL's for her age range in order to use the left hand effectively during ADL tasks-at/near low end of bell curve for her age                           []?????????? Met []?????????? Not met [x]?????????? Partially met               3.  Patient will be able to engage in 8 + minutes of sustained standing activities to promote increased independence with IADLs                          [x]?????????? Met []?????????? Not met []?????????? Partially met      Long Term Goals: To be accomplished in 24 treatments:              1.  Patient will be able to sleep on her left side with minimal pain/difficulty--progressing                          []?????????? Met []?????????? Not met [x]?????????? Partially met               2.  Patient will be able to write information/fill out forms in a timely and legible manner                          []?????????? Met []?????????? Not met [x]?????????? Partially met               3.  Patient will be independent with cutting food                          []?????????? Met []?????????? Not met [x]?????????? Partially met               4. Tamir Abi will be independent with completing all BADLs--progressing/increased time needed to complete tasks                          []?????????? Met []?????????? Not met [x]?????????? Partially met       PLAN  [x]  Upgrade activities as tolerated     [x]  Continue plan of care  []  Update interventions per flow sheet       []  Discharge due to:_  []  Other:_      Sánchez Leal OTR/ALE 3/23/2022

## 2022-03-23 NOTE — PROGRESS NOTES
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 s    Visit #: 10     SUBJECTIVE  Pain Level (0-10 scale) pre treatment: 0/10 Pain Level (0-10 scale) post treatment: 0/10  Any medication changes, allergies to medications, adverse drug reactions, diagnosis change, or new procedure performed?:   [] No    [] Yes (see summary sheet for update)  Subjective functional status/changes:   [x] No changes reported  I need to get these legs strong, I want to go back to work    OBJECTIVE  36 min Therapeutic Exercise:  [x] See flow sheet :   Rationale: increase ROM, increase strength, improve coordination, improve balance and increase proprioception to improve the patients ability to ambulate with decrease risk of falls. With   [x] TE   [] TA   [] Neuro   [] SC   [] other: Patient Education: [x] Review HEP  Discussed pros and cons of     [] Progressed/Changed HEP based on:   [] positioning   [] body mechanics   [] transfers   [] Use of heat/ice    [] other:          Other Objective/Functional Measures:  WALK SPEED 2 MPH. ENDURANCE TODAY 125 FEET X 14 NONSTOP  Gait :  No observed foot drop. Discussed cons of wearing an AFO. ASSESSMENT/Changes in Function:   BETTER WALKING SPEED AND ENDURANCE AS ABOVE. Patient will continue to benefit from skilled PT services to modify and progress therapeutic interventions, address functional mobility deficits, address strength deficits and instruct in home and community integration to attain remaining goals. 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     []? Met [x]? Not met []? Partially met     Short Term Goals: To be accomplished in  2-6 treatments. 1. Patient will be independent with her HEP to progress with POC. [x]? Met []? Not met []? Partially met 2/28  2. Patient nerve pain will decreased to <=3/10 with mobility. []? Met [x]? Not met []? Partially met     Long Term Goals: To be accomplished in 16-27  treatments. 1. Patient will gain 1/2 to 1 grade of LE strength to improve stability. []? Met []? Not met [x]? Partially met   2. Patient will be able to perform a SLS on each leg for 10 sec, to improve balance. []? Met []? Not met [x]? Partially met 2/28 can stand for 3-5 seconds  3. Patient will be able to ambulate > 400 ft with LRAD and (L) AFO? Independently. [x]? Met []? Not met []? Partially met 2/28 but has limp  4. Patient sit to stand time will decreased by 3-5 sec to improve LE functional strength. [x]? Met []? Not met []? Partially met   5. Patient will be able to negotiate 4(8'') steps with 1 rail and SOS ascending/desceneding. [x]? Met []? Not met []? Partially met   6 Patient will be able to perform her household ADLs: vacuuming, picking up items from the floor and getting in/out of the tube with more ease. []? Met []? Not met [x]?  Partially met     PLAN  [x]  Upgrade activities as tolerated     [x]  Continue plan of care  [x]  Update interventions per flow sheet         Rosa M Dukes PTA 3/23/2022

## 2022-03-28 ENCOUNTER — APPOINTMENT (OUTPATIENT)
Dept: PHYSICAL THERAPY | Age: 62
End: 2022-03-28
Payer: MEDICAID

## 2022-03-28 ENCOUNTER — HOSPITAL ENCOUNTER (OUTPATIENT)
Dept: PHYSICAL THERAPY | Age: 62
Discharge: HOME | End: 2022-03-28
Payer: MEDICAID

## 2022-03-28 PROCEDURE — 97110 THERAPEUTIC EXERCISES: CPT

## 2022-03-28 NOTE — PROGRESS NOTES
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 Visit #: 12     SUBJECTIVE  Pain Level (0-10 scale) pre treatment: 0/10 Pain Level (0-10 scale) post treatment: 0/10  Any medication changes, allergies to medications, adverse drug reactions, diagnosis change, or new procedure performed?:   [] No    [] Yes (see summary sheet for update)  Subjective functional status/changes:   [x] No changes reported  I need to get these legs strong, I want to go back to work  I did well today with these PT return to work exercises. OBJECTIVE  40 min Therapeutic Exercise:  [x] See flow sheet :   Rationale: increase ROM, increase strength, improve coordination, improve balance and increase proprioception to improve the patients ability to ambulate with decrease risk of falls. With   [x] TE   [] TA   [] Neuro   [] SC   [] other: Patient Education: [x] Review HEP  Discussed pros and cons of     [] Progressed/Changed HEP based on:   [] positioning   [] body mechanics   [] transfers   [] Use of heat/ice    [] other:          Other Objective/Functional Measures:  Hand  55 lb right, 52 left. Gait :  No observed foot drop. Discussed cons of wearing an AFO. ASSESSMENT/Changes in Function:   BETTER endurance. Better . Better strength.     Patient will continue to benefit from skilled PT services to modify and progress therapeutic interventions, address functional mobility deficits, address strength deficits and instruct in home and community integration to attain remaining goals. 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     [x]? Met []? Not met []? Partially met     Short Term Goals: To be accomplished in  2-6 treatments. 1. Patient will be independent with her HEP to progress with POC. [x]? Met []? Not met []? Partially met 2/28  2. Patient nerve pain will decreased to <=3/10 with mobility. [x]? Met []? Not met []? Partially met     Long Term Goals: To be accomplished in 16-27  treatments. 1. Patient will gain 1/2 to 1 grade of LE strength to improve stability. [x]? Met []? Not met []? Partially met   2. Patient will be able to perform a SLS on each leg for 10 sec, to improve balance. []? Met []? Not met [x]? Partially met 2/28 can stand for 3-5 seconds  3. Patient will be able to ambulate > 400 ft with LRAD and (L) AFO? Independently. [x]? Met []? Not met []? Partially met 2/28 but has limp  4. Patient sit to stand time will decreased by 3-5 sec to improve LE functional strength. [x]? Met []? Not met []? Partially met   5. Patient will be able to negotiate 4(8'') steps with 1 rail and SOS ascending/desceneding. [x]? Met []? Not met []? Partially met   6 Patient will be able to perform her household ADLs: vacuuming, picking up items from the floor and getting in/out of the tube with more ease. []? Met []? Not met [x]?  Partially met     PLAN  [x]  Upgrade activities as tolerated     [x]  Continue plan of care  [x]  Update interventions per flow sheet         Dayo Perez, PT 3/28/2022

## 2022-03-30 ENCOUNTER — HOSPITAL ENCOUNTER (OUTPATIENT)
Dept: PHYSICAL THERAPY | Age: 62
End: 2022-03-30
Payer: MEDICAID

## 2022-03-30 ENCOUNTER — APPOINTMENT (OUTPATIENT)
Dept: PHYSICAL THERAPY | Age: 62
End: 2022-03-30
Payer: MEDICAID

## 2022-05-02 NOTE — PROGRESS NOTES
[address], [address]  Phone: [phone]   Fax: [phone]    Discharge Summary  2-15    Patient name: Isiah Ross  : 1960  Provider#: [id number]  Referral source: Iker Vazquez MD      Medical/Treatment Diagnosis: Disorder of vein, unspecified [I87.9]  Type 2 diabetes mellitus with hyperglycemia [E11.65]  Essential (primary) hypertension [I10]  Muscle weakness (generalized) [M62.81]     Prior Hospitalization: see medical history     Comorbidities: See Plan of Care  Prior Level of Function:See Plan of Care  Medications: Verified on Patient Summary List    Start of Care: 1/15/22      Onset Date:10/2021     CVA   Visits from Start of Care: 12     Missed Visits: NA  Reporting Period : 1/15/22 to 22      ASSESSMENT/SUMMARY OF CARE: GOALS MET; DC PT    FOTO Functional Measure: 50/100  Discharge  50/100  Intake  Short Term Goals: To be accomplished in  2-6 treatments. 1. Patient will be independent with her HEP to progress with POC. [x]? ? Met []? ? Not met []? ? Partially met   2. Patient nerve pain will decreased to <=3/10 with mobility. [x]? ? Met []? ? Not met []? ? Partially met     Long Term Goals: To be accomplished in 16-27  treatments. 1. Patient will gain 1/2 to 1 grade of LE strength to improve stability. [x]? ? Met []? ? Not met []? ? Partially met   2. Patient will be able to perform a SLS on each leg for 10 sec, to improve balance. []?? Met []? ? Not met [x]? ? Partially met  can stand for 3-5 seconds  3. Patient will be able to ambulate > 400 ft with LRAD and (L) AFO? Independently. [x]? ? Met []? ? Not met []? ? Partially met  but has limp  4. Patient sit to stand time will decreased by 3-5 sec to improve LE functional strength. [x]? ? Met []? ? Not met []? ? Partially met   5. Patient will be able to negotiate 4(8'') steps with 1 rail and SOS ascending/desceneding. [x]? ? Met []? ? Not met []?? Partially met   6 Patient will be able to perform her household ADLs: vacuuming, picking up items from the floor and getting in/out of the tube with more ease. []?? Met []? ? Not met [x]? ? Partially met       RECOMMENDATIONS:  [x]Discontinue therapy: [x]Patient has reached or is progressing toward set goals      []Patient is non-compliant or has abdicated      []Due to lack of appreciable progress towards set goals      []Saroj Peralta, PT 5/2/2022

## 2022-05-09 NOTE — PROGRESS NOTES
[address] [facility]. [place] [address] [place] [address] Opal Machuca  Ph: [phone]  Fax: [phone]    Discharge Summary 2-15    Patient name: Deuce Garnica  : 1960  Provider#: [id number]  Referral source: Kriss Peterson MD      Medical/Treatment Diagnosis: Cerebrovascular disease, unspecified [I67.9]  Muscle weakness (generalized) [M62.81]  Type 2 diabetes mellitus with hyperglycemia [E11.65]  Essential (primary) hypertension [I10]     Prior Hospitalization: see medical history     Comorbidities: See Plan of Care  Prior Level of Function: See Plan of Care  Medications: Verified on Patient Summary List    Start of Care: 22   Onset Date:10/18/21   Visits from Start of Care: 14  Missed Visits:4  Reporting Period : 3/5/22 to 22    Assessment/Summary of care:   Patient referred to occupational therapy services following a CVA with left side weakness (2021). Patient's insurance had a limit of 30 combined OT/PT visits per calendar year. Patient seen for a total of 28 visits (combined). Patient last seen on 3/30/22 but was not feeling well that day. Patient left the clinic (untreated) that day--no additional visits or contact with the clinic was made by the patient. Current status; Unknown. Patient was progressing towards goals. She continued to demonstrate mild left side inattention, especially when using the left arm while performing bilateral UE tasks. Patient also demonstrated mild deficits while  performing divided attention tasks/multi tasking--noted to be improving during the last few visits. Patient will be discharged at this time. Thank you for the consult.        [facility]:  37.28 %    GOALS:  Short Term Goals: To be accomplished in 10 treatments:              7.  Patient will be mod independent with home exercise program to promote gains between sessions                          [x]??????????? Met []??????????? Not met []??????????? Partially met               2.  Patient will demonstrate left pinch strength that is WFL's for her age range in order to use the left hand effectively during ADL tasks-at/near low end of bell curve for her age                           []??????????? Met []??????????? Not met [x]??????????? Partially met               3.  Patient will be able to engage in 8 + minutes of sustained standing activities to promote increased independence with IADLs                          [x]??????????? Met []??????????? Not met []??????????? Partially met      Long Term Goals: To be accomplished in 24 treatments:              1.  Patient will be able to sleep on her left side with minimal pain/difficulty--progressing                          []??????????? Met []??????????? Not met [x]??????????? Partially met               2.  Patient will be able to write information/fill out forms in a timely and legible manner                          []??????????? Met []??????????? Not met [x]??????????? Partially met               3.  Patient will be independent with cutting food                          []??????????? Met []??????????? Not met [x]??????????? Partially met               4. Chen Perfect will be independent with completing all BADLs--progressing/increased time needed to complete tasks                          []??????????? Met []??????????? Not met [x]??????????? Partially met     RECOMMENDATIONS:  [x]Discontinue therapy:   [x]Patient has reached or is progressing toward set goals   [x]Other;  Patient reaching insurance limit of 30 combined OT/PT visits (at 28)      Betty Abbott OTR/L 5/9/2022

## 2023-01-20 NOTE — PROGRESS NOTES
In November - a prescription for 1 mg decadron was given and the next day  AM cortisol CAME ABNORMAL   Looks like we had another cortisol drawn, but by its level I CAN SAY IT WAS just AM cortisol , without decadron the previous night   I just sent for a repeat AM cortisol after 11 pm decadron at night Paty GIL calling from [facility] for L knee MRI orders, see mychart from today.    Nishant Griffin RN   [facility]

## 2023-08-10 ENCOUNTER — OFFICE VISIT (OUTPATIENT)
Age: 63
End: 2023-08-10
Payer: MEDICAID

## 2023-08-10 VITALS
HEIGHT: 61 IN | RESPIRATION RATE: 18 BRPM | SYSTOLIC BLOOD PRESSURE: 143 MMHG | WEIGHT: 127 LBS | HEART RATE: 95 BPM | TEMPERATURE: 97.8 F | DIASTOLIC BLOOD PRESSURE: 85 MMHG | OXYGEN SATURATION: 97 % | BODY MASS INDEX: 23.98 KG/M2

## 2023-08-10 DIAGNOSIS — I73.9 PERIPHERAL VASCULAR DISEASE (HCC): Primary | ICD-10-CM

## 2023-08-10 PROCEDURE — 99203 OFFICE O/P NEW LOW 30 MIN: CPT | Performed by: SURGERY

## 2023-08-10 RX ORDER — FLUTICASONE PROPIONATE AND SALMETEROL 250; 50 UG/1; UG/1
1 POWDER RESPIRATORY (INHALATION) EVERY 12 HOURS
COMMUNITY

## 2023-08-10 ASSESSMENT — PATIENT HEALTH QUESTIONNAIRE - PHQ9
SUM OF ALL RESPONSES TO PHQ9 QUESTIONS 1 & 2: 0
SUM OF ALL RESPONSES TO PHQ QUESTIONS 1-9: 0
2. FEELING DOWN, DEPRESSED OR HOPELESS: 0
1. LITTLE INTEREST OR PLEASURE IN DOING THINGS: 0

## 2023-08-25 PROBLEM — I73.9 PERIPHERAL VASCULAR DISEASE (HCC): Status: ACTIVE | Noted: 2023-08-25

## 2023-08-25 NOTE — PROGRESS NOTES
Vascular History and Physical    Patient: Precious Donahue  MRN: [id number]    YOB: 1960  Age: 65 y.o. Sex: female     Chief Complaint:  Chief Complaint   Patient presents with    Follow-up     PVD       History of Present Illness: Precious Donahue is a 65 y.o. very pleasant woman who had a previous carotid endarterectomy a few years ago. Patient now complaining of right leg pain. Patient apparently had a angiogram done by Dr. Francoise Calabrese. Patient also had fourth toe wound as well. Pain is constant. Wound has not healed. Patient denies any fever or chills. Patient denies any currently any antibiotic therapy. Social History:  Social Connections: Not on file       Past Medical History:  Past Medical History:   Diagnosis Date    Arthritis     Coagulation disorder ([address])     possible , pt had surgery on leg and bled out    Diabetes ([address]) 2019    Hypertension     Left-sided weakness     Nodule of kidney     left kidney    Peripheral vascular disease ([address])     Stroke ([address])     recent stroke on 10/16/2021       Surgical History:  Past Surgical History:   Procedure Laterality Date    CATARACT REMOVAL  2019    VASCULAR SURGERY         Allergies: Allergies   Allergen Reactions    Adhesive Tape Rash    Penicillins Nausea And Vomiting    Codeine Other (See Comments)     Makes me \"loopy\"       Current Meds:  Current Outpatient Medications   Medication Sig Dispense Refill    fluticasone-salmeterol (ADVAIR DISKUS) 250-50 MCG/ACT AEPB diskus inhaler Inhale 1 puff into the lungs in the morning and 1 puff in the evening.       aspirin 325 MG EC tablet Take 1 tablet by mouth daily      atorvastatin (LIPITOR) 80 MG tablet Take 1 tablet by mouth daily      diphenhydrAMINE (BENADRYL) 25 MG capsule Take 1 capsule by mouth every 6 hours as needed      glipiZIDE (GLUCOTROL XL) 10 MG extended release tablet Take 1 tablet by mouth daily      lisinopril (PRINIVIL;ZESTRIL) 20 MG tablet Take by mouth daily      acetaminophen

## 2023-09-01 ENCOUNTER — HOSPITAL ENCOUNTER (OUTPATIENT)
Facility: HOSPITAL | Age: 63
End: 2023-09-01
Attending: SURGERY
Payer: MEDICAID

## 2023-09-01 DIAGNOSIS — I73.9 PERIPHERAL VASCULAR DISEASE (HCC): ICD-10-CM

## 2023-09-01 PROCEDURE — 82565 ASSAY OF CREATININE: CPT

## 2023-09-01 PROCEDURE — 75635 CT ANGIO ABDOMINAL ARTERIES: CPT

## 2023-09-01 PROCEDURE — 6360000004 HC RX CONTRAST MEDICATION: Performed by: SURGERY

## 2023-09-01 RX ADMIN — IOPAMIDOL 100 ML: 755 INJECTION, SOLUTION INTRAVENOUS at 15:04

## 2023-09-02 LAB — CREAT BLD-MCNC: 0.8 MG/DL (ref 0.6–1.3)

## 2023-09-07 ENCOUNTER — OFFICE VISIT (OUTPATIENT)
Age: 63
End: 2023-09-07
Payer: MEDICAID

## 2023-09-07 VITALS
RESPIRATION RATE: 15 BRPM | HEIGHT: 61 IN | TEMPERATURE: 97.7 F | WEIGHT: 129 LBS | HEART RATE: 97 BPM | SYSTOLIC BLOOD PRESSURE: 134 MMHG | BODY MASS INDEX: 24.35 KG/M2 | OXYGEN SATURATION: 99 % | DIASTOLIC BLOOD PRESSURE: 83 MMHG

## 2023-09-07 DIAGNOSIS — I73.9 PERIPHERAL VASCULAR DISEASE (HCC): Primary | ICD-10-CM

## 2023-09-07 PROCEDURE — 99213 OFFICE O/P EST LOW 20 MIN: CPT | Performed by: SURGERY

## 2023-09-07 ASSESSMENT — PATIENT HEALTH QUESTIONNAIRE - PHQ9
SUM OF ALL RESPONSES TO PHQ9 QUESTIONS 1 & 2: 0
SUM OF ALL RESPONSES TO PHQ QUESTIONS 1-9: 0
SUM OF ALL RESPONSES TO PHQ QUESTIONS 1-9: 0
2. FEELING DOWN, DEPRESSED OR HOPELESS: 0
SUM OF ALL RESPONSES TO PHQ QUESTIONS 1-9: 0
1. LITTLE INTEREST OR PLEASURE IN DOING THINGS: 0
SUM OF ALL RESPONSES TO PHQ QUESTIONS 1-9: 0

## 2023-09-14 ENCOUNTER — OFFICE VISIT (OUTPATIENT)
Age: 63
End: 2023-09-14
Payer: MEDICAID

## 2023-09-14 VITALS
HEART RATE: 95 BPM | WEIGHT: 130 LBS | DIASTOLIC BLOOD PRESSURE: 84 MMHG | OXYGEN SATURATION: 96 % | RESPIRATION RATE: 15 BRPM | SYSTOLIC BLOOD PRESSURE: 148 MMHG | HEIGHT: 61 IN | BODY MASS INDEX: 24.55 KG/M2

## 2023-09-14 DIAGNOSIS — I73.9 PERIPHERAL VASCULAR DISEASE (HCC): Primary | ICD-10-CM

## 2023-09-14 DIAGNOSIS — D49.7 ADRENAL TUMOR: ICD-10-CM

## 2023-09-14 DIAGNOSIS — D49.0 PANCREATIC TUMOR: ICD-10-CM

## 2023-09-14 PROCEDURE — 99213 OFFICE O/P EST LOW 20 MIN: CPT | Performed by: SURGERY

## 2023-09-15 ENCOUNTER — TELEPHONE (OUTPATIENT)
Age: 63
End: 2023-09-15

## 2023-09-15 PROBLEM — D49.0 PANCREATIC TUMOR: Status: ACTIVE | Noted: 2023-09-15

## 2023-09-15 PROBLEM — D49.7 ADRENAL TUMOR: Status: ACTIVE | Noted: 2023-09-15

## 2023-09-15 NOTE — TELEPHONE ENCOUNTER
Nubia Márquez with [facility] scheduling called stating a peer to peer needs to be completed for the CTA of abdomen. [address] at [phone].    Case #[id number]

## 2023-09-15 NOTE — PROGRESS NOTES
Vascular History and Physical    Patient: Floyd Lennox  MRN: [id number]    YOB: 1960  Age: 65 y.o. Sex: female     Chief Complaint:  Chief Complaint   Patient presents with    Follow-up     Peripheral vascular disease        History of Present Illness: Floyd Lennox is a 65 y.o. very pleasant woman is here today for follow-up. She is still complaining of breast pain on the right foot with ulcer on the fifth toe. CT scan last time showed previous bypass graft on the right side is occluded. 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 However this needs to be further investigated. Patient also has a left adrenal gland has enlarged. This was first observed a few years ago. Social History:  Social Connections: Not on file       Past Medical History:  Past Medical History:   Diagnosis Date    Arthritis     Coagulation disorder ([address])     possible , pt had surgery on leg and bled out    Diabetes ([address]) 2019    Hypertension     Left-sided weakness     Nodule of kidney     left kidney    Peripheral vascular disease ([address])     Stroke ([address])     recent stroke on 10/16/2021       Surgical History:  Past Surgical History:   Procedure Laterality Date    CATARACT REMOVAL  2019    VASCULAR SURGERY         Allergies: Allergies   Allergen Reactions    Adhesive Tape Rash    Penicillins Nausea And Vomiting    Codeine Other (See Comments)     Makes me \"loopy\"       Current Meds:  Current Outpatient Medications   Medication Sig Dispense Refill    fluticasone-salmeterol (ADVAIR) 250-50 MCG/ACT AEPB diskus inhaler Inhale 1 puff into the lungs in the morning and 1 puff in the evening.       aspirin 325 MG EC tablet Take 1 tablet by mouth daily      atorvastatin (LIPITOR) 80 MG tablet Take 1 tablet by mouth daily      diphenhydrAMINE (BENADRYL) 25 MG capsule Take 1 capsule by mouth every 6 hours as needed      glipiZIDE (GLUCOTROL XL) 10 MG extended release tablet Take 1 tablet

## 2023-09-19 ENCOUNTER — TELEPHONE (OUTPATIENT)
Age: 63
End: 2023-09-19

## 2023-09-19 NOTE — TELEPHONE ENCOUNTER
Lvm for patient  regarding surgery with Dr. Michel First on 09/27 and to see if she got cardiac clearance.

## 2023-09-19 NOTE — TELEPHONE ENCOUNTER
Pt reached out regarding her cardiac clearance prior to surgery. She states she's in progress of having it done. Transferred call to [facility].

## 2023-09-19 NOTE — TELEPHONE ENCOUNTER
Talked to patient regarding surgery with Dr. Alex Hendricks. Dr. Alex Hendricks r requested to change the date of surgery to 9/28 due to obtaining clearance and patient accepted.

## 2023-09-21 NOTE — PROGRESS NOTES
Vascular History and Physical    Patient: Adrianna Lees  MRN: [id number]    YOB: 1960  Age: 65 y.o. Sex: female     Chief Complaint:  No chief complaint on file. History of Present Illness: Adrianna Lees is a 65 y.o. very pleasant woman who had a previous right carotid endarterectomy. She is here today because of worsening right foot ulcer. Patient has previous right leg bypass surgery years ago. Now patient complaining of the right fifth toe ulcer nonhealing with rest pain. Social History:  Social Connections: Not on file       Past Medical History:  Past Medical History:   Diagnosis Date    Arthritis     Coagulation disorder ([address])     possible , pt had surgery on leg and bled out    Diabetes ([address]) 2019    Hypertension     Left-sided weakness     Nodule of kidney     left kidney    Peripheral vascular disease ([address])     Stroke ([address])     recent stroke on 10/16/2021       Surgical History:  Past Surgical History:   Procedure Laterality Date    CATARACT REMOVAL  2019    VASCULAR SURGERY         Allergies: Allergies   Allergen Reactions    Adhesive Tape Rash    Penicillins Nausea And Vomiting    Codeine Other (See Comments)     Makes me \"loopy\"       Current Meds:  Current Outpatient Medications   Medication Sig Dispense Refill    fluticasone-salmeterol (ADVAIR) 250-50 MCG/ACT AEPB diskus inhaler Inhale 1 puff into the lungs in the morning and 1 puff in the evening.       aspirin 325 MG EC tablet Take 1 tablet by mouth daily      atorvastatin (LIPITOR) 80 MG tablet Take 1 tablet by mouth daily      diphenhydrAMINE (BENADRYL) 25 MG capsule Take 1 capsule by mouth every 6 hours as needed      glipiZIDE (GLUCOTROL XL) 10 MG extended release tablet Take 1 tablet by mouth daily      lisinopril (PRINIVIL;ZESTRIL) 20 MG tablet Take by mouth daily      acetaminophen (TYLENOL) 325 MG tablet Take by mouth every 4 hours as needed (Patient not taking: Reported on 8/10/2023)      pantoprazole osteomylitis, c/f endocarditis

## 2023-09-25 ENCOUNTER — HOSPITAL ENCOUNTER (OUTPATIENT)
Facility: HOSPITAL | Age: 63
Discharge: HOME | End: 2023-09-28
Payer: MEDICAID

## 2023-09-25 VITALS
TEMPERATURE: 97.9 F | HEART RATE: 84 BPM | SYSTOLIC BLOOD PRESSURE: 134 MMHG | RESPIRATION RATE: 16 BRPM | DIASTOLIC BLOOD PRESSURE: 72 MMHG

## 2023-09-25 LAB
ABO + RH BLD: NORMAL
ALBUMIN SERPL-MCNC: 3.7 G/DL (ref 3.5–5)
ALBUMIN/GLOB SERPL: 1.1 (ref 1.1–2.2)
ALP SERPL-CCNC: 99 U/L (ref 45–117)
ALT SERPL-CCNC: 18 U/L (ref 12–78)
ANION GAP SERPL CALC-SCNC: 5 MMOL/L (ref 5–15)
ANTIGENS PRESENT BLD: NORMAL
ANTIGENS PRESENT RBC DONR: NORMAL
ANTIGENS PRESENT RBC DONR: NORMAL
APTT PPP: 26.8 SEC (ref 21.2–34.1)
AST SERPL W P-5'-P-CCNC: 14 U/L (ref 15–37)
BILIRUB SERPL-MCNC: 0.3 MG/DL (ref 0.2–1)
BLD PROD TYP BPU: NORMAL
BLD PROD TYP BPU: NORMAL
BLOOD BANK CMNT PATIENT-IMP: NORMAL
BLOOD BANK DISPENSE STATUS: NORMAL
BLOOD BANK DISPENSE STATUS: NORMAL
BLOOD GROUP ANTIBODIES SERPL: NORMAL
BLOOD GROUP ANTIBODIES SERPL: POSITIVE
BPU ID: NORMAL
BPU ID: NORMAL
BUN SERPL-MCNC: 14 MG/DL (ref 6–20)
BUN/CREAT SERPL: 17 (ref 12–20)
CA-I BLD-MCNC: 8.8 MG/DL (ref 8.5–10.1)
CHLORIDE SERPL-SCNC: 107 MMOL/L (ref 97–108)
CO2 SERPL-SCNC: 23 MMOL/L (ref 21–32)
CREAT SERPL-MCNC: 0.83 MG/DL (ref 0.55–1.02)
CROSSMATCH RESULT: NORMAL
CROSSMATCH RESULT: NORMAL
DAT POLY-SP REAG RBC QL: NEGATIVE
ERYTHROCYTE [DISTWIDTH] IN BLOOD BY AUTOMATED COUNT: 16.6 % (ref 11.5–14.5)
GLOBULIN SER CALC-MCNC: 3.5 G/DL (ref 2–4)
GLUCOSE SERPL-MCNC: 241 MG/DL (ref 65–100)
HCT VFR BLD AUTO: 31.6 % (ref 35–47)
HGB BLD-MCNC: 9 G/DL (ref 11.5–16)
INR PPP: 0.9 (ref 0.9–1.1)
MCH RBC QN AUTO: 20.4 PG (ref 26–34)
MCHC RBC AUTO-ENTMCNC: 28.5 G/DL (ref 30–36.5)
MCV RBC AUTO: 71.5 FL (ref 80–99)
MRSA DNA SPEC QL NAA+PROBE: NOT DETECTED
NRBC # BLD: 0 K/UL (ref 0–0.01)
NRBC BLD-RTO: 0 PER 100 WBC
PLATELET # BLD AUTO: 487 K/UL (ref 150–400)
PMV BLD AUTO: 11.8 FL (ref 8.9–12.9)
POTASSIUM SERPL-SCNC: 5.2 MMOL/L (ref 3.5–5.1)
PROT SERPL-MCNC: 7.2 G/DL (ref 6.4–8.2)
PROTHROMBIN TIME: 12.6 SEC (ref 11.9–14.6)
RBC # BLD AUTO: 4.42 M/UL (ref 3.8–5.2)
SODIUM SERPL-SCNC: 135 MMOL/L (ref 136–145)
SPECIMEN EXP DATE BLD: NORMAL
THERAPEUTIC RANGE: NORMAL SEC (ref 82–109)
TRANSFUSION STATUS PATIENT QL: NORMAL
TRANSFUSION STATUS PATIENT QL: NORMAL
UNIT DIVISION: 0
UNIT DIVISION: 0
WBC # BLD AUTO: 8 K/UL (ref 3.6–11)

## 2023-09-25 PROCEDURE — 86901 BLOOD TYPING SEROLOGIC RH(D): CPT

## 2023-09-25 PROCEDURE — 87641 MR-STAPH DNA AMP PROBE: CPT

## 2023-09-25 PROCEDURE — 86922 COMPATIBILITY TEST ANTIGLOB: CPT

## 2023-09-25 PROCEDURE — 85730 THROMBOPLASTIN TIME PARTIAL: CPT

## 2023-09-25 PROCEDURE — 86905 BLOOD TYPING RBC ANTIGENS: CPT

## 2023-09-25 PROCEDURE — 86870 RBC ANTIBODY IDENTIFICATION: CPT

## 2023-09-25 PROCEDURE — 86850 RBC ANTIBODY SCREEN: CPT

## 2023-09-25 PROCEDURE — 86880 COOMBS TEST DIRECT: CPT

## 2023-09-25 PROCEDURE — 86900 BLOOD TYPING SEROLOGIC ABO: CPT

## 2023-09-25 PROCEDURE — 86902 BLOOD TYPE ANTIGEN DONOR EA: CPT

## 2023-09-25 PROCEDURE — 85610 PROTHROMBIN TIME: CPT

## 2023-09-25 PROCEDURE — 83036 HEMOGLOBIN GLYCOSYLATED A1C: CPT

## 2023-09-25 PROCEDURE — 80053 COMPREHEN METABOLIC PANEL: CPT

## 2023-09-25 PROCEDURE — 36415 COLL VENOUS BLD VENIPUNCTURE: CPT

## 2023-09-25 PROCEDURE — 86921 COMPATIBILITY TEST INCUBATE: CPT

## 2023-09-25 PROCEDURE — 85027 COMPLETE CBC AUTOMATED: CPT

## 2023-09-25 PROCEDURE — 86920 COMPATIBILITY TEST SPIN: CPT

## 2023-09-25 ASSESSMENT — PAIN SCALES - GENERAL: PAINLEVEL_OUTOF10: 0

## 2023-09-26 ENCOUNTER — TELEPHONE (OUTPATIENT)
Age: 63
End: 2023-09-26

## 2023-09-26 LAB
EST. AVERAGE GLUCOSE BLD GHB EST-MCNC: 197 MG/DL
HBA1C MFR BLD: 8.5 % (ref 4–5.6)

## 2023-09-26 NOTE — TELEPHONE ENCOUNTER
Georgiana the nurse from [facility] called today regarding patient's PAT labs. She is scheduled for 9/28/23. The patient's hemoglobin in 9.0 and hematocrit is 31.6. She wants to make him aware prior to the surgery.

## 2023-09-27 ENCOUNTER — TELEPHONE (OUTPATIENT)
Age: 63
End: 2023-09-27

## 2023-09-27 NOTE — TELEPHONE ENCOUNTER
Perfect serve message sent to Dr. Allison Troy. Dr. Allison Troy acknowledged receipt of lab values. nothing

## 2023-09-27 NOTE — TELEPHONE ENCOUNTER
Returned call to [facility] with [facility]'s states she spoke with [address] [place] [address] cardiology and confirmed patient had stress test completed 9/26/23, but they informed her the provider will take 48 hours to review and confirm if she is cleared for surgery. Per Georgiana she did call Dr. Jayashree Johnson and informed him of situation. Informed Georgiana I will reach out to Daniaon  and follow up.

## 2023-09-27 NOTE — TELEPHONE ENCOUNTER
Georgiana(nurse) from [facility] called in for Karen Nelson due to not being cleared with the stress test until after October 17th. She will have to be rescheduled for surgery.  Please give her a call at [phone]

## 2023-09-27 NOTE — TELEPHONE ENCOUNTER
Received return call from Georgiana with [facility]'s states she spoke with anesthesia patient will need to be cancelled since they still have not received cardiac clearance. Perfect serve sent to provider to update.

## 2023-09-27 NOTE — TELEPHONE ENCOUNTER
Patient called again requesting the arrival time for her surgery for tomorrow. I told her that Dr. Ta Chapman will call her. She wanted me to let you know that she called again.

## 2023-09-28 ENCOUNTER — TELEPHONE (OUTPATIENT)
Age: 63
End: 2023-09-28

## 2023-09-28 NOTE — TELEPHONE ENCOUNTER
Alma York called in wanting to know when she'll be rescheduled for surgery. She states she's going to  her results from yesterday.  Please give her a call [phone]

## 2023-09-29 ENCOUNTER — CLINICAL DOCUMENTATION (OUTPATIENT)
Age: 63
End: 2023-09-29

## 2023-09-29 NOTE — TELEPHONE ENCOUNTER
Spoke to nurse who confirmed they have results of Stress test. She will fax over along with EKG. Office fax number confirmed.

## 2023-09-29 NOTE — PROGRESS NOTES
Received office notes from [facility]. Forwarded to Dr. Kurt Breen. Also sent message to Anurag Valdez, surgery scheduler.

## 2023-10-02 LAB
ABO + RH BLD: NORMAL
ANTIGENS PRESENT BLD: NORMAL
ANTIGENS PRESENT RBC DONR: NORMAL
ANTIGENS PRESENT RBC DONR: NORMAL
BLD PROD TYP BPU: NORMAL
BLD PROD TYP BPU: NORMAL
BLOOD BANK CMNT PATIENT-IMP: NORMAL
BLOOD BANK DISPENSE STATUS: NORMAL
BLOOD BANK DISPENSE STATUS: NORMAL
BLOOD GROUP ANTIBODIES SERPL: NORMAL
BLOOD GROUP ANTIBODIES SERPL: POSITIVE
BPU ID: NORMAL
BPU ID: NORMAL
CROSSMATCH RESULT: NORMAL
CROSSMATCH RESULT: NORMAL
DAT POLY-SP REAG RBC QL: NEGATIVE
SPECIMEN EXP DATE BLD: NORMAL
TRANSFUSION STATUS PATIENT QL: NORMAL
TRANSFUSION STATUS PATIENT QL: NORMAL
UNIT DIVISION: 0
UNIT DIVISION: 0

## 2023-10-16 ENCOUNTER — HOSPITAL ENCOUNTER (OUTPATIENT)
Facility: HOSPITAL | Age: 63
Discharge: HOME | End: 2023-10-19
Payer: MEDICAID

## 2023-10-16 VITALS — TEMPERATURE: 98.1 F | SYSTOLIC BLOOD PRESSURE: 164 MMHG | DIASTOLIC BLOOD PRESSURE: 71 MMHG | HEART RATE: 91 BPM

## 2023-10-16 LAB
ALBUMIN SERPL-MCNC: 3.6 G/DL (ref 3.5–5)
ALBUMIN/GLOB SERPL: 0.9 (ref 1.1–2.2)
ALP SERPL-CCNC: 104 U/L (ref 45–117)
ALT SERPL-CCNC: 21 U/L (ref 12–78)
ANION GAP SERPL CALC-SCNC: 6 MMOL/L (ref 5–15)
AST SERPL W P-5'-P-CCNC: 15 U/L (ref 15–37)
BILIRUB SERPL-MCNC: 0.1 MG/DL (ref 0.2–1)
BUN SERPL-MCNC: 13 MG/DL (ref 6–20)
BUN/CREAT SERPL: 15 (ref 12–20)
CA-I BLD-MCNC: 8.7 MG/DL (ref 8.5–10.1)
CHLORIDE SERPL-SCNC: 113 MMOL/L (ref 97–108)
CO2 SERPL-SCNC: 20 MMOL/L (ref 21–32)
CREAT SERPL-MCNC: 0.86 MG/DL (ref 0.55–1.02)
ERYTHROCYTE [DISTWIDTH] IN BLOOD BY AUTOMATED COUNT: 18.1 % (ref 11.5–14.5)
GLOBULIN SER CALC-MCNC: 4 G/DL (ref 2–4)
GLUCOSE SERPL-MCNC: 230 MG/DL (ref 65–100)
HCT VFR BLD AUTO: 31.5 % (ref 35–47)
HGB BLD-MCNC: 8.9 G/DL (ref 11.5–16)
MCH RBC QN AUTO: 20.3 PG (ref 26–34)
MCHC RBC AUTO-ENTMCNC: 28.3 G/DL (ref 30–36.5)
MCV RBC AUTO: 71.9 FL (ref 80–99)
NRBC # BLD: 0 K/UL (ref 0–0.01)
NRBC BLD-RTO: 0 PER 100 WBC
PLATELET # BLD AUTO: 521 K/UL (ref 150–400)
PMV BLD AUTO: 11 FL (ref 8.9–12.9)
POTASSIUM SERPL-SCNC: 4.5 MMOL/L (ref 3.5–5.1)
PROT SERPL-MCNC: 7.6 G/DL (ref 6.4–8.2)
RBC # BLD AUTO: 4.38 M/UL (ref 3.8–5.2)
SODIUM SERPL-SCNC: 139 MMOL/L (ref 136–145)
WBC # BLD AUTO: 8.4 K/UL (ref 3.6–11)

## 2023-10-16 PROCEDURE — 36415 COLL VENOUS BLD VENIPUNCTURE: CPT

## 2023-10-16 PROCEDURE — 86901 BLOOD TYPING SEROLOGIC RH(D): CPT

## 2023-10-16 PROCEDURE — 80053 COMPREHEN METABOLIC PANEL: CPT

## 2023-10-16 PROCEDURE — 86850 RBC ANTIBODY SCREEN: CPT

## 2023-10-16 PROCEDURE — 86920 COMPATIBILITY TEST SPIN: CPT

## 2023-10-16 PROCEDURE — 86902 BLOOD TYPE ANTIGEN DONOR EA: CPT

## 2023-10-16 PROCEDURE — 86880 COOMBS TEST DIRECT: CPT

## 2023-10-16 PROCEDURE — 86870 RBC ANTIBODY IDENTIFICATION: CPT

## 2023-10-16 PROCEDURE — 86900 BLOOD TYPING SEROLOGIC ABO: CPT

## 2023-10-16 PROCEDURE — 85027 COMPLETE CBC AUTOMATED: CPT

## 2023-10-16 PROCEDURE — 86922 COMPATIBILITY TEST ANTIGLOB: CPT

## 2023-10-16 PROCEDURE — 86921 COMPATIBILITY TEST INCUBATE: CPT

## 2023-10-16 ASSESSMENT — PAIN SCALES - GENERAL: PAINLEVEL_OUTOF10: 8

## 2023-10-17 NOTE — PERIOP NOTE
Dr. Afsaneh Oropeza made aware of Hgb 8.9 , orders for crossmatch DOS received    Made aware patient called [facility] stated her back pain \"eased up\" did not go to ER  No new orders given at this time

## 2023-10-18 ENCOUNTER — HOSPITAL ENCOUNTER (INPATIENT)
Facility: HOSPITAL | Age: 63
LOS: 2 days | Discharge: HOME HEALTH | DRG: 181 | End: 2023-10-20
Attending: SURGERY | Admitting: SURGERY
Payer: MEDICAID

## 2023-10-18 ENCOUNTER — ANESTHESIA (OUTPATIENT)
Facility: HOSPITAL | Age: 63
End: 2023-10-18
Payer: MEDICAID

## 2023-10-18 ENCOUNTER — ANESTHESIA EVENT (OUTPATIENT)
Facility: HOSPITAL | Age: 63
End: 2023-10-18
Payer: MEDICAID

## 2023-10-18 DIAGNOSIS — I70.221 CRITICAL LIMB ISCHEMIA OF RIGHT LOWER EXTREMITY WITH REST PAIN (HCC): ICD-10-CM

## 2023-10-18 DIAGNOSIS — L97.519 ULCER OF RIGHT FOOT, UNSPECIFIED ULCER STAGE (HCC): ICD-10-CM

## 2023-10-18 DIAGNOSIS — I99.8 LIMB ISCHEMIA: Primary | ICD-10-CM

## 2023-10-18 LAB
GLUCOSE BLD STRIP.AUTO-MCNC: 228 MG/DL (ref 65–100)
GLUCOSE BLD STRIP.AUTO-MCNC: 230 MG/DL (ref 65–100)
GLUCOSE BLD STRIP.AUTO-MCNC: 265 MG/DL (ref 65–100)
PERFORMED BY:: ABNORMAL

## 2023-10-18 PROCEDURE — 30233N1 TRANSFUSION OF NONAUTOLOGOUS RED BLOOD CELLS INTO PERIPHERAL VEIN, PERCUTANEOUS APPROACH: ICD-10-PCS | Performed by: SURGERY

## 2023-10-18 PROCEDURE — 35656 BPG FEMORAL-POPLITEAL: CPT | Performed by: SURGERY

## 2023-10-18 PROCEDURE — 2709999900 HC NON-CHARGEABLE SUPPLY: Performed by: SURGERY

## 2023-10-18 PROCEDURE — 99222 1ST HOSP IP/OBS MODERATE 55: CPT | Performed by: SURGERY

## 2023-10-18 PROCEDURE — 6370000000 HC RX 637 (ALT 250 FOR IP): Performed by: SURGERY

## 2023-10-18 PROCEDURE — 88311 DECALCIFY TISSUE: CPT

## 2023-10-18 PROCEDURE — 6360000002 HC RX W HCPCS: Performed by: SURGERY

## 2023-10-18 PROCEDURE — 2580000003 HC RX 258: Performed by: SURGERY

## 2023-10-18 PROCEDURE — C1768 GRAFT, VASCULAR: HCPCS | Performed by: SURGERY

## 2023-10-18 PROCEDURE — 6360000002 HC RX W HCPCS: Performed by: NURSE ANESTHETIST, CERTIFIED REGISTERED

## 2023-10-18 PROCEDURE — 87086 URINE CULTURE/COLONY COUNT: CPT

## 2023-10-18 PROCEDURE — 35371 RECHANNELING OF ARTERY: CPT | Performed by: SURGERY

## 2023-10-18 PROCEDURE — 2500000003 HC RX 250 WO HCPCS: Performed by: SURGERY

## 2023-10-18 PROCEDURE — 2500000003 HC RX 250 WO HCPCS: Performed by: NURSE ANESTHETIST, CERTIFIED REGISTERED

## 2023-10-18 PROCEDURE — 3700000001 HC ADD 15 MINUTES (ANESTHESIA): Performed by: SURGERY

## 2023-10-18 PROCEDURE — 2000000000 HC ICU R&B

## 2023-10-18 PROCEDURE — 3600000002 HC SURGERY LEVEL 2 BASE: Performed by: SURGERY

## 2023-10-18 PROCEDURE — 2580000003 HC RX 258: Performed by: NURSE ANESTHETIST, CERTIFIED REGISTERED

## 2023-10-18 PROCEDURE — 04CK0ZZ EXTIRPATION OF MATTER FROM RIGHT FEMORAL ARTERY, OPEN APPROACH: ICD-10-PCS | Performed by: SURGERY

## 2023-10-18 PROCEDURE — 3700000000 HC ANESTHESIA ATTENDED CARE: Performed by: SURGERY

## 2023-10-18 PROCEDURE — 82962 GLUCOSE BLOOD TEST: CPT

## 2023-10-18 PROCEDURE — 7100000001 HC PACU RECOVERY - ADDTL 15 MIN: Performed by: SURGERY

## 2023-10-18 PROCEDURE — 7100000000 HC PACU RECOVERY - FIRST 15 MIN: Performed by: SURGERY

## 2023-10-18 PROCEDURE — P9016 RBC LEUKOCYTES REDUCED: HCPCS

## 2023-10-18 PROCEDURE — 041K0JL BYPASS RIGHT FEMORAL ARTERY TO POPLITEAL ARTERY WITH SYNTHETIC SUBSTITUTE, OPEN APPROACH: ICD-10-PCS | Performed by: SURGERY

## 2023-10-18 PROCEDURE — 3600000012 HC SURGERY LEVEL 2 ADDTL 15MIN: Performed by: SURGERY

## 2023-10-18 PROCEDURE — 88304 TISSUE EXAM BY PATHOLOGIST: CPT

## 2023-10-18 DEVICE — IMPLANTABLE DEVICE: Type: IMPLANTABLE DEVICE | Site: LEG | Status: FUNCTIONAL

## 2023-10-18 RX ORDER — SODIUM CHLORIDE 0.9 % (FLUSH) 0.9 %
5-40 SYRINGE (ML) INJECTION EVERY 12 HOURS SCHEDULED
Status: DISCONTINUED | OUTPATIENT
Start: 2023-10-18 | End: 2023-10-20 | Stop reason: HOSPADM

## 2023-10-18 RX ORDER — ONDANSETRON 2 MG/ML
INJECTION INTRAMUSCULAR; INTRAVENOUS PRN
Status: DISCONTINUED | OUTPATIENT
Start: 2023-10-18 | End: 2023-10-18 | Stop reason: SDUPTHER

## 2023-10-18 RX ORDER — SODIUM CHLORIDE 0.9 % (FLUSH) 0.9 %
5-40 SYRINGE (ML) INJECTION EVERY 12 HOURS SCHEDULED
Status: DISCONTINUED | OUTPATIENT
Start: 2023-10-18 | End: 2023-10-18 | Stop reason: HOSPADM

## 2023-10-18 RX ORDER — SODIUM CHLORIDE 9 MG/ML
INJECTION, SOLUTION INTRAVENOUS PRN
Status: DISCONTINUED | OUTPATIENT
Start: 2023-10-18 | End: 2023-10-20 | Stop reason: HOSPADM

## 2023-10-18 RX ORDER — MIDAZOLAM HYDROCHLORIDE 1 MG/ML
INJECTION INTRAMUSCULAR; INTRAVENOUS PRN
Status: DISCONTINUED | OUTPATIENT
Start: 2023-10-18 | End: 2023-10-18 | Stop reason: SDUPTHER

## 2023-10-18 RX ORDER — HYDRALAZINE HYDROCHLORIDE 20 MG/ML
10 INJECTION INTRAMUSCULAR; INTRAVENOUS
Status: DISCONTINUED | OUTPATIENT
Start: 2023-10-18 | End: 2023-10-18 | Stop reason: HOSPADM

## 2023-10-18 RX ORDER — ONDANSETRON 4 MG/1
4 TABLET, ORALLY DISINTEGRATING ORAL EVERY 8 HOURS PRN
Status: DISCONTINUED | OUTPATIENT
Start: 2023-10-18 | End: 2023-10-20 | Stop reason: HOSPADM

## 2023-10-18 RX ORDER — HYDROMORPHONE HYDROCHLORIDE 1 MG/ML
0.5 INJECTION, SOLUTION INTRAMUSCULAR; INTRAVENOUS; SUBCUTANEOUS
Status: DISCONTINUED | OUTPATIENT
Start: 2023-10-18 | End: 2023-10-20 | Stop reason: HOSPADM

## 2023-10-18 RX ORDER — LIDOCAINE HYDROCHLORIDE 20 MG/ML
INJECTION, SOLUTION EPIDURAL; INFILTRATION; INTRACAUDAL; PERINEURAL PRN
Status: DISCONTINUED | OUTPATIENT
Start: 2023-10-18 | End: 2023-10-18 | Stop reason: SDUPTHER

## 2023-10-18 RX ORDER — HEPARIN SODIUM 5000 [USP'U]/ML
INJECTION, SOLUTION INTRAVENOUS; SUBCUTANEOUS PRN
Status: DISCONTINUED | OUTPATIENT
Start: 2023-10-18 | End: 2023-10-18 | Stop reason: ALTCHOICE

## 2023-10-18 RX ORDER — KETOROLAC TROMETHAMINE 30 MG/ML
INJECTION, SOLUTION INTRAMUSCULAR; INTRAVENOUS PRN
Status: DISCONTINUED | OUTPATIENT
Start: 2023-10-18 | End: 2023-10-18 | Stop reason: SDUPTHER

## 2023-10-18 RX ORDER — DEXAMETHASONE SODIUM PHOSPHATE 4 MG/ML
INJECTION, SOLUTION INTRA-ARTICULAR; INTRALESIONAL; INTRAMUSCULAR; INTRAVENOUS; SOFT TISSUE PRN
Status: DISCONTINUED | OUTPATIENT
Start: 2023-10-18 | End: 2023-10-18 | Stop reason: SDUPTHER

## 2023-10-18 RX ORDER — ONDANSETRON 2 MG/ML
4 INJECTION INTRAMUSCULAR; INTRAVENOUS EVERY 6 HOURS PRN
Status: DISCONTINUED | OUTPATIENT
Start: 2023-10-18 | End: 2023-10-20 | Stop reason: HOSPADM

## 2023-10-18 RX ORDER — HYDROMORPHONE HYDROCHLORIDE 1 MG/ML
1 INJECTION, SOLUTION INTRAMUSCULAR; INTRAVENOUS; SUBCUTANEOUS
Status: DISCONTINUED | OUTPATIENT
Start: 2023-10-18 | End: 2023-10-20 | Stop reason: HOSPADM

## 2023-10-18 RX ORDER — SODIUM CHLORIDE 0.9 % (FLUSH) 0.9 %
5-40 SYRINGE (ML) INJECTION PRN
Status: DISCONTINUED | OUTPATIENT
Start: 2023-10-18 | End: 2023-10-20 | Stop reason: HOSPADM

## 2023-10-18 RX ORDER — SODIUM CHLORIDE, SODIUM LACTATE, POTASSIUM CHLORIDE, CALCIUM CHLORIDE 600; 310; 30; 20 MG/100ML; MG/100ML; MG/100ML; MG/100ML
INJECTION, SOLUTION INTRAVENOUS CONTINUOUS
Status: DISCONTINUED | OUTPATIENT
Start: 2023-10-18 | End: 2023-10-20 | Stop reason: HOSPADM

## 2023-10-18 RX ORDER — ONDANSETRON 2 MG/ML
4 INJECTION INTRAMUSCULAR; INTRAVENOUS
Status: DISCONTINUED | OUTPATIENT
Start: 2023-10-18 | End: 2023-10-18 | Stop reason: HOSPADM

## 2023-10-18 RX ORDER — HYDROMORPHONE HYDROCHLORIDE 1 MG/ML
0.5 INJECTION, SOLUTION INTRAMUSCULAR; INTRAVENOUS; SUBCUTANEOUS EVERY 5 MIN PRN
Status: DISCONTINUED | OUTPATIENT
Start: 2023-10-18 | End: 2023-10-18 | Stop reason: HOSPADM

## 2023-10-18 RX ORDER — SEVOFLURANE 250 ML/250ML
LIQUID RESPIRATORY (INHALATION)
Status: DISPENSED
Start: 2023-10-18 | End: 2023-10-18

## 2023-10-18 RX ORDER — SODIUM CHLORIDE 0.9 % (FLUSH) 0.9 %
5-40 SYRINGE (ML) INJECTION PRN
Status: DISCONTINUED | OUTPATIENT
Start: 2023-10-18 | End: 2023-10-18 | Stop reason: HOSPADM

## 2023-10-18 RX ORDER — SODIUM CHLORIDE 9 MG/ML
INJECTION, SOLUTION INTRAVENOUS PRN
Status: DISCONTINUED | OUTPATIENT
Start: 2023-10-18 | End: 2023-10-18 | Stop reason: HOSPADM

## 2023-10-18 RX ORDER — 0.9 % SODIUM CHLORIDE 0.9 %
INTRAVENOUS SOLUTION INTRAVENOUS CONTINUOUS PRN
Status: COMPLETED | OUTPATIENT
Start: 2023-10-18 | End: 2023-10-18

## 2023-10-18 RX ORDER — ROCURONIUM BROMIDE 10 MG/ML
INJECTION, SOLUTION INTRAVENOUS PRN
Status: DISCONTINUED | OUTPATIENT
Start: 2023-10-18 | End: 2023-10-18 | Stop reason: SDUPTHER

## 2023-10-18 RX ORDER — SODIUM CHLORIDE, SODIUM LACTATE, POTASSIUM CHLORIDE, CALCIUM CHLORIDE 600; 310; 30; 20 MG/100ML; MG/100ML; MG/100ML; MG/100ML
INJECTION, SOLUTION INTRAVENOUS CONTINUOUS PRN
Status: DISCONTINUED | OUTPATIENT
Start: 2023-10-18 | End: 2023-10-18 | Stop reason: SDUPTHER

## 2023-10-18 RX ORDER — PROPOFOL 10 MG/ML
INJECTION, EMULSION INTRAVENOUS PRN
Status: DISCONTINUED | OUTPATIENT
Start: 2023-10-18 | End: 2023-10-18 | Stop reason: SDUPTHER

## 2023-10-18 RX ORDER — PHENYLEPHRINE HCL IN 0.9% NACL 1 MG/10 ML
SYRINGE (ML) INTRAVENOUS PRN
Status: DISCONTINUED | OUTPATIENT
Start: 2023-10-18 | End: 2023-10-18 | Stop reason: SDUPTHER

## 2023-10-18 RX ORDER — LABETALOL HYDROCHLORIDE 5 MG/ML
10 INJECTION, SOLUTION INTRAVENOUS
Status: DISCONTINUED | OUTPATIENT
Start: 2023-10-18 | End: 2023-10-18 | Stop reason: HOSPADM

## 2023-10-18 RX ORDER — FENTANYL CITRATE 50 UG/ML
INJECTION, SOLUTION INTRAMUSCULAR; INTRAVENOUS PRN
Status: DISCONTINUED | OUTPATIENT
Start: 2023-10-18 | End: 2023-10-18 | Stop reason: SDUPTHER

## 2023-10-18 RX ORDER — IPRATROPIUM BROMIDE AND ALBUTEROL SULFATE 2.5; .5 MG/3ML; MG/3ML
1 SOLUTION RESPIRATORY (INHALATION)
Status: DISCONTINUED | OUTPATIENT
Start: 2023-10-18 | End: 2023-10-18 | Stop reason: HOSPADM

## 2023-10-18 RX ORDER — OXYCODONE HYDROCHLORIDE 5 MG/1
5 TABLET ORAL PRN
Status: DISCONTINUED | OUTPATIENT
Start: 2023-10-18 | End: 2023-10-18 | Stop reason: HOSPADM

## 2023-10-18 RX ORDER — MEPERIDINE HYDROCHLORIDE 25 MG/ML
12.5 INJECTION INTRAMUSCULAR; INTRAVENOUS; SUBCUTANEOUS EVERY 5 MIN PRN
Status: DISCONTINUED | OUTPATIENT
Start: 2023-10-18 | End: 2023-10-18 | Stop reason: HOSPADM

## 2023-10-18 RX ORDER — SODIUM CHLORIDE, SODIUM LACTATE, POTASSIUM CHLORIDE, CALCIUM CHLORIDE 600; 310; 30; 20 MG/100ML; MG/100ML; MG/100ML; MG/100ML
INJECTION, SOLUTION INTRAVENOUS ONCE
Status: DISCONTINUED | OUTPATIENT
Start: 2023-10-18 | End: 2023-10-18 | Stop reason: HOSPADM

## 2023-10-18 RX ORDER — METOCLOPRAMIDE HYDROCHLORIDE 5 MG/ML
10 INJECTION INTRAMUSCULAR; INTRAVENOUS
Status: DISCONTINUED | OUTPATIENT
Start: 2023-10-18 | End: 2023-10-18 | Stop reason: HOSPADM

## 2023-10-18 RX ORDER — HEPARIN SODIUM 1000 [USP'U]/ML
INJECTION, SOLUTION INTRAVENOUS; SUBCUTANEOUS PRN
Status: DISCONTINUED | OUTPATIENT
Start: 2023-10-18 | End: 2023-10-18 | Stop reason: SDUPTHER

## 2023-10-18 RX ORDER — SODIUM CHLORIDE 9 MG/ML
INJECTION, SOLUTION INTRAVENOUS CONTINUOUS
Status: DISCONTINUED | OUTPATIENT
Start: 2023-10-18 | End: 2023-10-20 | Stop reason: HOSPADM

## 2023-10-18 RX ORDER — LORAZEPAM 2 MG/ML
0.5 INJECTION INTRAMUSCULAR
Status: DISCONTINUED | OUTPATIENT
Start: 2023-10-18 | End: 2023-10-18 | Stop reason: HOSPADM

## 2023-10-18 RX ORDER — FENTANYL CITRATE 50 UG/ML
50 INJECTION, SOLUTION INTRAMUSCULAR; INTRAVENOUS EVERY 5 MIN PRN
Status: DISCONTINUED | OUTPATIENT
Start: 2023-10-18 | End: 2023-10-18 | Stop reason: HOSPADM

## 2023-10-18 RX ORDER — OXYCODONE HYDROCHLORIDE 5 MG/1
10 TABLET ORAL PRN
Status: DISCONTINUED | OUTPATIENT
Start: 2023-10-18 | End: 2023-10-18 | Stop reason: HOSPADM

## 2023-10-18 RX ORDER — DEXTROSE MONOHYDRATE 100 MG/ML
INJECTION, SOLUTION INTRAVENOUS CONTINUOUS PRN
Status: DISCONTINUED | OUTPATIENT
Start: 2023-10-18 | End: 2023-10-18 | Stop reason: HOSPADM

## 2023-10-18 RX ADMIN — Medication 100 MCG: at 15:44

## 2023-10-18 RX ADMIN — FENTANYL CITRATE 25 MCG: 50 INJECTION, SOLUTION INTRAMUSCULAR; INTRAVENOUS at 15:17

## 2023-10-18 RX ADMIN — Medication 100 MCG: at 12:41

## 2023-10-18 RX ADMIN — FENTANYL CITRATE 50 MCG: 50 INJECTION, SOLUTION INTRAMUSCULAR; INTRAVENOUS at 14:25

## 2023-10-18 RX ADMIN — FENTANYL CITRATE 25 MCG: 50 INJECTION, SOLUTION INTRAMUSCULAR; INTRAVENOUS at 15:27

## 2023-10-18 RX ADMIN — VANCOMYCIN HYDROCHLORIDE 1000 MG: 1 INJECTION, POWDER, LYOPHILIZED, FOR SOLUTION INTRAVENOUS at 11:26

## 2023-10-18 RX ADMIN — FENTANYL CITRATE 50 MCG: 50 INJECTION, SOLUTION INTRAMUSCULAR; INTRAVENOUS at 13:14

## 2023-10-18 RX ADMIN — PIPERACILLIN AND TAZOBACTAM 3375 MG: 3; .375 INJECTION, POWDER, LYOPHILIZED, FOR SOLUTION INTRAVENOUS at 18:57

## 2023-10-18 RX ADMIN — SODIUM CHLORIDE: 9 INJECTION, SOLUTION INTRAVENOUS at 18:54

## 2023-10-18 RX ADMIN — Medication 50 MCG: at 13:23

## 2023-10-18 RX ADMIN — Medication 50 MCG: at 14:45

## 2023-10-18 RX ADMIN — KETOROLAC TROMETHAMINE 15 MG: 30 INJECTION, SOLUTION INTRAMUSCULAR at 15:23

## 2023-10-18 RX ADMIN — HEPARIN SODIUM 5000 UNITS: 1000 INJECTION, SOLUTION INTRAVENOUS; SUBCUTANEOUS at 13:38

## 2023-10-18 RX ADMIN — PROPOFOL 20 MG: 10 INJECTION, EMULSION INTRAVENOUS at 13:57

## 2023-10-18 RX ADMIN — ROCURONIUM BROMIDE 10 MG: 10 INJECTION, SOLUTION INTRAVENOUS at 13:54

## 2023-10-18 RX ADMIN — Medication 50 MCG: at 14:40

## 2023-10-18 RX ADMIN — HYDROMORPHONE HYDROCHLORIDE 1 MG: 1 INJECTION, SOLUTION INTRAMUSCULAR; INTRAVENOUS; SUBCUTANEOUS at 20:46

## 2023-10-18 RX ADMIN — ROCURONIUM BROMIDE 5 MG: 10 INJECTION, SOLUTION INTRAVENOUS at 14:51

## 2023-10-18 RX ADMIN — Medication 100 MCG: at 14:48

## 2023-10-18 RX ADMIN — PROPOFOL 30 MG: 10 INJECTION, EMULSION INTRAVENOUS at 14:17

## 2023-10-18 RX ADMIN — PROPOFOL 50 MG: 10 INJECTION, EMULSION INTRAVENOUS at 14:51

## 2023-10-18 RX ADMIN — DEXAMETHASONE SODIUM PHOSPHATE 4 MG: 4 INJECTION, SOLUTION INTRA-ARTICULAR; INTRALESIONAL; INTRAMUSCULAR; INTRAVENOUS; SOFT TISSUE at 12:50

## 2023-10-18 RX ADMIN — SODIUM CHLORIDE, POTASSIUM CHLORIDE, SODIUM LACTATE AND CALCIUM CHLORIDE: 600; 310; 30; 20 INJECTION, SOLUTION INTRAVENOUS at 14:22

## 2023-10-18 RX ADMIN — SODIUM CHLORIDE, PRESERVATIVE FREE 10 ML: 5 INJECTION INTRAVENOUS at 19:45

## 2023-10-18 RX ADMIN — Medication 50 MCG: at 13:40

## 2023-10-18 RX ADMIN — ROCURONIUM BROMIDE 50 MG: 10 INJECTION, SOLUTION INTRAVENOUS at 12:31

## 2023-10-18 RX ADMIN — Medication 150 MCG: at 12:49

## 2023-10-18 RX ADMIN — LIDOCAINE HYDROCHLORIDE 100 MG: 20 INJECTION, SOLUTION EPIDURAL; INFILTRATION; INTRACAUDAL; PERINEURAL at 12:30

## 2023-10-18 RX ADMIN — PROPOFOL 150 MG: 10 INJECTION, EMULSION INTRAVENOUS at 12:30

## 2023-10-18 RX ADMIN — Medication 50 MCG: at 14:35

## 2023-10-18 RX ADMIN — FENTANYL CITRATE 100 MCG: 50 INJECTION, SOLUTION INTRAMUSCULAR; INTRAVENOUS at 12:30

## 2023-10-18 RX ADMIN — ONDANSETRON 4 MG: 2 INJECTION INTRAMUSCULAR; INTRAVENOUS at 12:50

## 2023-10-18 RX ADMIN — MIDAZOLAM HYDROCHLORIDE 2 MG: 2 INJECTION, SOLUTION INTRAMUSCULAR; INTRAVENOUS at 12:28

## 2023-10-18 RX ADMIN — Medication 100 MCG: at 12:44

## 2023-10-18 RX ADMIN — SODIUM CHLORIDE, POTASSIUM CHLORIDE, SODIUM LACTATE AND CALCIUM CHLORIDE: 600; 310; 30; 20 INJECTION, SOLUTION INTRAVENOUS at 12:28

## 2023-10-18 RX ADMIN — PROPOFOL 50 MG: 10 INJECTION, EMULSION INTRAVENOUS at 13:13

## 2023-10-18 RX ADMIN — Medication 100 MCG: at 15:00

## 2023-10-18 RX ADMIN — ROCURONIUM BROMIDE 5 MG: 10 INJECTION, SOLUTION INTRAVENOUS at 14:29

## 2023-10-18 RX ADMIN — Medication 100 MCG: at 15:47

## 2023-10-18 RX ADMIN — PROPOFOL 20 MG: 10 INJECTION, EMULSION INTRAVENOUS at 14:19

## 2023-10-18 ASSESSMENT — PAIN DESCRIPTION - PAIN TYPE: TYPE: SURGICAL PAIN

## 2023-10-18 ASSESSMENT — PAIN SCALES - GENERAL
PAINLEVEL_OUTOF10: 0
PAINLEVEL_OUTOF10: 0
PAINLEVEL_OUTOF10: 6
PAINLEVEL_OUTOF10: 0
PAINLEVEL_OUTOF10: 7
PAINLEVEL_OUTOF10: 0

## 2023-10-18 ASSESSMENT — PAIN DESCRIPTION - ONSET: ONSET: PROGRESSIVE

## 2023-10-18 ASSESSMENT — PAIN DESCRIPTION - LOCATION
LOCATION: LEG
LOCATION: LEG

## 2023-10-18 ASSESSMENT — PAIN DESCRIPTION - FREQUENCY: FREQUENCY: CONTINUOUS

## 2023-10-18 ASSESSMENT — PAIN DESCRIPTION - DESCRIPTORS
DESCRIPTORS: NUMBNESS
DESCRIPTORS: NUMBNESS

## 2023-10-18 ASSESSMENT — PAIN - FUNCTIONAL ASSESSMENT: PAIN_FUNCTIONAL_ASSESSMENT: 0-10

## 2023-10-18 ASSESSMENT — LIFESTYLE VARIABLES: SMOKING_STATUS: 1

## 2023-10-18 ASSESSMENT — PAIN DESCRIPTION - ORIENTATION: ORIENTATION: RIGHT;LOWER

## 2023-10-18 NOTE — PROGRESS NOTES
1700 : Nurse remains at bedside. Pt. Denies any complications/reactions. No complaints. Resting quietly with eyes closed, RR even and unlabored. Easily arousable.

## 2023-10-18 NOTE — PERIOP NOTE
Bedside SBAR report received from [facility], pt stable, eyes closed, arouses to verbal, oriented x4, VSS, blood transfusion in progress, no complaints.

## 2023-10-18 NOTE — H&P
Vascular History and Physical     Patient: Diana Thompson                       MRN: [id number]          YOB: 1960        Age: 65 y.o. Sex: female      Chief Complaint:  No chief complaint on file. History of Present Illness: Diana Thompson is a 65 y.o. very pleasant woman who had a previous right carotid endarterectomy. She is here today because of worsening right foot ulcer. Patient has previous right leg bypass surgery years ago. Now patient complaining of the right fifth toe ulcer nonhealing with rest pain. Social History:  Social Connections: Not on file         Past Medical History:  Past Medical History        Past Medical History:   Diagnosis Date    Arthritis      Coagulation disorder ([address])       possible , pt had surgery on leg and bled out    Diabetes ([address]) 2019    Hypertension      Left-sided weakness      Nodule of kidney       left kidney    Peripheral vascular disease ([address])      Stroke ([address])       recent stroke on 10/16/2021            Surgical History:  Past Surgical History         Past Surgical History:   Procedure Laterality Date    CATARACT REMOVAL   2019    VASCULAR SURGERY                Allergies: Allergies   Allergen Reactions    Adhesive Tape Rash    Penicillins Nausea And Vomiting    Codeine Other (See Comments)       Makes me \"loopy\"         Current Meds:  Current Facility-Administered Medications          Current Outpatient Medications   Medication Sig Dispense Refill    fluticasone-salmeterol (ADVAIR) 250-50 MCG/ACT AEPB diskus inhaler Inhale 1 puff into the lungs in the morning and 1 puff in the evening.         aspirin 325 MG EC tablet Take 1 tablet by mouth daily        atorvastatin (LIPITOR) 80 MG tablet Take 1 tablet by mouth daily        diphenhydrAMINE (BENADRYL) 25 MG capsule Take 1 capsule by mouth every 6 hours as needed        glipiZIDE (GLUCOTROL XL) 10 MG extended release tablet Take 1 tablet by mouth daily        lisinopril

## 2023-10-18 NOTE — PROGRESS NOTES
Dr. Luis Armando Garcia to bedside to assess pt. Thready pulse, assessed with doppler. Informed of pre-op Hgb and Bps and got estrellita. from crna on arrival and IVF bolusing.

## 2023-10-18 NOTE — ANESTHESIA POSTPROCEDURE EVALUATION
Department of Anesthesiology  Postprocedure Note    Patient: Roseann Charles  MRN: [id number]  YOB: 1960  Date of evaluation: 10/18/2023      Procedure Summary     Date: 10/18/23 Room / Location: [facility] MAIN OR 04 / [facility] MAIN OR    Anesthesia Start: 9695 Anesthesia Stop: 8401    Procedure: RIGHT FEMORAL ARTERY BYPASS WITH GRAFT; RIGHT FEMORAL ENDARTERECTOMY (Right: Leg Lower) Diagnosis:       Ulcer of right foot, unspecified ulcer stage ([address])      Critical limb ischemia of right lower extremity with rest pain ([address])      (Ulcer of right foot, unspecified ulcer stage ([address]) [L97.519])      (Critical limb ischemia of right lower extremity with rest pain ([address]) [I70.221])    Surgeons: Bindu Hicks MD Responsible Provider: Sonya Jessica MD    Anesthesia Type: general ASA Status: 3          Anesthesia Type: No value filed.     Birdie Phase I: Birdie Score: 9    Birdie Phase II:        Anesthesia Post Evaluation    Patient location during evaluation: PACU  Patient participation: complete - patient participated  Level of consciousness: awake  Airway patency: patent  Nausea & Vomiting: no nausea and no vomiting  Complications: no  Cardiovascular status: hemodynamically stable  Respiratory status: acceptable  Hydration status: euvolemic  Pain management: adequate

## 2023-10-19 PROBLEM — I70.201 FEMORAL ARTERY OCCLUSION, RIGHT (HCC): Status: ACTIVE | Noted: 2023-10-19

## 2023-10-19 PROBLEM — L97.519 ULCER OF RIGHT FOOT (HCC): Status: ACTIVE | Noted: 2023-10-19

## 2023-10-19 LAB
ABO + RH BLD: NORMAL
ANION GAP SERPL CALC-SCNC: 3 MMOL/L (ref 5–15)
ANTIGENS PRESENT RBC DONR: NORMAL
ANTIGENS PRESENT RBC DONR: NORMAL
BLD PROD TYP BPU: NORMAL
BLD PROD TYP BPU: NORMAL
BLOOD BANK CMNT PATIENT-IMP: NORMAL
BLOOD BANK DISPENSE STATUS: NORMAL
BLOOD BANK DISPENSE STATUS: NORMAL
BLOOD GROUP ANTIBODIES SERPL: NORMAL
BLOOD GROUP ANTIBODIES SERPL: POSITIVE
BPU ID: NORMAL
BPU ID: NORMAL
BUN SERPL-MCNC: 20 MG/DL (ref 6–20)
BUN/CREAT SERPL: 25 (ref 12–20)
CA-I BLD-MCNC: 8.3 MG/DL (ref 8.5–10.1)
CHLORIDE SERPL-SCNC: 112 MMOL/L (ref 97–108)
CO2 SERPL-SCNC: 22 MMOL/L (ref 21–32)
CREAT SERPL-MCNC: 0.79 MG/DL (ref 0.55–1.02)
CROSSMATCH RESULT: NORMAL
CROSSMATCH RESULT: NORMAL
DAT POLY-SP REAG RBC QL: NEGATIVE
GLUCOSE BLD STRIP.AUTO-MCNC: 173 MG/DL (ref 65–100)
GLUCOSE BLD STRIP.AUTO-MCNC: 216 MG/DL (ref 65–100)
GLUCOSE SERPL-MCNC: 192 MG/DL (ref 65–100)
HCT VFR BLD AUTO: 34.5 % (ref 35–47)
HGB BLD-MCNC: 10.2 G/DL (ref 11.5–16)
PERFORMED BY:: ABNORMAL
PERFORMED BY:: ABNORMAL
POTASSIUM SERPL-SCNC: 4.5 MMOL/L (ref 3.5–5.1)
SODIUM SERPL-SCNC: 137 MMOL/L (ref 136–145)
SPECIMEN EXP DATE BLD: NORMAL
TRANSFUSION STATUS PATIENT QL: NORMAL
TRANSFUSION STATUS PATIENT QL: NORMAL
UNIT DIVISION: 0
UNIT DIVISION: 0

## 2023-10-19 PROCEDURE — 85018 HEMOGLOBIN: CPT

## 2023-10-19 PROCEDURE — 80048 BASIC METABOLIC PNL TOTAL CA: CPT

## 2023-10-19 PROCEDURE — 85014 HEMATOCRIT: CPT

## 2023-10-19 PROCEDURE — 82962 GLUCOSE BLOOD TEST: CPT

## 2023-10-19 PROCEDURE — 6370000000 HC RX 637 (ALT 250 FOR IP): Performed by: SURGERY

## 2023-10-19 PROCEDURE — 2500000003 HC RX 250 WO HCPCS: Performed by: SURGERY

## 2023-10-19 PROCEDURE — 2580000003 HC RX 258: Performed by: SURGERY

## 2023-10-19 PROCEDURE — 99024 POSTOP FOLLOW-UP VISIT: CPT | Performed by: SURGERY

## 2023-10-19 PROCEDURE — 36415 COLL VENOUS BLD VENIPUNCTURE: CPT

## 2023-10-19 PROCEDURE — 94640 AIRWAY INHALATION TREATMENT: CPT

## 2023-10-19 PROCEDURE — 6360000002 HC RX W HCPCS: Performed by: SURGERY

## 2023-10-19 PROCEDURE — 1100000000 HC RM PRIVATE

## 2023-10-19 PROCEDURE — 94761 N-INVAS EAR/PLS OXIMETRY MLT: CPT

## 2023-10-19 RX ORDER — LISINOPRIL 20 MG/1
20 TABLET ORAL DAILY
Status: DISCONTINUED | OUTPATIENT
Start: 2023-10-19 | End: 2023-10-20 | Stop reason: HOSPADM

## 2023-10-19 RX ORDER — ASPIRIN 325 MG
325 TABLET, DELAYED RELEASE (ENTERIC COATED) ORAL DAILY
Status: DISCONTINUED | OUTPATIENT
Start: 2023-10-19 | End: 2023-10-20 | Stop reason: HOSPADM

## 2023-10-19 RX ORDER — GLIPIZIDE 5 MG/1
10 TABLET ORAL
Status: DISCONTINUED | OUTPATIENT
Start: 2023-10-20 | End: 2023-10-20 | Stop reason: HOSPADM

## 2023-10-19 RX ORDER — BUDESONIDE AND FORMOTEROL FUMARATE DIHYDRATE 160; 4.5 UG/1; UG/1
2 AEROSOL RESPIRATORY (INHALATION)
Status: DISCONTINUED | OUTPATIENT
Start: 2023-10-19 | End: 2023-10-20 | Stop reason: HOSPADM

## 2023-10-19 RX ORDER — ATORVASTATIN CALCIUM 40 MG/1
80 TABLET, FILM COATED ORAL NIGHTLY
Status: DISCONTINUED | OUTPATIENT
Start: 2023-10-19 | End: 2023-10-20 | Stop reason: HOSPADM

## 2023-10-19 RX ADMIN — SODIUM CHLORIDE, PRESERVATIVE FREE 10 ML: 5 INJECTION INTRAVENOUS at 10:19

## 2023-10-19 RX ADMIN — PIPERACILLIN AND TAZOBACTAM 3375 MG: 3; .375 INJECTION, POWDER, LYOPHILIZED, FOR SOLUTION INTRAVENOUS at 10:19

## 2023-10-19 RX ADMIN — ASPIRIN 325 MG: 325 TABLET, COATED ORAL at 12:55

## 2023-10-19 RX ADMIN — HYDROMORPHONE HYDROCHLORIDE 0.5 MG: 1 INJECTION, SOLUTION INTRAMUSCULAR; INTRAVENOUS; SUBCUTANEOUS at 12:55

## 2023-10-19 RX ADMIN — SODIUM CHLORIDE, PRESERVATIVE FREE 10 ML: 5 INJECTION INTRAVENOUS at 21:04

## 2023-10-19 RX ADMIN — HYDROMORPHONE HYDROCHLORIDE 0.5 MG: 1 INJECTION, SOLUTION INTRAMUSCULAR; INTRAVENOUS; SUBCUTANEOUS at 17:33

## 2023-10-19 RX ADMIN — Medication 2 PUFF: at 20:29

## 2023-10-19 RX ADMIN — SODIUM CHLORIDE: 9 INJECTION, SOLUTION INTRAVENOUS at 17:33

## 2023-10-19 RX ADMIN — LISINOPRIL 20 MG: 20 TABLET ORAL at 12:55

## 2023-10-19 RX ADMIN — HYDROMORPHONE HYDROCHLORIDE 1 MG: 1 INJECTION, SOLUTION INTRAMUSCULAR; INTRAVENOUS; SUBCUTANEOUS at 21:03

## 2023-10-19 RX ADMIN — PIPERACILLIN AND TAZOBACTAM 3375 MG: 3; .375 INJECTION, POWDER, LYOPHILIZED, FOR SOLUTION INTRAVENOUS at 17:33

## 2023-10-19 RX ADMIN — HYDROMORPHONE HYDROCHLORIDE 0.5 MG: 1 INJECTION, SOLUTION INTRAMUSCULAR; INTRAVENOUS; SUBCUTANEOUS at 07:44

## 2023-10-19 RX ADMIN — PIPERACILLIN AND TAZOBACTAM 3375 MG: 3; .375 INJECTION, POWDER, LYOPHILIZED, FOR SOLUTION INTRAVENOUS at 00:16

## 2023-10-19 RX ADMIN — ATORVASTATIN CALCIUM 80 MG: 40 TABLET, FILM COATED ORAL at 21:05

## 2023-10-19 ASSESSMENT — PAIN - FUNCTIONAL ASSESSMENT
PAIN_FUNCTIONAL_ASSESSMENT: PREVENTS OR INTERFERES WITH MANY ACTIVE NOT PASSIVE ACTIVITIES

## 2023-10-19 ASSESSMENT — PAIN DESCRIPTION - ORIENTATION
ORIENTATION: RIGHT
ORIENTATION: MID;RIGHT
ORIENTATION: RIGHT
ORIENTATION: RIGHT;POSTERIOR
ORIENTATION: POSTERIOR;RIGHT

## 2023-10-19 ASSESSMENT — PAIN SCALES - GENERAL
PAINLEVEL_OUTOF10: 6
PAINLEVEL_OUTOF10: 3
PAINLEVEL_OUTOF10: 9
PAINLEVEL_OUTOF10: 7
PAINLEVEL_OUTOF10: 2
PAINLEVEL_OUTOF10: 2
PAINLEVEL_OUTOF10: 7

## 2023-10-19 ASSESSMENT — PAIN DESCRIPTION - LOCATION
LOCATION: ABDOMEN;BACK;LEG
LOCATION: BACK;LEG
LOCATION: BACK;LEG
LOCATION: GROIN
LOCATION: GROIN

## 2023-10-19 ASSESSMENT — PAIN DESCRIPTION - DESCRIPTORS
DESCRIPTORS: DISCOMFORT;SHARP
DESCRIPTORS: ACHING
DESCRIPTORS: SHARP

## 2023-10-19 NOTE — PROGRESS NOTES
Spiritual Care Assessment/Progress Note  [address]    Name: Eric Salgado MRN: [id number]    Age: 65 y.o. Sex: female   Language: English     Date: 10/19/2023            Total Time Calculated: 67 min              Spiritual Assessment begun in [facility] 2 CCU  Service Provided For[de-identified] Friend, Patient and family together  Referral/Consult From[de-identified] Patient  Encounter Overview/Reason : Advance Care Planning    Spiritual beliefs:      [x] Involved in a elizabeth tradition/spiritual practice: Arch George     [] Supported by a elizabeth community:      [] Claims no spiritual orientation:      [] Seeking spiritual identity:           [] Adheres to an individual form of spirituality:      [] Not able to assess:                Identified resources for coping and support system:   Support System: Children, Family members, Friends/neighbors       [x] Prayer                  [x] Devotional reading               [] Music                  [] Guided Imagery     [] Pet visits                                        [] Other: (COMMENT)     Specific area/focus of visit   Encounter:    Crisis:    Spiritual/Emotional needs: Type: Spiritual Support  Ritual, Rites and Sacraments:    Grief, Loss, and Adjustments:    Ethics/Mediation:    Behavioral Health:    Palliative Care: Advance Care Planning: Type: ACP conversation, Completed AD/ACP document(s)    Plan/Referrals: Other (Comment) (Joel Velazquez is available if needed)    Narrative: Joel Velazquez was notified Ms. Bridgett Burgos is interested in completing an Advance Medical Directive (AMD). Pt reports she has been wanting to do this for some time. Primary Decision  Maker is present. Original and two copies given to Ms. Bridgett Burgos. Ms. Bridgett Burgos is seated upright in bedside recliner; her mother-in-law and primary decision maker, Jose Reyes, and pt's friend, Louisa Lucia are seated on the pt's bed. Pt's son, Andrea Pandya, enters the room moments later. Paloma and laughter filled the room during the visit.  Ms.

## 2023-10-19 NOTE — PROGRESS NOTES
1740 - patient reported increased pain and swelling to right popliteal site. Increase in serous fluid on dressing and swelling above site. Tender to touch. Leg otherwise warm, pedal pulse still +1. Dr. Allison Troy made aware and coming to see patient at bedside prior to transfer to medical unit. 200 - Dr. Allison Troy saw patient, swelling is normal for extent of surgery. Keep patient on bedrest tonight and leg elevated with 2-3 pillows. Continue monitor color, temp and pulses of right leg. Okay to continue with transfer to surgical unit. 1945 - Report previously called to [id number] [place] [address], RN on dayshift. [facility] and updated on status. All questions answered. ICU nightshift RN updated and will move patient 521.

## 2023-10-19 NOTE — OP NOTE
This is operative report on Oral Diego, patient's YOB: 1960    Date of surgery: October 18, 2023. Diagnosis:  Right foot critical limb ischemia with nonhealing wound. Right SFA bypass graft occlusion. Procedure:  1. Right common femoral endarterectomy. 2.  Right common femoral artery to below-knee popliteal bypass with 6 mm PTFE graft. Anesthesia: General  Anesthesiologist: Dr. Aminta Goff  EBL: 20 cc. Complication: None  Assistant: Please see the OR record  Specimen: Plaque removed from right common femoral artery  Implants: As above  Condition: Stable    Indication for surgery: Patient has a previous right leg bypass surgery however this is occluded with a currently has problem with the pain and nonhealing ulcer on the right foot. Patient was discussed about these findings. CT angiogram was performed. Findings discussed with the patient discussed with right leg bypass surgery with the PTFE graft. We discussed about rational for the surgery risk benefits complication. Description of procedure: Patient was brought to the operating table comfortably in supine position. Followed by patient had a general anesthesia without any difficulty. Followed by patient's her lower abdomen and right leg was prepped usual sterile technique using DuraPrep's followed by sterile drapes were placed usual fashion. At this time timeout was called after confirmation was made procedure commenced. Right groin incision was made over the previous scar tissue. Followed by common femoral artery and branch vessels were dissected out usual fashion. Followed by right collar incision was made medial popliteal below knee level. Followed by distal popliteal artery was dissected out usual fashion. Followed by PTFE suture in the graft was tunneled using tunneling device from below knee level to right groin. Followed by once graft was oriented and placed appropriately length.   Patient was systemically heparinized. Distal popliteal artery has minimal plaque. Popliteal artery was controlled with a vessel loop followed by arteriotomy was made. Followed by prepared end of the graft was sewn into distal popliteal artery end-to-side fashion using 6-0 Prolene sutures. After distal anastomosis complete, blood flow was established to graft system. Followed by proximal graft was spatulated and prepared. Followed by common femoral artery was controlled with a vascular clamp followed by arteriotomy made. Common femoral artery was completely occluded. Followed by endarterectomy was performed. Endarterectomy was performed at the junction of the external iliac artery and common femoral artery. Followed by graft was sewn into end-to-side fashion using 5-0 Prolene sutures. Once proximal graft anastomosis complete blood flow was established usual fashion to the graft system. Followed by both incision was closed with 3-0 Vicryl sutures in layers and skin was closed with a 4 Monocryl sutures. Sterile dressing was applied. Postprocedure strong Doppler signal noted on both DP and PT. Followed by wounds are covered with a sterile dressing. Patient tolerated procedure well without any complication.

## 2023-10-19 NOTE — BRIEF OP NOTE
Brief Postoperative Note      Patient: Elza Orourke  YOB: 1960  MRN: [id number]    Date of Procedure: 10/18/2023    Pre-Op Diagnosis Codes:     * Ulcer of right foot, unspecified ulcer stage ([address]) [L97.519]     * Critical limb ischemia of right lower extremity with rest pain ([address]) [I70.221]    Post-Op Diagnosis: Same       Procedure(s):  RIGHT FEMORAL ARTERY BYPASS WITH GRAFT; RIGHT FEMORAL ENDARTERECTOMY    Surgeon(s):  Sujata Cantu MD    Assistant:  Surgical Assistant: Carolann Diamond    Anesthesia: General    Estimated Blood Loss (mL): Minimal    Complications: None    Specimens:   ID Type Source Tests Collected by Time Destination   1 : RIGHT FEMORAL ARTERY PLAQUE Tissue Groin SURGICAL PATHOLOGY Sujata Cantu MD 10/18/2023 1435    A : urine culture Urine Urine, indwelling catheter CULTURE, URINE Sree Talavera RN 10/18/2023 1240        Implants:  Implant Name Type Inv. Item Serial No.  Lot No. LRB No. Used Action   GRAFT VSCLR STNDRD WALL 6MM ID [id number] PTFE STRT RNFD RNGD - [id number] Vascular grafts GRAFT VSCLR STNDRD WALL 6MM ID [id number] PTFE STRT RNFD RNGD [id number] [facility] . Right 1 Implanted         Drains:   Urinary Catheter 10/18/23 Walker-Temperature;2 Way (Active)   Catheter Indications Need for fluid volume management of the critically ill patient in a critical care setting 10/18/23 1948   Site Assessment No urethral drainage 10/18/23 1948   Urine Color Yellow 10/18/23 1948   Urine Appearance Clear 10/18/23 1948   Collection Container Standard 10/18/23 1948   Securement Method Securing device (Describe) 10/18/23 1948   Catheter Care  Perineal wipes 10/18/23 1948   Catheter Best Practices  Drainage tube clipped to bed;Catheter secured to thigh; Tamper seal intact; Bag below bladder;Bag not on floor; Lack of dependent loop in tubing;Drainage bag less than half full 10/18/23 1948   Status Patent 10/18/23 1948   Output (mL) 350 mL 10/18/23 1948 Findings: Same as above      Electronically signed by Car Quick MD on 10/19/2023 at 6:04 AM

## 2023-10-19 NOTE — ACP (ADVANCE CARE PLANNING)
Advance Care Planning     Advance Care Planning Inpatient Note  Spiritual Care Department    Today's Date: 10/19/2023  Unit: [facility] 2 CCU    Received request from patient. Upon review of chart and communication with care team, patient's decision making abilities are not in question. . Patient, Child/Children, and Friends was/were present in the room during visit. Goals of ACP Conversation:  Discuss advance care planning documents    Health Care Decision Makers:       Primary Decision Maker: Ricarda New - Other Relative - [phone]  Summary:  Completed [facility]    Advance Care Planning Documents (Patient Wishes):  Healthcare Power of /Advance Directive Appointment of [address]  Living Will/Advance Directive  Anatomical Gift/Organ Donation     Assessment:   was notified Ms. Elina Banuelos is interested in completing an Advance Medical Directive (AMD). Pt reports she has been wanting to do this for some time. Primary Decision  Maker is present. Original and two copies given to Ms. Elina Banuelos. Interventions:  Provided education on documents for clarity and greater understanding    Care Preferences Communicated:   No    Outcomes/Plan:  ACP Discussion: Completed  New advance directive completed. Returned original document(s) to patient, as well as copies for distribution to appointed agents  Copy of advance directive given to staff to scan into medical record.     Electronically signed by Kris Agrawal [facility] on 10/19/2023 at 3:35 PM

## 2023-10-19 NOTE — CONSULTS
flush 0.9 % injection 5-40 mL  5-40 mL IntraVENous 2 times per day Sarah Cantu MD   10 mL at 10/19/23 1019    sodium chloride flush 0.9 % injection 5-40 mL  5-40 mL IntraVENous PRN Sarah Cantu MD        0.9 % sodium chloride infusion   IntraVENous PRN Sarah Cantu MD        ondansetron (ZOFRAN-ODT) disintegrating tablet 4 mg  4 mg Oral Q8H PRN Sarah Cantu MD        Or    ondansetron [facility]) injection 4 mg  4 mg IntraVENous Q6H PRN Sarah Cantu MD        0.9 % sodium chloride infusion   IntraVENous Continuous Sarah Cantu  mL/hr at 10/18/23 1854 New Bag at 10/18/23 1854    HYDROmorphone HCl PF (DILAUDID) injection 1 mg  1 mg IntraVENous Q3H PRN Sarah Cantu MD   1 mg at 10/18/23 2046    Or    HYDROmorphone HCl PF (DILAUDID) injection 0.5 mg  0.5 mg IntraVENous Q3H PRN Sarah Cantu MD   0.5 mg at 10/19/23 1255    piperacillin-tazobactam (ZOSYN) 3,375 mg in sodium chloride 0.9 % 50 mL IVPB (mini-bag)  3,375 mg IntraVENous q8h Sarah Cantu MD 12.5 mL/hr at 10/19/23 1019 3,375 mg at 10/19/23 1019    0.9 % sodium chloride infusion   IntraVENous PRN Sarah Cantu MD            Allergies   Allergen Reactions    Adhesive Tape Rash    Penicillins Nausea And Vomiting    Codeine Other (See Comments)     Makes me \"loopy\"       Review of Systems:  CONSTITUTIONAL: No fever, no chills. No repeated infections. No night sweats. Patient is feeling tired  HEENT: No mouth sores. No Epistaxis. Has mild hearing impairment. No change in taste or smell sensations. CARDIOVASCULAR: No  palpitations or chest pain. No edema. No syncope. RESPIRATORY: No cough. No dyspnea on exertion. No Hemoptysis. No wheezing. No hoarseness of voice. GI: No nausea or vomiting. No diarrhea, constipation, no bright red blood per rectum. No hematemesis or melena. She has abdominal pain and has lost some weight. She has history of rectal bleeding in the past. No dysphagia. : No dysuria, no hematuria.  No frequency of urination. INTEGUMENTARY: No skin rash or palpable lumps or bumps. HEMATOLOGIC: No history of easy bruisability. No gingival bleeding  NEURO: No focal weakness, No paresthesia. No headache or seizures. She does have history of stroke she had  MUSCULOSKELETAL: Lower abdominal pain as well as back pain. She also has pain in her legs. Objective:     Vitals:    10/19/23 1200 10/19/23 1211 10/19/23 1255 10/19/23 1300   BP:    139/71   Pulse: 75   92   Resp: 13  18 12   Temp:       TempSrc:       SpO2: 99%   100%   Weight:       Height:  1.54 m (5' 0.63\")          Physical Exam:  Constitutional: Middle-aged white female not in any acute distress she is in mild to moderate pain. She looks chronically ill. Eyes: Sclerae anicteric. Conjunctivae shows pallor. ENMT: Oral mucosa is moist, no thrush, mucositis, or petechiae. Neck: No adenopathy, JVD or thyromegaly. She has well-healed scar from previous carotid endarterectomy. Hematologic/Lymphatic: Bilateral axillary regions showed no adenopathy. Respiratory: Lungs are clear bilaterally. Cardiovascular: Normal sinus rhythm; no gallop or murmur; peripheral pulses are palpable. Abdomen: Soft, nontender, no hepatosplenomegaly. No guarding or rigidity. Bowel sounds present. Back/Spine: No spinal tenderness; no costovertebral angle tenderness. Extremities: Edema. Detailed exam was not done as patient was in pain in her legs. Skin: No petechiae; no skin rash. Neurologic: Alert/oriented x 3; no focal neurological deficits.     Recent Results (from the past 24 hour(s))   POCT Glucose    Collection Time: 10/18/23  7:09 PM   Result Value Ref Range    POC Glucose 265 (H) 65 - 100 mg/dL    Performed by: Abby Palacios    POCT Glucose    Collection Time: 10/18/23  8:53 PM   Result Value Ref Range    POC Glucose 230 (H) 65 - 100 mg/dL    Performed by: Ayla Eldridge (PCT)    Hemoglobin and Hematocrit    Collection Time: 10/19/23  2:15 AM   Result Value Ref Range

## 2023-10-19 NOTE — PERIOP NOTE
Spoke to pt's mother-in-law, Baron Reeder, updated on pt's condition of stable, going to ICU room 284 shortly.  She stated family have gone home and will visit pt in am.

## 2023-10-19 NOTE — PROGRESS NOTES
Pt received from surgery @ 1930. Pt was on the second unit of PRBC which finished at 0830 pm with a total volume of 310 administered. No adverse reaction noted and vital signes remain wnl see EMR.

## 2023-10-19 NOTE — PROGRESS NOTES
Nutrition Assessment     Type and Reason for Visit: Positive Nutrition Screen (MST2)    Nutrition Recommendations/Plan:   Continue current diet  Ensure HP 2x/day, bernadette 2x/day  Monitor and record PO intakes, supplement acceptance, and Bms in I/Os     Malnutrition Assessment:  Malnutrition Status: No malnutrition    Nutrition Assessment:  Admitted for limb ischemia, +nonhealing toe ulcer. +pancreatic, L adrenal mass. Screened for MST2, pt reported weight loss, decreased appetite/intake. No recent significant weight loss per EMR, documented intakes >50%. Plan to add ONS to support wound healing. Labs: Na 137, K 4.5, BUN 20, Creat 0.79, Gluc 216. Meds: atorvastatin    Estimated Daily Nutrient Needs:  Energy (kcal):  1773kcal (30kcal/kg) Weight Used for Energy Requirements: Current     Protein (g):  71-89g (1.2-1.5g/kg) Weight Used for Protein Requirements: Current        Fluid (ml/day):  1773mL Method Used for Fluid Requirements: 1 ml/kcal    Nutrition Related Findings:   NFPE without acute findings. No n/v, d/c, or problems chewing/swallowing. No edema. BM PTA. Wound Type: Surgical Incision, Open Wounds    Current Nutrition Therapies:    ADULT DIET;  Regular    Anthropometric Measures:  Height: 154 cm (5' 0.63\")  Current Body Wt: 59.1 kg (130 lb 4.7 oz)   BMI: 24.9    Nutrition Diagnosis:   Increased nutrient needs related to catabolic illness as evidenced by wounds    Nutrition Interventions:   Food and/or Nutrient Delivery: Continue Current Diet, Start Oral Nutrition Supplement  Nutrition Education/Counseling: No recommendation at this time  Coordination of Nutrition Care: Continue to monitor while inpatient    Goals:  Goals: other (specify)  Specify Other Goals: s/s wound healing    Nutrition Monitoring and Evaluation:   Behavioral-Environmental Outcomes: None Identified  Food/Nutrient Intake Outcomes: Food and Nutrient Intake, Supplement Intake  Physical Signs/Symptoms Outcomes: Meal Time Behavior, Skin,

## 2023-10-19 NOTE — CARE COORDINATION
10/19/23 8260   Service Assessment   Patient Orientation Alert and Oriented   Cognition Alert   History Provided By Patient   Primary [address] Parent; Children;Family Members   Patient's Healthcare Decision Maker is: Named in [address]   PCP Verified by CM Yes   Last Visit to PCP Within last 3 months  (Sep 2023)   Prior Functional Level Independent in ADLs/IADLs   Current Functional Level Independent in ADLs/IADLs   Can patient return to prior living arrangement Yes   Ability to make needs known: Good   Family able to assist with home care needs: Yes   Would you like for me to discuss the discharge plan with any other family members/significant others, and if so, who? Yes   Financial Resources  Resources None   Social/Functional History   Lives With Family       Patient sitting up in the bed w/family members present in the room. Resides in a single level home w/family members. Last seen at listed PCP office Sep 2023. [address] to p/u medications. Self reports independent w/all ADL's & IADL's and drives. No home O2 or DME. Verbalized \"will need a walker. \"  No prior HH or IRF. Insurance doesn't offer a SNF benefit. Patient has adult children; however desires to have her mother in law (SARAH) Niranjan Shultz as her primary decision maker. AMD to be completed by lynda. FULL Code. Primary decision maker: SARAH Pickens @ [phone].         ULISSES Jones

## 2023-10-19 NOTE — PROGRESS NOTES
Patient is doing well. Vital signs stable. Right foot is well-perfused with a palpable DP pulse. Oncology consultation requested for pancreatic mass and as well as left adrenal mass. Patient can be out of bed today. Continue IV antibiotics. Patient can be transferred to surgical floor.

## 2023-10-20 VITALS
SYSTOLIC BLOOD PRESSURE: 106 MMHG | RESPIRATION RATE: 18 BRPM | HEIGHT: 61 IN | TEMPERATURE: 98.8 F | WEIGHT: 130.29 LBS | DIASTOLIC BLOOD PRESSURE: 65 MMHG | HEART RATE: 90 BPM | OXYGEN SATURATION: 98 % | BODY MASS INDEX: 24.6 KG/M2

## 2023-10-20 LAB
BACTERIA SPEC CULT: NORMAL
GLUCOSE BLD STRIP.AUTO-MCNC: 210 MG/DL (ref 65–100)
Lab: NORMAL
PERFORMED BY:: ABNORMAL

## 2023-10-20 PROCEDURE — 82962 GLUCOSE BLOOD TEST: CPT

## 2023-10-20 PROCEDURE — 6360000002 HC RX W HCPCS: Performed by: SURGERY

## 2023-10-20 PROCEDURE — 97161 PT EVAL LOW COMPLEX 20 MIN: CPT

## 2023-10-20 PROCEDURE — 2500000003 HC RX 250 WO HCPCS: Performed by: SURGERY

## 2023-10-20 PROCEDURE — 94640 AIRWAY INHALATION TREATMENT: CPT

## 2023-10-20 PROCEDURE — 2580000003 HC RX 258: Performed by: SURGERY

## 2023-10-20 PROCEDURE — 6370000000 HC RX 637 (ALT 250 FOR IP): Performed by: SURGERY

## 2023-10-20 PROCEDURE — 97530 THERAPEUTIC ACTIVITIES: CPT

## 2023-10-20 RX ORDER — OXYCODONE HYDROCHLORIDE AND ACETAMINOPHEN 5; 325 MG/1; MG/1
1 TABLET ORAL EVERY 6 HOURS PRN
Qty: 20 TABLET | Refills: 0 | Status: SHIPPED | OUTPATIENT
Start: 2023-10-20 | End: 2023-10-25

## 2023-10-20 RX ORDER — SULFAMETHOXAZOLE AND TRIMETHOPRIM 800; 160 MG/1; MG/1
1 TABLET ORAL 2 TIMES DAILY
Qty: 14 TABLET | Refills: 0 | Status: SHIPPED | OUTPATIENT
Start: 2023-10-20 | End: 2023-10-25

## 2023-10-20 RX ADMIN — ONDANSETRON 4 MG: 2 INJECTION INTRAMUSCULAR; INTRAVENOUS at 10:07

## 2023-10-20 RX ADMIN — PIPERACILLIN AND TAZOBACTAM 3375 MG: 3; .375 INJECTION, POWDER, LYOPHILIZED, FOR SOLUTION INTRAVENOUS at 01:55

## 2023-10-20 RX ADMIN — Medication 2 PUFF: at 08:52

## 2023-10-20 RX ADMIN — LISINOPRIL 20 MG: 20 TABLET ORAL at 08:58

## 2023-10-20 RX ADMIN — HYDROMORPHONE HYDROCHLORIDE 1 MG: 1 INJECTION, SOLUTION INTRAMUSCULAR; INTRAVENOUS; SUBCUTANEOUS at 06:20

## 2023-10-20 RX ADMIN — HYDROMORPHONE HYDROCHLORIDE 1 MG: 1 INJECTION, SOLUTION INTRAMUSCULAR; INTRAVENOUS; SUBCUTANEOUS at 01:14

## 2023-10-20 RX ADMIN — ASPIRIN 325 MG: 325 TABLET, COATED ORAL at 08:58

## 2023-10-20 RX ADMIN — GLIPIZIDE 10 MG: 5 TABLET ORAL at 08:58

## 2023-10-20 RX ADMIN — SODIUM CHLORIDE: 9 INJECTION, SOLUTION INTRAVENOUS at 08:08

## 2023-10-20 RX ADMIN — SODIUM CHLORIDE, PRESERVATIVE FREE 10 ML: 5 INJECTION INTRAVENOUS at 08:58

## 2023-10-20 RX ADMIN — PIPERACILLIN AND TAZOBACTAM 3375 MG: 3; .375 INJECTION, POWDER, LYOPHILIZED, FOR SOLUTION INTRAVENOUS at 10:08

## 2023-10-20 ASSESSMENT — PAIN - FUNCTIONAL ASSESSMENT
PAIN_FUNCTIONAL_ASSESSMENT: ACTIVITIES ARE NOT PREVENTED

## 2023-10-20 ASSESSMENT — PAIN DESCRIPTION - LOCATION
LOCATION: GROIN

## 2023-10-20 ASSESSMENT — PAIN DESCRIPTION - DESCRIPTORS
DESCRIPTORS: SHARP;DISCOMFORT
DESCRIPTORS: DISCOMFORT;SHARP
DESCRIPTORS: SHARP;DISCOMFORT
DESCRIPTORS: SHARP;DISCOMFORT

## 2023-10-20 ASSESSMENT — PAIN DESCRIPTION - ORIENTATION
ORIENTATION: RIGHT

## 2023-10-20 ASSESSMENT — PAIN SCALES - GENERAL
PAINLEVEL_OUTOF10: 2
PAINLEVEL_OUTOF10: 8
PAINLEVEL_OUTOF10: 7
PAINLEVEL_OUTOF10: 3
PAINLEVEL_OUTOF10: 3

## 2023-10-20 NOTE — PLAN OF CARE
Discharge to home with home health, start of service tomorrow 10/21/23. Discharge medications reviewed with the patient and appropriate educational materials and side effects teaching were provided. Wound care instructions with betadine, gauze and transparent dressing, all discussed with patient. Removed intravenous access and telemetry box. No telemetry and no rowe. Transportation provided by patient's family friend.

## 2023-10-20 NOTE — DISCHARGE INSTRUCTIONS
Change dressing daily as instructed  Finish antibiotics.   Keep right leg lelevated  May take shower

## 2023-10-20 NOTE — CARE COORDINATION
Patient discharging home today, needing home health and rolling walker. Orders received and sent via [employer]. Explained to patient may be unable to setup home health due to insurance but we would try. Patient understood. No preference for home health or DME. 1440: Patient discharging home today with home health. Home health through [facility] 10/21/2023.  Walker to be delivered by [employer].    Transition of Care Plan:    RUR: 12%  Prior Level of Functioning: ind  Disposition: home health   If SNF or IPR: Date FOC offered: na  Date FOC received: na  Accepting facility: na  Date authorization started with reference number: na  Date authorization received and expires: na  Follow up appointments:   DME needed: rolling walker  Transportation at discharge: family  IM/IMM Medicare/ letter given: na  Is patient a  and connected with VA? na  If yes, was [employer] transfer form completed and [facility] notified? na  Caregiver Contact: na  Discharge Caregiver contacted prior to discharge? na  Care Conference needed? na  Barriers to discharge: na

## 2023-10-20 NOTE — PROGRESS NOTES
4 Eyes Skin Assessment     NAME:  Myranda Cotter  YOB: 1960  MEDICAL RECORD NUMBER:  [id number]    The patient is being assessed for  Transfer to New Unit    I agree that at least one RN has performed a thorough Head to Toe Skin Assessment on the patient. ALL assessment sites listed below have been assessed. Areas assessed by both nurses:    Head, Face, Ears, Shoulders, Back, Chest, Arms, Elbows, Hands, Sacrum. Buttock, Coccyx, Ischium, and Legs. Feet and Heels        Does the Patient have a Wound?  No noted wound(s)       Alexx Prevention initiated by RN: No  Wound Care Orders initiated by RN: No    Pressure Injury (Stage 3,4, Unstageable, DTI, NWPT, and Complex wounds) if present, place Wound referral order by RN under : No    New Ostomies, if present place, Ostomy referral order under : No     Nurse 1 eSignature: Electronically signed by Lucero De La O RN on 10/20/23 at 4:23 AM EDT    **SHARE this note so that the co-signing nurse can place an eSignature**    Nurse 2 eSignature: Electronically signed by Maryjo Watt RN on 10/20/23 at 5:59 AM EDT

## 2023-10-30 RX ORDER — OXYCODONE HYDROCHLORIDE AND ACETAMINOPHEN 5; 325 MG/1; MG/1
1 TABLET ORAL EVERY 6 HOURS PRN
Qty: 20 TABLET | Refills: 0 | Status: CANCELLED | OUTPATIENT
Start: 2023-10-30 | End: 2023-11-04

## 2023-10-30 NOTE — TELEPHONE ENCOUNTER
Patient identified with two patient identifiers. Patient requesting refill on percocet. Patient sates over the counter tylenol not helping. Patient informed I will update provider. Message given to provider for medication refill, informed script sent to inbox to approve or refuse.

## 2023-10-30 NOTE — TELEPHONE ENCOUNTER
Patient called today needing a refill on pain medication. She uses [facility] on [address].  [phone]

## 2023-10-31 DIAGNOSIS — M79.604 PAIN OF RIGHT LOWER EXTREMITY: Primary | ICD-10-CM

## 2023-10-31 RX ORDER — OXYCODONE HYDROCHLORIDE AND ACETAMINOPHEN 5; 325 MG/1; MG/1
1 TABLET ORAL EVERY 6 HOURS PRN
Qty: 20 TABLET | Refills: 0 | Status: SHIPPED | OUTPATIENT
Start: 2023-10-31 | End: 2023-11-05

## 2023-11-01 NOTE — TELEPHONE ENCOUNTER
Patient identified with two patient identifiers. Patient informed prescription sent to pharmacy on file.

## 2023-11-03 ENCOUNTER — OFFICE VISIT (OUTPATIENT)
Age: 63
End: 2023-11-03

## 2023-11-03 VITALS
HEART RATE: 87 BPM | WEIGHT: 133 LBS | TEMPERATURE: 97.4 F | HEIGHT: 60 IN | BODY MASS INDEX: 26.11 KG/M2 | DIASTOLIC BLOOD PRESSURE: 77 MMHG | OXYGEN SATURATION: 97 % | RESPIRATION RATE: 20 BRPM | SYSTOLIC BLOOD PRESSURE: 135 MMHG

## 2023-11-03 DIAGNOSIS — I70.201 FEMORAL ARTERY OCCLUSION, RIGHT (HCC): Primary | ICD-10-CM

## 2023-11-03 PROCEDURE — 99024 POSTOP FOLLOW-UP VISIT: CPT | Performed by: SURGERY

## 2023-11-03 RX ORDER — CLINDAMYCIN HYDROCHLORIDE 300 MG/1
300 CAPSULE ORAL 4 TIMES DAILY
Qty: 40 CAPSULE | Refills: 0 | Status: SHIPPED | OUTPATIENT
Start: 2023-11-03 | End: 2023-11-13

## 2023-11-03 ASSESSMENT — PATIENT HEALTH QUESTIONNAIRE - PHQ9
SUM OF ALL RESPONSES TO PHQ QUESTIONS 1-9: 0
SUM OF ALL RESPONSES TO PHQ QUESTIONS 1-9: 0
SUM OF ALL RESPONSES TO PHQ9 QUESTIONS 1 & 2: 0
SUM OF ALL RESPONSES TO PHQ QUESTIONS 1-9: 0
2. FEELING DOWN, DEPRESSED OR HOPELESS: 0
1. LITTLE INTEREST OR PLEASURE IN DOING THINGS: 0
SUM OF ALL RESPONSES TO PHQ QUESTIONS 1-9: 0

## 2023-11-03 NOTE — PROGRESS NOTES
Very pleasant woman here today for postop follow-up. Patient had a right common femoral artery below-knee popliteal bypass with PTFE graft. He is doing well. She is complaining of swelling. Incision was clean and dry. Mild redness noted. Excellent Doppler signal noted right foot. Continue wound care with iodine on the incision. And the clindamycin therapy for 5 days.   Follow-up 2 weeks

## 2023-11-27 ENCOUNTER — OFFICE VISIT (OUTPATIENT)
Age: 63
End: 2023-11-27

## 2023-11-27 VITALS
HEIGHT: 60 IN | DIASTOLIC BLOOD PRESSURE: 85 MMHG | OXYGEN SATURATION: 98 % | BODY MASS INDEX: 25.03 KG/M2 | TEMPERATURE: 97.5 F | HEART RATE: 91 BPM | RESPIRATION RATE: 20 BRPM | WEIGHT: 127.5 LBS | SYSTOLIC BLOOD PRESSURE: 138 MMHG

## 2023-11-27 DIAGNOSIS — I70.201 FEMORAL ARTERY OCCLUSION, RIGHT (HCC): Primary | ICD-10-CM

## 2023-11-27 PROCEDURE — 99024 POSTOP FOLLOW-UP VISIT: CPT | Performed by: SURGERY

## 2023-11-27 RX ORDER — ALBUTEROL SULFATE 90 UG/1
2 AEROSOL, METERED RESPIRATORY (INHALATION) EVERY 4 HOURS PRN
COMMUNITY
Start: 2023-09-19

## 2023-11-27 ASSESSMENT — PATIENT HEALTH QUESTIONNAIRE - PHQ9
SUM OF ALL RESPONSES TO PHQ QUESTIONS 1-9: 0
1. LITTLE INTEREST OR PLEASURE IN DOING THINGS: 0
SUM OF ALL RESPONSES TO PHQ9 QUESTIONS 1 & 2: 0
2. FEELING DOWN, DEPRESSED OR HOPELESS: 0
SUM OF ALL RESPONSES TO PHQ QUESTIONS 1-9: 0

## 2023-11-29 NOTE — PROGRESS NOTES
Patient is here today follow-up right femoral below-knee popliteal bypass. Patient is current doing well. Swelling is improved. Graft system has excellent Doppler signal as well as palpable DP pulse on the right foot. Patient currently is seeing oncology for multiple findings on the pancreas and the left adrenal tumor. Patient be reassessed 6 weeks follow-up for surveillance vascular examination.

## 2023-12-13 ENCOUNTER — HOSPITAL ENCOUNTER (OUTPATIENT)
Facility: HOSPITAL | Age: 63
Discharge: HOME | End: 2023-12-16
Attending: INTERNAL MEDICINE
Payer: MEDICAID

## 2023-12-13 DIAGNOSIS — D68.9 BLOOD CLOTTING DISORDER (HCC): ICD-10-CM

## 2023-12-13 PROCEDURE — 74183 MRI ABD W/O CNTR FLWD CNTR: CPT

## 2023-12-13 PROCEDURE — 6360000004 HC RX CONTRAST MEDICATION: Performed by: INTERNAL MEDICINE

## 2023-12-13 PROCEDURE — A9577 INJ MULTIHANCE: HCPCS | Performed by: INTERNAL MEDICINE

## 2023-12-13 RX ADMIN — GADOBENATE DIMEGLUMINE 11 ML: 529 INJECTION, SOLUTION INTRAVENOUS at 13:30

## 2024-01-06 ENCOUNTER — APPOINTMENT (OUTPATIENT)
Facility: HOSPITAL | Age: 64
DRG: 181 | End: 2024-01-06
Payer: MEDICAID

## 2024-01-06 ENCOUNTER — HOSPITAL ENCOUNTER (INPATIENT)
Facility: HOSPITAL | Age: 64
LOS: 3 days | Discharge: HOME | DRG: 181 | End: 2024-01-09
Attending: STUDENT IN AN ORGANIZED HEALTH CARE EDUCATION/TRAINING PROGRAM | Admitting: SURGERY
Payer: MEDICAID

## 2024-01-06 DIAGNOSIS — I99.8 ISCHEMIA OF RIGHT LOWER EXTREMITY: ICD-10-CM

## 2024-01-06 DIAGNOSIS — I70.90 ARTERIAL OCCLUSION: Primary | ICD-10-CM

## 2024-01-06 LAB
ANION GAP SERPL CALC-SCNC: 4 MMOL/L (ref 5–15)
APTT PPP: >153 SEC (ref 21.2–34.1)
BASOPHILS # BLD: 0 K/UL (ref 0–0.1)
BASOPHILS NFR BLD: 1 % (ref 0–1)
BUN SERPL-MCNC: 11 MG/DL (ref 6–20)
BUN/CREAT SERPL: 13 (ref 12–20)
CA-I BLD-MCNC: 9.4 MG/DL (ref 8.5–10.1)
CHLORIDE SERPL-SCNC: 108 MMOL/L (ref 97–108)
CO2 SERPL-SCNC: 23 MMOL/L (ref 21–32)
CREAT SERPL-MCNC: 0.87 MG/DL (ref 0.55–1.02)
DIFFERENTIAL METHOD BLD: ABNORMAL
EOSINOPHIL # BLD: 0 K/UL (ref 0–0.4)
EOSINOPHIL NFR BLD: 0 % (ref 0–7)
ERYTHROCYTE [DISTWIDTH] IN BLOOD BY AUTOMATED COUNT: 18.2 % (ref 11.5–14.5)
GLUCOSE BLD STRIP.AUTO-MCNC: 170 MG/DL (ref 65–100)
GLUCOSE BLD STRIP.AUTO-MCNC: 63 MG/DL (ref 65–100)
GLUCOSE SERPL-MCNC: 152 MG/DL (ref 65–100)
HCT VFR BLD AUTO: 36.8 % (ref 35–47)
HGB BLD-MCNC: 11.3 G/DL (ref 11.5–16)
IMM GRANULOCYTES # BLD AUTO: 0 K/UL (ref 0–0.04)
IMM GRANULOCYTES NFR BLD AUTO: 0 % (ref 0–0.5)
INR PPP: 0.9 (ref 0.9–1.1)
LYMPHOCYTES # BLD: 2.3 K/UL (ref 0.8–3.5)
LYMPHOCYTES NFR BLD: 29 % (ref 12–49)
MCH RBC QN AUTO: 23.8 PG (ref 26–34)
MCHC RBC AUTO-ENTMCNC: 30.7 G/DL (ref 30–36.5)
MCV RBC AUTO: 77.5 FL (ref 80–99)
MONOCYTES # BLD: 0.7 K/UL (ref 0–1)
MONOCYTES NFR BLD: 9 % (ref 5–13)
NEUTS SEG # BLD: 4.8 K/UL (ref 1.8–8)
NEUTS SEG NFR BLD: 61 % (ref 32–75)
NRBC # BLD: 0 K/UL (ref 0–0.01)
NRBC BLD-RTO: 0 PER 100 WBC
PERFORMED BY:: ABNORMAL
PERFORMED BY:: ABNORMAL
PLATELET # BLD AUTO: 395 K/UL (ref 150–400)
PMV BLD AUTO: 10.1 FL (ref 8.9–12.9)
POTASSIUM SERPL-SCNC: 4 MMOL/L (ref 3.5–5.1)
PROTHROMBIN TIME: 12.9 SEC (ref 11.9–14.6)
RBC # BLD AUTO: 4.75 M/UL (ref 3.8–5.2)
SODIUM SERPL-SCNC: 135 MMOL/L (ref 136–145)
THERAPEUTIC RANGE: ABNORMAL SEC (ref 82–109)
UFH PPP CHRO-ACNC: 0.97 IU/ML
UFH PPP CHRO-ACNC: <0.1 IU/ML
WBC # BLD AUTO: 7.8 K/UL (ref 3.6–11)

## 2024-01-06 PROCEDURE — 85730 THROMBOPLASTIN TIME PARTIAL: CPT

## 2024-01-06 PROCEDURE — 82962 GLUCOSE BLOOD TEST: CPT

## 2024-01-06 PROCEDURE — 6360000002 HC RX W HCPCS: Performed by: SURGERY

## 2024-01-06 PROCEDURE — 85025 COMPLETE CBC W/AUTO DIFF WBC: CPT

## 2024-01-06 PROCEDURE — 6370000000 HC RX 637 (ALT 250 FOR IP): Performed by: SURGERY

## 2024-01-06 PROCEDURE — 73706 CT ANGIO LWR EXTR W/O&W/DYE: CPT

## 2024-01-06 PROCEDURE — 2580000003 HC RX 258: Performed by: SURGERY

## 2024-01-06 PROCEDURE — 85610 PROTHROMBIN TIME: CPT

## 2024-01-06 PROCEDURE — 94640 AIRWAY INHALATION TREATMENT: CPT

## 2024-01-06 PROCEDURE — 36415 COLL VENOUS BLD VENIPUNCTURE: CPT

## 2024-01-06 PROCEDURE — 85520 HEPARIN ASSAY: CPT

## 2024-01-06 PROCEDURE — 99285 EMERGENCY DEPT VISIT HI MDM: CPT

## 2024-01-06 PROCEDURE — 80048 BASIC METABOLIC PNL TOTAL CA: CPT

## 2024-01-06 PROCEDURE — 2500000003 HC RX 250 WO HCPCS: Performed by: SURGERY

## 2024-01-06 PROCEDURE — 6360000004 HC RX CONTRAST MEDICATION: Performed by: SURGERY

## 2024-01-06 PROCEDURE — 6360000002 HC RX W HCPCS: Performed by: STUDENT IN AN ORGANIZED HEALTH CARE EDUCATION/TRAINING PROGRAM

## 2024-01-06 PROCEDURE — 1100000000 HC RM PRIVATE

## 2024-01-06 PROCEDURE — 99222 1ST HOSP IP/OBS MODERATE 55: CPT | Performed by: SURGERY

## 2024-01-06 RX ORDER — ONDANSETRON 2 MG/ML
4 INJECTION INTRAMUSCULAR; INTRAVENOUS EVERY 6 HOURS PRN
Status: DISCONTINUED | OUTPATIENT
Start: 2024-01-06 | End: 2024-01-08

## 2024-01-06 RX ORDER — SODIUM CHLORIDE 9 MG/ML
INJECTION, SOLUTION INTRAVENOUS CONTINUOUS
Status: DISPENSED | OUTPATIENT
Start: 2024-01-06 | End: 2024-01-08

## 2024-01-06 RX ORDER — BUDESONIDE AND FORMOTEROL FUMARATE DIHYDRATE 160; 4.5 UG/1; UG/1
2 AEROSOL RESPIRATORY (INHALATION)
Status: DISCONTINUED | OUTPATIENT
Start: 2024-01-06 | End: 2024-01-09 | Stop reason: HOSPADM

## 2024-01-06 RX ORDER — MORPHINE SULFATE 4 MG/ML
4 INJECTION, SOLUTION INTRAMUSCULAR; INTRAVENOUS ONCE
Status: COMPLETED | OUTPATIENT
Start: 2024-01-06 | End: 2024-01-06

## 2024-01-06 RX ORDER — ATORVASTATIN CALCIUM 40 MG/1
80 TABLET, FILM COATED ORAL DAILY
Status: DISCONTINUED | OUTPATIENT
Start: 2024-01-06 | End: 2024-01-09 | Stop reason: HOSPADM

## 2024-01-06 RX ORDER — HEPARIN SODIUM 10000 [USP'U]/100ML
5-30 INJECTION, SOLUTION INTRAVENOUS CONTINUOUS
Status: DISCONTINUED | OUTPATIENT
Start: 2024-01-06 | End: 2024-01-09

## 2024-01-06 RX ORDER — POLYETHYLENE GLYCOL 3350 17 G/17G
17 POWDER, FOR SOLUTION ORAL DAILY PRN
Status: DISCONTINUED | OUTPATIENT
Start: 2024-01-06 | End: 2024-01-09 | Stop reason: HOSPADM

## 2024-01-06 RX ORDER — INSULIN LISPRO 100 [IU]/ML
0-4 INJECTION, SOLUTION INTRAVENOUS; SUBCUTANEOUS
Status: DISCONTINUED | OUTPATIENT
Start: 2024-01-06 | End: 2024-01-09 | Stop reason: HOSPADM

## 2024-01-06 RX ORDER — HEPARIN SODIUM 10000 [USP'U]/100ML
5-30 INJECTION, SOLUTION INTRAVENOUS CONTINUOUS
Status: DISCONTINUED | OUTPATIENT
Start: 2024-01-06 | End: 2024-01-06

## 2024-01-06 RX ORDER — SODIUM CHLORIDE 0.9 % (FLUSH) 0.9 %
5-40 SYRINGE (ML) INJECTION PRN
Status: DISCONTINUED | OUTPATIENT
Start: 2024-01-06 | End: 2024-01-08

## 2024-01-06 RX ORDER — LISINOPRIL 20 MG/1
20 TABLET ORAL DAILY
Status: DISCONTINUED | OUTPATIENT
Start: 2024-01-06 | End: 2024-01-09 | Stop reason: HOSPADM

## 2024-01-06 RX ORDER — ALBUTEROL SULFATE 90 UG/1
2 AEROSOL, METERED RESPIRATORY (INHALATION) EVERY 4 HOURS PRN
Status: DISCONTINUED | OUTPATIENT
Start: 2024-01-06 | End: 2024-01-09 | Stop reason: HOSPADM

## 2024-01-06 RX ORDER — ACETAMINOPHEN 325 MG/1
650 TABLET ORAL EVERY 6 HOURS PRN
Status: DISCONTINUED | OUTPATIENT
Start: 2024-01-06 | End: 2024-01-09 | Stop reason: HOSPADM

## 2024-01-06 RX ORDER — POTASSIUM CHLORIDE 7.45 MG/ML
10 INJECTION INTRAVENOUS PRN
Status: DISCONTINUED | OUTPATIENT
Start: 2024-01-06 | End: 2024-01-09 | Stop reason: HOSPADM

## 2024-01-06 RX ORDER — POTASSIUM CHLORIDE 20 MEQ/1
40 TABLET, EXTENDED RELEASE ORAL PRN
Status: DISCONTINUED | OUTPATIENT
Start: 2024-01-06 | End: 2024-01-09 | Stop reason: HOSPADM

## 2024-01-06 RX ORDER — HEPARIN SODIUM 1000 [USP'U]/ML
30 INJECTION, SOLUTION INTRAVENOUS; SUBCUTANEOUS PRN
Status: DISCONTINUED | OUTPATIENT
Start: 2024-01-06 | End: 2024-01-09

## 2024-01-06 RX ORDER — MAGNESIUM SULFATE IN WATER 40 MG/ML
2000 INJECTION, SOLUTION INTRAVENOUS PRN
Status: DISCONTINUED | OUTPATIENT
Start: 2024-01-06 | End: 2024-01-09 | Stop reason: HOSPADM

## 2024-01-06 RX ORDER — SODIUM CHLORIDE 0.9 % (FLUSH) 0.9 %
5-40 SYRINGE (ML) INJECTION EVERY 12 HOURS SCHEDULED
Status: DISCONTINUED | OUTPATIENT
Start: 2024-01-06 | End: 2024-01-08

## 2024-01-06 RX ORDER — HEPARIN SODIUM 1000 [USP'U]/ML
60 INJECTION, SOLUTION INTRAVENOUS; SUBCUTANEOUS PRN
Status: DISCONTINUED | OUTPATIENT
Start: 2024-01-06 | End: 2024-01-09

## 2024-01-06 RX ORDER — SODIUM CHLORIDE 9 MG/ML
INJECTION, SOLUTION INTRAVENOUS PRN
Status: DISCONTINUED | OUTPATIENT
Start: 2024-01-06 | End: 2024-01-08

## 2024-01-06 RX ORDER — INSULIN LISPRO 100 [IU]/ML
0-4 INJECTION, SOLUTION INTRAVENOUS; SUBCUTANEOUS NIGHTLY
Status: DISCONTINUED | OUTPATIENT
Start: 2024-01-06 | End: 2024-01-09 | Stop reason: HOSPADM

## 2024-01-06 RX ORDER — FLUTICASONE PROPIONATE AND SALMETEROL 250; 50 UG/1; UG/1
1 POWDER RESPIRATORY (INHALATION) EVERY 12 HOURS
Status: DISCONTINUED | OUTPATIENT
Start: 2024-01-06 | End: 2024-01-06

## 2024-01-06 RX ORDER — PANTOPRAZOLE SODIUM 40 MG/1
40 TABLET, DELAYED RELEASE ORAL DAILY
Status: DISCONTINUED | OUTPATIENT
Start: 2024-01-06 | End: 2024-01-09 | Stop reason: HOSPADM

## 2024-01-06 RX ORDER — HYDROMORPHONE HYDROCHLORIDE 1 MG/ML
1 INJECTION, SOLUTION INTRAMUSCULAR; INTRAVENOUS; SUBCUTANEOUS EVERY 4 HOURS PRN
Status: DISCONTINUED | OUTPATIENT
Start: 2024-01-06 | End: 2024-01-07

## 2024-01-06 RX ORDER — HEPARIN SODIUM 1000 [USP'U]/ML
60 INJECTION, SOLUTION INTRAVENOUS; SUBCUTANEOUS ONCE
Status: COMPLETED | OUTPATIENT
Start: 2024-01-06 | End: 2024-01-06

## 2024-01-06 RX ORDER — ACETAMINOPHEN 650 MG/1
650 SUPPOSITORY RECTAL EVERY 6 HOURS PRN
Status: DISCONTINUED | OUTPATIENT
Start: 2024-01-06 | End: 2024-01-09 | Stop reason: HOSPADM

## 2024-01-06 RX ORDER — ONDANSETRON 4 MG/1
4 TABLET, ORALLY DISINTEGRATING ORAL EVERY 8 HOURS PRN
Status: DISCONTINUED | OUTPATIENT
Start: 2024-01-06 | End: 2024-01-08

## 2024-01-06 RX ADMIN — LISINOPRIL 20 MG: 20 TABLET ORAL at 15:37

## 2024-01-06 RX ADMIN — HEPARIN SODIUM 12 UNITS/KG/HR: 10000 INJECTION, SOLUTION INTRAVENOUS at 15:40

## 2024-01-06 RX ADMIN — Medication 2 PUFF: at 21:03

## 2024-01-06 RX ADMIN — HEPARIN SODIUM 3430 UNITS: 1000 INJECTION INTRAVENOUS; SUBCUTANEOUS at 23:14

## 2024-01-06 RX ADMIN — HEPARIN SODIUM 3430 UNITS: 1000 INJECTION INTRAVENOUS; SUBCUTANEOUS at 15:39

## 2024-01-06 RX ADMIN — PANTOPRAZOLE SODIUM 40 MG: 40 TABLET, DELAYED RELEASE ORAL at 15:44

## 2024-01-06 RX ADMIN — MORPHINE SULFATE 4 MG: 4 INJECTION, SOLUTION INTRAMUSCULAR; INTRAVENOUS at 15:15

## 2024-01-06 RX ADMIN — IOPAMIDOL 100 ML: 755 INJECTION, SOLUTION INTRAVENOUS at 14:19

## 2024-01-06 RX ADMIN — HYDROMORPHONE HYDROCHLORIDE 1 MG: 1 INJECTION, SOLUTION INTRAMUSCULAR; INTRAVENOUS; SUBCUTANEOUS at 17:30

## 2024-01-06 RX ADMIN — SODIUM CHLORIDE, PRESERVATIVE FREE 10 ML: 5 INJECTION INTRAVENOUS at 20:37

## 2024-01-06 RX ADMIN — HYDROMORPHONE HYDROCHLORIDE 1 MG: 1 INJECTION, SOLUTION INTRAMUSCULAR; INTRAVENOUS; SUBCUTANEOUS at 21:28

## 2024-01-06 RX ADMIN — ACETAMINOPHEN 650 MG: 325 TABLET ORAL at 15:38

## 2024-01-06 RX ADMIN — SODIUM CHLORIDE: 9 INJECTION, SOLUTION INTRAVENOUS at 15:22

## 2024-01-06 RX ADMIN — ATORVASTATIN CALCIUM 80 MG: 40 TABLET, FILM COATED ORAL at 15:37

## 2024-01-06 ASSESSMENT — PAIN DESCRIPTION - DESCRIPTORS
DESCRIPTORS: ACHING
DESCRIPTORS: ACHING;SHARP

## 2024-01-06 ASSESSMENT — PAIN DESCRIPTION - ORIENTATION
ORIENTATION: RIGHT
ORIENTATION: LOWER
ORIENTATION: LEFT

## 2024-01-06 ASSESSMENT — PAIN SCALES - GENERAL
PAINLEVEL_OUTOF10: 10
PAINLEVEL_OUTOF10: 8
PAINLEVEL_OUTOF10: 10
PAINLEVEL_OUTOF10: 10
PAINLEVEL_OUTOF10: 6

## 2024-01-06 ASSESSMENT — PAIN DESCRIPTION - LOCATION
LOCATION: LEG

## 2024-01-06 ASSESSMENT — LIFESTYLE VARIABLES
HOW OFTEN DO YOU HAVE A DRINK CONTAINING ALCOHOL: NEVER
HOW MANY STANDARD DRINKS CONTAINING ALCOHOL DO YOU HAVE ON A TYPICAL DAY: PATIENT DOES NOT DRINK

## 2024-01-06 ASSESSMENT — PAIN SCALES - WONG BAKER: WONGBAKER_NUMERICALRESPONSE: 2

## 2024-01-06 ASSESSMENT — PAIN - FUNCTIONAL ASSESSMENT
PAIN_FUNCTIONAL_ASSESSMENT: ACTIVITIES ARE NOT PREVENTED
PAIN_FUNCTIONAL_ASSESSMENT: 0-10

## 2024-01-06 NOTE — ED TRIAGE NOTES
Pt present with pain, numbness and coolness to left lower leg, states had a surgery on leg 10/2023.  Has a appointment with Dr Cantu this coming Monday

## 2024-01-06 NOTE — ED PROVIDER NOTES
Exam  Vitals and nursing note reviewed.   HENT:      Nose: Nose normal.      Mouth/Throat:      Mouth: Mucous membranes are moist.   Cardiovascular:      Comments: Right lower extremity pulse unable to palpate.  Pulmonary:      Effort: Pulmonary effort is normal.      Breath sounds: Normal breath sounds.   Abdominal:      Palpations: Abdomen is soft.   Skin:     General: Skin is warm.      Comments: Pale and dusky right lower extremity.   Neurological:      General: No focal deficit present.      Mental Status: She is alert.           Medical Decision Making     Records Reviewed: Prior medical records and nursing Notes    63-year-old female presenting to the ED with right lower extremity pain.  Differential diagnosis includes arterial occlusion, less likely DVT, bypass occlusion, less likely sepsis, less likely cellulitis.  Obtain blood work, will discuss with vascular.      Screenings:              No data recorded        Clinical Management Tools:   Not Applicable    Social Determinants of health affecting management: None    ED Course     Patient was given the following medications:  Medications   sodium chloride flush 0.9 % injection 5-40 mL (has no administration in time range)   sodium chloride flush 0.9 % injection 5-40 mL (has no administration in time range)   0.9 % sodium chloride infusion (has no administration in time range)   potassium chloride (KLOR-CON M) extended release tablet 40 mEq (has no administration in time range)     Or   potassium bicarb-citric acid (EFFER-K) effervescent tablet 40 mEq (has no administration in time range)     Or   potassium chloride 10 mEq/100 mL IVPB (Peripheral Line) (has no administration in time range)   magnesium sulfate 2000 mg in 50 mL IVPB premix (has no administration in time range)   ondansetron (ZOFRAN-ODT) disintegrating tablet 4 mg (has no administration in time range)     Or   ondansetron (ZOFRAN) injection 4 mg (has no administration in time range)

## 2024-01-06 NOTE — PLAN OF CARE
Problem: Discharge Planning  Goal: Discharge to home or other facility with appropriate resources  Outcome: Progressing     Problem: Safety - Adult  Goal: Free from fall injury  Outcome: Progressing     Problem: ABCDS Injury Assessment  Goal: Absence of physical injury  Outcome: Progressing     Problem: Pain  Goal: Verbalizes/displays adequate comfort level or baseline comfort level  Outcome: Not Progressing  Flowsheets (Taken 1/6/2024 8867)  Verbalizes/displays adequate comfort level or baseline comfort level:   Assess pain using appropriate pain scale   Administer analgesics based on type and severity of pain and evaluate response

## 2024-01-06 NOTE — ED NOTES
Dr. Cantu ntfd of critical CT findings and that patient has moved to 527. 5E also ntfd of critical findings.

## 2024-01-06 NOTE — ED NOTES
ED TO INPATIENT SBAR HANDOFF    Patient Name: Laury Schuster   Preferred Name: Laury  : 1960  63 y.o.   Family/Caregiver Present: no   Code Status Order: Full Code  PO Status: NO  Telemetry Order:   C-SSRS: Risk of Suicide: No Risk  Sitter no   Restraints:     Sepsis Risk Score Sepsis Risk Score: 0.51    Situation  Chief Complaint   Patient presents with    Leg Pain     Brief Description of Patient's Condition: Pt has hx of graft placed by Dr Cantu, started yesterday with pain in leg, leg pale and cold  Mental Status: oriented and alert  Arrived from:Home  Imaging:   CTA LOWER EXTREMITY RIGHT W WO CONTRAST    (Results Pending)     Abnormal labs:   Abnormal Labs Reviewed   CBC WITH AUTO DIFFERENTIAL - Abnormal; Notable for the following components:       Result Value    Hemoglobin 11.3 (*)     MCV 77.5 (*)     MCH 23.8 (*)     RDW 18.2 (*)     All other components within normal limits   BASIC METABOLIC PANEL - Abnormal; Notable for the following components:    Sodium 135 (*)     Anion Gap 4 (*)     Glucose 152 (*)     All other components within normal limits       Background  Allergies:   Allergies   Allergen Reactions    Adhesive Tape Rash    Penicillins Nausea And Vomiting    Codeine Other (See Comments)     Makes me \"loopy\"     History:   Past Medical History:   Diagnosis Date    Arthritis     Coagulation disorder (HCC)     possible , pt had surgery on leg and bled out    Diabetes (HCC) 2019    Hypertension     Left-sided weakness     Nodule of kidney     left kidney    Peripheral vascular disease (HCC)     Stroke (HCC)     recent stroke on 10/16/2021       Assessment  Vitals:        Vitals:    24 1258   BP: (!) 119/92   Pulse: 99   Resp: 20   Temp: 97.5 °F (36.4 °C)   SpO2: 100%   Weight: 57.2 kg (126 lb)   Height: 1.549 m (5' 1\")     Deterioration Index (DI): Deterioration Index: 23.04  Deterioration Index (DI) Interventions Performed:    O2 Flow Rate:    O2 Device:    Cardiac Rhythm:    Critical

## 2024-01-07 ENCOUNTER — APPOINTMENT (OUTPATIENT)
Facility: HOSPITAL | Age: 64
DRG: 181 | End: 2024-01-07
Payer: MEDICAID

## 2024-01-07 ENCOUNTER — ANESTHESIA EVENT (OUTPATIENT)
Facility: HOSPITAL | Age: 64
DRG: 181 | End: 2024-01-07
Payer: MEDICAID

## 2024-01-07 ENCOUNTER — ANESTHESIA (OUTPATIENT)
Facility: HOSPITAL | Age: 64
DRG: 181 | End: 2024-01-07
Payer: MEDICAID

## 2024-01-07 PROBLEM — I70.90 ARTERIAL OCCLUSION: Status: ACTIVE | Noted: 2024-01-07

## 2024-01-07 PROBLEM — T82.898A OCCLUSION OF FEMOROPOPLITEAL BYPASS GRAFT (HCC): Status: ACTIVE | Noted: 2024-01-07

## 2024-01-07 LAB
ALBUMIN SERPL-MCNC: 3.2 G/DL (ref 3.5–5)
ALBUMIN SERPL-MCNC: 3.4 G/DL (ref 3.5–5)
ALBUMIN/GLOB SERPL: 0.9 (ref 1.1–2.2)
ALBUMIN/GLOB SERPL: 0.9 (ref 1.1–2.2)
ALP SERPL-CCNC: 61 U/L (ref 45–117)
ALP SERPL-CCNC: 65 U/L (ref 45–117)
ALT SERPL-CCNC: 27 U/L (ref 12–78)
ALT SERPL-CCNC: 27 U/L (ref 12–78)
ANION GAP SERPL CALC-SCNC: 5 MMOL/L (ref 5–15)
ANION GAP SERPL CALC-SCNC: 5 MMOL/L (ref 5–15)
APTT PPP: 138.6 SEC (ref 21.2–34.1)
APTT PPP: 30.1 SEC (ref 21.2–34.1)
AST SERPL W P-5'-P-CCNC: 127 U/L (ref 15–37)
AST SERPL W P-5'-P-CCNC: 140 U/L (ref 15–37)
BASOPHILS # BLD: 0 K/UL (ref 0–0.1)
BASOPHILS # BLD: 0 K/UL (ref 0–0.1)
BASOPHILS NFR BLD: 0 % (ref 0–1)
BASOPHILS NFR BLD: 1 % (ref 0–1)
BILIRUB SERPL-MCNC: 0.2 MG/DL (ref 0.2–1)
BILIRUB SERPL-MCNC: 0.2 MG/DL (ref 0.2–1)
BUN SERPL-MCNC: 11 MG/DL (ref 6–20)
BUN SERPL-MCNC: 9 MG/DL (ref 6–20)
BUN/CREAT SERPL: 13 (ref 12–20)
BUN/CREAT SERPL: 15 (ref 12–20)
CA-I BLD-MCNC: 8.5 MG/DL (ref 8.5–10.1)
CA-I BLD-MCNC: 8.6 MG/DL (ref 8.5–10.1)
CHLORIDE SERPL-SCNC: 111 MMOL/L (ref 97–108)
CHLORIDE SERPL-SCNC: 111 MMOL/L (ref 97–108)
CO2 SERPL-SCNC: 22 MMOL/L (ref 21–32)
CO2 SERPL-SCNC: 23 MMOL/L (ref 21–32)
CREAT SERPL-MCNC: 0.67 MG/DL (ref 0.55–1.02)
CREAT SERPL-MCNC: 0.71 MG/DL (ref 0.55–1.02)
DIFFERENTIAL METHOD BLD: ABNORMAL
DIFFERENTIAL METHOD BLD: ABNORMAL
EOSINOPHIL # BLD: 0.1 K/UL (ref 0–0.4)
EOSINOPHIL # BLD: 0.1 K/UL (ref 0–0.4)
EOSINOPHIL NFR BLD: 1 % (ref 0–7)
EOSINOPHIL NFR BLD: 1 % (ref 0–7)
ERYTHROCYTE [DISTWIDTH] IN BLOOD BY AUTOMATED COUNT: 18.4 % (ref 11.5–14.5)
ERYTHROCYTE [DISTWIDTH] IN BLOOD BY AUTOMATED COUNT: 18.6 % (ref 11.5–14.5)
GLOBULIN SER CALC-MCNC: 3.6 G/DL (ref 2–4)
GLOBULIN SER CALC-MCNC: 3.8 G/DL (ref 2–4)
GLUCOSE BLD STRIP.AUTO-MCNC: 116 MG/DL (ref 65–100)
GLUCOSE BLD STRIP.AUTO-MCNC: 199 MG/DL (ref 65–100)
GLUCOSE BLD STRIP.AUTO-MCNC: 208 MG/DL (ref 65–100)
GLUCOSE BLD STRIP.AUTO-MCNC: 77 MG/DL (ref 65–100)
GLUCOSE SERPL-MCNC: 64 MG/DL (ref 65–100)
GLUCOSE SERPL-MCNC: 89 MG/DL (ref 65–100)
HCT VFR BLD AUTO: 33.2 % (ref 35–47)
HCT VFR BLD AUTO: 36.3 % (ref 35–47)
HGB BLD-MCNC: 10.7 G/DL (ref 11.5–16)
HGB BLD-MCNC: 9.9 G/DL (ref 11.5–16)
IMM GRANULOCYTES # BLD AUTO: 0 K/UL (ref 0–0.04)
IMM GRANULOCYTES # BLD AUTO: 0 K/UL (ref 0–0.04)
IMM GRANULOCYTES NFR BLD AUTO: 0 % (ref 0–0.5)
IMM GRANULOCYTES NFR BLD AUTO: 0 % (ref 0–0.5)
LYMPHOCYTES # BLD: 1.1 K/UL (ref 0.8–3.5)
LYMPHOCYTES # BLD: 3 K/UL (ref 0.8–3.5)
LYMPHOCYTES NFR BLD: 12 % (ref 12–49)
LYMPHOCYTES NFR BLD: 34 % (ref 12–49)
MCH RBC QN AUTO: 23.8 PG (ref 26–34)
MCH RBC QN AUTO: 23.9 PG (ref 26–34)
MCHC RBC AUTO-ENTMCNC: 29.5 G/DL (ref 30–36.5)
MCHC RBC AUTO-ENTMCNC: 29.8 G/DL (ref 30–36.5)
MCV RBC AUTO: 80.2 FL (ref 80–99)
MCV RBC AUTO: 80.7 FL (ref 80–99)
MONOCYTES # BLD: 0.4 K/UL (ref 0–1)
MONOCYTES # BLD: 0.8 K/UL (ref 0–1)
MONOCYTES NFR BLD: 4 % (ref 5–13)
MONOCYTES NFR BLD: 9 % (ref 5–13)
NEUTS SEG # BLD: 4.8 K/UL (ref 1.8–8)
NEUTS SEG # BLD: 7.6 K/UL (ref 1.8–8)
NEUTS SEG NFR BLD: 55 % (ref 32–75)
NEUTS SEG NFR BLD: 83 % (ref 32–75)
NRBC # BLD: 0 K/UL (ref 0–0.01)
NRBC # BLD: 0 K/UL (ref 0–0.01)
NRBC BLD-RTO: 0 PER 100 WBC
NRBC BLD-RTO: 0 PER 100 WBC
PERFORMED BY:: ABNORMAL
PERFORMED BY:: NORMAL
PLATELET # BLD AUTO: 356 K/UL (ref 150–400)
PLATELET # BLD AUTO: 364 K/UL (ref 150–400)
PMV BLD AUTO: 10.7 FL (ref 8.9–12.9)
PMV BLD AUTO: 11.7 FL (ref 8.9–12.9)
POTASSIUM SERPL-SCNC: 3.8 MMOL/L (ref 3.5–5.1)
POTASSIUM SERPL-SCNC: 4.2 MMOL/L (ref 3.5–5.1)
PROT SERPL-MCNC: 6.8 G/DL (ref 6.4–8.2)
PROT SERPL-MCNC: 7.2 G/DL (ref 6.4–8.2)
RBC # BLD AUTO: 4.14 M/UL (ref 3.8–5.2)
RBC # BLD AUTO: 4.5 M/UL (ref 3.8–5.2)
SODIUM SERPL-SCNC: 138 MMOL/L (ref 136–145)
SODIUM SERPL-SCNC: 139 MMOL/L (ref 136–145)
THERAPEUTIC RANGE: ABNORMAL SEC (ref 82–109)
THERAPEUTIC RANGE: NORMAL SEC (ref 82–109)
UFH PPP CHRO-ACNC: 0.45 IU/ML
UFH PPP CHRO-ACNC: 0.83 IU/ML
WBC # BLD AUTO: 8.7 K/UL (ref 3.6–11)
WBC # BLD AUTO: 9.2 K/UL (ref 3.6–11)

## 2024-01-07 PROCEDURE — 7100000000 HC PACU RECOVERY - FIRST 15 MIN: Performed by: SURGERY

## 2024-01-07 PROCEDURE — 76000 FLUOROSCOPY <1 HR PHYS/QHP: CPT

## 2024-01-07 PROCEDURE — C1726 CATH, BAL DIL, NON-VASCULAR: HCPCS | Performed by: SURGERY

## 2024-01-07 PROCEDURE — 3600000014 HC SURGERY LEVEL 4 ADDTL 15MIN: Performed by: SURGERY

## 2024-01-07 PROCEDURE — 7100000001 HC PACU RECOVERY - ADDTL 15 MIN: Performed by: SURGERY

## 2024-01-07 PROCEDURE — 86901 BLOOD TYPING SEROLOGIC RH(D): CPT

## 2024-01-07 PROCEDURE — 6360000002 HC RX W HCPCS: Performed by: ANESTHESIOLOGY

## 2024-01-07 PROCEDURE — 2500000003 HC RX 250 WO HCPCS: Performed by: NURSE ANESTHETIST, CERTIFIED REGISTERED

## 2024-01-07 PROCEDURE — 6360000002 HC RX W HCPCS: Performed by: NURSE ANESTHETIST, CERTIFIED REGISTERED

## 2024-01-07 PROCEDURE — 3700000000 HC ANESTHESIA ATTENDED CARE: Performed by: SURGERY

## 2024-01-07 PROCEDURE — 2580000003 HC RX 258: Performed by: SURGERY

## 2024-01-07 PROCEDURE — 86922 COMPATIBILITY TEST ANTIGLOB: CPT

## 2024-01-07 PROCEDURE — 2580000003 HC RX 258: Performed by: NURSE ANESTHETIST, CERTIFIED REGISTERED

## 2024-01-07 PROCEDURE — B41F1ZZ FLUOROSCOPY OF RIGHT LOWER EXTREMITY ARTERIES USING LOW OSMOLAR CONTRAST: ICD-10-PCS | Performed by: SURGERY

## 2024-01-07 PROCEDURE — 35875 REMOVAL OF CLOT IN GRAFT: CPT | Performed by: SURGERY

## 2024-01-07 PROCEDURE — C1894 INTRO/SHEATH, NON-LASER: HCPCS | Performed by: SURGERY

## 2024-01-07 PROCEDURE — 86900 BLOOD TYPING SEROLOGIC ABO: CPT

## 2024-01-07 PROCEDURE — 85520 HEPARIN ASSAY: CPT

## 2024-01-07 PROCEDURE — 86870 RBC ANTIBODY IDENTIFICATION: CPT

## 2024-01-07 PROCEDURE — 86921 COMPATIBILITY TEST INCUBATE: CPT

## 2024-01-07 PROCEDURE — 6360000002 HC RX W HCPCS: Performed by: SURGERY

## 2024-01-07 PROCEDURE — 04CM0ZZ EXTIRPATION OF MATTER FROM RIGHT POPLITEAL ARTERY, OPEN APPROACH: ICD-10-PCS | Performed by: SURGERY

## 2024-01-07 PROCEDURE — 86920 COMPATIBILITY TEST SPIN: CPT

## 2024-01-07 PROCEDURE — 36415 COLL VENOUS BLD VENIPUNCTURE: CPT

## 2024-01-07 PROCEDURE — 94640 AIRWAY INHALATION TREATMENT: CPT

## 2024-01-07 PROCEDURE — 1100000000 HC RM PRIVATE

## 2024-01-07 PROCEDURE — 85025 COMPLETE CBC W/AUTO DIFF WBC: CPT

## 2024-01-07 PROCEDURE — 86850 RBC ANTIBODY SCREEN: CPT

## 2024-01-07 PROCEDURE — 86902 BLOOD TYPE ANTIGEN DONOR EA: CPT

## 2024-01-07 PROCEDURE — 85730 THROMBOPLASTIN TIME PARTIAL: CPT

## 2024-01-07 PROCEDURE — 2500000003 HC RX 250 WO HCPCS: Performed by: ANESTHESIOLOGY

## 2024-01-07 PROCEDURE — 3600000004 HC SURGERY LEVEL 4 BASE: Performed by: SURGERY

## 2024-01-07 PROCEDURE — 2709999900 HC NON-CHARGEABLE SUPPLY: Performed by: SURGERY

## 2024-01-07 PROCEDURE — 6370000000 HC RX 637 (ALT 250 FOR IP): Performed by: SURGERY

## 2024-01-07 PROCEDURE — 2500000003 HC RX 250 WO HCPCS: Performed by: SURGERY

## 2024-01-07 PROCEDURE — 94761 N-INVAS EAR/PLS OXIMETRY MLT: CPT

## 2024-01-07 PROCEDURE — C1887 CATHETER, GUIDING: HCPCS | Performed by: SURGERY

## 2024-01-07 PROCEDURE — C1757 CATH, THROMBECTOMY/EMBOLECT: HCPCS | Performed by: SURGERY

## 2024-01-07 PROCEDURE — C1769 GUIDE WIRE: HCPCS | Performed by: SURGERY

## 2024-01-07 PROCEDURE — 82962 GLUCOSE BLOOD TEST: CPT

## 2024-01-07 PROCEDURE — 88304 TISSUE EXAM BY PATHOLOGIST: CPT

## 2024-01-07 PROCEDURE — 80053 COMPREHEN METABOLIC PANEL: CPT

## 2024-01-07 PROCEDURE — 6370000000 HC RX 637 (ALT 250 FOR IP): Performed by: ANESTHESIOLOGY

## 2024-01-07 PROCEDURE — 3700000001 HC ADD 15 MINUTES (ANESTHESIA): Performed by: SURGERY

## 2024-01-07 RX ORDER — DEXMEDETOMIDINE HYDROCHLORIDE 100 UG/ML
INJECTION, SOLUTION INTRAVENOUS PRN
Status: DISCONTINUED | OUTPATIENT
Start: 2024-01-07 | End: 2024-01-07 | Stop reason: SDUPTHER

## 2024-01-07 RX ORDER — PROPOFOL 10 MG/ML
INJECTION, EMULSION INTRAVENOUS CONTINUOUS PRN
Status: DISCONTINUED | OUTPATIENT
Start: 2024-01-07 | End: 2024-01-07 | Stop reason: SDUPTHER

## 2024-01-07 RX ORDER — HYDRALAZINE HYDROCHLORIDE 20 MG/ML
10 INJECTION INTRAMUSCULAR; INTRAVENOUS
Status: DISCONTINUED | OUTPATIENT
Start: 2024-01-07 | End: 2024-01-07 | Stop reason: HOSPADM

## 2024-01-07 RX ORDER — WATER 10 ML/10ML
10 INJECTION INTRAMUSCULAR; INTRAVENOUS; SUBCUTANEOUS ONCE
Status: DISCONTINUED | OUTPATIENT
Start: 2024-01-07 | End: 2024-01-09 | Stop reason: HOSPADM

## 2024-01-07 RX ORDER — LORAZEPAM 2 MG/ML
0.5 INJECTION INTRAMUSCULAR
Status: DISCONTINUED | OUTPATIENT
Start: 2024-01-07 | End: 2024-01-07 | Stop reason: HOSPADM

## 2024-01-07 RX ORDER — ONDANSETRON 4 MG/1
4 TABLET, ORALLY DISINTEGRATING ORAL EVERY 8 HOURS PRN
Status: DISCONTINUED | OUTPATIENT
Start: 2024-01-07 | End: 2024-01-09 | Stop reason: HOSPADM

## 2024-01-07 RX ORDER — DEXTROSE MONOHYDRATE 100 MG/ML
INJECTION, SOLUTION INTRAVENOUS CONTINUOUS PRN
Status: DISCONTINUED | OUTPATIENT
Start: 2024-01-07 | End: 2024-01-07 | Stop reason: HOSPADM

## 2024-01-07 RX ORDER — ONDANSETRON 2 MG/ML
INJECTION INTRAMUSCULAR; INTRAVENOUS PRN
Status: DISCONTINUED | OUTPATIENT
Start: 2024-01-07 | End: 2024-01-07 | Stop reason: SDUPTHER

## 2024-01-07 RX ORDER — SODIUM CHLORIDE 0.9 % (FLUSH) 0.9 %
5-40 SYRINGE (ML) INJECTION EVERY 12 HOURS SCHEDULED
Status: DISCONTINUED | OUTPATIENT
Start: 2024-01-07 | End: 2024-01-07 | Stop reason: HOSPADM

## 2024-01-07 RX ORDER — HYDROMORPHONE HYDROCHLORIDE 1 MG/ML
0.5 INJECTION, SOLUTION INTRAMUSCULAR; INTRAVENOUS; SUBCUTANEOUS EVERY 5 MIN PRN
Status: DISCONTINUED | OUTPATIENT
Start: 2024-01-07 | End: 2024-01-07 | Stop reason: HOSPADM

## 2024-01-07 RX ORDER — ONDANSETRON 2 MG/ML
4 INJECTION INTRAMUSCULAR; INTRAVENOUS
Status: DISCONTINUED | OUTPATIENT
Start: 2024-01-07 | End: 2024-01-07 | Stop reason: HOSPADM

## 2024-01-07 RX ORDER — HEPARIN SODIUM 1000 [USP'U]/ML
INJECTION, SOLUTION INTRAVENOUS; SUBCUTANEOUS PRN
Status: DISCONTINUED | OUTPATIENT
Start: 2024-01-07 | End: 2024-01-07 | Stop reason: SDUPTHER

## 2024-01-07 RX ORDER — LIDOCAINE 4 G/G
1 PATCH TOPICAL AS NEEDED
Status: DISCONTINUED | OUTPATIENT
Start: 2024-01-07 | End: 2024-01-07 | Stop reason: HOSPADM

## 2024-01-07 RX ORDER — FENTANYL CITRATE 50 UG/ML
50 INJECTION, SOLUTION INTRAMUSCULAR; INTRAVENOUS EVERY 5 MIN PRN
Status: DISCONTINUED | OUTPATIENT
Start: 2024-01-07 | End: 2024-01-07 | Stop reason: HOSPADM

## 2024-01-07 RX ORDER — SODIUM CHLORIDE 9 MG/ML
INJECTION, SOLUTION INTRAVENOUS PRN
Status: DISCONTINUED | OUTPATIENT
Start: 2024-01-07 | End: 2024-01-09 | Stop reason: HOSPADM

## 2024-01-07 RX ORDER — SODIUM CHLORIDE 9 MG/ML
INJECTION, SOLUTION INTRAVENOUS PRN
Status: DISCONTINUED | OUTPATIENT
Start: 2024-01-07 | End: 2024-01-07 | Stop reason: HOSPADM

## 2024-01-07 RX ORDER — DEXAMETHASONE SODIUM PHOSPHATE 4 MG/ML
INJECTION, SOLUTION INTRA-ARTICULAR; INTRALESIONAL; INTRAMUSCULAR; INTRAVENOUS; SOFT TISSUE PRN
Status: DISCONTINUED | OUTPATIENT
Start: 2024-01-07 | End: 2024-01-07 | Stop reason: SDUPTHER

## 2024-01-07 RX ORDER — METOCLOPRAMIDE HYDROCHLORIDE 5 MG/ML
10 INJECTION INTRAMUSCULAR; INTRAVENOUS
Status: DISCONTINUED | OUTPATIENT
Start: 2024-01-07 | End: 2024-01-07 | Stop reason: HOSPADM

## 2024-01-07 RX ORDER — ONDANSETRON 2 MG/ML
4 INJECTION INTRAMUSCULAR; INTRAVENOUS EVERY 6 HOURS PRN
Status: DISCONTINUED | OUTPATIENT
Start: 2024-01-07 | End: 2024-01-09 | Stop reason: HOSPADM

## 2024-01-07 RX ORDER — MEPERIDINE HYDROCHLORIDE 25 MG/ML
12.5 INJECTION INTRAMUSCULAR; INTRAVENOUS; SUBCUTANEOUS EVERY 5 MIN PRN
Status: DISCONTINUED | OUTPATIENT
Start: 2024-01-07 | End: 2024-01-07 | Stop reason: HOSPADM

## 2024-01-07 RX ORDER — SODIUM CHLORIDE 0.9 % (FLUSH) 0.9 %
5-40 SYRINGE (ML) INJECTION EVERY 12 HOURS SCHEDULED
Status: DISCONTINUED | OUTPATIENT
Start: 2024-01-07 | End: 2024-01-09 | Stop reason: HOSPADM

## 2024-01-07 RX ORDER — SODIUM CHLORIDE 0.9 % (FLUSH) 0.9 %
5-40 SYRINGE (ML) INJECTION PRN
Status: DISCONTINUED | OUTPATIENT
Start: 2024-01-07 | End: 2024-01-07 | Stop reason: HOSPADM

## 2024-01-07 RX ORDER — HYDROMORPHONE HYDROCHLORIDE 1 MG/ML
1 INJECTION, SOLUTION INTRAMUSCULAR; INTRAVENOUS; SUBCUTANEOUS
Status: DISPENSED | OUTPATIENT
Start: 2024-01-07 | End: 2024-01-09

## 2024-01-07 RX ORDER — MIDAZOLAM HYDROCHLORIDE 1 MG/ML
INJECTION INTRAMUSCULAR; INTRAVENOUS PRN
Status: DISCONTINUED | OUTPATIENT
Start: 2024-01-07 | End: 2024-01-07 | Stop reason: SDUPTHER

## 2024-01-07 RX ORDER — GLUCAGON 1 MG/ML
1 KIT INJECTION PRN
Status: DISCONTINUED | OUTPATIENT
Start: 2024-01-07 | End: 2024-01-07 | Stop reason: HOSPADM

## 2024-01-07 RX ORDER — CEFAZOLIN SODIUM 1 G/3ML
INJECTION, POWDER, FOR SOLUTION INTRAMUSCULAR; INTRAVENOUS
Status: DISPENSED
Start: 2024-01-07 | End: 2024-01-07

## 2024-01-07 RX ORDER — IPRATROPIUM BROMIDE AND ALBUTEROL SULFATE 2.5; .5 MG/3ML; MG/3ML
1 SOLUTION RESPIRATORY (INHALATION)
Status: DISCONTINUED | OUTPATIENT
Start: 2024-01-07 | End: 2024-01-07 | Stop reason: HOSPADM

## 2024-01-07 RX ORDER — OXYCODONE HYDROCHLORIDE 5 MG/1
5 TABLET ORAL PRN
Status: COMPLETED | OUTPATIENT
Start: 2024-01-07 | End: 2024-01-07

## 2024-01-07 RX ORDER — POVIDONE-IODINE 10 MG/G
OINTMENT TOPICAL PRN
Status: DISCONTINUED | OUTPATIENT
Start: 2024-01-07 | End: 2024-01-07 | Stop reason: ALTCHOICE

## 2024-01-07 RX ORDER — DEXAMETHASONE SODIUM PHOSPHATE 4 MG/ML
INJECTION, SOLUTION INTRA-ARTICULAR; INTRALESIONAL; INTRAMUSCULAR; INTRAVENOUS; SOFT TISSUE PRN
Status: DISCONTINUED | OUTPATIENT
Start: 2024-01-07 | End: 2024-01-07

## 2024-01-07 RX ORDER — DIPHENHYDRAMINE HYDROCHLORIDE 50 MG/ML
12.5 INJECTION INTRAMUSCULAR; INTRAVENOUS
Status: DISCONTINUED | OUTPATIENT
Start: 2024-01-07 | End: 2024-01-07 | Stop reason: HOSPADM

## 2024-01-07 RX ORDER — LABETALOL HYDROCHLORIDE 5 MG/ML
10 INJECTION, SOLUTION INTRAVENOUS
Status: DISCONTINUED | OUTPATIENT
Start: 2024-01-07 | End: 2024-01-07 | Stop reason: HOSPADM

## 2024-01-07 RX ORDER — SODIUM CHLORIDE, SODIUM LACTATE, POTASSIUM CHLORIDE, CALCIUM CHLORIDE 600; 310; 30; 20 MG/100ML; MG/100ML; MG/100ML; MG/100ML
INJECTION, SOLUTION INTRAVENOUS CONTINUOUS PRN
Status: DISCONTINUED | OUTPATIENT
Start: 2024-01-07 | End: 2024-01-07 | Stop reason: SDUPTHER

## 2024-01-07 RX ORDER — FENTANYL CITRATE 50 UG/ML
INJECTION, SOLUTION INTRAMUSCULAR; INTRAVENOUS PRN
Status: DISCONTINUED | OUTPATIENT
Start: 2024-01-07 | End: 2024-01-07 | Stop reason: SDUPTHER

## 2024-01-07 RX ORDER — SODIUM CHLORIDE 0.9 % (FLUSH) 0.9 %
5-40 SYRINGE (ML) INJECTION PRN
Status: DISCONTINUED | OUTPATIENT
Start: 2024-01-07 | End: 2024-01-09 | Stop reason: HOSPADM

## 2024-01-07 RX ORDER — OXYCODONE HYDROCHLORIDE 5 MG/1
10 TABLET ORAL PRN
Status: COMPLETED | OUTPATIENT
Start: 2024-01-07 | End: 2024-01-07

## 2024-01-07 RX ORDER — SODIUM CHLORIDE, SODIUM LACTATE, POTASSIUM CHLORIDE, CALCIUM CHLORIDE 600; 310; 30; 20 MG/100ML; MG/100ML; MG/100ML; MG/100ML
INJECTION, SOLUTION INTRAVENOUS ONCE
Status: DISCONTINUED | OUTPATIENT
Start: 2024-01-07 | End: 2024-01-07 | Stop reason: HOSPADM

## 2024-01-07 RX ADMIN — OXYCODONE HYDROCHLORIDE 10 MG: 5 TABLET ORAL at 13:30

## 2024-01-07 RX ADMIN — SODIUM CHLORIDE, PRESERVATIVE FREE 10 ML: 5 INJECTION INTRAVENOUS at 20:32

## 2024-01-07 RX ADMIN — FENTANYL CITRATE 50 MCG: 50 INJECTION, SOLUTION INTRAMUSCULAR; INTRAVENOUS at 10:22

## 2024-01-07 RX ADMIN — HYDROMORPHONE HYDROCHLORIDE 1 MG: 1 INJECTION, SOLUTION INTRAMUSCULAR; INTRAVENOUS; SUBCUTANEOUS at 23:21

## 2024-01-07 RX ADMIN — FENTANYL CITRATE 50 MCG: 50 INJECTION, SOLUTION INTRAMUSCULAR; INTRAVENOUS at 12:17

## 2024-01-07 RX ADMIN — HEPARIN SODIUM 5000 UNITS: 1000 INJECTION, SOLUTION INTRAVENOUS; SUBCUTANEOUS at 10:49

## 2024-01-07 RX ADMIN — MIDAZOLAM HYDROCHLORIDE 2 MG: 2 INJECTION, SOLUTION INTRAMUSCULAR; INTRAVENOUS at 10:09

## 2024-01-07 RX ADMIN — FENTANYL CITRATE 50 MCG: 50 INJECTION, SOLUTION INTRAMUSCULAR; INTRAVENOUS at 13:30

## 2024-01-07 RX ADMIN — SODIUM CHLORIDE: 9 INJECTION, SOLUTION INTRAVENOUS at 23:30

## 2024-01-07 RX ADMIN — DEXMEDETOMIDINE 10 MCG: 100 INJECTION, SOLUTION INTRAVENOUS at 10:23

## 2024-01-07 RX ADMIN — SODIUM CHLORIDE, PRESERVATIVE FREE 10 ML: 5 INJECTION INTRAVENOUS at 23:07

## 2024-01-07 RX ADMIN — SODIUM CHLORIDE, PRESERVATIVE FREE 10 ML: 5 INJECTION INTRAVENOUS at 23:24

## 2024-01-07 RX ADMIN — PROPOFOL 50 MCG/KG/MIN: 10 INJECTION, EMULSION INTRAVENOUS at 10:19

## 2024-01-07 RX ADMIN — ATORVASTATIN CALCIUM 80 MG: 40 TABLET, FILM COATED ORAL at 08:43

## 2024-01-07 RX ADMIN — PHENYLEPHRINE HYDROCHLORIDE 40 MCG/MIN: 10 INJECTION INTRAVENOUS at 10:32

## 2024-01-07 RX ADMIN — DEXAMETHASONE SODIUM PHOSPHATE 4 MG: 4 INJECTION, SOLUTION INTRA-ARTICULAR; INTRALESIONAL; INTRAMUSCULAR; INTRAVENOUS; SOFT TISSUE at 10:37

## 2024-01-07 RX ADMIN — PANTOPRAZOLE SODIUM 40 MG: 40 TABLET, DELAYED RELEASE ORAL at 08:43

## 2024-01-07 RX ADMIN — SODIUM CHLORIDE, PRESERVATIVE FREE 10 ML: 5 INJECTION INTRAVENOUS at 08:43

## 2024-01-07 RX ADMIN — HYDROMORPHONE HYDROCHLORIDE 1 MG: 1 INJECTION, SOLUTION INTRAMUSCULAR; INTRAVENOUS; SUBCUTANEOUS at 20:30

## 2024-01-07 RX ADMIN — HYDROMORPHONE HYDROCHLORIDE 0.5 MG: 1 INJECTION, SOLUTION INTRAMUSCULAR; INTRAVENOUS; SUBCUTANEOUS at 13:53

## 2024-01-07 RX ADMIN — HYDROMORPHONE HYDROCHLORIDE 1 MG: 1 INJECTION, SOLUTION INTRAMUSCULAR; INTRAVENOUS; SUBCUTANEOUS at 16:49

## 2024-01-07 RX ADMIN — SODIUM CHLORIDE, POTASSIUM CHLORIDE, SODIUM LACTATE AND CALCIUM CHLORIDE: 600; 310; 30; 20 INJECTION, SOLUTION INTRAVENOUS at 10:10

## 2024-01-07 RX ADMIN — ONDANSETRON 4 MG: 2 INJECTION INTRAMUSCULAR; INTRAVENOUS at 10:37

## 2024-01-07 RX ADMIN — Medication 2 PUFF: at 21:11

## 2024-01-07 RX ADMIN — INSULIN LISPRO 1 UNITS: 100 INJECTION, SOLUTION INTRAVENOUS; SUBCUTANEOUS at 16:48

## 2024-01-07 RX ADMIN — Medication 2 PUFF: at 08:31

## 2024-01-07 RX ADMIN — CEFAZOLIN SODIUM 2000 MG: 1 INJECTION, POWDER, FOR SOLUTION INTRAMUSCULAR; INTRAVENOUS at 10:09

## 2024-01-07 RX ADMIN — HEPARIN SODIUM 5000 UNITS: 1000 INJECTION, SOLUTION INTRAVENOUS; SUBCUTANEOUS at 11:42

## 2024-01-07 RX ADMIN — PROPOFOL 150 MG: 10 INJECTION, EMULSION INTRAVENOUS at 10:27

## 2024-01-07 RX ADMIN — HYDROMORPHONE HYDROCHLORIDE 1 MG: 1 INJECTION, SOLUTION INTRAMUSCULAR; INTRAVENOUS; SUBCUTANEOUS at 00:32

## 2024-01-07 RX ADMIN — HYDROMORPHONE HYDROCHLORIDE 1 MG: 1 INJECTION, SOLUTION INTRAMUSCULAR; INTRAVENOUS; SUBCUTANEOUS at 05:05

## 2024-01-07 RX ADMIN — FENTANYL CITRATE 50 MCG: 50 INJECTION, SOLUTION INTRAMUSCULAR; INTRAVENOUS at 10:19

## 2024-01-07 RX ADMIN — FENTANYL CITRATE 50 MCG: 50 INJECTION, SOLUTION INTRAMUSCULAR; INTRAVENOUS at 12:45

## 2024-01-07 ASSESSMENT — PAIN DESCRIPTION - LOCATION
LOCATION: LEG

## 2024-01-07 ASSESSMENT — PAIN SCALES - GENERAL
PAINLEVEL_OUTOF10: 10
PAINLEVEL_OUTOF10: 5
PAINLEVEL_OUTOF10: 8
PAINLEVEL_OUTOF10: 10
PAINLEVEL_OUTOF10: 2
PAINLEVEL_OUTOF10: 10
PAINLEVEL_OUTOF10: 6
PAINLEVEL_OUTOF10: 10
PAINLEVEL_OUTOF10: 10
PAINLEVEL_OUTOF10: 4
PAINLEVEL_OUTOF10: 5
PAINLEVEL_OUTOF10: 9
PAINLEVEL_OUTOF10: 10
PAINLEVEL_OUTOF10: 10

## 2024-01-07 ASSESSMENT — PAIN - FUNCTIONAL ASSESSMENT
PAIN_FUNCTIONAL_ASSESSMENT: FACE, LEGS, ACTIVITY, CRY, AND CONSOLABILITY (FLACC)
PAIN_FUNCTIONAL_ASSESSMENT: 0-10
PAIN_FUNCTIONAL_ASSESSMENT: 0-10
PAIN_FUNCTIONAL_ASSESSMENT: ACTIVITIES ARE NOT PREVENTED
PAIN_FUNCTIONAL_ASSESSMENT: ACTIVITIES ARE NOT PREVENTED
PAIN_FUNCTIONAL_ASSESSMENT: 0-10
PAIN_FUNCTIONAL_ASSESSMENT: FACE, LEGS, ACTIVITY, CRY, AND CONSOLABILITY (FLACC)
PAIN_FUNCTIONAL_ASSESSMENT: ACTIVITIES ARE NOT PREVENTED
PAIN_FUNCTIONAL_ASSESSMENT: FACE, LEGS, ACTIVITY, CRY, AND CONSOLABILITY (FLACC)
PAIN_FUNCTIONAL_ASSESSMENT: ACTIVITIES ARE NOT PREVENTED
PAIN_FUNCTIONAL_ASSESSMENT: ACTIVITIES ARE NOT PREVENTED

## 2024-01-07 ASSESSMENT — PAIN DESCRIPTION - ORIENTATION
ORIENTATION: RIGHT

## 2024-01-07 ASSESSMENT — PAIN DESCRIPTION - DESCRIPTORS
DESCRIPTORS: ACHING
DESCRIPTORS: ACHING
DESCRIPTORS: PRESSURE;DISCOMFORT;ACHING
DESCRIPTORS: ACHING
DESCRIPTORS: ACHING;SHARP
DESCRIPTORS: BURNING;PRESSURE

## 2024-01-07 ASSESSMENT — PAIN SCALES - WONG BAKER: WONGBAKER_NUMERICALRESPONSE: 10

## 2024-01-07 NOTE — ANESTHESIA PRE PROCEDURE
7.2 01/07/2024 05:49 AM    CALCIUM 8.6 01/07/2024 05:49 AM    BILITOT 0.2 01/07/2024 05:49 AM    ALKPHOS 65 01/07/2024 05:49 AM    ALKPHOS 61 12/01/2021 03:01 PM     01/07/2024 05:49 AM    ALT 27 01/07/2024 05:49 AM       POC Tests:   Recent Labs     01/07/24  0914   POCGLU 77       Coags:   Lab Results   Component Value Date/Time    PROTIME 12.9 01/06/2024 01:27 PM    INR 0.9 01/06/2024 01:27 PM    APTT >153.0 01/06/2024 04:15 PM    APTT 32.7 10/28/2021 10:01 AM       HCG (If Applicable): No results found for: \"PREGTESTUR\", \"PREGSERUM\", \"HCG\", \"HCGQUANT\"     ABGs: No results found for: \"PHART\", \"PO2ART\", \"IPU7OXB\", \"NVO9OGH\", \"BEART\", \"C4CTPUYA\"     Type & Screen (If Applicable):  No results found for: \"LABABO\", \"LABRH\"    Drug/Infectious Status (If Applicable):  No results found for: \"HIV\", \"HEPCAB\"    COVID-19 Screening (If Applicable):   Lab Results   Component Value Date/Time    COVID19 Not detected 11/11/2021 08:35 AM    COVID19 Please find results under separate order 11/11/2021 08:35 AM           Anesthesia Evaluation  Patient summary reviewed and Nursing notes reviewed   no history of anesthetic complications:   Airway: Mallampati: II  TM distance: >3 FB   Neck ROM: full  Mouth opening: > = 3 FB   Dental:    (+) edentulous      Pulmonary:Negative Pulmonary ROS and normal exam  breath sounds clear to auscultation                             Cardiovascular:    (+) hypertension:, hyperlipidemia      ECG reviewed  Rhythm: regular  Rate: normal                    Neuro/Psych:   (+) CVA: residual symptoms            GI/Hepatic/Renal: Neg GI/Hepatic/Renal ROS            Endo/Other:    (+) DiabetesType II DM, blood dyscrasia: anticoagulation therapy and anemia:..                  ROS comment: Diagnosis: Ischemia of right lower extremity [I99.8] Abdominal:              PE comment: Deferred.   Vascular:   + PVD, aortic or cerebral.       Other Findings:             Anesthesia Plan      MAC and TIVA     ASA 4 -

## 2024-01-07 NOTE — H&P
surveillance, encounter for    Ischemia of right lower extremity    Stroke (HCC)    CVA (cerebral vascular accident) (HCC)    History of bleeding disorder    Peripheral vascular disease (HCC)    Adrenal tumor    Pancreatic tumor    Limb ischemia    Ulcer of right foot (HCC)    Femoral artery occlusion, right (HCC)    Ischemic leg       Plans: Most likely patient has  occluded recent bypass graft on the right leg.    Will start heparin drip.    I will make further recommendation after CT scan angiogram reviewed.    Patient was discussed about findings.  Most likely the patient will need revascularization procedure occluded graft on the right leg for recently developing ischemic limb.                Pepe Zhu MD

## 2024-01-07 NOTE — PLAN OF CARE
Problem: Pain  Goal: Verbalizes/displays adequate comfort level or baseline comfort level  1/6/2024 1920 by Sharee Machuca, RN  Outcome: Progressing     Problem: Safety - Adult  Goal: Free from fall injury  1/6/2024 1920 by Sharee Machuca, RN

## 2024-01-07 NOTE — ANESTHESIA POSTPROCEDURE EVALUATION
Department of Anesthesiology  Postprocedure Note    Patient: Laury Schuster  MRN: [id number]  YOB: 1960  Date of evaluation: 1/7/2024    Procedure Summary       Date: 01/07/24 Room / Location: [facility] MAIN OR 02 / [facility] MAIN OR    Anesthesia Start: 1010 Anesthesia Stop: 1200    Procedure: RIGHT LEG GRAFT THROMBECTOMY AND ANGIOGRAM (Right: Groin) Diagnosis:       Ischemia of right lower extremity      (Ischemia of right lower extremity [I99.8])    Surgeons: Marko Cantu MD Responsible Provider: Landon Burrows Jr., MD    Anesthesia Type: MAC, TIVA ASA Status: 4 - Emergent            Anesthesia Type: No value filed.    Birdie Phase I: Birdie Score: 9    Birdie Phase II:      Anesthesia Post Evaluation    Patient location during evaluation: PACU  Patient participation: complete - patient participated  Level of consciousness: awake  Pain score: 0  Airway patency: patent  Nausea & Vomiting: no nausea and no vomiting  Cardiovascular status: hemodynamically stable  Respiratory status: acceptable  Hydration status: stable  Multimodal analgesia pain management approach    No notable events documented.

## 2024-01-07 NOTE — CARE COORDINATION
Initial assessment not completed.  Pt unable to fully participate with her discharge planning this date.  CM to follow up on [id number]

## 2024-01-07 NOTE — OP NOTE
This is operative report on Laury Schuster, patient's YOB: 1960.    Date of procedure: January 7, 2024.    Diagnosis:  Right foot ischemia.  2.    Previous right common femoral artery below-knee popliteal bypass with graft.    Postprocedure diagnosis:  1.  Right common femoral artery to below-knee popliteal artery bypass graft thrombosis.  2.  Angiogram below knee area shows complete occlusion of the TP trunk, anterior tibial artery and patent mid to distal posterior tibial artery    Procedure:  1.  Right common femoral artery to below-knee popliteal bypass graft exposure at mid thigh level.  2.  Right common femoral artery to below-knee popliteal artery bypass graft thrombectomy using 3 Uzbek, 4 Uzbek and 5 Uzbek Grace catheter.  3.  Right below-knee angiogram supervision interpretation.  4.  Attempted recanalization of the left below-knee TP trunk and proximal posterior tibial artery.    Anesthesia: General with LMA.  Anesthesiologist: Dr. Burrows.  EBL: 400 cc.  Complication: None.  Total floor time: Please see or record  Total contrast used: Please see the OR record.    Indication for procedure: Ms. Schuster had a right common femoral artery to below-knee popliteal bypass graft with 6 mm PTFE graft for 3 months ago.  Patient was in the hospital with severe pain on the right foot.  Findings consistent with a graft occlusion.  Patient was discussed with revascularization procedure including try to salvage the graft.  A few options discussed about to revascularize right leg.  Including graft thrombectomy with without tPA infusion therapy.  Patient was discussed about surgery details and surgical risks benefits explained.  Patient has signed surgical consent.    Discussion of procedure: Patient was brought to the operating table.  Followed by comfortably supine position.  Followed by LMA and general anesthesia was initiated..  Patient's right leg groin and left groin was prepped using

## 2024-01-08 LAB
ALBUMIN SERPL-MCNC: 3.1 G/DL (ref 3.5–5)
ALBUMIN/GLOB SERPL: 0.9 (ref 1.1–2.2)
ALP SERPL-CCNC: 59 U/L (ref 45–117)
ALT SERPL-CCNC: 36 U/L (ref 12–78)
ANION GAP SERPL CALC-SCNC: 6 MMOL/L (ref 5–15)
AST SERPL W P-5'-P-CCNC: 226 U/L (ref 15–37)
BASOPHILS # BLD: 0 K/UL (ref 0–0.1)
BASOPHILS NFR BLD: 0 % (ref 0–1)
BILIRUB SERPL-MCNC: 0.3 MG/DL (ref 0.2–1)
BUN SERPL-MCNC: 8 MG/DL (ref 6–20)
BUN/CREAT SERPL: 12 (ref 12–20)
CA-I BLD-MCNC: 8.4 MG/DL (ref 8.5–10.1)
CHLORIDE SERPL-SCNC: 109 MMOL/L (ref 97–108)
CO2 SERPL-SCNC: 23 MMOL/L (ref 21–32)
CREAT SERPL-MCNC: 0.68 MG/DL (ref 0.55–1.02)
DIFFERENTIAL METHOD BLD: ABNORMAL
EOSINOPHIL # BLD: 0 K/UL (ref 0–0.4)
EOSINOPHIL NFR BLD: 0 % (ref 0–7)
ERYTHROCYTE [DISTWIDTH] IN BLOOD BY AUTOMATED COUNT: 18.1 % (ref 11.5–14.5)
GLOBULIN SER CALC-MCNC: 3.3 G/DL (ref 2–4)
GLUCOSE BLD STRIP.AUTO-MCNC: 121 MG/DL (ref 65–100)
GLUCOSE BLD STRIP.AUTO-MCNC: 145 MG/DL (ref 65–100)
GLUCOSE BLD STRIP.AUTO-MCNC: 260 MG/DL (ref 65–100)
GLUCOSE BLD STRIP.AUTO-MCNC: 272 MG/DL (ref 65–100)
GLUCOSE SERPL-MCNC: 116 MG/DL (ref 65–100)
HCT VFR BLD AUTO: 27.9 % (ref 35–47)
HGB BLD-MCNC: 8.5 G/DL (ref 11.5–16)
IMM GRANULOCYTES # BLD AUTO: 0 K/UL (ref 0–0.04)
IMM GRANULOCYTES NFR BLD AUTO: 0 % (ref 0–0.5)
LYMPHOCYTES # BLD: 3.9 K/UL (ref 0.8–3.5)
LYMPHOCYTES NFR BLD: 34 % (ref 12–49)
MCH RBC QN AUTO: 24.2 PG (ref 26–34)
MCHC RBC AUTO-ENTMCNC: 30.5 G/DL (ref 30–36.5)
MCV RBC AUTO: 79.5 FL (ref 80–99)
MONOCYTES # BLD: 1.3 K/UL (ref 0–1)
MONOCYTES NFR BLD: 11 % (ref 5–13)
NEUTS SEG # BLD: 6.2 K/UL (ref 1.8–8)
NEUTS SEG NFR BLD: 55 % (ref 32–75)
NRBC # BLD: 0 K/UL (ref 0–0.01)
NRBC BLD-RTO: 0 PER 100 WBC
PERFORMED BY:: ABNORMAL
PLATELET # BLD AUTO: 318 K/UL (ref 150–400)
PMV BLD AUTO: 10.8 FL (ref 8.9–12.9)
POTASSIUM SERPL-SCNC: 3.8 MMOL/L (ref 3.5–5.1)
PROT SERPL-MCNC: 6.4 G/DL (ref 6.4–8.2)
RBC # BLD AUTO: 3.51 M/UL (ref 3.8–5.2)
SODIUM SERPL-SCNC: 138 MMOL/L (ref 136–145)
UFH PPP CHRO-ACNC: 0.16 IU/ML
UFH PPP CHRO-ACNC: 0.42 IU/ML
UFH PPP CHRO-ACNC: <0.1 IU/ML
WBC # BLD AUTO: 11.4 K/UL (ref 3.6–11)

## 2024-01-08 PROCEDURE — 6360000002 HC RX W HCPCS: Performed by: SURGERY

## 2024-01-08 PROCEDURE — 1100000000 HC RM PRIVATE

## 2024-01-08 PROCEDURE — 2580000003 HC RX 258: Performed by: SURGERY

## 2024-01-08 PROCEDURE — 82962 GLUCOSE BLOOD TEST: CPT

## 2024-01-08 PROCEDURE — 85520 HEPARIN ASSAY: CPT

## 2024-01-08 PROCEDURE — 99024 POSTOP FOLLOW-UP VISIT: CPT | Performed by: SURGERY

## 2024-01-08 PROCEDURE — 94640 AIRWAY INHALATION TREATMENT: CPT

## 2024-01-08 PROCEDURE — 2500000003 HC RX 250 WO HCPCS: Performed by: SURGERY

## 2024-01-08 PROCEDURE — 6370000000 HC RX 637 (ALT 250 FOR IP): Performed by: SURGERY

## 2024-01-08 PROCEDURE — 36415 COLL VENOUS BLD VENIPUNCTURE: CPT

## 2024-01-08 PROCEDURE — 80053 COMPREHEN METABOLIC PANEL: CPT

## 2024-01-08 PROCEDURE — 94761 N-INVAS EAR/PLS OXIMETRY MLT: CPT

## 2024-01-08 PROCEDURE — 85025 COMPLETE CBC W/AUTO DIFF WBC: CPT

## 2024-01-08 RX ORDER — CLINDAMYCIN PHOSPHATE 600 MG/50ML
600 INJECTION, SOLUTION INTRAVENOUS EVERY 8 HOURS
Status: DISCONTINUED | OUTPATIENT
Start: 2024-01-08 | End: 2024-01-09

## 2024-01-08 RX ORDER — CLINDAMYCIN PHOSPHATE 600 MG/50ML
600 INJECTION, SOLUTION INTRAVENOUS EVERY 8 HOURS
Status: DISCONTINUED | OUTPATIENT
Start: 2024-01-08 | End: 2024-01-08

## 2024-01-08 RX ADMIN — CLINDAMYCIN PHOSPHATE 600 MG: 600 INJECTION, SOLUTION INTRAVENOUS at 11:40

## 2024-01-08 RX ADMIN — HYDROMORPHONE HYDROCHLORIDE 1 MG: 1 INJECTION, SOLUTION INTRAMUSCULAR; INTRAVENOUS; SUBCUTANEOUS at 03:49

## 2024-01-08 RX ADMIN — CLINDAMYCIN PHOSPHATE 600 MG: 600 INJECTION, SOLUTION INTRAVENOUS at 20:50

## 2024-01-08 RX ADMIN — HEPARIN SODIUM 3430 UNITS: 1000 INJECTION INTRAVENOUS; SUBCUTANEOUS at 03:41

## 2024-01-08 RX ADMIN — HYDROMORPHONE HYDROCHLORIDE 1 MG: 1 INJECTION, SOLUTION INTRAMUSCULAR; INTRAVENOUS; SUBCUTANEOUS at 22:17

## 2024-01-08 RX ADMIN — SODIUM CHLORIDE, PRESERVATIVE FREE 10 ML: 5 INJECTION INTRAVENOUS at 09:23

## 2024-01-08 RX ADMIN — LISINOPRIL 20 MG: 20 TABLET ORAL at 09:22

## 2024-01-08 RX ADMIN — HEPARIN SODIUM 1720 UNITS: 1000 INJECTION INTRAVENOUS; SUBCUTANEOUS at 22:11

## 2024-01-08 RX ADMIN — Medication 2 PUFF: at 20:39

## 2024-01-08 RX ADMIN — ATORVASTATIN CALCIUM 80 MG: 40 TABLET, FILM COATED ORAL at 09:22

## 2024-01-08 RX ADMIN — HEPARIN SODIUM 16 UNITS/KG/HR: 10000 INJECTION, SOLUTION INTRAVENOUS at 14:11

## 2024-01-08 RX ADMIN — HYDROMORPHONE HYDROCHLORIDE 1 MG: 1 INJECTION, SOLUTION INTRAMUSCULAR; INTRAVENOUS; SUBCUTANEOUS at 17:41

## 2024-01-08 RX ADMIN — HYDROMORPHONE HYDROCHLORIDE 1 MG: 1 INJECTION, SOLUTION INTRAMUSCULAR; INTRAVENOUS; SUBCUTANEOUS at 10:55

## 2024-01-08 RX ADMIN — Medication 2 PUFF: at 09:21

## 2024-01-08 RX ADMIN — PANTOPRAZOLE SODIUM 40 MG: 40 TABLET, DELAYED RELEASE ORAL at 09:22

## 2024-01-08 RX ADMIN — INSULIN LISPRO 2 UNITS: 100 INJECTION, SOLUTION INTRAVENOUS; SUBCUTANEOUS at 13:24

## 2024-01-08 RX ADMIN — SODIUM CHLORIDE: 9 INJECTION, SOLUTION INTRAVENOUS at 09:23

## 2024-01-08 RX ADMIN — HYDROMORPHONE HYDROCHLORIDE 1 MG: 1 INJECTION, SOLUTION INTRAMUSCULAR; INTRAVENOUS; SUBCUTANEOUS at 14:14

## 2024-01-08 ASSESSMENT — PAIN DESCRIPTION - LOCATION
LOCATION: LEG

## 2024-01-08 ASSESSMENT — PAIN SCALES - GENERAL
PAINLEVEL_OUTOF10: 9
PAINLEVEL_OUTOF10: 5
PAINLEVEL_OUTOF10: 7
PAINLEVEL_OUTOF10: 10
PAINLEVEL_OUTOF10: 7
PAINLEVEL_OUTOF10: 8

## 2024-01-08 ASSESSMENT — PAIN DESCRIPTION - ORIENTATION
ORIENTATION: RIGHT

## 2024-01-08 ASSESSMENT — PAIN DESCRIPTION - DESCRIPTORS
DESCRIPTORS: THROBBING
DESCRIPTORS: BURNING
DESCRIPTORS: ACHING;SHARP;STABBING
DESCRIPTORS: BURNING

## 2024-01-08 ASSESSMENT — PAIN - FUNCTIONAL ASSESSMENT: PAIN_FUNCTIONAL_ASSESSMENT: ACTIVITIES ARE NOT PREVENTED

## 2024-01-08 NOTE — CARE COORDINATION
01/08/24 1337   Service Assessment   Patient Orientation Alert and Oriented   Cognition Alert   History Provided By Patient   Primary Caregiver Self   Accompanied By/Relationship Patient alone in room.   Support Systems Parent   Patient's Healthcare Decision Maker is: Patient Declined (Legal Next of Kin Remains as Decision Maker)   PCP Verified by CM Yes   Last Visit to PCP Within last 3 months  (Seen by Dr. Escudero 2 months ago.)   Prior Functional Level Independent in ADLs/IADLs   Current Functional Level Independent in ADLs/IADLs   Can patient return to prior living arrangement Yes   Ability to make needs known: Good   Family able to assist with home care needs: Yes   Would you like for me to discuss the discharge plan with any other family members/significant others, and if so, who? No   Financial Resources Medicaid   Community Resources None   Social/Functional History   Lives With Family;Son   Type of Home Trailer   Home Layout One level   Home Access Stairs to enter with rails;Ramped entrance   Entrance Stairs - Number of Steps 6   Home Equipment Cane;Walker, rolling   Active  Yes   Discharge Planning   Current Services Prior To Admission Durable Medical Equipment   Current DME Prior to Arrival Cane;Walker   Services At/After Discharge   Mode of Transport at Discharge Other (see comment)  (Patient's son)   Confirm Follow Up Transport Family       Patient confirmed demographics on chart. Patient lives with her mother and son in a trailer accessible by six steps and a ramp. She ambulates with a cane or walker and is an active . She had home health services in the past, and is open to receiving them again, if recommended.  Patient plans to return home at discharge; her son will pick her up.     Advance Care Planning     General Advance Care Planning (ACP) Conversation    Date of Conversation: 1/6/2024  Conducted with: Patient with Decision Making Capacity    Healthcare Decision Maker:    Primary

## 2024-01-09 VITALS
OXYGEN SATURATION: 100 % | HEART RATE: 82 BPM | BODY MASS INDEX: 24.97 KG/M2 | DIASTOLIC BLOOD PRESSURE: 63 MMHG | RESPIRATION RATE: 18 BRPM | TEMPERATURE: 98.1 F | SYSTOLIC BLOOD PRESSURE: 124 MMHG | HEIGHT: 61 IN | WEIGHT: 132.28 LBS

## 2024-01-09 DIAGNOSIS — M79.606 PAIN OF LOWER EXTREMITY, UNSPECIFIED LATERALITY: Primary | ICD-10-CM

## 2024-01-09 LAB
ALBUMIN SERPL-MCNC: 2.9 G/DL (ref 3.5–5)
ALBUMIN/GLOB SERPL: 0.8 (ref 1.1–2.2)
ALP SERPL-CCNC: 56 U/L (ref 45–117)
ALT SERPL-CCNC: 38 U/L (ref 12–78)
ANION GAP SERPL CALC-SCNC: 5 MMOL/L (ref 5–15)
AST SERPL W P-5'-P-CCNC: 218 U/L (ref 15–37)
BASOPHILS # BLD: 0.1 K/UL (ref 0–0.1)
BASOPHILS NFR BLD: 1 % (ref 0–1)
BILIRUB SERPL-MCNC: 0.2 MG/DL (ref 0.2–1)
BUN SERPL-MCNC: 9 MG/DL (ref 6–20)
BUN/CREAT SERPL: 12 (ref 12–20)
CA-I BLD-MCNC: 8.3 MG/DL (ref 8.5–10.1)
CHLORIDE SERPL-SCNC: 110 MMOL/L (ref 97–108)
CK SERPL-CCNC: 5261 U/L (ref 26–192)
CO2 SERPL-SCNC: 25 MMOL/L (ref 21–32)
CREAT SERPL-MCNC: 0.75 MG/DL (ref 0.55–1.02)
DIFFERENTIAL METHOD BLD: ABNORMAL
EOSINOPHIL # BLD: 0.1 K/UL (ref 0–0.4)
EOSINOPHIL NFR BLD: 1 % (ref 0–7)
ERYTHROCYTE [DISTWIDTH] IN BLOOD BY AUTOMATED COUNT: 18.2 % (ref 11.5–14.5)
GLOBULIN SER CALC-MCNC: 3.5 G/DL (ref 2–4)
GLUCOSE BLD STRIP.AUTO-MCNC: 173 MG/DL (ref 65–100)
GLUCOSE BLD STRIP.AUTO-MCNC: 175 MG/DL (ref 65–100)
GLUCOSE SERPL-MCNC: 138 MG/DL (ref 65–100)
HCT VFR BLD AUTO: 26.9 % (ref 35–47)
HGB BLD-MCNC: 8 G/DL (ref 11.5–16)
IMM GRANULOCYTES # BLD AUTO: 0 K/UL (ref 0–0.04)
IMM GRANULOCYTES NFR BLD AUTO: 0 % (ref 0–0.5)
LYMPHOCYTES # BLD: 3.5 K/UL (ref 0.8–3.5)
LYMPHOCYTES NFR BLD: 41 % (ref 12–49)
MCH RBC QN AUTO: 24 PG (ref 26–34)
MCHC RBC AUTO-ENTMCNC: 29.7 G/DL (ref 30–36.5)
MCV RBC AUTO: 80.5 FL (ref 80–99)
MONOCYTES # BLD: 0.9 K/UL (ref 0–1)
MONOCYTES NFR BLD: 11 % (ref 5–13)
NEUTS SEG # BLD: 3.9 K/UL (ref 1.8–8)
NEUTS SEG NFR BLD: 46 % (ref 32–75)
NRBC # BLD: 0 K/UL (ref 0–0.01)
NRBC BLD-RTO: 0 PER 100 WBC
PERFORMED BY:: ABNORMAL
PERFORMED BY:: ABNORMAL
PLATELET # BLD AUTO: 293 K/UL (ref 150–400)
PMV BLD AUTO: 11.4 FL (ref 8.9–12.9)
POTASSIUM SERPL-SCNC: 4 MMOL/L (ref 3.5–5.1)
PROT SERPL-MCNC: 6.4 G/DL (ref 6.4–8.2)
RBC # BLD AUTO: 3.34 M/UL (ref 3.8–5.2)
SODIUM SERPL-SCNC: 140 MMOL/L (ref 136–145)
UFH PPP CHRO-ACNC: 0.27 IU/ML
WBC # BLD AUTO: 8.4 K/UL (ref 3.6–11)

## 2024-01-09 PROCEDURE — 85520 HEPARIN ASSAY: CPT

## 2024-01-09 PROCEDURE — 94640 AIRWAY INHALATION TREATMENT: CPT

## 2024-01-09 PROCEDURE — 82962 GLUCOSE BLOOD TEST: CPT

## 2024-01-09 PROCEDURE — 82550 ASSAY OF CK (CPK): CPT

## 2024-01-09 PROCEDURE — 85025 COMPLETE CBC W/AUTO DIFF WBC: CPT

## 2024-01-09 PROCEDURE — 6360000002 HC RX W HCPCS: Performed by: SURGERY

## 2024-01-09 PROCEDURE — 2580000003 HC RX 258: Performed by: SURGERY

## 2024-01-09 PROCEDURE — 6370000000 HC RX 637 (ALT 250 FOR IP): Performed by: SURGERY

## 2024-01-09 PROCEDURE — 36415 COLL VENOUS BLD VENIPUNCTURE: CPT

## 2024-01-09 PROCEDURE — 80053 COMPREHEN METABOLIC PANEL: CPT

## 2024-01-09 PROCEDURE — 2500000003 HC RX 250 WO HCPCS: Performed by: SURGERY

## 2024-01-09 RX ORDER — OXYCODONE HYDROCHLORIDE AND ACETAMINOPHEN 5; 325 MG/1; MG/1
1 TABLET ORAL EVERY 6 HOURS PRN
Qty: 20 TABLET | Refills: 0 | Status: SHIPPED | OUTPATIENT
Start: 2024-01-09 | End: 2024-01-14

## 2024-01-09 RX ORDER — CLINDAMYCIN HYDROCHLORIDE 150 MG/1
300 CAPSULE ORAL EVERY 6 HOURS SCHEDULED
Status: DISCONTINUED | OUTPATIENT
Start: 2024-01-09 | End: 2024-01-09 | Stop reason: HOSPADM

## 2024-01-09 RX ADMIN — CLINDAMYCIN PHOSPHATE 600 MG: 600 INJECTION, SOLUTION INTRAVENOUS at 04:20

## 2024-01-09 RX ADMIN — SODIUM CHLORIDE, PRESERVATIVE FREE 10 ML: 5 INJECTION INTRAVENOUS at 08:12

## 2024-01-09 RX ADMIN — CLINDAMYCIN HYDROCHLORIDE 300 MG: 150 CAPSULE ORAL at 06:23

## 2024-01-09 RX ADMIN — Medication 2 PUFF: at 08:30

## 2024-01-09 RX ADMIN — CLINDAMYCIN HYDROCHLORIDE 300 MG: 150 CAPSULE ORAL at 12:32

## 2024-01-09 RX ADMIN — HYDROMORPHONE HYDROCHLORIDE 1 MG: 1 INJECTION, SOLUTION INTRAMUSCULAR; INTRAVENOUS; SUBCUTANEOUS at 06:23

## 2024-01-09 RX ADMIN — ATORVASTATIN CALCIUM 80 MG: 40 TABLET, FILM COATED ORAL at 08:11

## 2024-01-09 RX ADMIN — PANTOPRAZOLE SODIUM 40 MG: 40 TABLET, DELAYED RELEASE ORAL at 08:11

## 2024-01-09 RX ADMIN — HEPARIN SODIUM 1720 UNITS: 1000 INJECTION INTRAVENOUS; SUBCUTANEOUS at 07:19

## 2024-01-09 RX ADMIN — LISINOPRIL 20 MG: 20 TABLET ORAL at 08:11

## 2024-01-09 RX ADMIN — APIXABAN 10 MG: 5 TABLET, FILM COATED ORAL at 10:43

## 2024-01-09 NOTE — DISCHARGE INSTRUCTIONS
Take Eliquis 10 mg p.o. twice daily for next 7 days then transition to 5 mg p.o. twice daily.    Change dressing on the right thigh in 2 days and cover with a Band-Aid.    Finish oral antibiotics    Keep right foot elevated with 3 pillows all the time.

## 2024-01-09 NOTE — CARE COORDINATION
Patient discharging home today,self care. Family to .    Transition of Care Plan:    RUR: 17%  Prior Level of Functioning: ind  Disposition: home  If SNF or IPR: Date FOC offered: na  Date FOC received: na  Accepting facility: na  Date authorization started with reference number: na  Date authorization received and expires: na  Follow up appointments:   DME needed: na  Transportation at discharge: family  IM/IMM Medicare/ letter given: na  Is patient a [place] and connected with [facility]? na  If yes, was [place] transfer form completed and VA notified? na  Caregiver Contact: na  Discharge Caregiver contacted prior to discharge? na  Care Conference needed? na  Barriers to discharge: na

## 2024-01-09 NOTE — PROGRESS NOTES
4 Eyes Skin Assessment     NAME:  Laury Schuster  YOB: 1960  MEDICAL RECORD NUMBER:  [id number]    The patient is being assessed for  Post-Op Surgical    I agree that at least one RN has performed a thorough Head to Toe Skin Assessment on the patient. ALL assessment sites listed below have been assessed.      Areas assessed by both nurses:    Head, Face, Ears, Shoulders, Back, Chest, Arms, Elbows, Hands, Sacrum. Buttock, Coccyx, Ischium, Legs. Feet and Heels, Under Medical Devices , and Other          Does the Patient have a Wound? No noted wound(s)       Alexx Prevention initiated by RN: Yes  Wound Care Orders initiated by RN: No    Pressure Injury (Stage 3,4, Unstageable, DTI, NWPT, and Complex wounds) if present, place Wound referral order by RN under : No    New Ostomies, if present place, Ostomy referral order under : No     Nurse 1 eSignature: Electronically signed by Sarah Prescott RN on 1/7/24 at 2:57 PM EST    **SHARE this note so that the co-signing nurse can place an eSignature**    Nurse 2 eSignature: Electronically signed by Trung Mitchell RN on 1/7/24 at 4:35 PM EST   
4 Eyes Skin Assessment     NAME:  Laury Schuster  YOB: 1960  MEDICAL RECORD NUMBER:  [id number]    The patient is being assessed for  Admission    I agree that at least one RN has performed a thorough Head to Toe Skin Assessment on the patient. ALL assessment sites listed below have been assessed.      Areas assessed by both nurses:    Head, Face, Ears, Shoulders, Back, Chest, Arms, Elbows, Hands, Sacrum. Buttock, Coccyx, Ischium, Legs. Feet and Heels, and Under Medical Devices         Does the Patient have a Wound? No noted wound(s)       Alexx Prevention initiated by RN: No  Wound Care Orders initiated by RN: No    Pressure Injury (Stage 3,4, Unstageable, DTI, NWPT, and Complex wounds) if present, place Wound referral order by RN under : No    New Ostomies, if present place, Ostomy referral order under : No     Nurse 1 eSignature: Electronically signed by Josesito Calvert RN on 1/6/24 at 5:14 PM EST    **SHARE this note so that the co-signing nurse can place an eSignature**    Nurse 2 eSignature: Electronically signed by Paulette Strickland RN on 1/6/24 at 5:15 PM EST   
Awaiting lab hematology of Anti Xa,  states the result should be ready in the next 10 to 15 mins.   
Patient is having her warm blankets to right lower limb q 2 hourly  
Patient off unit to OR for surgery. Awaiting return.  
Patient returned from PACU following procedure. Report given by Gracy CORDOVA.   
Pt is a&ox4. Vs are stable. Discharge paperwork was given and went over with. Pt has no questions.pt has all belonings   
Received critical PTT level of >153.0 from lab personnel. Current Heparin drip protocol states to titrate based off of Heparin Xa. Called Dr. Cantu regarding result and stated we would titrate based off of the Xa. Completed as ordered. No new orders at this time. Repeat 6 hour Xa level scheduled for 2200.   
Received verbal read back order from Dr. Cantu. Start patient Heparin per intial protocol.   
Spoke with Dr. Cantu r/t patient being unable to obtain a second IV line for IV ABT Tx and IV pain med due to patient being on heparin. Dr. Cantu stated he will Change IV ABT Tx to PO ABT Tx. And Change IV pain med to PO Pain medication.  
Writer received telephone readback order from Dr. Cantu stating to stop patient Heparin Drip r/t upcoming surgery. Also, writer spoke with Dr. Burrows r/t patient lisinopril order and Dr. Burrows stated to hold Lisinopril for the a.m today due to procedure.  
release tablet 40 mEq  40 mEq Oral PRN Marko Cantu MD        Or    potassium bicarb-citric acid (EFFER-K) effervescent tablet 40 mEq  40 mEq Oral PRN Marko Cantu MD        Or    potassium chloride 10 mEq/100 mL IVPB (Peripheral Line)  10 mEq IntraVENous PRN Marko Cantu MD        magnesium sulfate 2000 mg in 50 mL IVPB premix  2,000 mg IntraVENous PRN Marko Cantu MD        polyethylene glycol (GLYCOLAX) packet 17 g  17 g Oral Daily PRN Marko Cantu MD        acetaminophen (TYLENOL) tablet 650 mg  650 mg Oral Q6H PRN Marko Cantu MD   650 mg at 01/06/24 1538    Or    acetaminophen (TYLENOL) suppository 650 mg  650 mg Rectal Q6H PRN Marko Cantu MD        0.9 % sodium chloride infusion   IntraVENous Continuous Marko Cantu MD 75 mL/hr at 01/07/24 2330 New Bag at 01/07/24 2330    albuterol sulfate HFA (PROVENTIL;VENTOLIN;PROAIR) 108 (90 Base) MCG/ACT inhaler 2 puff  2 puff Inhalation Q4H PRN Marko Cantu MD        atorvastatin (LIPITOR) tablet 80 mg  80 mg Oral Daily Marko Cantu MD   80 mg at 01/07/24 0843    lisinopril (PRINIVIL;ZESTRIL) tablet 20 mg  20 mg Oral Daily Marko Cantu MD   20 mg at 01/06/24 1537    pantoprazole (PROTONIX) tablet 40 mg  40 mg Oral Daily Marko Cantu MD   40 mg at 01/07/24 0843    insulin lispro (HUMALOG) injection vial 0-4 Units  0-4 Units SubCUTAneous TID  Marko Cantu MD   1 Units at 01/07/24 1648    insulin lispro (HUMALOG) injection vial 0-4 Units  0-4 Units SubCUTAneous Nightly Marko Cantu MD        heparin (porcine) injection 3,430 Units  60 Units/kg IntraVENous PRN Marko Cantu MD   3,430 Units at 01/08/24 0341    heparin (porcine) injection 1,720 Units  30 Units/kg IntraVENous PRN Marko Cantu MD        heparin 25,000 units in dextrose 5% 250 mL (premix) infusion  5-30 Units/kg/hr IntraVENous Continuous Pepe Marko Leyva MD 9.2 mL/hr at 01/08/24 0345 16 Units/kg/hr at 01/08/24 0345    budesonide-formoterol (SYMBICORT) 160-4.5

## 2024-01-09 NOTE — PLAN OF CARE
Problem: Discharge Planning  Goal: Discharge to home or other facility with appropriate resources  Outcome: Progressing  Flowsheets (Taken 1/8/2024 2050 by Karmen Hernández, RN)  Discharge to home or other facility with appropriate resources: Identify barriers to discharge with patient and caregiver     Problem: Pain  Goal: Verbalizes/displays adequate comfort level or baseline comfort level  Outcome: Progressing  Flowsheets (Taken 1/8/2024 2050 by Karmen Hernández, RN)  Verbalizes/displays adequate comfort level or baseline comfort level:   Encourage patient to monitor pain and request assistance   Assess pain using appropriate pain scale   Administer analgesics based on type and severity of pain and evaluate response   Implement non-pharmacological measures as appropriate and evaluate response   Consider cultural and social influences on pain and pain management     Problem: Safety - Adult  Goal: Free from fall injury  Outcome: Progressing     Problem: ABCDS Injury Assessment  Goal: Absence of physical injury  Outcome: Progressing     Problem: Chronic Conditions and Co-morbidities  Goal: Patient's chronic conditions and co-morbidity symptoms are monitored and maintained or improved  Outcome: Progressing  Flowsheets (Taken 1/8/2024 2050 by Karmen Hernández, RN)  Care Plan - Patient's Chronic Conditions and Co-Morbidity Symptoms are Monitored and Maintained or Improved:   Monitor and assess patient's chronic conditions and comorbid symptoms for stability, deterioration, or improvement   Collaborate with multidisciplinary team to address chronic and comorbid conditions and prevent exacerbation or deterioration   Update acute care plan with appropriate goals if chronic or comorbid symptoms are exacerbated and prevent overall improvement and discharge

## 2024-01-11 LAB
ABO + RH BLD: NORMAL
ANTIGENS PRESENT RBC DONR: NORMAL
ANTIGENS PRESENT RBC DONR: NORMAL
BLD PROD TYP BPU: NORMAL
BLD PROD TYP BPU: NORMAL
BLOOD BANK CMNT PATIENT-IMP: NORMAL
BLOOD BANK DISPENSE STATUS: NORMAL
BLOOD BANK DISPENSE STATUS: NORMAL
BLOOD GROUP ANTIBODIES SERPL: NORMAL
BLOOD GROUP ANTIBODIES SERPL: POSITIVE
BPU ID: NORMAL
BPU ID: NORMAL
CROSSMATCH RESULT: NORMAL
CROSSMATCH RESULT: NORMAL
SPECIMEN EXP DATE BLD: NORMAL
TRANSFUSION STATUS PATIENT QL: NORMAL
TRANSFUSION STATUS PATIENT QL: NORMAL
UNIT DIVISION: 0
UNIT DIVISION: 0

## 2024-01-22 ENCOUNTER — OFFICE VISIT (OUTPATIENT)
Age: 64
End: 2024-01-22
Payer: MEDICAID

## 2024-01-22 VITALS
HEIGHT: 61 IN | SYSTOLIC BLOOD PRESSURE: 128 MMHG | OXYGEN SATURATION: 98 % | TEMPERATURE: 98.1 F | DIASTOLIC BLOOD PRESSURE: 77 MMHG | BODY MASS INDEX: 24.17 KG/M2 | HEART RATE: 90 BPM | WEIGHT: 128 LBS | RESPIRATION RATE: 18 BRPM

## 2024-01-22 DIAGNOSIS — M79.605 PAIN OF LEFT LOWER EXTREMITY: Primary | ICD-10-CM

## 2024-01-22 DIAGNOSIS — T82.898D OCCLUSION OF FEMOROPOPLITEAL BYPASS GRAFT, SUBSEQUENT ENCOUNTER: ICD-10-CM

## 2024-01-22 PROCEDURE — 99212 OFFICE O/P EST SF 10 MIN: CPT | Performed by: SURGERY

## 2024-01-22 RX ORDER — OXYCODONE HYDROCHLORIDE AND ACETAMINOPHEN 5; 325 MG/1; MG/1
1 TABLET ORAL EVERY 6 HOURS PRN
Qty: 20 TABLET | Refills: 0 | Status: SHIPPED | OUTPATIENT
Start: 2024-01-22 | End: 2024-01-27

## 2024-01-22 ASSESSMENT — PATIENT HEALTH QUESTIONNAIRE - PHQ9
2. FEELING DOWN, DEPRESSED OR HOPELESS: 0
SUM OF ALL RESPONSES TO PHQ QUESTIONS 1-9: 0
SUM OF ALL RESPONSES TO PHQ9 QUESTIONS 1 & 2: 0
1. LITTLE INTEREST OR PLEASURE IN DOING THINGS: 0
SUM OF ALL RESPONSES TO PHQ QUESTIONS 1-9: 0

## 2024-01-27 PROBLEM — M79.605 PAIN OF LEFT LOWER EXTREMITY: Status: ACTIVE | Noted: 2024-01-27

## 2024-01-27 NOTE — PROGRESS NOTES
Identified pt with two pt identifiers(name and ). Reviewed record in preparation for visit and have obtained necessary documentation. All patient medications has been reviewed.    Chief Complaint   Patient presents with    Post-Op Check     S/P Right leg graft Thrombectomy and Angiogram       Health Maintenance Due   Topic    Pneumococcal 0-64 years Vaccine (1 - PCV)    HIV screen     Diabetic Alb to Cr ratio (uACR) test     Hepatitis C screen     DTaP/Tdap/Td vaccine (1 - Tdap)    Cervical cancer screen     Colorectal Cancer Screen     Breast cancer screen     Shingles vaccine (1 of 2)    Low dose CT lung screening &/or counseling     Respiratory Syncytial Virus (RSV) Pregnant or age 60 yrs+ (1 - 1-dose 60+ series)    Lipids     Flu vaccine (1)    COVID-19 Vaccine (3 - 2023-24 season)       Vitals:    24 1347   BP: 128/77   Site: Right Upper Arm   Position: Sitting   Pulse: 90   Resp: 18   Temp: 98.1 °F (36.7 °C)   TempSrc: Temporal   SpO2: 98%   Weight: 58.1 kg (128 lb)   Height: 1.549 m (5' 1\")         4.Have you been to the ER, urgent care clinic since your last visit?  Hospitalized since your last visit? 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 Have you seen or consulted any other health care providers outside of the [facility] since your last visit?  Include any pap smears or colon screening. No      Patient is accompanied by  friend   I have received verbal consent from Laury Schuster to discuss any/all medical information while they are present in the room.   
every 6 hours as needed for Pain for up to 5 days. Intended supply: 5 days. Take lowest dose possible to manage pain Max Daily Amount: 4 tablets 20 tablet 0    apixaban (ELIQUIS) 5 MG TABS tablet Take 1 tablet by mouth 2 times daily 180 tablet 1    apixaban (ELIQUIS) 5 MG TABS tablet Take 1 tablet by mouth 2 times daily 180 tablet 1    albuterol sulfate HFA (PROVENTIL;VENTOLIN;PROAIR) 108 (90 Base) MCG/ACT inhaler Inhale 2 puffs into the lungs every 4 hours as needed      fluticasone-salmeterol (ADVAIR) 250-50 MCG/ACT AEPB diskus inhaler Inhale 1 puff into the lungs in the morning and 1 puff in the evening.      acetaminophen (TYLENOL) 325 MG tablet Take by mouth every 4 hours as needed      aspirin 325 MG EC tablet Take 1 tablet by mouth daily      atorvastatin (LIPITOR) 80 MG tablet Take 1 tablet by mouth daily      diphenhydrAMINE (BENADRYL) 25 MG capsule Take 1 capsule by mouth every 6 hours as needed      glipiZIDE (GLUCOTROL XL) 10 MG extended release tablet Take 1 tablet by mouth daily      lisinopril (PRINIVIL;ZESTRIL) 20 MG tablet Take by mouth daily      pantoprazole (PROTONIX) 40 MG tablet Take 1 tablet by mouth daily       No current facility-administered medications for this visit.       Review of Systems:  I reviewed the rest of organ systems personally and they are negative.  Pertinent findings discussed in HPI.    Physical Examination    /77 (Site: Right Upper Arm, Position: Sitting)   Pulse 90   Temp 98.1 °F (36.7 °C) (Temporal)   Resp 18   Ht 1.549 m (5' 1\")   Wt 58.1 kg (128 lb)   LMP  (LMP Unknown)   SpO2 98%   BMI 24.19 kg/m²   Gen: Well developed, well nourished 63 y.o. female in no acute distress  Head: normocephalic, atraumatic  Mouth: Clear, no overt lesions, oral mucosa pink and moist  Neck: supple, no masses, no adenopathy or carotid bruits, trachea midline  Resp: clear to auscultation bilaterally, no wheeze, rhonchi or rales, excursions normal and symmetrical  Cardio:

## 2024-02-02 ENCOUNTER — OFFICE VISIT (OUTPATIENT)
Age: 64
End: 2024-02-02
Payer: MEDICAID

## 2024-02-02 VITALS
TEMPERATURE: 97.6 F | DIASTOLIC BLOOD PRESSURE: 86 MMHG | HEART RATE: 86 BPM | OXYGEN SATURATION: 96 % | WEIGHT: 130 LBS | SYSTOLIC BLOOD PRESSURE: 137 MMHG | BODY MASS INDEX: 24.55 KG/M2 | RESPIRATION RATE: 18 BRPM | HEIGHT: 61 IN

## 2024-02-02 DIAGNOSIS — T82.898A OCCLUSION OF FEMOROPOPLITEAL BYPASS GRAFT, INITIAL ENCOUNTER (HCC): Primary | ICD-10-CM

## 2024-02-02 PROCEDURE — 99212 OFFICE O/P EST SF 10 MIN: CPT | Performed by: SURGERY

## 2024-02-02 ASSESSMENT — PATIENT HEALTH QUESTIONNAIRE - PHQ9
SUM OF ALL RESPONSES TO PHQ9 QUESTIONS 1 & 2: 2
SUM OF ALL RESPONSES TO PHQ QUESTIONS 1-9: 2
SUM OF ALL RESPONSES TO PHQ QUESTIONS 1-9: 2
2. FEELING DOWN, DEPRESSED OR HOPELESS: 2
1. LITTLE INTEREST OR PLEASURE IN DOING THINGS: 0
SUM OF ALL RESPONSES TO PHQ QUESTIONS 1-9: 2
SUM OF ALL RESPONSES TO PHQ QUESTIONS 1-9: 2

## 2024-02-02 NOTE — PROGRESS NOTES
Patient identified with two identification factors, Name and Date of Birth.    Chief Complaint   Patient presents with    Follow-up     1 week follow-up graft       /86 (Site: Right Upper Arm, Position: Sitting, Cuff Size: Small Adult)   Pulse 86   Temp 97.6 °F (36.4 °C) (Oral)   Resp 18   Ht 1.549 m (5' 1\")   Wt 59 kg (130 lb)   LMP  (LMP Unknown)   SpO2 96%   BMI 24.56 kg/m²       1. \"Have you been to the ER, urgent care clinic since your last visit?  Hospitalized since your last visit?\" No     2. \"Have you seen or consulted any other health care providers outside of the [facility] since your last visit?\" No

## 2024-02-05 NOTE — PROGRESS NOTES
Vascular History and Physical    Patient: Laury Schuster  MRN: [id number]    YOB: 1960  Age: 63 y.o.  Sex: female     Chief Complaint:  Chief Complaint   Patient presents with    Follow-up     1 week follow-up graft       History of Present Illness: Laury Schuster is a 63 y.o. very pleasant woman is here today for surveillance vascular examination on the right leg.  Patient had revascularization with the graft salvage procedure on the right leg.  Patient currently on Eliquis therapy.  Patient scheduled to have distal pancreatectomy on March 4, 2024.    Social History:  Social Connections: Not on file       Past Medical History:  Past Medical History:   Diagnosis Date    Arthritis     Coagulation disorder (HCC)     possible , pt had surgery on leg and bled out    Diabetes (HCC) 2019    Hypertension     Left-sided weakness     Nodule of kidney     left kidney    Peripheral vascular disease (HCC)     Stroke (HCC)     recent stroke on 10/16/2021       Surgical History:  Past Surgical History:   Procedure Laterality Date    CATARACT REMOVAL  2019    COLONOSCOPY      FEMORAL BYPASS Right 10/18/2023    Right common femoral endarterectomy. 2.Right common femoral artery to below-knee popliteal bypass with 6 mm PTFE graft. performed by Marko Cantu MD at [facility] MAIN OR    INVASIVE VASCULAR Right 1/7/2024    RIGHT LEG GRAFT THROMBECTOMY AND ANGIOGRAM performed by Marko Cantu MD at [facility] MAIN OR    VASCULAR SURGERY         Allergies:  Allergies   Allergen Reactions    Adhesive Tape Rash    Penicillins Nausea And Vomiting    Codeine Other (See Comments)     Makes me \"loopy\"       Current Meds:  Current Outpatient Medications   Medication Sig Dispense Refill    apixaban (ELIQUIS) 5 MG TABS tablet Take 1 tablet by mouth 2 times daily for 300 doses 60 tablet 4    apixaban (ELIQUIS) 5 MG TABS tablet Take 1 tablet by mouth 2 times daily 180 tablet 1    apixaban (ELIQUIS) 5 MG TABS tablet Take 1 tablet by mouth 2

## 2024-02-27 ENCOUNTER — TELEPHONE (OUTPATIENT)
Age: 64
End: 2024-02-27

## 2024-02-27 NOTE — TELEPHONE ENCOUNTER
Spoke with david and she wanted to know if Pepe could write and sign a note stating he gave permission for the patient to come off of the aspirin and eliquis for her upcoming procedure, informed her that he was out of the office so that may take a while but I could send him her number so that he could call with a verbal order and she stated that was fin. Sent Pepe a perfect serve with the details and what number to call .

## 2024-02-27 NOTE — TELEPHONE ENCOUNTER
Natali MCCLAIN [phone] ETHAN at [facility] wants to speak to Dr. Pepe hayes about asprin and eliquis. Please give her a call back.

## 2024-02-28 ENCOUNTER — TELEPHONE (OUTPATIENT)
Age: 64
End: 2024-02-28

## 2024-02-28 NOTE — TELEPHONE ENCOUNTER
Called ETHAN and left Natali a voice message informing her that Pepe tried to call and got no answer but he stated that the eliquis should be stopped 2 days prior and the aspirin should be stopped 7 days prior to her procedure. Also left his cell number per his request.

## 2024-02-28 NOTE — TELEPHONE ENCOUNTER
Called Natali MCCLAIN in reference to this patient going off of eliquis and aspirin prior to her procedure and needing a note.  As I was speaking with Natali Cantu called on the other line so she was going to talk to him concerning the note

## 2024-03-25 ENCOUNTER — OFFICE VISIT (OUTPATIENT)
Age: 64
End: 2024-03-25
Payer: MEDICAID

## 2024-03-25 VITALS
BODY MASS INDEX: 23.22 KG/M2 | HEART RATE: 96 BPM | SYSTOLIC BLOOD PRESSURE: 106 MMHG | WEIGHT: 123 LBS | HEIGHT: 61 IN | OXYGEN SATURATION: 96 % | DIASTOLIC BLOOD PRESSURE: 73 MMHG

## 2024-03-25 DIAGNOSIS — M79.604 PAIN OF RIGHT LOWER EXTREMITY: Primary | ICD-10-CM

## 2024-03-25 PROCEDURE — 99212 OFFICE O/P EST SF 10 MIN: CPT | Performed by: SURGERY

## 2024-03-25 RX ORDER — OXYCODONE HYDROCHLORIDE AND ACETAMINOPHEN 5; 325 MG/1; MG/1
1 TABLET ORAL EVERY 6 HOURS PRN
Qty: 40 TABLET | Refills: 0 | Status: SHIPPED | OUTPATIENT
Start: 2024-03-25 | End: 2024-04-08

## 2024-03-25 RX ORDER — GLIPIZIDE 10 MG/1
10 TABLET ORAL 2 TIMES DAILY
COMMUNITY
Start: 2024-02-05

## 2024-03-25 NOTE — PROGRESS NOTES
Identified pt with two pt identifiers(name and ). Reviewed record in preparation for visit and have obtained necessary documentation. All patient medications has been reviewed.  Chief Complaint   Patient presents with    Follow-up    Leg Pain     R       Vitals:    24 1331   BP: 106/73   Pulse: 96   SpO2: 96%                   Coordination of Care Questionnaire:   1) Have you been to an emergency room, urgent care, or hospitalized since your last visit?   no      2. Have seen or consulted any other health care provider since your last visit? no

## 2024-03-29 PROBLEM — M79.604 PAIN OF RIGHT LOWER EXTREMITY: Status: ACTIVE | Noted: 2024-03-29

## 2024-03-29 NOTE — PROGRESS NOTES
Vascular History and Physical    Patient: Laury Schuster  MRN: [id number]    YOB: 1960  Age: 63 y.o.  Sex: female     Chief Complaint:  Chief Complaint   Patient presents with    Follow-up    Leg Pain     R       History of Present Illness: Laury Schuster is a 63 y.o. very pleasant woman is here today because of the concerning for right foot pain.  Patient recently underwent pancreatic surgery.  Since then right foot has become painful.  Apparently anticoagulation therapy was not started until 3 days ago.  Patient had a previous right leg bypass graft occlusion.    Social History:  Social Connections: Not on file       Past Medical History:  Past Medical History:   Diagnosis Date    Arthritis     Coagulation disorder (HCC)     possible , pt had surgery on leg and bled out    Diabetes (HCC) 2019    Hypertension     Left-sided weakness     Nodule of kidney     left kidney    Peripheral vascular disease (HCC)     Stroke ([facility])     recent stroke on 10/16/2021       Surgical History:  Past Surgical History:   Procedure Laterality Date    CATARACT REMOVAL  2019    COLONOSCOPY      FEMORAL BYPASS Right 10/18/2023    Right common femoral endarterectomy. 2.Right common femoral artery to below-knee popliteal bypass with 6 mm PTFE graft. performed by Marko Cantu MD at [facility] MAIN OR    INVASIVE VASCULAR Right 1/7/2024    RIGHT LEG GRAFT THROMBECTOMY AND ANGIOGRAM performed by Marko Cantu MD at [facility] MAIN OR    VASCULAR SURGERY         Allergies:  Allergies   Allergen Reactions    Adhesive Tape Rash    Penicillins Nausea And Vomiting    Codeine Other (See Comments)     Makes me \"loopy\"       Current Meds:  Current Outpatient Medications   Medication Sig Dispense Refill    insulin glargine (LANTUS;BASAGLAR) 100 UNIT/ML injection pen Inject 20 Units into the skin nightly      INSULIN LISPRO IJ Inject as directed Sliding scale      oxyCODONE-acetaminophen (PERCOCET) 5-325 MG per tablet Take 1 tablet by mouth

## 2024-04-04 ENCOUNTER — HOSPITAL ENCOUNTER (INPATIENT)
Facility: HOSPITAL | Age: 64
LOS: 13 days | Discharge: NURSING, REHAB OR LONG-TERM CARE | DRG: 240 | End: 2024-04-17
Attending: EMERGENCY MEDICINE | Admitting: SURGERY
Payer: MEDICARE

## 2024-04-04 ENCOUNTER — APPOINTMENT (OUTPATIENT)
Facility: HOSPITAL | Age: 64
DRG: 240 | End: 2024-04-04
Attending: EMERGENCY MEDICINE
Payer: MEDICARE

## 2024-04-04 DIAGNOSIS — I70.25 ISCHEMIC FOOT ULCER DUE TO ATHEROSCLEROSIS OF NATIVE ARTERY OF LIMB (HCC): ICD-10-CM

## 2024-04-04 DIAGNOSIS — I73.9 PERIPHERAL VASCULAR DISEASE (HCC): ICD-10-CM

## 2024-04-04 DIAGNOSIS — I73.9 PAD (PERIPHERAL ARTERY DISEASE) (HCC): ICD-10-CM

## 2024-04-04 DIAGNOSIS — R52 PAIN: Primary | ICD-10-CM

## 2024-04-04 DIAGNOSIS — L97.509 ISCHEMIC FOOT ULCER DUE TO ATHEROSCLEROSIS OF NATIVE ARTERY OF LIMB (HCC): ICD-10-CM

## 2024-04-04 DIAGNOSIS — M62.262 NONTRAUMATIC ISCHEMIC INFARCTION OF MUSCLE OF LEFT LOWER LEG: ICD-10-CM

## 2024-04-04 LAB
ALBUMIN SERPL-MCNC: 3 G/DL (ref 3.5–5)
ALBUMIN/GLOB SERPL: 0.7 (ref 1.1–2.2)
ALP SERPL-CCNC: 101 U/L (ref 45–117)
ALT SERPL-CCNC: 11 U/L (ref 12–78)
ANION GAP SERPL CALC-SCNC: 5 MMOL/L (ref 5–15)
APTT PPP: 45.8 SEC (ref 21.2–34.1)
APTT PPP: 92.7 SEC (ref 21.2–34.1)
AST SERPL W P-5'-P-CCNC: 14 U/L (ref 15–37)
BASOPHILS # BLD: 0 K/UL (ref 0–0.1)
BASOPHILS NFR BLD: 0 % (ref 0–1)
BILIRUB SERPL-MCNC: 0.1 MG/DL (ref 0.2–1)
BUN SERPL-MCNC: 10 MG/DL (ref 6–20)
BUN/CREAT SERPL: 16 (ref 12–20)
CA-I BLD-MCNC: 9 MG/DL (ref 8.5–10.1)
CHLORIDE SERPL-SCNC: 100 MMOL/L (ref 97–108)
CO2 SERPL-SCNC: 25 MMOL/L (ref 21–32)
CREAT SERPL-MCNC: 0.61 MG/DL (ref 0.55–1.02)
DIFFERENTIAL METHOD BLD: ABNORMAL
EOSINOPHIL # BLD: 0.2 K/UL (ref 0–0.4)
EOSINOPHIL NFR BLD: 1 % (ref 0–7)
ERYTHROCYTE [DISTWIDTH] IN BLOOD BY AUTOMATED COUNT: 20.7 % (ref 11.5–14.5)
GLOBULIN SER CALC-MCNC: 4.5 G/DL (ref 2–4)
GLUCOSE BLD STRIP.AUTO-MCNC: 204 MG/DL (ref 65–100)
GLUCOSE BLD STRIP.AUTO-MCNC: 213 MG/DL (ref 65–100)
GLUCOSE SERPL-MCNC: 265 MG/DL (ref 65–100)
HCT VFR BLD AUTO: 26.2 % (ref 35–47)
HGB BLD-MCNC: 7.9 G/DL (ref 11.5–16)
IMM GRANULOCYTES # BLD AUTO: 0.2 K/UL (ref 0–0.04)
IMM GRANULOCYTES NFR BLD AUTO: 1 % (ref 0–0.5)
INR PPP: 1.2 (ref 0.9–1.1)
INR PPP: 1.3 (ref 0.9–1.1)
LYMPHOCYTES # BLD: 1.6 K/UL (ref 0.8–3.5)
LYMPHOCYTES NFR BLD: 10 % (ref 12–49)
MAGNESIUM SERPL-MCNC: 2 MG/DL (ref 1.6–2.4)
MCH RBC QN AUTO: 23.2 PG (ref 26–34)
MCHC RBC AUTO-ENTMCNC: 30.2 G/DL (ref 30–36.5)
MCV RBC AUTO: 76.8 FL (ref 80–99)
MONOCYTES # BLD: 1.1 K/UL (ref 0–1)
MONOCYTES NFR BLD: 7 % (ref 5–13)
NEUTS SEG # BLD: 12.8 K/UL (ref 1.8–8)
NEUTS SEG NFR BLD: 81 % (ref 32–75)
NRBC # BLD: 0.13 K/UL (ref 0–0.01)
NRBC BLD-RTO: 0.8 PER 100 WBC
PERFORMED BY:: ABNORMAL
PERFORMED BY:: ABNORMAL
PLATELET # BLD AUTO: 792 K/UL (ref 150–400)
PLATELET COMMENT: ABNORMAL
PMV BLD AUTO: 11 FL (ref 8.9–12.9)
POTASSIUM SERPL-SCNC: 4.2 MMOL/L (ref 3.5–5.1)
PROT SERPL-MCNC: 7.5 G/DL (ref 6.4–8.2)
PROTHROMBIN TIME: 15.4 SEC (ref 11.9–14.6)
PROTHROMBIN TIME: 16.2 SEC (ref 11.9–14.6)
RBC # BLD AUTO: 3.41 M/UL (ref 3.8–5.2)
RBC MORPH BLD: ABNORMAL
SODIUM SERPL-SCNC: 130 MMOL/L (ref 136–145)
THERAPEUTIC RANGE: ABNORMAL SEC (ref 82–109)
THERAPEUTIC RANGE: ABNORMAL SEC (ref 82–109)
TROPONIN I SERPL HS-MCNC: 6 NG/L (ref 0–51)
UFH PPP CHRO-ACNC: >1.1 IU/ML
UFH PPP CHRO-ACNC: >1.1 IU/ML
WBC # BLD AUTO: 15.9 K/UL (ref 3.6–11)

## 2024-04-04 PROCEDURE — 83735 ASSAY OF MAGNESIUM: CPT

## 2024-04-04 PROCEDURE — 6370000000 HC RX 637 (ALT 250 FOR IP): Performed by: SURGERY

## 2024-04-04 PROCEDURE — 6360000002 HC RX W HCPCS: Performed by: SURGERY

## 2024-04-04 PROCEDURE — 36415 COLL VENOUS BLD VENIPUNCTURE: CPT

## 2024-04-04 PROCEDURE — 85610 PROTHROMBIN TIME: CPT

## 2024-04-04 PROCEDURE — 1100000000 HC RM PRIVATE

## 2024-04-04 PROCEDURE — 99222 1ST HOSP IP/OBS MODERATE 55: CPT | Performed by: SURGERY

## 2024-04-04 PROCEDURE — 85520 HEPARIN ASSAY: CPT

## 2024-04-04 PROCEDURE — 80053 COMPREHEN METABOLIC PANEL: CPT

## 2024-04-04 PROCEDURE — 82962 GLUCOSE BLOOD TEST: CPT

## 2024-04-04 PROCEDURE — 2580000003 HC RX 258: Performed by: SURGERY

## 2024-04-04 PROCEDURE — 6360000002 HC RX W HCPCS: Performed by: EMERGENCY MEDICINE

## 2024-04-04 PROCEDURE — 84484 ASSAY OF TROPONIN QUANT: CPT

## 2024-04-04 PROCEDURE — 94640 AIRWAY INHALATION TREATMENT: CPT

## 2024-04-04 PROCEDURE — 93926 LOWER EXTREMITY STUDY: CPT

## 2024-04-04 PROCEDURE — 85025 COMPLETE CBC W/AUTO DIFF WBC: CPT

## 2024-04-04 PROCEDURE — 93922 UPR/L XTREMITY ART 2 LEVELS: CPT

## 2024-04-04 PROCEDURE — 93926 LOWER EXTREMITY STUDY: CPT | Performed by: SURGERY

## 2024-04-04 PROCEDURE — 99285 EMERGENCY DEPT VISIT HI MDM: CPT

## 2024-04-04 PROCEDURE — 85730 THROMBOPLASTIN TIME PARTIAL: CPT

## 2024-04-04 RX ORDER — ATORVASTATIN CALCIUM 40 MG/1
80 TABLET, FILM COATED ORAL DAILY
Status: DISCONTINUED | OUTPATIENT
Start: 2024-04-04 | End: 2024-04-17 | Stop reason: HOSPADM

## 2024-04-04 RX ORDER — HEPARIN SODIUM 1000 [USP'U]/ML
30 INJECTION, SOLUTION INTRAVENOUS; SUBCUTANEOUS PRN
Status: DISCONTINUED | OUTPATIENT
Start: 2024-04-04 | End: 2024-04-07

## 2024-04-04 RX ORDER — POTASSIUM CHLORIDE 7.45 MG/ML
10 INJECTION INTRAVENOUS PRN
Status: DISCONTINUED | OUTPATIENT
Start: 2024-04-04 | End: 2024-04-17 | Stop reason: HOSPADM

## 2024-04-04 RX ORDER — FOLIC ACID 1 MG/1
1 TABLET ORAL DAILY
COMMUNITY

## 2024-04-04 RX ORDER — BUDESONIDE AND FORMOTEROL FUMARATE DIHYDRATE 80; 4.5 UG/1; UG/1
2 AEROSOL RESPIRATORY (INHALATION)
Status: DISCONTINUED | OUTPATIENT
Start: 2024-04-04 | End: 2024-04-17 | Stop reason: HOSPADM

## 2024-04-04 RX ORDER — SODIUM CHLORIDE 9 MG/ML
INJECTION, SOLUTION INTRAVENOUS CONTINUOUS
Status: DISPENSED | OUTPATIENT
Start: 2024-04-04 | End: 2024-04-06

## 2024-04-04 RX ORDER — HYDROMORPHONE HYDROCHLORIDE 1 MG/ML
1 INJECTION, SOLUTION INTRAMUSCULAR; INTRAVENOUS; SUBCUTANEOUS EVERY 4 HOURS PRN
Status: DISCONTINUED | OUTPATIENT
Start: 2024-04-04 | End: 2024-04-05

## 2024-04-04 RX ORDER — ONDANSETRON 4 MG/1
4 TABLET, ORALLY DISINTEGRATING ORAL EVERY 8 HOURS PRN
Status: DISCONTINUED | OUTPATIENT
Start: 2024-04-04 | End: 2024-04-07

## 2024-04-04 RX ORDER — ACETAMINOPHEN 325 MG/1
325 TABLET ORAL EVERY 4 HOURS PRN
Status: DISCONTINUED | OUTPATIENT
Start: 2024-04-04 | End: 2024-04-17 | Stop reason: SDUPTHER

## 2024-04-04 RX ORDER — SODIUM CHLORIDE 9 MG/ML
INJECTION, SOLUTION INTRAVENOUS PRN
Status: DISCONTINUED | OUTPATIENT
Start: 2024-04-04 | End: 2024-04-09

## 2024-04-04 RX ORDER — ACETAMINOPHEN 325 MG/1
650 TABLET ORAL EVERY 6 HOURS PRN
Status: DISCONTINUED | OUTPATIENT
Start: 2024-04-04 | End: 2024-04-08 | Stop reason: SDUPTHER

## 2024-04-04 RX ORDER — ONDANSETRON 2 MG/ML
4 INJECTION INTRAMUSCULAR; INTRAVENOUS EVERY 6 HOURS PRN
Status: DISCONTINUED | OUTPATIENT
Start: 2024-04-04 | End: 2024-04-07

## 2024-04-04 RX ORDER — PANTOPRAZOLE SODIUM 40 MG/1
40 TABLET, DELAYED RELEASE ORAL DAILY
Status: DISCONTINUED | OUTPATIENT
Start: 2024-04-04 | End: 2024-04-17 | Stop reason: HOSPADM

## 2024-04-04 RX ORDER — ACETAMINOPHEN 650 MG/1
650 SUPPOSITORY RECTAL EVERY 6 HOURS PRN
Status: DISCONTINUED | OUTPATIENT
Start: 2024-04-04 | End: 2024-04-17 | Stop reason: HOSPADM

## 2024-04-04 RX ORDER — INSULIN GLARGINE 100 [IU]/ML
20 INJECTION, SOLUTION SUBCUTANEOUS NIGHTLY
Status: DISCONTINUED | OUTPATIENT
Start: 2024-04-04 | End: 2024-04-15

## 2024-04-04 RX ORDER — HEPARIN SODIUM 1000 [USP'U]/ML
60 INJECTION, SOLUTION INTRAVENOUS; SUBCUTANEOUS PRN
Status: DISCONTINUED | OUTPATIENT
Start: 2024-04-04 | End: 2024-04-07

## 2024-04-04 RX ORDER — LISINOPRIL 20 MG/1
20 TABLET ORAL DAILY
Status: DISCONTINUED | OUTPATIENT
Start: 2024-04-04 | End: 2024-04-17 | Stop reason: HOSPADM

## 2024-04-04 RX ORDER — HEPARIN SODIUM 10000 [USP'U]/100ML
5-30 INJECTION, SOLUTION INTRAVENOUS CONTINUOUS
Status: DISCONTINUED | OUTPATIENT
Start: 2024-04-04 | End: 2024-04-07

## 2024-04-04 RX ORDER — TRAZODONE HYDROCHLORIDE 50 MG/1
75 TABLET ORAL NIGHTLY
COMMUNITY

## 2024-04-04 RX ORDER — INSULIN LISPRO 100 [IU]/ML
0-4 INJECTION, SOLUTION INTRAVENOUS; SUBCUTANEOUS NIGHTLY
Status: DISCONTINUED | OUTPATIENT
Start: 2024-04-04 | End: 2024-04-10

## 2024-04-04 RX ORDER — M-VIT,TX,IRON,MINS/CALC/FOLIC 27MG-0.4MG
1 TABLET ORAL DAILY
COMMUNITY

## 2024-04-04 RX ORDER — INSULIN LISPRO 100 [IU]/ML
0-4 INJECTION, SOLUTION INTRAVENOUS; SUBCUTANEOUS
Status: DISCONTINUED | OUTPATIENT
Start: 2024-04-04 | End: 2024-04-10

## 2024-04-04 RX ORDER — FERROUS SULFATE 325(65) MG
325 TABLET ORAL
COMMUNITY

## 2024-04-04 RX ORDER — SODIUM CHLORIDE 0.9 % (FLUSH) 0.9 %
5-40 SYRINGE (ML) INJECTION PRN
Status: DISCONTINUED | OUTPATIENT
Start: 2024-04-04 | End: 2024-04-09

## 2024-04-04 RX ORDER — SODIUM CHLORIDE 0.9 % (FLUSH) 0.9 %
5-40 SYRINGE (ML) INJECTION EVERY 12 HOURS SCHEDULED
Status: DISCONTINUED | OUTPATIENT
Start: 2024-04-04 | End: 2024-04-09

## 2024-04-04 RX ORDER — POLYETHYLENE GLYCOL 3350 17 G/17G
17 POWDER, FOR SOLUTION ORAL DAILY PRN
Status: DISCONTINUED | OUTPATIENT
Start: 2024-04-04 | End: 2024-04-17 | Stop reason: HOSPADM

## 2024-04-04 RX ORDER — MAGNESIUM SULFATE IN WATER 40 MG/ML
2000 INJECTION, SOLUTION INTRAVENOUS PRN
Status: DISCONTINUED | OUTPATIENT
Start: 2024-04-04 | End: 2024-04-17 | Stop reason: HOSPADM

## 2024-04-04 RX ORDER — ALBUTEROL SULFATE 90 UG/1
2 AEROSOL, METERED RESPIRATORY (INHALATION) EVERY 4 HOURS PRN
Status: DISCONTINUED | OUTPATIENT
Start: 2024-04-04 | End: 2024-04-17 | Stop reason: HOSPADM

## 2024-04-04 RX ORDER — POTASSIUM CHLORIDE 20 MEQ/1
40 TABLET, EXTENDED RELEASE ORAL PRN
Status: DISCONTINUED | OUTPATIENT
Start: 2024-04-04 | End: 2024-04-17 | Stop reason: HOSPADM

## 2024-04-04 RX ORDER — HYDROMORPHONE HYDROCHLORIDE 2 MG/1
2 TABLET ORAL EVERY 4 HOURS PRN
Status: ON HOLD | COMMUNITY
End: 2024-04-17 | Stop reason: HOSPADM

## 2024-04-04 RX ORDER — HEPARIN SODIUM 1000 [USP'U]/ML
60 INJECTION, SOLUTION INTRAVENOUS; SUBCUTANEOUS ONCE
Status: COMPLETED | OUTPATIENT
Start: 2024-04-04 | End: 2024-04-04

## 2024-04-04 RX ADMIN — SODIUM CHLORIDE: 9 INJECTION, SOLUTION INTRAVENOUS at 18:57

## 2024-04-04 RX ADMIN — HEPARIN SODIUM 12 UNITS/KG/HR: 10000 INJECTION, SOLUTION INTRAVENOUS at 15:27

## 2024-04-04 RX ADMIN — HYDROMORPHONE HYDROCHLORIDE 1 MG: 1 INJECTION, SOLUTION INTRAMUSCULAR; INTRAVENOUS; SUBCUTANEOUS at 18:43

## 2024-04-04 RX ADMIN — Medication 2 PUFF: at 19:49

## 2024-04-04 RX ADMIN — Medication 2 PUFF: at 19:51

## 2024-04-04 RX ADMIN — INSULIN GLARGINE 20 UNITS: 100 INJECTION, SOLUTION SUBCUTANEOUS at 22:27

## 2024-04-04 RX ADMIN — PANTOPRAZOLE SODIUM 40 MG: 40 TABLET, DELAYED RELEASE ORAL at 18:43

## 2024-04-04 RX ADMIN — SODIUM CHLORIDE, PRESERVATIVE FREE 10 ML: 5 INJECTION INTRAVENOUS at 22:17

## 2024-04-04 RX ADMIN — INSULIN LISPRO 1 UNITS: 100 INJECTION, SOLUTION INTRAVENOUS; SUBCUTANEOUS at 18:43

## 2024-04-04 RX ADMIN — HEPARIN SODIUM 3290 UNITS: 1000 INJECTION INTRAVENOUS; SUBCUTANEOUS at 15:26

## 2024-04-04 RX ADMIN — HYDROMORPHONE HYDROCHLORIDE 1 MG: 1 INJECTION, SOLUTION INTRAMUSCULAR; INTRAVENOUS; SUBCUTANEOUS at 15:18

## 2024-04-04 RX ADMIN — HYDROMORPHONE HYDROCHLORIDE 1 MG: 1 INJECTION, SOLUTION INTRAMUSCULAR; INTRAVENOUS; SUBCUTANEOUS at 22:27

## 2024-04-04 ASSESSMENT — PAIN DESCRIPTION - ORIENTATION
ORIENTATION: RIGHT

## 2024-04-04 ASSESSMENT — PAIN SCALES - GENERAL
PAINLEVEL_OUTOF10: 8
PAINLEVEL_OUTOF10: 7
PAINLEVEL_OUTOF10: 7

## 2024-04-04 ASSESSMENT — PAIN - FUNCTIONAL ASSESSMENT: PAIN_FUNCTIONAL_ASSESSMENT: PREVENTS OR INTERFERES SOME ACTIVE ACTIVITIES AND ADLS

## 2024-04-04 ASSESSMENT — PAIN DESCRIPTION - LOCATION
LOCATION: FOOT
LOCATION: LEG
LOCATION: LEG;FOOT

## 2024-04-04 ASSESSMENT — PAIN DESCRIPTION - DESCRIPTORS: DESCRIPTORS: SHARP;STABBING

## 2024-04-04 NOTE — ED PROVIDER NOTES
sulfate HFA (PROVENTIL;VENTOLIN;PROAIR) 108 (90 Base) MCG/ACT inhaler 2 puff (has no administration in time range)   atorvastatin (LIPITOR) tablet 80 mg (has no administration in time range)   budesonide-formoterol (SYMBICORT) 80-4.5 MCG/ACT inhaler 2 puff (has no administration in time range)   insulin glargine (LANTUS) injection vial 20 Units (has no administration in time range)   lisinopril (PRINIVIL;ZESTRIL) tablet 20 mg (has no administration in time range)   pantoprazole (PROTONIX) tablet 40 mg (has no administration in time range)   insulin lispro (HUMALOG) injection vial 0-4 Units (has no administration in time range)   insulin lispro (HUMALOG) injection vial 0-4 Units (has no administration in time range)   HYDROmorphone (DILAUDID) injection 1 mg (1 mg IntraVENous Given 4/4/24 1518)       CONSULTS: (Who and What was discussed)  Consult(s) include Dr. Cantu, the surgeon.     Social Determinants affecting Dx or Tx: None    PROCEDURES   Procedures   No procedures.     Smoking Cessation: None.    CRITICAL CARE TIME     Critical Care:  I personally spent a total of 35 minutes of critical care time in obtaining history, performing a physical exam, bedside monitoring of interventions, collecting, and interpreting tests but excluding time spent in any separately billed procedures and excluding time spent treating other patients and/or teaching time.   Discussion with consultant: General surgery.  Clinical Concern: ischemic limb, PAD.   Intervention: iv heparin infusion + bolus.  Jose Magaña D.O.    DISPOSITION/PLAN   DISPOSITION Admitted 04/04/2024 02:24:52 PM      Admitted.     PATIENT REFERRED TO:  No follow-up provider specified.    DISCHARGE MEDICATIONS:     Medication List        ASK your doctor about these medications      acetaminophen 325 MG tablet  Commonly known as: TYLENOL     albuterol sulfate  (90 Base) MCG/ACT inhaler  Commonly known as: PROVENTIL;VENTOLIN;PROAIR     apixaban 5 MG Tabs

## 2024-04-04 NOTE — ED NOTES
ED TO INPATIENT SBAR HANDOFF    Patient Name: Laury Schuster   Preferred Name: Laury  : 1960  63 y.o.   Family/Caregiver Present: no   Code Status Order: Full Code  PO Status: NPO:  Telemetry Order:   C-SSRS: Risk of Suicide: No Risk  Sitter no   Restraints:     Sepsis Risk Score Sepsis Risk Score: 2.67    Situation  Chief Complaint   Patient presents with    Foot Problem     Brief Description of Patient's Condition: Pain and decreased circulation to right foot.  US reports pending.  Heparin started at 1530.  Dilaudid given immediately prior.  Mental Status: oriented, alert, coherent, logical, thought processes intact, and able to concentrate and follow conversation  Arrived from:Home  Imaging:   Vascular duplex lower extremity arteries right           Abnormal labs:   Abnormal Labs Reviewed   CBC WITH AUTO DIFFERENTIAL - Abnormal; Notable for the following components:       Result Value    WBC 15.9 (*)     RBC 3.41 (*)     Hemoglobin 7.9 (*)     Hematocrit 26.2 (*)     MCV 76.8 (*)     MCH 23.2 (*)     RDW 20.7 (*)     Platelets 792 (*)     Nucleated RBCs 0.8 (*)     nRBC 0.13 (*)     Neutrophils % 81 (*)     Lymphocytes % 10 (*)     Immature Granulocytes 1 (*)     Neutrophils Absolute 12.8 (*)     Monocytes Absolute 1.1 (*)     Absolute Immature Granulocyte 0.2 (*)     All other components within normal limits   COMPREHENSIVE METABOLIC PANEL - Abnormal; Notable for the following components:    Sodium 130 (*)     Glucose 265 (*)     Total Bilirubin 0.1 (*)     AST 14 (*)     ALT 11 (*)     Albumin 3.0 (*)     Globulin 4.5 (*)     Albumin/Globulin Ratio 0.7 (*)     All other components within normal limits   PROTIME-INR - Abnormal; Notable for the following components:    Protime 16.2 (*)     INR 1.3 (*)     All other components within normal limits   APTT - Abnormal; Notable for the following components:    APTT 45.8 (*)     All other components within normal limits       Background  Allergies:

## 2024-04-04 NOTE — ED TRIAGE NOTES
[facility] 15 pt stated that is has right foot pain; pt had vascular surgery done on her right foot in October and then pt had another surgery approx 1-2 months ago; pt stated that she was off her blood thinner for surgery and when she went to f/u with MD Cantu last Monday after leaving a hospital 2 Fridays ago for a splenectomy and part of her pancreas, was told that her right foot has a little pulse and would be re-evaluated when he got back into the hospital; MD Cantu also told pt to go to [facility] or [facility] if the pain worsens while he was out of town

## 2024-04-05 LAB
ALBUMIN SERPL-MCNC: 2.4 G/DL (ref 3.5–5)
ALBUMIN/GLOB SERPL: 0.5 (ref 1.1–2.2)
ALP SERPL-CCNC: 89 U/L (ref 45–117)
ALT SERPL-CCNC: 12 U/L (ref 12–78)
ANION GAP SERPL CALC-SCNC: 5 MMOL/L (ref 5–15)
APTT PPP: 47.5 SEC (ref 21.2–34.1)
APTT PPP: 53.5 SEC (ref 21.2–34.1)
AST SERPL W P-5'-P-CCNC: 12 U/L (ref 15–37)
BASOPHILS # BLD: 0 K/UL (ref 0–0.1)
BASOPHILS NFR BLD: 0 % (ref 0–1)
BILIRUB SERPL-MCNC: 0.3 MG/DL (ref 0.2–1)
BUN SERPL-MCNC: 8 MG/DL (ref 6–20)
BUN/CREAT SERPL: 20 (ref 12–20)
CA-I BLD-MCNC: 8.6 MG/DL (ref 8.5–10.1)
CHLORIDE SERPL-SCNC: 105 MMOL/L (ref 97–108)
CO2 SERPL-SCNC: 26 MMOL/L (ref 21–32)
CREAT SERPL-MCNC: 0.41 MG/DL (ref 0.55–1.02)
DIFFERENTIAL METHOD BLD: ABNORMAL
ECHO BSA: 1.52 M2
EOSINOPHIL # BLD: 0.3 K/UL (ref 0–0.4)
EOSINOPHIL NFR BLD: 2 % (ref 0–7)
ERYTHROCYTE [DISTWIDTH] IN BLOOD BY AUTOMATED COUNT: 20.8 % (ref 11.5–14.5)
EST. AVERAGE GLUCOSE BLD GHB EST-MCNC: 166 MG/DL
GLOBULIN SER CALC-MCNC: 4.4 G/DL (ref 2–4)
GLUCOSE BLD STRIP.AUTO-MCNC: 167 MG/DL (ref 65–100)
GLUCOSE BLD STRIP.AUTO-MCNC: 203 MG/DL (ref 65–100)
GLUCOSE BLD STRIP.AUTO-MCNC: 204 MG/DL (ref 65–100)
GLUCOSE BLD STRIP.AUTO-MCNC: 266 MG/DL (ref 65–100)
GLUCOSE SERPL-MCNC: 135 MG/DL (ref 65–100)
HBA1C MFR BLD: 7.4 % (ref 4–5.6)
HCT VFR BLD AUTO: 23.4 % (ref 35–47)
HGB BLD-MCNC: 7 G/DL (ref 11.5–16)
IMM GRANULOCYTES # BLD AUTO: 0.1 K/UL (ref 0–0.04)
IMM GRANULOCYTES NFR BLD AUTO: 1 % (ref 0–0.5)
LYMPHOCYTES # BLD: 3.6 K/UL (ref 0.8–3.5)
LYMPHOCYTES NFR BLD: 25 % (ref 12–49)
MCH RBC QN AUTO: 23.4 PG (ref 26–34)
MCHC RBC AUTO-ENTMCNC: 29.9 G/DL (ref 30–36.5)
MCV RBC AUTO: 78.3 FL (ref 80–99)
MONOCYTES # BLD: 1.3 K/UL (ref 0–1)
MONOCYTES NFR BLD: 9 % (ref 5–13)
NEUTS SEG # BLD: 9.2 K/UL (ref 1.8–8)
NEUTS SEG NFR BLD: 63 % (ref 32–75)
NRBC # BLD: 0.11 K/UL (ref 0–0.01)
NRBC BLD-RTO: 0.8 PER 100 WBC
PERFORMED BY:: ABNORMAL
PLATELET # BLD AUTO: 710 K/UL (ref 150–400)
PMV BLD AUTO: 11.9 FL (ref 8.9–12.9)
POTASSIUM SERPL-SCNC: 3.9 MMOL/L (ref 3.5–5.1)
PROT SERPL-MCNC: 6.8 G/DL (ref 6.4–8.2)
RBC # BLD AUTO: 2.99 M/UL (ref 3.8–5.2)
RBC MORPH BLD: ABNORMAL
SODIUM SERPL-SCNC: 136 MMOL/L (ref 136–145)
THERAPEUTIC RANGE: ABNORMAL SEC (ref 82–109)
THERAPEUTIC RANGE: ABNORMAL SEC (ref 82–109)
UFH PPP CHRO-ACNC: >1.1 IU/ML
UFH PPP CHRO-ACNC: >1.1 IU/ML
VAS RIGHT ARTERIAL PROX ANASTOMOSIS EDV: 9.5 CM/S
VAS RIGHT ARTERIAL PROX ANASTOMOSIS PSV: 28.6 CM/S
VAS RIGHT ATA MID PSV: 10.8 CM/S
VAS RIGHT CFA DIST PSV: 177 CM/S
VAS RIGHT PERONEAL DIST PSV: 7.5 CM/S
VAS RIGHT PTA DIST PSV: 8.8 CM/S
WBC # BLD AUTO: 14.5 K/UL (ref 3.6–11)

## 2024-04-05 PROCEDURE — P9016 RBC LEUKOCYTES REDUCED: HCPCS

## 2024-04-05 PROCEDURE — 85520 HEPARIN ASSAY: CPT

## 2024-04-05 PROCEDURE — 86850 RBC ANTIBODY SCREEN: CPT

## 2024-04-05 PROCEDURE — 86870 RBC ANTIBODY IDENTIFICATION: CPT

## 2024-04-05 PROCEDURE — 80053 COMPREHEN METABOLIC PANEL: CPT

## 2024-04-05 PROCEDURE — 36415 COLL VENOUS BLD VENIPUNCTURE: CPT

## 2024-04-05 PROCEDURE — 6360000002 HC RX W HCPCS: Performed by: EMERGENCY MEDICINE

## 2024-04-05 PROCEDURE — 82962 GLUCOSE BLOOD TEST: CPT

## 2024-04-05 PROCEDURE — 85730 THROMBOPLASTIN TIME PARTIAL: CPT

## 2024-04-05 PROCEDURE — 6370000000 HC RX 637 (ALT 250 FOR IP): Performed by: SURGERY

## 2024-04-05 PROCEDURE — 36430 TRANSFUSION BLD/BLD COMPNT: CPT

## 2024-04-05 PROCEDURE — 99232 SBSQ HOSP IP/OBS MODERATE 35: CPT | Performed by: SURGERY

## 2024-04-05 PROCEDURE — 86921 COMPATIBILITY TEST INCUBATE: CPT

## 2024-04-05 PROCEDURE — 6360000002 HC RX W HCPCS: Performed by: SURGERY

## 2024-04-05 PROCEDURE — 86920 COMPATIBILITY TEST SPIN: CPT

## 2024-04-05 PROCEDURE — 1100000000 HC RM PRIVATE

## 2024-04-05 PROCEDURE — 86922 COMPATIBILITY TEST ANTIGLOB: CPT

## 2024-04-05 PROCEDURE — 83036 HEMOGLOBIN GLYCOSYLATED A1C: CPT

## 2024-04-05 PROCEDURE — 86902 BLOOD TYPE ANTIGEN DONOR EA: CPT

## 2024-04-05 PROCEDURE — 86900 BLOOD TYPING SEROLOGIC ABO: CPT

## 2024-04-05 PROCEDURE — 30233N1 TRANSFUSION OF NONAUTOLOGOUS RED BLOOD CELLS INTO PERIPHERAL VEIN, PERCUTANEOUS APPROACH: ICD-10-PCS | Performed by: SURGERY

## 2024-04-05 PROCEDURE — 94761 N-INVAS EAR/PLS OXIMETRY MLT: CPT

## 2024-04-05 PROCEDURE — 86901 BLOOD TYPING SEROLOGIC RH(D): CPT

## 2024-04-05 PROCEDURE — 85025 COMPLETE CBC W/AUTO DIFF WBC: CPT

## 2024-04-05 PROCEDURE — 93926 LOWER EXTREMITY STUDY: CPT | Performed by: SURGERY

## 2024-04-05 PROCEDURE — 2580000003 HC RX 258: Performed by: SURGERY

## 2024-04-05 PROCEDURE — 94640 AIRWAY INHALATION TREATMENT: CPT

## 2024-04-05 RX ORDER — FUROSEMIDE 10 MG/ML
20 INJECTION INTRAMUSCULAR; INTRAVENOUS ONCE
Status: COMPLETED | OUTPATIENT
Start: 2024-04-05 | End: 2024-04-05

## 2024-04-05 RX ORDER — CLINDAMYCIN PHOSPHATE 600 MG/50ML
600 INJECTION, SOLUTION INTRAVENOUS EVERY 8 HOURS
Status: COMPLETED | OUTPATIENT
Start: 2024-04-05 | End: 2024-04-10

## 2024-04-05 RX ORDER — GLUCAGON 1 MG/ML
1 KIT INJECTION PRN
Status: DISCONTINUED | OUTPATIENT
Start: 2024-04-05 | End: 2024-04-10 | Stop reason: SDUPTHER

## 2024-04-05 RX ORDER — SODIUM CHLORIDE 9 MG/ML
INJECTION, SOLUTION INTRAVENOUS PRN
Status: DISCONTINUED | OUTPATIENT
Start: 2024-04-05 | End: 2024-04-07

## 2024-04-05 RX ORDER — HYDROMORPHONE HYDROCHLORIDE 1 MG/ML
1 INJECTION, SOLUTION INTRAMUSCULAR; INTRAVENOUS; SUBCUTANEOUS
Status: DISCONTINUED | OUTPATIENT
Start: 2024-04-05 | End: 2024-04-05

## 2024-04-05 RX ORDER — HYDROMORPHONE HYDROCHLORIDE 2 MG/ML
1.5 INJECTION, SOLUTION INTRAMUSCULAR; INTRAVENOUS; SUBCUTANEOUS
Status: DISCONTINUED | OUTPATIENT
Start: 2024-04-05 | End: 2024-04-06

## 2024-04-05 RX ADMIN — CLINDAMYCIN IN 5 PERCENT DEXTROSE 600 MG: 12 INJECTION, SOLUTION INTRAVENOUS at 17:40

## 2024-04-05 RX ADMIN — LISINOPRIL 20 MG: 20 TABLET ORAL at 09:30

## 2024-04-05 RX ADMIN — Medication 2 PUFF: at 08:00

## 2024-04-05 RX ADMIN — Medication 2 PUFF: at 19:54

## 2024-04-05 RX ADMIN — HYDROMORPHONE HYDROCHLORIDE 1 MG: 1 INJECTION, SOLUTION INTRAMUSCULAR; INTRAVENOUS; SUBCUTANEOUS at 06:28

## 2024-04-05 RX ADMIN — HEPARIN SODIUM 1650 UNITS: 1000 INJECTION INTRAVENOUS; SUBCUTANEOUS at 16:05

## 2024-04-05 RX ADMIN — HYDROMORPHONE HYDROCHLORIDE 1.5 MG: 2 INJECTION INTRAMUSCULAR; INTRAVENOUS; SUBCUTANEOUS at 12:25

## 2024-04-05 RX ADMIN — SODIUM CHLORIDE: 9 INJECTION, SOLUTION INTRAVENOUS at 06:30

## 2024-04-05 RX ADMIN — HYDROMORPHONE HYDROCHLORIDE 1 MG: 1 INJECTION, SOLUTION INTRAMUSCULAR; INTRAVENOUS; SUBCUTANEOUS at 02:24

## 2024-04-05 RX ADMIN — INSULIN LISPRO 1 UNITS: 100 INJECTION, SOLUTION INTRAVENOUS; SUBCUTANEOUS at 17:40

## 2024-04-05 RX ADMIN — HEPARIN SODIUM 1650 UNITS: 1000 INJECTION INTRAVENOUS; SUBCUTANEOUS at 06:25

## 2024-04-05 RX ADMIN — INSULIN LISPRO 1 UNITS: 100 INJECTION, SOLUTION INTRAVENOUS; SUBCUTANEOUS at 12:35

## 2024-04-05 RX ADMIN — SODIUM CHLORIDE, PRESERVATIVE FREE 10 ML: 5 INJECTION INTRAVENOUS at 21:55

## 2024-04-05 RX ADMIN — ATORVASTATIN CALCIUM 80 MG: 40 TABLET, FILM COATED ORAL at 09:31

## 2024-04-05 RX ADMIN — HYDROMORPHONE HYDROCHLORIDE 1.5 MG: 2 INJECTION INTRAMUSCULAR; INTRAVENOUS; SUBCUTANEOUS at 15:30

## 2024-04-05 RX ADMIN — HYDROMORPHONE HYDROCHLORIDE 1.5 MG: 2 INJECTION INTRAMUSCULAR; INTRAVENOUS; SUBCUTANEOUS at 09:25

## 2024-04-05 RX ADMIN — CLINDAMYCIN IN 5 PERCENT DEXTROSE 600 MG: 12 INJECTION, SOLUTION INTRAVENOUS at 09:43

## 2024-04-05 RX ADMIN — FUROSEMIDE 20 MG: 10 INJECTION, SOLUTION INTRAMUSCULAR; INTRAVENOUS at 09:30

## 2024-04-05 RX ADMIN — HYDROMORPHONE HYDROCHLORIDE 1.5 MG: 2 INJECTION INTRAMUSCULAR; INTRAVENOUS; SUBCUTANEOUS at 18:36

## 2024-04-05 RX ADMIN — HYDROMORPHONE HYDROCHLORIDE 1.5 MG: 2 INJECTION INTRAMUSCULAR; INTRAVENOUS; SUBCUTANEOUS at 21:48

## 2024-04-05 RX ADMIN — PANTOPRAZOLE SODIUM 40 MG: 40 TABLET, DELAYED RELEASE ORAL at 09:30

## 2024-04-05 RX ADMIN — SODIUM CHLORIDE, PRESERVATIVE FREE 10 ML: 5 INJECTION INTRAVENOUS at 09:43

## 2024-04-05 RX ADMIN — HEPARIN SODIUM 14 UNITS/KG/HR: 10000 INJECTION, SOLUTION INTRAVENOUS at 06:26

## 2024-04-05 ASSESSMENT — PAIN DESCRIPTION - DESCRIPTORS
DESCRIPTORS: ACHING;THROBBING;SORE
DESCRIPTORS: ACHING;THROBBING
DESCRIPTORS: ACHING;SHARP;THROBBING;BURNING
DESCRIPTORS: ACHING;THROBBING

## 2024-04-05 ASSESSMENT — PAIN SCALES - GENERAL
PAINLEVEL_OUTOF10: 10
PAINLEVEL_OUTOF10: 10
PAINLEVEL_OUTOF10: 9
PAINLEVEL_OUTOF10: 9
PAINLEVEL_OUTOF10: 10
PAINLEVEL_OUTOF10: 0
PAINLEVEL_OUTOF10: 9

## 2024-04-05 ASSESSMENT — PAIN DESCRIPTION - ORIENTATION
ORIENTATION: RIGHT

## 2024-04-05 ASSESSMENT — PAIN DESCRIPTION - LOCATION
LOCATION: LEG
LOCATION: LEG
LOCATION: FOOT
LOCATION: LEG
LOCATION: LEG;FOOT
LOCATION: LEG;FOOT
LOCATION: FOOT

## 2024-04-05 ASSESSMENT — PAIN - FUNCTIONAL ASSESSMENT: PAIN_FUNCTIONAL_ASSESSMENT: PREVENTS OR INTERFERES SOME ACTIVE ACTIVITIES AND ADLS

## 2024-04-05 NOTE — CARE COORDINATION
04/05/24 2785   Service Assessment   Patient Orientation Alert and Oriented   Cognition Alert   History Provided By Patient   Primary Caregiver Self   Support Systems None   Patient's Healthcare Decision Maker is: Legal Next of Kin   PCP Verified by CM Yes   Last Visit to PCP Within last 3 months   Prior Functional Level Assistance with the following:;Mobility   Current Functional Level Assistance with the following:;Mobility   Can patient return to prior living arrangement Yes   Ability to make needs known: Good   Family able to assist with home care needs: No   Would you like for me to discuss the discharge plan with any other family members/significant others, and if so, who? No   Financial Resources Medicare;Medicaid   Community Resources None   Social/Functional History   Lives With Parent   Type of Home Trailer   Home Layout One level   Home Access Ramped entrance   Bathroom Shower/Tub Shower chair with back   Home Equipment Cane;Walker, standard   Services At/After Discharge   Transition of Care Consult (CM Consult) Discharge Planning     CM met with patient at bedside. Demos on face sheet confirmed as correct. Patient lives in a trailer. Her mother lives with her. Patient provides care for her mother. DME includes cane, walker and shower chair. Meds to Beds offered and declined.    DCP: TBD    Advance Care Planning     General Advance Care Planning (ACP) Conversation    Date of Conversation: 4/5/2024  Conducted with: Patient with Decision Making Capacity    Healthcare Decision Maker:    Primary Decision Maker: Ricarda New - Other Relative - [phone]  Click here to complete Healthcare Decision Makers including selection of the Healthcare Decision Maker Relationship (ie \"Primary\").   Today we documented Decision Maker(s) consistent with Legal Next of Kin hierarchy.    Content/Action Overview:  DECLINED ACP Conversation - will revisit periodically  Reviewed DNR/DNI and patient elects Full Code

## 2024-04-05 NOTE — H&P
General surgery history and Physical    Patient: Laury Schuster  MRN: [id number]    YOB: 1960  Age: 63 y.o.  Sex: female     Chief Complaint:  Chief Complaint   Patient presents with    Foot Problem       History of Present Illness: Laury Schuster is a 63 y.o. very pleasant woman with a longstanding history of PAD.  She had the initial right leg bypass surgery number of years ago and developed the ischemic right leg requiring redo bypass surgery October 2023.  She had right common femoral artery endarterectomy with right common femoral artery below-knee popliteal artery bypass with 6 mm PTFE graft.  Then graft and occluded January 2024.  Patient had graft thrombectomy and the right leg was revascularized and placed anticoagulation therapy with Eliquis.  Last month patient underwent pancreatic resection.  Apparently anticoagulation was not started about 2 weeks postop.  Patient has significant pain on the right leg with dependent rubor.  Doppler interrogation shows graft occlusion noted today.    Social History:  Social Connections: Not on file       Past Medical History:  Past Medical History:   Diagnosis Date    Arthritis     Coagulation disorder ([facility])     possible , pt had surgery on leg and bled out    Diabetes ([facility]) 2019    Hypertension     Left-sided weakness     Nodule of kidney     left kidney    Peripheral vascular disease ([facility])     Stroke ([facility])     recent stroke on 10/16/2021     Surgical History:  Past Surgical History:   Procedure Laterality Date    CATARACT REMOVAL  2019    COLONOSCOPY      FEMORAL BYPASS Right 10/18/2023    Right common femoral endarterectomy. 2.Right common femoral artery to below-knee popliteal bypass with 6 mm PTFE graft. performed by Marko Cantu MD at [facility] MAIN OR    INVASIVE VASCULAR Right 1/7/2024    RIGHT LEG GRAFT THROMBECTOMY AND ANGIOGRAM performed by Marko Cantu MD at [facility] MAIN OR    VASCULAR SURGERY         Allergies:  Allergies   Allergen Reactions

## 2024-04-06 LAB
ALBUMIN SERPL-MCNC: 2.7 G/DL (ref 3.5–5)
ALBUMIN/GLOB SERPL: 0.6 (ref 1.1–2.2)
ALP SERPL-CCNC: 92 U/L (ref 45–117)
ALT SERPL-CCNC: 11 U/L (ref 12–78)
ANION GAP SERPL CALC-SCNC: 3 MMOL/L (ref 5–15)
APTT PPP: 47.2 SEC (ref 21.2–34.1)
APTT PPP: 68.8 SEC (ref 21.2–34.1)
APTT PPP: 73.4 SEC (ref 21.2–34.1)
APTT PPP: >153 SEC (ref 21.2–34.1)
AST SERPL W P-5'-P-CCNC: 15 U/L (ref 15–37)
BASOPHILS # BLD: 0.1 K/UL (ref 0–0.1)
BASOPHILS NFR BLD: 1 % (ref 0–1)
BILIRUB SERPL-MCNC: 0.4 MG/DL (ref 0.2–1)
BUN SERPL-MCNC: 5 MG/DL (ref 6–20)
BUN/CREAT SERPL: 12 (ref 12–20)
CA-I BLD-MCNC: 8.8 MG/DL (ref 8.5–10.1)
CHLORIDE SERPL-SCNC: 103 MMOL/L (ref 97–108)
CO2 SERPL-SCNC: 27 MMOL/L (ref 21–32)
CREAT SERPL-MCNC: 0.42 MG/DL (ref 0.55–1.02)
DIFFERENTIAL METHOD BLD: ABNORMAL
EOSINOPHIL # BLD: 0.3 K/UL (ref 0–0.4)
EOSINOPHIL NFR BLD: 2 % (ref 0–7)
ERYTHROCYTE [DISTWIDTH] IN BLOOD BY AUTOMATED COUNT: 18 % (ref 11.5–14.5)
GLOBULIN SER CALC-MCNC: 4.7 G/DL (ref 2–4)
GLUCOSE BLD STRIP.AUTO-MCNC: 124 MG/DL (ref 65–100)
GLUCOSE BLD STRIP.AUTO-MCNC: 136 MG/DL (ref 65–100)
GLUCOSE BLD STRIP.AUTO-MCNC: 142 MG/DL (ref 65–100)
GLUCOSE BLD STRIP.AUTO-MCNC: 234 MG/DL (ref 65–100)
GLUCOSE BLD STRIP.AUTO-MCNC: 237 MG/DL (ref 65–100)
GLUCOSE SERPL-MCNC: 123 MG/DL (ref 65–100)
HCT VFR BLD AUTO: 31.5 % (ref 35–47)
HGB BLD-MCNC: 9.9 G/DL (ref 11.5–16)
IMM GRANULOCYTES # BLD AUTO: 0 K/UL (ref 0–0.04)
IMM GRANULOCYTES NFR BLD AUTO: 0 % (ref 0–0.5)
INR PPP: 1 (ref 0.9–1.1)
LYMPHOCYTES # BLD: 2.6 K/UL (ref 0.8–3.5)
LYMPHOCYTES NFR BLD: 19 % (ref 12–49)
MCH RBC QN AUTO: 24.1 PG (ref 26–34)
MCHC RBC AUTO-ENTMCNC: 31.4 G/DL (ref 30–36.5)
MCV RBC AUTO: 76.6 FL (ref 80–99)
MONOCYTES # BLD: 1.4 K/UL (ref 0–1)
MONOCYTES NFR BLD: 10 % (ref 5–13)
NEUTS SEG # BLD: 9.1 K/UL (ref 1.8–8)
NEUTS SEG NFR BLD: 68 % (ref 32–75)
NRBC # BLD: 0.15 K/UL (ref 0–0.01)
NRBC BLD-RTO: 1.1 PER 100 WBC
PERFORMED BY:: ABNORMAL
PLATELET # BLD AUTO: 672 K/UL (ref 150–400)
PMV BLD AUTO: 11.3 FL (ref 8.9–12.9)
POTASSIUM SERPL-SCNC: 3.4 MMOL/L (ref 3.5–5.1)
PROT SERPL-MCNC: 7.4 G/DL (ref 6.4–8.2)
PROTHROMBIN TIME: 13.6 SEC (ref 11.9–14.6)
RBC # BLD AUTO: 4.11 M/UL (ref 3.8–5.2)
RBC MORPH BLD: ABNORMAL
RBC MORPH BLD: ABNORMAL
SODIUM SERPL-SCNC: 133 MMOL/L (ref 136–145)
THERAPEUTIC RANGE: ABNORMAL SEC (ref 82–109)
WBC # BLD AUTO: 13.5 K/UL (ref 3.6–11)

## 2024-04-06 PROCEDURE — 94640 AIRWAY INHALATION TREATMENT: CPT

## 2024-04-06 PROCEDURE — 6360000002 HC RX W HCPCS: Performed by: PHYSICIAN ASSISTANT

## 2024-04-06 PROCEDURE — 6360000002 HC RX W HCPCS: Performed by: SURGERY

## 2024-04-06 PROCEDURE — 2580000003 HC RX 258: Performed by: SURGERY

## 2024-04-06 PROCEDURE — 94761 N-INVAS EAR/PLS OXIMETRY MLT: CPT

## 2024-04-06 PROCEDURE — 1100000000 HC RM PRIVATE

## 2024-04-06 PROCEDURE — 85025 COMPLETE CBC W/AUTO DIFF WBC: CPT

## 2024-04-06 PROCEDURE — 6360000002 HC RX W HCPCS: Performed by: EMERGENCY MEDICINE

## 2024-04-06 PROCEDURE — 85610 PROTHROMBIN TIME: CPT

## 2024-04-06 PROCEDURE — 85730 THROMBOPLASTIN TIME PARTIAL: CPT

## 2024-04-06 PROCEDURE — 6370000000 HC RX 637 (ALT 250 FOR IP): Performed by: SURGERY

## 2024-04-06 PROCEDURE — 80053 COMPREHEN METABOLIC PANEL: CPT

## 2024-04-06 PROCEDURE — 82962 GLUCOSE BLOOD TEST: CPT

## 2024-04-06 PROCEDURE — 6370000000 HC RX 637 (ALT 250 FOR IP): Performed by: PHYSICIAN ASSISTANT

## 2024-04-06 RX ORDER — HYDROMORPHONE HYDROCHLORIDE 2 MG/ML
2 INJECTION, SOLUTION INTRAMUSCULAR; INTRAVENOUS; SUBCUTANEOUS
Status: DISCONTINUED | OUTPATIENT
Start: 2024-04-06 | End: 2024-04-07

## 2024-04-06 RX ADMIN — HEPARIN SODIUM 16 UNITS/KG/HR: 10000 INJECTION, SOLUTION INTRAVENOUS at 01:27

## 2024-04-06 RX ADMIN — HYDROMORPHONE HYDROCHLORIDE 2 MG: 2 INJECTION INTRAMUSCULAR; INTRAVENOUS; SUBCUTANEOUS at 19:46

## 2024-04-06 RX ADMIN — HEPARIN SODIUM 1647 UNITS: 1000 INJECTION INTRAVENOUS; SUBCUTANEOUS at 11:11

## 2024-04-06 RX ADMIN — LISINOPRIL 20 MG: 20 TABLET ORAL at 11:18

## 2024-04-06 RX ADMIN — CLINDAMYCIN IN 5 PERCENT DEXTROSE 600 MG: 12 INJECTION, SOLUTION INTRAVENOUS at 00:38

## 2024-04-06 RX ADMIN — HYDROMORPHONE HYDROCHLORIDE 1.5 MG: 2 INJECTION INTRAMUSCULAR; INTRAVENOUS; SUBCUTANEOUS at 04:43

## 2024-04-06 RX ADMIN — CLINDAMYCIN IN 5 PERCENT DEXTROSE 600 MG: 12 INJECTION, SOLUTION INTRAVENOUS at 11:16

## 2024-04-06 RX ADMIN — HYDROMORPHONE HYDROCHLORIDE 1.5 MG: 2 INJECTION INTRAMUSCULAR; INTRAVENOUS; SUBCUTANEOUS at 10:56

## 2024-04-06 RX ADMIN — SODIUM CHLORIDE, PRESERVATIVE FREE 10 ML: 5 INJECTION INTRAVENOUS at 20:27

## 2024-04-06 RX ADMIN — ATORVASTATIN CALCIUM 80 MG: 40 TABLET, FILM COATED ORAL at 11:18

## 2024-04-06 RX ADMIN — HEPARIN SODIUM 1650 UNITS: 1000 INJECTION INTRAVENOUS; SUBCUTANEOUS at 17:08

## 2024-04-06 RX ADMIN — PANTOPRAZOLE SODIUM 40 MG: 40 TABLET, DELAYED RELEASE ORAL at 11:18

## 2024-04-06 RX ADMIN — HEPARIN SODIUM 22 UNITS/KG/HR: 10000 INJECTION, SOLUTION INTRAVENOUS at 17:06

## 2024-04-06 RX ADMIN — INSULIN LISPRO 2 UNITS: 100 INJECTION, SOLUTION INTRAVENOUS; SUBCUTANEOUS at 12:14

## 2024-04-06 RX ADMIN — SODIUM CHLORIDE: 9 INJECTION, SOLUTION INTRAVENOUS at 06:47

## 2024-04-06 RX ADMIN — Medication 2 PUFF: at 22:46

## 2024-04-06 RX ADMIN — HYDROMORPHONE HYDROCHLORIDE 2 MG: 2 INJECTION INTRAMUSCULAR; INTRAVENOUS; SUBCUTANEOUS at 16:54

## 2024-04-06 RX ADMIN — HYDROMORPHONE HYDROCHLORIDE 2 MG: 2 INJECTION INTRAMUSCULAR; INTRAVENOUS; SUBCUTANEOUS at 22:49

## 2024-04-06 RX ADMIN — CLINDAMYCIN IN 5 PERCENT DEXTROSE 600 MG: 12 INJECTION, SOLUTION INTRAVENOUS at 23:02

## 2024-04-06 RX ADMIN — HYDROMORPHONE HYDROCHLORIDE 1.5 MG: 2 INJECTION INTRAMUSCULAR; INTRAVENOUS; SUBCUTANEOUS at 07:47

## 2024-04-06 RX ADMIN — HYDROMORPHONE HYDROCHLORIDE 1.5 MG: 2 INJECTION INTRAMUSCULAR; INTRAVENOUS; SUBCUTANEOUS at 00:43

## 2024-04-06 RX ADMIN — HEPARIN SODIUM 1650 UNITS: 1000 INJECTION INTRAVENOUS; SUBCUTANEOUS at 01:28

## 2024-04-06 RX ADMIN — INSULIN GLARGINE 20 UNITS: 100 INJECTION, SOLUTION SUBCUTANEOUS at 00:32

## 2024-04-06 RX ADMIN — CLINDAMYCIN IN 5 PERCENT DEXTROSE 600 MG: 12 INJECTION, SOLUTION INTRAVENOUS at 16:54

## 2024-04-06 RX ADMIN — HYDROMORPHONE HYDROCHLORIDE 2 MG: 2 INJECTION INTRAMUSCULAR; INTRAVENOUS; SUBCUTANEOUS at 13:47

## 2024-04-06 RX ADMIN — Medication 2 PUFF: at 08:44

## 2024-04-06 ASSESSMENT — PAIN SCALES - GENERAL
PAINLEVEL_OUTOF10: 10
PAINLEVEL_OUTOF10: 8
PAINLEVEL_OUTOF10: 10
PAINLEVEL_OUTOF10: 8
PAINLEVEL_OUTOF10: 7
PAINLEVEL_OUTOF10: 8
PAINLEVEL_OUTOF10: 6

## 2024-04-06 ASSESSMENT — PAIN DESCRIPTION - LOCATION
LOCATION: FOOT;LEG
LOCATION: FOOT;LEG
LOCATION: FOOT
LOCATION: FOOT
LOCATION: FOOT;LEG
LOCATION: FOOT
LOCATION: FOOT;LEG

## 2024-04-06 ASSESSMENT — PAIN DESCRIPTION - ORIENTATION
ORIENTATION: RIGHT

## 2024-04-06 ASSESSMENT — PAIN - FUNCTIONAL ASSESSMENT
PAIN_FUNCTIONAL_ASSESSMENT: PREVENTS OR INTERFERES SOME ACTIVE ACTIVITIES AND ADLS
PAIN_FUNCTIONAL_ASSESSMENT: PREVENTS OR INTERFERES WITH MANY ACTIVE NOT PASSIVE ACTIVITIES

## 2024-04-06 ASSESSMENT — PAIN DESCRIPTION - DESCRIPTORS
DESCRIPTORS: ACHING;THROBBING;SHARP;SORE
DESCRIPTORS: SHARP;THROBBING
DESCRIPTORS: ACHING;STABBING;SHARP
DESCRIPTORS: ACHING;STABBING;THROBBING
DESCRIPTORS: ACHING;THROBBING
DESCRIPTORS: ACHING;BURNING;THROBBING
DESCRIPTORS: ACHING;THROBBING

## 2024-04-06 ASSESSMENT — PAIN SCALES - WONG BAKER: WONGBAKER_NUMERICALRESPONSE: NO HURT

## 2024-04-07 ENCOUNTER — ANESTHESIA (OUTPATIENT)
Facility: HOSPITAL | Age: 64
End: 2024-04-07
Payer: MEDICARE

## 2024-04-07 ENCOUNTER — ANESTHESIA EVENT (OUTPATIENT)
Facility: HOSPITAL | Age: 64
End: 2024-04-07
Payer: MEDICARE

## 2024-04-07 ENCOUNTER — APPOINTMENT (OUTPATIENT)
Facility: HOSPITAL | Age: 64
DRG: 240 | End: 2024-04-07
Payer: MEDICARE

## 2024-04-07 LAB
APPEARANCE UR: CLEAR
APTT PPP: 31.3 SEC (ref 21.2–34.1)
APTT PPP: 32.6 SEC (ref 21.2–34.1)
APTT PPP: 33.3 SEC (ref 21.2–34.1)
APTT PPP: 83.7 SEC (ref 21.2–34.1)
ARTERIAL PATENCY WRIST A: YES
BACTERIA URNS QL MICRO: NEGATIVE /HPF
BASE EXCESS BLDA CALC-SCNC: 3.3 MMOL/L (ref 0–3)
BDY SITE: ABNORMAL
BILIRUB UR QL: NEGATIVE
BODY TEMPERATURE: 98
CK SERPL-CCNC: 119 U/L (ref 26–192)
CK SERPL-CCNC: 302 U/L (ref 26–192)
COHGB MFR BLD: 1 % (ref 1–2)
COLOR UR: ABNORMAL
EPITH CASTS URNS QL MICRO: ABNORMAL /LPF
FIBRINOGEN PPP-MCNC: 481 MG/DL (ref 220–535)
FIBRINOGEN PPP-MCNC: 518 MG/DL (ref 220–535)
FIO2 ON VENT: 21 %
GLUCOSE BLD STRIP.AUTO-MCNC: 152 MG/DL (ref 65–100)
GLUCOSE BLD STRIP.AUTO-MCNC: 212 MG/DL (ref 65–100)
GLUCOSE BLD STRIP.AUTO-MCNC: 215 MG/DL (ref 65–100)
GLUCOSE UR STRIP.AUTO-MCNC: NEGATIVE MG/DL
HCO3 BLDA-SCNC: 28 MMOL/L (ref 22–26)
HGB UR QL STRIP: ABNORMAL
KETONES UR QL STRIP.AUTO: NEGATIVE MG/DL
LEUKOCYTE ESTERASE UR QL STRIP.AUTO: NEGATIVE
METHGB MFR BLD: 0.2 % (ref 0–1.4)
MUCOUS THREADS URNS QL MICRO: ABNORMAL /LPF
NITRITE UR QL STRIP.AUTO: NEGATIVE
OXYHGB MFR BLD: 94.3 % (ref 95–99)
PCO2 BLDA: 40 MMHG (ref 35–45)
PERFORMED BY:: ABNORMAL
PH BLDA: 7.45 (ref 7.35–7.45)
PH UR STRIP: 7 (ref 5–8)
PO2 BLDA: 75 MMHG (ref 80–100)
PROT UR STRIP-MCNC: NEGATIVE MG/DL
RBC #/AREA URNS HPF: ABNORMAL /HPF (ref 0–5)
SAO2 % BLD: 95 % (ref 95–99)
SAO2% DEVICE SAO2% SENSOR NAME: ABNORMAL
SP GR UR REFRACTOMETRY: 1.01 (ref 1–1.03)
SPECIMEN SITE: ABNORMAL
THERAPEUTIC RANGE: ABNORMAL SEC (ref 82–109)
THERAPEUTIC RANGE: NORMAL SEC (ref 82–109)
URINE CULTURE IF INDICATED: ABNORMAL
UROBILINOGEN UR QL STRIP.AUTO: 0.1 EU/DL (ref 0.1–1)
WBC URNS QL MICRO: ABNORMAL /HPF (ref 0–4)

## 2024-04-07 PROCEDURE — 7100000000 HC PACU RECOVERY - FIRST 15 MIN: Performed by: SURGERY

## 2024-04-07 PROCEDURE — 6360000002 HC RX W HCPCS: Performed by: PHYSICIAN ASSISTANT

## 2024-04-07 PROCEDURE — 99232 SBSQ HOSP IP/OBS MODERATE 35: CPT | Performed by: SURGERY

## 2024-04-07 PROCEDURE — 82962 GLUCOSE BLOOD TEST: CPT

## 2024-04-07 PROCEDURE — 94640 AIRWAY INHALATION TREATMENT: CPT

## 2024-04-07 PROCEDURE — 2580000003 HC RX 258: Performed by: SURGERY

## 2024-04-07 PROCEDURE — 6360000002 HC RX W HCPCS: Performed by: SURGERY

## 2024-04-07 PROCEDURE — 6370000000 HC RX 637 (ALT 250 FOR IP): Performed by: SURGERY

## 2024-04-07 PROCEDURE — 36600 WITHDRAWAL OF ARTERIAL BLOOD: CPT

## 2024-04-07 PROCEDURE — 2709999900 HC NON-CHARGEABLE SUPPLY: Performed by: SURGERY

## 2024-04-07 PROCEDURE — 76000 FLUOROSCOPY <1 HR PHYS/QHP: CPT

## 2024-04-07 PROCEDURE — P9016 RBC LEUKOCYTES REDUCED: HCPCS

## 2024-04-07 PROCEDURE — 37211 THROMBOLYTIC ART THERAPY: CPT | Performed by: SURGERY

## 2024-04-07 PROCEDURE — 86922 COMPATIBILITY TEST ANTIGLOB: CPT

## 2024-04-07 PROCEDURE — 85384 FIBRINOGEN ACTIVITY: CPT

## 2024-04-07 PROCEDURE — 3700000001 HC ADD 15 MINUTES (ANESTHESIA): Performed by: SURGERY

## 2024-04-07 PROCEDURE — 75710 ARTERY X-RAYS ARM/LEG: CPT | Performed by: SURGERY

## 2024-04-07 PROCEDURE — C1887 CATHETER, GUIDING: HCPCS | Performed by: SURGERY

## 2024-04-07 PROCEDURE — 88304 TISSUE EXAM BY PATHOLOGIST: CPT

## 2024-04-07 PROCEDURE — 3E05017 INTRODUCTION OF OTHER THROMBOLYTIC INTO PERIPHERAL ARTERY, OPEN APPROACH: ICD-10-PCS | Performed by: SURGERY

## 2024-04-07 PROCEDURE — 35875 REMOVAL OF CLOT IN GRAFT: CPT | Performed by: SURGERY

## 2024-04-07 PROCEDURE — 81001 URINALYSIS AUTO W/SCOPE: CPT

## 2024-04-07 PROCEDURE — 2500000003 HC RX 250 WO HCPCS: Performed by: NURSE ANESTHETIST, CERTIFIED REGISTERED

## 2024-04-07 PROCEDURE — C1769 GUIDE WIRE: HCPCS | Performed by: SURGERY

## 2024-04-07 PROCEDURE — 85730 THROMBOPLASTIN TIME PARTIAL: CPT

## 2024-04-07 PROCEDURE — C1760 CLOSURE DEV, VASC: HCPCS | Performed by: SURGERY

## 2024-04-07 PROCEDURE — 86921 COMPATIBILITY TEST INCUBATE: CPT

## 2024-04-07 PROCEDURE — 3700000000 HC ANESTHESIA ATTENDED CARE: Performed by: SURGERY

## 2024-04-07 PROCEDURE — C1894 INTRO/SHEATH, NON-LASER: HCPCS | Performed by: SURGERY

## 2024-04-07 PROCEDURE — 04CK0ZZ EXTIRPATION OF MATTER FROM RIGHT FEMORAL ARTERY, OPEN APPROACH: ICD-10-PCS | Performed by: SURGERY

## 2024-04-07 PROCEDURE — 2000000000 HC ICU R&B

## 2024-04-07 PROCEDURE — 86902 BLOOD TYPE ANTIGEN DONOR EA: CPT

## 2024-04-07 PROCEDURE — 82803 BLOOD GASES ANY COMBINATION: CPT

## 2024-04-07 PROCEDURE — 3600000002 HC SURGERY LEVEL 2 BASE: Performed by: SURGERY

## 2024-04-07 PROCEDURE — 87086 URINE CULTURE/COLONY COUNT: CPT

## 2024-04-07 PROCEDURE — 2500000003 HC RX 250 WO HCPCS: Performed by: SURGERY

## 2024-04-07 PROCEDURE — 6360000002 HC RX W HCPCS: Performed by: ANESTHESIOLOGY

## 2024-04-07 PROCEDURE — 7100000001 HC PACU RECOVERY - ADDTL 15 MIN: Performed by: SURGERY

## 2024-04-07 PROCEDURE — 94761 N-INVAS EAR/PLS OXIMETRY MLT: CPT

## 2024-04-07 PROCEDURE — 2580000003 HC RX 258: Performed by: NURSE ANESTHETIST, CERTIFIED REGISTERED

## 2024-04-07 PROCEDURE — B41F1ZZ FLUOROSCOPY OF RIGHT LOWER EXTREMITY ARTERIES USING LOW OSMOLAR CONTRAST: ICD-10-PCS | Performed by: SURGERY

## 2024-04-07 PROCEDURE — 6360000002 HC RX W HCPCS: Performed by: NURSE ANESTHETIST, CERTIFIED REGISTERED

## 2024-04-07 PROCEDURE — 82550 ASSAY OF CK (CPK): CPT

## 2024-04-07 PROCEDURE — 3600000012 HC SURGERY LEVEL 2 ADDTL 15MIN: Performed by: SURGERY

## 2024-04-07 RX ORDER — SODIUM CHLORIDE 9 MG/ML
INJECTION, SOLUTION INTRAVENOUS PRN
Status: DISCONTINUED | OUTPATIENT
Start: 2024-04-07 | End: 2024-04-07

## 2024-04-07 RX ORDER — PROPOFOL 10 MG/ML
INJECTION, EMULSION INTRAVENOUS CONTINUOUS PRN
Status: DISCONTINUED | OUTPATIENT
Start: 2024-04-07 | End: 2024-04-07 | Stop reason: SDUPTHER

## 2024-04-07 RX ORDER — OXYCODONE HCL 20 MG/1
20 TABLET, FILM COATED, EXTENDED RELEASE ORAL EVERY 12 HOURS SCHEDULED
Status: DISCONTINUED | OUTPATIENT
Start: 2024-04-07 | End: 2024-04-07

## 2024-04-07 RX ORDER — HEPARIN SODIUM 10000 [USP'U]/100ML
400 INJECTION, SOLUTION INTRAVENOUS CONTINUOUS
Status: DISPENSED | OUTPATIENT
Start: 2024-04-07 | End: 2024-04-08

## 2024-04-07 RX ORDER — OXYCODONE HYDROCHLORIDE 5 MG/1
5 TABLET ORAL PRN
Status: DISCONTINUED | OUTPATIENT
Start: 2024-04-07 | End: 2024-04-07 | Stop reason: HOSPADM

## 2024-04-07 RX ORDER — FENTANYL CITRATE 50 UG/ML
75 INJECTION, SOLUTION INTRAMUSCULAR; INTRAVENOUS
Status: DISCONTINUED | OUTPATIENT
Start: 2024-04-07 | End: 2024-04-09

## 2024-04-07 RX ORDER — GLYCOPYRROLATE 0.2 MG/ML
INJECTION INTRAMUSCULAR; INTRAVENOUS PRN
Status: DISCONTINUED | OUTPATIENT
Start: 2024-04-07 | End: 2024-04-07 | Stop reason: SDUPTHER

## 2024-04-07 RX ORDER — SODIUM CHLORIDE, SODIUM LACTATE, POTASSIUM CHLORIDE, CALCIUM CHLORIDE 600; 310; 30; 20 MG/100ML; MG/100ML; MG/100ML; MG/100ML
INJECTION, SOLUTION INTRAVENOUS ONCE
Status: DISCONTINUED | OUTPATIENT
Start: 2024-04-07 | End: 2024-04-07 | Stop reason: HOSPADM

## 2024-04-07 RX ORDER — IPRATROPIUM BROMIDE AND ALBUTEROL SULFATE 2.5; .5 MG/3ML; MG/3ML
1 SOLUTION RESPIRATORY (INHALATION)
Status: DISCONTINUED | OUTPATIENT
Start: 2024-04-07 | End: 2024-04-07 | Stop reason: HOSPADM

## 2024-04-07 RX ORDER — MIDAZOLAM HYDROCHLORIDE 2 MG/2ML
2 INJECTION, SOLUTION INTRAMUSCULAR; INTRAVENOUS
Status: DISCONTINUED | OUTPATIENT
Start: 2024-04-07 | End: 2024-04-09

## 2024-04-07 RX ORDER — HYDROMORPHONE HYDROCHLORIDE 1 MG/ML
0.5 INJECTION, SOLUTION INTRAMUSCULAR; INTRAVENOUS; SUBCUTANEOUS EVERY 5 MIN PRN
Status: DISCONTINUED | OUTPATIENT
Start: 2024-04-07 | End: 2024-04-07 | Stop reason: HOSPADM

## 2024-04-07 RX ORDER — SODIUM CHLORIDE, SODIUM LACTATE, POTASSIUM CHLORIDE, CALCIUM CHLORIDE 600; 310; 30; 20 MG/100ML; MG/100ML; MG/100ML; MG/100ML
INJECTION, SOLUTION INTRAVENOUS CONTINUOUS PRN
Status: DISCONTINUED | OUTPATIENT
Start: 2024-04-07 | End: 2024-04-07 | Stop reason: SDUPTHER

## 2024-04-07 RX ORDER — DIPHENHYDRAMINE HYDROCHLORIDE 50 MG/ML
12.5 INJECTION INTRAMUSCULAR; INTRAVENOUS
Status: DISCONTINUED | OUTPATIENT
Start: 2024-04-07 | End: 2024-04-07 | Stop reason: HOSPADM

## 2024-04-07 RX ORDER — LABETALOL HYDROCHLORIDE 5 MG/ML
10 INJECTION, SOLUTION INTRAVENOUS
Status: DISCONTINUED | OUTPATIENT
Start: 2024-04-07 | End: 2024-04-07 | Stop reason: HOSPADM

## 2024-04-07 RX ORDER — HYDROMORPHONE HYDROCHLORIDE 1 MG/ML
1 INJECTION, SOLUTION INTRAMUSCULAR; INTRAVENOUS; SUBCUTANEOUS ONCE
Status: COMPLETED | OUTPATIENT
Start: 2024-04-07 | End: 2024-04-07

## 2024-04-07 RX ORDER — DEXAMETHASONE SODIUM PHOSPHATE 4 MG/ML
INJECTION, SOLUTION INTRA-ARTICULAR; INTRALESIONAL; INTRAMUSCULAR; INTRAVENOUS; SOFT TISSUE PRN
Status: DISCONTINUED | OUTPATIENT
Start: 2024-04-07 | End: 2024-04-07 | Stop reason: SDUPTHER

## 2024-04-07 RX ORDER — DEXTROSE MONOHYDRATE 100 MG/ML
INJECTION, SOLUTION INTRAVENOUS CONTINUOUS PRN
Status: DISCONTINUED | OUTPATIENT
Start: 2024-04-07 | End: 2024-04-07 | Stop reason: HOSPADM

## 2024-04-07 RX ORDER — GLUCAGON 1 MG/ML
1 KIT INJECTION PRN
Status: DISCONTINUED | OUTPATIENT
Start: 2024-04-07 | End: 2024-04-07 | Stop reason: HOSPADM

## 2024-04-07 RX ORDER — HEPARIN SODIUM 1000 [USP'U]/ML
INJECTION, SOLUTION INTRAVENOUS; SUBCUTANEOUS PRN
Status: DISCONTINUED | OUTPATIENT
Start: 2024-04-07 | End: 2024-04-07

## 2024-04-07 RX ORDER — HEPARIN SODIUM 10000 [USP'U]/100ML
5-30 INJECTION, SOLUTION INTRAVENOUS CONTINUOUS
Status: DISCONTINUED | OUTPATIENT
Start: 2024-04-07 | End: 2024-04-07

## 2024-04-07 RX ORDER — FENTANYL CITRATE 50 UG/ML
INJECTION, SOLUTION INTRAMUSCULAR; INTRAVENOUS
Status: DISPENSED
Start: 2024-04-07 | End: 2024-04-08

## 2024-04-07 RX ORDER — OXYCODONE HYDROCHLORIDE 5 MG/1
10 TABLET ORAL PRN
Status: DISCONTINUED | OUTPATIENT
Start: 2024-04-07 | End: 2024-04-07 | Stop reason: HOSPADM

## 2024-04-07 RX ORDER — KETOROLAC TROMETHAMINE 30 MG/ML
30 INJECTION, SOLUTION INTRAMUSCULAR; INTRAVENOUS ONCE
Status: COMPLETED | OUTPATIENT
Start: 2024-04-07 | End: 2024-04-07

## 2024-04-07 RX ORDER — SODIUM CHLORIDE 0.9 % (FLUSH) 0.9 %
5-40 SYRINGE (ML) INJECTION PRN
Status: DISCONTINUED | OUTPATIENT
Start: 2024-04-07 | End: 2024-04-07 | Stop reason: HOSPADM

## 2024-04-07 RX ORDER — SODIUM CHLORIDE 9 MG/ML
INJECTION, SOLUTION INTRAVENOUS PRN
Status: CANCELLED | OUTPATIENT
Start: 2024-04-07

## 2024-04-07 RX ORDER — LIDOCAINE HYDROCHLORIDE 10 MG/ML
INJECTION, SOLUTION EPIDURAL; INFILTRATION; INTRACAUDAL; PERINEURAL PRN
Status: DISCONTINUED | OUTPATIENT
Start: 2024-04-07 | End: 2024-04-07 | Stop reason: ALTCHOICE

## 2024-04-07 RX ORDER — SODIUM CHLORIDE 9 MG/ML
INJECTION, SOLUTION INTRAVENOUS PRN
Status: DISCONTINUED | OUTPATIENT
Start: 2024-04-07 | End: 2024-04-07 | Stop reason: HOSPADM

## 2024-04-07 RX ORDER — FUROSEMIDE 10 MG/ML
20 INJECTION INTRAMUSCULAR; INTRAVENOUS ONCE
Status: COMPLETED | OUTPATIENT
Start: 2024-04-07 | End: 2024-04-07

## 2024-04-07 RX ORDER — FENTANYL CITRATE 50 UG/ML
75 INJECTION, SOLUTION INTRAMUSCULAR; INTRAVENOUS
Status: COMPLETED | OUTPATIENT
Start: 2024-04-07 | End: 2024-04-07

## 2024-04-07 RX ORDER — HYDRALAZINE HYDROCHLORIDE 20 MG/ML
10 INJECTION INTRAMUSCULAR; INTRAVENOUS
Status: DISCONTINUED | OUTPATIENT
Start: 2024-04-07 | End: 2024-04-07 | Stop reason: HOSPADM

## 2024-04-07 RX ORDER — MEPERIDINE HYDROCHLORIDE 25 MG/ML
12.5 INJECTION INTRAMUSCULAR; INTRAVENOUS; SUBCUTANEOUS EVERY 5 MIN PRN
Status: DISCONTINUED | OUTPATIENT
Start: 2024-04-07 | End: 2024-04-07 | Stop reason: HOSPADM

## 2024-04-07 RX ORDER — LIDOCAINE 4 G/G
1 PATCH TOPICAL AS NEEDED
Status: DISCONTINUED | OUTPATIENT
Start: 2024-04-07 | End: 2024-04-07 | Stop reason: HOSPADM

## 2024-04-07 RX ORDER — SODIUM CHLORIDE 0.9 % (FLUSH) 0.9 %
5-40 SYRINGE (ML) INJECTION EVERY 12 HOURS SCHEDULED
Status: DISCONTINUED | OUTPATIENT
Start: 2024-04-07 | End: 2024-04-07 | Stop reason: HOSPADM

## 2024-04-07 RX ORDER — SODIUM CHLORIDE 9 MG/ML
INJECTION, SOLUTION INTRAVENOUS PRN
Status: DISCONTINUED | OUTPATIENT
Start: 2024-04-07 | End: 2024-04-09

## 2024-04-07 RX ORDER — METOCLOPRAMIDE HYDROCHLORIDE 5 MG/ML
10 INJECTION INTRAMUSCULAR; INTRAVENOUS
Status: DISCONTINUED | OUTPATIENT
Start: 2024-04-07 | End: 2024-04-07 | Stop reason: HOSPADM

## 2024-04-07 RX ORDER — FENTANYL CITRATE 50 UG/ML
50 INJECTION, SOLUTION INTRAMUSCULAR; INTRAVENOUS EVERY 5 MIN PRN
Status: DISCONTINUED | OUTPATIENT
Start: 2024-04-07 | End: 2024-04-07 | Stop reason: HOSPADM

## 2024-04-07 RX ORDER — SODIUM CHLORIDE 0.9 % (FLUSH) 0.9 %
5-40 SYRINGE (ML) INJECTION EVERY 12 HOURS SCHEDULED
Status: DISCONTINUED | OUTPATIENT
Start: 2024-04-07 | End: 2024-04-09

## 2024-04-07 RX ORDER — OXYCODONE HCL 20 MG/1
20 TABLET, FILM COATED, EXTENDED RELEASE ORAL EVERY 12 HOURS SCHEDULED
Status: DISCONTINUED | OUTPATIENT
Start: 2024-04-07 | End: 2024-04-10

## 2024-04-07 RX ORDER — ONDANSETRON 2 MG/ML
4 INJECTION INTRAMUSCULAR; INTRAVENOUS EVERY 6 HOURS PRN
Status: DISCONTINUED | OUTPATIENT
Start: 2024-04-07 | End: 2024-04-17 | Stop reason: HOSPADM

## 2024-04-07 RX ORDER — SODIUM CHLORIDE 0.9 % (FLUSH) 0.9 %
5-40 SYRINGE (ML) INJECTION PRN
Status: DISCONTINUED | OUTPATIENT
Start: 2024-04-07 | End: 2024-04-09

## 2024-04-07 RX ORDER — MIDAZOLAM HYDROCHLORIDE 2 MG/2ML
2 INJECTION, SOLUTION INTRAMUSCULAR; INTRAVENOUS ONCE
Status: COMPLETED | OUTPATIENT
Start: 2024-04-07 | End: 2024-04-07

## 2024-04-07 RX ORDER — LORAZEPAM 2 MG/ML
0.5 INJECTION INTRAMUSCULAR
Status: DISCONTINUED | OUTPATIENT
Start: 2024-04-07 | End: 2024-04-07 | Stop reason: HOSPADM

## 2024-04-07 RX ORDER — KETAMINE HYDROCHLORIDE 10 MG/ML
INJECTION, SOLUTION INTRAMUSCULAR; INTRAVENOUS PRN
Status: DISCONTINUED | OUTPATIENT
Start: 2024-04-07 | End: 2024-04-07 | Stop reason: SDUPTHER

## 2024-04-07 RX ORDER — METOPROLOL TARTRATE 1 MG/ML
5 INJECTION, SOLUTION INTRAVENOUS EVERY 6 HOURS
Status: DISCONTINUED | OUTPATIENT
Start: 2024-04-07 | End: 2024-04-17 | Stop reason: HOSPADM

## 2024-04-07 RX ORDER — FENTANYL CITRATE 50 UG/ML
INJECTION, SOLUTION INTRAMUSCULAR; INTRAVENOUS PRN
Status: DISCONTINUED | OUTPATIENT
Start: 2024-04-07 | End: 2024-04-07 | Stop reason: SDUPTHER

## 2024-04-07 RX ORDER — ONDANSETRON 4 MG/1
4 TABLET, ORALLY DISINTEGRATING ORAL EVERY 8 HOURS PRN
Status: DISCONTINUED | OUTPATIENT
Start: 2024-04-07 | End: 2024-04-17 | Stop reason: HOSPADM

## 2024-04-07 RX ORDER — MIDAZOLAM HYDROCHLORIDE 1 MG/ML
INJECTION INTRAMUSCULAR; INTRAVENOUS PRN
Status: DISCONTINUED | OUTPATIENT
Start: 2024-04-07 | End: 2024-04-07 | Stop reason: SDUPTHER

## 2024-04-07 RX ORDER — ONDANSETRON 2 MG/ML
4 INJECTION INTRAMUSCULAR; INTRAVENOUS
Status: DISCONTINUED | OUTPATIENT
Start: 2024-04-07 | End: 2024-04-07 | Stop reason: HOSPADM

## 2024-04-07 RX ORDER — NALOXONE HYDROCHLORIDE 0.4 MG/ML
INJECTION, SOLUTION INTRAMUSCULAR; INTRAVENOUS; SUBCUTANEOUS PRN
Status: DISCONTINUED | OUTPATIENT
Start: 2024-04-07 | End: 2024-04-07 | Stop reason: HOSPADM

## 2024-04-07 RX ADMIN — FENTANYL CITRATE 75 MCG: 0.05 INJECTION, SOLUTION INTRAMUSCULAR; INTRAVENOUS at 18:02

## 2024-04-07 RX ADMIN — ALTEPLASE 4 MG: 2.2 INJECTION, POWDER, LYOPHILIZED, FOR SOLUTION INTRAVENOUS at 12:17

## 2024-04-07 RX ADMIN — ALTEPLASE 1 MG/HR: 2.2 INJECTION, POWDER, LYOPHILIZED, FOR SOLUTION INTRAVENOUS at 12:17

## 2024-04-07 RX ADMIN — MIDAZOLAM HYDROCHLORIDE 2 MG: 2 INJECTION, SOLUTION INTRAMUSCULAR; INTRAVENOUS at 10:20

## 2024-04-07 RX ADMIN — HYDROMORPHONE HYDROCHLORIDE 2 MG: 2 INJECTION INTRAMUSCULAR; INTRAVENOUS; SUBCUTANEOUS at 02:42

## 2024-04-07 RX ADMIN — GLYCOPYRROLATE 0.1 MG: 0.2 INJECTION, SOLUTION INTRAMUSCULAR; INTRAVENOUS at 10:31

## 2024-04-07 RX ADMIN — FENTANYL CITRATE 75 MCG: 0.05 INJECTION, SOLUTION INTRAMUSCULAR; INTRAVENOUS at 23:00

## 2024-04-07 RX ADMIN — MIDAZOLAM HYDROCHLORIDE 2 MG: 1 INJECTION, SOLUTION INTRAMUSCULAR; INTRAVENOUS at 23:00

## 2024-04-07 RX ADMIN — KETAMINE HYDROCHLORIDE 20 MG: 10 INJECTION, SOLUTION INTRAMUSCULAR; INTRAVENOUS at 10:42

## 2024-04-07 RX ADMIN — HYDROMORPHONE HYDROCHLORIDE 2 MG: 2 INJECTION INTRAMUSCULAR; INTRAVENOUS; SUBCUTANEOUS at 05:21

## 2024-04-07 RX ADMIN — ALTEPLASE 1 MG/HR: 2.2 INJECTION, POWDER, LYOPHILIZED, FOR SOLUTION INTRAVENOUS at 12:38

## 2024-04-07 RX ADMIN — FUROSEMIDE 20 MG: 10 INJECTION, SOLUTION INTRAMUSCULAR; INTRAVENOUS at 15:39

## 2024-04-07 RX ADMIN — Medication 2 PUFF: at 09:39

## 2024-04-07 RX ADMIN — DEXAMETHASONE SODIUM PHOSPHATE 10 MG: 4 INJECTION, SOLUTION INTRA-ARTICULAR; INTRALESIONAL; INTRAMUSCULAR; INTRAVENOUS; SOFT TISSUE at 12:34

## 2024-04-07 RX ADMIN — KETAMINE HYDROCHLORIDE 10 MG: 10 INJECTION, SOLUTION INTRAMUSCULAR; INTRAVENOUS at 11:04

## 2024-04-07 RX ADMIN — HYDROMORPHONE HYDROCHLORIDE 1 MG: 1 INJECTION, SOLUTION INTRAMUSCULAR; INTRAVENOUS; SUBCUTANEOUS at 15:12

## 2024-04-07 RX ADMIN — PROPOFOL 75 MCG/KG/MIN: 10 INJECTION, EMULSION INTRAVENOUS at 10:24

## 2024-04-07 RX ADMIN — PANTOPRAZOLE SODIUM 40 MG: 40 TABLET, DELAYED RELEASE ORAL at 08:13

## 2024-04-07 RX ADMIN — HEPARIN SODIUM 5000 UNITS: 1000 INJECTION, SOLUTION INTRAVENOUS; SUBCUTANEOUS at 10:57

## 2024-04-07 RX ADMIN — SODIUM CHLORIDE, PRESERVATIVE FREE 10 ML: 5 INJECTION INTRAVENOUS at 08:17

## 2024-04-07 RX ADMIN — HYDROMORPHONE HYDROCHLORIDE 2 MG: 2 INJECTION INTRAMUSCULAR; INTRAVENOUS; SUBCUTANEOUS at 13:31

## 2024-04-07 RX ADMIN — OXYCODONE HYDROCHLORIDE 20 MG: 20 TABLET, FILM COATED, EXTENDED RELEASE ORAL at 18:08

## 2024-04-07 RX ADMIN — HYDROMORPHONE HYDROCHLORIDE 2 MG: 2 INJECTION INTRAMUSCULAR; INTRAVENOUS; SUBCUTANEOUS at 08:13

## 2024-04-07 RX ADMIN — FENTANYL CITRATE 75 MCG: 0.05 INJECTION, SOLUTION INTRAMUSCULAR; INTRAVENOUS at 16:56

## 2024-04-07 RX ADMIN — KETAMINE HYDROCHLORIDE 20 MG: 10 INJECTION, SOLUTION INTRAMUSCULAR; INTRAVENOUS at 10:31

## 2024-04-07 RX ADMIN — HYDROMORPHONE HYDROCHLORIDE 0.5 MG: 1 INJECTION, SOLUTION INTRAMUSCULAR; INTRAVENOUS; SUBCUTANEOUS at 12:53

## 2024-04-07 RX ADMIN — FENTANYL CITRATE 50 MCG: 50 INJECTION, SOLUTION INTRAMUSCULAR; INTRAVENOUS at 13:10

## 2024-04-07 RX ADMIN — METOROPROLOL TARTRATE 5 MG: 5 INJECTION, SOLUTION INTRAVENOUS at 16:53

## 2024-04-07 RX ADMIN — HEPARIN SODIUM AND DEXTROSE 400 UNITS/HR: 10000; 5 INJECTION INTRAVENOUS at 13:40

## 2024-04-07 RX ADMIN — FENTANYL CITRATE 75 MCG: 0.05 INJECTION, SOLUTION INTRAMUSCULAR; INTRAVENOUS at 20:15

## 2024-04-07 RX ADMIN — FENTANYL CITRATE 50 MCG: 50 INJECTION, SOLUTION INTRAMUSCULAR; INTRAVENOUS at 10:28

## 2024-04-07 RX ADMIN — MIDAZOLAM HYDROCHLORIDE 2 MG: 1 INJECTION, SOLUTION INTRAMUSCULAR; INTRAVENOUS at 20:10

## 2024-04-07 RX ADMIN — FENTANYL CITRATE 50 MCG: 50 INJECTION, SOLUTION INTRAMUSCULAR; INTRAVENOUS at 12:45

## 2024-04-07 RX ADMIN — ATORVASTATIN CALCIUM 80 MG: 40 TABLET, FILM COATED ORAL at 08:13

## 2024-04-07 RX ADMIN — CLINDAMYCIN IN 5 PERCENT DEXTROSE 600 MG: 12 INJECTION, SOLUTION INTRAVENOUS at 23:00

## 2024-04-07 RX ADMIN — LISINOPRIL 20 MG: 20 TABLET ORAL at 08:13

## 2024-04-07 RX ADMIN — HYDROMORPHONE HYDROCHLORIDE 1 MG: 1 INJECTION, SOLUTION INTRAMUSCULAR; INTRAVENOUS; SUBCUTANEOUS at 12:29

## 2024-04-07 RX ADMIN — CLINDAMYCIN IN 5 PERCENT DEXTROSE 600 MG: 12 INJECTION, SOLUTION INTRAVENOUS at 08:17

## 2024-04-07 RX ADMIN — SODIUM CHLORIDE, POTASSIUM CHLORIDE, SODIUM LACTATE AND CALCIUM CHLORIDE: 600; 310; 30; 20 INJECTION, SOLUTION INTRAVENOUS at 10:20

## 2024-04-07 RX ADMIN — SODIUM CHLORIDE, PRESERVATIVE FREE 10 ML: 5 INJECTION INTRAVENOUS at 20:28

## 2024-04-07 RX ADMIN — METOROPROLOL TARTRATE 5 MG: 5 INJECTION, SOLUTION INTRAVENOUS at 21:41

## 2024-04-07 RX ADMIN — SODIUM CHLORIDE, PRESERVATIVE FREE 10 ML: 5 INJECTION INTRAVENOUS at 21:40

## 2024-04-07 RX ADMIN — FENTANYL CITRATE 75 MCG: 0.05 INJECTION, SOLUTION INTRAMUSCULAR; INTRAVENOUS at 19:14

## 2024-04-07 RX ADMIN — KETOROLAC TROMETHAMINE 30 MG: 30 INJECTION, SOLUTION INTRAMUSCULAR; INTRAVENOUS at 15:12

## 2024-04-07 RX ADMIN — FENTANYL CITRATE 75 MCG: 0.05 INJECTION, SOLUTION INTRAMUSCULAR; INTRAVENOUS at 21:41

## 2024-04-07 RX ADMIN — ALTEPLASE 1 MG/HR: 2.2 INJECTION, POWDER, LYOPHILIZED, FOR SOLUTION INTRAVENOUS at 22:55

## 2024-04-07 RX ADMIN — CLINDAMYCIN IN 5 PERCENT DEXTROSE 600 MG: 12 INJECTION, SOLUTION INTRAVENOUS at 15:42

## 2024-04-07 RX ADMIN — FENTANYL CITRATE 50 MCG: 50 INJECTION, SOLUTION INTRAMUSCULAR; INTRAVENOUS at 10:24

## 2024-04-07 ASSESSMENT — PAIN DESCRIPTION - ORIENTATION
ORIENTATION: RIGHT

## 2024-04-07 ASSESSMENT — PAIN DESCRIPTION - LOCATION
LOCATION: FOOT;LEG
LOCATION: FOOT
LOCATION: LEG
LOCATION: FOOT

## 2024-04-07 ASSESSMENT — PAIN - FUNCTIONAL ASSESSMENT
PAIN_FUNCTIONAL_ASSESSMENT: FACE, LEGS, ACTIVITY, CRY, AND CONSOLABILITY (FLACC)
PAIN_FUNCTIONAL_ASSESSMENT: PREVENTS OR INTERFERES SOME ACTIVE ACTIVITIES AND ADLS
PAIN_FUNCTIONAL_ASSESSMENT: PREVENTS OR INTERFERES WITH MANY ACTIVE NOT PASSIVE ACTIVITIES
PAIN_FUNCTIONAL_ASSESSMENT: PREVENTS OR INTERFERES SOME ACTIVE ACTIVITIES AND ADLS

## 2024-04-07 ASSESSMENT — PAIN SCALES - WONG BAKER
WONGBAKER_NUMERICALRESPONSE: HURTS WORST
WONGBAKER_NUMERICALRESPONSE: HURTS WHOLE LOT
WONGBAKER_NUMERICALRESPONSE: HURTS WORST
WONGBAKER_NUMERICALRESPONSE: HURTS WORST

## 2024-04-07 ASSESSMENT — PAIN SCALES - GENERAL
PAINLEVEL_OUTOF10: 10
PAINLEVEL_OUTOF10: 0
PAINLEVEL_OUTOF10: 7
PAINLEVEL_OUTOF10: 10
PAINLEVEL_OUTOF10: 0
PAINLEVEL_OUTOF10: 10
PAINLEVEL_OUTOF10: 6
PAINLEVEL_OUTOF10: 10
PAINLEVEL_OUTOF10: 0
PAINLEVEL_OUTOF10: 10
PAINLEVEL_OUTOF10: 8
PAINLEVEL_OUTOF10: 10
PAINLEVEL_OUTOF10: 7
PAINLEVEL_OUTOF10: 10
PAINLEVEL_OUTOF10: 8
PAINLEVEL_OUTOF10: 9
PAINLEVEL_OUTOF10: 10

## 2024-04-07 ASSESSMENT — PAIN DESCRIPTION - DESCRIPTORS
DESCRIPTORS: ACHING;THROBBING;SHARP;SORE
DESCRIPTORS: ACHING
DESCRIPTORS: ACHING;THROBBING;STABBING
DESCRIPTORS: SHARP;THROBBING
DESCRIPTORS: ACHING

## 2024-04-07 NOTE — CONSENT
Informed Consent for Blood Component Transfusion Note    I have discussed with the patient the rationale for blood component transfusion; its benefits in treating or preventing fatigue, organ damage, or death; and its risk which includes mild transfusion reactions, rare risk of blood borne infection, or more serious but rare reactions. I have discussed the alternatives to transfusion, including the risk and consequences of not receiving transfusion. The patient had an opportunity to ask questions and had agreed to proceed with transfusion of blood components.    Electronically signed by Leoncio Sena MD on 4/7/24 at 12:08 PM EDT

## 2024-04-07 NOTE — BRIEF OP NOTE
Brief Postoperative Note      Patient: Laury Schuster  YOB: 1960  MRN: [id number]    Date of Procedure: 4/7/2024    Pre-Op Diagnosis Codes:     * Mechanical complication of bypass graft, initial encounter ([facility]) [T82.398A]    Post-Op Diagnosis:   Right common femoral artery to below-knee popliteal artery bypass graft occlusion due to thrombosis, plaque, fibrin deposits.  Right below-knee three-vessel occlusion, nonvisualization of contrast at the ankle level.       Procedure:  1.  Right common femoral artery to below-knee popliteal bypass graft thrombectomy using 3 [place] a 4 [place] Walker catheter.  2.  Right leg angiogram.  3.  Right leg bypass graft tPA infusion catheter placement.    Surgeon(s):  Marko Cantu MD    Assistant:  Surgical Assistant: Alvarado Regan    Anesthesia: General    Estimated Blood Loss (mL): 200     Complications: None    Specimens:   ID Type Source Tests Collected by Time Destination   1 : (R) leg thrombis and plaque Tissue Leg SURGICAL PATHOLOGY Marko Cantu MD 4/7/2024 1102        Implants:  * No implants in log *      Drains:   Urinary Catheter 04/07/24 2 Way (Active)   Catheter Indications Perioperative use for selected surgical procedures 04/07/24 1600   Site Assessment No urethral drainage 04/07/24 1600   Urine Color Yellow 04/07/24 1600   Urine Appearance Clear 04/07/24 1600   Collection Container Standard 04/07/24 1600   Securement Method Securing device (Describe) 04/07/24 1600   Catheter Care  Perineal wipes 04/07/24 1600   Catheter Best Practices  Drainage tube clipped to bed;Catheter secured to thigh;Tamper seal intact;Bag below bladder;Bag not on floor;Lack of dependent loop in tubing;Drainage bag less than half full 04/07/24 1600   Status Draining;Patent 04/07/24 1600   Output (mL) 200 mL 04/07/24 1600       Findings:  Infection Present At Time Of Surgery (PATOS) (choose all levels that have infection present):  No infection present  Other Findings: As

## 2024-04-08 LAB
ABO + RH BLD: NORMAL
ANION GAP SERPL CALC-SCNC: 4 MMOL/L (ref 5–15)
ANTIGENS PRESENT RBC DONR: NORMAL
APTT PPP: 32.6 SEC (ref 21.2–34.1)
APTT PPP: 33.7 SEC (ref 21.2–34.1)
APTT PPP: 33.9 SEC (ref 21.2–34.1)
ARTERIAL PATENCY WRIST A: YES
BASE EXCESS BLDA CALC-SCNC: 2.8 MMOL/L (ref 0–3)
BDY SITE: ABNORMAL
BLD PROD TYP BPU: NORMAL
BLOOD BANK BLOOD PRODUCT EXPIRATION DATE: NORMAL
BLOOD BANK CMNT PATIENT-IMP: NORMAL
BLOOD BANK DISPENSE STATUS: NORMAL
BLOOD BANK ISBT PRODUCT BLOOD TYPE: 6200
BLOOD BANK PRODUCT CODE: NORMAL
BLOOD BANK UNIT TYPE AND RH: NORMAL
BLOOD GROUP ANTIBODIES SERPL: NORMAL
BLOOD GROUP ANTIBODIES SERPL: POSITIVE
BODY TEMPERATURE: 97.9
BPU ID: NORMAL
BUN SERPL-MCNC: 5 MG/DL (ref 6–20)
BUN/CREAT SERPL: 11 (ref 12–20)
CA-I BLD-MCNC: 8.6 MG/DL (ref 8.5–10.1)
CHLORIDE SERPL-SCNC: 97 MMOL/L (ref 97–108)
CK SERPL-CCNC: 1560 U/L (ref 26–192)
CK SERPL-CCNC: 2394 U/L (ref 26–192)
CK SERPL-CCNC: 2413 U/L (ref 26–192)
CK SERPL-CCNC: 2497 U/L (ref 26–192)
CO2 SERPL-SCNC: 31 MMOL/L (ref 21–32)
COHGB MFR BLD: 0.9 % (ref 1–2)
CREAT SERPL-MCNC: 0.45 MG/DL (ref 0.55–1.02)
CROSSMATCH RESULT: NORMAL
ERYTHROCYTE [DISTWIDTH] IN BLOOD BY AUTOMATED COUNT: 18.6 % (ref 11.5–14.5)
FIBRINOGEN PPP-MCNC: 493 MG/DL (ref 220–535)
FIBRINOGEN PPP-MCNC: 522 MG/DL (ref 220–535)
FIBRINOGEN PPP-MCNC: 539 MG/DL (ref 220–535)
FIBRINOGEN PPP-MCNC: 578 MG/DL (ref 220–535)
FIO2 ON VENT: 28 %
GAS FLOW.O2 O2 DELIVERY SYS: 2 L/MIN
GLUCOSE BLD STRIP.AUTO-MCNC: 202 MG/DL (ref 65–100)
GLUCOSE BLD STRIP.AUTO-MCNC: 207 MG/DL (ref 65–100)
GLUCOSE BLD STRIP.AUTO-MCNC: 219 MG/DL (ref 65–100)
GLUCOSE BLD STRIP.AUTO-MCNC: 275 MG/DL (ref 65–100)
GLUCOSE SERPL-MCNC: 188 MG/DL (ref 65–100)
HCO3 BLDA-SCNC: 27 MMOL/L (ref 22–26)
HCT VFR BLD AUTO: 32.7 % (ref 35–47)
HGB BLD-MCNC: 10.4 G/DL (ref 11.5–16)
MCH RBC QN AUTO: 24.2 PG (ref 26–34)
MCHC RBC AUTO-ENTMCNC: 31.8 G/DL (ref 30–36.5)
MCV RBC AUTO: 76.2 FL (ref 80–99)
METHGB MFR BLD: 0.2 % (ref 0–1.4)
NRBC # BLD: 0.07 K/UL (ref 0–0.01)
NRBC BLD-RTO: 0.5 PER 100 WBC
OXYHGB MFR BLD: 96.6 % (ref 95–99)
PCO2 BLDA: 38 MMHG (ref 35–45)
PERFORMED BY:: ABNORMAL
PH BLDA: 7.47 (ref 7.35–7.45)
PLATELET # BLD AUTO: 549 K/UL (ref 150–400)
PMV BLD AUTO: 10.9 FL (ref 8.9–12.9)
PO2 BLDA: 98 MMHG (ref 80–100)
POTASSIUM SERPL-SCNC: 3.1 MMOL/L (ref 3.5–5.1)
RBC # BLD AUTO: 4.29 M/UL (ref 3.8–5.2)
SAO2 % BLD: 98 % (ref 95–99)
SAO2% DEVICE SAO2% SENSOR NAME: ABNORMAL
SODIUM SERPL-SCNC: 132 MMOL/L (ref 136–145)
SPECIMEN EXP DATE BLD: NORMAL
SPECIMEN SITE: ABNORMAL
THERAPEUTIC RANGE: NORMAL SEC (ref 82–109)
TRANSFUSION STATUS PATIENT QL: NORMAL
UNIT DIVISION: 0
UNIT ISSUE DATE/TIME: NORMAL
WBC # BLD AUTO: 15 K/UL (ref 3.6–11)

## 2024-04-08 PROCEDURE — C1887 CATHETER, GUIDING: HCPCS | Performed by: SURGERY

## 2024-04-08 PROCEDURE — 99153 MOD SED SAME PHYS/QHP EA: CPT | Performed by: SURGERY

## 2024-04-08 PROCEDURE — 94761 N-INVAS EAR/PLS OXIMETRY MLT: CPT

## 2024-04-08 PROCEDURE — 6360000004 HC RX CONTRAST MEDICATION: Performed by: SURGERY

## 2024-04-08 PROCEDURE — C1725 CATH, TRANSLUMIN NON-LASER: HCPCS | Performed by: SURGERY

## 2024-04-08 PROCEDURE — 37228 HC PLASTY UNI TIB/PER INITIAL: CPT | Performed by: SURGERY

## 2024-04-08 PROCEDURE — 2500000003 HC RX 250 WO HCPCS: Performed by: SURGERY

## 2024-04-08 PROCEDURE — 99152 MOD SED SAME PHYS/QHP 5/>YRS: CPT | Performed by: SURGERY

## 2024-04-08 PROCEDURE — 75710 ARTERY X-RAYS ARM/LEG: CPT | Performed by: SURGERY

## 2024-04-08 PROCEDURE — 2709999900 HC NON-CHARGEABLE SUPPLY: Performed by: SURGERY

## 2024-04-08 PROCEDURE — 85384 FIBRINOGEN ACTIVITY: CPT

## 2024-04-08 PROCEDURE — 6370000000 HC RX 637 (ALT 250 FOR IP): Performed by: SURGERY

## 2024-04-08 PROCEDURE — 99232 SBSQ HOSP IP/OBS MODERATE 35: CPT | Performed by: SURGERY

## 2024-04-08 PROCEDURE — 85027 COMPLETE CBC AUTOMATED: CPT

## 2024-04-08 PROCEDURE — 82962 GLUCOSE BLOOD TEST: CPT

## 2024-04-08 PROCEDURE — 6360000002 HC RX W HCPCS: Performed by: SURGERY

## 2024-04-08 PROCEDURE — 36600 WITHDRAWAL OF ARTERIAL BLOOD: CPT

## 2024-04-08 PROCEDURE — 2000000000 HC ICU R&B

## 2024-04-08 PROCEDURE — C1769 GUIDE WIRE: HCPCS | Performed by: SURGERY

## 2024-04-08 PROCEDURE — 37214 CESSJ THERAPY CATH REMOVAL: CPT | Performed by: SURGERY

## 2024-04-08 PROCEDURE — 36415 COLL VENOUS BLD VENIPUNCTURE: CPT

## 2024-04-08 PROCEDURE — 85730 THROMBOPLASTIN TIME PARTIAL: CPT

## 2024-04-08 PROCEDURE — 36245 INS CATH ABD/L-EXT ART 1ST: CPT | Performed by: SURGERY

## 2024-04-08 PROCEDURE — 2700000000 HC OXYGEN THERAPY PER DAY

## 2024-04-08 PROCEDURE — 94640 AIRWAY INHALATION TREATMENT: CPT

## 2024-04-08 PROCEDURE — 82803 BLOOD GASES ANY COMBINATION: CPT

## 2024-04-08 PROCEDURE — 82550 ASSAY OF CK (CPK): CPT

## 2024-04-08 PROCEDURE — 80048 BASIC METABOLIC PNL TOTAL CA: CPT

## 2024-04-08 PROCEDURE — C1760 CLOSURE DEV, VASC: HCPCS | Performed by: SURGERY

## 2024-04-08 PROCEDURE — 047P3ZZ DILATION OF RIGHT ANTERIOR TIBIAL ARTERY, PERCUTANEOUS APPROACH: ICD-10-PCS | Performed by: SURGERY

## 2024-04-08 PROCEDURE — 37228 PR REVSC OPN/PRQ TIB/PERO W/ANGIOPLASTY UNI: CPT | Performed by: SURGERY

## 2024-04-08 PROCEDURE — 2580000003 HC RX 258: Performed by: SURGERY

## 2024-04-08 PROCEDURE — B41F1ZZ FLUOROSCOPY OF RIGHT LOWER EXTREMITY ARTERIES USING LOW OSMOLAR CONTRAST: ICD-10-PCS | Performed by: SURGERY

## 2024-04-08 RX ORDER — ACETAMINOPHEN 325 MG/1
650 TABLET ORAL EVERY 4 HOURS PRN
Status: DISCONTINUED | OUTPATIENT
Start: 2024-04-08 | End: 2024-04-17 | Stop reason: HOSPADM

## 2024-04-08 RX ORDER — POTASSIUM CHLORIDE 7.45 MG/ML
10 INJECTION INTRAVENOUS
Status: COMPLETED | OUTPATIENT
Start: 2024-04-08 | End: 2024-04-08

## 2024-04-08 RX ORDER — SODIUM CHLORIDE 0.9 % (FLUSH) 0.9 %
5-40 SYRINGE (ML) INJECTION EVERY 12 HOURS SCHEDULED
Status: DISCONTINUED | OUTPATIENT
Start: 2024-04-08 | End: 2024-04-09

## 2024-04-08 RX ORDER — FENTANYL CITRATE 50 UG/ML
INJECTION, SOLUTION INTRAMUSCULAR; INTRAVENOUS PRN
Status: DISCONTINUED | OUTPATIENT
Start: 2024-04-08 | End: 2024-04-08 | Stop reason: HOSPADM

## 2024-04-08 RX ORDER — MIDAZOLAM HYDROCHLORIDE 1 MG/ML
INJECTION INTRAMUSCULAR; INTRAVENOUS PRN
Status: DISCONTINUED | OUTPATIENT
Start: 2024-04-08 | End: 2024-04-08 | Stop reason: HOSPADM

## 2024-04-08 RX ORDER — SODIUM CHLORIDE 9 MG/ML
INJECTION, SOLUTION INTRAVENOUS PRN
Status: DISCONTINUED | OUTPATIENT
Start: 2024-04-08 | End: 2024-04-17 | Stop reason: HOSPADM

## 2024-04-08 RX ORDER — HEPARIN SODIUM 200 [USP'U]/100ML
INJECTION, SOLUTION INTRAVENOUS CONTINUOUS PRN
Status: COMPLETED | OUTPATIENT
Start: 2024-04-08 | End: 2024-04-08

## 2024-04-08 RX ORDER — MAGNESIUM SULFATE 1 G/100ML
1000 INJECTION INTRAVENOUS ONCE
Status: COMPLETED | OUTPATIENT
Start: 2024-04-08 | End: 2024-04-08

## 2024-04-08 RX ORDER — SODIUM CHLORIDE 0.9 % (FLUSH) 0.9 %
5-40 SYRINGE (ML) INJECTION PRN
Status: DISCONTINUED | OUTPATIENT
Start: 2024-04-08 | End: 2024-04-17 | Stop reason: HOSPADM

## 2024-04-08 RX ADMIN — FENTANYL CITRATE 75 MCG: 0.05 INJECTION, SOLUTION INTRAMUSCULAR; INTRAVENOUS at 20:28

## 2024-04-08 RX ADMIN — MIDAZOLAM HYDROCHLORIDE 2 MG: 1 INJECTION, SOLUTION INTRAMUSCULAR; INTRAVENOUS at 21:21

## 2024-04-08 RX ADMIN — MIDAZOLAM HYDROCHLORIDE 2 MG: 1 INJECTION, SOLUTION INTRAMUSCULAR; INTRAVENOUS at 02:10

## 2024-04-08 RX ADMIN — MIDAZOLAM HYDROCHLORIDE 2 MG: 1 INJECTION, SOLUTION INTRAMUSCULAR; INTRAVENOUS at 10:16

## 2024-04-08 RX ADMIN — CLINDAMYCIN IN 5 PERCENT DEXTROSE 600 MG: 12 INJECTION, SOLUTION INTRAVENOUS at 23:40

## 2024-04-08 RX ADMIN — POTASSIUM CHLORIDE 10 MEQ: 7.45 INJECTION INTRAVENOUS at 10:18

## 2024-04-08 RX ADMIN — FENTANYL CITRATE 75 MCG: 0.05 INJECTION, SOLUTION INTRAMUSCULAR; INTRAVENOUS at 02:09

## 2024-04-08 RX ADMIN — FENTANYL CITRATE 75 MCG: 0.05 INJECTION, SOLUTION INTRAMUSCULAR; INTRAVENOUS at 15:09

## 2024-04-08 RX ADMIN — METOROPROLOL TARTRATE 5 MG: 5 INJECTION, SOLUTION INTRAVENOUS at 04:00

## 2024-04-08 RX ADMIN — SODIUM CHLORIDE, PRESERVATIVE FREE 10 ML: 5 INJECTION INTRAVENOUS at 09:12

## 2024-04-08 RX ADMIN — Medication 2 PUFF: at 21:41

## 2024-04-08 RX ADMIN — FENTANYL CITRATE 75 MCG: 0.05 INJECTION, SOLUTION INTRAMUSCULAR; INTRAVENOUS at 10:16

## 2024-04-08 RX ADMIN — FENTANYL CITRATE 75 MCG: 0.05 INJECTION, SOLUTION INTRAMUSCULAR; INTRAVENOUS at 12:14

## 2024-04-08 RX ADMIN — SODIUM CHLORIDE, PRESERVATIVE FREE 10 ML: 5 INJECTION INTRAVENOUS at 20:30

## 2024-04-08 RX ADMIN — SODIUM CHLORIDE, PRESERVATIVE FREE 10 ML: 5 INJECTION INTRAVENOUS at 09:31

## 2024-04-08 RX ADMIN — Medication 2 PUFF: at 06:46

## 2024-04-08 RX ADMIN — METOROPROLOL TARTRATE 5 MG: 5 INJECTION, SOLUTION INTRAVENOUS at 21:21

## 2024-04-08 RX ADMIN — MIDAZOLAM HYDROCHLORIDE 2 MG: 1 INJECTION, SOLUTION INTRAMUSCULAR; INTRAVENOUS at 05:00

## 2024-04-08 RX ADMIN — FENTANYL CITRATE 75 MCG: 0.05 INJECTION, SOLUTION INTRAMUSCULAR; INTRAVENOUS at 00:50

## 2024-04-08 RX ADMIN — ATORVASTATIN CALCIUM 80 MG: 40 TABLET, FILM COATED ORAL at 09:12

## 2024-04-08 RX ADMIN — FENTANYL CITRATE 75 MCG: 0.05 INJECTION, SOLUTION INTRAMUSCULAR; INTRAVENOUS at 04:56

## 2024-04-08 RX ADMIN — FENTANYL CITRATE 75 MCG: 0.05 INJECTION, SOLUTION INTRAMUSCULAR; INTRAVENOUS at 04:05

## 2024-04-08 RX ADMIN — FENTANYL CITRATE 75 MCG: 0.05 INJECTION, SOLUTION INTRAMUSCULAR; INTRAVENOUS at 17:55

## 2024-04-08 RX ADMIN — MIDAZOLAM HYDROCHLORIDE 2 MG: 1 INJECTION, SOLUTION INTRAMUSCULAR; INTRAVENOUS at 18:03

## 2024-04-08 RX ADMIN — POTASSIUM CHLORIDE 10 MEQ: 7.45 INJECTION INTRAVENOUS at 09:11

## 2024-04-08 RX ADMIN — POTASSIUM BICARBONATE 40 MEQ: 782 TABLET, EFFERVESCENT ORAL at 04:12

## 2024-04-08 RX ADMIN — FENTANYL CITRATE 75 MCG: 0.05 INJECTION, SOLUTION INTRAMUSCULAR; INTRAVENOUS at 23:39

## 2024-04-08 RX ADMIN — MIDAZOLAM HYDROCHLORIDE 2 MG: 1 INJECTION, SOLUTION INTRAMUSCULAR; INTRAVENOUS at 13:29

## 2024-04-08 RX ADMIN — CLINDAMYCIN IN 5 PERCENT DEXTROSE 600 MG: 12 INJECTION, SOLUTION INTRAVENOUS at 07:00

## 2024-04-08 RX ADMIN — SODIUM CHLORIDE, PRESERVATIVE FREE 10 ML: 5 INJECTION INTRAVENOUS at 20:31

## 2024-04-08 RX ADMIN — CLINDAMYCIN IN 5 PERCENT DEXTROSE 600 MG: 12 INJECTION, SOLUTION INTRAVENOUS at 15:35

## 2024-04-08 RX ADMIN — ALTEPLASE 1 MG/HR: 2.2 INJECTION, POWDER, LYOPHILIZED, FOR SOLUTION INTRAVENOUS at 09:35

## 2024-04-08 RX ADMIN — LISINOPRIL 20 MG: 20 TABLET ORAL at 09:16

## 2024-04-08 RX ADMIN — METOROPROLOL TARTRATE 5 MG: 5 INJECTION, SOLUTION INTRAVENOUS at 15:09

## 2024-04-08 RX ADMIN — PANTOPRAZOLE SODIUM 40 MG: 40 TABLET, DELAYED RELEASE ORAL at 09:13

## 2024-04-08 RX ADMIN — METOROPROLOL TARTRATE 5 MG: 5 INJECTION, SOLUTION INTRAVENOUS at 09:12

## 2024-04-08 RX ADMIN — OXYCODONE HYDROCHLORIDE 20 MG: 20 TABLET, FILM COATED, EXTENDED RELEASE ORAL at 20:29

## 2024-04-08 RX ADMIN — OXYCODONE HYDROCHLORIDE 20 MG: 20 TABLET, FILM COATED, EXTENDED RELEASE ORAL at 09:13

## 2024-04-08 RX ADMIN — MAGNESIUM SULFATE HEPTAHYDRATE 1000 MG: 1 INJECTION, SOLUTION INTRAVENOUS at 09:11

## 2024-04-08 RX ADMIN — FENTANYL CITRATE 75 MCG: 0.05 INJECTION, SOLUTION INTRAMUSCULAR; INTRAVENOUS at 13:28

## 2024-04-08 ASSESSMENT — PAIN DESCRIPTION - DESCRIPTORS
DESCRIPTORS: ACHING

## 2024-04-08 ASSESSMENT — PAIN DESCRIPTION - ORIENTATION
ORIENTATION: RIGHT

## 2024-04-08 ASSESSMENT — PAIN DESCRIPTION - LOCATION
LOCATION: FOOT
LOCATION: FOOT;LEG
LOCATION: FOOT
LOCATION: LEG
LOCATION: FOOT
LOCATION: LEG
LOCATION: LEG
LOCATION: FOOT
LOCATION: LEG;FOOT

## 2024-04-08 ASSESSMENT — PAIN SCALES - GENERAL
PAINLEVEL_OUTOF10: 0
PAINLEVEL_OUTOF10: 10
PAINLEVEL_OUTOF10: 8
PAINLEVEL_OUTOF10: 0
PAINLEVEL_OUTOF10: 0
PAINLEVEL_OUTOF10: 10
PAINLEVEL_OUTOF10: 8
PAINLEVEL_OUTOF10: 10
PAINLEVEL_OUTOF10: 7
PAINLEVEL_OUTOF10: 10
PAINLEVEL_OUTOF10: 8

## 2024-04-08 ASSESSMENT — PAIN - FUNCTIONAL ASSESSMENT
PAIN_FUNCTIONAL_ASSESSMENT: ACTIVITIES ARE NOT PREVENTED
PAIN_FUNCTIONAL_ASSESSMENT: PREVENTS OR INTERFERES SOME ACTIVE ACTIVITIES AND ADLS

## 2024-04-08 ASSESSMENT — PAIN SCALES - WONG BAKER
WONGBAKER_NUMERICALRESPONSE: HURTS A LITTLE BIT

## 2024-04-08 NOTE — CARE COORDINATION
CM reviewed Pt medicals, Pt lives in a trailer with her mother.    Pt needs assistance with mobility. Will need PT/OT eval/recommendation.

## 2024-04-09 PROBLEM — L97.509 ISCHEMIC FOOT ULCER DUE TO ATHEROSCLEROSIS OF NATIVE ARTERY OF LIMB (HCC): Status: ACTIVE | Noted: 2024-04-09

## 2024-04-09 PROBLEM — I70.25 ISCHEMIC FOOT ULCER DUE TO ATHEROSCLEROSIS OF NATIVE ARTERY OF LIMB (HCC): Status: ACTIVE | Noted: 2024-04-09

## 2024-04-09 LAB
ALBUMIN SERPL-MCNC: 2.3 G/DL (ref 3.5–5)
ALBUMIN/GLOB SERPL: 0.5 (ref 1.1–2.2)
ALP SERPL-CCNC: 78 U/L (ref 45–117)
ALT SERPL-CCNC: 25 U/L (ref 12–78)
ANION GAP SERPL CALC-SCNC: 4 MMOL/L (ref 5–15)
APTT PPP: 34.7 SEC (ref 21.2–34.1)
AST SERPL W P-5'-P-CCNC: 83 U/L (ref 15–37)
BACTERIA SPEC CULT: NORMAL
BASOPHILS # BLD: 0 K/UL (ref 0–0.1)
BASOPHILS NFR BLD: 0 % (ref 0–1)
BILIRUB SERPL-MCNC: 0.6 MG/DL (ref 0.2–1)
BUN SERPL-MCNC: 5 MG/DL (ref 6–20)
BUN/CREAT SERPL: 12 (ref 12–20)
CA-I BLD-MCNC: 8.7 MG/DL (ref 8.5–10.1)
CHLORIDE SERPL-SCNC: 98 MMOL/L (ref 97–108)
CK SERPL-CCNC: 1277 U/L (ref 26–192)
CK SERPL-CCNC: 1433 U/L (ref 26–192)
CK SERPL-CCNC: 1863 U/L (ref 26–192)
CK SERPL-CCNC: 2114 U/L (ref 26–192)
CO2 SERPL-SCNC: 28 MMOL/L (ref 21–32)
CREAT SERPL-MCNC: 0.41 MG/DL (ref 0.55–1.02)
DIFFERENTIAL METHOD BLD: ABNORMAL
ECHO BSA: 1.52 M2
EOSINOPHIL # BLD: 0 K/UL (ref 0–0.4)
EOSINOPHIL NFR BLD: 0 % (ref 0–7)
ERYTHROCYTE [DISTWIDTH] IN BLOOD BY AUTOMATED COUNT: 18.5 % (ref 11.5–14.5)
GLOBULIN SER CALC-MCNC: 4.5 G/DL (ref 2–4)
GLUCOSE BLD STRIP.AUTO-MCNC: 206 MG/DL (ref 65–100)
GLUCOSE BLD STRIP.AUTO-MCNC: 206 MG/DL (ref 65–100)
GLUCOSE BLD STRIP.AUTO-MCNC: 234 MG/DL (ref 65–100)
GLUCOSE BLD STRIP.AUTO-MCNC: 251 MG/DL (ref 65–100)
GLUCOSE SERPL-MCNC: 189 MG/DL (ref 65–100)
HCT VFR BLD AUTO: 33.3 % (ref 35–47)
HGB BLD-MCNC: 10.4 G/DL (ref 11.5–16)
IMM GRANULOCYTES # BLD AUTO: 0.2 K/UL (ref 0–0.04)
IMM GRANULOCYTES NFR BLD AUTO: 1 % (ref 0–0.5)
INR PPP: 1.2 (ref 0.9–1.1)
LYMPHOCYTES # BLD: 1.7 K/UL (ref 0.8–3.5)
LYMPHOCYTES NFR BLD: 10 % (ref 12–49)
Lab: NORMAL
MCH RBC QN AUTO: 23.7 PG (ref 26–34)
MCHC RBC AUTO-ENTMCNC: 31.2 G/DL (ref 30–36.5)
MCV RBC AUTO: 76 FL (ref 80–99)
MONOCYTES # BLD: 2.1 K/UL (ref 0–1)
MONOCYTES NFR BLD: 12 % (ref 5–13)
NEUTS SEG # BLD: 13.3 K/UL (ref 1.8–8)
NEUTS SEG NFR BLD: 77 % (ref 32–75)
NRBC # BLD: 0.02 K/UL (ref 0–0.01)
NRBC BLD-RTO: 0.1 PER 100 WBC
PERFORMED BY:: ABNORMAL
PLATELET # BLD AUTO: 554 K/UL (ref 150–400)
PMV BLD AUTO: 11 FL (ref 8.9–12.9)
POTASSIUM SERPL-SCNC: 3.5 MMOL/L (ref 3.5–5.1)
PROT SERPL-MCNC: 6.8 G/DL (ref 6.4–8.2)
PROTHROMBIN TIME: 15.1 SEC (ref 11.9–14.6)
RBC # BLD AUTO: 4.38 M/UL (ref 3.8–5.2)
RBC MORPH BLD: ABNORMAL
SODIUM SERPL-SCNC: 130 MMOL/L (ref 136–145)
THERAPEUTIC RANGE: ABNORMAL SEC (ref 82–109)
UFH PPP CHRO-ACNC: 0.14 IU/ML
UFH PPP CHRO-ACNC: 0.14 IU/ML
UFH PPP CHRO-ACNC: 0.19 IU/ML
WBC # BLD AUTO: 17.3 K/UL (ref 3.6–11)

## 2024-04-09 PROCEDURE — 6370000000 HC RX 637 (ALT 250 FOR IP): Performed by: SURGERY

## 2024-04-09 PROCEDURE — 99024 POSTOP FOLLOW-UP VISIT: CPT | Performed by: SURGERY

## 2024-04-09 PROCEDURE — 2500000003 HC RX 250 WO HCPCS: Performed by: SURGERY

## 2024-04-09 PROCEDURE — 85610 PROTHROMBIN TIME: CPT

## 2024-04-09 PROCEDURE — 6360000002 HC RX W HCPCS: Performed by: SURGERY

## 2024-04-09 PROCEDURE — 2700000000 HC OXYGEN THERAPY PER DAY

## 2024-04-09 PROCEDURE — 85730 THROMBOPLASTIN TIME PARTIAL: CPT

## 2024-04-09 PROCEDURE — 36415 COLL VENOUS BLD VENIPUNCTURE: CPT

## 2024-04-09 PROCEDURE — 80053 COMPREHEN METABOLIC PANEL: CPT

## 2024-04-09 PROCEDURE — 85025 COMPLETE CBC W/AUTO DIFF WBC: CPT

## 2024-04-09 PROCEDURE — 1100000000 HC RM PRIVATE

## 2024-04-09 PROCEDURE — 85520 HEPARIN ASSAY: CPT

## 2024-04-09 PROCEDURE — 82962 GLUCOSE BLOOD TEST: CPT

## 2024-04-09 PROCEDURE — 94761 N-INVAS EAR/PLS OXIMETRY MLT: CPT

## 2024-04-09 PROCEDURE — 94640 AIRWAY INHALATION TREATMENT: CPT

## 2024-04-09 PROCEDURE — 2580000003 HC RX 258: Performed by: SURGERY

## 2024-04-09 PROCEDURE — 6370000000 HC RX 637 (ALT 250 FOR IP): Performed by: PHYSICIAN ASSISTANT

## 2024-04-09 PROCEDURE — 82550 ASSAY OF CK (CPK): CPT

## 2024-04-09 RX ORDER — HEPARIN SODIUM 10000 [USP'U]/100ML
5-30 INJECTION, SOLUTION INTRAVENOUS CONTINUOUS
Status: DISCONTINUED | OUTPATIENT
Start: 2024-04-09 | End: 2024-04-09

## 2024-04-09 RX ORDER — HYDROMORPHONE HYDROCHLORIDE 2 MG/ML
1.5 INJECTION, SOLUTION INTRAMUSCULAR; INTRAVENOUS; SUBCUTANEOUS
Status: DISPENSED | OUTPATIENT
Start: 2024-04-09 | End: 2024-04-11

## 2024-04-09 RX ORDER — OXYCODONE HYDROCHLORIDE 10 MG/1
20 TABLET ORAL EVERY 4 HOURS PRN
Status: DISCONTINUED | OUTPATIENT
Start: 2024-04-09 | End: 2024-04-17 | Stop reason: HOSPADM

## 2024-04-09 RX ORDER — HEPARIN SODIUM 1000 [USP'U]/ML
60 INJECTION, SOLUTION INTRAVENOUS; SUBCUTANEOUS PRN
Status: DISCONTINUED | OUTPATIENT
Start: 2024-04-09 | End: 2024-04-12

## 2024-04-09 RX ORDER — HEPARIN SODIUM 1000 [USP'U]/ML
30 INJECTION, SOLUTION INTRAVENOUS; SUBCUTANEOUS PRN
Status: DISCONTINUED | OUTPATIENT
Start: 2024-04-09 | End: 2024-04-12

## 2024-04-09 RX ORDER — HEPARIN SODIUM 10000 [USP'U]/100ML
5-30 INJECTION, SOLUTION INTRAVENOUS CONTINUOUS
Status: DISCONTINUED | OUTPATIENT
Start: 2024-04-09 | End: 2024-04-12

## 2024-04-09 RX ORDER — POTASSIUM CHLORIDE 20 MEQ/1
40 TABLET, EXTENDED RELEASE ORAL ONCE
Status: DISCONTINUED | OUTPATIENT
Start: 2024-04-09 | End: 2024-04-09

## 2024-04-09 RX ADMIN — OXYCODONE HYDROCHLORIDE 20 MG: 10 TABLET ORAL at 17:07

## 2024-04-09 RX ADMIN — INSULIN LISPRO 4 UNITS: 100 INJECTION, SOLUTION INTRAVENOUS; SUBCUTANEOUS at 17:07

## 2024-04-09 RX ADMIN — SODIUM CHLORIDE, PRESERVATIVE FREE 10 ML: 5 INJECTION INTRAVENOUS at 08:39

## 2024-04-09 RX ADMIN — MIDAZOLAM HYDROCHLORIDE 2 MG: 1 INJECTION, SOLUTION INTRAMUSCULAR; INTRAVENOUS at 00:51

## 2024-04-09 RX ADMIN — POTASSIUM CHLORIDE 40 MEQ: 1500 TABLET, EXTENDED RELEASE ORAL at 10:47

## 2024-04-09 RX ADMIN — HEPARIN SODIUM 1700 UNITS: 1000 INJECTION INTRAVENOUS; SUBCUTANEOUS at 15:32

## 2024-04-09 RX ADMIN — HYDROMORPHONE HYDROCHLORIDE 1.5 MG: 2 INJECTION INTRAMUSCULAR; INTRAVENOUS; SUBCUTANEOUS at 21:17

## 2024-04-09 RX ADMIN — HYDROMORPHONE HYDROCHLORIDE 1.5 MG: 2 INJECTION INTRAMUSCULAR; INTRAVENOUS; SUBCUTANEOUS at 14:29

## 2024-04-09 RX ADMIN — PANTOPRAZOLE SODIUM 40 MG: 40 TABLET, DELAYED RELEASE ORAL at 08:38

## 2024-04-09 RX ADMIN — FENTANYL CITRATE 75 MCG: 0.05 INJECTION, SOLUTION INTRAMUSCULAR; INTRAVENOUS at 06:50

## 2024-04-09 RX ADMIN — LISINOPRIL 20 MG: 20 TABLET ORAL at 08:39

## 2024-04-09 RX ADMIN — MIDAZOLAM HYDROCHLORIDE 2 MG: 1 INJECTION, SOLUTION INTRAMUSCULAR; INTRAVENOUS at 03:50

## 2024-04-09 RX ADMIN — Medication 2 PUFF: at 06:54

## 2024-04-09 RX ADMIN — HEPARIN SODIUM 5 UNITS/KG/HR: 10000 INJECTION, SOLUTION INTRAVENOUS at 09:07

## 2024-04-09 RX ADMIN — OXYCODONE HYDROCHLORIDE 20 MG: 20 TABLET, FILM COATED, EXTENDED RELEASE ORAL at 08:39

## 2024-04-09 RX ADMIN — SODIUM CHLORIDE, PRESERVATIVE FREE 10 ML: 5 INJECTION INTRAVENOUS at 08:40

## 2024-04-09 RX ADMIN — METOROPROLOL TARTRATE 5 MG: 5 INJECTION, SOLUTION INTRAVENOUS at 08:38

## 2024-04-09 RX ADMIN — HYDROMORPHONE HYDROCHLORIDE 1.5 MG: 2 INJECTION INTRAMUSCULAR; INTRAVENOUS; SUBCUTANEOUS at 18:43

## 2024-04-09 RX ADMIN — HEPARIN SODIUM 9 UNITS/KG/HR: 10000 INJECTION, SOLUTION INTRAVENOUS at 23:05

## 2024-04-09 RX ADMIN — ATORVASTATIN CALCIUM 80 MG: 40 TABLET, FILM COATED ORAL at 08:39

## 2024-04-09 RX ADMIN — FENTANYL CITRATE 75 MCG: 0.05 INJECTION, SOLUTION INTRAMUSCULAR; INTRAVENOUS at 00:50

## 2024-04-09 RX ADMIN — METOROPROLOL TARTRATE 5 MG: 5 INJECTION, SOLUTION INTRAVENOUS at 03:45

## 2024-04-09 RX ADMIN — METOROPROLOL TARTRATE 5 MG: 5 INJECTION, SOLUTION INTRAVENOUS at 15:23

## 2024-04-09 RX ADMIN — METOROPROLOL TARTRATE 5 MG: 5 INJECTION, SOLUTION INTRAVENOUS at 21:16

## 2024-04-09 RX ADMIN — OXYCODONE HYDROCHLORIDE 20 MG: 20 TABLET, FILM COATED, EXTENDED RELEASE ORAL at 21:16

## 2024-04-09 RX ADMIN — CLINDAMYCIN IN 5 PERCENT DEXTROSE 600 MG: 12 INJECTION, SOLUTION INTRAVENOUS at 15:20

## 2024-04-09 RX ADMIN — MIDAZOLAM HYDROCHLORIDE 2 MG: 1 INJECTION, SOLUTION INTRAMUSCULAR; INTRAVENOUS at 06:50

## 2024-04-09 RX ADMIN — HEPARIN SODIUM 1700 UNITS: 1000 INJECTION INTRAVENOUS; SUBCUTANEOUS at 23:04

## 2024-04-09 RX ADMIN — HEPARIN SODIUM 7 UNITS/KG/HR: 10000 INJECTION, SOLUTION INTRAVENOUS at 15:31

## 2024-04-09 RX ADMIN — Medication 2 PUFF: at 20:25

## 2024-04-09 RX ADMIN — FENTANYL CITRATE 75 MCG: 0.05 INJECTION, SOLUTION INTRAMUSCULAR; INTRAVENOUS at 03:50

## 2024-04-09 RX ADMIN — HYDROMORPHONE HYDROCHLORIDE 1.5 MG: 2 INJECTION INTRAMUSCULAR; INTRAVENOUS; SUBCUTANEOUS at 10:35

## 2024-04-09 RX ADMIN — CLINDAMYCIN IN 5 PERCENT DEXTROSE 600 MG: 12 INJECTION, SOLUTION INTRAVENOUS at 07:00

## 2024-04-09 ASSESSMENT — PAIN SCALES - GENERAL
PAINLEVEL_OUTOF10: 5
PAINLEVEL_OUTOF10: 7
PAINLEVEL_OUTOF10: 4
PAINLEVEL_OUTOF10: 8
PAINLEVEL_OUTOF10: 8
PAINLEVEL_OUTOF10: 3
PAINLEVEL_OUTOF10: 8
PAINLEVEL_OUTOF10: 6
PAINLEVEL_OUTOF10: 6
PAINLEVEL_OUTOF10: 7
PAINLEVEL_OUTOF10: 7
PAINLEVEL_OUTOF10: 8
PAINLEVEL_OUTOF10: 7
PAINLEVEL_OUTOF10: 0
PAINLEVEL_OUTOF10: 8
PAINLEVEL_OUTOF10: 9
PAINLEVEL_OUTOF10: 8
PAINLEVEL_OUTOF10: 6

## 2024-04-09 ASSESSMENT — PAIN DESCRIPTION - LOCATION
LOCATION: FOOT
LOCATION: LEG;FOOT
LOCATION: FOOT

## 2024-04-09 ASSESSMENT — PAIN - FUNCTIONAL ASSESSMENT
PAIN_FUNCTIONAL_ASSESSMENT: ACTIVITIES ARE NOT PREVENTED
PAIN_FUNCTIONAL_ASSESSMENT: ACTIVITIES ARE NOT PREVENTED

## 2024-04-09 ASSESSMENT — PAIN DESCRIPTION - DESCRIPTORS
DESCRIPTORS: THROBBING
DESCRIPTORS: THROBBING;ACHING
DESCRIPTORS: ACHING;THROBBING

## 2024-04-09 ASSESSMENT — PAIN DESCRIPTION - ORIENTATION
ORIENTATION: RIGHT

## 2024-04-09 NOTE — CONSULTS
Inpatient consult to Hospitalist  Consult performed by: Linda Garcia PA-C  Consult ordered by: Marko Cantu MD  Reason for consult: Diabetes management  Assessment/Recommendations:   Full note to follow  Blood sugars well-controlled at this time.  Continue current Lantus plus lispro sliding scale  No immediate change  Thank you for asking us to assist with the management of this patient    Linda Garcia PA-C, [facility]  Physician Assistant  Distinguished Fellow, [employer]  Associate, American College of [employer]iology  Lead Advanced Practice Provider(GIULIANA), Division of Hospital Medicine          
MD Kaitlynn    Medical Decision Making:    Labs reviewed by myself   CBC/CMP/Magnesium    Diagnostic data reviewed by myself   EKG/Telemetry strip consistent with NSR    Toxic drug monitoring    Lisinopril, monitor for hypotension/ALONSO    Discussed case with   Attending Physician Dr. Cantu    MDM Discussion: Patient with numerous medical comorbidities, each with increased risk for mortality and morbidity if left untreated. Patient requires medications with high risk of toxicity and need  for intensive monitoring. I have reviewed patient's presenting subjective and objective findings, as well as all laboratory studies, imaging studies, and vital signs to date as well as treatment rendered and patient's response to those treatments. In addition, prior medical, surgical and relevant social and family histories were reviewed.     This is dictation was done by dragon, computer voice recognition software. Quite often unanticipated grammatical, syntax, homophones and other interpretive errors or inadvertently transcribed by the computer software. Please excuse errors that have escaped final proofreading. Thank you.      Reviewed most current lab test results and cultures  YES  Reviewed most current radiology test results   YES  Review and summation of old records today    NO  Reviewed patient's current orders and MAR    YES  PMH/SH reviewed - no change compared to H&P          Assessment / Plan:  Ischemic right lower extremity status post femoropopliteal bypass-currently patient doing well postprocedure  Continue current treatment  Defer management to attending physician Dr. Cantu    Hyperglycemia type 2 diabetes-currently well-controlled  Continue current insulin regimen  Insulin sliding scale for additional coverage    Hypertension-continue current antihypertensive medications    Severe peripheral vascular disease-continue aspirin once daily  Continue Lipitor once daily  Continue to follow vascular surgery 
mass.    Fluid collection: No drainable fluid collection.    Impression  Complete occlusion of the right femoral artery and its to adjacent bypass  grafts. No significant arterial blood flow to the right lower extremity.  I discussed this impression with Dr. Liu at 1557 hours on 1/6/2024.        Xray Result (most recent): No other radiology results here.    4/4/2024 (date of consult) arterial duplex right leg:  Findings:  Right common femoral artery patent, monophasic signal, peak systolic velocity 177, EDV 4.3.  Right proximal graft anastomosis shows peak systolic velocity 20.6.  Right proximal graft is occluded.  Right mid graft is occluded.  Right distal graft is occluded.  Right distal graft to popliteal artery anastomosis occluded.  Distal posterior tibial artery shows monophasic signal with dampened signal, peak systolic velocity 8.8.  Distal peroneal artery shows monophasic signal with dampened signal, peak systolic velocity 7.5.  Dorsalis pedis artery shows dampened signal.  Mid anterior tibial artery shows monophasic with dampened signal, peak systolic was 10.8.     Impression:  1.  Right common femoral artery patent however right common femoral artery to popliteal artery bypass graft is occluded.  2.  Right distal popliteal artery, peroneal artery and dorsalis pedis artery patent with minimal monophasic signal dampened signal.      _______________________________________________________________________    TOTAL TIME:  60 Minutes    Signed: Linda Garcia PA-C    Procedures: see electronic medical records for all procedures/Xrays and details which were not copied into this note but were reviewed prior to creation of Plan.

## 2024-04-10 ENCOUNTER — APPOINTMENT (OUTPATIENT)
Facility: HOSPITAL | Age: 64
DRG: 240 | End: 2024-04-10
Payer: MEDICARE

## 2024-04-10 LAB
ALBUMIN SERPL-MCNC: 2.4 G/DL (ref 3.5–5)
ALBUMIN/GLOB SERPL: 0.6 (ref 1.1–2.2)
ALP SERPL-CCNC: 77 U/L (ref 45–117)
ALT SERPL-CCNC: 25 U/L (ref 12–78)
ANION GAP SERPL CALC-SCNC: 3 MMOL/L (ref 5–15)
AST SERPL W P-5'-P-CCNC: 66 U/L (ref 15–37)
BASOPHILS # BLD: 0 K/UL (ref 0–0.1)
BASOPHILS NFR BLD: 0 % (ref 0–1)
BILIRUB SERPL-MCNC: 0.5 MG/DL (ref 0.2–1)
BUN SERPL-MCNC: 11 MG/DL (ref 6–20)
BUN/CREAT SERPL: 21 (ref 12–20)
CA-I BLD-MCNC: 8.9 MG/DL (ref 8.5–10.1)
CHLORIDE SERPL-SCNC: 98 MMOL/L (ref 97–108)
CK SERPL-CCNC: 1221 U/L (ref 26–192)
CO2 SERPL-SCNC: 29 MMOL/L (ref 21–32)
CREAT SERPL-MCNC: 0.52 MG/DL (ref 0.55–1.02)
DIFFERENTIAL METHOD BLD: ABNORMAL
EOSINOPHIL # BLD: 0.3 K/UL (ref 0–0.4)
EOSINOPHIL NFR BLD: 2 % (ref 0–7)
ERYTHROCYTE [DISTWIDTH] IN BLOOD BY AUTOMATED COUNT: 18.2 % (ref 11.5–14.5)
GLOBULIN SER CALC-MCNC: 4 G/DL (ref 2–4)
GLUCOSE BLD STRIP.AUTO-MCNC: 196 MG/DL (ref 65–100)
GLUCOSE BLD STRIP.AUTO-MCNC: 220 MG/DL (ref 65–100)
GLUCOSE BLD STRIP.AUTO-MCNC: 294 MG/DL (ref 65–100)
GLUCOSE SERPL-MCNC: 187 MG/DL (ref 65–100)
HCT VFR BLD AUTO: 31.3 % (ref 35–47)
HGB BLD-MCNC: 9.7 G/DL (ref 11.5–16)
IMM GRANULOCYTES # BLD AUTO: 0.1 K/UL (ref 0–0.04)
IMM GRANULOCYTES NFR BLD AUTO: 1 % (ref 0–0.5)
LYMPHOCYTES # BLD: 1.9 K/UL (ref 0.8–3.5)
LYMPHOCYTES NFR BLD: 15 % (ref 12–49)
MCH RBC QN AUTO: 23.9 PG (ref 26–34)
MCHC RBC AUTO-ENTMCNC: 31 G/DL (ref 30–36.5)
MCV RBC AUTO: 77.1 FL (ref 80–99)
MONOCYTES # BLD: 1.8 K/UL (ref 0–1)
MONOCYTES NFR BLD: 14 % (ref 5–13)
NEUTS SEG # BLD: 8.7 K/UL (ref 1.8–8)
NEUTS SEG NFR BLD: 68 % (ref 32–75)
NRBC # BLD: 0 K/UL (ref 0–0.01)
NRBC BLD-RTO: 0 PER 100 WBC
PERFORMED BY:: ABNORMAL
PLATELET # BLD AUTO: 474 K/UL (ref 150–400)
PMV BLD AUTO: 11.4 FL (ref 8.9–12.9)
POTASSIUM SERPL-SCNC: 4.2 MMOL/L (ref 3.5–5.1)
PROT SERPL-MCNC: 6.4 G/DL (ref 6.4–8.2)
RBC # BLD AUTO: 4.06 M/UL (ref 3.8–5.2)
SODIUM SERPL-SCNC: 130 MMOL/L (ref 136–145)
UFH PPP CHRO-ACNC: 0.11 IU/ML
UFH PPP CHRO-ACNC: 0.12 IU/ML
UFH PPP CHRO-ACNC: 0.24 IU/ML
WBC # BLD AUTO: 12.8 K/UL (ref 3.6–11)

## 2024-04-10 PROCEDURE — 80053 COMPREHEN METABOLIC PANEL: CPT

## 2024-04-10 PROCEDURE — 6360000002 HC RX W HCPCS: Performed by: SURGERY

## 2024-04-10 PROCEDURE — 1100000000 HC RM PRIVATE

## 2024-04-10 PROCEDURE — 99024 POSTOP FOLLOW-UP VISIT: CPT | Performed by: SURGERY

## 2024-04-10 PROCEDURE — 85025 COMPLETE CBC W/AUTO DIFF WBC: CPT

## 2024-04-10 PROCEDURE — 6370000000 HC RX 637 (ALT 250 FOR IP): Performed by: SURGERY

## 2024-04-10 PROCEDURE — 94761 N-INVAS EAR/PLS OXIMETRY MLT: CPT

## 2024-04-10 PROCEDURE — 82962 GLUCOSE BLOOD TEST: CPT

## 2024-04-10 PROCEDURE — 85520 HEPARIN ASSAY: CPT

## 2024-04-10 PROCEDURE — 2500000003 HC RX 250 WO HCPCS: Performed by: SURGERY

## 2024-04-10 PROCEDURE — 6370000000 HC RX 637 (ALT 250 FOR IP): Performed by: PHYSICIAN ASSISTANT

## 2024-04-10 PROCEDURE — 71045 X-RAY EXAM CHEST 1 VIEW: CPT

## 2024-04-10 PROCEDURE — 2580000003 HC RX 258: Performed by: SURGERY

## 2024-04-10 PROCEDURE — 2700000000 HC OXYGEN THERAPY PER DAY

## 2024-04-10 PROCEDURE — 2580000003 HC RX 258: Performed by: PHYSICIAN ASSISTANT

## 2024-04-10 PROCEDURE — 82550 ASSAY OF CK (CPK): CPT

## 2024-04-10 PROCEDURE — 36415 COLL VENOUS BLD VENIPUNCTURE: CPT

## 2024-04-10 PROCEDURE — 94640 AIRWAY INHALATION TREATMENT: CPT

## 2024-04-10 RX ORDER — OXYCODONE HCL 10 MG/1
30 TABLET, FILM COATED, EXTENDED RELEASE ORAL EVERY 12 HOURS SCHEDULED
Status: DISCONTINUED | OUTPATIENT
Start: 2024-04-10 | End: 2024-04-17 | Stop reason: HOSPADM

## 2024-04-10 RX ORDER — INSULIN LISPRO 100 [IU]/ML
0-4 INJECTION, SOLUTION INTRAVENOUS; SUBCUTANEOUS NIGHTLY
Status: DISCONTINUED | OUTPATIENT
Start: 2024-04-10 | End: 2024-04-17 | Stop reason: HOSPADM

## 2024-04-10 RX ORDER — GLUCAGON 1 MG/ML
1 KIT INJECTION PRN
Status: DISCONTINUED | OUTPATIENT
Start: 2024-04-10 | End: 2024-04-17 | Stop reason: HOSPADM

## 2024-04-10 RX ORDER — SODIUM CHLORIDE 9 MG/ML
INJECTION, SOLUTION INTRAVENOUS CONTINUOUS
Status: DISCONTINUED | OUTPATIENT
Start: 2024-04-10 | End: 2024-04-15

## 2024-04-10 RX ORDER — INSULIN LISPRO 100 [IU]/ML
0-8 INJECTION, SOLUTION INTRAVENOUS; SUBCUTANEOUS
Status: DISCONTINUED | OUTPATIENT
Start: 2024-04-10 | End: 2024-04-17 | Stop reason: HOSPADM

## 2024-04-10 RX ORDER — DEXTROSE MONOHYDRATE 100 MG/ML
INJECTION, SOLUTION INTRAVENOUS CONTINUOUS PRN
Status: DISCONTINUED | OUTPATIENT
Start: 2024-04-10 | End: 2024-04-17 | Stop reason: HOSPADM

## 2024-04-10 RX ADMIN — CLINDAMYCIN IN 5 PERCENT DEXTROSE 600 MG: 12 INJECTION, SOLUTION INTRAVENOUS at 01:21

## 2024-04-10 RX ADMIN — HYDROMORPHONE HYDROCHLORIDE 1.5 MG: 2 INJECTION INTRAMUSCULAR; INTRAVENOUS; SUBCUTANEOUS at 17:35

## 2024-04-10 RX ADMIN — OXYCODONE HYDROCHLORIDE 20 MG: 10 TABLET ORAL at 04:32

## 2024-04-10 RX ADMIN — SODIUM CHLORIDE, PRESERVATIVE FREE 10 ML: 5 INJECTION INTRAVENOUS at 08:17

## 2024-04-10 RX ADMIN — HYDROMORPHONE HYDROCHLORIDE 1.5 MG: 2 INJECTION INTRAMUSCULAR; INTRAVENOUS; SUBCUTANEOUS at 22:14

## 2024-04-10 RX ADMIN — METOROPROLOL TARTRATE 5 MG: 5 INJECTION, SOLUTION INTRAVENOUS at 16:27

## 2024-04-10 RX ADMIN — OXYCODONE HYDROCHLORIDE 20 MG: 10 TABLET ORAL at 23:57

## 2024-04-10 RX ADMIN — OXYCODONE HYDROCHLORIDE 20 MG: 10 TABLET ORAL at 12:01

## 2024-04-10 RX ADMIN — LISINOPRIL 20 MG: 20 TABLET ORAL at 08:16

## 2024-04-10 RX ADMIN — HYDROMORPHONE HYDROCHLORIDE 1.5 MG: 2 INJECTION INTRAMUSCULAR; INTRAVENOUS; SUBCUTANEOUS at 09:07

## 2024-04-10 RX ADMIN — ATORVASTATIN CALCIUM 80 MG: 40 TABLET, FILM COATED ORAL at 08:16

## 2024-04-10 RX ADMIN — OXYCODONE HYDROCHLORIDE 20 MG: 20 TABLET, FILM COATED, EXTENDED RELEASE ORAL at 08:16

## 2024-04-10 RX ADMIN — HYDROMORPHONE HYDROCHLORIDE 1.5 MG: 2 INJECTION INTRAMUSCULAR; INTRAVENOUS; SUBCUTANEOUS at 13:33

## 2024-04-10 RX ADMIN — HYDROMORPHONE HYDROCHLORIDE 1.5 MG: 2 INJECTION INTRAMUSCULAR; INTRAVENOUS; SUBCUTANEOUS at 01:34

## 2024-04-10 RX ADMIN — METOROPROLOL TARTRATE 5 MG: 5 INJECTION, SOLUTION INTRAVENOUS at 04:25

## 2024-04-10 RX ADMIN — Medication 2 PUFF: at 08:44

## 2024-04-10 RX ADMIN — INSULIN LISPRO 4 UNITS: 100 INJECTION, SOLUTION INTRAVENOUS; SUBCUTANEOUS at 16:27

## 2024-04-10 RX ADMIN — OXYCODONE HYDROCHLORIDE 20 MG: 10 TABLET ORAL at 00:22

## 2024-04-10 RX ADMIN — PANTOPRAZOLE SODIUM 40 MG: 40 TABLET, DELAYED RELEASE ORAL at 08:16

## 2024-04-10 RX ADMIN — HEPARIN SODIUM 1700 UNITS: 1000 INJECTION INTRAVENOUS; SUBCUTANEOUS at 17:21

## 2024-04-10 RX ADMIN — SODIUM CHLORIDE: 9 INJECTION, SOLUTION INTRAVENOUS at 17:35

## 2024-04-10 RX ADMIN — HYDROMORPHONE HYDROCHLORIDE 1.5 MG: 2 INJECTION INTRAMUSCULAR; INTRAVENOUS; SUBCUTANEOUS at 06:07

## 2024-04-10 RX ADMIN — OXYCODONE HYDROCHLORIDE 30 MG: 10 TABLET, FILM COATED, EXTENDED RELEASE ORAL at 20:50

## 2024-04-10 RX ADMIN — METOROPROLOL TARTRATE 5 MG: 5 INJECTION, SOLUTION INTRAVENOUS at 22:13

## 2024-04-10 RX ADMIN — Medication 2 PUFF: at 20:31

## 2024-04-10 RX ADMIN — METOROPROLOL TARTRATE 5 MG: 5 INJECTION, SOLUTION INTRAVENOUS at 08:16

## 2024-04-10 RX ADMIN — HEPARIN SODIUM 11 UNITS/KG/HR: 10000 INJECTION, SOLUTION INTRAVENOUS at 17:21

## 2024-04-10 RX ADMIN — OXYCODONE HYDROCHLORIDE 20 MG: 10 TABLET ORAL at 16:27

## 2024-04-10 ASSESSMENT — PAIN DESCRIPTION - DESCRIPTORS
DESCRIPTORS: ACHING

## 2024-04-10 ASSESSMENT — PAIN DESCRIPTION - ORIENTATION
ORIENTATION: RIGHT
ORIENTATION: RIGHT;LOWER
ORIENTATION: RIGHT
ORIENTATION: RIGHT;LOWER
ORIENTATION: RIGHT

## 2024-04-10 ASSESSMENT — PAIN DESCRIPTION - LOCATION
LOCATION: LEG
LOCATION: FOOT
LOCATION: LEG

## 2024-04-10 ASSESSMENT — PAIN SCALES - GENERAL
PAINLEVEL_OUTOF10: 7
PAINLEVEL_OUTOF10: 9
PAINLEVEL_OUTOF10: 8
PAINLEVEL_OUTOF10: 8
PAINLEVEL_OUTOF10: 6
PAINLEVEL_OUTOF10: 9
PAINLEVEL_OUTOF10: 7
PAINLEVEL_OUTOF10: 6
PAINLEVEL_OUTOF10: 7
PAINLEVEL_OUTOF10: 5
PAINLEVEL_OUTOF10: 10
PAINLEVEL_OUTOF10: 8
PAINLEVEL_OUTOF10: 9
PAINLEVEL_OUTOF10: 5
PAINLEVEL_OUTOF10: 7
PAINLEVEL_OUTOF10: 8
PAINLEVEL_OUTOF10: 7

## 2024-04-11 LAB
ALBUMIN SERPL-MCNC: 2.5 G/DL (ref 3.5–5)
ALBUMIN/GLOB SERPL: 0.7 (ref 1.1–2.2)
ALP SERPL-CCNC: 74 U/L (ref 45–117)
ALT SERPL-CCNC: 26 U/L (ref 12–78)
ANION GAP SERPL CALC-SCNC: 5 MMOL/L (ref 5–15)
AST SERPL W P-5'-P-CCNC: 52 U/L (ref 15–37)
BASOPHILS # BLD: 0.1 K/UL (ref 0–0.1)
BASOPHILS NFR BLD: 1 % (ref 0–1)
BILIRUB SERPL-MCNC: 0.5 MG/DL (ref 0.2–1)
BUN SERPL-MCNC: 6 MG/DL (ref 6–20)
BUN/CREAT SERPL: 13 (ref 12–20)
CA-I BLD-MCNC: 8.6 MG/DL (ref 8.5–10.1)
CHLORIDE SERPL-SCNC: 99 MMOL/L (ref 97–108)
CK SERPL-CCNC: 894 U/L (ref 26–192)
CO2 SERPL-SCNC: 26 MMOL/L (ref 21–32)
CREAT SERPL-MCNC: 0.46 MG/DL (ref 0.55–1.02)
DIFFERENTIAL METHOD BLD: ABNORMAL
EOSINOPHIL # BLD: 0.3 K/UL (ref 0–0.4)
EOSINOPHIL NFR BLD: 3 % (ref 0–7)
ERYTHROCYTE [DISTWIDTH] IN BLOOD BY AUTOMATED COUNT: 17.8 % (ref 11.5–14.5)
GLOBULIN SER CALC-MCNC: 3.8 G/DL (ref 2–4)
GLUCOSE BLD STRIP.AUTO-MCNC: 193 MG/DL (ref 65–100)
GLUCOSE BLD STRIP.AUTO-MCNC: 240 MG/DL (ref 65–100)
GLUCOSE BLD STRIP.AUTO-MCNC: 273 MG/DL (ref 65–100)
GLUCOSE SERPL-MCNC: 237 MG/DL (ref 65–100)
HCT VFR BLD AUTO: 30.4 % (ref 35–47)
HGB BLD-MCNC: 9.6 G/DL (ref 11.5–16)
IMM GRANULOCYTES # BLD AUTO: 0 K/UL
IMM GRANULOCYTES NFR BLD AUTO: 0 %
IRON SATN MFR SERPL: 5 % (ref 20–50)
IRON SERPL-MCNC: 14 UG/DL (ref 35–150)
LYMPHOCYTES # BLD: 1.7 K/UL (ref 0.8–3.5)
LYMPHOCYTES NFR BLD: 15 % (ref 12–49)
MCH RBC QN AUTO: 24.3 PG (ref 26–34)
MCHC RBC AUTO-ENTMCNC: 31.6 G/DL (ref 30–36.5)
MCV RBC AUTO: 77 FL (ref 80–99)
MONOCYTES # BLD: 1.5 K/UL (ref 0–1)
MONOCYTES NFR BLD: 14 % (ref 5–13)
NEUTS SEG # BLD: 7.4 K/UL (ref 1.8–8)
NEUTS SEG NFR BLD: 67 % (ref 32–75)
NRBC # BLD: 0 K/UL (ref 0–0.01)
NRBC BLD-RTO: 0 PER 100 WBC
PERFORMED BY:: ABNORMAL
PLATELET # BLD AUTO: 463 K/UL (ref 150–400)
PMV BLD AUTO: 11.4 FL (ref 8.9–12.9)
POTASSIUM SERPL-SCNC: 4.1 MMOL/L (ref 3.5–5.1)
PROT SERPL-MCNC: 6.3 G/DL (ref 6.4–8.2)
RBC # BLD AUTO: 3.95 M/UL (ref 3.8–5.2)
RBC MORPH BLD: ABNORMAL
SODIUM SERPL-SCNC: 130 MMOL/L (ref 136–145)
TIBC SERPL-MCNC: 286 UG/DL (ref 250–450)
UFH PPP CHRO-ACNC: 0.32 IU/ML
UFH PPP CHRO-ACNC: 0.43 IU/ML
UFH PPP CHRO-ACNC: <0.1 IU/ML
UFH PPP CHRO-ACNC: <0.1 IU/ML
WBC # BLD AUTO: 11 K/UL (ref 3.6–11)

## 2024-04-11 PROCEDURE — 80053 COMPREHEN METABOLIC PANEL: CPT

## 2024-04-11 PROCEDURE — 6370000000 HC RX 637 (ALT 250 FOR IP): Performed by: SURGERY

## 2024-04-11 PROCEDURE — 6360000002 HC RX W HCPCS: Performed by: SURGERY

## 2024-04-11 PROCEDURE — 94640 AIRWAY INHALATION TREATMENT: CPT

## 2024-04-11 PROCEDURE — 94761 N-INVAS EAR/PLS OXIMETRY MLT: CPT

## 2024-04-11 PROCEDURE — 83540 ASSAY OF IRON: CPT

## 2024-04-11 PROCEDURE — 99232 SBSQ HOSP IP/OBS MODERATE 35: CPT | Performed by: SURGERY

## 2024-04-11 PROCEDURE — 6360000002 HC RX W HCPCS: Performed by: PHYSICIAN ASSISTANT

## 2024-04-11 PROCEDURE — 85025 COMPLETE CBC W/AUTO DIFF WBC: CPT

## 2024-04-11 PROCEDURE — 36415 COLL VENOUS BLD VENIPUNCTURE: CPT

## 2024-04-11 PROCEDURE — 82550 ASSAY OF CK (CPK): CPT

## 2024-04-11 PROCEDURE — 1100000000 HC RM PRIVATE

## 2024-04-11 PROCEDURE — 2500000003 HC RX 250 WO HCPCS: Performed by: SURGERY

## 2024-04-11 PROCEDURE — 2580000003 HC RX 258: Performed by: PHYSICIAN ASSISTANT

## 2024-04-11 PROCEDURE — 6370000000 HC RX 637 (ALT 250 FOR IP): Performed by: PHYSICIAN ASSISTANT

## 2024-04-11 PROCEDURE — 82962 GLUCOSE BLOOD TEST: CPT

## 2024-04-11 PROCEDURE — 85520 HEPARIN ASSAY: CPT

## 2024-04-11 RX ORDER — HYDROMORPHONE HYDROCHLORIDE 2 MG/ML
1.5 INJECTION, SOLUTION INTRAMUSCULAR; INTRAVENOUS; SUBCUTANEOUS
Status: DISCONTINUED | OUTPATIENT
Start: 2024-04-11 | End: 2024-04-17 | Stop reason: HOSPADM

## 2024-04-11 RX ORDER — HYDROMORPHONE HYDROCHLORIDE 1 MG/ML
1 INJECTION, SOLUTION INTRAMUSCULAR; INTRAVENOUS; SUBCUTANEOUS
Status: DISCONTINUED | OUTPATIENT
Start: 2024-04-11 | End: 2024-04-11

## 2024-04-11 RX ADMIN — HEPARIN SODIUM 15 UNITS/KG/HR: 10000 INJECTION, SOLUTION INTRAVENOUS at 08:35

## 2024-04-11 RX ADMIN — METOROPROLOL TARTRATE 5 MG: 5 INJECTION, SOLUTION INTRAVENOUS at 16:18

## 2024-04-11 RX ADMIN — INSULIN LISPRO 2 UNITS: 100 INJECTION, SOLUTION INTRAVENOUS; SUBCUTANEOUS at 08:31

## 2024-04-11 RX ADMIN — Medication 2 PUFF: at 08:00

## 2024-04-11 RX ADMIN — INSULIN LISPRO 4 UNITS: 100 INJECTION, SOLUTION INTRAVENOUS; SUBCUTANEOUS at 11:56

## 2024-04-11 RX ADMIN — OXYCODONE HYDROCHLORIDE 30 MG: 10 TABLET, FILM COATED, EXTENDED RELEASE ORAL at 20:51

## 2024-04-11 RX ADMIN — OXYCODONE HYDROCHLORIDE 30 MG: 10 TABLET, FILM COATED, EXTENDED RELEASE ORAL at 08:31

## 2024-04-11 RX ADMIN — Medication 2 PUFF: at 20:23

## 2024-04-11 RX ADMIN — METOROPROLOL TARTRATE 5 MG: 5 INJECTION, SOLUTION INTRAVENOUS at 10:03

## 2024-04-11 RX ADMIN — HYDROMORPHONE HYDROCHLORIDE 1.5 MG: 2 INJECTION INTRAMUSCULAR; INTRAVENOUS; SUBCUTANEOUS at 16:14

## 2024-04-11 RX ADMIN — HYDROMORPHONE HYDROCHLORIDE 1.5 MG: 2 INJECTION INTRAMUSCULAR; INTRAVENOUS; SUBCUTANEOUS at 06:55

## 2024-04-11 RX ADMIN — ATORVASTATIN CALCIUM 80 MG: 40 TABLET, FILM COATED ORAL at 08:31

## 2024-04-11 RX ADMIN — LISINOPRIL 20 MG: 20 TABLET ORAL at 08:31

## 2024-04-11 RX ADMIN — SODIUM CHLORIDE: 9 INJECTION, SOLUTION INTRAVENOUS at 03:58

## 2024-04-11 RX ADMIN — HEPARIN SODIUM 3400 UNITS: 1000 INJECTION INTRAVENOUS; SUBCUTANEOUS at 10:07

## 2024-04-11 RX ADMIN — OXYCODONE HYDROCHLORIDE 20 MG: 10 TABLET ORAL at 14:05

## 2024-04-11 RX ADMIN — HEPARIN SODIUM 3400 UNITS: 1000 INJECTION INTRAVENOUS; SUBCUTANEOUS at 01:08

## 2024-04-11 RX ADMIN — OXYCODONE HYDROCHLORIDE 20 MG: 10 TABLET ORAL at 18:36

## 2024-04-11 RX ADMIN — HYDROMORPHONE HYDROCHLORIDE 1.5 MG: 2 INJECTION INTRAMUSCULAR; INTRAVENOUS; SUBCUTANEOUS at 01:19

## 2024-04-11 RX ADMIN — OXYCODONE HYDROCHLORIDE 20 MG: 10 TABLET ORAL at 03:57

## 2024-04-11 RX ADMIN — HYDROMORPHONE HYDROCHLORIDE 1.5 MG: 2 INJECTION INTRAMUSCULAR; INTRAVENOUS; SUBCUTANEOUS at 09:59

## 2024-04-11 RX ADMIN — METOROPROLOL TARTRATE 5 MG: 5 INJECTION, SOLUTION INTRAVENOUS at 03:58

## 2024-04-11 RX ADMIN — METOROPROLOL TARTRATE 5 MG: 5 INJECTION, SOLUTION INTRAVENOUS at 20:53

## 2024-04-11 RX ADMIN — PANTOPRAZOLE SODIUM 40 MG: 40 TABLET, DELAYED RELEASE ORAL at 08:31

## 2024-04-11 ASSESSMENT — PAIN DESCRIPTION - DESCRIPTORS
DESCRIPTORS: ACHING
DESCRIPTORS: ACHING;BURNING;THROBBING

## 2024-04-11 ASSESSMENT — PAIN SCALES - GENERAL
PAINLEVEL_OUTOF10: 9
PAINLEVEL_OUTOF10: 7
PAINLEVEL_OUTOF10: 8
PAINLEVEL_OUTOF10: 9
PAINLEVEL_OUTOF10: 7
PAINLEVEL_OUTOF10: 9
PAINLEVEL_OUTOF10: 10
PAINLEVEL_OUTOF10: 9

## 2024-04-11 ASSESSMENT — PAIN DESCRIPTION - ORIENTATION
ORIENTATION: RIGHT;LOWER
ORIENTATION: RIGHT
ORIENTATION: RIGHT;LOWER
ORIENTATION: RIGHT;LOWER

## 2024-04-11 ASSESSMENT — PAIN DESCRIPTION - LOCATION
LOCATION: LEG
LOCATION: FOOT
LOCATION: LEG
LOCATION: LEG

## 2024-04-11 ASSESSMENT — PAIN - FUNCTIONAL ASSESSMENT: PAIN_FUNCTIONAL_ASSESSMENT: PREVENTS OR INTERFERES SOME ACTIVE ACTIVITIES AND ADLS

## 2024-04-11 NOTE — CARE COORDINATION
CM reviewed chart. Current DCP home self, patient will most likely need placement after medical procedure. CM will continue to follow.

## 2024-04-12 ENCOUNTER — ANESTHESIA EVENT (OUTPATIENT)
Facility: HOSPITAL | Age: 64
End: 2024-04-12
Payer: MEDICARE

## 2024-04-12 LAB
ALBUMIN SERPL-MCNC: 2.2 G/DL (ref 3.5–5)
ALBUMIN/GLOB SERPL: 0.5 (ref 1.1–2.2)
ALP SERPL-CCNC: 66 U/L (ref 45–117)
ALT SERPL-CCNC: 24 U/L (ref 12–78)
ANION GAP SERPL CALC-SCNC: 5 MMOL/L (ref 5–15)
AST SERPL W P-5'-P-CCNC: 43 U/L (ref 15–37)
BASOPHILS # BLD: 0 K/UL (ref 0–0.1)
BASOPHILS NFR BLD: 0 % (ref 0–1)
BILIRUB SERPL-MCNC: 0.5 MG/DL (ref 0.2–1)
BUN SERPL-MCNC: 3 MG/DL (ref 6–20)
BUN/CREAT SERPL: 8 (ref 12–20)
CA-I BLD-MCNC: 8.9 MG/DL (ref 8.5–10.1)
CHLORIDE SERPL-SCNC: 98 MMOL/L (ref 97–108)
CK SERPL-CCNC: 576 U/L (ref 26–192)
CO2 SERPL-SCNC: 26 MMOL/L (ref 21–32)
CREAT SERPL-MCNC: 0.38 MG/DL (ref 0.55–1.02)
DIFFERENTIAL METHOD BLD: ABNORMAL
EOSINOPHIL # BLD: 0.1 K/UL (ref 0–0.4)
EOSINOPHIL NFR BLD: 1 % (ref 0–7)
ERYTHROCYTE [DISTWIDTH] IN BLOOD BY AUTOMATED COUNT: 18.2 % (ref 11.5–14.5)
GLOBULIN SER CALC-MCNC: 4.4 G/DL (ref 2–4)
GLUCOSE BLD STRIP.AUTO-MCNC: 183 MG/DL (ref 65–100)
GLUCOSE BLD STRIP.AUTO-MCNC: 207 MG/DL (ref 65–100)
GLUCOSE BLD STRIP.AUTO-MCNC: 214 MG/DL (ref 65–100)
GLUCOSE BLD STRIP.AUTO-MCNC: 222 MG/DL (ref 65–100)
GLUCOSE BLD STRIP.AUTO-MCNC: 239 MG/DL (ref 65–100)
GLUCOSE SERPL-MCNC: 205 MG/DL (ref 65–100)
HCT VFR BLD AUTO: 29.3 % (ref 35–47)
HGB BLD-MCNC: 8.9 G/DL (ref 11.5–16)
IMM GRANULOCYTES # BLD AUTO: 0 K/UL
IMM GRANULOCYTES NFR BLD AUTO: 0 %
LYMPHOCYTES # BLD: 1.1 K/UL (ref 0.8–3.5)
LYMPHOCYTES NFR BLD: 9 % (ref 12–49)
MCH RBC QN AUTO: 23.4 PG (ref 26–34)
MCHC RBC AUTO-ENTMCNC: 30.4 G/DL (ref 30–36.5)
MCV RBC AUTO: 77.1 FL (ref 80–99)
MONOCYTES # BLD: 1.6 K/UL (ref 0–1)
MONOCYTES NFR BLD: 13 % (ref 5–13)
NEUTS SEG # BLD: 9.6 K/UL (ref 1.8–8)
NEUTS SEG NFR BLD: 77 % (ref 32–75)
NRBC # BLD: 0 K/UL (ref 0–0.01)
NRBC BLD-RTO: 0 PER 100 WBC
PERFORMED BY:: ABNORMAL
PLATELET # BLD AUTO: 425 K/UL (ref 150–400)
PMV BLD AUTO: 11.9 FL (ref 8.9–12.9)
POTASSIUM SERPL-SCNC: 3.6 MMOL/L (ref 3.5–5.1)
PROT SERPL-MCNC: 6.6 G/DL (ref 6.4–8.2)
RBC # BLD AUTO: 3.8 M/UL (ref 3.8–5.2)
RBC MORPH BLD: ABNORMAL
SODIUM SERPL-SCNC: 129 MMOL/L (ref 136–145)
UFH PPP CHRO-ACNC: 0.36 IU/ML
UFH PPP CHRO-ACNC: 0.5 IU/ML
UFH PPP CHRO-ACNC: <0.1 IU/ML
UFH PPP CHRO-ACNC: <0.1 IU/ML
WBC # BLD AUTO: 12.4 K/UL (ref 3.6–11)

## 2024-04-12 PROCEDURE — 36415 COLL VENOUS BLD VENIPUNCTURE: CPT

## 2024-04-12 PROCEDURE — 80053 COMPREHEN METABOLIC PANEL: CPT

## 2024-04-12 PROCEDURE — 82962 GLUCOSE BLOOD TEST: CPT

## 2024-04-12 PROCEDURE — 6370000000 HC RX 637 (ALT 250 FOR IP): Performed by: SURGERY

## 2024-04-12 PROCEDURE — 2580000003 HC RX 258: Performed by: PHYSICIAN ASSISTANT

## 2024-04-12 PROCEDURE — 99232 SBSQ HOSP IP/OBS MODERATE 35: CPT | Performed by: SURGERY

## 2024-04-12 PROCEDURE — 94761 N-INVAS EAR/PLS OXIMETRY MLT: CPT

## 2024-04-12 PROCEDURE — 85520 HEPARIN ASSAY: CPT

## 2024-04-12 PROCEDURE — 2500000003 HC RX 250 WO HCPCS: Performed by: SURGERY

## 2024-04-12 PROCEDURE — 6370000000 HC RX 637 (ALT 250 FOR IP): Performed by: PHYSICIAN ASSISTANT

## 2024-04-12 PROCEDURE — 6360000002 HC RX W HCPCS: Performed by: SURGERY

## 2024-04-12 PROCEDURE — 6360000002 HC RX W HCPCS: Performed by: PHYSICIAN ASSISTANT

## 2024-04-12 PROCEDURE — 82550 ASSAY OF CK (CPK): CPT

## 2024-04-12 PROCEDURE — 94640 AIRWAY INHALATION TREATMENT: CPT

## 2024-04-12 PROCEDURE — 85025 COMPLETE CBC W/AUTO DIFF WBC: CPT

## 2024-04-12 PROCEDURE — 1100000000 HC RM PRIVATE

## 2024-04-12 RX ADMIN — LISINOPRIL 20 MG: 20 TABLET ORAL at 08:06

## 2024-04-12 RX ADMIN — OXYCODONE HYDROCHLORIDE 20 MG: 10 TABLET ORAL at 05:17

## 2024-04-12 RX ADMIN — INSULIN LISPRO 2 UNITS: 100 INJECTION, SOLUTION INTRAVENOUS; SUBCUTANEOUS at 08:07

## 2024-04-12 RX ADMIN — OXYCODONE HYDROCHLORIDE 30 MG: 10 TABLET, FILM COATED, EXTENDED RELEASE ORAL at 21:05

## 2024-04-12 RX ADMIN — HYDROMORPHONE HYDROCHLORIDE 1.5 MG: 2 INJECTION INTRAMUSCULAR; INTRAVENOUS; SUBCUTANEOUS at 02:18

## 2024-04-12 RX ADMIN — INSULIN LISPRO 2 UNITS: 100 INJECTION, SOLUTION INTRAVENOUS; SUBCUTANEOUS at 12:37

## 2024-04-12 RX ADMIN — METOROPROLOL TARTRATE 5 MG: 5 INJECTION, SOLUTION INTRAVENOUS at 05:13

## 2024-04-12 RX ADMIN — Medication 2 PUFF: at 21:37

## 2024-04-12 RX ADMIN — HYDROMORPHONE HYDROCHLORIDE 1.5 MG: 2 INJECTION INTRAMUSCULAR; INTRAVENOUS; SUBCUTANEOUS at 19:26

## 2024-04-12 RX ADMIN — OXYCODONE HYDROCHLORIDE 30 MG: 10 TABLET, FILM COATED, EXTENDED RELEASE ORAL at 08:06

## 2024-04-12 RX ADMIN — HEPARIN SODIUM 23 UNITS/KG/HR: 10000 INJECTION, SOLUTION INTRAVENOUS at 12:35

## 2024-04-12 RX ADMIN — INSULIN LISPRO 2 UNITS: 100 INJECTION, SOLUTION INTRAVENOUS; SUBCUTANEOUS at 17:29

## 2024-04-12 RX ADMIN — HYDROMORPHONE HYDROCHLORIDE 1.5 MG: 2 INJECTION INTRAMUSCULAR; INTRAVENOUS; SUBCUTANEOUS at 12:37

## 2024-04-12 RX ADMIN — Medication 2 PUFF: at 09:06

## 2024-04-12 RX ADMIN — HYDROMORPHONE HYDROCHLORIDE 1.5 MG: 2 INJECTION INTRAMUSCULAR; INTRAVENOUS; SUBCUTANEOUS at 22:19

## 2024-04-12 RX ADMIN — METOROPROLOL TARTRATE 5 MG: 5 INJECTION, SOLUTION INTRAVENOUS at 21:52

## 2024-04-12 RX ADMIN — ATORVASTATIN CALCIUM 80 MG: 40 TABLET, FILM COATED ORAL at 08:06

## 2024-04-12 RX ADMIN — METOROPROLOL TARTRATE 5 MG: 5 INJECTION, SOLUTION INTRAVENOUS at 15:43

## 2024-04-12 RX ADMIN — PANTOPRAZOLE SODIUM 40 MG: 40 TABLET, DELAYED RELEASE ORAL at 08:06

## 2024-04-12 RX ADMIN — METOROPROLOL TARTRATE 5 MG: 5 INJECTION, SOLUTION INTRAVENOUS at 10:21

## 2024-04-12 RX ADMIN — HYDROMORPHONE HYDROCHLORIDE 1.5 MG: 2 INJECTION INTRAMUSCULAR; INTRAVENOUS; SUBCUTANEOUS at 15:43

## 2024-04-12 RX ADMIN — HEPARIN SODIUM 3400 UNITS: 1000 INJECTION INTRAVENOUS; SUBCUTANEOUS at 05:11

## 2024-04-12 RX ADMIN — SODIUM CHLORIDE: 9 INJECTION, SOLUTION INTRAVENOUS at 02:19

## 2024-04-12 ASSESSMENT — PAIN DESCRIPTION - LOCATION
LOCATION: FOOT
LOCATION: LEG
LOCATION: FOOT
LOCATION: ABDOMEN
LOCATION: FOOT
LOCATION: FOOT

## 2024-04-12 ASSESSMENT — PAIN SCALES - GENERAL
PAINLEVEL_OUTOF10: 7
PAINLEVEL_OUTOF10: 8
PAINLEVEL_OUTOF10: 10
PAINLEVEL_OUTOF10: 9
PAINLEVEL_OUTOF10: 7
PAINLEVEL_OUTOF10: 6
PAINLEVEL_OUTOF10: 9
PAINLEVEL_OUTOF10: 9
PAINLEVEL_OUTOF10: 8
PAINLEVEL_OUTOF10: 9

## 2024-04-12 ASSESSMENT — PAIN SCALES - WONG BAKER: WONGBAKER_NUMERICALRESPONSE: NO HURT

## 2024-04-12 ASSESSMENT — PAIN DESCRIPTION - ORIENTATION
ORIENTATION: RIGHT
ORIENTATION: RIGHT
ORIENTATION: ANTERIOR
ORIENTATION: RIGHT

## 2024-04-12 ASSESSMENT — PAIN - FUNCTIONAL ASSESSMENT
PAIN_FUNCTIONAL_ASSESSMENT: PREVENTS OR INTERFERES SOME ACTIVE ACTIVITIES AND ADLS
PAIN_FUNCTIONAL_ASSESSMENT: PREVENTS OR INTERFERES SOME ACTIVE ACTIVITIES AND ADLS
PAIN_FUNCTIONAL_ASSESSMENT: ACTIVITIES ARE NOT PREVENTED
PAIN_FUNCTIONAL_ASSESSMENT: ACTIVITIES ARE NOT PREVENTED

## 2024-04-12 ASSESSMENT — PAIN DESCRIPTION - DESCRIPTORS
DESCRIPTORS: ACHING
DESCRIPTORS: ACHING;BURNING;THROBBING
DESCRIPTORS: ACHING
DESCRIPTORS: ACHING;THROBBING

## 2024-04-12 NOTE — CARE COORDINATION
CM reviewed chart. Choice letter obtained and sent to Taylor YUNG (1st choice) and Kimberlyn Linares (2nd choice). CM will continue to follow.

## 2024-04-13 ENCOUNTER — ANESTHESIA (OUTPATIENT)
Facility: HOSPITAL | Age: 64
End: 2024-04-13
Payer: MEDICARE

## 2024-04-13 LAB
ALBUMIN SERPL-MCNC: 2.4 G/DL (ref 3.5–5)
ALBUMIN/GLOB SERPL: 0.6 (ref 1.1–2.2)
ALP SERPL-CCNC: 80 U/L (ref 45–117)
ALT SERPL-CCNC: 29 U/L (ref 12–78)
ANION GAP SERPL CALC-SCNC: 6 MMOL/L (ref 5–15)
AST SERPL W P-5'-P-CCNC: 36 U/L (ref 15–37)
BASOPHILS # BLD: 0 K/UL (ref 0–0.1)
BASOPHILS NFR BLD: 0 % (ref 0–1)
BILIRUB SERPL-MCNC: 0.6 MG/DL (ref 0.2–1)
BUN SERPL-MCNC: 4 MG/DL (ref 6–20)
BUN/CREAT SERPL: 9 (ref 12–20)
CA-I BLD-MCNC: 8.8 MG/DL (ref 8.5–10.1)
CHLORIDE SERPL-SCNC: 100 MMOL/L (ref 97–108)
CK SERPL-CCNC: 361 U/L (ref 26–192)
CO2 SERPL-SCNC: 26 MMOL/L (ref 21–32)
CREAT SERPL-MCNC: 0.43 MG/DL (ref 0.55–1.02)
DIFFERENTIAL METHOD BLD: ABNORMAL
EOSINOPHIL # BLD: 0 K/UL (ref 0–0.4)
EOSINOPHIL NFR BLD: 0 % (ref 0–7)
ERYTHROCYTE [DISTWIDTH] IN BLOOD BY AUTOMATED COUNT: 17.9 % (ref 11.5–14.5)
GLOBULIN SER CALC-MCNC: 3.9 G/DL (ref 2–4)
GLUCOSE BLD STRIP.AUTO-MCNC: 214 MG/DL (ref 65–100)
GLUCOSE BLD STRIP.AUTO-MCNC: 300 MG/DL (ref 65–100)
GLUCOSE BLD STRIP.AUTO-MCNC: 328 MG/DL (ref 65–100)
GLUCOSE BLD STRIP.AUTO-MCNC: 334 MG/DL (ref 65–100)
GLUCOSE SERPL-MCNC: 189 MG/DL (ref 65–100)
HCT VFR BLD AUTO: 29.8 % (ref 35–47)
HGB BLD-MCNC: 9.1 G/DL (ref 11.5–16)
IMM GRANULOCYTES # BLD AUTO: 0 K/UL
IMM GRANULOCYTES NFR BLD AUTO: 0 %
LYMPHOCYTES # BLD: 1.3 K/UL (ref 0.8–3.5)
LYMPHOCYTES NFR BLD: 10 % (ref 12–49)
MCH RBC QN AUTO: 23.7 PG (ref 26–34)
MCHC RBC AUTO-ENTMCNC: 30.5 G/DL (ref 30–36.5)
MCV RBC AUTO: 77.6 FL (ref 80–99)
MONOCYTES # BLD: 1.9 K/UL (ref 0–1)
MONOCYTES NFR BLD: 14 % (ref 5–13)
NEUTS SEG # BLD: 10.1 K/UL (ref 1.8–8)
NEUTS SEG NFR BLD: 76 % (ref 32–75)
NRBC # BLD: 0 K/UL (ref 0–0.01)
NRBC BLD-RTO: 0 PER 100 WBC
PERFORMED BY:: ABNORMAL
PLATELET # BLD AUTO: 431 K/UL (ref 150–400)
PMV BLD AUTO: 12.1 FL (ref 8.9–12.9)
POTASSIUM SERPL-SCNC: 3.8 MMOL/L (ref 3.5–5.1)
PROT SERPL-MCNC: 6.3 G/DL (ref 6.4–8.2)
RBC # BLD AUTO: 3.84 M/UL (ref 3.8–5.2)
RBC MORPH BLD: ABNORMAL
SODIUM SERPL-SCNC: 132 MMOL/L (ref 136–145)
WBC # BLD AUTO: 13.3 K/UL (ref 3.6–11)

## 2024-04-13 PROCEDURE — 6370000000 HC RX 637 (ALT 250 FOR IP): Performed by: PHYSICIAN ASSISTANT

## 2024-04-13 PROCEDURE — 7100000001 HC PACU RECOVERY - ADDTL 15 MIN: Performed by: SURGERY

## 2024-04-13 PROCEDURE — 2500000003 HC RX 250 WO HCPCS

## 2024-04-13 PROCEDURE — 88311 DECALCIFY TISSUE: CPT

## 2024-04-13 PROCEDURE — 6360000002 HC RX W HCPCS: Performed by: ANESTHESIOLOGY

## 2024-04-13 PROCEDURE — 6360000002 HC RX W HCPCS: Performed by: PHYSICIAN ASSISTANT

## 2024-04-13 PROCEDURE — 82962 GLUCOSE BLOOD TEST: CPT

## 2024-04-13 PROCEDURE — 7100000000 HC PACU RECOVERY - FIRST 15 MIN: Performed by: SURGERY

## 2024-04-13 PROCEDURE — 2580000003 HC RX 258: Performed by: PHYSICIAN ASSISTANT

## 2024-04-13 PROCEDURE — 0Y6H0Z1 DETACHMENT AT RIGHT LOWER LEG, HIGH, OPEN APPROACH: ICD-10-PCS | Performed by: SURGERY

## 2024-04-13 PROCEDURE — 6370000000 HC RX 637 (ALT 250 FOR IP): Performed by: ANESTHESIOLOGY

## 2024-04-13 PROCEDURE — 3600000003 HC SURGERY LEVEL 3 BASE: Performed by: SURGERY

## 2024-04-13 PROCEDURE — 6360000002 HC RX W HCPCS

## 2024-04-13 PROCEDURE — 2500000003 HC RX 250 WO HCPCS: Performed by: SURGERY

## 2024-04-13 PROCEDURE — 2709999900 HC NON-CHARGEABLE SUPPLY: Performed by: SURGERY

## 2024-04-13 PROCEDURE — 27880 AMPUTATION OF LOWER LEG: CPT | Performed by: SURGERY

## 2024-04-13 PROCEDURE — 3700000001 HC ADD 15 MINUTES (ANESTHESIA): Performed by: SURGERY

## 2024-04-13 PROCEDURE — 6370000000 HC RX 637 (ALT 250 FOR IP): Performed by: SURGERY

## 2024-04-13 PROCEDURE — 80053 COMPREHEN METABOLIC PANEL: CPT

## 2024-04-13 PROCEDURE — 2580000003 HC RX 258

## 2024-04-13 PROCEDURE — 88307 TISSUE EXAM BY PATHOLOGIST: CPT

## 2024-04-13 PROCEDURE — 99232 SBSQ HOSP IP/OBS MODERATE 35: CPT | Performed by: SURGERY

## 2024-04-13 PROCEDURE — 94640 AIRWAY INHALATION TREATMENT: CPT

## 2024-04-13 PROCEDURE — 3600000013 HC SURGERY LEVEL 3 ADDTL 15MIN: Performed by: SURGERY

## 2024-04-13 PROCEDURE — 3700000000 HC ANESTHESIA ATTENDED CARE: Performed by: SURGERY

## 2024-04-13 PROCEDURE — 1100000000 HC RM PRIVATE

## 2024-04-13 PROCEDURE — 2580000003 HC RX 258: Performed by: SURGERY

## 2024-04-13 PROCEDURE — 85025 COMPLETE CBC W/AUTO DIFF WBC: CPT

## 2024-04-13 PROCEDURE — 82550 ASSAY OF CK (CPK): CPT

## 2024-04-13 PROCEDURE — 36415 COLL VENOUS BLD VENIPUNCTURE: CPT

## 2024-04-13 RX ORDER — ONDANSETRON 2 MG/ML
INJECTION INTRAMUSCULAR; INTRAVENOUS PRN
Status: DISCONTINUED | OUTPATIENT
Start: 2024-04-13 | End: 2024-04-13 | Stop reason: SDUPTHER

## 2024-04-13 RX ORDER — HYDRALAZINE HYDROCHLORIDE 20 MG/ML
10 INJECTION INTRAMUSCULAR; INTRAVENOUS EVERY 6 HOURS PRN
Status: DISCONTINUED | OUTPATIENT
Start: 2024-04-13 | End: 2024-04-17 | Stop reason: HOSPADM

## 2024-04-13 RX ORDER — SODIUM CHLORIDE 0.9 % (FLUSH) 0.9 %
5-40 SYRINGE (ML) INJECTION EVERY 12 HOURS SCHEDULED
Status: DISCONTINUED | OUTPATIENT
Start: 2024-04-13 | End: 2024-04-13 | Stop reason: HOSPADM

## 2024-04-13 RX ORDER — SODIUM CHLORIDE 0.9 % (FLUSH) 0.9 %
5-40 SYRINGE (ML) INJECTION PRN
Status: DISCONTINUED | OUTPATIENT
Start: 2024-04-13 | End: 2024-04-17 | Stop reason: HOSPADM

## 2024-04-13 RX ORDER — ONDANSETRON 2 MG/ML
4 INJECTION INTRAMUSCULAR; INTRAVENOUS
Status: DISCONTINUED | OUTPATIENT
Start: 2024-04-13 | End: 2024-04-13 | Stop reason: HOSPADM

## 2024-04-13 RX ORDER — LIDOCAINE HYDROCHLORIDE 20 MG/ML
INJECTION, SOLUTION EPIDURAL; INFILTRATION; INTRACAUDAL; PERINEURAL PRN
Status: DISCONTINUED | OUTPATIENT
Start: 2024-04-13 | End: 2024-04-13 | Stop reason: SDUPTHER

## 2024-04-13 RX ORDER — DEXMEDETOMIDINE HYDROCHLORIDE 100 UG/ML
INJECTION, SOLUTION INTRAVENOUS PRN
Status: DISCONTINUED | OUTPATIENT
Start: 2024-04-13 | End: 2024-04-13 | Stop reason: SDUPTHER

## 2024-04-13 RX ORDER — FENTANYL CITRATE 50 UG/ML
50 INJECTION, SOLUTION INTRAMUSCULAR; INTRAVENOUS EVERY 5 MIN PRN
Status: DISCONTINUED | OUTPATIENT
Start: 2024-04-13 | End: 2024-04-13 | Stop reason: HOSPADM

## 2024-04-13 RX ORDER — OXYCODONE HYDROCHLORIDE 5 MG/1
10 TABLET ORAL PRN
Status: COMPLETED | OUTPATIENT
Start: 2024-04-13 | End: 2024-04-13

## 2024-04-13 RX ORDER — IPRATROPIUM BROMIDE AND ALBUTEROL SULFATE 2.5; .5 MG/3ML; MG/3ML
1 SOLUTION RESPIRATORY (INHALATION)
Status: DISCONTINUED | OUTPATIENT
Start: 2024-04-13 | End: 2024-04-13 | Stop reason: HOSPADM

## 2024-04-13 RX ORDER — SODIUM CHLORIDE, SODIUM LACTATE, POTASSIUM CHLORIDE, CALCIUM CHLORIDE 600; 310; 30; 20 MG/100ML; MG/100ML; MG/100ML; MG/100ML
INJECTION, SOLUTION INTRAVENOUS ONCE
Status: DISCONTINUED | OUTPATIENT
Start: 2024-04-13 | End: 2024-04-13 | Stop reason: HOSPADM

## 2024-04-13 RX ORDER — DEXAMETHASONE SODIUM PHOSPHATE 4 MG/ML
INJECTION, SOLUTION INTRA-ARTICULAR; INTRALESIONAL; INTRAMUSCULAR; INTRAVENOUS; SOFT TISSUE PRN
Status: DISCONTINUED | OUTPATIENT
Start: 2024-04-13 | End: 2024-04-13 | Stop reason: SDUPTHER

## 2024-04-13 RX ORDER — SODIUM CHLORIDE 0.9 % (FLUSH) 0.9 %
5-40 SYRINGE (ML) INJECTION EVERY 12 HOURS SCHEDULED
Status: DISCONTINUED | OUTPATIENT
Start: 2024-04-13 | End: 2024-04-17 | Stop reason: HOSPADM

## 2024-04-13 RX ORDER — SODIUM CHLORIDE, SODIUM LACTATE, POTASSIUM CHLORIDE, CALCIUM CHLORIDE 600; 310; 30; 20 MG/100ML; MG/100ML; MG/100ML; MG/100ML
INJECTION, SOLUTION INTRAVENOUS CONTINUOUS PRN
Status: DISCONTINUED | OUTPATIENT
Start: 2024-04-13 | End: 2024-04-13 | Stop reason: SDUPTHER

## 2024-04-13 RX ORDER — HYDRALAZINE HYDROCHLORIDE 20 MG/ML
10 INJECTION INTRAMUSCULAR; INTRAVENOUS
Status: DISCONTINUED | OUTPATIENT
Start: 2024-04-13 | End: 2024-04-13 | Stop reason: HOSPADM

## 2024-04-13 RX ORDER — METOCLOPRAMIDE HYDROCHLORIDE 5 MG/ML
10 INJECTION INTRAMUSCULAR; INTRAVENOUS
Status: DISCONTINUED | OUTPATIENT
Start: 2024-04-13 | End: 2024-04-13 | Stop reason: HOSPADM

## 2024-04-13 RX ORDER — PROPOFOL 10 MG/ML
INJECTION, EMULSION INTRAVENOUS PRN
Status: DISCONTINUED | OUTPATIENT
Start: 2024-04-13 | End: 2024-04-13 | Stop reason: SDUPTHER

## 2024-04-13 RX ORDER — SODIUM CHLORIDE 9 MG/ML
INJECTION, SOLUTION INTRAVENOUS PRN
Status: DISCONTINUED | OUTPATIENT
Start: 2024-04-13 | End: 2024-04-13 | Stop reason: HOSPADM

## 2024-04-13 RX ORDER — FENTANYL CITRATE 50 UG/ML
INJECTION, SOLUTION INTRAMUSCULAR; INTRAVENOUS PRN
Status: DISCONTINUED | OUTPATIENT
Start: 2024-04-13 | End: 2024-04-13 | Stop reason: SDUPTHER

## 2024-04-13 RX ORDER — LABETALOL HYDROCHLORIDE 5 MG/ML
10 INJECTION, SOLUTION INTRAVENOUS
Status: DISCONTINUED | OUTPATIENT
Start: 2024-04-13 | End: 2024-04-13 | Stop reason: HOSPADM

## 2024-04-13 RX ORDER — ONDANSETRON 2 MG/ML
4 INJECTION INTRAMUSCULAR; INTRAVENOUS EVERY 6 HOURS PRN
Status: DISCONTINUED | OUTPATIENT
Start: 2024-04-13 | End: 2024-04-17 | Stop reason: HOSPADM

## 2024-04-13 RX ORDER — NALOXONE HYDROCHLORIDE 0.4 MG/ML
INJECTION, SOLUTION INTRAMUSCULAR; INTRAVENOUS; SUBCUTANEOUS PRN
Status: DISCONTINUED | OUTPATIENT
Start: 2024-04-13 | End: 2024-04-13 | Stop reason: HOSPADM

## 2024-04-13 RX ORDER — FENTANYL CITRATE 50 UG/ML
INJECTION, SOLUTION INTRAMUSCULAR; INTRAVENOUS
Status: DISPENSED
Start: 2024-04-13 | End: 2024-04-13

## 2024-04-13 RX ORDER — DIPHENHYDRAMINE HYDROCHLORIDE 50 MG/ML
12.5 INJECTION INTRAMUSCULAR; INTRAVENOUS
Status: DISCONTINUED | OUTPATIENT
Start: 2024-04-13 | End: 2024-04-13 | Stop reason: HOSPADM

## 2024-04-13 RX ORDER — MIDAZOLAM HYDROCHLORIDE 1 MG/ML
INJECTION INTRAMUSCULAR; INTRAVENOUS PRN
Status: DISCONTINUED | OUTPATIENT
Start: 2024-04-13 | End: 2024-04-13 | Stop reason: SDUPTHER

## 2024-04-13 RX ORDER — OXYCODONE HYDROCHLORIDE 5 MG/1
5 TABLET ORAL PRN
Status: COMPLETED | OUTPATIENT
Start: 2024-04-13 | End: 2024-04-13

## 2024-04-13 RX ORDER — HYDRALAZINE HYDROCHLORIDE 20 MG/ML
INJECTION INTRAMUSCULAR; INTRAVENOUS PRN
Status: DISCONTINUED | OUTPATIENT
Start: 2024-04-13 | End: 2024-04-13 | Stop reason: SDUPTHER

## 2024-04-13 RX ORDER — GLUCAGON 1 MG/ML
1 KIT INJECTION PRN
Status: DISCONTINUED | OUTPATIENT
Start: 2024-04-13 | End: 2024-04-13 | Stop reason: HOSPADM

## 2024-04-13 RX ORDER — DEXTROSE MONOHYDRATE 100 MG/ML
INJECTION, SOLUTION INTRAVENOUS CONTINUOUS PRN
Status: DISCONTINUED | OUTPATIENT
Start: 2024-04-13 | End: 2024-04-13 | Stop reason: HOSPADM

## 2024-04-13 RX ORDER — LORAZEPAM 2 MG/ML
0.5 INJECTION INTRAMUSCULAR
Status: COMPLETED | OUTPATIENT
Start: 2024-04-13 | End: 2024-04-13

## 2024-04-13 RX ORDER — ONDANSETRON 4 MG/1
4 TABLET, ORALLY DISINTEGRATING ORAL EVERY 8 HOURS PRN
Status: DISCONTINUED | OUTPATIENT
Start: 2024-04-13 | End: 2024-04-17 | Stop reason: HOSPADM

## 2024-04-13 RX ORDER — HYDROMORPHONE HYDROCHLORIDE 1 MG/ML
0.5 INJECTION, SOLUTION INTRAMUSCULAR; INTRAVENOUS; SUBCUTANEOUS EVERY 5 MIN PRN
Status: DISCONTINUED | OUTPATIENT
Start: 2024-04-13 | End: 2024-04-13 | Stop reason: HOSPADM

## 2024-04-13 RX ORDER — SODIUM CHLORIDE 9 MG/ML
INJECTION, SOLUTION INTRAVENOUS PRN
Status: DISCONTINUED | OUTPATIENT
Start: 2024-04-13 | End: 2024-04-17 | Stop reason: HOSPADM

## 2024-04-13 RX ORDER — FENTANYL CITRATE 50 UG/ML
INJECTION, SOLUTION INTRAMUSCULAR; INTRAVENOUS
Status: COMPLETED
Start: 2024-04-13 | End: 2024-04-13

## 2024-04-13 RX ORDER — SODIUM CHLORIDE 0.9 % (FLUSH) 0.9 %
5-40 SYRINGE (ML) INJECTION PRN
Status: DISCONTINUED | OUTPATIENT
Start: 2024-04-13 | End: 2024-04-13 | Stop reason: HOSPADM

## 2024-04-13 RX ADMIN — SODIUM CHLORIDE, POTASSIUM CHLORIDE, SODIUM LACTATE AND CALCIUM CHLORIDE: 600; 310; 30; 20 INJECTION, SOLUTION INTRAVENOUS at 07:58

## 2024-04-13 RX ADMIN — LORAZEPAM 0.5 MG: 2 INJECTION INTRAMUSCULAR; INTRAVENOUS at 10:11

## 2024-04-13 RX ADMIN — HYDROMORPHONE HYDROCHLORIDE 1.5 MG: 2 INJECTION INTRAMUSCULAR; INTRAVENOUS; SUBCUTANEOUS at 13:44

## 2024-04-13 RX ADMIN — DEXMEDETOMIDINE 8 MCG: 100 INJECTION, SOLUTION INTRAVENOUS at 07:58

## 2024-04-13 RX ADMIN — METOROPROLOL TARTRATE 5 MG: 5 INJECTION, SOLUTION INTRAVENOUS at 21:37

## 2024-04-13 RX ADMIN — HYDROMORPHONE HYDROCHLORIDE 0.5 MG: 1 INJECTION, SOLUTION INTRAMUSCULAR; INTRAVENOUS; SUBCUTANEOUS at 09:52

## 2024-04-13 RX ADMIN — SODIUM CHLORIDE: 9 INJECTION, SOLUTION INTRAVENOUS at 00:38

## 2024-04-13 RX ADMIN — CEFAZOLIN SODIUM 2000 MG: 1 INJECTION, POWDER, FOR SOLUTION INTRAMUSCULAR; INTRAVENOUS at 07:58

## 2024-04-13 RX ADMIN — HYDROMORPHONE HYDROCHLORIDE 0.5 MG: 1 INJECTION, SOLUTION INTRAMUSCULAR; INTRAVENOUS; SUBCUTANEOUS at 08:21

## 2024-04-13 RX ADMIN — FENTANYL CITRATE 50 MCG: 50 INJECTION, SOLUTION INTRAMUSCULAR; INTRAVENOUS at 09:40

## 2024-04-13 RX ADMIN — HYDRALAZINE HYDROCHLORIDE 10 MG: 20 INJECTION, SOLUTION INTRAMUSCULAR; INTRAVENOUS at 09:05

## 2024-04-13 RX ADMIN — ATORVASTATIN CALCIUM 80 MG: 40 TABLET, FILM COATED ORAL at 11:03

## 2024-04-13 RX ADMIN — FENTANYL CITRATE 25 MCG: 50 INJECTION, SOLUTION INTRAMUSCULAR; INTRAVENOUS at 08:35

## 2024-04-13 RX ADMIN — LISINOPRIL 20 MG: 20 TABLET ORAL at 11:03

## 2024-04-13 RX ADMIN — DEXMEDETOMIDINE 8 MCG: 100 INJECTION, SOLUTION INTRAVENOUS at 08:25

## 2024-04-13 RX ADMIN — MIDAZOLAM HYDROCHLORIDE 2 MG: 2 INJECTION, SOLUTION INTRAMUSCULAR; INTRAVENOUS at 07:58

## 2024-04-13 RX ADMIN — OXYCODONE 10 MG: 5 TABLET ORAL at 10:00

## 2024-04-13 RX ADMIN — SODIUM CHLORIDE, PRESERVATIVE FREE 10 ML: 5 INJECTION INTRAVENOUS at 21:40

## 2024-04-13 RX ADMIN — INSULIN LISPRO 6 UNITS: 100 INJECTION, SOLUTION INTRAVENOUS; SUBCUTANEOUS at 12:00

## 2024-04-13 RX ADMIN — INSULIN LISPRO 6 UNITS: 100 INJECTION, SOLUTION INTRAVENOUS; SUBCUTANEOUS at 16:44

## 2024-04-13 RX ADMIN — OXYCODONE HYDROCHLORIDE 30 MG: 10 TABLET, FILM COATED, EXTENDED RELEASE ORAL at 21:37

## 2024-04-13 RX ADMIN — HYDROMORPHONE HYDROCHLORIDE 0.3 MG: 1 INJECTION, SOLUTION INTRAMUSCULAR; INTRAVENOUS; SUBCUTANEOUS at 08:25

## 2024-04-13 RX ADMIN — HYDROMORPHONE HYDROCHLORIDE 1.5 MG: 2 INJECTION INTRAMUSCULAR; INTRAVENOUS; SUBCUTANEOUS at 11:06

## 2024-04-13 RX ADMIN — ONDANSETRON 4 MG: 2 INJECTION INTRAMUSCULAR; INTRAVENOUS at 08:40

## 2024-04-13 RX ADMIN — FENTANYL CITRATE 25 MCG: 50 INJECTION, SOLUTION INTRAMUSCULAR; INTRAVENOUS at 08:28

## 2024-04-13 RX ADMIN — SODIUM CHLORIDE, PRESERVATIVE FREE 10 ML: 5 INJECTION INTRAVENOUS at 09:45

## 2024-04-13 RX ADMIN — OXYCODONE HYDROCHLORIDE 20 MG: 10 TABLET ORAL at 13:01

## 2024-04-13 RX ADMIN — INSULIN LISPRO 4 UNITS: 100 INJECTION, SOLUTION INTRAVENOUS; SUBCUTANEOUS at 20:37

## 2024-04-13 RX ADMIN — METOROPROLOL TARTRATE 5 MG: 5 INJECTION, SOLUTION INTRAVENOUS at 16:44

## 2024-04-13 RX ADMIN — METOROPROLOL TARTRATE 5 MG: 5 INJECTION, SOLUTION INTRAVENOUS at 11:02

## 2024-04-13 RX ADMIN — DEXMEDETOMIDINE 4 MCG: 100 INJECTION, SOLUTION INTRAVENOUS at 08:21

## 2024-04-13 RX ADMIN — HYDROMORPHONE HYDROCHLORIDE 1.5 MG: 2 INJECTION INTRAMUSCULAR; INTRAVENOUS; SUBCUTANEOUS at 01:33

## 2024-04-13 RX ADMIN — Medication 2 PUFF: at 20:38

## 2024-04-13 RX ADMIN — METOROPROLOL TARTRATE 5 MG: 5 INJECTION, SOLUTION INTRAVENOUS at 04:29

## 2024-04-13 RX ADMIN — FENTANYL CITRATE 25 MCG: 50 INJECTION, SOLUTION INTRAMUSCULAR; INTRAVENOUS at 08:47

## 2024-04-13 RX ADMIN — FENTANYL CITRATE 50 MCG: 50 INJECTION, SOLUTION INTRAMUSCULAR; INTRAVENOUS at 08:05

## 2024-04-13 RX ADMIN — FENTANYL CITRATE 25 MCG: 50 INJECTION, SOLUTION INTRAMUSCULAR; INTRAVENOUS at 09:04

## 2024-04-13 RX ADMIN — HYDROMORPHONE HYDROCHLORIDE 1.5 MG: 2 INJECTION INTRAMUSCULAR; INTRAVENOUS; SUBCUTANEOUS at 20:29

## 2024-04-13 RX ADMIN — LIDOCAINE HYDROCHLORIDE 60 MG: 20 INJECTION, SOLUTION EPIDURAL; INFILTRATION; INTRACAUDAL; PERINEURAL at 08:04

## 2024-04-13 RX ADMIN — DEXAMETHASONE SODIUM PHOSPHATE 4 MG: 4 INJECTION, SOLUTION INTRA-ARTICULAR; INTRALESIONAL; INTRAMUSCULAR; INTRAVENOUS; SOFT TISSUE at 08:08

## 2024-04-13 RX ADMIN — HYDROMORPHONE HYDROCHLORIDE 1.5 MG: 2 INJECTION INTRAMUSCULAR; INTRAVENOUS; SUBCUTANEOUS at 16:45

## 2024-04-13 RX ADMIN — HYDROMORPHONE HYDROCHLORIDE 0.2 MG: 1 INJECTION, SOLUTION INTRAMUSCULAR; INTRAVENOUS; SUBCUTANEOUS at 08:28

## 2024-04-13 RX ADMIN — FENTANYL CITRATE 50 MCG: 50 INJECTION, SOLUTION INTRAMUSCULAR; INTRAVENOUS at 08:14

## 2024-04-13 RX ADMIN — PANTOPRAZOLE SODIUM 40 MG: 40 TABLET, DELAYED RELEASE ORAL at 11:03

## 2024-04-13 RX ADMIN — PROPOFOL 130 MG: 10 INJECTION, EMULSION INTRAVENOUS at 08:04

## 2024-04-13 RX ADMIN — HYDROMORPHONE HYDROCHLORIDE 0.5 MG: 1 INJECTION, SOLUTION INTRAMUSCULAR; INTRAVENOUS; SUBCUTANEOUS at 10:02

## 2024-04-13 RX ADMIN — HYDROMORPHONE HYDROCHLORIDE 1.5 MG: 2 INJECTION INTRAMUSCULAR; INTRAVENOUS; SUBCUTANEOUS at 04:17

## 2024-04-13 RX ADMIN — HYDROMORPHONE HYDROCHLORIDE 1.5 MG: 2 INJECTION INTRAMUSCULAR; INTRAVENOUS; SUBCUTANEOUS at 23:39

## 2024-04-13 ASSESSMENT — PAIN DESCRIPTION - DESCRIPTORS
DESCRIPTORS: ACHING;DISCOMFORT;DULL
DESCRIPTORS: ACHING
DESCRIPTORS: ACHING;DISCOMFORT;DULL
DESCRIPTORS: SHARP;THROBBING
DESCRIPTORS: ACHING;DISCOMFORT;DULL
DESCRIPTORS: ACHING
DESCRIPTORS: DISCOMFORT;DULL;ACHING
DESCRIPTORS: ACHING
DESCRIPTORS: ACHING;DISCOMFORT
DESCRIPTORS: SHARP;ACHING
DESCRIPTORS: ACHING
DESCRIPTORS: ACHING
DESCRIPTORS: ACHING;DULL;DISCOMFORT
DESCRIPTORS: ACHING;DISCOMFORT;DULL

## 2024-04-13 ASSESSMENT — PAIN DESCRIPTION - ORIENTATION
ORIENTATION: RIGHT

## 2024-04-13 ASSESSMENT — PAIN SCALES - GENERAL
PAINLEVEL_OUTOF10: 10
PAINLEVEL_OUTOF10: 10
PAINLEVEL_OUTOF10: 9
PAINLEVEL_OUTOF10: 10
PAINLEVEL_OUTOF10: 6
PAINLEVEL_OUTOF10: 10
PAINLEVEL_OUTOF10: 0
PAINLEVEL_OUTOF10: 10
PAINLEVEL_OUTOF10: 6
PAINLEVEL_OUTOF10: 9
PAINLEVEL_OUTOF10: 9
PAINLEVEL_OUTOF10: 6
PAINLEVEL_OUTOF10: 0
PAINLEVEL_OUTOF10: 9
PAINLEVEL_OUTOF10: 10
PAINLEVEL_OUTOF10: 8
PAINLEVEL_OUTOF10: 6
PAINLEVEL_OUTOF10: 6
PAINLEVEL_OUTOF10: 8
PAINLEVEL_OUTOF10: 9
PAINLEVEL_OUTOF10: 10
PAINLEVEL_OUTOF10: 0
PAINLEVEL_OUTOF10: 9
PAINLEVEL_OUTOF10: 7
PAINLEVEL_OUTOF10: 8

## 2024-04-13 ASSESSMENT — PAIN DESCRIPTION - LOCATION
LOCATION: LEG
LOCATION: FOOT
LOCATION: LEG
LOCATION: KNEE
LOCATION: KNEE
LOCATION: LEG
LOCATION: KNEE
LOCATION: LEG
LOCATION: FOOT
LOCATION: LEG

## 2024-04-13 ASSESSMENT — PAIN DESCRIPTION - PAIN TYPE
TYPE: SURGICAL PAIN

## 2024-04-13 ASSESSMENT — PAIN DESCRIPTION - FREQUENCY
FREQUENCY: CONTINUOUS

## 2024-04-13 ASSESSMENT — PAIN - FUNCTIONAL ASSESSMENT
PAIN_FUNCTIONAL_ASSESSMENT: ACTIVITIES ARE NOT PREVENTED
PAIN_FUNCTIONAL_ASSESSMENT: PREVENTS OR INTERFERES SOME ACTIVE ACTIVITIES AND ADLS
PAIN_FUNCTIONAL_ASSESSMENT: PREVENTS OR INTERFERES SOME ACTIVE ACTIVITIES AND ADLS

## 2024-04-13 ASSESSMENT — PAIN DESCRIPTION - ONSET: ONSET: GRADUAL

## 2024-04-13 NOTE — ANESTHESIA POSTPROCEDURE EVALUATION
Department of Anesthesiology  Postprocedure Note    Patient: Laury Schuster  MRN: [id number]  YOB: 1960  Date of evaluation: 4/13/2024    Procedure Summary       Date: 04/13/24 Room / Location: [facility] MAIN OR 02 / [facility] MAIN OR    Anesthesia Start: 0758 Anesthesia Stop: 0905    Procedure: RIGHT LEG AMPUTATION BELOW KNEE (Right: Leg Lower) Diagnosis:       Nontraumatic ischemic infarction of muscle of left lower leg      (Nontraumatic ischemic infarction of muscle of left lower leg [M62.262])    Surgeons: Marko Cantu MD Responsible Provider: Lida Lin MD    Anesthesia Type: General ASA Status: 3 - Emergent            Anesthesia Type: General    Birdie Phase I: Birdie Score: 10    Birdie Phase II:      Anesthesia Post Evaluation    Patient location during evaluation: PACU  Patient participation: complete - patient participated  Level of consciousness: awake  Airway patency: patent  Nausea & Vomiting: no nausea and no vomiting  Cardiovascular status: hemodynamically stable  Respiratory status: acceptable  Hydration status: euvolemic  Pain management: adequate    No notable events documented.

## 2024-04-13 NOTE — PERIOP NOTE
TRANSFER - OUT REPORT:    Verbal report given to Kena RN(name) on Laury Schuster  being transferred to (unit) for routine post-op       Report consisted of patient’s Situation, Background, Assessment and   Recommendations(SBAR).     Information from the following report(s) Nurse Handoff Report, Surgery Report, Intake/Output, Recent Results, Cardiac Rhythm ST, Procedure Verification, Quality Measures, and Neuro Assessment was reviewed with the receiving nurse.    Opportunity for questions and clarification was provided.      Patient transported with:   Monitor  Registered Nurse Breanna and Edmar RNs    Lines: IV infusing Help Text LR     This SmartLink retrieves the last documented value for LDA assessment data, retrieved by either LDA type or by LDA group ID.     This SmartLink should be used in a SmartText or SmartPhrase. If one is not available, please contact your .

## 2024-04-13 NOTE — ANESTHESIA PRE PROCEDURE
K 3.8 04/13/2024 03:05 AM     04/13/2024 03:05 AM    CO2 26 04/13/2024 03:05 AM    BUN 4 04/13/2024 03:05 AM    CREATININE 0.43 04/13/2024 03:05 AM    GFRAA >60 12/01/2021 03:01 PM    AGRATIO 0.6 04/13/2024 03:05 AM    AGRATIO 0.9 12/01/2021 03:01 PM    LABGLOM >90 04/13/2024 03:05 AM    GLUCOSE 189 04/13/2024 03:05 AM    PROT 6.3 04/13/2024 03:05 AM    CALCIUM 8.8 04/13/2024 03:05 AM    BILITOT 0.6 04/13/2024 03:05 AM    ALKPHOS 80 04/13/2024 03:05 AM    ALKPHOS 61 12/01/2021 03:01 PM    AST 36 04/13/2024 03:05 AM    ALT 29 04/13/2024 03:05 AM       POC Tests:   Recent Labs     04/12/24 2147   POCGLU 183*         Coags:   Lab Results   Component Value Date/Time    PROTIME 15.1 04/09/2024 08:50 AM    INR 1.2 04/09/2024 08:50 AM    APTT 34.7 04/09/2024 08:50 AM    APTT 32.7 10/28/2021 10:01 AM       HCG (If Applicable): No results found for: \"PREGTESTUR\", \"PREGSERUM\", \"HCG\", \"HCGQUANT\"     ABGs:   Lab Results   Component Value Date/Time    PHART 7.47 04/08/2024 12:05 AM    PO2ART 98 04/08/2024 12:05 AM    HAH7MBZ 38 04/08/2024 12:05 AM    FZO3IAP 27 04/08/2024 12:05 AM    BEART 2.8 04/08/2024 12:05 AM    G8WGVSYZ 98 04/08/2024 12:05 AM        Type & Screen (If Applicable):  No results found for: \"LABABO\", \"LABRH\"    Drug/Infectious Status (If Applicable):  No results found for: \"HIV\", \"HEPCAB\"    COVID-19 Screening (If Applicable):   Lab Results   Component Value Date/Time    COVID19 Not detected 11/11/2021 08:35 AM    COVID19 Please find results under separate order 11/11/2021 08:35 AM           Anesthesia Evaluation  Patient summary reviewed and Nursing notes reviewed   no history of anesthetic complications:   Airway: Mallampati: III  TM distance: <3 FB   Neck ROM: full  Mouth opening: < 3 FB   Dental:    (+) edentulous      Pulmonary:Negative Pulmonary ROS and normal exam  breath sounds clear to auscultation                             Cardiovascular:    (+) hypertension:, hyperlipidemia      ECG

## 2024-04-14 LAB
ALBUMIN SERPL-MCNC: 2.3 G/DL (ref 3.5–5)
ALBUMIN/GLOB SERPL: 0.5 (ref 1.1–2.2)
ALP SERPL-CCNC: 85 U/L (ref 45–117)
ALT SERPL-CCNC: 26 U/L (ref 12–78)
ANION GAP SERPL CALC-SCNC: 8 MMOL/L (ref 5–15)
AST SERPL W P-5'-P-CCNC: 35 U/L (ref 15–37)
BASOPHILS # BLD: 0 K/UL (ref 0–0.1)
BASOPHILS NFR BLD: 0 % (ref 0–1)
BILIRUB SERPL-MCNC: 0.4 MG/DL (ref 0.2–1)
BUN SERPL-MCNC: 5 MG/DL (ref 6–20)
BUN/CREAT SERPL: 11 (ref 12–20)
CA-I BLD-MCNC: 9 MG/DL (ref 8.5–10.1)
CHLORIDE SERPL-SCNC: 102 MMOL/L (ref 97–108)
CO2 SERPL-SCNC: 27 MMOL/L (ref 21–32)
CREAT SERPL-MCNC: 0.47 MG/DL (ref 0.55–1.02)
DIFFERENTIAL METHOD BLD: ABNORMAL
EOSINOPHIL # BLD: 0 K/UL (ref 0–0.4)
EOSINOPHIL NFR BLD: 0 % (ref 0–7)
ERYTHROCYTE [DISTWIDTH] IN BLOOD BY AUTOMATED COUNT: 17.8 % (ref 11.5–14.5)
GLOBULIN SER CALC-MCNC: 4.6 G/DL (ref 2–4)
GLUCOSE BLD STRIP.AUTO-MCNC: 217 MG/DL (ref 65–100)
GLUCOSE BLD STRIP.AUTO-MCNC: 298 MG/DL (ref 65–100)
GLUCOSE BLD STRIP.AUTO-MCNC: 308 MG/DL (ref 65–100)
GLUCOSE BLD STRIP.AUTO-MCNC: 311 MG/DL (ref 65–100)
GLUCOSE SERPL-MCNC: 223 MG/DL (ref 65–100)
HCT VFR BLD AUTO: 30.9 % (ref 35–47)
HGB BLD-MCNC: 9.4 G/DL (ref 11.5–16)
IMM GRANULOCYTES # BLD AUTO: 0.2 K/UL (ref 0–0.04)
IMM GRANULOCYTES NFR BLD AUTO: 1 % (ref 0–0.5)
LYMPHOCYTES # BLD: 1.5 K/UL (ref 0.8–3.5)
LYMPHOCYTES NFR BLD: 10 % (ref 12–49)
MCH RBC QN AUTO: 23.5 PG (ref 26–34)
MCHC RBC AUTO-ENTMCNC: 30.4 G/DL (ref 30–36.5)
MCV RBC AUTO: 77.3 FL (ref 80–99)
MONOCYTES # BLD: 2.1 K/UL (ref 0–1)
MONOCYTES NFR BLD: 14 % (ref 5–13)
NEUTS SEG # BLD: 11.4 K/UL (ref 1.8–8)
NEUTS SEG NFR BLD: 75 % (ref 32–75)
NRBC # BLD: 0 K/UL (ref 0–0.01)
NRBC BLD-RTO: 0 PER 100 WBC
PERFORMED BY:: ABNORMAL
PLATELET # BLD AUTO: 489 K/UL (ref 150–400)
PMV BLD AUTO: 12 FL (ref 8.9–12.9)
POTASSIUM SERPL-SCNC: 3.5 MMOL/L (ref 3.5–5.1)
PROT SERPL-MCNC: 6.9 G/DL (ref 6.4–8.2)
RBC # BLD AUTO: 4 M/UL (ref 3.8–5.2)
RBC MORPH BLD: ABNORMAL
SODIUM SERPL-SCNC: 137 MMOL/L (ref 136–145)
WBC # BLD AUTO: 15.2 K/UL (ref 3.6–11)

## 2024-04-14 PROCEDURE — 2580000003 HC RX 258: Performed by: SURGERY

## 2024-04-14 PROCEDURE — 6370000000 HC RX 637 (ALT 250 FOR IP): Performed by: SURGERY

## 2024-04-14 PROCEDURE — 99024 POSTOP FOLLOW-UP VISIT: CPT | Performed by: SURGERY

## 2024-04-14 PROCEDURE — 2500000003 HC RX 250 WO HCPCS: Performed by: SURGERY

## 2024-04-14 PROCEDURE — 85025 COMPLETE CBC W/AUTO DIFF WBC: CPT

## 2024-04-14 PROCEDURE — 36415 COLL VENOUS BLD VENIPUNCTURE: CPT

## 2024-04-14 PROCEDURE — 82962 GLUCOSE BLOOD TEST: CPT

## 2024-04-14 PROCEDURE — 6360000002 HC RX W HCPCS: Performed by: PHYSICIAN ASSISTANT

## 2024-04-14 PROCEDURE — 1100000000 HC RM PRIVATE

## 2024-04-14 PROCEDURE — 94761 N-INVAS EAR/PLS OXIMETRY MLT: CPT

## 2024-04-14 PROCEDURE — 80053 COMPREHEN METABOLIC PANEL: CPT

## 2024-04-14 PROCEDURE — 6370000000 HC RX 637 (ALT 250 FOR IP): Performed by: PHYSICIAN ASSISTANT

## 2024-04-14 PROCEDURE — 6360000002 HC RX W HCPCS: Performed by: SURGERY

## 2024-04-14 PROCEDURE — 94640 AIRWAY INHALATION TREATMENT: CPT

## 2024-04-14 PROCEDURE — 2580000003 HC RX 258: Performed by: PHYSICIAN ASSISTANT

## 2024-04-14 RX ADMIN — INSULIN LISPRO 6 UNITS: 100 INJECTION, SOLUTION INTRAVENOUS; SUBCUTANEOUS at 16:25

## 2024-04-14 RX ADMIN — METOROPROLOL TARTRATE 5 MG: 5 INJECTION, SOLUTION INTRAVENOUS at 16:26

## 2024-04-14 RX ADMIN — OXYCODONE HYDROCHLORIDE 30 MG: 10 TABLET, FILM COATED, EXTENDED RELEASE ORAL at 08:50

## 2024-04-14 RX ADMIN — SODIUM CHLORIDE, PRESERVATIVE FREE 10 ML: 5 INJECTION INTRAVENOUS at 08:53

## 2024-04-14 RX ADMIN — SODIUM CHLORIDE, PRESERVATIVE FREE 10 ML: 5 INJECTION INTRAVENOUS at 21:33

## 2024-04-14 RX ADMIN — METOROPROLOL TARTRATE 5 MG: 5 INJECTION, SOLUTION INTRAVENOUS at 21:32

## 2024-04-14 RX ADMIN — INSULIN LISPRO 2 UNITS: 100 INJECTION, SOLUTION INTRAVENOUS; SUBCUTANEOUS at 08:50

## 2024-04-14 RX ADMIN — HYDROMORPHONE HYDROCHLORIDE 1.5 MG: 2 INJECTION INTRAMUSCULAR; INTRAVENOUS; SUBCUTANEOUS at 02:30

## 2024-04-14 RX ADMIN — HYDROMORPHONE HYDROCHLORIDE 1.5 MG: 2 INJECTION INTRAMUSCULAR; INTRAVENOUS; SUBCUTANEOUS at 11:49

## 2024-04-14 RX ADMIN — OXYCODONE HYDROCHLORIDE 20 MG: 10 TABLET ORAL at 19:58

## 2024-04-14 RX ADMIN — HYDROMORPHONE HYDROCHLORIDE 1.5 MG: 2 INJECTION INTRAMUSCULAR; INTRAVENOUS; SUBCUTANEOUS at 23:09

## 2024-04-14 RX ADMIN — SODIUM CHLORIDE: 9 INJECTION, SOLUTION INTRAVENOUS at 03:54

## 2024-04-14 RX ADMIN — INSULIN LISPRO 4 UNITS: 100 INJECTION, SOLUTION INTRAVENOUS; SUBCUTANEOUS at 11:49

## 2024-04-14 RX ADMIN — HYDROMORPHONE HYDROCHLORIDE 1.5 MG: 2 INJECTION INTRAMUSCULAR; INTRAVENOUS; SUBCUTANEOUS at 16:26

## 2024-04-14 RX ADMIN — OXYCODONE HYDROCHLORIDE 30 MG: 10 TABLET, FILM COATED, EXTENDED RELEASE ORAL at 21:32

## 2024-04-14 RX ADMIN — HYDROMORPHONE HYDROCHLORIDE 1.5 MG: 2 INJECTION INTRAMUSCULAR; INTRAVENOUS; SUBCUTANEOUS at 05:29

## 2024-04-14 RX ADMIN — Medication 2 PUFF: at 20:21

## 2024-04-14 RX ADMIN — PANTOPRAZOLE SODIUM 40 MG: 40 TABLET, DELAYED RELEASE ORAL at 08:51

## 2024-04-14 RX ADMIN — ATORVASTATIN CALCIUM 80 MG: 40 TABLET, FILM COATED ORAL at 08:50

## 2024-04-14 RX ADMIN — INSULIN GLARGINE 20 UNITS: 100 INJECTION, SOLUTION SUBCUTANEOUS at 21:33

## 2024-04-14 RX ADMIN — METOROPROLOL TARTRATE 5 MG: 5 INJECTION, SOLUTION INTRAVENOUS at 08:50

## 2024-04-14 RX ADMIN — Medication 2 PUFF: at 07:29

## 2024-04-14 RX ADMIN — HYDROMORPHONE HYDROCHLORIDE 1.5 MG: 2 INJECTION INTRAMUSCULAR; INTRAVENOUS; SUBCUTANEOUS at 19:23

## 2024-04-14 RX ADMIN — HYDRALAZINE HYDROCHLORIDE 10 MG: 20 INJECTION INTRAMUSCULAR; INTRAVENOUS at 00:16

## 2024-04-14 RX ADMIN — METOROPROLOL TARTRATE 5 MG: 5 INJECTION, SOLUTION INTRAVENOUS at 04:09

## 2024-04-14 RX ADMIN — LISINOPRIL 20 MG: 20 TABLET ORAL at 08:51

## 2024-04-14 RX ADMIN — HYDROMORPHONE HYDROCHLORIDE 1.5 MG: 2 INJECTION INTRAMUSCULAR; INTRAVENOUS; SUBCUTANEOUS at 08:47

## 2024-04-14 RX ADMIN — SODIUM CHLORIDE: 9 INJECTION, SOLUTION INTRAVENOUS at 19:27

## 2024-04-14 ASSESSMENT — PAIN SCALES - GENERAL
PAINLEVEL_OUTOF10: 8
PAINLEVEL_OUTOF10: 8
PAINLEVEL_OUTOF10: 7
PAINLEVEL_OUTOF10: 9
PAINLEVEL_OUTOF10: 10
PAINLEVEL_OUTOF10: 8
PAINLEVEL_OUTOF10: 6
PAINLEVEL_OUTOF10: 9
PAINLEVEL_OUTOF10: 10
PAINLEVEL_OUTOF10: 6
PAINLEVEL_OUTOF10: 8
PAINLEVEL_OUTOF10: 8

## 2024-04-14 ASSESSMENT — PAIN DESCRIPTION - DESCRIPTORS
DESCRIPTORS: ACHING

## 2024-04-14 ASSESSMENT — PAIN DESCRIPTION - LOCATION
LOCATION: LEG
LOCATION: LEG
LOCATION: KNEE
LOCATION: LEG
LOCATION: KNEE
LOCATION: LEG

## 2024-04-14 ASSESSMENT — PAIN - FUNCTIONAL ASSESSMENT
PAIN_FUNCTIONAL_ASSESSMENT: PREVENTS OR INTERFERES SOME ACTIVE ACTIVITIES AND ADLS
PAIN_FUNCTIONAL_ASSESSMENT: PREVENTS OR INTERFERES SOME ACTIVE ACTIVITIES AND ADLS

## 2024-04-14 ASSESSMENT — PAIN DESCRIPTION - ORIENTATION
ORIENTATION: RIGHT
ORIENTATION: INNER;RIGHT
ORIENTATION: RIGHT
ORIENTATION: RIGHT

## 2024-04-14 NOTE — BRIEF OP NOTE
Brief Postoperative Note      Patient: Laury Schuster  YOB: 1960  MRN: [id number]    Date of Procedure: 4/13/2024    Preop diagnosis: Right leg ischemia.    Post-Op Diagnosis: Same       Procedure(s):  RIGHT LEG AMPUTATION BELOW KNEE    Surgeon(s):  Marko Cantu MD    Assistant:  Surgical Assistant: Sirena Del Valle    Anesthesia: General    Estimated Blood Loss (mL): Minimal    Complications: None    Specimens:   ID Type Source Tests Collected by Time Destination   1 : RIGHT BELOW KNEE AMPUTATION Tissue Leg SURGICAL PATHOLOGY Marko Cantu MD 4/13/2024 0828        Implants:  * No implants in log *      Drains:   Urinary Catheter 04/07/24 2 Way (Active)   Catheter Indications Perioperative use for selected surgical procedures 04/13/24 2030   Site Assessment No urethral drainage 04/13/24 2030   Urine Color Yellow 04/13/24 2030   Urine Appearance Clear 04/13/24 2030   Urine Odor Other (Comment) 04/13/24 1100   Collection Container Standard 04/13/24 2030   Securement Method Securing device (Describe) 04/13/24 2030   Catheter Care  Perineal wipes 04/13/24 1100   Catheter Best Practices  Drainage tube clipped to bed;Catheter secured to thigh;Tamper seal intact;Bag below bladder;Bag not on floor;Lack of dependent loop in tubing;Drainage bag less than half full 04/13/24 2030   Status Draining 04/13/24 2030   Output (mL) 650 mL 04/13/24 2030       Findings:  Infection Present At Time Of Surgery (PATOS) (choose all levels that have infection present):  No infection present  Other Findings: As above    Electronically signed by Pepe Zhu MD on 4/14/2024 at 3:26 AM

## 2024-04-14 NOTE — OP NOTE
Operative Note      Patient: Laury Schuster  YOB: 1960  MRN: [id number]    Date of Procedure: 4/13/2024    Preop diagnosis:  Right leg ischemia with gangrene of the foot.  Failure of the revascularization of the right leg vasculature.    Post-Op Diagnosis: Same       Procedure(s):  RIGHT LEG AMPUTATION BELOW KNEE    Surgeon(s):  Marko Cantu MD    Assistant:   Surgical Assistant: Sirena Del Valle    Anesthesia: General    Estimated Blood Loss (mL): Minimal    Complications: None    Specimens:   ID Type Source Tests Collected by Time Destination   1 : RIGHT BELOW KNEE AMPUTATION Tissue Leg SURGICAL PATHOLOGY Marko Cantu MD 4/13/2024 0828        Implants:  * No implants in log *      Drains:   Urinary Catheter 04/07/24 2 Way (Active)   Catheter Indications Perioperative use for selected surgical procedures 04/13/24 2030   Site Assessment No urethral drainage 04/13/24 2030   Urine Color Yellow 04/13/24 2030   Urine Appearance Clear 04/13/24 2030   Urine Odor Other (Comment) 04/13/24 1100   Collection Container Standard 04/13/24 2030   Securement Method Securing device (Describe) 04/13/24 2030   Catheter Care  Perineal wipes 04/13/24 1100   Catheter Best Practices  Drainage tube clipped to bed;Catheter secured to thigh;Tamper seal intact;Bag below bladder;Bag not on floor;Lack of dependent loop in tubing;Drainage bag less than half full 04/13/24 2030   Status Draining 04/13/24 2030   Output (mL) 650 mL 04/13/24 2030       Findings:  Infection Present At Time Of Surgery (PATOS) (choose all levels that have infection present):  No infection present  Other Findings: As above    Detailed Description of Procedure:   Patient was brought to the operating table comfortably supine position.  Followed by general anesthesia was initiated.  Followed by patient's right leg was prepped usual sterile technique using Betadine solution and draped in usual aseptic fashion.  Followed by at this time timeout was 
Dundas sheath was placed.  Followed by 5 cm tPA infusion catheter was introduced to the distal graft under fluoroscopic imaging guidance and tPA infusion was initiated with 4 mg bolus with infusion rate of 1 mg/h.  Vascular sheath was applied with 400 units/h heparin continuous drip as well.  Sterile dressing was applied.  At the end the procedure instrument counts correct.  Patient was transferred to ICU.    Electronically signed by Pepe Zhu MD on 4/8/2024 at 5:19 AM

## 2024-04-15 LAB
ALBUMIN SERPL-MCNC: 2.3 G/DL (ref 3.5–5)
ALBUMIN/GLOB SERPL: 0.5 (ref 1.1–2.2)
ALP SERPL-CCNC: 77 U/L (ref 45–117)
ALT SERPL-CCNC: 29 U/L (ref 12–78)
ANION GAP SERPL CALC-SCNC: 9 MMOL/L (ref 5–15)
AST SERPL W P-5'-P-CCNC: 52 U/L (ref 15–37)
BASOPHILS # BLD: 0 K/UL (ref 0–0.1)
BASOPHILS NFR BLD: 0 % (ref 0–1)
BILIRUB SERPL-MCNC: 0.3 MG/DL (ref 0.2–1)
BUN SERPL-MCNC: 4 MG/DL (ref 6–20)
BUN/CREAT SERPL: 10 (ref 12–20)
CA-I BLD-MCNC: 8.7 MG/DL (ref 8.5–10.1)
CHLORIDE SERPL-SCNC: 100 MMOL/L (ref 97–108)
CO2 SERPL-SCNC: 27 MMOL/L (ref 21–32)
CREAT SERPL-MCNC: 0.42 MG/DL (ref 0.55–1.02)
DIFFERENTIAL METHOD BLD: ABNORMAL
EOSINOPHIL # BLD: 0.1 K/UL (ref 0–0.4)
EOSINOPHIL NFR BLD: 1 % (ref 0–7)
ERYTHROCYTE [DISTWIDTH] IN BLOOD BY AUTOMATED COUNT: 17.6 % (ref 11.5–14.5)
GLOBULIN SER CALC-MCNC: 4.4 G/DL (ref 2–4)
GLUCOSE BLD STRIP.AUTO-MCNC: 191 MG/DL (ref 65–100)
GLUCOSE BLD STRIP.AUTO-MCNC: 200 MG/DL (ref 65–100)
GLUCOSE BLD STRIP.AUTO-MCNC: 252 MG/DL (ref 65–100)
GLUCOSE BLD STRIP.AUTO-MCNC: 328 MG/DL (ref 65–100)
GLUCOSE SERPL-MCNC: 192 MG/DL (ref 65–100)
HCT VFR BLD AUTO: 29.8 % (ref 35–47)
HGB BLD-MCNC: 9.1 G/DL (ref 11.5–16)
IMM GRANULOCYTES # BLD AUTO: 0.1 K/UL (ref 0–0.04)
IMM GRANULOCYTES NFR BLD AUTO: 1 % (ref 0–0.5)
LYMPHOCYTES # BLD: 1.7 K/UL (ref 0.8–3.5)
LYMPHOCYTES NFR BLD: 15 % (ref 12–49)
MCH RBC QN AUTO: 23.6 PG (ref 26–34)
MCHC RBC AUTO-ENTMCNC: 30.5 G/DL (ref 30–36.5)
MCV RBC AUTO: 77.2 FL (ref 80–99)
MONOCYTES # BLD: 1.9 K/UL (ref 0–1)
MONOCYTES NFR BLD: 17 % (ref 5–13)
NEUTS SEG # BLD: 7.5 K/UL (ref 1.8–8)
NEUTS SEG NFR BLD: 66 % (ref 32–75)
NRBC # BLD: 0 K/UL (ref 0–0.01)
NRBC BLD-RTO: 0 PER 100 WBC
PERFORMED BY:: ABNORMAL
PLATELET # BLD AUTO: 533 K/UL (ref 150–400)
PMV BLD AUTO: 12.5 FL (ref 8.9–12.9)
POTASSIUM SERPL-SCNC: 3.2 MMOL/L (ref 3.5–5.1)
PROT SERPL-MCNC: 6.7 G/DL (ref 6.4–8.2)
RBC # BLD AUTO: 3.86 M/UL (ref 3.8–5.2)
SODIUM SERPL-SCNC: 136 MMOL/L (ref 136–145)
WBC # BLD AUTO: 11.4 K/UL (ref 3.6–11)

## 2024-04-15 PROCEDURE — 85025 COMPLETE CBC W/AUTO DIFF WBC: CPT

## 2024-04-15 PROCEDURE — 82962 GLUCOSE BLOOD TEST: CPT

## 2024-04-15 PROCEDURE — 6370000000 HC RX 637 (ALT 250 FOR IP): Performed by: INTERNAL MEDICINE

## 2024-04-15 PROCEDURE — 97165 OT EVAL LOW COMPLEX 30 MIN: CPT

## 2024-04-15 PROCEDURE — 94640 AIRWAY INHALATION TREATMENT: CPT

## 2024-04-15 PROCEDURE — 6370000000 HC RX 637 (ALT 250 FOR IP): Performed by: SURGERY

## 2024-04-15 PROCEDURE — 99024 POSTOP FOLLOW-UP VISIT: CPT | Performed by: SURGERY

## 2024-04-15 PROCEDURE — 2500000003 HC RX 250 WO HCPCS: Performed by: SURGERY

## 2024-04-15 PROCEDURE — 97530 THERAPEUTIC ACTIVITIES: CPT

## 2024-04-15 PROCEDURE — 94761 N-INVAS EAR/PLS OXIMETRY MLT: CPT

## 2024-04-15 PROCEDURE — 6360000002 HC RX W HCPCS: Performed by: PHYSICIAN ASSISTANT

## 2024-04-15 PROCEDURE — 6370000000 HC RX 637 (ALT 250 FOR IP): Performed by: PHYSICIAN ASSISTANT

## 2024-04-15 PROCEDURE — 80053 COMPREHEN METABOLIC PANEL: CPT

## 2024-04-15 PROCEDURE — 2580000003 HC RX 258: Performed by: PHYSICIAN ASSISTANT

## 2024-04-15 PROCEDURE — 1100000000 HC RM PRIVATE

## 2024-04-15 PROCEDURE — 36415 COLL VENOUS BLD VENIPUNCTURE: CPT

## 2024-04-15 PROCEDURE — 6360000002 HC RX W HCPCS: Performed by: SURGERY

## 2024-04-15 PROCEDURE — 2580000003 HC RX 258: Performed by: SURGERY

## 2024-04-15 RX ORDER — INSULIN LISPRO 100 [IU]/ML
5 INJECTION, SOLUTION INTRAVENOUS; SUBCUTANEOUS
Status: DISCONTINUED | OUTPATIENT
Start: 2024-04-15 | End: 2024-04-17 | Stop reason: HOSPADM

## 2024-04-15 RX ORDER — INSULIN GLARGINE 100 [IU]/ML
25 INJECTION, SOLUTION SUBCUTANEOUS NIGHTLY
Status: DISCONTINUED | OUTPATIENT
Start: 2024-04-15 | End: 2024-04-17 | Stop reason: HOSPADM

## 2024-04-15 RX ADMIN — SODIUM CHLORIDE, PRESERVATIVE FREE 10 ML: 5 INJECTION INTRAVENOUS at 20:09

## 2024-04-15 RX ADMIN — METOROPROLOL TARTRATE 5 MG: 5 INJECTION, SOLUTION INTRAVENOUS at 22:24

## 2024-04-15 RX ADMIN — INSULIN LISPRO 2 UNITS: 100 INJECTION, SOLUTION INTRAVENOUS; SUBCUTANEOUS at 17:25

## 2024-04-15 RX ADMIN — OXYCODONE HYDROCHLORIDE 30 MG: 10 TABLET, FILM COATED, EXTENDED RELEASE ORAL at 10:22

## 2024-04-15 RX ADMIN — INSULIN LISPRO 5 UNITS: 100 INJECTION, SOLUTION INTRAVENOUS; SUBCUTANEOUS at 11:41

## 2024-04-15 RX ADMIN — ATORVASTATIN CALCIUM 80 MG: 40 TABLET, FILM COATED ORAL at 09:33

## 2024-04-15 RX ADMIN — Medication 2 PUFF: at 20:30

## 2024-04-15 RX ADMIN — PANTOPRAZOLE SODIUM 40 MG: 40 TABLET, DELAYED RELEASE ORAL at 09:33

## 2024-04-15 RX ADMIN — OXYCODONE HYDROCHLORIDE 20 MG: 10 TABLET ORAL at 06:35

## 2024-04-15 RX ADMIN — INSULIN LISPRO 6 UNITS: 100 INJECTION, SOLUTION INTRAVENOUS; SUBCUTANEOUS at 11:40

## 2024-04-15 RX ADMIN — OXYCODONE HYDROCHLORIDE 30 MG: 10 TABLET, FILM COATED, EXTENDED RELEASE ORAL at 20:06

## 2024-04-15 RX ADMIN — HYDROMORPHONE HYDROCHLORIDE 1.5 MG: 2 INJECTION INTRAMUSCULAR; INTRAVENOUS; SUBCUTANEOUS at 04:41

## 2024-04-15 RX ADMIN — SODIUM CHLORIDE: 9 INJECTION, SOLUTION INTRAVENOUS at 06:36

## 2024-04-15 RX ADMIN — OXYCODONE HYDROCHLORIDE 20 MG: 10 TABLET ORAL at 00:59

## 2024-04-15 RX ADMIN — METOROPROLOL TARTRATE 5 MG: 5 INJECTION, SOLUTION INTRAVENOUS at 17:25

## 2024-04-15 RX ADMIN — OXYCODONE HYDROCHLORIDE 20 MG: 10 TABLET ORAL at 15:10

## 2024-04-15 RX ADMIN — Medication 2 PUFF: at 08:16

## 2024-04-15 RX ADMIN — OXYCODONE HYDROCHLORIDE 20 MG: 10 TABLET ORAL at 20:06

## 2024-04-15 RX ADMIN — SODIUM CHLORIDE, PRESERVATIVE FREE 10 ML: 5 INJECTION INTRAVENOUS at 09:33

## 2024-04-15 RX ADMIN — METOROPROLOL TARTRATE 5 MG: 5 INJECTION, SOLUTION INTRAVENOUS at 04:41

## 2024-04-15 RX ADMIN — INSULIN LISPRO 5 UNITS: 100 INJECTION, SOLUTION INTRAVENOUS; SUBCUTANEOUS at 17:25

## 2024-04-15 RX ADMIN — LISINOPRIL 20 MG: 20 TABLET ORAL at 09:33

## 2024-04-15 RX ADMIN — HYDRALAZINE HYDROCHLORIDE 10 MG: 20 INJECTION INTRAMUSCULAR; INTRAVENOUS at 06:30

## 2024-04-15 RX ADMIN — INSULIN GLARGINE 25 UNITS: 100 INJECTION, SOLUTION SUBCUTANEOUS at 20:06

## 2024-04-15 ASSESSMENT — PAIN DESCRIPTION - DESCRIPTORS
DESCRIPTORS: ACHING

## 2024-04-15 ASSESSMENT — ENCOUNTER SYMPTOMS
GASTROINTESTINAL NEGATIVE: 1
RESPIRATORY NEGATIVE: 1

## 2024-04-15 ASSESSMENT — PAIN SCALES - GENERAL
PAINLEVEL_OUTOF10: 6
PAINLEVEL_OUTOF10: 0
PAINLEVEL_OUTOF10: 7
PAINLEVEL_OUTOF10: 10
PAINLEVEL_OUTOF10: 5
PAINLEVEL_OUTOF10: 8
PAINLEVEL_OUTOF10: 8

## 2024-04-15 ASSESSMENT — PAIN DESCRIPTION - LOCATION
LOCATION: KNEE
LOCATION: KNEE
LOCATION: LEG
LOCATION: KNEE

## 2024-04-15 ASSESSMENT — PAIN DESCRIPTION - ORIENTATION
ORIENTATION: RIGHT

## 2024-04-15 NOTE — CARE COORDINATION
CM reviewed discharge order. DCP [facility] when medically ready, facility do not have a bed today. CM will continue to follow.

## 2024-04-16 LAB
ALBUMIN SERPL-MCNC: 2.2 G/DL (ref 3.5–5)
ALBUMIN/GLOB SERPL: 0.5 (ref 1.1–2.2)
ALP SERPL-CCNC: 78 U/L (ref 45–117)
ALT SERPL-CCNC: 30 U/L (ref 12–78)
ANION GAP SERPL CALC-SCNC: 5 MMOL/L (ref 5–15)
AST SERPL W P-5'-P-CCNC: 43 U/L (ref 15–37)
BASOPHILS # BLD: 0.1 K/UL (ref 0–0.1)
BASOPHILS NFR BLD: 1 % (ref 0–1)
BILIRUB SERPL-MCNC: 0.3 MG/DL (ref 0.2–1)
BUN SERPL-MCNC: 5 MG/DL (ref 6–20)
BUN/CREAT SERPL: 11 (ref 12–20)
CA-I BLD-MCNC: 8.8 MG/DL (ref 8.5–10.1)
CHLORIDE SERPL-SCNC: 100 MMOL/L (ref 97–108)
CO2 SERPL-SCNC: 30 MMOL/L (ref 21–32)
CREAT SERPL-MCNC: 0.46 MG/DL (ref 0.55–1.02)
DIFFERENTIAL METHOD BLD: ABNORMAL
EOSINOPHIL # BLD: 0.2 K/UL (ref 0–0.4)
EOSINOPHIL NFR BLD: 2 % (ref 0–7)
ERYTHROCYTE [DISTWIDTH] IN BLOOD BY AUTOMATED COUNT: 17.8 % (ref 11.5–14.5)
GLOBULIN SER CALC-MCNC: 4.4 G/DL (ref 2–4)
GLUCOSE BLD STRIP.AUTO-MCNC: 170 MG/DL (ref 65–100)
GLUCOSE BLD STRIP.AUTO-MCNC: 171 MG/DL (ref 65–100)
GLUCOSE BLD STRIP.AUTO-MCNC: 194 MG/DL (ref 65–100)
GLUCOSE BLD STRIP.AUTO-MCNC: 211 MG/DL (ref 65–100)
GLUCOSE SERPL-MCNC: 164 MG/DL (ref 65–100)
HCT VFR BLD AUTO: 29.8 % (ref 35–47)
HGB BLD-MCNC: 9 G/DL (ref 11.5–16)
IMM GRANULOCYTES # BLD AUTO: 0.1 K/UL (ref 0–0.04)
IMM GRANULOCYTES NFR BLD AUTO: 1 % (ref 0–0.5)
LYMPHOCYTES # BLD: 2.5 K/UL (ref 0.8–3.5)
LYMPHOCYTES NFR BLD: 25 % (ref 12–49)
MCH RBC QN AUTO: 23.4 PG (ref 26–34)
MCHC RBC AUTO-ENTMCNC: 30.2 G/DL (ref 30–36.5)
MCV RBC AUTO: 77.4 FL (ref 80–99)
MONOCYTES # BLD: 1.8 K/UL (ref 0–1)
MONOCYTES NFR BLD: 17 % (ref 5–13)
NEUTS SEG # BLD: 5.5 K/UL (ref 1.8–8)
NEUTS SEG NFR BLD: 54 % (ref 32–75)
NRBC # BLD: 0 K/UL (ref 0–0.01)
NRBC BLD-RTO: 0 PER 100 WBC
PERFORMED BY:: ABNORMAL
PLATELET # BLD AUTO: 518 K/UL (ref 150–400)
PMV BLD AUTO: 13 FL (ref 8.9–12.9)
POTASSIUM SERPL-SCNC: 3.1 MMOL/L (ref 3.5–5.1)
PROT SERPL-MCNC: 6.6 G/DL (ref 6.4–8.2)
RBC # BLD AUTO: 3.85 M/UL (ref 3.8–5.2)
SODIUM SERPL-SCNC: 135 MMOL/L (ref 136–145)
WBC # BLD AUTO: 10.1 K/UL (ref 3.6–11)

## 2024-04-16 PROCEDURE — 97530 THERAPEUTIC ACTIVITIES: CPT

## 2024-04-16 PROCEDURE — 6370000000 HC RX 637 (ALT 250 FOR IP): Performed by: PHYSICIAN ASSISTANT

## 2024-04-16 PROCEDURE — 99232 SBSQ HOSP IP/OBS MODERATE 35: CPT | Performed by: SURGERY

## 2024-04-16 PROCEDURE — 6370000000 HC RX 637 (ALT 250 FOR IP): Performed by: INTERNAL MEDICINE

## 2024-04-16 PROCEDURE — 36415 COLL VENOUS BLD VENIPUNCTURE: CPT

## 2024-04-16 PROCEDURE — 94640 AIRWAY INHALATION TREATMENT: CPT

## 2024-04-16 PROCEDURE — 6360000002 HC RX W HCPCS: Performed by: PHYSICIAN ASSISTANT

## 2024-04-16 PROCEDURE — 6360000002 HC RX W HCPCS: Performed by: SURGERY

## 2024-04-16 PROCEDURE — 97163 PT EVAL HIGH COMPLEX 45 MIN: CPT

## 2024-04-16 PROCEDURE — 82962 GLUCOSE BLOOD TEST: CPT

## 2024-04-16 PROCEDURE — 6370000000 HC RX 637 (ALT 250 FOR IP): Performed by: SURGERY

## 2024-04-16 PROCEDURE — 80053 COMPREHEN METABOLIC PANEL: CPT

## 2024-04-16 PROCEDURE — 1100000000 HC RM PRIVATE

## 2024-04-16 PROCEDURE — 85025 COMPLETE CBC W/AUTO DIFF WBC: CPT

## 2024-04-16 PROCEDURE — 2580000003 HC RX 258: Performed by: SURGERY

## 2024-04-16 PROCEDURE — 2500000003 HC RX 250 WO HCPCS: Performed by: SURGERY

## 2024-04-16 RX ADMIN — ATORVASTATIN CALCIUM 80 MG: 40 TABLET, FILM COATED ORAL at 08:46

## 2024-04-16 RX ADMIN — OXYCODONE HYDROCHLORIDE 30 MG: 10 TABLET, FILM COATED, EXTENDED RELEASE ORAL at 20:25

## 2024-04-16 RX ADMIN — INSULIN LISPRO 2 UNITS: 100 INJECTION, SOLUTION INTRAVENOUS; SUBCUTANEOUS at 08:48

## 2024-04-16 RX ADMIN — SODIUM CHLORIDE, PRESERVATIVE FREE 10 ML: 5 INJECTION INTRAVENOUS at 04:30

## 2024-04-16 RX ADMIN — HYDRALAZINE HYDROCHLORIDE 10 MG: 20 INJECTION INTRAMUSCULAR; INTRAVENOUS at 20:26

## 2024-04-16 RX ADMIN — SODIUM CHLORIDE, PRESERVATIVE FREE 10 ML: 5 INJECTION INTRAVENOUS at 20:26

## 2024-04-16 RX ADMIN — POTASSIUM CHLORIDE 40 MEQ: 1500 TABLET, EXTENDED RELEASE ORAL at 20:25

## 2024-04-16 RX ADMIN — OXYCODONE HYDROCHLORIDE 20 MG: 10 TABLET ORAL at 05:52

## 2024-04-16 RX ADMIN — INSULIN LISPRO 5 UNITS: 100 INJECTION, SOLUTION INTRAVENOUS; SUBCUTANEOUS at 17:18

## 2024-04-16 RX ADMIN — PANTOPRAZOLE SODIUM 40 MG: 40 TABLET, DELAYED RELEASE ORAL at 08:46

## 2024-04-16 RX ADMIN — INSULIN LISPRO 5 UNITS: 100 INJECTION, SOLUTION INTRAVENOUS; SUBCUTANEOUS at 12:12

## 2024-04-16 RX ADMIN — INSULIN LISPRO 5 UNITS: 100 INJECTION, SOLUTION INTRAVENOUS; SUBCUTANEOUS at 08:47

## 2024-04-16 RX ADMIN — Medication 2 PUFF: at 20:13

## 2024-04-16 RX ADMIN — METOROPROLOL TARTRATE 5 MG: 5 INJECTION, SOLUTION INTRAVENOUS at 04:30

## 2024-04-16 RX ADMIN — METOROPROLOL TARTRATE 5 MG: 5 INJECTION, SOLUTION INTRAVENOUS at 15:43

## 2024-04-16 RX ADMIN — LISINOPRIL 20 MG: 20 TABLET ORAL at 08:46

## 2024-04-16 RX ADMIN — SODIUM CHLORIDE, PRESERVATIVE FREE 10 ML: 5 INJECTION INTRAVENOUS at 08:51

## 2024-04-16 RX ADMIN — Medication 2 PUFF: at 07:50

## 2024-04-16 RX ADMIN — HYDROMORPHONE HYDROCHLORIDE 1.5 MG: 2 INJECTION INTRAMUSCULAR; INTRAVENOUS; SUBCUTANEOUS at 12:12

## 2024-04-16 RX ADMIN — OXYCODONE HYDROCHLORIDE 20 MG: 10 TABLET ORAL at 01:45

## 2024-04-16 RX ADMIN — METOROPROLOL TARTRATE 5 MG: 5 INJECTION, SOLUTION INTRAVENOUS at 21:22

## 2024-04-16 RX ADMIN — OXYCODONE HYDROCHLORIDE 30 MG: 10 TABLET, FILM COATED, EXTENDED RELEASE ORAL at 08:46

## 2024-04-16 RX ADMIN — OXYCODONE HYDROCHLORIDE 20 MG: 10 TABLET ORAL at 14:33

## 2024-04-16 RX ADMIN — INSULIN GLARGINE 25 UNITS: 100 INJECTION, SOLUTION SUBCUTANEOUS at 21:22

## 2024-04-16 RX ADMIN — OXYCODONE HYDROCHLORIDE 20 MG: 10 TABLET ORAL at 18:23

## 2024-04-16 ASSESSMENT — PAIN DESCRIPTION - LOCATION
LOCATION: LEG
LOCATION: LEG;HIP
LOCATION: LEG;HIP
LOCATION: LEG

## 2024-04-16 ASSESSMENT — PAIN DESCRIPTION - ORIENTATION
ORIENTATION: RIGHT

## 2024-04-16 ASSESSMENT — PAIN DESCRIPTION - DESCRIPTORS: DESCRIPTORS: ACHING

## 2024-04-16 ASSESSMENT — PAIN SCALES - GENERAL
PAINLEVEL_OUTOF10: 7
PAINLEVEL_OUTOF10: 9
PAINLEVEL_OUTOF10: 9
PAINLEVEL_OUTOF10: 8
PAINLEVEL_OUTOF10: 8
PAINLEVEL_OUTOF10: 7
PAINLEVEL_OUTOF10: 7

## 2024-04-16 NOTE — CARE COORDINATION
CM reviewed chart. JUMANA spoke with Pina from [facility] she is working on bed to get patient to facility with mother. CM will continue to follow.

## 2024-04-17 VITALS
OXYGEN SATURATION: 94 % | HEIGHT: 60 IN | TEMPERATURE: 98.8 F | HEART RATE: 90 BPM | WEIGHT: 121.25 LBS | SYSTOLIC BLOOD PRESSURE: 167 MMHG | RESPIRATION RATE: 18 BRPM | DIASTOLIC BLOOD PRESSURE: 86 MMHG | BODY MASS INDEX: 23.81 KG/M2

## 2024-04-17 LAB
ALBUMIN SERPL-MCNC: 2.2 G/DL (ref 3.5–5)
ALBUMIN/GLOB SERPL: 0.5 (ref 1.1–2.2)
ALP SERPL-CCNC: 81 U/L (ref 45–117)
ALT SERPL-CCNC: 26 U/L (ref 12–78)
ANION GAP SERPL CALC-SCNC: 7 MMOL/L (ref 5–15)
AST SERPL W P-5'-P-CCNC: 40 U/L (ref 15–37)
BASOPHILS # BLD: 0.1 K/UL (ref 0–0.1)
BASOPHILS NFR BLD: 1 % (ref 0–1)
BILIRUB SERPL-MCNC: 0.3 MG/DL (ref 0.2–1)
BUN SERPL-MCNC: 5 MG/DL (ref 6–20)
BUN/CREAT SERPL: 10 (ref 12–20)
CA-I BLD-MCNC: 8.9 MG/DL (ref 8.5–10.1)
CHLORIDE SERPL-SCNC: 100 MMOL/L (ref 97–108)
CO2 SERPL-SCNC: 28 MMOL/L (ref 21–32)
CREAT SERPL-MCNC: 0.48 MG/DL (ref 0.55–1.02)
DIFFERENTIAL METHOD BLD: ABNORMAL
EOSINOPHIL # BLD: 0.2 K/UL (ref 0–0.4)
EOSINOPHIL NFR BLD: 2 % (ref 0–7)
ERYTHROCYTE [DISTWIDTH] IN BLOOD BY AUTOMATED COUNT: 17.6 % (ref 11.5–14.5)
GLOBULIN SER CALC-MCNC: 4.4 G/DL (ref 2–4)
GLUCOSE BLD STRIP.AUTO-MCNC: 173 MG/DL (ref 65–100)
GLUCOSE BLD STRIP.AUTO-MCNC: 188 MG/DL (ref 65–100)
GLUCOSE SERPL-MCNC: 152 MG/DL (ref 65–100)
HCT VFR BLD AUTO: 29.9 % (ref 35–47)
HGB BLD-MCNC: 9 G/DL (ref 11.5–16)
IMM GRANULOCYTES # BLD AUTO: 0.1 K/UL (ref 0–0.04)
IMM GRANULOCYTES NFR BLD AUTO: 1 % (ref 0–0.5)
LYMPHOCYTES # BLD: 2.2 K/UL (ref 0.8–3.5)
LYMPHOCYTES NFR BLD: 21 % (ref 12–49)
MCH RBC QN AUTO: 23.3 PG (ref 26–34)
MCHC RBC AUTO-ENTMCNC: 30.1 G/DL (ref 30–36.5)
MCV RBC AUTO: 77.5 FL (ref 80–99)
MONOCYTES # BLD: 1.6 K/UL (ref 0–1)
MONOCYTES NFR BLD: 15 % (ref 5–13)
NEUTS SEG # BLD: 6.2 K/UL (ref 1.8–8)
NEUTS SEG NFR BLD: 60 % (ref 32–75)
NRBC # BLD: 0 K/UL (ref 0–0.01)
NRBC BLD-RTO: 0 PER 100 WBC
PERFORMED BY:: ABNORMAL
PERFORMED BY:: ABNORMAL
PLATELET # BLD AUTO: 521 K/UL (ref 150–400)
PMV BLD AUTO: 12.7 FL (ref 8.9–12.9)
POTASSIUM SERPL-SCNC: 3.7 MMOL/L (ref 3.5–5.1)
PROT SERPL-MCNC: 6.6 G/DL (ref 6.4–8.2)
RBC # BLD AUTO: 3.86 M/UL (ref 3.8–5.2)
SODIUM SERPL-SCNC: 135 MMOL/L (ref 136–145)
WBC # BLD AUTO: 10.3 K/UL (ref 3.6–11)

## 2024-04-17 PROCEDURE — 6370000000 HC RX 637 (ALT 250 FOR IP): Performed by: SURGERY

## 2024-04-17 PROCEDURE — 82962 GLUCOSE BLOOD TEST: CPT

## 2024-04-17 PROCEDURE — 99238 HOSP IP/OBS DSCHRG MGMT 30/<: CPT | Performed by: SURGERY

## 2024-04-17 PROCEDURE — 36415 COLL VENOUS BLD VENIPUNCTURE: CPT

## 2024-04-17 PROCEDURE — 85025 COMPLETE CBC W/AUTO DIFF WBC: CPT

## 2024-04-17 PROCEDURE — 80053 COMPREHEN METABOLIC PANEL: CPT

## 2024-04-17 PROCEDURE — 6360000002 HC RX W HCPCS: Performed by: PHYSICIAN ASSISTANT

## 2024-04-17 PROCEDURE — 2580000003 HC RX 258: Performed by: SURGERY

## 2024-04-17 PROCEDURE — 6370000000 HC RX 637 (ALT 250 FOR IP): Performed by: INTERNAL MEDICINE

## 2024-04-17 PROCEDURE — 2500000003 HC RX 250 WO HCPCS: Performed by: SURGERY

## 2024-04-17 PROCEDURE — 6370000000 HC RX 637 (ALT 250 FOR IP): Performed by: PHYSICIAN ASSISTANT

## 2024-04-17 PROCEDURE — 94761 N-INVAS EAR/PLS OXIMETRY MLT: CPT

## 2024-04-17 PROCEDURE — 97530 THERAPEUTIC ACTIVITIES: CPT

## 2024-04-17 PROCEDURE — 94640 AIRWAY INHALATION TREATMENT: CPT

## 2024-04-17 RX ORDER — OXYCODONE HYDROCHLORIDE 20 MG/1
20 TABLET ORAL EVERY 8 HOURS PRN
Qty: 40 TABLET | Refills: 0 | Status: SHIPPED | OUTPATIENT
Start: 2024-04-17 | End: 2024-05-01

## 2024-04-17 RX ORDER — OXYCODONE HYDROCHLORIDE 30 MG/1
30 TABLET, FILM COATED, EXTENDED RELEASE ORAL EVERY 12 HOURS SCHEDULED
Qty: 60 EACH | Refills: 0 | Status: SHIPPED | OUTPATIENT
Start: 2024-04-17 | End: 2024-05-17

## 2024-04-17 RX ADMIN — METOROPROLOL TARTRATE 5 MG: 5 INJECTION, SOLUTION INTRAVENOUS at 10:14

## 2024-04-17 RX ADMIN — METOROPROLOL TARTRATE 5 MG: 5 INJECTION, SOLUTION INTRAVENOUS at 04:35

## 2024-04-17 RX ADMIN — ATORVASTATIN CALCIUM 80 MG: 40 TABLET, FILM COATED ORAL at 08:30

## 2024-04-17 RX ADMIN — HYDROMORPHONE HYDROCHLORIDE 1.5 MG: 2 INJECTION INTRAMUSCULAR; INTRAVENOUS; SUBCUTANEOUS at 12:41

## 2024-04-17 RX ADMIN — PANTOPRAZOLE SODIUM 40 MG: 40 TABLET, DELAYED RELEASE ORAL at 08:30

## 2024-04-17 RX ADMIN — LISINOPRIL 20 MG: 20 TABLET ORAL at 08:30

## 2024-04-17 RX ADMIN — OXYCODONE HYDROCHLORIDE 20 MG: 10 TABLET ORAL at 01:45

## 2024-04-17 RX ADMIN — INSULIN LISPRO 5 UNITS: 100 INJECTION, SOLUTION INTRAVENOUS; SUBCUTANEOUS at 12:18

## 2024-04-17 RX ADMIN — Medication 2 PUFF: at 07:23

## 2024-04-17 RX ADMIN — SODIUM CHLORIDE, PRESERVATIVE FREE 10 ML: 5 INJECTION INTRAVENOUS at 08:45

## 2024-04-17 RX ADMIN — OXYCODONE HYDROCHLORIDE 20 MG: 10 TABLET ORAL at 14:36

## 2024-04-17 RX ADMIN — OXYCODONE HYDROCHLORIDE 30 MG: 10 TABLET, FILM COATED, EXTENDED RELEASE ORAL at 08:30

## 2024-04-17 RX ADMIN — INSULIN LISPRO 5 UNITS: 100 INJECTION, SOLUTION INTRAVENOUS; SUBCUTANEOUS at 08:32

## 2024-04-17 ASSESSMENT — PAIN DESCRIPTION - LOCATION
LOCATION: KNEE
LOCATION: KNEE
LOCATION: LEG
LOCATION: LEG

## 2024-04-17 ASSESSMENT — PAIN SCALES - WONG BAKER: WONGBAKER_NUMERICALRESPONSE: HURTS A LITTLE BIT

## 2024-04-17 ASSESSMENT — PAIN SCALES - GENERAL
PAINLEVEL_OUTOF10: 8
PAINLEVEL_OUTOF10: 7
PAINLEVEL_OUTOF10: 8
PAINLEVEL_OUTOF10: 6
PAINLEVEL_OUTOF10: 8
PAINLEVEL_OUTOF10: 5

## 2024-04-17 ASSESSMENT — PAIN DESCRIPTION - DESCRIPTORS
DESCRIPTORS: ACHING
DESCRIPTORS: PRESSURE;THROBBING
DESCRIPTORS: THROBBING;SPASM
DESCRIPTORS: SHOOTING;SPASM

## 2024-04-17 ASSESSMENT — PAIN DESCRIPTION - ORIENTATION
ORIENTATION: RIGHT

## 2024-04-17 NOTE — CARE COORDINATION
CM noted discharge order. [facility], [address] [place] [address], room 107, nurse can call report to [phone].     Transition of Care Plan:    RUR: 20%  Prior Level of Functioning: Needs assistance  Disposition: SNF  If SNF or IPR: Date FOC offered: 4/15/2024  Date FOC received: 4/15/2024  Accepting facility: [facility]  Date authorization started with reference number: n/a  Date authorization received and expires: n/a  Follow up appointments: Per MD  DME needed: n/a  Transportation at discharge: Medicaid Stretcher  IM/IMM Medicare/ letter given: yes  Is patient a  and connected with [facility]? yes   If yes, was  transfer form completed and [facility] notified?   Caregiver Contact: n/a  Discharge Caregiver contacted prior to discharge? N/a  Care Conference needed? no  Barriers to discharge: none

## 2024-04-17 NOTE — DISCHARGE SUMMARY
This is a discharge summary for marcio briseno, patient's YOB: 1960.    Admission diagnosis:  Recent pancreatic resection.  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    Procedure:  Right leg angiogram, thrombectomy, tPA infusion therapy.  Right BKA.    Brief Hospital course: Patient recently underwent partial pancreatic resection and splenectomy at [facility].  Patient had anticoagulant held.  Patient presents hospital with right foot pain.  With graft thrombosis.  Patient underwent thrombectomy with tPA infusion therapy on the right leg.  Unfortunately after tPA infusion therapy patient has no circulation at the pedal and ankle level on the right foot.  Patient underwent right BKA for ischemic limb.  Postop patient doing well.  Patient will need transition to rehab facility for amputation.  Currently right BKA wound clean and dry.  Patient will need a daily wound care including Xeroform gauze, 4 x 4 gauze Curlex and Ace wrap.  Patient will follow-up with Dr. JOSE WATTS in 2 weeks as outpatient.  Patient stable to be discharged to rehab.

## 2024-04-17 NOTE — PLAN OF CARE
Problem: Discharge Planning  Goal: Discharge to home or other facility with appropriate resources  4/10/2024 0619 by Gem Maier RN  Outcome: Progressing  Flowsheets (Taken 4/10/2024 0028)  Discharge to home or other facility with appropriate resources:   Identify barriers to discharge with patient and caregiver   Arrange for needed discharge resources and transportation as appropriate   Identify discharge learning needs (meds, wound care, etc)  4/9/2024 2240 by David Salazar, RN  Outcome: Progressing     Problem: Pain  Goal: Verbalizes/displays adequate comfort level or baseline comfort level  4/10/2024 0619 by Gem Maier RN  Outcome: Progressing  Flowsheets  Taken 4/10/2024 0300  Verbalizes/displays adequate comfort level or baseline comfort level:   Encourage patient to monitor pain and request assistance   Assess pain using appropriate pain scale   Administer analgesics based on type and severity of pain and evaluate response   Implement non-pharmacological measures as appropriate and evaluate response   Consider cultural and social influences on pain and pain management   Notify Licensed Independent Practitioner if interventions unsuccessful or patient reports new pain  Taken 4/10/2024 0022  Verbalizes/displays adequate comfort level or baseline comfort level:   Encourage patient to monitor pain and request assistance   Assess pain using appropriate pain scale   Administer analgesics based on type and severity of pain and evaluate response   Implement non-pharmacological measures as appropriate and evaluate response   Consider cultural and social influences on pain and pain management   Notify Licensed Independent Practitioner if interventions unsuccessful or patient reports new pain  4/9/2024 2240 by David Salazar, RN  Outcome: Progressing     Problem: Skin/Tissue Integrity  Goal: Absence of new skin breakdown  Description: 1.  Monitor for areas of redness and/or skin breakdown  2.  
  Problem: Discharge Planning  Goal: Discharge to home or other facility with appropriate resources  4/10/2024 1042 by Roseann Llanos RN  Outcome: Progressing  Flowsheets (Taken 4/10/2024 0801)  Discharge to home or other facility with appropriate resources:   Arrange for needed discharge resources and transportation as appropriate   Identify discharge learning needs (meds, wound care, etc)   Arrange for interpreters to assist at discharge as needed  4/10/2024 0619 by Gem Maier RN  Outcome: Progressing  Flowsheets (Taken 4/10/2024 0028)  Discharge to home or other facility with appropriate resources:   Identify barriers to discharge with patient and caregiver   Arrange for needed discharge resources and transportation as appropriate   Identify discharge learning needs (meds, wound care, etc)  4/9/2024 2240 by David Salazar RN  Outcome: Progressing     Problem: Pain  Goal: Verbalizes/displays adequate comfort level or baseline comfort level  4/10/2024 1042 by Roseann Llanos RN  Outcome: Progressing  4/10/2024 0619 by Gem Maier RN  Outcome: Progressing  Flowsheets  Taken 4/10/2024 0300  Verbalizes/displays adequate comfort level or baseline comfort level:   Encourage patient to monitor pain and request assistance   Assess pain using appropriate pain scale   Administer analgesics based on type and severity of pain and evaluate response   Implement non-pharmacological measures as appropriate and evaluate response   Consider cultural and social influences on pain and pain management   Notify Licensed Independent Practitioner if interventions unsuccessful or patient reports new pain  Taken 4/10/2024 0022  Verbalizes/displays adequate comfort level or baseline comfort level:   Encourage patient to monitor pain and request assistance   Assess pain using appropriate pain scale   Administer analgesics based on type and severity of pain and evaluate response   Implement 
  Problem: Discharge Planning  Goal: Discharge to home or other facility with appropriate resources  4/13/2024 1249 by Glory Prescott RN  Outcome: Progressing     Problem: Pain  Goal: Verbalizes/displays adequate comfort level or baseline comfort level  4/13/2024 2312 by Sharee Fisher RN  Outcome: Progressing  4/13/2024 1249 by Glory Prescott RN  Outcome: Progressing  Flowsheets (Taken 4/13/2024 0905 by Mariya Esposito RN)  Verbalizes/displays adequate comfort level or baseline comfort level:   Encourage patient to monitor pain and request assistance   Assess pain using appropriate pain scale   Administer analgesics based on type and severity of pain and evaluate response     Problem: Skin/Tissue Integrity  Goal: Absence of new skin breakdown  Description: 1.  Monitor for areas of redness and/or skin breakdown  2.  Assess vascular access sites hourly  3.  Every 4-6 hours minimum:  Change oxygen saturation probe site  4.  Every 4-6 hours:  If on nasal continuous positive airway pressure, respiratory therapy assess nares and determine need for appliance change or resting period.  4/13/2024 2312 by Sharee Fisher RN  Outcome: Progressing  4/13/2024 1249 by Glory Prescott RN  Outcome: Progressing     Problem: Safety - Adult  Goal: Free from fall injury  4/13/2024 2312 by Sharee Fisher RN  Outcome: Progressing  4/13/2024 1249 by Glory Prescott RN  Outcome: Progressing     Problem: Nutrition Deficit:  Goal: Optimize nutritional status  4/13/2024 2312 by Sharee Fisher RN  Outcome: Progressing  4/13/2024 1249 by Glory Prescott RN  Outcome: Progressing     Problem: Chronic Conditions and Co-morbidities  Goal: Patient's chronic conditions and co-morbidity symptoms are monitored and maintained or improved  4/13/2024 2312 by Sharee Fisher RN  Outcome: Progressing  4/13/2024 1249 by Glory Prescott RN  Outcome: Progressing     Problem: Cardiovascular - Adult  Goal: 
  Problem: Discharge Planning  Goal: Discharge to home or other facility with appropriate resources  4/14/2024 1707 by Glory Prescott RN  Outcome: Progressing  Flowsheets (Taken 4/14/2024 0897)  Discharge to home or other facility with appropriate resources:   Identify barriers to discharge with patient and caregiver   Arrange for needed discharge resources and transportation as appropriate     Problem: Pain  Goal: Verbalizes/displays adequate comfort level or baseline comfort level  4/15/2024 0118 by Sharee Fisher RN  Outcome: Progressing  4/14/2024 1707 by Glory Prescott RN  Outcome: Progressing     Problem: Skin/Tissue Integrity  Goal: Absence of new skin breakdown  Description: 1.  Monitor for areas of redness and/or skin breakdown  2.  Assess vascular access sites hourly  3.  Every 4-6 hours minimum:  Change oxygen saturation probe site  4.  Every 4-6 hours:  If on nasal continuous positive airway pressure, respiratory therapy assess nares and determine need for appliance change or resting period.  4/15/2024 0118 by Sharee Fisher RN  Outcome: Progressing  4/14/2024 1707 by Glory Prescott RN  Outcome: Progressing     Problem: Safety - Adult  Goal: Free from fall injury  4/15/2024 0118 by Sharee Fisher RN  Outcome: Progressing  4/14/2024 1707 by Glory Prescott RN  Outcome: Progressing     Problem: Nutrition Deficit:  Goal: Optimize nutritional status  4/15/2024 0118 by Sharee Fisher RN  Outcome: Progressing  4/14/2024 1707 by Glory Prescott RN  Outcome: Progressing     Problem: Chronic Conditions and Co-morbidities  Goal: Patient's chronic conditions and co-morbidity symptoms are monitored and maintained or improved  4/15/2024 0118 by Sharee Fisher RN  Outcome: Progressing  4/14/2024 1707 by Glory Prescott RN  Outcome: Progressing  Flowsheets (Taken 4/14/2024 0804)  Care Plan - Patient's Chronic Conditions and Co-Morbidity Symptoms are Monitored and Maintained or 
  Problem: Discharge Planning  Goal: Discharge to home or other facility with appropriate resources  4/6/2024 0350 by Regina Wolf RN  Outcome: Progressing  4/6/2024 0346 by Regina Wolf RN  Outcome: Progressing     Problem: Pain  Goal: Verbalizes/displays adequate comfort level or baseline comfort level  4/6/2024 0350 by Regina Wolf RN  Outcome: Progressing  4/6/2024 0346 by Regina Wolf RN  Outcome: Progressing     Problem: Skin/Tissue Integrity  Goal: Absence of new skin breakdown  Description: 1.  Monitor for areas of redness and/or skin breakdown  2.  Assess vascular access sites hourly  3.  Every 4-6 hours minimum:  Change oxygen saturation probe site  4.  Every 4-6 hours:  If on nasal continuous positive airway pressure, respiratory therapy assess nares and determine need for appliance change or resting period.  4/6/2024 0350 by Regina Wolf RN  Outcome: Progressing  4/6/2024 0346 by Regina Wolf RN  Outcome: Progressing     Problem: Safety - Adult  Goal: Free from fall injury  Outcome: Progressing     
  Problem: Discharge Planning  Goal: Discharge to home or other facility with appropriate resources  4/6/2024 2008 by Regina Wolf RN  Outcome: Progressing  4/6/2024 0756 by Kristy Friedman RN  Outcome: Progressing  4/6/2024 0755 by Kristy Friedman RN  Outcome: Progressing     Problem: Pain  Goal: Verbalizes/displays adequate comfort level or baseline comfort level  4/6/2024 0756 by Kristy Friedman RN  Outcome: Progressing  4/6/2024 0755 by Kristy Friedman RN  Outcome: Progressing     Problem: Skin/Tissue Integrity  Goal: Absence of new skin breakdown  Description: 1.  Monitor for areas of redness and/or skin breakdown  2.  Assess vascular access sites hourly  3.  Every 4-6 hours minimum:  Change oxygen saturation probe site  4.  Every 4-6 hours:  If on nasal continuous positive airway pressure, respiratory therapy assess nares and determine need for appliance change or resting period.  4/6/2024 0756 by Kristy Friedman RN  Outcome: Progressing  4/6/2024 0755 by Kristy Friedman RN  Outcome: Progressing     Problem: Safety - Adult  Goal: Free from fall injury  4/6/2024 0756 by Kristy Friedman RN  Outcome: Progressing  4/6/2024 0755 by Kristy Friedman RN  Outcome: Progressing     
  Problem: Discharge Planning  Goal: Discharge to home or other facility with appropriate resources  Outcome: Progressing     Problem: Pain  Goal: Verbalizes/displays adequate comfort level or baseline comfort level  4/13/2024 1249 by Glory Prescott RN  Outcome: Progressing  Flowsheets (Taken 4/13/2024 0905 by Mariya Esposito, RN)  Verbalizes/displays adequate comfort level or baseline comfort level:   Encourage patient to monitor pain and request assistance   Assess pain using appropriate pain scale   Administer analgesics based on type and severity of pain and evaluate response  4/13/2024 0123 by Sharee Fisher RN  Outcome: Progressing     Problem: Skin/Tissue Integrity  Goal: Absence of new skin breakdown  Description: 1.  Monitor for areas of redness and/or skin breakdown  2.  Assess vascular access sites hourly  3.  Every 4-6 hours minimum:  Change oxygen saturation probe site  4.  Every 4-6 hours:  If on nasal continuous positive airway pressure, respiratory therapy assess nares and determine need for appliance change or resting period.  4/13/2024 1249 by Glory Prescott RN  Outcome: Progressing  4/13/2024 0123 by Sharee Fisher RN  Outcome: Progressing     Problem: Safety - Adult  Goal: Free from fall injury  4/13/2024 1249 by Glory Prescott RN  Outcome: Progressing  4/13/2024 0123 by Sharee Fisher RN  Outcome: Progressing     Problem: Nutrition Deficit:  Goal: Optimize nutritional status  4/13/2024 1249 by Glory Prescott RN  Outcome: Progressing  4/13/2024 0123 by Sharee Fisher RN  Outcome: Progressing     Problem: Chronic Conditions and Co-morbidities  Goal: Patient's chronic conditions and co-morbidity symptoms are monitored and maintained or improved  4/13/2024 1249 by Glory Prescott RN  Outcome: Progressing  4/13/2024 0123 by Sharee Fisher RN  Outcome: Progressing     Problem: Cardiovascular - Adult  Goal: Maintains optimal cardiac output and hemodynamic 
  Problem: Discharge Planning  Goal: Discharge to home or other facility with appropriate resources  Outcome: Progressing     Problem: Pain  Goal: Verbalizes/displays adequate comfort level or baseline comfort level  Outcome: Progressing     Problem: Skin/Tissue Integrity  Goal: Absence of new skin breakdown  Description: 1.  Monitor for areas of redness and/or skin breakdown  2.  Assess vascular access sites hourly  3.  Every 4-6 hours minimum:  Change oxygen saturation probe site  4.  Every 4-6 hours:  If on nasal continuous positive airway pressure, respiratory therapy assess nares and determine need for appliance change or resting period.  Outcome: Progressing     
  Problem: Discharge Planning  Goal: Discharge to home or other facility with appropriate resources  Outcome: Progressing     Problem: Pain  Goal: Verbalizes/displays adequate comfort level or baseline comfort level  Outcome: Progressing     Problem: Skin/Tissue Integrity  Goal: Absence of new skin breakdown  Description: 1.  Monitor for areas of redness and/or skin breakdown  2.  Assess vascular access sites hourly  3.  Every 4-6 hours minimum:  Change oxygen saturation probe site  4.  Every 4-6 hours:  If on nasal continuous positive airway pressure, respiratory therapy assess nares and determine need for appliance change or resting period.  Outcome: Progressing     Problem: Safety - Adult  Goal: Free from fall injury  4/9/2024 2240 by David Salazar, RN  Outcome: Progressing  4/9/2024 1117 by Rachell Holliday RN  Outcome: Progressing     Problem: Nutrition Deficit:  Goal: Optimize nutritional status  Outcome: Progressing     Problem: Chronic Conditions and Co-morbidities  Goal: Patient's chronic conditions and co-morbidity symptoms are monitored and maintained or improved  Outcome: Progressing     Problem: Cardiovascular - Adult  Goal: Maintains optimal cardiac output and hemodynamic stability  Outcome: Progressing  Goal: Absence of cardiac dysrhythmias or at baseline  Outcome: Progressing     Problem: Skin/Tissue Integrity - Adult  Goal: Skin integrity remains intact  Outcome: Progressing  Goal: Incisions, wounds, or drain sites healing without S/S of infection  Outcome: Progressing     Problem: Musculoskeletal - Adult  Goal: Return mobility to safest level of function  Outcome: Progressing  Goal: Return ADL status to a safe level of function  Outcome: Progressing     Problem: Respiratory - Adult  Goal: Achieves optimal ventilation and oxygenation  Outcome: Progressing     Problem: Gastrointestinal - Adult  Goal: Minimal or absence of nausea and vomiting  Outcome: Progressing  Goal: Maintains or returns to 
  Problem: Discharge Planning  Goal: Discharge to home or other facility with appropriate resources  Outcome: Progressing     Problem: Pain  Goal: Verbalizes/displays adequate comfort level or baseline comfort level  Outcome: Progressing     Problem: Skin/Tissue Integrity  Goal: Absence of new skin breakdown  Description: 1.  Monitor for areas of redness and/or skin breakdown  2.  Assess vascular access sites hourly  3.  Every 4-6 hours minimum:  Change oxygen saturation probe site  4.  Every 4-6 hours:  If on nasal continuous positive airway pressure, respiratory therapy assess nares and determine need for appliance change or resting period.  Outcome: Progressing     Problem: Safety - Adult  Goal: Free from fall injury  Outcome: Progressing     Problem: Chronic Conditions and Co-morbidities  Goal: Patient's chronic conditions and co-morbidity symptoms are monitored and maintained or improved  Outcome: Progressing     
  Problem: Discharge Planning  Goal: Discharge to home or other facility with appropriate resources  Outcome: Progressing     Problem: Skin/Tissue Integrity  Goal: Absence of new skin breakdown  Description: 1.  Monitor for areas of redness and/or skin breakdown  2.  Assess vascular access sites hourly  3.  Every 4-6 hours minimum:  Change oxygen saturation probe site  4.  Every 4-6 hours:  If on nasal continuous positive airway pressure, respiratory therapy assess nares and determine need for appliance change or resting period.  Outcome: Progressing     Problem: Genitourinary - Adult  Goal: Urinary catheter remains patent  Recent Flowsheet Documentation  Taken 4/15/2024 2032 by Fior Laboy, RN  Urinary catheter remains patent: Assess patency of urinary catheter     
  Problem: Discharge Planning  Goal: Discharge to home or other facility with appropriate resources  Outcome: Progressing  Flowsheets  Taken 4/4/2024 2239  Discharge to home or other facility with appropriate resources:   Identify barriers to discharge with patient and caregiver   Identify discharge learning needs (meds, wound care, etc)  Taken 4/4/2024 2012  Discharge to home or other facility with appropriate resources:   Identify barriers to discharge with patient and caregiver   Identify discharge learning needs (meds, wound care, etc)     Problem: Pain  Goal: Verbalizes/displays adequate comfort level or baseline comfort level  Outcome: Progressing  Flowsheets (Taken 4/4/2024 2239)  Verbalizes/displays adequate comfort level or baseline comfort level:   Encourage patient to monitor pain and request assistance   Assess pain using appropriate pain scale   Administer analgesics based on type and severity of pain and evaluate response   Implement non-pharmacological measures as appropriate and evaluate response   Consider cultural and social influences on pain and pain management   Notify Licensed Independent Practitioner if interventions unsuccessful or patient reports new pain     Problem: Skin/Tissue Integrity  Goal: Absence of new skin breakdown  Description: 1.  Monitor for areas of redness and/or skin breakdown  2.  Assess vascular access sites hourly  3.  Every 4-6 hours minimum:  Change oxygen saturation probe site  4.  Every 4-6 hours:  If on nasal continuous positive airway pressure, respiratory therapy assess nares and determine need for appliance change or resting period.  Outcome: Progressing     Problem: Safety - Adult  Goal: Free from fall injury  Outcome: Progressing  Flowsheets (Taken 4/4/2024 2239)  Free From Fall Injury: Instruct family/caregiver on patient safety     
  Problem: Discharge Planning  Goal: Discharge to home or other facility with appropriate resources  Outcome: Progressing  Flowsheets (Taken 4/14/2024 0859)  Discharge to home or other facility with appropriate resources:   Identify barriers to discharge with patient and caregiver   Arrange for needed discharge resources and transportation as appropriate     Problem: Pain  Goal: Verbalizes/displays adequate comfort level or baseline comfort level  Outcome: Progressing     Problem: Skin/Tissue Integrity  Goal: Absence of new skin breakdown  Description: 1.  Monitor for areas of redness and/or skin breakdown  2.  Assess vascular access sites hourly  3.  Every 4-6 hours minimum:  Change oxygen saturation probe site  4.  Every 4-6 hours:  If on nasal continuous positive airway pressure, respiratory therapy assess nares and determine need for appliance change or resting period.  Outcome: Progressing     Problem: Safety - Adult  Goal: Free from fall injury  Outcome: Progressing     Problem: Nutrition Deficit:  Goal: Optimize nutritional status  Outcome: Progressing     Problem: Chronic Conditions and Co-morbidities  Goal: Patient's chronic conditions and co-morbidity symptoms are monitored and maintained or improved  Outcome: Progressing  Flowsheets (Taken 4/14/2024 0842)  Care Plan - Patient's Chronic Conditions and Co-Morbidity Symptoms are Monitored and Maintained or Improved:   Monitor and assess patient's chronic conditions and comorbid symptoms for stability, deterioration, or improvement   Collaborate with multidisciplinary team to address chronic and comorbid conditions and prevent exacerbation or deterioration     Problem: Cardiovascular - Adult  Goal: Maintains optimal cardiac output and hemodynamic stability  Outcome: Progressing  Goal: Absence of cardiac dysrhythmias or at baseline  Outcome: Progressing     Problem: Skin/Tissue Integrity - Adult  Goal: Skin integrity remains intact  Outcome: Progressing   
  Problem: Pain  Goal: Verbalizes/displays adequate comfort level or baseline comfort level  4/5/2024 0825 by Kristy Friedman RN  Outcome: Progressing  4/4/2024 2239 by Ani Madrid RN  Outcome: Progressing  Flowsheets (Taken 4/4/2024 2239)  Verbalizes/displays adequate comfort level or baseline comfort level:   Encourage patient to monitor pain and request assistance   Assess pain using appropriate pain scale   Administer analgesics based on type and severity of pain and evaluate response   Implement non-pharmacological measures as appropriate and evaluate response   Consider cultural and social influences on pain and pain management   Notify Licensed Independent Practitioner if interventions unsuccessful or patient reports new pain     Problem: Skin/Tissue Integrity  Goal: Absence of new skin breakdown  Description: 1.  Monitor for areas of redness and/or skin breakdown  2.  Assess vascular access sites hourly  3.  Every 4-6 hours minimum:  Change oxygen saturation probe site  4.  Every 4-6 hours:  If on nasal continuous positive airway pressure, respiratory therapy assess nares and determine need for appliance change or resting period.  4/5/2024 0825 by Kristy Friedman RN  Outcome: Progressing  4/4/2024 2239 by Ani Madrid RN  Outcome: Progressing     
  Problem: Pain  Goal: Verbalizes/displays adequate comfort level or baseline comfort level  Outcome: Progressing     Problem: Skin/Tissue Integrity  Goal: Absence of new skin breakdown  Description: 1.  Monitor for areas of redness and/or skin breakdown  2.  Assess vascular access sites hourly  3.  Every 4-6 hours minimum:  Change oxygen saturation probe site  4.  Every 4-6 hours:  If on nasal continuous positive airway pressure, respiratory therapy assess nares and determine need for appliance change or resting period.  Outcome: Progressing     Problem: Safety - Adult  Goal: Free from fall injury  Outcome: Progressing     Problem: Nutrition Deficit:  Goal: Optimize nutritional status  Outcome: Progressing     Problem: Chronic Conditions and Co-morbidities  Goal: Patient's chronic conditions and co-morbidity symptoms are monitored and maintained or improved  Outcome: Progressing     Problem: Cardiovascular - Adult  Goal: Maintains optimal cardiac output and hemodynamic stability  Outcome: Progressing  Goal: Absence of cardiac dysrhythmias or at baseline  Outcome: Progressing     Problem: Skin/Tissue Integrity - Adult  Goal: Skin integrity remains intact  Outcome: Progressing  Goal: Incisions, wounds, or drain sites healing without S/S of infection  Outcome: Progressing     Problem: Musculoskeletal - Adult  Goal: Return mobility to safest level of function  Outcome: Progressing  Goal: Return ADL status to a safe level of function  Outcome: Progressing     Problem: Respiratory - Adult  Goal: Achieves optimal ventilation and oxygenation  Outcome: Progressing     Problem: Gastrointestinal - Adult  Goal: Minimal or absence of nausea and vomiting  Outcome: Progressing  Goal: Maintains or returns to baseline bowel function  Outcome: Progressing  Goal: Maintains adequate nutritional intake  Outcome: Progressing     Problem: Genitourinary - Adult  Goal: Urinary catheter remains patent  Outcome: Progressing     
  Problem: Pain  Goal: Verbalizes/displays adequate comfort level or baseline comfort level  Outcome: Progressing     Problem: Skin/Tissue Integrity  Goal: Absence of new skin breakdown  Description: 1.  Monitor for areas of redness and/or skin breakdown  2.  Assess vascular access sites hourly  3.  Every 4-6 hours minimum:  Change oxygen saturation probe site  4.  Every 4-6 hours:  If on nasal continuous positive airway pressure, respiratory therapy assess nares and determine need for appliance change or resting period.  Outcome: Progressing     Problem: Safety - Adult  Goal: Free from fall injury  Outcome: Progressing     Problem: Physical Therapy - Adult  Goal: By Discharge: Performs mobility at highest level of function for planned discharge setting.  See evaluation for individualized goals.  Description: FUNCTIONAL STATUS PRIOR TO ADMISSION: Patient was modified independent using a single point cane for functional mobility.    HOME SUPPORT PRIOR TO ADMISSION: The patient lived with parents,son but did not require assistance.    Physical Therapy Goals  Initiated 4/16/2024  Pt stated goal: get better  Pt will be I with LE HEP in 7 days.  Pt will perform bed mobility with Modified Nye in 7 days.  Pt will perform transfers with Contact Guard Assist in 7 days.   Pt will amb 10 feet with LRAD safely with Minimal Assist in 7 days.   Pt will verbalize and demonstrate compliance with fall precautions  in 7 days.   Pt will demonstrate improvement in standing/gait balance from poor to fair in 7 days.    4/16/2024 1130 by Sirena Prescott, PT  Outcome: Progressing     
  Problem: Safety - Adult  Goal: Free from fall injury  4/9/2024 1117 by Rachell Holliday, RN  Outcome: Progressing  4/8/2024 2206 by Yasemin Funez, RN  Outcome: Progressing     
  Problem: Skin/Tissue Integrity  Goal: Absence of new skin breakdown  Description: 1.  Monitor for areas of redness and/or skin breakdown  2.  Assess vascular access sites hourly  3.  Every 4-6 hours minimum:  Change oxygen saturation probe site  4.  Every 4-6 hours:  If on nasal continuous positive airway pressure, respiratory therapy assess nares and determine need for appliance change or resting period.  4/6/2024 0755 by Kristy Friedman RN  Outcome: Progressing  4/6/2024 0350 by Regina Wolf RN  Outcome: Progressing  4/6/2024 0346 by Regina Wolf RN  Outcome: Progressing     Problem: Safety - Adult  Goal: Free from fall injury  4/6/2024 0755 by Kristy Friedman RN  Outcome: Progressing  4/6/2024 0346 by Regina Wolf RN  Outcome: Progressing     
  Problem: Skin/Tissue Integrity  Goal: Absence of new skin breakdown  Description: 1.  Monitor for areas of redness and/or skin breakdown  2.  Assess vascular access sites hourly  3.  Every 4-6 hours minimum:  Change oxygen saturation probe site  4.  Every 4-6 hours:  If on nasal continuous positive airway pressure, respiratory therapy assess nares and determine need for appliance change or resting period.  4/6/2024 0756 by Kristy Friedman RN  Outcome: Progressing  4/6/2024 0755 by Kristy Friedman RN  Outcome: Progressing  4/6/2024 0350 by Regina Wolf RN  Outcome: Progressing  4/6/2024 0346 by Regina Wolf RN  Outcome: Progressing     Problem: Safety - Adult  Goal: Free from fall injury  4/6/2024 0756 by Kristy Friedman RN  Outcome: Progressing  4/6/2024 0755 by Kristy Friedman RN  Outcome: Progressing  4/6/2024 0346 by Regina Wolf RN  Outcome: Progressing     
  Problem: Skin/Tissue Integrity  Goal: Absence of new skin breakdown  Description: 1.  Monitor for areas of redness and/or skin breakdown  2.  Assess vascular access sites hourly  3.  Every 4-6 hours minimum:  Change oxygen saturation probe site  4.  Every 4-6 hours:  If on nasal continuous positive airway pressure, respiratory therapy assess nares and determine need for appliance change or resting period.  Outcome: Progressing     Problem: Safety - Adult  Goal: Free from fall injury  Outcome: Progressing     
Care plan complete  
[facility]  
PHYSICAL THERAPY EVALUATION  Patient: Laury Schuster (63 y.o. female)  Date: 4/16/2024  Primary Diagnosis: Peripheral vascular disease (HCC) [I73.9]  Pain [R52]  Ischemic leg [I99.8]  Peripheral arterial disease (HCC) [I73.9]  Procedure(s) (LRB):  RIGHT LEG AMPUTATION BELOW KNEE (Right) 3 Days Post-Op   Precautions: Fall Risk                      Recommendations for nursing mobility: Encourage HEP in prep for ADLs/mobility; see handout for details, LE elevation for management of edema, and Use of BSC for toileting     In place during session: Peripheral IV and EKG/telemetry     ASSESSMENT  Pt is a 63 y.o. female admitted on 4/4/2024 for PVD ; pt currently being treated for rt BKA  . Pt semi supine  upon PT arrival, agreeable to evaluation. Pt A&O x 4.  Patient lives with son and parents.Was indep prior to adm.    Based on the objective data described below, the patient currently presents with impaired ability to perform high-level IADLs, impaired strength, impaired sensation, decreased activity tolerance, impaired balance, and impaired posture. (See below for objective details and assist levels).     Overall pt tolerated session good today with PT. Pt required sba to min assist for bed mobility and transfers. Pt amb 3 feet with RW, gt belt and min assist;Vcs required for safety demonstrates good standing balance. . Pt will benefit from continued skilled PT to address above deficits and return to PLOF. Potential barriers for safe discharge: . Current PT DC recommendation [facility]patient [facility] Facility  once medically appropriate.      GOALS:    Problem: Physical Therapy - Adult  Goal: By Discharge: Performs mobility at highest level of function for planned discharge setting.  See evaluation for individualized goals.  Description: FUNCTIONAL STATUS PRIOR TO ADMISSION: Patient was modified independent using a single point cane for functional mobility.    HOME SUPPORT PRIOR TO ADMISSION: The patient lived with 
Walker catheter removed. PIV removed. [facility] to [facility]  
discharge  
Progressing  4/10/2024 1042 by Roseann Llanos RN  Outcome: Progressing     Problem: Nutrition Deficit:  Goal: Optimize nutritional status  4/10/2024 2123 by Gem Maier RN  Outcome: Progressing  4/10/2024 1042 by Roseann Llanos RN  Outcome: Progressing     Problem: Chronic Conditions and Co-morbidities  Goal: Patient's chronic conditions and co-morbidity symptoms are monitored and maintained or improved  4/10/2024 2123 by Gem Maier RN  Outcome: Progressing  4/10/2024 1042 by Roseann Llanos RN  Outcome: Progressing  Flowsheets (Taken 4/10/2024 0801)  Care Plan - Patient's Chronic Conditions and Co-Morbidity Symptoms are Monitored and Maintained or Improved:   Monitor and assess patient's chronic conditions and comorbid symptoms for stability, deterioration, or improvement   Collaborate with multidisciplinary team to address chronic and comorbid conditions and prevent exacerbation or deterioration     Problem: Cardiovascular - Adult  Goal: Maintains optimal cardiac output and hemodynamic stability  4/10/2024 2123 by Gem Maier RN  Outcome: Progressing  4/10/2024 1042 by Roseann Llanos RN  Outcome: Progressing  Flowsheets (Taken 4/10/2024 0801)  Maintains optimal cardiac output and hemodynamic stability:   Monitor blood pressure and heart rate   Monitor urine output and notify Licensed Independent Practitioner for values outside of normal range   Administer fluid and/or volume expanders as ordered  Goal: Absence of cardiac dysrhythmias or at baseline  4/10/2024 2123 by Gem Maier RN  Outcome: Progressing  4/10/2024 1042 by Roseann Llanos RN  Outcome: Progressing  Flowsheets (Taken 4/10/2024 0801)  Absence of cardiac dysrhythmias or at baseline:   Monitor cardiac rate and rhythm   Assess for signs of decreased cardiac output   Administer antiarrhythmia medication and electrolyte replacement as ordered     Problem: Skin/Tissue Integrity - 
baseline  4/12/2024 0945 by Frandy Soria RN  Outcome: Progressing  4/12/2024 0300 by Clara Tanner RN  Outcome: Progressing     Problem: Skin/Tissue Integrity - Adult  Goal: Skin integrity remains intact  4/12/2024 0945 by Frandy Soria RN  Outcome: Progressing  4/12/2024 0300 by Clara Tanner RN  Outcome: Progressing  Goal: Incisions, wounds, or drain sites healing without S/S of infection  4/12/2024 0945 by Frandy Soria RN  Outcome: Progressing  4/12/2024 0300 by Clara Tanner RN  Outcome: Progressing     Problem: Musculoskeletal - Adult  Goal: Return mobility to safest level of function  4/12/2024 0945 by Frandy Soria RN  Outcome: Progressing  4/12/2024 0300 by Clara Tanner RN  Outcome: Progressing  Goal: Return ADL status to a safe level of function  4/12/2024 0945 by Frandy Soria RN  Outcome: Progressing  4/12/2024 0300 by Clara Tanner RN  Outcome: Progressing     Problem: Respiratory - Adult  Goal: Achieves optimal ventilation and oxygenation  4/12/2024 0945 by Frandy Soria RN  Outcome: Progressing  4/12/2024 0300 by Clara Tanner RN  Outcome: Progressing  Flowsheets (Taken 4/11/2024 2002)  Achieves optimal ventilation and oxygenation:   Assess for changes in mentation and behavior   Assess for changes in respiratory status   Position to facilitate oxygenation and minimize respiratory effort   Oxygen supplementation based on oxygen saturation or arterial blood gases     Problem: Gastrointestinal - Adult  Goal: Minimal or absence of nausea and vomiting  4/12/2024 0945 by Frandy Soria RN  Outcome: Progressing  4/12/2024 0300 by Clara Tanner RN  Outcome: Progressing  Goal: Maintains or returns to baseline bowel function  4/12/2024 0945 by Frandy Soria RN  Outcome: Progressing  4/12/2024 0300 by Clara Tanner RN  Outcome: Progressing  Goal: Maintains adequate nutritional intake  4/12/2024 0945 by 
Training  Transfer Training: Yes  Sit to Stand: Contact-guard assistance;Assist X1;Additional time  Stand to Sit: Contact-guard assistance;Assist X1;Additional time  Bed to Chair: Contact-guard assistance;Assist X1;Additional time      Balance:  Balance  Sitting: Intact  Standing: Impaired  Standing - Static: Fair;Constant support  Standing - Dynamic: Constant support;Fair      ADL Intervention:    LE Dressing: Independent  LE Dressing Skilled Clinical Factors: donning L sock at EOB      Therapeutic exercise:  Completed seated in recliner w/ min verbal/ visual cues for proper technique and self pacing   Exercise Sets Reps AROM AAROM PROM Self PROM Comments   Shoulder flex/ext 1 12 [x] [] [] []    Elbow flex/ext 1 12 [x] [] [] []    Wrist flex/ext 1 12 [x] [] [] []    Hand flex/ ext 1 12 [x] [] [] []      Pain Ratin/10   Pain Intervention(s):   nursing notified and addressing and patient medicated for pain prior to session    Activity Tolerance:   Good and requires rest breaks    After treatment patient left in no apparent distress:   Bed locked and returned to lowest position, Patient left in no apparent distress sitting up in chair, Call bell within reach, and Caregiver / family present, and nsg updated     COMMUNICATION/EDUCATION:   The patient’s plan of care was discussed with: Registered nurse    Patient Education  Education Given To: Patient  Education Provided: Role of Therapy;Plan of Care;Home Exercise Program;Precautions  Education Method: Verbal;Demonstration  Barriers to Learning: None  Education Outcome: Verbalized understanding;Demonstrated understanding;Continued education needed    Thank you for this referral.  KODY Andujar  Minutes: 40    
protocol as indicated     Problem: Gastrointestinal - Adult  Goal: Minimal or absence of nausea and vomiting  4/11/2024 0927 by Frandy Soria RN  Outcome: Progressing  4/11/2024 0927 by Frandy Soria RN  Outcome: Adequate for Discharge  4/10/2024 2123 by Gem Maier RN  Outcome: Progressing  Flowsheets (Taken 4/10/2024 2000)  Minimal or absence of nausea and vomiting:   Administer IV fluids as ordered to ensure adequate hydration   Administer ordered antiemetic medications as needed   Provide nonpharmacologic comfort measures as appropriate   Nutrition consult to assist patient with adequate nutrition and appropriate food choices  Goal: Maintains or returns to baseline bowel function  4/11/2024 0927 by Frandy Soria RN  Outcome: Progressing  4/11/2024 0927 by Frandy Soria RN  Outcome: Adequate for Discharge  4/10/2024 2123 by Gem Maier RN  Outcome: Progressing  Flowsheets (Taken 4/10/2024 2000)  Maintains or returns to baseline bowel function:   Assess bowel function   Encourage oral fluids to ensure adequate hydration   Administer IV fluids as ordered to ensure adequate hydration   Administer ordered medications as needed   Encourage mobilization and activity   Nutrition consult to assist patient with appropriate food choices  Goal: Maintains adequate nutritional intake  4/11/2024 0927 by Frandy Soria RN  Outcome: Progressing  4/11/2024 0927 by Frandy Soria RN  Outcome: Adequate for Discharge  4/10/2024 2123 by Gem Maier RN  Outcome: Progressing  Flowsheets (Taken 4/10/2024 2000)  Maintains adequate nutritional intake:   Monitor percentage of each meal consumed   Identify factors contributing to decreased intake, treat as appropriate   Assist with meals as needed   Monitor intake and output, weight and lab values   Obtain nutritional consult as needed     Problem: Genitourinary - Adult  Goal: Urinary catheter remains patent  4/11/2024 0927 by 
H/O laparoscopy

## 2024-04-17 NOTE — DISCHARGE INSTRUCTIONS
Daily wound care of the right BKA with Xeroform gauze, 4 x 4 gauze, Kerlix and Ace wrap.  Keep right BKA elevated.

## 2024-04-17 NOTE — PROGRESS NOTES
Hospitalist Progress Note    NAME:   Laury Schuster   : 1960   MRN: [id number]     Date/Time: 2024 3:00 PM  Patient PCP: Trenton Escudero MD    Estimated discharge date: 48 hours  Barriers: Awaiting amputation      Assessment / Plan:    Right foot ischemia/rhabdomyolysis  Occluded right femoropopliteal bypass  Discontinued heparin  Gangrenous changes although sensation and motor ability present  Underwent BKA of the right leg on 2024  Increased OxyContin from 20 mg twice daily to 30 mg  Continue Dilaudid at 1.5 mg every 3 hours and oxycodone every 4 hours, 20 mg  Ecchymosis noted in the right groin near the femoropopliteal insertion site    Rhabdomyolysis  CK 1221->894->576-->361    Diabetes mellitus, type II  Patient has received a subtotal pancreatectomy for pancreatic cancer  Increase sliding scale insulin from medium dose to high resistant  Continue to monitor glucose levels    Essential hypertension  Continue metoprolol  Continue lisinopril with caution    Microcytic anemia  Iron panel in a.m.  Hemoglobin 9.7-->9.6-->8.9-->9.1    Medical Decision Making     [x] High (any 2)     A. Problems (any 1)  [x] Acute/Chronic Illness/injury posing threat to life or bodily function:    [] Severe exacerbation of chronic illness:    ---------------------------------------------------------------------  B. Risk of Treatment (any 1)   [x] Drugs/treatments that require intensive monitoring for toxicity include:    [] IV ABX requiring serial renal monitoring for nephrotoxicity:     [] IV Narcotic analgesia for adverse drug reaction  [] Aggressive IV diuresis requiring serial monitoring for renal impairment and electrolyte derangements  [] Critical electrolyte abnormalities requiring IV replacement and close serial monitoring  [] Insulin - monitoring serial FSBS for Hypoglycemic adverse drug reaction  [x] Other -IV pain medication  [] Change in code status:    [] Decision to escalate care:    [] Major 
      Hospitalist Progress Note    NAME:   Laury Schuster   : 1960   MRN: [id number]     Date/Time: 4/15/2024 9:07 AM  Patient PCP: Trenton Escudero MD    Estimated discharge date:    Barriers: Placement      Assessment / Plan:    Right foot ischemia/rhabdomyolysis  Occluded right femoropopliteal bypass  Discontinued heparin  Underwent BKA of the right leg on 2024    Rhabdomyolysis  CK 1221->894->576-->361    Diabetes mellitus, type II  Patient has received a subtotal pancreatectomy for pancreatic cancer  Increase Lantus 25 units, add scheduled Humalog 5 units before meals  Continue to monitor glucose levels    Essential hypertension  Continue metoprolol  Continue lisinopril with caution    Microcytic anemia  Hemoglobin 9.7-->9.6-->8.9-->9.1    Patient cleared for discharge to rehab facility by surgery    Medical Decision Making     [x] High (any 2)     A. Problems (any 1)  [x] Acute/Chronic Illness/injury posing threat to life or bodily function:    [] Severe exacerbation of chronic illness:    ---------------------------------------------------------------------  B. Risk of Treatment (any 1)   [x] Drugs/treatments that require intensive monitoring for toxicity include:    [] IV ABX requiring serial renal monitoring for nephrotoxicity:     [] IV Narcotic analgesia for adverse drug reaction  [] Aggressive IV diuresis requiring serial monitoring for renal impairment and electrolyte derangements  [] Critical electrolyte abnormalities requiring IV replacement and close serial monitoring  [x] Insulin - monitoring serial FSBS for Hypoglycemic adverse drug reaction  [x] Other -IV pain medication  [] Change in code status:    [] Decision to escalate care:    [] Major surgery/procedure with associated risk factors:     ----------------------------------------------------------------------  C. Data (any 2)  [x] Discussed current management and discharge planning options with Case Management.  [x] Discussed 
      Hospitalist Progress Note    NAME:   Laury Schuster   : 1960   MRN: [id number]     Date/Time: 2024 2:45 PM  Patient PCP: Trenton Escudero MD    Estimated discharge date: 48 hours  Barriers: Awaiting amputation      Assessment / Plan:    Right foot ischemia/rhabdomyolysis  Occluded right femoropopliteal bypass  Continue heparin  Gangrenous changes although sensation and motor ability present  Scheduled for below-knee amputation with Dr. Cantu   Increased OxyContin from 20 mg twice daily to 30 mg  Continue Dilaudid at 1.5 mg every 3 hours and oxycodone every 4 hours, 20 mg    Rhabdomyolysis  CK 1221->894    Diabetes mellitus, type II  Patient has received a subtotal pancreatectomy for pancreatic cancer  Increase sliding scale insulin from medium dose to high resistant  Continue to monitor glucose levels    Essential hypertension  Continue metoprolol  Continue lisinopril with caution    Microcytic anemia  Iron panel in a.m.  Hemoglobin 9.7-->9.6    Medical Decision Making     [x] High (any 2)     A. Problems (any 1)  [x] Acute/Chronic Illness/injury posing threat to life or bodily function:    [] Severe exacerbation of chronic illness:    ---------------------------------------------------------------------  B. Risk of Treatment (any 1)   [x] Drugs/treatments that require intensive monitoring for toxicity include:    [] IV ABX requiring serial renal monitoring for nephrotoxicity:     [] IV Narcotic analgesia for adverse drug reaction  [] Aggressive IV diuresis requiring serial monitoring for renal impairment and electrolyte derangements  [] Critical electrolyte abnormalities requiring IV replacement and close serial monitoring  [] Insulin - monitoring serial FSBS for Hypoglycemic adverse drug reaction  [x] Other -IV pain medication  [] Change in code status:    [] Decision to escalate care:    [] Major surgery/procedure with associated risk factors:   
      Hospitalist Progress Note    NAME:   Laury Schuster   : 1960   MRN: [id number]     Date/Time: 2024 2:42 PM  Patient PCP: Trenton Escudero MD    Estimated discharge date: 48 hours  Barriers: Awaiting amputation      Assessment / Plan:    Right foot ischemia/rhabdomyolysis  Occluded right femoropopliteal bypass  Continue heparin  Gangrenous changes although sensation and motor ability present  Scheduled for below-knee amputation with Dr. Cantu   Increased OxyContin from 20 mg twice daily to 30 mg  Continue Dilaudid at 1.5 mg every 3 hours and oxycodone every 4 hours, 20 mg  Ecchymosis noted in the right groin near the femoropopliteal insertion site    Rhabdomyolysis  CK 1221->894->576    Diabetes mellitus, type II  Patient has received a subtotal pancreatectomy for pancreatic cancer  Increase sliding scale insulin from medium dose to high resistant  Continue to monitor glucose levels    Essential hypertension  Continue metoprolol  Continue lisinopril with caution    Microcytic anemia  Iron panel in a.m.  Hemoglobin 9.7-->9.6-->8.9    Medical Decision Making     [x] High (any 2)     A. Problems (any 1)  [x] Acute/Chronic Illness/injury posing threat to life or bodily function:    [] Severe exacerbation of chronic illness:    ---------------------------------------------------------------------  B. Risk of Treatment (any 1)   [x] Drugs/treatments that require intensive monitoring for toxicity include:    [] IV ABX requiring serial renal monitoring for nephrotoxicity:     [] IV Narcotic analgesia for adverse drug reaction  [] Aggressive IV diuresis requiring serial monitoring for renal impairment and electrolyte derangements  [] Critical electrolyte abnormalities requiring IV replacement and close serial monitoring  [] Insulin - monitoring serial FSBS for Hypoglycemic adverse drug reaction  [x] Other -IV pain medication  [] Change in code status:    [] Decision to escalate care:    [] Major 
    Hospitalist Progress Note    NAME:   Laury Schuster   : 1960   MRN: [id number]     Date/Time: 2024 2:21 PM  Patient PCP: Trenton Escudero MD  Follow-up on consult for diabetes control:  Patient for OR today    Patient alert oriented and with less pain after increasing Dilaudid yesterday.     Latest Reference Range & Units 24 11:00 24 17:25 24 21:57 24 08:12 24 11:46 24 13:44 24 17:15 24 20:15 24 08:25   POC Glucose 65 - 100 mg/dL 204 (H) 203 (H) 266 (H) 124 (H) 237 (H) 142 (H) 136 (H) 234 (H) 152 (H)   (H): Data is abnormally high    Adequate blood sugar control  Lantus was held.  Will reevaluate postop.    Signed: Linda Garcia PA-C  [facility]  
    Hospitalist Progress Note    NAME:   Laury Schuster   : 1960   MRN: [id number]     Date/Time: 2024 7:31 AM  Patient PCP: Trenton Escudero MD    HOSPITAL COURSE:  Consulted for diabetes management.    63-year-old female with diffuse vasculopathy/peripheral vascular disease, status post previous right lower extremity and right carotid interventions now with ischemic right foot with plan for vascular surgery 2024.    Assessment / Plan:  1.  Ischemic right leg after discontinuation of anticoagulation, status post spleen and pancreatic resection.  -Heparin drip as per primary service/ (holding Eliquis)  -On atorvastatin     2.  New onset diabetes mellitus secondary to pancreatic resection  -I have again asked the  to fax the medical record repository that is used by [facility] for a stat copy of operative note.     3.  Severe diffuse peripheral vascular disease, status post open right carotid endarterectomy, right CFA-popliteal bypass graft, right CFA to below-knee popliteal bypass graft  -On atorvastatin  -On aspirin 325 daily  -On heparin drip     4.  Chronic and acute with worsening ischemia foot/leg pain     5.  Hypertension, managed with Zestril 20 mg,      Sliding scale increased slightly yesterday.  POC glucose win 123 this a.m., 266 yesterday evening    Will continue current dosing.  Hold Lantus tonight preop.  Last lispro dose at at bedtime.  Hyponatremia improving    Increase Dilaudid to 2 mg every 3 hours.    I have personally redone the request for medical records from [facility] and marked stat.    Medical Decision Making:     [x] High (any 2)    A. Problems (any 1)  [x] Acute/Chronic Illness/injury posing threat to life or bodily function: Diabetes mellitus in the setting of impending surgery, severe peripheral vascular disease with previous stroke  [x] Severe exacerbation of chronic illness: Worsening of peripheral vascular disease now with overt ischemic rest 
    STUDENT NOTE  Hospitalist Progress Note    NAME:   Laury Schuster   : 1960   MRN: [id number]     Date/Time: 2024 9:40 AM  Patient PCP: Trenton Escudero MD    Estimated discharge date:  Barriers:     Patient is a 63-year-old female with past medical history of pancreatic resection, PAD, hypertension, and PVD who presented to the ED with right lower extremity pain on .  Patient has had a history of PAD in which she has undergone unsuccessful bypass surgeries resulting in ischemia to the right lower extremity.  Pt endorses slight sensation still present on the dorsal aspect of foot and lower leg, as well as gangrenous toes. Patient is currently experiencing severe pain being managed on OxyContin and dilaudid. She is being anticoagulated on heparin and is scheduled for BKA of the right leg on .     Assessment / Plan:  Right lower extremity ischemia  Right leg femoral artery to popliteal artery bypass graft occlusion  Continue to monitor CK level, was 894 today and trending down  Continue patient on heparin drip  Continue pain management with OxyContin and Dilaudid PRN  Patient scheduled for right leg BKA on Saturday    Rhabdomyolysis  CK level of 894 today continue to monitor as it is trending down    Type 2 diabetes mellitus  Patient received partial pancreatic resection due to history of pancreatic cancer  A1c of 7.4  Continue to monitor glucose daily  Continue patient on medium dose sliding scale insulin    PAD  Continue atorvastatin    Essential hypertension  /83 this morning   Continue metoprolol and lisinopril    Microcytic anemia  Iron panel pending  Hemoglobin of 9.6  Hematocrit of 30.4    Hyponatremia  Sodium of 130 this morning  1L normal saline. Monitor sodium.    Medical Decision Making     [] High (any 2)     A. Problems (any 1)  [] Acute/Chronic Illness/injury posing threat to life or bodily function:    [] Severe exacerbation of chronic illness:  
  Physician Progress Note      PATIENT:               JOHN SALINAS  [facility] #:                  [id number]  :                       1960  ADMIT DATE:       2024 1:32 PM  DISCH DATE:  RESPONDING  PROVIDER #:        Marko Cantu MD          QUERY TEXT:    Patient admitted with RLE graft occlusion requiring surgical intervention.   Patient noted to have elevated WBCs, elevated heartrates, temp 100.5 on   24. If possible, please document in progress notes and discharge summary   if you are evaluating and/or treating any of the following:    The medical record reflects the following:  Risk Factors:  -63 F presents with right foot pain; admitted with RLE graft occlusion   requiring surgical intervention    Clinical Indicators:  -WBC 15.9...14.5...13.5...15.0  -temp max 100.5 (24)  -HRs on arrival 104...92...99  -HRs on 24 up to 98  -HRs on 24 up to 96  -HRs on 24 up to 116  -HRs on 24 up to 105    Treatment:  -labs, imaging, OR, PRBCs, IVF, pain med, IV Lasix, IV Heparin, ICU monitoring    Thank you,  AN Zarco, RN, CCDS, CRCR  Clinical   [email or web address] or contact via [employer]  Options provided:  -- SIRS of non-infectious origin without acute organ dysfunction  -- Other - I will add my own diagnosis  -- Disagree - Not applicable / Not valid  -- Disagree - Clinically unable to determine / Unknown  -- Refer to Clinical Documentation Reviewer    PROVIDER RESPONSE TEXT:    This patient has SIRS of non-infectious origin without acute organ   dysfunction.    Query created by: Shanelle Reyna on 2024 8:57 AM      Electronically signed by:  Marko Cantu MD 2024 9:16 AM          
.Progress Note (Hospitalist, Internal Medicine)  IDENTIFYING INFORMATION   PATIENT:  Laury Schuster  MRN:  [id number]  ADMIT DATE: 4/4/2024  TIME OF EVALUATION: 4/17/2024 2:55 PM      HISTORY OF PRESENT ILLNESS   Laury Schuster is a 63 y.o. female who presents with       SUBJECTIVE     No overnight issues.  She has no new complaints.    MEDICATIONS   Medications Prior to Admission  Medications Prior to Admission: ferrous sulfate (IRON 325) 325 (65 Fe) MG tablet, Take 1 tablet by mouth daily (with breakfast)  folic acid (FOLVITE) 1 MG tablet, Take 1 tablet by mouth daily  Multiple Vitamins-Minerals (THERAPEUTIC MULTIVITAMIN-MINERALS) tablet, Take 1 tablet by mouth daily  traZODone (DESYREL) 50 MG tablet, Take 1.5 tablets by mouth nightly  cyanocobalamin 1000 MCG tablet, Take 1 tablet by mouth daily  [DISCONTINUED] HYDROmorphone (DILAUDID) 2 MG tablet, Take 1 tablet by mouth every 4 hours as needed for Pain. Max Daily Amount: 12 mg  insulin glargine (LANTUS;BASAGLAR) 100 UNIT/ML injection pen, Inject 20 Units into the skin nightly  INSULIN LISPRO IJ, Inject as directed Sliding scale  [DISCONTINUED] apixaban (ELIQUIS) 5 MG TABS tablet, Take 1 tablet by mouth 2 times daily  albuterol sulfate HFA (PROVENTIL;VENTOLIN;PROAIR) 108 (90 Base) MCG/ACT inhaler, Inhale 2 puffs into the lungs every 4 hours as needed  fluticasone-salmeterol (ADVAIR) 250-50 MCG/ACT AEPB diskus inhaler, Inhale 1 puff into the lungs in the morning and 1 puff in the evening.  acetaminophen (TYLENOL) 325 MG tablet, Take by mouth every 4 hours as needed  aspirin 325 MG EC tablet, Take 1 tablet by mouth daily  lisinopril (PRINIVIL;ZESTRIL) 20 MG tablet, Take by mouth daily Does not take if BP is under 100  pantoprazole (PROTONIX) 40 MG tablet, Take 1 tablet by mouth daily    Current Medications  Current Facility-Administered Medications   Medication Dose Route Frequency Provider Last Rate Last Admin    insulin glargine (LANTUS) injection vial 25 Units  
24 hr chart check completed;  
24 hr chart check completed;  
4 Eyes Skin Assessment     NAME:  Laury Schuster  YOB: 1960  MEDICAL RECORD NUMBER:  [id number]    The patient is being assessed for  Transfer to New Unit    I agree that at least one RN has performed a thorough Head to Toe Skin Assessment on the patient. ALL assessment sites listed below have been assessed.      Areas assessed by both nurses:    Head, Face, Ears, Shoulders, Back, Chest, Arms, Elbows, Hands, Sacrum. Buttock, Coccyx, Ischium, Legs. Feet and Heels, and Under Medical Devices         Does the Patient have a Wound? Yes wound(s) were present on assessment. LDA wound assessment was Initiated and completed by RN. Large bruise inner right thigh. Surgical incision inner right thigh well approximated, with surgical sutures, slight redness noted, no drainage,or odor . Purple/ black/ red discoloration noted to right foot, with black eschar, redness and swelling to right metatarsal (distal phalanges)        Alexx Prevention initiated by RN: Yes  Wound Care Orders initiated by RN: Yes    Pressure Injury (Stage 3,4, Unstageable, DTI, NWPT, and Complex wounds) if present, place Wound referral order by RN under : Yes    New Ostomies, if present place, Ostomy referral order under : No     Nurse 1 eSignature: Electronically signed by Gem Maier RN on 4/10/24 at 12:58 AM EDT    **SHARE this note so that the co-signing nurse can place an eSignature**    Nurse 2 eSignature: Electronically signed by Eneida Malave RN on 4/10/24 at 1:07 AM EDT   
4 Eyes Skin Assessment     NAME:  Laury Schuster  YOB: 1960  MEDICAL RECORD NUMBER:  [id number]    The patient is being assessed for  Admission    I agree that at least one RN has performed a thorough Head to Toe Skin Assessment on the patient. ALL assessment sites listed below have been assessed.      Areas assessed by both nurses:    Head, Face, Ears, Shoulders, Back, Chest, Arms, Elbows, Hands, Sacrum. Buttock, Coccyx, Ischium, Legs. Feet and Heels, and Under Medical Devices         Does the Patient have a Wound? No noted wound(s)    Right foot jenelle. Right great toe discolored, purple, heeling surgical incision at top of Right thigh no open wounds noted        Alexx Prevention initiated by RN: Yes  Wound Care Orders initiated by RN: No    Pressure Injury (Stage 3,4, Unstageable, DTI, NWPT, and Complex wounds) if present, place Wound referral order by RN under : No    New Ostomies, if present place, Ostomy referral order under : No     Nurse 1 eSignature: Electronically signed by Milla Ferrer RN on 4/4/24 at 5:42 PM EDT    **SHARE this note so that the co-signing nurse can place an eSignature**    Nurse 2 eSignature: Electronically signed by VIKTORIA HURTADO RN on 4/4/24 at 9:24 PM EDT    
4 Eyes Skin Assessment     NAME:  Laury Schuster  YOB: 1960  MEDICAL RECORD NUMBER:  [id number]    The patient is being assessed for  Other   Weekly check    I agree that at least one RN has performed a thorough Head to Toe Skin Assessment on the patient. ALL assessment sites listed below have been assessed.      Areas assessed by both nurses:    Head, Face, Ears, Shoulders, Back, Chest, Arms, Elbows, Hands, Sacrum. Buttock, Coccyx, Ischium, Legs. Feet and Heels, and Under Medical Devices         Does the Patient have a Wound? Yes wound(s) were present on assessment. LDA wound assessment was Initiated and completed by RN       Alexx Prevention initiated by RN: No  Wound Care Orders initiated by RN: No    Pressure Injury (Stage 3,4, Unstageable, DTI, NWPT, and Complex wounds) if present, place Wound referral order by RN under : No    New Ostomies, if present place, Ostomy referral order under : No     Nurse 1 eSignature: Electronically signed by Louisa Wolf RN on 4/17/24 at 11:51 AM EDT    **SHARE this note so that the co-signing nurse can place an eSignature**    Nurse 2 eSignature: Electronically signed by Felicitas Baxter RN on 4/17/24 at 1:07 PM EDT   
Bedside shift report given to Clara CORDOVA   
Bedside shift report given to Ricarda CORDOVA   
Comprehensive Nutrition Assessment    Type and Reason for Visit:  Initial, Positive Nutrition Screen (MST 2)    Nutrition Recommendations/Plan:   Continue diet as 4 Carb.  Ensure HP or compact x2/d- vanilla flavor.  Continue to monitor and document PO and ONS intakes, BM in I/Os.     Malnutrition Assessment:  Malnutrition Status:  No malnutrition (04/05/24 1212)    Context:  Acute Illness     Findings of the 6 clinical characteristics of malnutrition:  Energy Intake:  Mild decrease in energy intake (Comment) (PO intakes decreased 2/2 ageusia)  Weight Loss:  No significant weight loss     Body Fat Loss:  No significant body fat loss     Muscle Mass Loss:  No significant muscle mass loss    Fluid Accumulation:  No significant fluid accumulation     Strength:  Not Performed    Nutrition Assessment:    Admitted for PAD. Screened for MST 2 d/t noted wt loss and poor intakes PTA. Pt suspects 9# wt loss x3-4 wks; EBU=993# x5 mths, wt loss nutritionally insignificant. Pt endorsed improvement w/ 65-75% of Breakfast tray today; stated poor intakes d/t ageusia x1 mth. RD provided tips to aid in improving taste perception. Pt endorsed previously on ensure at [facility] but disliked flavors offered, prefers Boost; Pt amenable to trying ensure vanilla while IP. Food preferences obtained. Continue therapeutic diet. Noted plan for R. leg bypass graft thrombectomy and angiogram on 4/7. Labs: H/H 7.0/23.4, Cr 0.41, -213. Meds: NS, PPI, heparin, dilaudid, humalog, lantus, cleocin, lipitor, lisinopril.    Nutrition Related Findings:    NFPE inappropriate at visit, Pt in pain; no obvious wasting seen. No N/V/D/C nor chewing/swallowing difficulties reported. Last BM 4/4, +flatus. Trace RLE edema noted. Wound Type: None       Current Nutrition Intake & Therapies:    Average Meal Intake: 51-75%  Average Supplements Intake: None Ordered  ADULT DIET; Regular; 3 carb choices (45 gm/meal)    Anthropometric Measures:  Height: 
Comprehensive Nutrition Assessment    Type and Reason for Visit:  Reassess    Nutrition Recommendations/Plan:   Continue diet as 4 Carb.  Ensure HP or compact x2/d- vanilla flavor.  Continue to monitor and document PO and ONS intakes, BM in I/Os.     Malnutrition Assessment:  Malnutrition Status:  No malnutrition (04/05/24 1212)    Context:  Acute Illness     Findings of the 6 clinical characteristics of malnutrition:  Energy Intake:  Mild decrease in energy intake (Comment) (PO intakes decreased 2/2 ageusia)  Weight Loss:  No significant weight loss     Body Fat Loss:  No significant body fat loss     Muscle Mass Loss:  No significant muscle mass loss    Fluid Accumulation:  No significant fluid accumulation     Strength:  Not Performed    Nutrition Assessment:    Admitted for PAD. Screened for MST 2 d/t noted wt loss and poor intakes PTA. Pt suspects 9# wt loss x3-4 wks; DFC=880# x5 mths, wt loss nutritionally insignificant. Pt endorsed improvement w/ 65-75% of Breakfast tray today; stated poor intakes d/t ageusia x1 mth. RD provided tips to aid in improving taste perception. Pt endorsed previously on ensure at [facility] but disliked flavors offered, prefers Boost; Pt amenable to trying ensure vanilla while IP. Food preferences obtained. Continue therapeutic diet. Noted plan for R. leg bypass graft thrombectomy and angiogram on 4/7. (4/12) Pt with adequate po intake, scheduled for BKA on 4/13, will follow per protocol. Labs: reviewed  Meds: NS, PPI, heparin, dilaudid, humalog, lantus, cleocin, lipitor, lisinopril.    Nutrition Related Findings:    NFPE inappropriate at visit, Pt in pain; no obvious wasting seen. No N/V/D/C nor chewing/swallowing difficulties reported. Last BM 4/12, +flatus. Trace RLE edema noted. Wound Type: None       Current Nutrition Intake & Therapies:    Average Meal Intake: 51-75%  Average Supplements Intake: Unable to assess  ADULT DIET; Regular  Diet NPO    Anthropometric 
Dr. Cantu spoke with patient's primary decision maker and updated her on patient's condition  
Heparin Infusion Initiation  Laury Schuster is a 63 y.o. female starting heparin for:   graft bypass thrombus s/p thrombectomy and CDTPA  Heparin dosing: order for weight based protocol  Initial Dosing Weight: 56.6 kg (Recorded body weight)  Recent Labs     04/08/24  0300 04/08/24  1130 04/08/24  1810 04/09/24  0336   *  --   --  554*   HGB 10.4*  --   --  10.4*   APTT 33.9 32.6 33.7  --      Factor Xa inhibitor/LMWH use within the past 72 hours? No  If yes, date and time of last administration: N/A  Hypertriglyceridemia (> 690 mg/dL) or hyperbilirubinemia (> 37 mg/dL conjugated bilirubin, >14 mg/dL unconjugated bilirubin) present? No  Recent Labs     04/09/24 0336   BILITOT 0.6       Assessment/Plan:   Heparin to be monitored using anti-Xa for duration of therapy  Initial bolus ordered: No  Starting rate:  12  unit/kg/hr  PRN boluses entered: Yes       
Heparin Infusion Initiation  Laury Schuster is a 63 y.o. female starting heparin for:   stent thrombosis, limb ischemia  Heparin dosing: order for weight based protocol  Initial Dosing Weight: ~54.9 kg (Recorded body weight)  Recent Labs     04/04/24  1347   *   HGB 7.9*     Factor Xa inhibitor/LMWH use within the past 72 hours? Yes  If yes, date and time of last administration:  Eliquis 4/4 AM  Hypertriglyceridemia (> 690 mg/dL) or hyperbilirubinemia (> 37 mg/dL conjugated bilirubin, >14 mg/dL unconjugated bilirubin) present? No  Recent Labs     04/04/24  1347   BILITOT 0.1*       Assessment/Plan:   Heparin to be monitored using aPTT until 72 hours following last factor Xa inhibitor/LMWH administration, anticipated date for change to anti-Xa monitoring is 4/8  Initial bolus ordered: Yes  Starting rate:  12 unit/kg/hr  PRN boluses entered: Yes       
OCCUPATIONAL THERAPY EVALUATION  Patient: Laury Schuster (63 y.o. female)  Date: 4/15/2024  Primary Diagnosis: Peripheral vascular disease (HCC) [I73.9]  Pain [R52]  Ischemic leg [I99.8]  Peripheral arterial disease (HCC) [I73.9]  Procedure(s) (LRB):  RIGHT LEG AMPUTATION BELOW KNEE (Right) 2 Days Post-Op   Precautions: Fall Risk, Bed Alarm                Recommendations for nursing mobility: Use of bed/chair alarm for safety, Use of BSC for toileting , AD and gt belt for bed to chair , and Assist x1    In place during session:Peripheral IV, EKG/telemetry , and Pulse ox  ASSESSMENT  Pt is a 63 y.o. female presenting to [facility] with c/o pain in the R leg, admitted 4/4 and currently s/p R BKA; POD #2. Pt received semi-supine in bed upon arrival, AXO x4, and agreeable to OT evaluation.     Based on current observations, pt presents with decreased  functional mobility, independence in ADLs, ROM, strength, activity tolerance, endurance, balance, increased pain levels (see below for objective details and assist levels).     Overall, pt tolerates session fair with c/o 0/10 pain at rest and 8/10 following session. Pt req'd CGA for all functional mobility including STS using RW; increased unsteadiness noted w/ dynamic balance. Good sitting balance but req'd support from handrail while donning L sock at EOB. She req'd CGA-min A for bed>chair transfer; pt shuffled foot/hopped on LLE using RW. Intact BUE ROM and 4/5 B ; would benefit from continued BUE exs to maximize safety w/ OOB mobility. OT provided education on BUE exs to complete throughout the day; handout provided. Pt will benefit from continued skilled OT services to address current impairments and improve IND and safety with self cares and functional transfers/mobility. Potential barriers for safe discharge: pt is a high fall risk. Current OT d/c recommendation [facility] once medically appropriate.     Start of Session Middle of Session End of 
PROGRESS NOTE      Chief Complaints:  No new complaints   HPI and  Objective:    Pain well-controlled.    No fever or chills.  No nausea no abdominal pain.  Review of Systems:  Rest of review of system reviewed personally and they are negative.    EXAM:  BP (!) 149/74   Pulse 97   Temp 99.7 °F (37.6 °C) (Oral)   Resp 16   Ht 1.524 m (5')   Wt 57.7 kg (127 lb 3.3 oz)   LMP  (LMP Unknown)   SpO2 94%   BMI 24.84 kg/m²     Patient is awake   Head and neck atraumatic, normocephalic.  ENT: No hoarse voice, gaze appropriate.  Cardiac system regular rate rhythm.  Pulmonary no audible wheeze, no cyanosis.  Chest wall excursion normal with respiration cycle  Abdomen is soft not particularly distended.  Neurologically nonfocal.  Hematology system: No bruising noted.  Musculoskeletal system: No joint deformity noted.  Genitourinary system: No hematuria noted.  Skin is warm and moist.  Psychosocial: Cooperative.  Vascular examination as previously noted no changes.    Current Facility-Administered Medications   Medication Dose Route Frequency Provider Last Rate Last Admin    HYDROmorphone (DILAUDID) injection 1.5 mg  1.5 mg IntraVENous Q3H PRN Landon Ardon PA-C   1.5 mg at 04/12/24 0218    oxyCODONE (OXYCONTIN) extended release tablet 30 mg  30 mg Oral 2 times per day Landon Ardon PA-C   30 mg at 04/11/24 2051    glucose chewable tablet 16 g  4 tablet Oral PRN Landon Ardon PA-C        dextrose bolus 10% 125 mL  125 mL IntraVENous PRN Landon Ardon PA-C        Or    dextrose bolus 10% 250 mL  250 mL IntraVENous PRN Landon Ardon PA-C        glucagon injection 1 mg  1 mg SubCUTAneous PRN Landon Ardon PA-C        dextrose 10 % infusion   IntraVENous Continuous PRN Landon Ardon PA-C        insulin lispro (HUMALOG) injection vial 0-8 Units  0-8 Units SubCUTAneous TID WC Landon Ardon PA-C   4 Units at 04/11/24 1156    insulin lispro (HUMALOG) injection vial 0-4 Units  0-4 
PROGRESS NOTE      Chief Complaints:  No new complaints.  HPI and  Objective:    Pain is not well-controlled.  Review of Systems:  Rest of review of system reviewed personally and they are negative.    EXAM:  BP (!) 148/75   Pulse 88   Temp 98.2 °F (36.8 °C) (Oral)   Resp 18   Ht 1.524 m (5')   Wt 55.7 kg (122 lb 12.7 oz)   LMP  (LMP Unknown)   SpO2 95%   BMI 23.98 kg/m²     Patient is awake   Head and neck atraumatic, normocephalic.  ENT: No hoarse voice, gaze appropriate.  Cardiac system regular rate rhythm.  Pulmonary no audible wheeze, no cyanosis.  Chest wall excursion normal with respiration cycle  Abdomen is soft not particularly distended.  Neurologically nonfocal.  Hematology system: No bruising noted.  Musculoskeletal system: No joint deformity noted.  Genitourinary system: No hematuria noted.  Skin is warm and moist.  Psychosocial: Cooperative.  Vascular examination as previously noted no changes.    Current Facility-Administered Medications   Medication Dose Route Frequency Provider Last Rate Last Admin    insulin glargine (LANTUS) injection vial 25 Units  25 Units SubCUTAneous Nightly Yg Strong MD   25 Units at 04/15/24 2006    insulin lispro (HUMALOG) injection vial 5 Units  5 Units SubCUTAneous TID WC Yg Strong MD   5 Units at 04/15/24 1725    sodium chloride flush 0.9 % injection 5-40 mL  5-40 mL IntraVENous 2 times per day Marko Cantu MD   10 mL at 04/15/24 2009    sodium chloride flush 0.9 % injection 5-40 mL  5-40 mL IntraVENous PRN Marko Cantu MD   10 mL at 04/16/24 0430    0.9 % sodium chloride infusion   IntraVENous PRN Marko Cantu MD        ondansetron (ZOFRAN-ODT) disintegrating tablet 4 mg  4 mg Oral Q8H PRN Marko Cantu MD        Or    ondansetron (ZOFRAN) injection 4 mg  4 mg IntraVENous Q6H PRN Marko Cantu MD        hydrALAZINE (APRESOLINE) injection 10 mg  10 mg IntraVENous Q6H PRN Marko Cantu MD   10 mg at 04/15/24 0630    HYDROmorphone 
PROGRESS NOTE      Chief Complaints:  No new complaints.  HPI and  Objective:    Pain management controlled.  Review of Systems:  Rest of review of system reviewed personally and they are negative.    EXAM:  BP (!) 179/90   Pulse 92   Temp 97.9 °F (36.6 °C) (Oral)   Resp 18   Ht 1.524 m (5')   Wt 55.7 kg (122 lb 12.7 oz)   LMP  (LMP Unknown)   SpO2 96%   BMI 23.98 kg/m²     Patient is awake   Head and neck atraumatic, normocephalic.  ENT: No hoarse voice, gaze appropriate.  Cardiac system regular rate rhythm.  Pulmonary no audible wheeze, no cyanosis.  Chest wall excursion normal with respiration cycle  Abdomen is soft not particularly distended.  Neurologically nonfocal.  Hematology system: No bruising noted.  Musculoskeletal system: No joint deformity noted.  Genitourinary system: No hematuria noted.  Skin is warm and moist.  Psychosocial: Cooperative.  Vascular examination as previously noted no changes.    Current Facility-Administered Medications   Medication Dose Route Frequency Provider Last Rate Last Admin    sodium chloride flush 0.9 % injection 5-40 mL  5-40 mL IntraVENous 2 times per day Marko Cantu MD   10 mL at 04/14/24 2133    sodium chloride flush 0.9 % injection 5-40 mL  5-40 mL IntraVENous PRN Marko Cantu MD        0.9 % sodium chloride infusion   IntraVENous PRN Marko Cantu MD        ondansetron (ZOFRAN-ODT) disintegrating tablet 4 mg  4 mg Oral Q8H PRN Marko Cantu MD        Or    ondansetron (ZOFRAN) injection 4 mg  4 mg IntraVENous Q6H PRN Marko Cantu MD        hydrALAZINE (APRESOLINE) injection 10 mg  10 mg IntraVENous Q6H PRN Marko Cantu MD   10 mg at 04/15/24 0630    HYDROmorphone (DILAUDID) injection 1.5 mg  1.5 mg IntraVENous Q3H PRN Landon Ardon PA-C   1.5 mg at 04/15/24 0441    oxyCODONE (OXYCONTIN) extended release tablet 30 mg  30 mg Oral 2 times per day Landon Ardon PA-C   30 mg at 04/14/24 2132    glucose chewable tablet 16 g  4 tablet Oral 
PROGRESS NOTE      Chief Complaints:  No new issues.  HPI and  Objective:    Patient is better controlled.  Patient denies fever chills or chest pain.  Review of Systems:  Rest of review of system reviewed personally and they are negative.    EXAM:  /75   Pulse 89   Temp 98.2 °F (36.8 °C) (Oral)   Resp 18   Ht 1.524 m (5')   Wt 53.7 kg (118 lb 6.2 oz)   LMP  (LMP Unknown)   SpO2 99%   BMI 23.12 kg/m²     Patient is awake   Head and neck atraumatic, normocephalic.  ENT: No hoarse voice, gaze appropriate.  Cardiac system regular rate rhythm.  Pulmonary no audible wheeze, no cyanosis.  Chest wall excursion normal with respiration cycle  Abdomen is soft not particularly distended.  Neurologically nonfocal.  Hematology system: No bruising noted.  Musculoskeletal system: No joint deformity noted.  Genitourinary system: No hematuria noted.  Skin is warm and moist.  Psychosocial: Cooperative.  Vascular examination as previously noted no changes.    Current Facility-Administered Medications   Medication Dose Route Frequency Provider Last Rate Last Admin    oxyCODONE (OXYCONTIN) extended release tablet 30 mg  30 mg Oral 2 times per day Landon Ardon PA-C        glucose chewable tablet 16 g  4 tablet Oral PRN Landon Ardon PA-C        dextrose bolus 10% 125 mL  125 mL IntraVENous PRN Landon Ardon PA-C        Or    dextrose bolus 10% 250 mL  250 mL IntraVENous PRN Landon Ardon PA-C        glucagon injection 1 mg  1 mg SubCUTAneous PRN Landon Ardon PA-C        dextrose 10 % infusion   IntraVENous Continuous PRN Landon Ardon PA-C        insulin lispro (HUMALOG) injection vial 0-8 Units  0-8 Units SubCUTAneous TID WC Landon Ardon PA-C   4 Units at 04/10/24 1627    insulin lispro (HUMALOG) injection vial 0-4 Units  0-4 Units SubCUTAneous Nightly Landon Ardon PA-C        HYDROmorphone (DILAUDID) injection 1.5 mg  1.5 mg IntraVENous Q3H PRN Marko Cantu MD   1.5 
PROGRESS NOTE      Chief Complaints:  Patient examined this morning.  HPI and  Objective:    Pain level appears to be okay.  No fever.  Review of Systems:  Rest of review of system reviewed personally and they are negative.    EXAM:  /87   Pulse 98   Temp 98.1 °F (36.7 °C) (Oral)   Resp 17   Ht 1.524 m (5')   Wt 56.1 kg (123 lb 10.9 oz)   LMP  (LMP Unknown)   SpO2 98%   BMI 24.15 kg/m²     Patient is awake   Head and neck atraumatic, normocephalic.  ENT: No hoarse voice, gaze appropriate.  Cardiac system regular rate rhythm.  Pulmonary no audible wheeze, no cyanosis.  Chest wall excursion normal with respiration cycle  Abdomen is soft not particularly distended.  Neurologically nonfocal.  Hematology system: No bruising noted.  Musculoskeletal system: No joint deformity noted.  Genitourinary system: No hematuria noted.  Skin is warm and moist.  Psychosocial: Cooperative.  Vascular examination as previously noted no changes.    Current Facility-Administered Medications   Medication Dose Route Frequency Provider Last Rate Last Admin    sodium chloride flush 0.9 % injection 5-40 mL  5-40 mL IntraVENous 2 times per day Marko Cantu MD   10 mL at 04/13/24 2140    sodium chloride flush 0.9 % injection 5-40 mL  5-40 mL IntraVENous PRN Marko Cantu MD        0.9 % sodium chloride infusion   IntraVENous PRN Marko Cantu MD        ondansetron (ZOFRAN-ODT) disintegrating tablet 4 mg  4 mg Oral Q8H PRN Marko Cantu MD        Or    ondansetron (ZOFRAN) injection 4 mg  4 mg IntraVENous Q6H PRN Marko Cantu MD        hydrALAZINE (APRESOLINE) injection 10 mg  10 mg IntraVENous Q6H PRN Marko Cantu MD   10 mg at 04/14/24 0016    HYDROmorphone (DILAUDID) injection 1.5 mg  1.5 mg IntraVENous Q3H PRN Landon Ardon PA-C   1.5 mg at 04/14/24 0518    oxyCODONE (OXYCONTIN) extended release tablet 30 mg  30 mg Oral 2 times per day Landon Ardon PA-C   30 mg at 04/13/24 2132    glucose chewable 
PROGRESS NOTE      Chief Complaints:  This is a postprocedure follow-up  HPI and  Objective:    Patient still having significant pain, pain score 10 out of 10.  Patient has been hypertensive.    Review of Systems:  Rest of review of system reviewed personally and they are negative.    EXAM:  BP (!) 184/99   Pulse (!) 109   Temp 97.6 °F (36.4 °C)   Resp 19   Ht 1.524 m (5')   Wt 54.9 kg (121 lb)   LMP  (LMP Unknown)   SpO2 98%   BMI 23.63 kg/m²     Patient is awake   Head and neck atraumatic, normocephalic.  ENT: No hoarse voice, gaze appropriate.  Cardiac system regular rate rhythm.  Pulmonary no audible wheeze, no cyanosis.  Chest wall excursion normal with respiration cycle  Abdomen is soft not particularly distended.  Neurologically nonfocal.  Hematology system: No bruising noted.  Musculoskeletal system: No joint deformity noted.  Genitourinary system: No hematuria noted.  Skin is warm and moist.  Psychosocial: Cooperative.  Vascular examination as previously noted no changes.    Current Facility-Administered Medications   Medication Dose Route Frequency Provider Last Rate Last Admin    0.9 % sodium chloride infusion   IntraVENous PRN Marko Cantu MD        ALTEplase (CATHFLO) 10 mg in sodium chloride 0.9 % 500 mL infusion  1 mg/hr Intra-arTERial Continuous Marko Cantu MD 50 mL/hr at 04/07/24 1238 1 mg/hr at 04/07/24 1238    heparin 25,000 units in dextrose 5% 250 mL (premix) infusion  400 Units/hr Intra-arTERial Continuous Marko Cantu MD 4 mL/hr at 04/07/24 1340 400 Units/hr at 04/07/24 1340    fentaNYL (SUBLIMAZE) 50 MCG/ML injection             metoprolol (LOPRESSOR) injection 5 mg  5 mg IntraVENous Q6H Marko Cantu MD   5 mg at 04/07/24 1653    oxyCODONE (OXYCONTIN) extended release tablet 20 mg  20 mg Oral 2 times per day Marko Cantu MD        fentaNYL (SUBLIMAZE) injection 75 mcg  75 mcg IntraVENous Q1H PRN Marko Cantu MD        clindamycin (CLEOCIN) 600 mg in dextrose 5 % 50 
Patient received from OR, patient awake and alert on arrival to ICU and complains of pain in her right leg. Pulses assessed on arrival and patient does not have a pedal or popliteal pulse on the right foot, Dr Cantu called and made aware says that it was absent prior to procedure.     Leg to be kept warm with beir hugger, alteplase running through venous sheath and would like heparin running though it also. HOB to remain low and strict bedrest     1415 - Dr Cantu called as patient is yelling out in pain, verbal orders given     1645 - Dr Cantu called as patient is stilling yelling out in pain, told to DC dilaudid, start fentanyl 75 mcg q 1 hr, and oxy 20 mg PO BID    1734 - Dr Cantu at bedside, bleeding noted at dressing site, redressed with bulky dressing     1900 - patient yelling out in pain saying \"put me out\", \"I can't do this all night long\", Dr Cantu called and says to call him back in a few hours if condition has not changed.   
Patient to be transferred to ICU room 273, report given to ART Russell in ICU.  
Pt's primary decision maker notified of ICU admission, room number and nursing unit phone number.   
Spiritual Care Assessment/Progress Note  [facility]    Name: Laury Schuster MRN: [id number]    Age: 63 y.o.     Sex: female   Language: English     Date: 4/9/2024            Total Time Calculated: 25 min              Spiritual Assessment begun in [address] ICU  Service Provided For:: Patient  Referral/Consult From:: Rounding  Encounter Overview/Reason : Initial Encounter    Spiritual beliefs:      [] Involved in a elizabeth tradition/spiritual practice:      [] Supported by a elizabeth community:      [] Claims no spiritual orientation:      [x] Seeking spiritual identity: In Shinto          [] Adheres to an individual form of spirituality:      [] Not able to assess:                Identified resources for coping and support system:   Support System: Parent, Family members       [] Prayer                  [] Devotional reading               [] Music                  [] Guided Imagery     [] Pet visits                                        [] Other: (COMMENT)     Specific area/focus of visit   Encounter:    Crisis:    Spiritual/Emotional needs: Type: Spiritual Support  Ritual, Rites and Sacraments:    Grief, Loss, and Adjustments:    Ethics/Mediation:    Behavioral Health:    Palliative Care:    Advance Care Planning:      Plan/Referrals: Other (Comment) ( is available if needed)    Narrative: SA- Regular visit in 2 ICU. The patient was present during the visit. The patient expressed her overwhelming stresses/concerns due to her unfavorable season medically, emotionally, and spiritually.  provided a supportive presence with an active listening. The patient expressed her appreciation for the 's support and visit.  informed the patient of  availability. Please, contact the spiritual health team if needed.    Rev. Niraj Andre Mdiv.  Chaplain Resident  Page a : [phone]           
[facility].   
Visited patient in room #212 in [facility]. Ms. Schuster was present with two family members. Patient shared that she was not feeling well post surgery. She felt miserable and frustrated. She shared that she did not feel like addressing the concerns she had earlier. Provided patient with supportive presence, reflective listening, and words of comfort. Prayed with patient and family by request.     Rev. Sheridan Bro DMin.    can be reached by calling  at [facility] [phone]  
When attempting to assess pedal pulses patient refused stating that it hurts to the touch  
 60 Units/kg IntraVENous PRN Jose Magaña, DO   1,647 Units at 04/06/24 1111    heparin (porcine) injection 1,650 Units  30 Units/kg IntraVENous PRN Jose Magaña DO   1,650 Units at 04/06/24 1708    sodium chloride flush 0.9 % injection 5-40 mL  5-40 mL IntraVENous 2 times per day Marko Cantu MD   10 mL at 04/06/24 2027    sodium chloride flush 0.9 % injection 5-40 mL  5-40 mL IntraVENous PRN Marko Cantu MD        0.9 % sodium chloride infusion   IntraVENous PRN Marko Cantu MD        potassium chloride (KLOR-CON M) extended release tablet 40 mEq  40 mEq Oral PRN Marko Cantu MD        Or    potassium bicarb-citric acid (EFFER-K) effervescent tablet 40 mEq  40 mEq Oral PRN Marko Cantu MD        Or    potassium chloride 10 mEq/100 mL IVPB (Peripheral Line)  10 mEq IntraVENous PRN Marko Cantu MD        magnesium sulfate 2000 mg in 50 mL IVPB premix  2,000 mg IntraVENous PRN Marko Cantu MD        ondansetron (ZOFRAN-ODT) disintegrating tablet 4 mg  4 mg Oral Q8H PRN Marko Cantu MD        Or    ondansetron (ZOFRAN) injection 4 mg  4 mg IntraVENous Q6H PRN Marko Cantu MD        polyethylene glycol (GLYCOLAX) packet 17 g  17 g Oral Daily PRN Marko Cantu MD        acetaminophen (TYLENOL) tablet 650 mg  650 mg Oral Q6H PRN Marko Cantu MD        Or    acetaminophen (TYLENOL) suppository 650 mg  650 mg Rectal Q6H PRN Marko Cantu MD        acetaminophen (TYLENOL) tablet 325 mg  325 mg Oral Q4H PRN Marko Cantu MD        albuterol sulfate HFA (PROVENTIL;VENTOLIN;PROAIR) 108 (90 Base) MCG/ACT inhaler 2 puff  2 puff Inhalation Q4H PRN Marko Cantu MD   2 puff at 04/04/24 1949    atorvastatin (LIPITOR) tablet 80 mg  80 mg Oral Daily Marko Cantu MD   80 mg at 04/06/24 1118    budesonide-formoterol (SYMBICORT) 80-4.5 MCG/ACT inhaler 2 puff  2 puff Inhalation BID RT Pepe, Marko Leyva MD   2 puff at 04/06/24 9879    [Held by provider] insulin glargine (LANTUS) injection vial 
40 mEq Oral PRN Marko Cantu MD        Or    potassium chloride 10 mEq/100 mL IVPB (Peripheral Line)  10 mEq IntraVENous PRN Marko Cantu MD        magnesium sulfate 2000 mg in 50 mL IVPB premix  2,000 mg IntraVENous PRN Marko Cantu MD        ondansetron (ZOFRAN-ODT) disintegrating tablet 4 mg  4 mg Oral Q8H PRN Marko Cantu MD        Or    ondansetron (ZOFRAN) injection 4 mg  4 mg IntraVENous Q6H PRN Marko Cantu MD        polyethylene glycol (GLYCOLAX) packet 17 g  17 g Oral Daily PRN Marko Cantu MD        acetaminophen (TYLENOL) tablet 650 mg  650 mg Oral Q6H PRN Marko Cantu MD        Or    acetaminophen (TYLENOL) suppository 650 mg  650 mg Rectal Q6H PRN Marko Cantu MD        0.9 % sodium chloride infusion   IntraVENous Continuous Marko Cantu  mL/hr at 04/05/24 0630 New Bag at 04/05/24 0630    HYDROmorphone HCl PF (DILAUDID) injection 1 mg  1 mg IntraVENous Q4H PRN Marko Cantu MD   1 mg at 04/05/24 0224    acetaminophen (TYLENOL) tablet 325 mg  325 mg Oral Q4H PRN Marko Cantu MD        albuterol sulfate HFA (PROVENTIL;VENTOLIN;PROAIR) 108 (90 Base) MCG/ACT inhaler 2 puff  2 puff Inhalation Q4H PRN Marko Cantu MD   2 puff at 04/04/24 1949    atorvastatin (LIPITOR) tablet 80 mg  80 mg Oral Daily Marko Cantu MD        budesonide-formoterol (SYMBICORT) 80-4.5 MCG/ACT inhaler 2 puff  2 puff Inhalation BID RT Marko Cantu MD   2 puff at 04/04/24 1951    insulin glargine (LANTUS) injection vial 20 Units  20 Units SubCUTAneous Nightly aMrko Cantu MD   20 Units at 04/04/24 2227    lisinopril (PRINIVIL;ZESTRIL) tablet 20 mg  20 mg Oral Daily Marko Cantu MD        pantoprazole (PROTONIX) tablet 40 mg  40 mg Oral Daily Marko Cantu MD   40 mg at 04/04/24 1843    insulin lispro (HUMALOG) injection vial 0-4 Units  0-4 Units SubCUTAneous TID  Marko Cantu MD   1 Units at 04/04/24 1843    insulin lispro (HUMALOG) injection vial 0-4 Units  0-4 Units 
Hypoglycemic adverse drug reaction  [] Other -   [] Change in code status:    [] Decision to escalate care:    [] Major surgery/procedure with associated risk factors:     ----------------------------------------------------------------------  C. Data (any 2)  [] Discussed current management and discharge planning options with Case Management.  [] Discussed management of the case with:    [] Telemetry personally reviewed and interpreted as documented above    [] Imaging personally reviewed and interpreted, includes:     [] Data Review (any 3)  [] All available Consultant notes from yesterday/today were reviewed  [] All current labs were reviewed and interpreted for clinical significance   [] Appropriate follow-up labs were ordered  [] Collateral history obtained from:           Code Status: Full code  DVT Prophylaxis: :Heparin  GI Prophylaxis: Protonix     Subjective:     Chief Complaint / Reason for Physician Visit  S/p bypass surgery for right lower extremity pain.  Discussed with RN events overnight.       Objective:     VITALS:   Last 24hrs VS reviewed since prior progress note. Most recent are:  Patient Vitals for the past 24 hrs:   BP Temp Temp src Pulse Resp SpO2 Weight   04/10/24 0902 -- -- -- 82 18 98 % --   04/10/24 0740 (!) 114/50 98.6 °F (37 °C) Oral 80 18 98 % --   04/10/24 0637 -- -- -- -- 18 -- --   04/10/24 0607 -- -- -- -- 18 -- --   04/10/24 0502 -- -- -- -- 18 -- --   04/10/24 0432 -- -- -- -- 18 -- --   04/10/24 0335 126/80 98.4 °F (36.9 °C) Oral 88 22 98 % --   04/10/24 0300 133/89 98.6 °F (37 °C) Oral 89 18 100 % 53.7 kg (118 lb 6.2 oz)   04/10/24 0204 -- -- -- -- 18 -- --   04/10/24 0134 -- -- -- -- 18 -- --   04/10/24 0052 -- -- -- -- 18 -- --   04/10/24 0022 139/74 98.8 °F (37.1 °C) Oral 88 18 100 % --   04/09/24 2300 139/87 98.3 °F (36.8 °C) Oral 79 13 99 % --   04/09/24 2230 -- -- -- 80 13 99 % --   04/09/24 2200 -- -- -- 77 14 99 % --   04/09/24 2130 -- -- -- 77 23 99 % --   04/09/24 2100 
-- -- -- 77 23 99 % --   04/09/24 2100 123/73 -- -- 88 23 98 % --   04/09/24 2030 -- -- -- 86 19 97 % --   04/09/24 2026 -- -- -- -- -- 98 % --   04/09/24 2000 106/68 99 °F (37.2 °C) Oral 83 14 98 % --   04/09/24 1930 -- -- -- 91 20 98 % --   04/09/24 1900 -- -- -- 83 13 99 % --   04/09/24 1857 118/76 -- -- 90 22 96 % --   04/09/24 1843 -- -- -- -- 12 -- --   04/09/24 1837 -- -- -- 83 16 98 % --   04/09/24 1757 117/87 -- -- 89 20 98 % --   04/09/24 1737 -- -- -- -- 22 -- --   04/09/24 1707 -- -- -- -- 16 -- --   04/09/24 1700 (!) 152/86 -- -- 85 15 98 % --   04/09/24 1600 (!) 141/87 -- -- 82 19 99 % --   04/09/24 1500 136/72 98.2 °F (36.8 °C) Oral 92 16 97 % --         Intake/Output Summary (Last 24 hours) at 4/10/2024 1459  Last data filed at 4/10/2024 0514  Gross per 24 hour   Intake 310.41 ml   Output 300 ml   Net 10.41 ml        I had a face to face encounter and independently examined this patient on 4/10/2024, as outlined below:    Review of Systems   Constitutional:  Positive for fatigue.   Respiratory: Negative.     Cardiovascular: Negative.    Gastrointestinal: Negative.    Genitourinary: Negative.    Musculoskeletal:  Positive for arthralgias and myalgias.   Neurological: Negative.    Psychiatric/Behavioral: Negative.          PHYSICAL EXAM:  Physical Exam  Vitals reviewed.   HENT:      Head: Normocephalic and atraumatic.   Cardiovascular:      Rate and Rhythm: Normal rate and regular rhythm.      Heart sounds: Normal heart sounds.   Pulmonary:      Effort: Pulmonary effort is normal.      Breath sounds: Normal breath sounds.   Abdominal:      General: Abdomen is flat. Bowel sounds are normal.      Palpations: Abdomen is soft.   Musculoskeletal:         General: Normal range of motion.      Cervical back: Normal range of motion and neck supple.   Skin:     General: Skin is warm and dry.      Comments: Right foot with gangrenous changes of the phalanges with coolness extending to the proximal ankle there is 
98.2 °F (36.8 °C) Oral (!) 106 19 96 % --   04/13/24 1344 -- -- -- -- 17 -- --   04/13/24 1301 116/61 -- -- (!) 101 18 98 % --           Intake/Output Summary (Last 24 hours) at 4/14/2024 1209  Last data filed at 4/14/2024 0615  Gross per 24 hour   Intake 1600 ml   Output 3300 ml   Net -1700 ml          I had a face to face encounter and independently examined this patient on 4/14/2024, as outlined below:    Review of Systems   Constitutional:  Negative for fatigue.   Respiratory: Negative.     Cardiovascular: Negative.    Gastrointestinal: Negative.    Genitourinary: Negative.    Musculoskeletal:  Positive for myalgias. Negative for arthralgias.   Neurological: Negative.    Psychiatric/Behavioral: Negative.          PHYSICAL EXAM:  Physical Exam  Vitals reviewed.   HENT:      Head: Normocephalic and atraumatic.   Cardiovascular:      Rate and Rhythm: Normal rate and regular rhythm.      Heart sounds: Normal heart sounds.   Pulmonary:      Effort: Pulmonary effort is normal.      Breath sounds: Normal breath sounds.   Abdominal:      General: Abdomen is flat. Bowel sounds are normal.      Palpations: Abdomen is soft.   Musculoskeletal:         General: Normal range of motion.      Cervical back: Normal range of motion and neck supple.   Skin:     General: Skin is warm and dry.      Comments: Right BKA   Neurological:      General: No focal deficit present.      Mental Status: She is alert and oriented to person, place, and time.   Psychiatric:         Mood and Affect: Mood normal.          Reviewed most current lab test results and cultures  YES  Reviewed most current radiology test results   YES  Review and summation of old records today    NO  Reviewed patient's current orders and MAR    YES  PMH/SH reviewed - no change compared to H&P  ________________________________________________________________________  Care Plan discussed with:    Comments   Patient x    Family      RN x    Care Manager     Consultant       
MD        acetaminophen (TYLENOL) tablet 650 mg  650 mg Oral Q6H PRN Marko Cantu MD        Or    acetaminophen (TYLENOL) suppository 650 mg  650 mg Rectal Q6H PRN Marko Cantu MD        acetaminophen (TYLENOL) tablet 325 mg  325 mg Oral Q4H PRN Marko Cantu MD        albuterol sulfate HFA (PROVENTIL;VENTOLIN;PROAIR) 108 (90 Base) MCG/ACT inhaler 2 puff  2 puff Inhalation Q4H PRN Marko Cantu MD   2 puff at 04/04/24 1949    atorvastatin (LIPITOR) tablet 80 mg  80 mg Oral Daily Marko Cantu MD   80 mg at 04/07/24 0813    budesonide-formoterol (SYMBICORT) 80-4.5 MCG/ACT inhaler 2 puff  2 puff Inhalation BID RT Marko Cantu MD   2 puff at 04/08/24 0646    [Held by provider] insulin glargine (LANTUS) injection vial 20 Units  20 Units SubCUTAneous Nightly Marko Cantu MD   20 Units at 04/06/24 0032    lisinopril (PRINIVIL;ZESTRIL) tablet 20 mg  20 mg Oral Daily Marko Cantu MD   20 mg at 04/07/24 0813    pantoprazole (PROTONIX) tablet 40 mg  40 mg Oral Daily aMrko Cantu MD   40 mg at 04/07/24 0813    insulin lispro (HUMALOG) injection vial 0-4 Units  0-4 Units SubCUTAneous TID  Linda Garcia PA-C   2 Units at 04/06/24 1214    insulin lispro (HUMALOG) injection vial 0-4 Units  0-4 Units SubCUTAneous Nightly Marko Cantu MD               Recent Results (from the past 24 hour(s))   POCT Glucose    Collection Time: 04/07/24  8:25 AM   Result Value Ref Range    POC Glucose 152 (H) 65 - 100 mg/dL    Performed by: Elder Wagner    APTT    Collection Time: 04/07/24  8:32 AM   Result Value Ref Range    APTT 31.3 21.2 - 34.1 sec    Therapeutic Range   82 - 109 sec   Urinalysis with Reflex to Culture    Collection Time: 04/07/24  1:29 PM    Specimen: Urine   Result Value Ref Range    Color, UA Straw      Appearance Clear Clear      Specific Gravity, UA 1.015 1.003 - 1.030      pH, Urine 7.0 5.0 - 8.0      Protein, UA Negative Negative mg/dL    Glucose, UA Negative Negative mg/dL    Ketones, Urine 
glycol (GLYCOLAX) packet 17 g  17 g Oral Daily PRN Marko Cantu MD        acetaminophen (TYLENOL) suppository 650 mg  650 mg Rectal Q6H PRN Marko Cantu MD        acetaminophen (TYLENOL) tablet 325 mg  325 mg Oral Q4H PRN Marko aCntu MD        albuterol sulfate HFA (PROVENTIL;VENTOLIN;PROAIR) 108 (90 Base) MCG/ACT inhaler 2 puff  2 puff Inhalation Q4H PRN Marko Cantu MD   2 puff at 04/04/24 1949    atorvastatin (LIPITOR) tablet 80 mg  80 mg Oral Daily Marko Cantu MD   80 mg at 04/10/24 0816    budesonide-formoterol (SYMBICORT) 80-4.5 MCG/ACT inhaler 2 puff  2 puff Inhalation BID RT Marko Cantu MD   2 puff at 04/10/24 2031    [Held by provider] insulin glargine (LANTUS) injection vial 20 Units  20 Units SubCUTAneous Nightly Marko Cantu MD   20 Units at 04/06/24 0032    lisinopril (PRINIVIL;ZESTRIL) tablet 20 mg  20 mg Oral Daily Marko Cantu MD   20 mg at 04/10/24 0816    pantoprazole (PROTONIX) tablet 40 mg  40 mg Oral Daily Marko Cantu MD   40 mg at 04/10/24 0816           Recent Results (from the past 24 hour(s))   CK    Collection Time: 04/10/24  7:38 AM   Result Value Ref Range    Total CK 1,221 (H) 26 - 192 U/L   CBC with Auto Differential    Collection Time: 04/10/24  7:38 AM   Result Value Ref Range    WBC 12.8 (H) 3.6 - 11.0 K/uL    RBC 4.06 3.80 - 5.20 M/uL    Hemoglobin 9.7 (L) 11.5 - 16.0 g/dL    Hematocrit 31.3 (L) 35.0 - 47.0 %    MCV 77.1 (L) 80.0 - 99.0 FL    MCH 23.9 (L) 26.0 - 34.0 PG    MCHC 31.0 30.0 - 36.5 g/dL    RDW 18.2 (H) 11.5 - 14.5 %    Platelets 474 (H) 150 - 400 K/uL    MPV 11.4 8.9 - 12.9 FL    Nucleated RBCs 0.0 0.0  WBC    nRBC 0.00 0.00 - 0.01 K/uL    Neutrophils % 68 32 - 75 %    Lymphocytes % 15 12 - 49 %    Monocytes % 14 (H) 5 - 13 %    Eosinophils % 2 0 - 7 %    Basophils % 0 0 - 1 %    Immature Granulocytes % 1 (H) 0 - 0.5 %    Neutrophils Absolute 8.7 (H) 1.8 - 8.0 K/UL    Lymphocytes Absolute 1.9 0.8 - 3.5 K/UL    Monocytes Absolute 1.8 
APTT 32.6 21.2 - 34.1 sec    Therapeutic Range   82 - 109 sec   POCT Glucose    Collection Time: 04/08/24  4:02 PM   Result Value Ref Range    POC Glucose 219 (H) 65 - 100 mg/dL    Performed by: Keny Cespedes    Invasive vascular procedure    Collection Time: 04/08/24  5:28 PM   Result Value Ref Range    Body Surface Area 1.52 m2   APTT    Collection Time: 04/08/24  6:10 PM   Result Value Ref Range    APTT 33.7 21.2 - 34.1 sec    Therapeutic Range   82 - 109 sec   Fibrinogen    Collection Time: 04/08/24  6:10 PM   Result Value Ref Range    Fibrinogen 522 220 - 535 mg/dL   CK    Collection Time: 04/08/24  6:10 PM   Result Value Ref Range    Total CK 2,394 (H) 26 - 192 U/L   POCT Glucose    Collection Time: 04/08/24  8:45 PM   Result Value Ref Range    POC Glucose 202 (H) 65 - 100 mg/dL    Performed by: Leland Barahona    Fibrinogen    Collection Time: 04/08/24  9:30 PM   Result Value Ref Range    Fibrinogen 578 (H) 220 - 535 mg/dL   CK    Collection Time: 04/08/24  9:30 PM   Result Value Ref Range    Total CK 2,413 (H) 26 - 192 U/L   CK    Collection Time: 04/09/24  3:36 AM   Result Value Ref Range    Total CK 2,114 (H) 26 - 192 U/L   CBC with Auto Differential    Collection Time: 04/09/24  3:36 AM   Result Value Ref Range    WBC 17.3 (H) 3.6 - 11.0 K/uL    RBC 4.38 3.80 - 5.20 M/uL    Hemoglobin 10.4 (L) 11.5 - 16.0 g/dL    Hematocrit 33.3 (L) 35.0 - 47.0 %    MCV 76.0 (L) 80.0 - 99.0 FL    MCH 23.7 (L) 26.0 - 34.0 PG    MCHC 31.2 30.0 - 36.5 g/dL    RDW 18.5 (H) 11.5 - 14.5 %    Platelets 554 (H) 150 - 400 K/uL    MPV 11.0 8.9 - 12.9 FL    Nucleated RBCs 0.1 (H) 0.0  WBC    nRBC 0.02 (H) 0.00 - 0.01 K/uL    Neutrophils % PENDING %    Lymphocytes % PENDING %    Monocytes % PENDING %    Eosinophils % PENDING %    Basophils % PENDING %    Immature Granulocytes % PENDING %    Neutrophils Absolute PENDING K/UL    Lymphocytes Absolute PENDING K/UL    Monocytes Absolute PENDING K/UL    Eosinophils Absolute PENDING 
Neutrophils % 76 (H) 32 - 75 %    Lymphocytes % 10 (L) 12 - 49 %    Monocytes % 14 (H) 5 - 13 %    Eosinophils % 0 0 - 7 %    Basophils % 0 0 - 1 %    Immature Granulocytes % 0 %    Neutrophils Absolute 10.1 (H) 1.8 - 8.0 K/UL    Lymphocytes Absolute 1.3 0.8 - 3.5 K/UL    Monocytes Absolute 1.9 (H) 0.0 - 1.0 K/UL    Eosinophils Absolute 0.0 0.0 - 0.4 K/UL    Basophils Absolute 0.0 0.0 - 0.1 K/UL    Immature Granulocytes Absolute 0.0 K/UL    Differential Type Manual      RBC Comment Anisocytosis  1+       Comprehensive Metabolic Panel    Collection Time: 04/13/24  3:05 AM   Result Value Ref Range    Sodium 132 (L) 136 - 145 mmol/L    Potassium 3.8 3.5 - 5.1 mmol/L    Chloride 100 97 - 108 mmol/L    CO2 26 21 - 32 mmol/L    Anion Gap 6 5 - 15 mmol/L    Glucose 189 (H) 65 - 100 mg/dL    BUN 4 (L) 6 - 20 mg/dL    Creatinine 0.43 (L) 0.55 - 1.02 mg/dL    Bun/Cre Ratio 9 (L) 12 - 20      Est, Glom Filt Rate >90 >60 ml/min/1.73m2    Calcium 8.8 8.5 - 10.1 mg/dL    Total Bilirubin 0.6 0.2 - 1.0 mg/dL    AST 36 15 - 37 U/L    ALT 29 12 - 78 U/L    Alk Phosphatase 80 45 - 117 U/L    Total Protein 6.3 (L) 6.4 - 8.2 g/dL    Albumin 2.4 (L) 3.5 - 5.0 g/dL    Globulin 3.9 2.0 - 4.0 g/dL    Albumin/Globulin Ratio 0.6 (L) 1.1 - 2.2     CK    Collection Time: 04/13/24  3:05 AM   Result Value Ref Range    Total  (H) 26 - 192 U/L       ASSESSMENT:   Patient is 63 y.o. with diagnosis of : Principal Problem:    Peripheral arterial disease (HCC)  Active Problems:    Ischemic leg    Ischemic foot ulcer due to atherosclerosis of native artery of limb (HCC)  Resolved Problems:    * No resolved hospital problems. *      PLAN:                 Right foot examination shows dusky and discolored multiple toes right foot is cold to touch at the ankle level.    Patient scheduled for right BKA today.  Patient was discussed rational for the procedure, level of amputations, surgical risk benefits and postop wound cares in details.  Will

## 2024-04-22 ENCOUNTER — OFFICE VISIT (OUTPATIENT)
Age: 64
End: 2024-04-22

## 2024-04-22 VITALS
HEART RATE: 88 BPM | HEIGHT: 60 IN | TEMPERATURE: 97.8 F | BODY MASS INDEX: 23.68 KG/M2 | DIASTOLIC BLOOD PRESSURE: 70 MMHG | OXYGEN SATURATION: 94 % | SYSTOLIC BLOOD PRESSURE: 108 MMHG

## 2024-04-22 DIAGNOSIS — I99.8 ISCHEMIA OF RIGHT LOWER EXTREMITY: Primary | ICD-10-CM

## 2024-04-22 PROCEDURE — 99024 POSTOP FOLLOW-UP VISIT: CPT | Performed by: SURGERY

## 2024-04-22 RX ORDER — INSULIN LISPRO 100 [IU]/ML
INJECTION, SOLUTION INTRAVENOUS; SUBCUTANEOUS
COMMUNITY
Start: 2024-03-21

## 2024-04-22 RX ORDER — BLOOD SUGAR DIAGNOSTIC
STRIP MISCELLANEOUS
COMMUNITY
Start: 2024-03-22

## 2024-04-22 RX ORDER — MULTIVITAMIN
1 TABLET ORAL DAILY
COMMUNITY
Start: 2024-03-21

## 2024-04-22 RX ORDER — LANCETS 33 GAUGE
EACH MISCELLANEOUS
COMMUNITY
Start: 2024-03-21

## 2024-04-22 RX ORDER — GLUCOSAMINE HCL/CHONDROITIN SU 500-400 MG
CAPSULE ORAL
COMMUNITY
Start: 2024-03-21

## 2024-04-22 RX ORDER — CHOLECALCIFEROL (VITAMIN D3) 125 MCG
1000 CAPSULE ORAL DAILY
COMMUNITY
Start: 2024-03-22

## 2024-04-22 RX ORDER — PEN NEEDLE, DIABETIC 32GX 5/32"
NEEDLE, DISPOSABLE MISCELLANEOUS
COMMUNITY
Start: 2024-03-22

## 2024-04-22 RX ORDER — BLOOD-GLUCOSE METER
EACH MISCELLANEOUS
COMMUNITY
Start: 2024-03-21

## 2024-04-22 NOTE — PROGRESS NOTES
Identified pt with two pt identifiers(name and ). Reviewed record in preparation for visit and have obtained necessary documentation. All patient medications has been reviewed.  Chief Complaint   Patient presents with    Post-Op Check     R leg       Vitals:    24 1350   BP: 108/70   Pulse: 88   Temp: 97.8 °F (36.6 °C)   SpO2: 94%                   Coordination of Care Questionnaire:   1) Have you been to an emergency room, urgent care, or hospitalized since your last visit?   no    2. Have seen or consulted any other health care provider since your last visit? no

## 2024-04-29 ENCOUNTER — OFFICE VISIT (OUTPATIENT)
Age: 64
End: 2024-04-29

## 2024-04-29 VITALS
TEMPERATURE: 97.1 F | OXYGEN SATURATION: 97 % | RESPIRATION RATE: 20 BRPM | HEIGHT: 60 IN | BODY MASS INDEX: 22.58 KG/M2 | DIASTOLIC BLOOD PRESSURE: 65 MMHG | SYSTOLIC BLOOD PRESSURE: 104 MMHG | HEART RATE: 82 BPM | WEIGHT: 115 LBS

## 2024-04-29 DIAGNOSIS — L97.509 ISCHEMIC FOOT ULCER DUE TO ATHEROSCLEROSIS OF NATIVE ARTERY OF LIMB (HCC): Primary | ICD-10-CM

## 2024-04-29 DIAGNOSIS — I70.25 ISCHEMIC FOOT ULCER DUE TO ATHEROSCLEROSIS OF NATIVE ARTERY OF LIMB (HCC): Primary | ICD-10-CM

## 2024-04-29 PROCEDURE — 99024 POSTOP FOLLOW-UP VISIT: CPT | Performed by: SURGERY

## 2024-04-29 ASSESSMENT — PATIENT HEALTH QUESTIONNAIRE - PHQ9
1. LITTLE INTEREST OR PLEASURE IN DOING THINGS: NOT AT ALL
SUM OF ALL RESPONSES TO PHQ QUESTIONS 1-9: 0
SUM OF ALL RESPONSES TO PHQ9 QUESTIONS 1 & 2: 0
SUM OF ALL RESPONSES TO PHQ QUESTIONS 1-9: 0
SUM OF ALL RESPONSES TO PHQ QUESTIONS 1-9: 0
2. FEELING DOWN, DEPRESSED OR HOPELESS: NOT AT ALL
SUM OF ALL RESPONSES TO PHQ QUESTIONS 1-9: 0

## 2024-04-29 NOTE — PROGRESS NOTES
Identified pt with two pt identifiers (name and ). Reviewed chart in preparation for visit and have obtained necessary documentation.    Laury Schuster is a 63 y.o. female  No chief complaint on file.    /65 (Site: Left Upper Arm, Position: Sitting, Cuff Size: Small Adult)   Pulse 82   Temp 97.1 °F (36.2 °C) (Oral)   Resp 20   Ht 1.524 m (5')   Wt 52.2 kg (115 lb)   LMP  (LMP Unknown)   SpO2 97%   BMI 22.46 kg/m²     1. Have you been to the ER, urgent care clinic since your last visit?  Hospitalized since your last visit?no    2. Have you seen or consulted any other health care providers outside of the [facility] since your last visit?  Include any pap smears or colon screening. no

## 2024-05-10 ENCOUNTER — TELEPHONE (OUTPATIENT)
Age: 64
End: 2024-05-10

## 2024-05-10 NOTE — TELEPHONE ENCOUNTER
Yeny called in asking to speak with a nurse about the surgical site of patient. Please return call. [phone] Thanks DCR

## 2024-05-10 NOTE — TELEPHONE ENCOUNTER
Returned call to Quynh regarding patient. Pt is 3 weeks s/p Right BKA. Per Quynh the patient's suture line is dehiscing.  She wanted to know if Dr. Cantu wanted them to continue the tx plan or if he wanted to see the patient.  Explained I will speak with Dr. Cantu and return the call.

## 2024-05-10 NOTE — TELEPHONE ENCOUNTER
Left message for Quynh to return call.      Per Dr. Cantu he wants patient on complete bed rest.  No PT. Pt should elevate leg and tx to wound should be continued w/iodine pack.  Dr. Cantu wants to see patient on Monday for wound check

## 2024-05-13 ENCOUNTER — OFFICE VISIT (OUTPATIENT)
Age: 64
End: 2024-05-13

## 2024-05-13 VITALS
DIASTOLIC BLOOD PRESSURE: 82 MMHG | BODY MASS INDEX: 22.46 KG/M2 | RESPIRATION RATE: 18 BRPM | TEMPERATURE: 97.6 F | HEART RATE: 86 BPM | HEIGHT: 60 IN | SYSTOLIC BLOOD PRESSURE: 154 MMHG | OXYGEN SATURATION: 96 %

## 2024-05-13 DIAGNOSIS — T87.9 BKA STUMP COMPLICATION (HCC): Primary | ICD-10-CM

## 2024-05-13 PROCEDURE — 99024 POSTOP FOLLOW-UP VISIT: CPT | Performed by: SURGERY

## 2024-05-13 ASSESSMENT — PATIENT HEALTH QUESTIONNAIRE - PHQ9
SUM OF ALL RESPONSES TO PHQ QUESTIONS 1-9: 0
1. LITTLE INTEREST OR PLEASURE IN DOING THINGS: NOT AT ALL
SUM OF ALL RESPONSES TO PHQ QUESTIONS 1-9: 0
2. FEELING DOWN, DEPRESSED OR HOPELESS: NOT AT ALL
SUM OF ALL RESPONSES TO PHQ9 QUESTIONS 1 & 2: 0

## 2024-05-13 NOTE — PROGRESS NOTES
Identified pt with two pt identifiers (name and ). Reviewed chart in preparation for visit and have obtained necessary documentation.    Laury Schuster is a 63 y.o. female  Chief Complaint   Patient presents with    Post-Op Check     Amputation incision check      BP (!) 154/82 (Site: Right Upper Arm)   Pulse 86   Temp 97.6 °F (36.4 °C) (Oral)   Resp 18   Ht 1.524 m (5')   LMP  (LMP Unknown)   SpO2 96%   BMI 22.46 kg/m²     1. Have you been to the ER, urgent care clinic since your last visit?  Hospitalized since your last visit?no    2. Have you seen or consulted any other health care providers outside of the [facility] since your last visit?  Include any pap smears or colon screening. No    Patient and provider made aware of elevated BP x2. Patient asymptomatic. Patient reminded to monitor BP, continue to take BP medications if prescribed, and follow up with PCP/Cardiologist.  Patient expressed understanding and agreement.

## 2024-05-19 PROBLEM — T87.9 BKA STUMP COMPLICATION (HCC): Status: ACTIVE | Noted: 2024-05-19

## 2024-06-03 ENCOUNTER — OFFICE VISIT (OUTPATIENT)
Age: 64
End: 2024-06-03

## 2024-06-03 VITALS
DIASTOLIC BLOOD PRESSURE: 81 MMHG | BODY MASS INDEX: 22.46 KG/M2 | HEIGHT: 60 IN | RESPIRATION RATE: 16 BRPM | SYSTOLIC BLOOD PRESSURE: 138 MMHG | OXYGEN SATURATION: 96 % | HEART RATE: 72 BPM | TEMPERATURE: 98 F

## 2024-06-03 DIAGNOSIS — T87.9 BKA STUMP COMPLICATION (HCC): Primary | ICD-10-CM

## 2024-06-03 PROCEDURE — 99024 POSTOP FOLLOW-UP VISIT: CPT | Performed by: SURGERY

## 2024-06-03 RX ORDER — FUROSEMIDE 20 MG/1
20 TABLET ORAL DAILY
COMMUNITY

## 2024-06-03 ASSESSMENT — PATIENT HEALTH QUESTIONNAIRE - PHQ9
SUM OF ALL RESPONSES TO PHQ QUESTIONS 1-9: 0
SUM OF ALL RESPONSES TO PHQ QUESTIONS 1-9: 0
SUM OF ALL RESPONSES TO PHQ9 QUESTIONS 1 & 2: 0
SUM OF ALL RESPONSES TO PHQ QUESTIONS 1-9: 0
1. LITTLE INTEREST OR PLEASURE IN DOING THINGS: NOT AT ALL
2. FEELING DOWN, DEPRESSED OR HOPELESS: NOT AT ALL
SUM OF ALL RESPONSES TO PHQ QUESTIONS 1-9: 0

## 2024-06-03 NOTE — PROGRESS NOTES
Identified pt with two pt identifiers (name and ). Reviewed chart in preparation for visit and have obtained necessary documentation.    Laury Schuster is a 63 y.o. female  Chief Complaint   Patient presents with    Follow-up     RIGHT BKA 7/10 pain scale      /81 (Site: Right Upper Arm, Position: Sitting, Cuff Size: Small Adult)   Pulse 72   Temp 98 °F (36.7 °C) (Oral)   Resp 16   Ht 1.524 m (5')   LMP  (LMP Unknown)   SpO2 96%   BMI 22.46 kg/m²     1. Have you been to the ER, urgent care clinic since your last visit?  Hospitalized since your last visit?NO    2. Have you seen or consulted any other health care providers outside of the [facility] since your last visit?  Include any pap smears or colon screening. NO

## 2024-06-20 ENCOUNTER — OFFICE VISIT (OUTPATIENT)
Age: 64
End: 2024-06-20

## 2024-06-20 VITALS
SYSTOLIC BLOOD PRESSURE: 127 MMHG | TEMPERATURE: 98.2 F | RESPIRATION RATE: 16 BRPM | HEIGHT: 60 IN | DIASTOLIC BLOOD PRESSURE: 75 MMHG | BODY MASS INDEX: 22.46 KG/M2 | OXYGEN SATURATION: 97 % | HEART RATE: 88 BPM

## 2024-06-20 DIAGNOSIS — I99.8 ISCHEMIC LEG: Primary | ICD-10-CM

## 2024-06-20 PROCEDURE — 99024 POSTOP FOLLOW-UP VISIT: CPT | Performed by: SURGERY

## 2024-06-20 ASSESSMENT — PATIENT HEALTH QUESTIONNAIRE - PHQ9
SUM OF ALL RESPONSES TO PHQ QUESTIONS 1-9: 0
SUM OF ALL RESPONSES TO PHQ QUESTIONS 1-9: 0
2. FEELING DOWN, DEPRESSED OR HOPELESS: NOT AT ALL
SUM OF ALL RESPONSES TO PHQ QUESTIONS 1-9: 0
SUM OF ALL RESPONSES TO PHQ9 QUESTIONS 1 & 2: 0
SUM OF ALL RESPONSES TO PHQ QUESTIONS 1-9: 0
1. LITTLE INTEREST OR PLEASURE IN DOING THINGS: NOT AT ALL

## 2024-07-01 ENCOUNTER — TELEPHONE (OUTPATIENT)
Age: 64
End: 2024-07-01

## 2024-07-01 NOTE — TELEPHONE ENCOUNTER
I called [facility] she states that Maddie has already left for the day. I called and spoke with Laury Schuster 2 patient identifiers used. She states that she needs a sooner appointment I rescheduled her and she states that she will speak with Maddie tomorrow and make her aware.

## 2024-07-01 NOTE — TELEPHONE ENCOUNTER
Alexsandra from [facility] is calling with many questions about pt. Please call [phone] to assist. Thanks

## 2024-07-12 ENCOUNTER — TELEPHONE (OUTPATIENT)
Age: 64
End: 2024-07-12

## 2024-07-12 ENCOUNTER — OFFICE VISIT (OUTPATIENT)
Age: 64
End: 2024-07-12
Payer: MEDICARE

## 2024-07-12 VITALS
RESPIRATION RATE: 18 BRPM | HEART RATE: 73 BPM | TEMPERATURE: 98 F | SYSTOLIC BLOOD PRESSURE: 128 MMHG | OXYGEN SATURATION: 97 % | BODY MASS INDEX: 22.46 KG/M2 | DIASTOLIC BLOOD PRESSURE: 78 MMHG | HEIGHT: 60 IN

## 2024-07-12 DIAGNOSIS — M79.604 PAIN OF RIGHT LOWER EXTREMITY: ICD-10-CM

## 2024-07-12 DIAGNOSIS — T87.9 BKA STUMP COMPLICATION (HCC): Primary | ICD-10-CM

## 2024-07-12 PROCEDURE — 11043 DBRDMT MUSC&/FSCA 1ST 20/<: CPT | Performed by: SURGERY

## 2024-07-12 PROCEDURE — 99024 POSTOP FOLLOW-UP VISIT: CPT | Performed by: SURGERY

## 2024-07-12 RX ORDER — CIPROFLOXACIN 500 MG/1
500 TABLET, FILM COATED ORAL 2 TIMES DAILY
Qty: 20 TABLET | Refills: 0 | Status: SHIPPED | OUTPATIENT
Start: 2024-07-12 | End: 2024-07-22

## 2024-07-12 RX ORDER — CLINDAMYCIN HYDROCHLORIDE 300 MG/1
300 CAPSULE ORAL 4 TIMES DAILY
Qty: 40 CAPSULE | Refills: 0 | Status: SHIPPED | OUTPATIENT
Start: 2024-07-12 | End: 2024-07-22

## 2024-07-12 RX ORDER — GABAPENTIN 100 MG/1
100 CAPSULE ORAL 3 TIMES DAILY
COMMUNITY

## 2024-07-12 RX ORDER — OXYCODONE HYDROCHLORIDE 30 MG/1
30 TABLET ORAL
COMMUNITY

## 2024-07-12 NOTE — TELEPHONE ENCOUNTER
Nurse Pao Prescott called from [facility] the nurse said she is not percocet she is taking oxycodone 20mg every 6 hours as needed for pain  If he wants the patient to be on percocet he has to write an order for that please advise. Call back number is [phone]

## 2024-07-12 NOTE — PROGRESS NOTES
Identified patient with two patient identifiers (name and ). Reviewed chart in preparation for visit and have obtained necessary documentation.    Laury Schuster is a 63 y.o. female  Chief Complaint   Patient presents with    Follow-up     Right BKA     /78 (Site: Left Upper Arm, Position: Sitting, Cuff Size: Small Adult)   Pulse 73   Temp 98 °F (36.7 °C) (Oral)   Resp 18   Ht 1.524 m (5')   LMP  (LMP Unknown)   SpO2 97%   BMI 22.46 kg/m²     1. Have you been to the ER, urgent care clinic since your last visit?  Hospitalized since your last visit?yes    2. Have you seen or consulted any other health care providers outside of the [facility] since your last visit?  Include any pap smears or colon screening. yes

## 2024-07-15 NOTE — TELEPHONE ENCOUNTER
Per  discontinue the Oxycodone 20mg. Make the Oxycodone 30mg ER and then order the Percocet as he directed on the sheet. I called and spoke with the NP and nurse taking care of  today. They just wanted to clarify so with  because they couldn't add that with all of the current medications.  was standing with me and clarified the orders over the phone.

## 2024-07-18 ENCOUNTER — OFFICE VISIT (OUTPATIENT)
Age: 64
End: 2024-07-18
Payer: MEDICARE

## 2024-07-18 VITALS
HEART RATE: 78 BPM | SYSTOLIC BLOOD PRESSURE: 163 MMHG | OXYGEN SATURATION: 96 % | BODY MASS INDEX: 22.46 KG/M2 | HEIGHT: 60 IN | TEMPERATURE: 98.1 F | DIASTOLIC BLOOD PRESSURE: 81 MMHG | RESPIRATION RATE: 18 BRPM

## 2024-07-18 DIAGNOSIS — T87.9 BKA STUMP COMPLICATION (HCC): Primary | ICD-10-CM

## 2024-07-18 PROCEDURE — 99024 POSTOP FOLLOW-UP VISIT: CPT | Performed by: SURGERY

## 2024-07-18 PROCEDURE — 11043 DBRDMT MUSC&/FSCA 1ST 20/<: CPT | Performed by: SURGERY

## 2024-07-18 ASSESSMENT — PATIENT HEALTH QUESTIONNAIRE - PHQ9
SUM OF ALL RESPONSES TO PHQ QUESTIONS 1-9: 0
2. FEELING DOWN, DEPRESSED OR HOPELESS: NOT AT ALL
SUM OF ALL RESPONSES TO PHQ9 QUESTIONS 1 & 2: 0
SUM OF ALL RESPONSES TO PHQ QUESTIONS 1-9: 0
1. LITTLE INTEREST OR PLEASURE IN DOING THINGS: NOT AT ALL

## 2024-07-18 NOTE — PROGRESS NOTES
Identified pt with two pt identifiers (name and ). Reviewed chart in preparation for visit and have obtained necessary documentation.    Laury Schuster is a 63 y.o. female  Chief Complaint   Patient presents with    Follow-up     BKA     BP (!) 163/81 (Site: Right Upper Arm, Position: Sitting, Cuff Size: Small Adult)   Pulse 78   Temp 98.1 °F (36.7 °C) (Oral)   Resp 18   Ht 1.524 m (5')   LMP  (LMP Unknown)   SpO2 96%   BMI 22.46 kg/m²     1. Have you been to the ER, urgent care clinic since your last visit?  Hospitalized since your last visit?NO    2. Have you seen or consulted any other health care providers outside of the [facility] since your last visit?  Include any pap smears or colon screening. NO

## 2024-07-22 NOTE — PROGRESS NOTES
Patient is here today check right BKA wound.  Last week I did wound debridement.  Patient has still significant pain.  Right leg dressing is taken off.  Wounds examined.  Exposed tendon and necrotic tissue was debrided today.  Iodine dressing was applied.  Patient will be discharged in 7 days.  Patient was prescribed clindamycin and Cipro today.    Procedure:  Patient's right stump BKA was prepared with a sterile dressing.  Followed by using Metzenbaum scissors and pickups exposed subcutaneous tissue fat and tendons were debrided.  Total size wound debridement was 2 x 3 cm.  Excisional debridement was done.

## 2024-07-29 ENCOUNTER — OFFICE VISIT (OUTPATIENT)
Age: 64
End: 2024-07-29
Payer: MEDICARE

## 2024-07-29 VITALS
OXYGEN SATURATION: 96 % | SYSTOLIC BLOOD PRESSURE: 111 MMHG | HEIGHT: 60 IN | WEIGHT: 115 LBS | TEMPERATURE: 97.6 F | HEART RATE: 90 BPM | BODY MASS INDEX: 22.58 KG/M2 | DIASTOLIC BLOOD PRESSURE: 73 MMHG

## 2024-07-29 DIAGNOSIS — G89.18 POST-OP PAIN: Primary | ICD-10-CM

## 2024-07-29 DIAGNOSIS — T87.9 BKA STUMP COMPLICATION (HCC): ICD-10-CM

## 2024-07-29 PROCEDURE — 11043 DBRDMT MUSC&/FSCA 1ST 20/<: CPT | Performed by: SURGERY

## 2024-07-29 PROCEDURE — 99024 POSTOP FOLLOW-UP VISIT: CPT | Performed by: SURGERY

## 2024-07-29 RX ORDER — OXYCODONE HCL 20 MG/1
20 TABLET, FILM COATED, EXTENDED RELEASE ORAL 2 TIMES DAILY
Qty: 60 TABLET | Refills: 0 | Status: SHIPPED | OUTPATIENT
Start: 2024-07-29 | End: 2024-08-28

## 2024-07-29 ASSESSMENT — PATIENT HEALTH QUESTIONNAIRE - PHQ9
SUM OF ALL RESPONSES TO PHQ QUESTIONS 1-9: 0
SUM OF ALL RESPONSES TO PHQ9 QUESTIONS 1 & 2: 0
SUM OF ALL RESPONSES TO PHQ QUESTIONS 1-9: 0
1. LITTLE INTEREST OR PLEASURE IN DOING THINGS: NOT AT ALL
SUM OF ALL RESPONSES TO PHQ QUESTIONS 1-9: 0
SUM OF ALL RESPONSES TO PHQ QUESTIONS 1-9: 0
2. FEELING DOWN, DEPRESSED OR HOPELESS: NOT AT ALL

## 2024-07-29 NOTE — PROGRESS NOTES
Identified pt with two pt identifiers (name and ). Reviewed chart in preparation for visit and have obtained necessary documentation.    Laury Schuster is a 63 y.o. female  Chief Complaint   Patient presents with    Follow-up     10 day follow     /73 (Site: Left Upper Arm, Position: Sitting, Cuff Size: Medium Adult)   Pulse 90   Temp 97.6 °F (36.4 °C) (Oral)   Ht 1.524 m (5')   Wt 52.2 kg (115 lb)   LMP  (LMP Unknown)   SpO2 96%   BMI 22.46 kg/m²     1. Have you been to the ER, urgent care clinic since your last visit?  Hospitalized since your last visit?no    2. Have you seen or consulted any other health care providers outside of the [facility] since your last visit?  Include any pap smears or colon screening. no

## 2024-07-29 NOTE — PROGRESS NOTES
Patient is here today for right BKA wound examination.  Last visit patient had wound debridement done for exposed tendon and gangrene.    I examined the wound today.  There is still necrotic tissue noted over the exposed tendon.  I did bedside wound debridement done today.  Excisional wound debridement performed.  About 2 x 3 cm excisional wound debridement performed using maximum scissors and pickups over the exposed tendon necrotic tissue.  Followed by wounds are covered with iodine soaks, Kerlix and Ace wrap and patient will be reexamined in 2 weeks.  Patient will need daily wound care.

## 2024-08-01 ENCOUNTER — TELEPHONE (OUTPATIENT)
Age: 64
End: 2024-08-01

## 2024-08-01 DIAGNOSIS — T87.9 BKA STUMP COMPLICATION (HCC): Primary | ICD-10-CM

## 2024-08-01 DIAGNOSIS — I73.9 PERIPHERAL VASCULAR DISEASE (HCC): ICD-10-CM

## 2024-08-01 PROBLEM — G89.18 POST-OP PAIN: Status: ACTIVE | Noted: 2024-08-01

## 2024-08-01 NOTE — PROGRESS NOTES
Patient is here today for wound check of the right BKA.  Last time patient has necrotic tissue with exposed tendons requiring wound debridement in the office.    Patient now currently doing wound care with iodine soaks.  Patient has been released to home.  I examined the wound today.  Wounds are overall better.  There is still some necrotic tissue noted over exposed tendon and this was debrided at bedside today.  Wound measures to be about 2 x 3 cm.  Patient tolerated dressing changes well and wound debridement.  I applied Medihoney ointment today.  Change dressing every other day.  Patient will be reassessed next week.

## 2024-08-01 NOTE — TELEPHONE ENCOUNTER
I called and spoke with Mone at [facility] and told her that I did speak with  who states he will follow for Home health. Apply MediHoney every other day to right bka. She asked if I could send over the referral. I verified their fax number.

## 2024-08-01 NOTE — TELEPHONE ENCOUNTER
Mone the nurse from [facility]. Patient does not have any home health care orders and does not see her PCP for a couple of weeks. Will Dr. Cantu place orders until then. [phone]

## 2024-08-01 NOTE — TELEPHONE ENCOUNTER
Patient called stating that she hasn't heard anything after visit to Dr. Cantu. Orders were supposed to fax over to [facility] - ([facility]) fax number [phone]. Physical therapy orders & clinicals also  orders for a shower to be placed in her home because it's hard to get in and out of shower. Please call pt to assist. -Thank DCR

## 2024-08-05 ENCOUNTER — OFFICE VISIT (OUTPATIENT)
Age: 64
End: 2024-08-05
Payer: MEDICARE

## 2024-08-05 VITALS
SYSTOLIC BLOOD PRESSURE: 132 MMHG | RESPIRATION RATE: 18 BRPM | HEIGHT: 60 IN | DIASTOLIC BLOOD PRESSURE: 86 MMHG | OXYGEN SATURATION: 96 % | HEART RATE: 91 BPM | TEMPERATURE: 98.2 F | BODY MASS INDEX: 22.46 KG/M2

## 2024-08-05 DIAGNOSIS — T87.9 BKA STUMP COMPLICATION (HCC): ICD-10-CM

## 2024-08-05 DIAGNOSIS — G89.18 POST-OP PAIN: Primary | ICD-10-CM

## 2024-08-05 PROCEDURE — 4004F PT TOBACCO SCREEN RCVD TLK: CPT | Performed by: SURGERY

## 2024-08-05 PROCEDURE — 3017F COLORECTAL CA SCREEN DOC REV: CPT | Performed by: SURGERY

## 2024-08-05 PROCEDURE — G8427 DOCREV CUR MEDS BY ELIG CLIN: HCPCS | Performed by: SURGERY

## 2024-08-05 PROCEDURE — G8420 CALC BMI NORM PARAMETERS: HCPCS | Performed by: SURGERY

## 2024-08-05 PROCEDURE — 99212 OFFICE O/P EST SF 10 MIN: CPT | Performed by: SURGERY

## 2024-08-05 ASSESSMENT — PATIENT HEALTH QUESTIONNAIRE - PHQ9
2. FEELING DOWN, DEPRESSED OR HOPELESS: NOT AT ALL
SUM OF ALL RESPONSES TO PHQ QUESTIONS 1-9: 0
1. LITTLE INTEREST OR PLEASURE IN DOING THINGS: NOT AT ALL
SUM OF ALL RESPONSES TO PHQ9 QUESTIONS 1 & 2: 0
SUM OF ALL RESPONSES TO PHQ QUESTIONS 1-9: 0

## 2024-08-08 NOTE — PROGRESS NOTES
MCG/ACT AEPB diskus inhaler Inhale 1 puff into the lungs in the morning and 1 puff in the evening.      acetaminophen (TYLENOL) 325 MG tablet Take by mouth every 4 hours as needed      aspirin 325 MG EC tablet Take 1 tablet by mouth daily      lisinopril (PRINIVIL;ZESTRIL) 20 MG tablet Take by mouth daily Does not take if BP is under 100      pantoprazole (PROTONIX) 40 MG tablet Take 1 tablet by mouth daily       No current facility-administered medications for this visit.       Review of Systems:  I reviewed the rest of organ systems personally and they are negative.  Pertinent findings discussed in HPI.    Physical Examination    /86 (Site: Right Upper Arm, Position: Sitting, Cuff Size: Large Adult)   Pulse 91   Temp 98.2 °F (36.8 °C) (Oral)   Resp 18   Ht 1.524 m (5')   LMP  (LMP Unknown)   SpO2 96%   BMI 22.46 kg/m²   Gen: Well developed, well nourished 63 y.o. female in no acute distress  Head: normocephalic, atraumatic  Mouth: Clear, no overt lesions, oral mucosa pink and moist  Neck: supple, no masses, no adenopathy or carotid bruits, trachea midline  Resp: clear to auscultation bilaterally, no wheeze, rhonchi or rales, excursions normal and symmetrical  Cardio: Regular rate and rhythm, no murmurs, clicks, gallops or rubs, no edema or varicosities  Abdomen: soft, nontender, nondistended, normoactive bowel sounds, no hernias, no hepatosplenomegaly   Neuro: sensation and strength grossly intact and symmetrical  Psych: alert and oriented to person, place and time  Vascular examination: I examined the right BKA.  Wounds healing.  Minimal necrotic tissue noted.  I did some wound debridement today at bedside.  Skin: warm and moist.    Labs:  No visits with results within 1 Month(s) from this visit.   Latest known visit with results is:   No results displayed because visit has over 200 results.            Images:      Pepe Zhu MD    Assessments:  Patient Active Problem List   Diagnosis    Postop

## 2024-08-12 ENCOUNTER — OFFICE VISIT (OUTPATIENT)
Age: 64
End: 2024-08-12
Payer: MEDICARE

## 2024-08-12 VITALS
OXYGEN SATURATION: 95 % | SYSTOLIC BLOOD PRESSURE: 122 MMHG | TEMPERATURE: 98.6 F | HEIGHT: 60 IN | DIASTOLIC BLOOD PRESSURE: 81 MMHG | BODY MASS INDEX: 22.58 KG/M2 | WEIGHT: 115 LBS | HEART RATE: 77 BPM

## 2024-08-12 DIAGNOSIS — G89.18 POST-OP PAIN: ICD-10-CM

## 2024-08-12 DIAGNOSIS — T87.9 BKA STUMP COMPLICATION (HCC): Primary | ICD-10-CM

## 2024-08-12 PROCEDURE — 4004F PT TOBACCO SCREEN RCVD TLK: CPT | Performed by: SURGERY

## 2024-08-12 PROCEDURE — 99212 OFFICE O/P EST SF 10 MIN: CPT | Performed by: SURGERY

## 2024-08-12 PROCEDURE — G8427 DOCREV CUR MEDS BY ELIG CLIN: HCPCS | Performed by: SURGERY

## 2024-08-12 PROCEDURE — 3017F COLORECTAL CA SCREEN DOC REV: CPT | Performed by: SURGERY

## 2024-08-12 PROCEDURE — G8420 CALC BMI NORM PARAMETERS: HCPCS | Performed by: SURGERY

## 2024-08-12 RX ORDER — OXYCODONE AND ACETAMINOPHEN 10; 325 MG/1; MG/1
1 TABLET ORAL EVERY 8 HOURS PRN
Qty: 90 TABLET | Refills: 0 | Status: SHIPPED | OUTPATIENT
Start: 2024-08-12 | End: 2024-09-11

## 2024-08-12 RX ORDER — OXYCODONE HCL 20 MG/1
20 TABLET, FILM COATED, EXTENDED RELEASE ORAL 2 TIMES DAILY
Qty: 60 TABLET | Refills: 0 | Status: SHIPPED | OUTPATIENT
Start: 2024-08-12 | End: 2024-09-11

## 2024-08-12 ASSESSMENT — PATIENT HEALTH QUESTIONNAIRE - PHQ9
SUM OF ALL RESPONSES TO PHQ9 QUESTIONS 1 & 2: 0
SUM OF ALL RESPONSES TO PHQ QUESTIONS 1-9: 0
2. FEELING DOWN, DEPRESSED OR HOPELESS: NOT AT ALL
1. LITTLE INTEREST OR PLEASURE IN DOING THINGS: NOT AT ALL
SUM OF ALL RESPONSES TO PHQ QUESTIONS 1-9: 0

## 2024-08-12 NOTE — PROGRESS NOTES
Identified pt with two pt identifiers (name and ). Reviewed chart in preparation for visit and have obtained necessary documentation.    Laury Schuster is a 63 y.o. female  Chief Complaint   Patient presents with    Wound Check     Wound check     /81 (Site: Right Upper Arm, Position: Sitting, Cuff Size: Medium Adult)   Pulse 77   Temp 98.6 °F (37 °C) (Oral)   Ht 1.524 m (5')   Wt 52.2 kg (115 lb)   LMP  (LMP Unknown)   SpO2 95%   BMI 22.46 kg/m²     1. Have you been to the ER, urgent care clinic since your last visit?  Hospitalized since your last visit?no    2. Have you seen or consulted any other health care providers outside of the [facility] since your last visit?  Include any pap smears or colon screening. no

## 2024-08-23 NOTE — PROGRESS NOTES
Vascular History and Physical    Patient: Laury Schuster  MRN: [id number]    YOB: 1960  Age: 63 y.o.  Sex: female     Chief Complaint:  Chief Complaint   Patient presents with    Wound Check     Wound check       History of Present Illness: Laury Schuster is a 63 y.o. very pleasant woman is here today recheck right BKA stump wound complication.  Patient currently at home doing wound care with Medihoney ointment every other day.  Last visit I did some wound debridement.  Patient currently doing well.  Pain is still moderate.  Patient still requesting a pain medication.  Denies any fever chills chest pain shortness of breath.    Social History:  Social Connections: Not on file       Past Medical History:  Past Medical History:   Diagnosis Date    Arthritis     Coagulation disorder (HCC)     possible , pt had surgery on leg and bled out    Diabetes ([facility]) 2019    Hypertension     Left-sided weakness     Nodule of kidney     left kidney    Peripheral vascular disease (HCC)     Stroke ([facility])     recent stroke on 10/16/2021       Surgical History:  Past Surgical History:   Procedure Laterality Date    CATARACT REMOVAL  2019    COLONOSCOPY      FEMORAL BYPASS Right 10/18/2023    Right common femoral endarterectomy. 2.Right common femoral artery to below-knee popliteal bypass with 6 mm PTFE graft. performed by Marko Cantu MD at [facility] MAIN OR    FEMORAL BYPASS Right 4/7/2024    1.  Right common femoral artery to below-knee popliteal bypass graft thrombectomy using 3 Icelandic a 4 Icelandic Walker catheter. 2.  Right leg angiogram. 3.  Right leg bypass graft tPA infusion catheter placement. performed by Marko Cantu MD at [facility] MAIN OR    INVASIVE VASCULAR Right 1/7/2024    RIGHT LEG GRAFT THROMBECTOMY AND ANGIOGRAM performed by Marko Cantu MD at [facility] MAIN OR    INVASIVE VASCULAR N/A 4/8/2024    Angiography lower ext right performed by Marko Cantu MD at [facility] CARDIAC CATH LAB    INVASIVE VASCULAR N/A 4/8/2024

## 2024-08-26 ENCOUNTER — OFFICE VISIT (OUTPATIENT)
Age: 64
End: 2024-08-26
Payer: MEDICARE

## 2024-08-26 VITALS
DIASTOLIC BLOOD PRESSURE: 79 MMHG | HEART RATE: 76 BPM | TEMPERATURE: 98.7 F | BODY MASS INDEX: 22.46 KG/M2 | SYSTOLIC BLOOD PRESSURE: 131 MMHG | RESPIRATION RATE: 16 BRPM | HEIGHT: 60 IN | OXYGEN SATURATION: 98 %

## 2024-08-26 DIAGNOSIS — T87.9 BKA STUMP COMPLICATION (HCC): Primary | ICD-10-CM

## 2024-08-26 PROCEDURE — 99212 OFFICE O/P EST SF 10 MIN: CPT | Performed by: SURGERY

## 2024-08-26 PROCEDURE — 3017F COLORECTAL CA SCREEN DOC REV: CPT | Performed by: SURGERY

## 2024-08-26 PROCEDURE — 4004F PT TOBACCO SCREEN RCVD TLK: CPT | Performed by: SURGERY

## 2024-08-26 PROCEDURE — G8420 CALC BMI NORM PARAMETERS: HCPCS | Performed by: SURGERY

## 2024-08-26 PROCEDURE — G8427 DOCREV CUR MEDS BY ELIG CLIN: HCPCS | Performed by: SURGERY

## 2024-08-26 ASSESSMENT — PATIENT HEALTH QUESTIONNAIRE - PHQ9
SUM OF ALL RESPONSES TO PHQ QUESTIONS 1-9: 0
SUM OF ALL RESPONSES TO PHQ9 QUESTIONS 1 & 2: 0
SUM OF ALL RESPONSES TO PHQ QUESTIONS 1-9: 0
2. FEELING DOWN, DEPRESSED OR HOPELESS: NOT AT ALL
SUM OF ALL RESPONSES TO PHQ QUESTIONS 1-9: 0
1. LITTLE INTEREST OR PLEASURE IN DOING THINGS: NOT AT ALL
SUM OF ALL RESPONSES TO PHQ QUESTIONS 1-9: 0

## 2024-08-26 NOTE — PROGRESS NOTES
Identified pt with two pt identifiers (name and ). Reviewed chart in preparation for visit and have obtained necessary documentation.    Laury Schuster is a 63 y.o. female  Chief Complaint   Patient presents with    Follow-up     Right BKA stump wound      /79 (Site: Left Upper Arm, Position: Sitting, Cuff Size: Large Adult)   Pulse 76   Temp 98.7 °F (37.1 °C) (Oral)   Resp 16   Ht 1.524 m (5')   LMP  (LMP Unknown)   SpO2 98%   BMI 22.46 kg/m²     1. Have you been to the ER, urgent care clinic since your last visit?  Hospitalized since your last visit?NO    2. Have you seen or consulted any other health care providers outside of the [facility] since your last visit?  Include any pap smears or colon screening. NO

## 2024-09-06 ENCOUNTER — OFFICE VISIT (OUTPATIENT)
Age: 64
End: 2024-09-06
Payer: MEDICARE

## 2024-09-06 VITALS
HEIGHT: 60 IN | WEIGHT: 115 LBS | HEART RATE: 80 BPM | SYSTOLIC BLOOD PRESSURE: 159 MMHG | DIASTOLIC BLOOD PRESSURE: 82 MMHG | BODY MASS INDEX: 22.58 KG/M2 | OXYGEN SATURATION: 90 %

## 2024-09-06 DIAGNOSIS — T87.9 BKA STUMP COMPLICATION (HCC): Primary | ICD-10-CM

## 2024-09-06 PROCEDURE — 4004F PT TOBACCO SCREEN RCVD TLK: CPT | Performed by: SURGERY

## 2024-09-06 PROCEDURE — G8420 CALC BMI NORM PARAMETERS: HCPCS | Performed by: SURGERY

## 2024-09-06 PROCEDURE — 99212 OFFICE O/P EST SF 10 MIN: CPT | Performed by: SURGERY

## 2024-09-06 PROCEDURE — 3017F COLORECTAL CA SCREEN DOC REV: CPT | Performed by: SURGERY

## 2024-09-06 PROCEDURE — G8427 DOCREV CUR MEDS BY ELIG CLIN: HCPCS | Performed by: SURGERY

## 2024-09-06 ASSESSMENT — PATIENT HEALTH QUESTIONNAIRE - PHQ9
SUM OF ALL RESPONSES TO PHQ QUESTIONS 1-9: 0
SUM OF ALL RESPONSES TO PHQ9 QUESTIONS 1 & 2: 0
2. FEELING DOWN, DEPRESSED OR HOPELESS: NOT AT ALL
SUM OF ALL RESPONSES TO PHQ QUESTIONS 1-9: 0
1. LITTLE INTEREST OR PLEASURE IN DOING THINGS: NOT AT ALL

## 2024-09-17 ENCOUNTER — TELEPHONE (OUTPATIENT)
Age: 64
End: 2024-09-17

## 2024-09-30 ENCOUNTER — OFFICE VISIT (OUTPATIENT)
Age: 64
End: 2024-09-30
Payer: MEDICARE

## 2024-09-30 VITALS
TEMPERATURE: 97.7 F | DIASTOLIC BLOOD PRESSURE: 87 MMHG | RESPIRATION RATE: 16 BRPM | OXYGEN SATURATION: 97 % | BODY MASS INDEX: 22.46 KG/M2 | SYSTOLIC BLOOD PRESSURE: 142 MMHG | HEIGHT: 60 IN | HEART RATE: 86 BPM

## 2024-09-30 DIAGNOSIS — T87.9 BKA STUMP COMPLICATION (HCC): Primary | ICD-10-CM

## 2024-09-30 PROCEDURE — 3017F COLORECTAL CA SCREEN DOC REV: CPT | Performed by: SURGERY

## 2024-09-30 PROCEDURE — G8420 CALC BMI NORM PARAMETERS: HCPCS | Performed by: SURGERY

## 2024-09-30 PROCEDURE — 4004F PT TOBACCO SCREEN RCVD TLK: CPT | Performed by: SURGERY

## 2024-09-30 PROCEDURE — 99212 OFFICE O/P EST SF 10 MIN: CPT | Performed by: SURGERY

## 2024-09-30 PROCEDURE — G8427 DOCREV CUR MEDS BY ELIG CLIN: HCPCS | Performed by: SURGERY

## 2024-09-30 RX ORDER — OXYCODONE AND ACETAMINOPHEN 5; 325 MG/1; MG/1
2 TABLET ORAL EVERY 6 HOURS PRN
Qty: 60 TABLET | Refills: 0 | Status: SHIPPED | OUTPATIENT
Start: 2024-09-30 | End: 2024-10-14

## 2024-09-30 ASSESSMENT — PATIENT HEALTH QUESTIONNAIRE - PHQ9
1. LITTLE INTEREST OR PLEASURE IN DOING THINGS: NOT AT ALL
2. FEELING DOWN, DEPRESSED OR HOPELESS: NOT AT ALL
SUM OF ALL RESPONSES TO PHQ QUESTIONS 1-9: 0
SUM OF ALL RESPONSES TO PHQ9 QUESTIONS 1 & 2: 0
SUM OF ALL RESPONSES TO PHQ QUESTIONS 1-9: 0

## 2024-09-30 NOTE — PROGRESS NOTES
Identified pt with two pt identifiers (name and ). Reviewed chart in preparation for visit and have obtained necessary documentation.    Laury Schuster is a 63 y.o. female  Chief Complaint   Patient presents with    Follow-up    Wound Check     BKA     BP (!) 142/87 (Site: Left Upper Arm, Position: Sitting, Cuff Size: Small Adult)   Pulse 86   Temp 97.7 °F (36.5 °C) (Oral)   Resp 16   Ht 1.524 m (5')   LMP  (LMP Unknown)   SpO2 97%   BMI 22.46 kg/m²     1. Have you been to the ER, urgent care clinic since your last visit?  Hospitalized since your last visit?NO    2. Have you seen or consulted any other health care providers outside of the [facility] since your last visit?  Include any pap smears or colon screening. NO    Patient and provider made aware of elevated BP x2. Patient asymptomatic. Patient reminded to monitor BP, continue to take BP medications if prescribed, and follow up with PCP/Cardiologist.  Patient expressed understanding and agreement.

## 2024-10-01 ENCOUNTER — TELEPHONE (OUTPATIENT)
Age: 64
End: 2024-10-01

## 2024-10-01 DIAGNOSIS — T87.9 BKA STUMP COMPLICATION (HCC): Primary | ICD-10-CM

## 2024-10-01 DIAGNOSIS — I73.9 PERIPHERAL VASCULAR DISEASE (HCC): ICD-10-CM

## 2024-10-02 NOTE — PROGRESS NOTES
Vascular History and Physical    Patient: Laury Schuster  MRN: [id number]    YOB: 1960  Age: 63 y.o.  Sex: female     Chief Complaint:  Chief Complaint   Patient presents with    Follow-up    Wound Check     BKA       History of Present Illness: Laury Schuster is a 63 y.o. very pleasant woman is here today for wound examination on the right BKA.  Apparently she fell on right BKA stump wound and since then she is having a lot of pain and more drainage.  Patient has been applying Medihoney ointments to the wound.  I also did a multiple wound debridement done in the office last visit.    Social History:  Social Connections: Not on file       Past Medical History:  Past Medical History:   Diagnosis Date    Arthritis     Coagulation disorder (HCC)     possible , pt had surgery on leg and bled out    Diabetes (HCC) 2019    Hypertension     Left-sided weakness     Nodule of kidney     left kidney    Peripheral vascular disease (HCC)     Stroke ([facility])     recent stroke on 10/16/2021       Surgical History:  Past Surgical History:   Procedure Laterality Date    CATARACT REMOVAL  2019    COLONOSCOPY      FEMORAL BYPASS Right 10/18/2023    Right common femoral endarterectomy. 2.Right common femoral artery to below-knee popliteal bypass with 6 mm PTFE graft. performed by Marko Cantu MD at [facility] MAIN OR    FEMORAL BYPASS Right 4/7/2024    1.  Right common femoral artery to below-knee popliteal bypass graft thrombectomy using 3 Thai a 4 Thai Walker catheter. 2.  Right leg angiogram. 3.  Right leg bypass graft tPA infusion catheter placement. performed by Marko Cantu MD at [facility] MAIN OR    INVASIVE VASCULAR Right 1/7/2024    RIGHT LEG GRAFT THROMBECTOMY AND ANGIOGRAM performed by Marko Cantu MD at [facility] MAIN OR    INVASIVE VASCULAR N/A 4/8/2024    Angiography lower ext right performed by Marko Cantu MD at [facility] CARDIAC CATH LAB    INVASIVE VASCULAR N/A 4/8/2024    Angioplasty ant tib artery performed by

## 2024-10-03 NOTE — TELEPHONE ENCOUNTER
I called and spoke with Ciara at [facility] who states that the patients insurance changed as of 10/01/2024 and they no longer take it. I called and spoke with Laury Schuster 2 patient identifiers used. I informed her that I would send a referral to a different home health agency to see if they will take her.     Perfect Serve sent to  to have him do his office note from yesterday so we can get her home health.   
I spoke with [facility] and they do not accept the patients insurance. I faxed the referral to [facility] to see if they will take the patients insurance.   
I spoke with [facility] they have spoke with Laury Schuster and will be going out on Monday to see her.   
Received a phone call from [facility] they do not take [employer] insurance so they are going to discharge the patient today. Please advise.She was receiving wound care and PT and OT so she needs a new Home Health.  
No

## 2024-10-07 ENCOUNTER — OFFICE VISIT (OUTPATIENT)
Age: 64
End: 2024-10-07
Payer: MEDICARE

## 2024-10-07 VITALS
TEMPERATURE: 98.3 F | HEART RATE: 79 BPM | OXYGEN SATURATION: 95 % | SYSTOLIC BLOOD PRESSURE: 135 MMHG | DIASTOLIC BLOOD PRESSURE: 70 MMHG | RESPIRATION RATE: 18 BRPM | BODY MASS INDEX: 22.46 KG/M2 | HEIGHT: 60 IN

## 2024-10-07 DIAGNOSIS — T87.9 BKA STUMP COMPLICATION (HCC): Primary | ICD-10-CM

## 2024-10-07 PROCEDURE — 99212 OFFICE O/P EST SF 10 MIN: CPT | Performed by: SURGERY

## 2024-10-07 PROCEDURE — G8420 CALC BMI NORM PARAMETERS: HCPCS | Performed by: SURGERY

## 2024-10-07 PROCEDURE — 3017F COLORECTAL CA SCREEN DOC REV: CPT | Performed by: SURGERY

## 2024-10-07 PROCEDURE — G8427 DOCREV CUR MEDS BY ELIG CLIN: HCPCS | Performed by: SURGERY

## 2024-10-07 PROCEDURE — G8484 FLU IMMUNIZE NO ADMIN: HCPCS | Performed by: SURGERY

## 2024-10-07 PROCEDURE — 4004F PT TOBACCO SCREEN RCVD TLK: CPT | Performed by: SURGERY

## 2024-10-07 ASSESSMENT — PATIENT HEALTH QUESTIONNAIRE - PHQ9
SUM OF ALL RESPONSES TO PHQ9 QUESTIONS 1 & 2: 0
SUM OF ALL RESPONSES TO PHQ QUESTIONS 1-9: 0
2. FEELING DOWN, DEPRESSED OR HOPELESS: NOT AT ALL
SUM OF ALL RESPONSES TO PHQ QUESTIONS 1-9: 0
1. LITTLE INTEREST OR PLEASURE IN DOING THINGS: NOT AT ALL

## 2024-10-08 ENCOUNTER — TELEPHONE (OUTPATIENT)
Age: 64
End: 2024-10-08

## 2024-10-08 NOTE — TELEPHONE ENCOUNTER
Spoke with Pillo and informed her that Pepe does not want to try the santly he prefers to continue the iodine, she understood.

## 2024-10-08 NOTE — TELEPHONE ENCOUNTER
Spoke with Pillo and she asked that  send in an order for Santly ointment to help with the patient's soft necrotic tissue. The patient is using [facility] on [address] and she would like to be notified once sent so someone can know to pick it up for her.     Perfect serve sent to Pepe   The spine pre-operative education packet was mailed to the patient on 3/29/19. The patient reviewed the information included in the packet. He called the office and we reviewed the information. All his questions were answered and he gave a clear understanding of the instructions. He was advised to call the office at any time with further questions or concerns.

## 2024-10-08 NOTE — TELEPHONE ENCOUNTER
Pillo Okeefe called requesting a RX and has questions about wound care. Please contact her to assist. Thanks - DCR

## 2024-10-09 ENCOUNTER — TELEPHONE (OUTPATIENT)
Age: 64
End: 2024-10-09

## 2024-10-09 NOTE — TELEPHONE ENCOUNTER
Patient called this morning said she needs a refill on her percocet 5-325mg patient said she only has 4 pills left please advise. Call back number is [phone]

## 2024-10-10 DIAGNOSIS — T87.9 BKA STUMP COMPLICATION (HCC): ICD-10-CM

## 2024-10-10 RX ORDER — OXYCODONE AND ACETAMINOPHEN 5; 325 MG/1; MG/1
2 TABLET ORAL EVERY 6 HOURS PRN
Qty: 60 TABLET | Refills: 0 | Status: SHIPPED | OUTPATIENT
Start: 2024-10-10 | End: 2024-10-24

## 2024-10-14 NOTE — PROGRESS NOTES
Identified pt with two pt identifiers (name and ). Reviewed chart in preparation for visit and have obtained necessary documentation.    Laury Schuster is a 63 y.o. female  Chief Complaint   Patient presents with    Follow-up    Wound Check     BKA      /70 (Site: Left Upper Arm, Position: Sitting, Cuff Size: Large Adult)   Pulse 79   Temp 98.3 °F (36.8 °C) (Oral)   Resp 18   Ht 1.524 m (5')   LMP  (LMP Unknown)   SpO2 95%   BMI 22.46 kg/m²     1. Have you been to the ER, urgent care clinic since your last visit?  Hospitalized since your last visit?NO    2. Have you seen or consulted any other health care providers outside of the [facility] since your last visit?  Include any pap smears or colon screening. NO    
      Allergies:  Allergies   Allergen Reactions    Adhesive Tape Rash    Penicillins Nausea And Vomiting    Codeine Other (See Comments)     Makes me \"loopy\"       Current Meds:  Current Outpatient Medications   Medication Sig Dispense Refill    gabapentin (NEURONTIN) 100 MG capsule Take 1 capsule by mouth 3 times daily.      oxyCODONE (OXY-IR) 30 MG immediate release tablet Take 1 tablet by mouth every 8-12 hours as needed for Pain.      furosemide (LASIX) 20 MG tablet Take 1 tablet by mouth daily      Alcohol Swabs 70 % PADS USE AS DIRECTED      Blood Glucose Monitoring Suppl (ONETOUCH VERIO FLEX SYSTEM) w/Device KIT USE AS DIRECTED MEALS AND EVERY NIGHT AT BEDTIME      ONETOUCH VERIO strip       BD PEN NEEDLE YUE 2ND GEN 32G X 4 MM MISC USE AS DIRECTED TO INJECT INSULIN UP TO 5X/DAY      Lancets (ONETOUCH DELICA PLUS [id number]) MISC       Multiple Vitamin (MULTIVITAMIN) TABS Take 1 tablet by mouth daily      vitamin B-12 (CYANOCOBALAMIN) 500 MCG tablet Take 2 tablets by mouth daily      insulin lispro, 1 Unit Dial, (HUMALOG/ADMELOG) 100 UNIT/ML SOPN       ferrous sulfate (IRON 325) 325 (65 Fe) MG tablet Take 1 tablet by mouth daily (with breakfast)      folic acid (FOLVITE) 1 MG tablet Take 1 tablet by mouth daily      Multiple Vitamins-Minerals (THERAPEUTIC MULTIVITAMIN-MINERALS) tablet Take 1 tablet by mouth daily      traZODone (DESYREL) 50 MG tablet Take 1.5 tablets by mouth nightly      insulin glargine (LANTUS;BASAGLAR) 100 UNIT/ML injection pen Inject 20 Units into the skin nightly      albuterol sulfate HFA (PROVENTIL;VENTOLIN;PROAIR) 108 (90 Base) MCG/ACT inhaler Inhale 2 puffs into the lungs every 4 hours as needed      fluticasone-salmeterol (ADVAIR) 250-50 MCG/ACT AEPB diskus inhaler Inhale 1 puff into the lungs in the morning and 1 puff in the evening.      acetaminophen (TYLENOL) 325 MG tablet Take by mouth every 4 hours as needed      aspirin 325 MG EC tablet Take 1 tablet by mouth daily

## 2024-10-21 ENCOUNTER — OFFICE VISIT (OUTPATIENT)
Age: 64
End: 2024-10-21

## 2024-10-21 VITALS
BODY MASS INDEX: 22.46 KG/M2 | TEMPERATURE: 97.7 F | HEART RATE: 76 BPM | OXYGEN SATURATION: 97 % | HEIGHT: 60 IN | SYSTOLIC BLOOD PRESSURE: 169 MMHG | DIASTOLIC BLOOD PRESSURE: 73 MMHG | RESPIRATION RATE: 16 BRPM

## 2024-10-21 DIAGNOSIS — T87.9 BKA STUMP COMPLICATION (HCC): ICD-10-CM

## 2024-10-21 RX ORDER — OXYCODONE AND ACETAMINOPHEN 5; 325 MG/1; MG/1
2 TABLET ORAL EVERY 6 HOURS PRN
Qty: 60 TABLET | Refills: 0 | Status: SHIPPED | OUTPATIENT
Start: 2024-10-21 | End: 2024-11-04

## 2024-10-21 ASSESSMENT — PATIENT HEALTH QUESTIONNAIRE - PHQ9
SUM OF ALL RESPONSES TO PHQ QUESTIONS 1-9: 0
2. FEELING DOWN, DEPRESSED OR HOPELESS: NOT AT ALL
SUM OF ALL RESPONSES TO PHQ QUESTIONS 1-9: 0
1. LITTLE INTEREST OR PLEASURE IN DOING THINGS: NOT AT ALL
SUM OF ALL RESPONSES TO PHQ QUESTIONS 1-9: 0
SUM OF ALL RESPONSES TO PHQ9 QUESTIONS 1 & 2: 0
SUM OF ALL RESPONSES TO PHQ QUESTIONS 1-9: 0

## 2024-10-26 NOTE — PROGRESS NOTES
Identified pt with two pt identifiers (name and ). Reviewed chart in preparation for visit and have obtained necessary documentation.    Laury Schuster is a 63 y.o. female  Chief Complaint   Patient presents with    Follow-up     Right arm     BP (!) 169/73 (Site: Left Upper Arm, Position: Sitting, Cuff Size: Small Adult)   Pulse 76   Temp 97.7 °F (36.5 °C) (Oral)   Resp 16   Ht 1.524 m (5')   LMP  (LMP Unknown)   SpO2 97%   BMI 22.46 kg/m²     1. Have you been to the ER, urgent care clinic since your last visit?  Hospitalized since your last visit?NO    2. Have you seen or consulted any other health care providers outside of the [facility] since your last visit?  Include any pap smears or colon screening. NO    Patient and provider made aware of elevated BP x2. Patient asymptomatic. Patient reminded to monitor BP, continue to take BP medications if prescribed, and follow up with PCP/Cardiologist.  Patient expressed understanding and agreement.        
the lungs every 4 hours as needed      fluticasone-salmeterol (ADVAIR) 250-50 MCG/ACT AEPB diskus inhaler Inhale 1 puff into the lungs in the morning and 1 puff in the evening.      acetaminophen (TYLENOL) 325 MG tablet Take by mouth every 4 hours as needed      aspirin 325 MG EC tablet Take 1 tablet by mouth daily      lisinopril (PRINIVIL;ZESTRIL) 20 MG tablet Take by mouth daily Does not take if BP is under 100      pantoprazole (PROTONIX) 40 MG tablet Take 1 tablet by mouth daily       No current facility-administered medications for this visit.       Review of Systems:  I reviewed the rest of organ systems personally and they are negative.  Pertinent findings discussed in HPI.    Physical Examination    BP (!) 169/73 (Site: Left Upper Arm, Position: Sitting, Cuff Size: Small Adult)   Pulse 76   Temp 97.7 °F (36.5 °C) (Oral)   Resp 16   Ht 1.524 m (5')   LMP  (LMP Unknown)   SpO2 97%   BMI 22.46 kg/m²   Gen: Well developed, well nourished 63 y.o. female in no acute distress  Head: normocephalic, atraumatic  Mouth: Clear, no overt lesions, oral mucosa pink and moist  Neck: supple, no masses, no adenopathy or carotid bruits, trachea midline  Resp: clear to auscultation bilaterally, no wheeze, rhonchi or rales, excursions normal and symmetrical  Cardio: Regular rate and rhythm, no murmurs, clicks, gallops or rubs, no edema or varicosities  Abdomen: soft, nontender, nondistended, normoactive bowel sounds, no hernias, no hepatosplenomegaly   Neuro: sensation and strength grossly intact and symmetrical  Psych: alert and oriented to person, place and time  Vascular examination: I examined the BKA wound.  Wounds open about 3.5 cm and with necrotic tissue.  I did bedside wound debridement today.  Excisional wound debridement was done for the exposed tendons.  Procedure was performed with a Metzenbaum scissors and pickups followed by wounds are covered with Medihoney ointment.  Skin: warm and moist.    Labs:  No

## 2024-10-31 ENCOUNTER — TELEPHONE (OUTPATIENT)
Age: 64
End: 2024-10-31

## 2024-10-31 DIAGNOSIS — T87.9 BKA STUMP COMPLICATION (HCC): ICD-10-CM

## 2024-10-31 RX ORDER — OXYCODONE AND ACETAMINOPHEN 5; 325 MG/1; MG/1
2 TABLET ORAL EVERY 8 HOURS PRN
Qty: 60 TABLET | Refills: 0 | Status: SHIPPED | OUTPATIENT
Start: 2024-10-31 | End: 2024-11-14

## 2024-10-31 NOTE — TELEPHONE ENCOUNTER
Patient called requesting a refill on pain medication. She uses [facility] on [address]. [phone]

## 2024-11-04 ENCOUNTER — OFFICE VISIT (OUTPATIENT)
Age: 64
End: 2024-11-04
Payer: MEDICARE

## 2024-11-04 VITALS
RESPIRATION RATE: 18 BRPM | TEMPERATURE: 98.2 F | HEART RATE: 81 BPM | OXYGEN SATURATION: 96 % | DIASTOLIC BLOOD PRESSURE: 74 MMHG | HEIGHT: 60 IN | BODY MASS INDEX: 22.46 KG/M2 | SYSTOLIC BLOOD PRESSURE: 122 MMHG

## 2024-11-04 DIAGNOSIS — T87.9 BKA STUMP COMPLICATION (HCC): Primary | ICD-10-CM

## 2024-11-04 PROCEDURE — G8484 FLU IMMUNIZE NO ADMIN: HCPCS | Performed by: SURGERY

## 2024-11-04 PROCEDURE — G8420 CALC BMI NORM PARAMETERS: HCPCS | Performed by: SURGERY

## 2024-11-04 PROCEDURE — 3017F COLORECTAL CA SCREEN DOC REV: CPT | Performed by: SURGERY

## 2024-11-04 PROCEDURE — 99212 OFFICE O/P EST SF 10 MIN: CPT | Performed by: SURGERY

## 2024-11-04 PROCEDURE — 4004F PT TOBACCO SCREEN RCVD TLK: CPT | Performed by: SURGERY

## 2024-11-04 PROCEDURE — G8427 DOCREV CUR MEDS BY ELIG CLIN: HCPCS | Performed by: SURGERY

## 2024-11-04 ASSESSMENT — PATIENT HEALTH QUESTIONNAIRE - PHQ9
SUM OF ALL RESPONSES TO PHQ9 QUESTIONS 1 & 2: 0
1. LITTLE INTEREST OR PLEASURE IN DOING THINGS: NOT AT ALL
SUM OF ALL RESPONSES TO PHQ QUESTIONS 1-9: 0
SUM OF ALL RESPONSES TO PHQ QUESTIONS 1-9: 0
2. FEELING DOWN, DEPRESSED OR HOPELESS: NOT AT ALL
SUM OF ALL RESPONSES TO PHQ QUESTIONS 1-9: 0
SUM OF ALL RESPONSES TO PHQ QUESTIONS 1-9: 0

## 2024-11-04 NOTE — PROGRESS NOTES
1. Have you been to the ER, urgent care clinic since your last visit?  Hospitalized since your last visit?NO    2. Have you seen or consulted any other health care providers outside of the [facility] since your last visit?  Include any pap smears or colon screening. NO    Chief Complaint   Patient presents with    Follow-up    Wound Check     Right BKA

## 2024-11-15 ENCOUNTER — TELEPHONE (OUTPATIENT)
Age: 64
End: 2024-11-15

## 2024-11-15 NOTE — TELEPHONE ENCOUNTER
Patient called asking for  refill on RX Percocet before the weekend . Please contact patient to assist. Thanks

## 2024-11-18 ENCOUNTER — OFFICE VISIT (OUTPATIENT)
Age: 64
End: 2024-11-18
Payer: MEDICARE

## 2024-11-18 VITALS
TEMPERATURE: 98.7 F | SYSTOLIC BLOOD PRESSURE: 163 MMHG | BODY MASS INDEX: 22.46 KG/M2 | HEART RATE: 74 BPM | RESPIRATION RATE: 18 BRPM | OXYGEN SATURATION: 98 % | HEIGHT: 60 IN | DIASTOLIC BLOOD PRESSURE: 93 MMHG

## 2024-11-18 DIAGNOSIS — T87.9 BKA STUMP COMPLICATION (HCC): ICD-10-CM

## 2024-11-18 DIAGNOSIS — M79.604 PAIN OF RIGHT LOWER EXTREMITY: Primary | ICD-10-CM

## 2024-11-18 PROCEDURE — 97597 DBRDMT OPN WND 1ST 20 CM/<: CPT | Performed by: SURGERY

## 2024-11-18 RX ORDER — OXYCODONE AND ACETAMINOPHEN 5; 325 MG/1; MG/1
2 TABLET ORAL EVERY 8 HOURS PRN
Qty: 40 TABLET | Refills: 0 | Status: SHIPPED | OUTPATIENT
Start: 2024-11-18 | End: 2024-12-02

## 2024-11-18 ASSESSMENT — PATIENT HEALTH QUESTIONNAIRE - PHQ9
SUM OF ALL RESPONSES TO PHQ9 QUESTIONS 1 & 2: 0
SUM OF ALL RESPONSES TO PHQ QUESTIONS 1-9: 0
2. FEELING DOWN, DEPRESSED OR HOPELESS: NOT AT ALL
SUM OF ALL RESPONSES TO PHQ QUESTIONS 1-9: 0
SUM OF ALL RESPONSES TO PHQ QUESTIONS 1-9: 0
1. LITTLE INTEREST OR PLEASURE IN DOING THINGS: NOT AT ALL
SUM OF ALL RESPONSES TO PHQ QUESTIONS 1-9: 0

## 2024-11-18 NOTE — PROGRESS NOTES
Identified pt with two pt identifiers (name and ). Reviewed chart in preparation for visit and have obtained necessary documentation.    Laury Schuster is a 64 y.o. female  Chief Complaint   Patient presents with    Follow-up     Right Leg      BP (!) 163/93 (Site: Left Upper Arm, Position: Sitting, Cuff Size: Large Adult)   Pulse 74   Temp 98.7 °F (37.1 °C) (Oral)   Resp 18   Ht 1.524 m (5')   LMP  (LMP Unknown)   SpO2 98%   BMI 22.46 kg/m²     1. Have you been to the ER, urgent care clinic since your last visit?  Hospitalized since your last visit?NO    2. Have you seen or consulted any other health care providers outside of the [facility] since your last visit?  Include any pap smears or colon screening. NO    Patient and provider made aware of elevated BP x2. Patient asymptomatic. Patient reminded to monitor BP, continue to take BP medications if prescribed, and follow up with PCP/Cardiologist.  Patient expressed understanding and agreement.

## 2024-11-25 NOTE — PROGRESS NOTES
Vascular History and Physical    Patient: Laury Schuster  MRN: [id number]    YOB: 1960  Age: 64 y.o.  Sex: female     Chief Complaint:  Chief Complaint   Patient presents with    Follow-up     Right Leg        History of Present Illness: Laury Schuster is a 64 y.o. very pleasant woman is here today right BKA wound examination.  I been performing serial wound debridement done right BKA stump wound necrosis.  Currently we are applying Medihoney ointments every other day to the open wound.  Patient still in significant pain.    Social History:  Social Connections: Not on file       Past Medical History:  Past Medical History:   Diagnosis Date    Arthritis     Coagulation disorder (HCC)     possible , pt had surgery on leg and bled out    Diabetes ([facility]) 2019    Hypertension     Left-sided weakness     Nodule of kidney     left kidney    Peripheral vascular disease (HCC)     Stroke ([facility])     recent stroke on 10/16/2021       Surgical History:  Past Surgical History:   Procedure Laterality Date    CATARACT REMOVAL  2019    COLONOSCOPY      FEMORAL BYPASS Right 10/18/2023    Right common femoral endarterectomy. 2.Right common femoral artery to below-knee popliteal bypass with 6 mm PTFE graft. performed by Marko Cantu MD at [facility] MAIN OR    FEMORAL BYPASS Right 4/7/2024    1.  Right common femoral artery to below-knee popliteal bypass graft thrombectomy using 3 English a 4 English Walker catheter. 2.  Right leg angiogram. 3.  Right leg bypass graft tPA infusion catheter placement. performed by Marko Cantu MD at [facility] MAIN OR    INVASIVE VASCULAR Right 1/7/2024    RIGHT LEG GRAFT THROMBECTOMY AND ANGIOGRAM performed by Marko Cantu MD at [facility] MAIN OR    INVASIVE VASCULAR N/A 4/8/2024    Angiography lower ext right performed by Marko Cantu MD at [facility] CARDIAC CATH LAB    INVASIVE VASCULAR N/A 4/8/2024    Angioplasty ant tib artery performed by Marko Cantu MD at [facility] CARDIAC CATH LAB    LEG AMPUTATION

## 2024-12-02 ENCOUNTER — TELEPHONE (OUTPATIENT)
Age: 64
End: 2024-12-02

## 2024-12-02 RX ORDER — DOCUSATE SODIUM 100 MG/1
100 CAPSULE, LIQUID FILLED ORAL 4 TIMES DAILY
Qty: 90 CAPSULE | Refills: 1 | Status: SHIPPED | OUTPATIENT
Start: 2024-12-02 | End: 2025-01-16

## 2024-12-02 NOTE — TELEPHONE ENCOUNTER
Patient is calling to have Dr. Cantu to refill her Docusate RX. Please contact pt when done . Thanks

## 2024-12-02 NOTE — TELEPHONE ENCOUNTER
I called and spoke with Laury Schuster 2 patient identifiers used. Patient wants a refill on her pain medications. I told her I would discuss with  and see what he said.

## 2024-12-02 NOTE — TELEPHONE ENCOUNTER
I spoke with  he said to do Docusate Sodium 100mg capsule QID, 1 refill. I read back order to  and he approved

## 2024-12-03 DIAGNOSIS — M79.604 PAIN OF RIGHT LOWER EXTREMITY: Primary | ICD-10-CM

## 2024-12-03 RX ORDER — OXYCODONE AND ACETAMINOPHEN 5; 325 MG/1; MG/1
2 TABLET ORAL EVERY 8 HOURS PRN
Qty: 40 TABLET | Refills: 0 | Status: SHIPPED | OUTPATIENT
Start: 2024-12-03 | End: 2024-12-17

## 2024-12-05 ENCOUNTER — OFFICE VISIT (OUTPATIENT)
Age: 64
End: 2024-12-05
Payer: MEDICARE

## 2024-12-05 VITALS
HEIGHT: 60 IN | SYSTOLIC BLOOD PRESSURE: 117 MMHG | BODY MASS INDEX: 22.46 KG/M2 | HEART RATE: 91 BPM | OXYGEN SATURATION: 94 % | RESPIRATION RATE: 14 BRPM | TEMPERATURE: 97.6 F | DIASTOLIC BLOOD PRESSURE: 74 MMHG

## 2024-12-05 DIAGNOSIS — T87.9 BKA STUMP COMPLICATION (HCC): Primary | ICD-10-CM

## 2024-12-05 PROCEDURE — 3017F COLORECTAL CA SCREEN DOC REV: CPT | Performed by: SURGERY

## 2024-12-05 PROCEDURE — G8427 DOCREV CUR MEDS BY ELIG CLIN: HCPCS | Performed by: SURGERY

## 2024-12-05 PROCEDURE — G8484 FLU IMMUNIZE NO ADMIN: HCPCS | Performed by: SURGERY

## 2024-12-05 PROCEDURE — 4004F PT TOBACCO SCREEN RCVD TLK: CPT | Performed by: SURGERY

## 2024-12-05 PROCEDURE — G8420 CALC BMI NORM PARAMETERS: HCPCS | Performed by: SURGERY

## 2024-12-05 PROCEDURE — 99212 OFFICE O/P EST SF 10 MIN: CPT | Performed by: SURGERY

## 2024-12-05 ASSESSMENT — PATIENT HEALTH QUESTIONNAIRE - PHQ9
2. FEELING DOWN, DEPRESSED OR HOPELESS: NOT AT ALL
SUM OF ALL RESPONSES TO PHQ QUESTIONS 1-9: 0
SUM OF ALL RESPONSES TO PHQ9 QUESTIONS 1 & 2: 0

## 2024-12-05 NOTE — PROGRESS NOTES
Identified pt with two pt identifiers (name and ). Reviewed chart in preparation for visit and have obtained necessary documentation.    Laury Schuster is a 64 y.o. female  Chief Complaint   Patient presents with    Follow-up     Right BKA     /74 (Site: Left Upper Arm, Position: Sitting, Cuff Size: Large Adult)   Pulse 91   Temp 97.6 °F (36.4 °C) (Oral)   Resp 14   Ht 1.524 m (5')   LMP  (LMP Unknown)   SpO2 94%   BMI 22.46 kg/m²     1. Have you been to the ER, urgent care clinic since your last visit?  Hospitalized since your last visit.NO    2. Have you seen or consulted any other health care providers outside of the [facility] since your last visit?  Include any pap smears or colon screening. NO

## 2024-12-13 NOTE — CONSULTS
Consult Date: 10/19/2021    Consults Ms. Galo Alberto is a 62year old woman with DM, adrenal gland tumor, HTN, PVD, smoker who comes in with sudden onset slurred speech that started at 230 pm yesterday but has since resolved. She awoke at 230 am this morning with left arm and leg weakness and she could not control the movements in her left arm. She came to ER where Ct head was WNL and CTa head and neck showed no LVO. She has never had a stroke before. BP elevated at 174/106. Subjective  right ICA critical stenosis. Right mca embolic strokes. Results dw patient. Complains of left hemiparesis.      Past Medical History:   Diagnosis Date    Arthritis     Diabetes ([address] 2019    Hypertension     Peripheral vascular disease ([address]       Past Surgical History:   Procedure Laterality Date    HX CATARACT REMOVAL  2019     Family History   Problem Relation Age of Onset    Cancer Mother         BREAST    Lung Disease Mother     COPD Mother     Heart Disease Mother     Hypertension Mother     Diabetes Mother     Arthritis-osteo Mother     Parkinson's Disease Father     Lung Disease Father     Hypertension Father     Heart Disease Father     Diabetes Father     COPD Other     Parkinson's Disease Other     MS Other     Cancer Brother         PROSTATE    Heart Disease Brother     No Known Problems Son     No Known Problems Son    Dimas Brody MS Son     Anesth Problems Neg Hx       Social History     Tobacco Use    Smoking status: Current Every Day Smoker     Packs/day: 1.00     Years: 40.00     Pack years: 40.00    Smokeless tobacco: Never Used   Substance Use Topics    Alcohol use: Never       Current Facility-Administered Medications   Medication Dose Route Frequency Provider Last Rate Last Admin    aspirin delayed-release tablet 325 mg  325 mg Oral DAILY Andrea, Bob Meade MD        atorvastatin (LIPITOR) tablet 80 mg  80 mg Oral DAILY Andrea, Bob Meade MD        pantoprazole ([address]) [de-identified] : Atrial fibrillation  [de-identified] : 6/30/2022.  Normal left ventricular size and systolic function with estimated ejection fraction 56%.  Moderate concentric left ventricular hypertrophy.  Mild left atrial enlargement.  Mild mitral, aortic, and tricuspid regurgitations. tablet 40 mg  40 mg Oral DAILY Don Wong MD   40 mg at 10/18/21 9632    multivitamin with folic acid (ONE DAILY WITH FOLIC ACID) tablet 1 Tablet  1 Tablet Oral DAILY Buzz Kuhn MD   1 Tablet at 10/18/21 0853    lisinopriL (PRINIVIL, ZESTRIL) tablet 20 mg  20 mg Oral DAILY Buzz Kuhn MD   20 mg at 10/18/21 1773    insulin lispro (HUMALOG) injection   SubCUTAneous AC&HS Buzz Kuhn MD   2 Units at 10/18/21 1746    acetaminophen (TYLENOL) tablet 650 mg  650 mg Oral Q4H PRN Buzz Kuhn MD   650 mg at 10/18/21 2121    Or    acetaminophen (TYLENOL) solution 650 mg  650 mg Per NG tube Q4H PRN Buzz Kuhn MD        Or    acetaminophen (TYLENOL) suppository 650 mg  650 mg Rectal Q4H PRN Buzz Kuhn MD        heparin (porcine) injection 5,000 Units  5,000 Units SubCUTAneous [facility], Don Sepulveda MD   5,000 Units at 10/18/21 7512    influenza vaccine 2021-22 (6 mos+)(PF) (FLUARIX/FLULAVAL/FLUZONE QUAD) injection 0.5 mL  1 Each IntraMUSCular PRIOR TO DISCHARGE Don Wong MD        glucose chewable tablet 16 g  4 Tablet Oral PRN Buzz Kuhn MD        dextrose (D50W) injection syrg 12.5-25 g  25-50 mL IntraVENous PRN Buzz Kuhn MD        glucagon [facility]) injection 1 mg  1 mg IntraMUSCular PRN Buzz Kuhn MD            Review of Systems   Neurological: Positive for weakness and numbness. Objective     Vital signs for last 24 hours:  Visit Vitals  BP 98/65 (BP 1 Location: Left upper arm, BP Patient Position: Lying)   Pulse 87   Temp 98 °F (36.7 °C)   Resp 16   Ht 5' 1\" (1.549 m)   Wt 61.2 kg (135 lb)   SpO2 97%   BMI 25.51 kg/m²       Intake/Output this shift:  Current Shift: No intake/output data recorded. Last 3 Shifts: No intake/output data recorded.     Data Review:   Recent Results (from the past 24 hour(s)) TROPONIN-HIGH SENSITIVITY    Collection Time: 10/18/21  9:43 AM   Result Value Ref Range    Troponin-High Sensitivity 9 0 - 51 ng/L   HEMOGLOBIN A1C WITH EAG    Collection Time: 10/18/21  9:43 AM   Result Value Ref Range    Hemoglobin A1c 6.6 (H) 4.0 - 5.6 %    Est. average glucose 143 mg/dL   GLUCOSE, POC    Collection Time: 10/18/21  5:07 PM   Result Value Ref Range    Glucose (POC) 143 (H) 65 - 117 mg/dL    Performed by [address], POC    Collection Time: 10/18/21  8:56 PM   Result Value Ref Range    Glucose (POC) 144 (H) 65 - 117 mg/dL    Performed by MASHA AJ    CBC W/O DIFF    Collection Time: 10/19/21  6:55 AM   Result Value Ref Range    WBC 9.9 3.6 - 11.0 K/uL    RBC 4.50 3.80 - 5.20 M/uL    HGB 13.0 11.5 - 16.0 g/dL    HCT 40.7 35.0 - 47.0 %    MCV 90.4 80.0 - 99.0 FL    MCH 28.9 26.0 - 34.0 PG    MCHC 31.9 30.0 - 36.5 g/dL    RDW 12.5 11.5 - 14.5 %    PLATELET 982 111 - 439 K/uL    MPV 11.4 8.9 - 12.9 FL    NRBC 0.0 0.0  WBC    ABSOLUTE NRBC 0.00 0.00 - 1.03 K/uL   METABOLIC PANEL, BASIC    Collection Time: 10/19/21  6:55 AM   Result Value Ref Range    Sodium 138 136 - 145 mmol/L    Potassium 4.8 3.5 - 5.1 mmol/L    Chloride 108 97 - 108 mmol/L    CO2 23 21 - 32 mmol/L    Anion gap 7 5 - 15 mmol/L    Glucose 150 (H) 65 - 100 mg/dL    BUN 15 6 - 20 mg/dL    Creatinine 0.68 0.55 - 1.02 mg/dL    BUN/Creatinine ratio 22 (H) 12 - 20      GFR est AA >60 >60 ml/min/1.73m2    GFR est non-AA >60 >60 ml/min/1.73m2    Calcium 9.5 8.5 - 10.1 mg/dL   MAGNESIUM    Collection Time: 10/19/21  6:55 AM   Result Value Ref Range    Magnesium 2.2 1.6 - 2.4 mg/dL   GLUCOSE, POC    Collection Time: 10/19/21  7:30 AM   Result Value Ref Range    Glucose (POC) 152 (H) 65 - 117 mg/dL    Performed by Reshma De Luna        Physical Exam    Neuro Physical Exam      General: Well developed, well nourished. Patient in no apparent distress.       Neurological Exam:  Mental Status: AAOX4, normal speech Cranial Nerves:   Intact visual fields. PERRL, EOM's full, no nystagmus, no ptosis. Facial sensation is normal. Facial movement is symmetric. Palate is midline. Normal sternocleidomastoid strength. Tongue is midline. Hearing is intact bilaterally. Motor:  5/5 RUE and RLE. 4/5 biceps and triceps, weak hand graso. RLE: 4-/ hip flexion, 4/5 dorsi flexion   Reflexes:   Deep tendon reflexes 2+/4 and symmetrical.   Sensory:   Decreased light touch in left arm and leg    Gait:  Not tested    Tremor:   No tremor noted. Cerebellar:  No cerebellar signs present. Babinski:      Down b/l     Assessment and Plan:  is a 62year old woman who is an active smoker, HTN, DM who comes in with left hemiparesis. Likely has a right basal ganglia lacunar stroke. - MRI brain w/o contrast: right MCA embolic strokes  - TTE: pending report   - cannot have antiplt or [address] because of prior h/o GI bleed reported by patient. - atorvastatin 80mg  - BP goal < 150/90  - hga1c and lipid panel  - cardiac monitoring  -pt/ot/speech  - right ICA stenosis: CEA planned.    - advised to quit smoking

## 2024-12-16 ENCOUNTER — OFFICE VISIT (OUTPATIENT)
Age: 64
End: 2024-12-16
Payer: MEDICARE

## 2024-12-16 VITALS
SYSTOLIC BLOOD PRESSURE: 138 MMHG | BODY MASS INDEX: 22.46 KG/M2 | RESPIRATION RATE: 18 BRPM | TEMPERATURE: 97.8 F | HEART RATE: 83 BPM | HEIGHT: 60 IN | OXYGEN SATURATION: 96 % | DIASTOLIC BLOOD PRESSURE: 87 MMHG

## 2024-12-16 DIAGNOSIS — T87.9 BKA STUMP COMPLICATION (HCC): ICD-10-CM

## 2024-12-16 DIAGNOSIS — M79.604 PAIN OF RIGHT LOWER EXTREMITY: Primary | ICD-10-CM

## 2024-12-16 DIAGNOSIS — I96 GANGRENE (HCC): ICD-10-CM

## 2024-12-16 PROCEDURE — 99212 OFFICE O/P EST SF 10 MIN: CPT | Performed by: SURGERY

## 2024-12-16 PROCEDURE — 3017F COLORECTAL CA SCREEN DOC REV: CPT | Performed by: SURGERY

## 2024-12-16 PROCEDURE — G8420 CALC BMI NORM PARAMETERS: HCPCS | Performed by: SURGERY

## 2024-12-16 PROCEDURE — G8427 DOCREV CUR MEDS BY ELIG CLIN: HCPCS | Performed by: SURGERY

## 2024-12-16 PROCEDURE — G8484 FLU IMMUNIZE NO ADMIN: HCPCS | Performed by: SURGERY

## 2024-12-16 PROCEDURE — 4004F PT TOBACCO SCREEN RCVD TLK: CPT | Performed by: SURGERY

## 2024-12-16 PROCEDURE — 97597 DBRDMT OPN WND 1ST 20 CM/<: CPT | Performed by: SURGERY

## 2024-12-16 RX ORDER — OXYCODONE AND ACETAMINOPHEN 5; 325 MG/1; MG/1
1 TABLET ORAL EVERY 8 HOURS PRN
Qty: 40 TABLET | Refills: 0 | Status: SHIPPED | OUTPATIENT
Start: 2024-12-16 | End: 2024-12-30

## 2024-12-16 ASSESSMENT — PATIENT HEALTH QUESTIONNAIRE - PHQ9
SUM OF ALL RESPONSES TO PHQ QUESTIONS 1-9: 0
SUM OF ALL RESPONSES TO PHQ QUESTIONS 1-9: 0
1. LITTLE INTEREST OR PLEASURE IN DOING THINGS: NOT AT ALL
SUM OF ALL RESPONSES TO PHQ QUESTIONS 1-9: 0
SUM OF ALL RESPONSES TO PHQ QUESTIONS 1-9: 0
2. FEELING DOWN, DEPRESSED OR HOPELESS: NOT AT ALL
SUM OF ALL RESPONSES TO PHQ9 QUESTIONS 1 & 2: 0

## 2024-12-16 NOTE — PROGRESS NOTES
Vascular History and Physical    Patient: Laury Schuster  MRN: [id number]    YOB: 1960  Age: 64 y.o.  Sex: female     Chief Complaint:  Chief Complaint   Patient presents with    Follow-up     Right BKA       History of Present Illness: Laury Schuster is a 64 y.o. very pleasant woman is follow-up today right BKA wound checkup.  Patient currently had a right BKA wound complication.  We are currently doing iodine soaks daily.  Pain is also severe as well at times.  We talked about last time tapering pain medication gradually.  Patient still requiring high dose of pain medication.    Social History:  Social Connections: Not on file       Past Medical History:  Past Medical History:   Diagnosis Date    Arthritis     Coagulation disorder (HCC)     possible , pt had surgery on leg and bled out    Diabetes (HCC) 2019    Hypertension     Left-sided weakness     Nodule of kidney     left kidney    Peripheral vascular disease (HCC)     Stroke (HCC)     recent stroke on 10/16/2021       Surgical History:  Past Surgical History:   Procedure Laterality Date    CATARACT REMOVAL  2019    COLONOSCOPY      FEMORAL BYPASS Right 10/18/2023    Right common femoral endarterectomy. 2.Right common femoral artery to below-knee popliteal bypass with 6 mm PTFE graft. performed by Marko Cantu MD at [facility] MAIN OR    FEMORAL BYPASS Right 4/7/2024    1.  Right common femoral artery to below-knee popliteal bypass graft thrombectomy using 3 Marshallese a 4 Marshallese Walker catheter. 2.  Right leg angiogram. 3.  Right leg bypass graft tPA infusion catheter placement. performed by Marko Cantu MD at [facility] MAIN OR    INVASIVE VASCULAR Right 1/7/2024    RIGHT LEG GRAFT THROMBECTOMY AND ANGIOGRAM performed by Marko Cantu MD at [facility] MAIN OR    INVASIVE VASCULAR N/A 4/8/2024    Angiography lower ext right performed by Marko Cantu MD at [facility] CARDIAC CATH LAB    INVASIVE VASCULAR N/A 4/8/2024    Angioplasty ant tib artery performed by Pepe

## 2024-12-26 NOTE — PROGRESS NOTES
Identified pt with two pt identifiers (name and ). Reviewed chart in preparation for visit and have obtained necessary documentation.    Laury Schuster is a 64 y.o. female  Chief Complaint   Patient presents with    Wound Check     Right BKA      /87 (Site: Right Upper Arm, Position: Sitting, Cuff Size: Large Adult)   Pulse 83   Temp 97.8 °F (36.6 °C) (Oral)   Resp 18   Ht 1.524 m (5')   LMP  (LMP Unknown)   SpO2 96%   BMI 22.46 kg/m²     1. Have you been to the ER, urgent care clinic since your last visit?  Hospitalized since your last visit?no    2. Have you seen or consulted any other health care providers outside of the [facility] since your last visit?  Include any pap smears or colon screening. no    
scissors and pickups exposed tendons necrotic, devitalized tissue was excised.  And the wounds are covered with iodine soaked 4 x 4 gauze Kerlix and Ace wrap.  Skin: warm and moist.    Labs:  No visits with results within 1 Month(s) from this visit.   Latest known visit with results is:   No results displayed because visit has over 200 results.            Images:      Pepe Zhu MD    Assessments:  Patient Active Problem List   Diagnosis    Postop carotid endarterectomy surveillance, encounter for    Ischemia of right lower extremity    Stroke (HCC)    CVA (cerebral vascular accident) (HCC)    History of bleeding disorder    Peripheral vascular disease (HCC)    Adrenal tumor    Pancreatic tumor    Limb ischemia    Ulcer of right foot (HCC)    Femoral artery occlusion, right (HCC)    Ischemic leg    Arterial occlusion    Occlusion of femoropopliteal bypass graft (HCC)    Pain of left lower extremity    Pain of right lower extremity    Peripheral arterial disease (HCC)    Ischemic foot ulcer due to atherosclerosis of native artery of limb (HCC)    BKA stump complication (HCC)    Post-op pain       Plans: Patient tolerated wound care without any issues.  Patient was advised continue wound care with iodine soaks on exposed area of the BKA stump and cover with Kerlix and Ace wrap daily.  Patient will be reassessed in 2 weeks.      **Note: This dwain is pertinent to patient with PAD.    Vascular risk factor modification regimen:  5 regimens include: Optimal cholesterol control, exercise program, medication modifications including antiplatelet therapy and antihypertensives, optimal blood pressure control, and optimal hydration.  6 regimens include: 5 regimens plus tobacco cessation.  7 regimens include: 5 regimens plus strict diabetic control.  8 regimens include: 6 plus 7 regimens    Pepe Zhu MD

## 2024-12-30 ENCOUNTER — TELEPHONE (OUTPATIENT)
Age: 64
End: 2024-12-30

## 2024-12-30 NOTE — TELEPHONE ENCOUNTER
Patient called in stating she was in so much pain and that she needs a pain RX for the pain. I informed patient that Dr. Cantu was on vacation and will return on Thursday. Patient understood and will wait to Thursday, but wanted me to add note to system. Thanks - DCR

## 2024-12-31 DIAGNOSIS — M79.604 PAIN OF RIGHT LOWER EXTREMITY: Primary | ICD-10-CM

## 2024-12-31 RX ORDER — OXYCODONE AND ACETAMINOPHEN 5; 325 MG/1; MG/1
1 TABLET ORAL EVERY 8 HOURS PRN
Qty: 30 TABLET | Refills: 0 | Status: SHIPPED | OUTPATIENT
Start: 2024-12-31 | End: 2024-12-31 | Stop reason: SDUPTHER

## 2024-12-31 RX ORDER — OXYCODONE AND ACETAMINOPHEN 5; 325 MG/1; MG/1
1 TABLET ORAL EVERY 8 HOURS PRN
Qty: 30 TABLET | Refills: 0 | Status: SHIPPED | OUTPATIENT
Start: 2024-12-31 | End: 2025-01-10

## 2025-01-13 ENCOUNTER — OFFICE VISIT (OUTPATIENT)
Age: 65
End: 2025-01-13
Payer: MEDICARE

## 2025-01-13 VITALS
TEMPERATURE: 98.2 F | SYSTOLIC BLOOD PRESSURE: 139 MMHG | OXYGEN SATURATION: 95 % | RESPIRATION RATE: 16 BRPM | BODY MASS INDEX: 22.46 KG/M2 | HEIGHT: 60 IN | DIASTOLIC BLOOD PRESSURE: 89 MMHG | HEART RATE: 86 BPM

## 2025-01-13 DIAGNOSIS — I73.9 PERIPHERAL VASCULAR DISEASE (HCC): ICD-10-CM

## 2025-01-13 DIAGNOSIS — M79.604 PAIN OF RIGHT LOWER EXTREMITY: Primary | ICD-10-CM

## 2025-01-13 DIAGNOSIS — S89.92XA INJURY OF LEFT LOWER EXTREMITY, INITIAL ENCOUNTER: ICD-10-CM

## 2025-01-13 DIAGNOSIS — T87.9 BKA STUMP COMPLICATION (HCC): ICD-10-CM

## 2025-01-13 PROCEDURE — 99212 OFFICE O/P EST SF 10 MIN: CPT | Performed by: SURGERY

## 2025-01-13 RX ORDER — CLINDAMYCIN HYDROCHLORIDE 300 MG/1
300 CAPSULE ORAL 4 TIMES DAILY
Qty: 40 CAPSULE | Refills: 0 | Status: SHIPPED | OUTPATIENT
Start: 2025-01-13 | End: 2025-01-23

## 2025-01-13 RX ORDER — OXYCODONE AND ACETAMINOPHEN 5; 325 MG/1; MG/1
1 TABLET ORAL EVERY 8 HOURS PRN
Qty: 40 TABLET | Refills: 0 | Status: SHIPPED | OUTPATIENT
Start: 2025-01-13 | End: 2025-01-27

## 2025-01-13 RX ORDER — CIPROFLOXACIN 500 MG/1
500 TABLET, FILM COATED ORAL 2 TIMES DAILY
Qty: 20 TABLET | Refills: 0 | Status: SHIPPED | OUTPATIENT
Start: 2025-01-13 | End: 2025-01-23

## 2025-01-13 NOTE — PROGRESS NOTES
Identified pt with two pt identifiers (name and ). Reviewed chart in preparation for visit and have obtained necessary documentation.    Laury Schuster is a 64 y.o. female  Chief Complaint   Patient presents with    Follow-up    Wound Check     /89 (Site: Right Upper Arm, Position: Sitting, Cuff Size: Large Adult)   Pulse 86   Temp 98.2 °F (36.8 °C) (Oral)   Resp 16   Ht 1.524 m (5')   LMP  (LMP Unknown)   SpO2 95%   BMI 22.46 kg/m²     1. Have you been to the ER, urgent care clinic since your last visit?  Hospitalized since your last visit?NO    2. Have you seen or consulted any other health care providers outside of the [facility] since your last visit?  Include any pap smears or colon screening. NO

## 2025-01-14 ENCOUNTER — TELEPHONE (OUTPATIENT)
Age: 65
End: 2025-01-14

## 2025-01-20 ENCOUNTER — OFFICE VISIT (OUTPATIENT)
Age: 65
End: 2025-01-20
Payer: MEDICARE

## 2025-01-20 VITALS
DIASTOLIC BLOOD PRESSURE: 74 MMHG | RESPIRATION RATE: 14 BRPM | BODY MASS INDEX: 22.46 KG/M2 | HEART RATE: 100 BPM | HEIGHT: 60 IN | SYSTOLIC BLOOD PRESSURE: 131 MMHG | TEMPERATURE: 97.5 F | OXYGEN SATURATION: 95 %

## 2025-01-20 DIAGNOSIS — M79.604 PAIN OF RIGHT LOWER EXTREMITY: Primary | ICD-10-CM

## 2025-01-20 DIAGNOSIS — T87.9 BKA STUMP COMPLICATION (HCC): ICD-10-CM

## 2025-01-20 PROCEDURE — 99212 OFFICE O/P EST SF 10 MIN: CPT | Performed by: SURGERY

## 2025-01-20 RX ORDER — NYSTATIN 100000 [USP'U]/G
POWDER TOPICAL
Qty: 60 G | Refills: 1 | Status: SHIPPED | OUTPATIENT
Start: 2025-01-20

## 2025-01-20 NOTE — PROGRESS NOTES
Identified pt with two pt identifiers (name and ). Reviewed chart in preparation for visit and have obtained necessary documentation.    Laury Schuster is a 64 y.o. female  Chief Complaint   Patient presents with    Follow-up     Right BKA and Left leg      /74 (Site: Left Upper Arm, Position: Sitting, Cuff Size: Large Adult)   Pulse 100   Temp 97.5 °F (36.4 °C) (Oral)   Resp 14   Ht 1.524 m (5')   LMP  (LMP Unknown)   SpO2 95%   BMI 22.46 kg/m²     1. Have you been to the ER, urgent care clinic since your last visit?  Hospitalized since your last visit?NO    2. Have you seen or consulted any other health care providers outside of the [facility] since your last visit?  Include any pap smears or colon screening. NO

## 2025-01-21 ENCOUNTER — TELEPHONE (OUTPATIENT)
Age: 65
End: 2025-01-21

## 2025-01-21 NOTE — TELEPHONE ENCOUNTER
Laury Schuster 2 patient identifiers used called back to discuss her wounds. She states she got all the supplies and wants to know when she should change. I informed her the Nystatin is to be used 3 times daily. I told her the other can be changed every other day. She's going to change it today and see how it looks.

## 2025-01-21 NOTE — TELEPHONE ENCOUNTER
Patient called with questions about her wound and dressing. She would like a call back from nurse to assist. Thanks - DCR

## 2025-01-24 ENCOUNTER — TELEPHONE (OUTPATIENT)
Age: 65
End: 2025-01-24

## 2025-01-24 NOTE — TELEPHONE ENCOUNTER
Sruthi from [employer] is asking for a records and a order that was sent over. I informed her that we did get the medical record request and sent that to [employer]. She stated she also sent an order.I informed her that I don't see an order for patient. She stated she will resend the order and wanted to reach out to [employer]. I gave Sruthi [employer] customer service number and she will re fax order. Phone  [phone] , Fax: [phone] Thanks - [facility]

## 2025-01-24 NOTE — PROGRESS NOTES
Vascular History and Physical    Patient: Laury Schuster  MRN: [id number]    YOB: 1960  Age: 64 y.o.  Sex: female     Chief Complaint:  Chief Complaint   Patient presents with    Follow-up    Wound Check       History of Present Illness: Laury Schuster is a 64 y.o. very pleasant woman is here today examined the right BKA wound and as well as new onset wound on the left leg.  Patient complaining of redness and also spots of ulcer noted posterior calf and left ankle area.    Social History:  Social Connections: Not on file       Past Medical History:  Past Medical History:   Diagnosis Date    Arthritis     Coagulation disorder (HCC)     possible , pt had surgery on leg and bled out    Diabetes (HCC) 2019    Hypertension     Left-sided weakness     Nodule of kidney     left kidney    Peripheral vascular disease (HCC)     Stroke ([facility])     recent stroke on 10/16/2021       Surgical History:  Past Surgical History:   Procedure Laterality Date    CATARACT REMOVAL  2019    COLONOSCOPY      FEMORAL BYPASS Right 10/18/2023    Right common femoral endarterectomy. 2.Right common femoral artery to below-knee popliteal bypass with 6 mm PTFE graft. performed by Marko Cantu MD at [facility] MAIN OR    FEMORAL BYPASS Right 4/7/2024    1.  Right common femoral artery to below-knee popliteal bypass graft thrombectomy using 3 Afghan a 4 Afghan Walker catheter. 2.  Right leg angiogram. 3.  Right leg bypass graft tPA infusion catheter placement. performed by Marko Cantu MD at [facility] MAIN OR    INVASIVE VASCULAR Right 1/7/2024    RIGHT LEG GRAFT THROMBECTOMY AND ANGIOGRAM performed by Marko Cantu MD at [facility] MAIN OR    INVASIVE VASCULAR N/A 4/8/2024    Angiography lower ext right performed by Marko Cantu MD at [facility] CARDIAC CATH LAB    INVASIVE VASCULAR N/A 4/8/2024    Angioplasty ant tib artery performed by Marko Cantu MD at [facility] CARDIAC CATH LAB    LEG AMPUTATION BELOW KNEE Right 4/13/2024    RIGHT LEG AMPUTATION

## 2025-01-27 ENCOUNTER — TELEPHONE (OUTPATIENT)
Age: 65
End: 2025-01-27

## 2025-01-27 RX ORDER — OXYCODONE AND ACETAMINOPHEN 5; 325 MG/1; MG/1
2 TABLET ORAL EVERY 8 HOURS PRN
Qty: 40 TABLET | Refills: 0 | Status: SHIPPED | OUTPATIENT
Start: 2025-01-27 | End: 2025-02-03

## 2025-01-27 NOTE — TELEPHONE ENCOUNTER
Patient called to cancel appt and asked if Dr. Cantu can send her RX Oxycodone over to the pharmacy- Thanks  - DCR

## 2025-01-30 NOTE — PROGRESS NOTES
Vascular History and Physical    Patient: Laury Schuster  MRN: [id number]    YOB: 1960  Age: 64 y.o.  Sex: female     Chief Complaint:  Chief Complaint   Patient presents with    Follow-up     Right BKA and Left leg        History of Present Illness: Laury Schuster is a 64 y.o. very pleasant woman is here today to reassess both leg.  Patient has a complicated wound on the right BKA and now developed left leg multiple wounds.  Patient complaining of a rash around left calf area.  No fever or chills.    Social History:  Social Connections: Not on file       Past Medical History:  Past Medical History:   Diagnosis Date    Arthritis     Coagulation disorder (HCC)     possible , pt had surgery on leg and bled out    Diabetes (HCC) 2019    Hypertension     Left-sided weakness     Nodule of kidney     left kidney    Peripheral vascular disease (HCC)     Stroke ([facility])     recent stroke on 10/16/2021       Surgical History:  Past Surgical History:   Procedure Laterality Date    CATARACT REMOVAL  2019    COLONOSCOPY      FEMORAL BYPASS Right 10/18/2023    Right common femoral endarterectomy. 2.Right common femoral artery to below-knee popliteal bypass with 6 mm PTFE graft. performed by Marko Cantu MD at [facility] MAIN OR    FEMORAL BYPASS Right 4/7/2024    1.  Right common femoral artery to below-knee popliteal bypass graft thrombectomy using 3 Greenlandic a 4 Greenlandic Walker catheter. 2.  Right leg angiogram. 3.  Right leg bypass graft tPA infusion catheter placement. performed by Marko Cantu MD at [facility] MAIN OR    INVASIVE VASCULAR Right 1/7/2024    RIGHT LEG GRAFT THROMBECTOMY AND ANGIOGRAM performed by Marko Cantu MD at [facility] MAIN OR    INVASIVE VASCULAR N/A 4/8/2024    Angiography lower ext right performed by Marko Cantu MD at [facility] CARDIAC CATH LAB    INVASIVE VASCULAR N/A 4/8/2024    Angioplasty ant tib artery performed by Marko Cantu MD at [facility] CARDIAC CATH LAB    LEG AMPUTATION BELOW KNEE Right

## 2025-02-10 ENCOUNTER — OFFICE VISIT (OUTPATIENT)
Age: 65
End: 2025-02-10

## 2025-02-10 VITALS
SYSTOLIC BLOOD PRESSURE: 148 MMHG | TEMPERATURE: 98.5 F | HEIGHT: 60 IN | BODY MASS INDEX: 22.46 KG/M2 | HEART RATE: 90 BPM | DIASTOLIC BLOOD PRESSURE: 82 MMHG | OXYGEN SATURATION: 98 %

## 2025-02-10 DIAGNOSIS — M79.604 PAIN OF RIGHT LOWER EXTREMITY: Primary | ICD-10-CM

## 2025-02-10 DIAGNOSIS — T87.9 BKA STUMP COMPLICATION (HCC): ICD-10-CM

## 2025-02-10 RX ORDER — OXYCODONE AND ACETAMINOPHEN 5; 325 MG/1; MG/1
2 TABLET ORAL EVERY 8 HOURS PRN
Qty: 40 TABLET | Refills: 0 | Status: SHIPPED | OUTPATIENT
Start: 2025-02-10 | End: 2025-02-17

## 2025-02-10 ASSESSMENT — PATIENT HEALTH QUESTIONNAIRE - PHQ9
SUM OF ALL RESPONSES TO PHQ9 QUESTIONS 1 & 2: 0
1. LITTLE INTEREST OR PLEASURE IN DOING THINGS: NOT AT ALL
SUM OF ALL RESPONSES TO PHQ QUESTIONS 1-9: 0
2. FEELING DOWN, DEPRESSED OR HOPELESS: NOT AT ALL
SUM OF ALL RESPONSES TO PHQ QUESTIONS 1-9: 0

## 2025-02-10 NOTE — PROGRESS NOTES
Identified pt with two pt identifiers (name and ). Reviewed chart in preparation for visit and have obtained necessary documentation.    Laury Schuster is a 64 y.o. female  Chief Complaint   Patient presents with    Follow-up     Bilateral leg     BP (!) 148/82 (Site: Left Upper Arm, Position: Sitting, Cuff Size: Large Adult)   Pulse 90   Temp 98.5 °F (36.9 °C) (Oral)   Ht 1.524 m (5')   LMP  (LMP Unknown)   SpO2 98%   BMI 22.46 kg/m²     1. Have you been to the ER, urgent care clinic since your last visit?  Hospitalized since your last visit?no    2. Have you seen or consulted any other health care providers outside of the [facility] since your last visit?  Include any pap smears or colon screening. no      Patient and provider made aware of elevated BP x2. Patient asymptomatic. Patient reminded to monitor BP, continue to take BP medications if prescribed, and follow up with PCP/Cardiologist.  Patient expressed understanding and agreement.

## 2025-02-15 NOTE — PROGRESS NOTES
Vascular History and Physical    Patient: Laury Schuster  MRN: [id number]    YOB: 1960  Age: 64 y.o.  Sex: female     Chief Complaint:  Chief Complaint   Patient presents with    Follow-up     Bilateral leg       History of Present Illness: Laury Schuster is a 64 y.o. very pleasant woman is here today to reassess right BKA and left leg wound.  Right BKA wound being covered with a daily iodine soaked.  And the left leg wounds are covered with Aquacel Ag.    Social History:  Social Connections: Not on file       Past Medical History:  Past Medical History:   Diagnosis Date    Arthritis     Coagulation disorder (HCC)     possible , pt had surgery on leg and bled out    Diabetes (HCC) 2019    Hypertension     Left-sided weakness     Nodule of kidney     left kidney    Peripheral vascular disease (HCC)     Stroke ([facility])     recent stroke on 10/16/2021       Surgical History:  Past Surgical History:   Procedure Laterality Date    CATARACT REMOVAL  2019    COLONOSCOPY      FEMORAL BYPASS Right 10/18/2023    Right common femoral endarterectomy. 2.Right common femoral artery to below-knee popliteal bypass with 6 mm PTFE graft. performed by Marko Cantu MD at [facility] MAIN OR    FEMORAL BYPASS Right 4/7/2024    1.  Right common femoral artery to below-knee popliteal bypass graft thrombectomy using 3 Mongolian a 4 Mongolian Walker catheter. 2.  Right leg angiogram. 3.  Right leg bypass graft tPA infusion catheter placement. performed by Marko Cantu MD at [facility] MAIN OR    INVASIVE VASCULAR Right 1/7/2024    RIGHT LEG GRAFT THROMBECTOMY AND ANGIOGRAM performed by Marko Cantu MD at [facility] MAIN OR    INVASIVE VASCULAR N/A 4/8/2024    Angiography lower ext right performed by Marko Cantu MD at [facility] CARDIAC CATH LAB    INVASIVE VASCULAR N/A 4/8/2024    Angioplasty ant tib artery performed by Marko Cantu MD at [facility] CARDIAC CATH LAB    LEG AMPUTATION BELOW KNEE Right 4/13/2024    RIGHT LEG AMPUTATION BELOW KNEE

## 2025-02-25 ENCOUNTER — TELEPHONE (OUTPATIENT)
Age: 65
End: 2025-02-25

## 2025-02-25 DIAGNOSIS — M79.606 PAIN OF LOWER EXTREMITY, UNSPECIFIED LATERALITY: Primary | ICD-10-CM

## 2025-02-25 DIAGNOSIS — T87.9 BKA STUMP COMPLICATION (HCC): Primary | ICD-10-CM

## 2025-02-25 RX ORDER — OXYCODONE AND ACETAMINOPHEN 5; 325 MG/1; MG/1
1 TABLET ORAL EVERY 8 HOURS PRN
Qty: 40 TABLET | Refills: 0 | Status: SHIPPED | OUTPATIENT
Start: 2025-02-25 | End: 2025-03-11

## 2025-02-25 NOTE — TELEPHONE ENCOUNTER
Patient called this morning is requesting a refill on her percocet please advise.Call back number is [phone]

## 2025-03-07 ENCOUNTER — OFFICE VISIT (OUTPATIENT)
Age: 65
End: 2025-03-07

## 2025-03-07 VITALS
OXYGEN SATURATION: 91 % | RESPIRATION RATE: 18 BRPM | TEMPERATURE: 97.9 F | HEIGHT: 60 IN | SYSTOLIC BLOOD PRESSURE: 105 MMHG | HEART RATE: 105 BPM | DIASTOLIC BLOOD PRESSURE: 69 MMHG | BODY MASS INDEX: 22.46 KG/M2

## 2025-03-07 DIAGNOSIS — G89.18 POST-OP PAIN: Primary | ICD-10-CM

## 2025-03-07 DIAGNOSIS — T87.9 BKA STUMP COMPLICATION (HCC): ICD-10-CM

## 2025-03-07 RX ORDER — OXYCODONE AND ACETAMINOPHEN 5; 325 MG/1; MG/1
1 TABLET ORAL EVERY 8 HOURS PRN
Qty: 40 TABLET | Refills: 0 | Status: SHIPPED | OUTPATIENT
Start: 2025-03-07 | End: 2025-03-21

## 2025-03-07 ASSESSMENT — PATIENT HEALTH QUESTIONNAIRE - PHQ9
SUM OF ALL RESPONSES TO PHQ QUESTIONS 1-9: 12
10. IF YOU CHECKED OFF ANY PROBLEMS, HOW DIFFICULT HAVE THESE PROBLEMS MADE IT FOR YOU TO DO YOUR WORK, TAKE CARE OF THINGS AT HOME, OR GET ALONG WITH OTHER PEOPLE: SOMEWHAT DIFFICULT
3. TROUBLE FALLING OR STAYING ASLEEP: NEARLY EVERY DAY
4. FEELING TIRED OR HAVING LITTLE ENERGY: NEARLY EVERY DAY
5. POOR APPETITE OR OVEREATING: NOT AT ALL
8. MOVING OR SPEAKING SO SLOWLY THAT OTHER PEOPLE COULD HAVE NOTICED. OR THE OPPOSITE, BEING SO FIGETY OR RESTLESS THAT YOU HAVE BEEN MOVING AROUND A LOT MORE THAN USUAL: NOT AT ALL
6. FEELING BAD ABOUT YOURSELF - OR THAT YOU ARE A FAILURE OR HAVE LET YOURSELF OR YOUR FAMILY DOWN: NOT AT ALL
2. FEELING DOWN, DEPRESSED OR HOPELESS: NEARLY EVERY DAY
7. TROUBLE CONCENTRATING ON THINGS, SUCH AS READING THE NEWSPAPER OR WATCHING TELEVISION: NOT AT ALL
SUM OF ALL RESPONSES TO PHQ QUESTIONS 1-9: 12
9. THOUGHTS THAT YOU WOULD BE BETTER OFF DEAD, OR OF HURTING YOURSELF: NOT AT ALL
SUM OF ALL RESPONSES TO PHQ QUESTIONS 1-9: 12
1. LITTLE INTEREST OR PLEASURE IN DOING THINGS: NEARLY EVERY DAY
SUM OF ALL RESPONSES TO PHQ QUESTIONS 1-9: 12

## 2025-03-07 NOTE — PROGRESS NOTES
Identified patient with two patient identifiers (name and ). Reviewed chart in preparation for visit and have obtained necessary documentation.    Laury Schuster is a 64 y.o. female  Chief Complaint   Patient presents with    Follow-up     bka     /69 (Site: Right Upper Arm, Position: Sitting, Cuff Size: Large Adult)   Pulse (!) 105   Temp 97.9 °F (36.6 °C) (Oral)   Resp 18   Ht 1.524 m (5')   LMP  (LMP Unknown)   SpO2 91%   BMI 22.46 kg/m²     1. Have you been to the ER, urgent care clinic since your last visit?  Hospitalized since your last visit?no    2. Have you seen or consulted any other health care providers outside of the [facility] since your last visit?  Include any pap smears or colon screening. no

## 2025-03-24 ENCOUNTER — OFFICE VISIT (OUTPATIENT)
Age: 65
End: 2025-03-24
Payer: MEDICARE

## 2025-03-24 VITALS
HEIGHT: 60 IN | SYSTOLIC BLOOD PRESSURE: 136 MMHG | BODY MASS INDEX: 22.46 KG/M2 | HEART RATE: 99 BPM | TEMPERATURE: 97.8 F | OXYGEN SATURATION: 97 % | DIASTOLIC BLOOD PRESSURE: 85 MMHG | RESPIRATION RATE: 16 BRPM

## 2025-03-24 DIAGNOSIS — I99.8 ISCHEMIA OF RIGHT LOWER EXTREMITY: ICD-10-CM

## 2025-03-24 DIAGNOSIS — M79.605 PAIN OF LEFT LOWER EXTREMITY: Primary | ICD-10-CM

## 2025-03-24 PROCEDURE — 1036F TOBACCO NON-USER: CPT | Performed by: SURGERY

## 2025-03-24 PROCEDURE — 3017F COLORECTAL CA SCREEN DOC REV: CPT | Performed by: SURGERY

## 2025-03-24 PROCEDURE — 99212 OFFICE O/P EST SF 10 MIN: CPT | Performed by: SURGERY

## 2025-03-24 PROCEDURE — G8427 DOCREV CUR MEDS BY ELIG CLIN: HCPCS | Performed by: SURGERY

## 2025-03-24 PROCEDURE — G8420 CALC BMI NORM PARAMETERS: HCPCS | Performed by: SURGERY

## 2025-03-24 RX ORDER — CLINDAMYCIN HYDROCHLORIDE 300 MG/1
300 CAPSULE ORAL 4 TIMES DAILY
Qty: 40 CAPSULE | Refills: 0 | Status: SHIPPED | OUTPATIENT
Start: 2025-03-24 | End: 2025-04-03

## 2025-03-24 RX ORDER — CIPROFLOXACIN 500 MG/1
500 TABLET, FILM COATED ORAL 2 TIMES DAILY
Qty: 20 TABLET | Refills: 0 | Status: SHIPPED | OUTPATIENT
Start: 2025-03-24 | End: 2025-04-03

## 2025-03-24 RX ORDER — OXYCODONE AND ACETAMINOPHEN 5; 325 MG/1; MG/1
1 TABLET ORAL EVERY 8 HOURS PRN
Qty: 40 TABLET | Refills: 0 | Status: SHIPPED | OUTPATIENT
Start: 2025-03-24 | End: 2025-04-07

## 2025-03-24 ASSESSMENT — PATIENT HEALTH QUESTIONNAIRE - PHQ9
SUM OF ALL RESPONSES TO PHQ QUESTIONS 1-9: 0
SUM OF ALL RESPONSES TO PHQ QUESTIONS 1-9: 0
1. LITTLE INTEREST OR PLEASURE IN DOING THINGS: NOT AT ALL
SUM OF ALL RESPONSES TO PHQ QUESTIONS 1-9: 0
2. FEELING DOWN, DEPRESSED OR HOPELESS: NOT AT ALL
SUM OF ALL RESPONSES TO PHQ QUESTIONS 1-9: 0

## 2025-03-24 NOTE — PROGRESS NOTES
Identified pt with two pt identifiers (name and ). Reviewed chart in preparation for visit and have obtained necessary documentation.    Laury Schuster is a 64 y.o. female  Chief Complaint   Patient presents with    Follow-up    Wound Check     /85 (BP Site: Right Lower Arm, Patient Position: Sitting, BP Cuff Size: Large Adult)   Pulse 99   Temp 97.8 °F (36.6 °C) (Oral)   Resp 16   Ht 1.524 m (5')   LMP  (LMP Unknown)   SpO2 97%   BMI 22.46 kg/m²     1. Have you been to the ER, urgent care clinic since your last visit?  Hospitalized since your last visit?NO    2. Have you seen or consulted any other health care providers outside of the [facility] since your last visit?  Include any pap smears or colon screening. no

## 2025-03-31 NOTE — PROGRESS NOTES
Vascular History and Physical    Patient: Laury Schuster  MRN: [id number]    YOB: 1960  Age: 64 y.o.  Sex: female     Chief Complaint:  Chief Complaint   Patient presents with    Follow-up    Wound Check       History of Present Illness: Laury Schuster is a 64 y.o.  patient is here today to recheck  right BKA wounds, pt aslo have wound on right leg as well. Pt states right ankle wound has gotten worse. Pain is worse today. No fever or chills.    Social History:  Social Connections: Not on file       Past Medical History:  Past Medical History:   Diagnosis Date    Arthritis     Coagulation disorder     possible , pt had surgery on leg and bled out    Diabetes (HCC) 2019    Hypertension     Left-sided weakness     Nodule of kidney     left kidney    Peripheral vascular disease     Stroke ([facility])     recent stroke on 10/16/2021       Surgical History:  Past Surgical History:   Procedure Laterality Date    CATARACT REMOVAL  2019    COLONOSCOPY      FEMORAL BYPASS Right 10/18/2023    Right common femoral endarterectomy. 2.Right common femoral artery to below-knee popliteal bypass with 6 mm PTFE graft. performed by Marko Cantu MD at [facility] MAIN OR    FEMORAL BYPASS Right 4/7/2024    1.  Right common femoral artery to below-knee popliteal bypass graft thrombectomy using 3 Malian a 4 Malian Walker catheter. 2.  Right leg angiogram. 3.  Right leg bypass graft tPA infusion catheter placement. performed by Marko Cantu MD at [facility] MAIN OR    INVASIVE VASCULAR Right 1/7/2024    RIGHT LEG GRAFT THROMBECTOMY AND ANGIOGRAM performed by Marko Cantu MD at [facility] MAIN OR    INVASIVE VASCULAR N/A 4/8/2024    Angiography lower ext right performed by Marko Cantu MD at [facility] CARDIAC CATH LAB    INVASIVE VASCULAR N/A 4/8/2024    Angioplasty ant tib artery performed by Marko Cantu MD at [facility] CARDIAC CATH LAB    LEG AMPUTATION BELOW KNEE Right 4/13/2024    RIGHT LEG AMPUTATION BELOW KNEE performed by Marko Cantu MD

## 2025-04-07 ENCOUNTER — TELEPHONE (OUTPATIENT)
Age: 65
End: 2025-04-07

## 2025-04-07 DIAGNOSIS — M79.605 PAIN OF LEFT LOWER EXTREMITY: ICD-10-CM

## 2025-04-07 RX ORDER — OXYCODONE AND ACETAMINOPHEN 5; 325 MG/1; MG/1
1 TABLET ORAL EVERY 8 HOURS PRN
Qty: 40 TABLET | Refills: 0 | Status: SHIPPED | OUTPATIENT
Start: 2025-04-07 | End: 2025-04-21

## 2025-04-07 NOTE — TELEPHONE ENCOUNTER
Patient called in stating she needs a RX. She also states that her stomach is Red, Swollen and Warm to touch. Patient needs a nurse to call her to assist. Thanks - DCR

## 2025-04-10 ENCOUNTER — OFFICE VISIT (OUTPATIENT)
Age: 65
End: 2025-04-10
Payer: MEDICARE

## 2025-04-10 VITALS
SYSTOLIC BLOOD PRESSURE: 97 MMHG | TEMPERATURE: 98 F | HEIGHT: 60 IN | BODY MASS INDEX: 22.46 KG/M2 | HEART RATE: 67 BPM | DIASTOLIC BLOOD PRESSURE: 60 MMHG | RESPIRATION RATE: 17 BRPM

## 2025-04-10 DIAGNOSIS — S81.801D WOUND OF RIGHT LOWER EXTREMITY, SUBSEQUENT ENCOUNTER: Primary | ICD-10-CM

## 2025-04-10 DIAGNOSIS — S81.801D WOUND OF RIGHT LOWER EXTREMITY, SUBSEQUENT ENCOUNTER: ICD-10-CM

## 2025-04-10 PROCEDURE — G8420 CALC BMI NORM PARAMETERS: HCPCS | Performed by: SURGERY

## 2025-04-10 PROCEDURE — G8427 DOCREV CUR MEDS BY ELIG CLIN: HCPCS | Performed by: SURGERY

## 2025-04-10 PROCEDURE — 1036F TOBACCO NON-USER: CPT | Performed by: SURGERY

## 2025-04-10 PROCEDURE — 99212 OFFICE O/P EST SF 10 MIN: CPT | Performed by: SURGERY

## 2025-04-10 PROCEDURE — 3017F COLORECTAL CA SCREEN DOC REV: CPT | Performed by: SURGERY

## 2025-04-10 RX ORDER — SULFAMETHOXAZOLE AND TRIMETHOPRIM 800; 160 MG/1; MG/1
1 TABLET ORAL 2 TIMES DAILY
Qty: 14 TABLET | Refills: 0 | Status: SHIPPED | OUTPATIENT
Start: 2025-04-10 | End: 2025-04-17

## 2025-04-10 ASSESSMENT — PATIENT HEALTH QUESTIONNAIRE - PHQ9
SUM OF ALL RESPONSES TO PHQ QUESTIONS 1-9: 0
2. FEELING DOWN, DEPRESSED OR HOPELESS: NOT AT ALL
SUM OF ALL RESPONSES TO PHQ QUESTIONS 1-9: 0
1. LITTLE INTEREST OR PLEASURE IN DOING THINGS: NOT AT ALL

## 2025-04-12 LAB — SPECIMEN STATUS REPORT: NORMAL

## 2025-04-17 ENCOUNTER — TELEPHONE (OUTPATIENT)
Age: 65
End: 2025-04-17

## 2025-04-17 LAB
BACTERIA SPEC AEROBE CULT: NORMAL
BACTERIA SPEC ANAEROBE CULT: NORMAL
MICROORGANISM/AGENT SPEC: ABNORMAL

## 2025-04-17 NOTE — TELEPHONE ENCOUNTER
I called and spoke with Laury Schuster 2 patient identifiers used. I informed her that her prescription isn't up until Monday. I will have to wait until Monday to ask . She states she understands.

## 2025-04-17 NOTE — TELEPHONE ENCOUNTER
Patient called she canceled her appointment for today but requested a refill on her oxycodone please advise.

## 2025-04-18 PROBLEM — S81.801A WOUND OF RIGHT LEG: Status: ACTIVE | Noted: 2025-04-18

## 2025-04-18 NOTE — PROGRESS NOTES
Identified pt with two pt identifiers (name and ). Reviewed chart in preparation for visit and have obtained necessary documentation.    Laury Schuster is a 64 y.o. female  Chief Complaint   Patient presents with    Follow-up     BKA     BP 97/60 (BP Site: Left Upper Arm, Patient Position: Sitting, BP Cuff Size: Large Adult)   Pulse 67   Temp 98 °F (36.7 °C) (Oral)   Resp 17   Ht 1.524 m (5')   LMP  (LMP Unknown)   BMI 22.46 kg/m²     1. Have you been to the ER, urgent care clinic since your last visit?  Hospitalized since your last visit?NO    2. Have you seen or consulted any other health care providers outside of the [facility] since your last visit?  Include any pap smears or colon screening. NO    
   Comment: Test Code Change  Test Code Change  Please note that the Microbiology test code was changed to reflect  the specimen source or transport received.      Anaerobic Culture 04/10/2025 No anaerobic growth in 72 hours.   Final    Aerobic Culture 04/10/2025    Final                    Value:Mixed skin meek  Moderate growth           Images:      Pepe Zhu MD    Assessments:  Patient Active Problem List   Diagnosis    Postop carotid endarterectomy surveillance, encounter for    Ischemia of right lower extremity    Stroke (HCC)    CVA (cerebral vascular accident) (HCC)    History of bleeding disorder    Peripheral vascular disease    Adrenal tumor    Pancreatic tumor    Limb ischemia    Ulcer of right foot (HCC)    Femoral artery occlusion, right    Ischemic leg    Arterial occlusion    Occlusion of femoropopliteal bypass graft    Pain of left lower extremity    Pain of right lower extremity    Peripheral arterial disease    Ischemic foot ulcer due to atherosclerosis of native artery of limb    BKA stump complication (HCC)    Post-op pain    Wound of right leg       Plans: Will continue wound care on the open wound.  Etiology unclear.  Patient denies any trauma to the BKA stump.  Patient will be closely monitor follow-up 2 weeks.      **Note: This dwain is pertinent to patient with PAD.    Vascular risk factor modification regimen:  5 regimens include: Optimal cholesterol control, exercise program, medication modifications including antiplatelet therapy and antihypertensives, optimal blood pressure control, and optimal hydration.  6 regimens include: 5 regimens plus tobacco cessation.  7 regimens include: 5 regimens plus strict diabetic control.  8 regimens include: 6 plus 7 regimens    Pepe Zhu MD

## 2025-04-21 DIAGNOSIS — M79.604 PAIN OF RIGHT LOWER EXTREMITY: Primary | ICD-10-CM

## 2025-04-21 RX ORDER — OXYCODONE AND ACETAMINOPHEN 5; 325 MG/1; MG/1
1 TABLET ORAL EVERY 6 HOURS PRN
Qty: 28 TABLET | Refills: 0 | Status: SHIPPED | OUTPATIENT
Start: 2025-04-21 | End: 2025-04-28

## 2025-05-05 ENCOUNTER — OFFICE VISIT (OUTPATIENT)
Age: 65
End: 2025-05-05
Payer: MEDICARE

## 2025-05-05 VITALS
RESPIRATION RATE: 19 BRPM | BODY MASS INDEX: 22.46 KG/M2 | HEART RATE: 93 BPM | DIASTOLIC BLOOD PRESSURE: 75 MMHG | HEIGHT: 60 IN | SYSTOLIC BLOOD PRESSURE: 126 MMHG | OXYGEN SATURATION: 94 %

## 2025-05-05 DIAGNOSIS — S81.801D WOUND OF RIGHT LOWER EXTREMITY, SUBSEQUENT ENCOUNTER: ICD-10-CM

## 2025-05-05 DIAGNOSIS — S89.92XA INJURY OF LEFT LOWER EXTREMITY, INITIAL ENCOUNTER: ICD-10-CM

## 2025-05-05 DIAGNOSIS — S81.801D WOUND OF RIGHT LOWER EXTREMITY, SUBSEQUENT ENCOUNTER: Primary | ICD-10-CM

## 2025-05-05 PROCEDURE — 3017F COLORECTAL CA SCREEN DOC REV: CPT | Performed by: SURGERY

## 2025-05-05 PROCEDURE — G8420 CALC BMI NORM PARAMETERS: HCPCS | Performed by: SURGERY

## 2025-05-05 PROCEDURE — 1036F TOBACCO NON-USER: CPT | Performed by: SURGERY

## 2025-05-05 PROCEDURE — G8427 DOCREV CUR MEDS BY ELIG CLIN: HCPCS | Performed by: SURGERY

## 2025-05-05 PROCEDURE — 99212 OFFICE O/P EST SF 10 MIN: CPT | Performed by: SURGERY

## 2025-05-05 RX ORDER — OXYCODONE AND ACETAMINOPHEN 5; 325 MG/1; MG/1
1 TABLET ORAL EVERY 8 HOURS PRN
Qty: 40 TABLET | Refills: 0 | Status: SHIPPED | OUTPATIENT
Start: 2025-05-05 | End: 2025-05-19

## 2025-05-05 RX ORDER — CLINDAMYCIN HYDROCHLORIDE 300 MG/1
300 CAPSULE ORAL 4 TIMES DAILY
Qty: 40 CAPSULE | Refills: 0 | Status: SHIPPED | OUTPATIENT
Start: 2025-05-05 | End: 2025-05-15

## 2025-05-05 RX ORDER — CIPROFLOXACIN 500 MG/1
500 TABLET, FILM COATED ORAL 2 TIMES DAILY
Qty: 20 TABLET | Refills: 0 | Status: SHIPPED | OUTPATIENT
Start: 2025-05-05 | End: 2025-05-15

## 2025-05-05 ASSESSMENT — PATIENT HEALTH QUESTIONNAIRE - PHQ9
2. FEELING DOWN, DEPRESSED OR HOPELESS: NOT AT ALL
SUM OF ALL RESPONSES TO PHQ QUESTIONS 1-9: 0
1. LITTLE INTEREST OR PLEASURE IN DOING THINGS: NOT AT ALL
SUM OF ALL RESPONSES TO PHQ QUESTIONS 1-9: 0

## 2025-05-05 NOTE — PROGRESS NOTES
Identified pt with two pt identifiers (name and ). Reviewed chart in preparation for visit and have obtained necessary documentation.    Laury Schuster is a 64 y.o. female  Chief Complaint   Patient presents with    Other     BKA     /75 (BP Site: Right Upper Arm, Patient Position: Sitting, BP Cuff Size: Small Adult)   Pulse 93   Resp 19   Ht 1.524 m (5')   LMP  (LMP Unknown)   SpO2 94%   BMI 22.46 kg/m²     1. Have you been to the ER, urgent care clinic since your last visit?  Hospitalized since your last visit?no    2. Have you seen or consulted any other health care providers outside of the [facility] since your last visit?  Include any pap smears or colon screening. no

## 2025-05-11 LAB
MICROORGANISM/AGENT SPEC: ABNORMAL
MICROORGANISM/AGENT SPEC: ABNORMAL

## 2025-05-12 PROBLEM — S89.92XA INJURY OF LEFT LEG: Status: ACTIVE | Noted: 2025-05-12

## 2025-05-12 NOTE — PROGRESS NOTES
Vascular History and Physical    Patient: Laury Schuster  MRN: [id number]    YOB: 1960  Age: 64 y.o.  Sex: female     Chief Complaint:  Chief Complaint   Patient presents with    Other     BKA       History of Present Illness: Laury Schuster is a 64 y.o. very pleasant woman is here today right BKA wound examination as well as the left lower extremity.  Patient apparently has developed more wound on the left leg.  Patient currently taking care of her mom with hospice care.  Patient states right BKA stump wound has gotten worse and pain is more severe.    Social History:  Social Connections: Not on file       Past Medical History:  Past Medical History:   Diagnosis Date    Arthritis     Coagulation disorder     possible , pt had surgery on leg and bled out    Diabetes ([facility]) 2019    Hypertension     Left-sided weakness     Nodule of kidney     left kidney    Peripheral vascular disease     Stroke ([facility])     recent stroke on 10/16/2021       Surgical History:  Past Surgical History:   Procedure Laterality Date    CATARACT REMOVAL  2019    COLONOSCOPY      FEMORAL BYPASS Right 10/18/2023    Right common femoral endarterectomy. 2.Right common femoral artery to below-knee popliteal bypass with 6 mm PTFE graft. performed by Marko Cantu MD at [facility] MAIN OR    FEMORAL BYPASS Right 4/7/2024    1.  Right common femoral artery to below-knee popliteal bypass graft thrombectomy using 3 Vatican citizen a 4 [place] citizen Walker catheter. 2.  Right leg angiogram. 3.  Right leg bypass graft tPA infusion catheter placement. performed by Marko Cantu MD at [facility] MAIN OR    INVASIVE VASCULAR Right 1/7/2024    RIGHT LEG GRAFT THROMBECTOMY AND ANGIOGRAM performed by Marko Cantu MD at [facility] MAIN OR    INVASIVE VASCULAR N/A 4/8/2024    Angiography lower ext right performed by Marko Cantu MD at [facility] CARDIAC CATH LAB    INVASIVE VASCULAR N/A 4/8/2024    Angioplasty ant tib artery performed by Marko Cantu MD at [facility] CARDIAC CATH LAB

## 2025-05-13 LAB — MICROORGANISM/AGENT SPEC: ABNORMAL

## 2025-05-19 ENCOUNTER — OFFICE VISIT (OUTPATIENT)
Age: 65
End: 2025-05-19
Payer: MEDICARE

## 2025-05-19 VITALS
RESPIRATION RATE: 16 BRPM | TEMPERATURE: 98.2 F | HEART RATE: 87 BPM | SYSTOLIC BLOOD PRESSURE: 126 MMHG | HEIGHT: 60 IN | WEIGHT: 115 LBS | BODY MASS INDEX: 22.58 KG/M2 | DIASTOLIC BLOOD PRESSURE: 79 MMHG | OXYGEN SATURATION: 98 %

## 2025-05-19 DIAGNOSIS — M79.604 PAIN OF RIGHT LOWER EXTREMITY: Primary | ICD-10-CM

## 2025-05-19 DIAGNOSIS — T87.9 BKA STUMP COMPLICATION (HCC): ICD-10-CM

## 2025-05-19 PROCEDURE — G8427 DOCREV CUR MEDS BY ELIG CLIN: HCPCS | Performed by: SURGERY

## 2025-05-19 PROCEDURE — 99212 OFFICE O/P EST SF 10 MIN: CPT | Performed by: SURGERY

## 2025-05-19 PROCEDURE — G8420 CALC BMI NORM PARAMETERS: HCPCS | Performed by: SURGERY

## 2025-05-19 PROCEDURE — 1036F TOBACCO NON-USER: CPT | Performed by: SURGERY

## 2025-05-19 PROCEDURE — 3017F COLORECTAL CA SCREEN DOC REV: CPT | Performed by: SURGERY

## 2025-05-19 RX ORDER — CYCLOBENZAPRINE HCL 10 MG
10 TABLET ORAL NIGHTLY PRN
Qty: 30 TABLET | Refills: 1 | Status: SHIPPED | OUTPATIENT
Start: 2025-05-19 | End: 2025-07-18

## 2025-05-19 RX ORDER — CIPROFLOXACIN 500 MG/1
500 TABLET, FILM COATED ORAL 2 TIMES DAILY
Qty: 20 TABLET | Refills: 0 | Status: SHIPPED | OUTPATIENT
Start: 2025-05-19 | End: 2025-05-29

## 2025-05-19 RX ORDER — OXYCODONE AND ACETAMINOPHEN 5; 325 MG/1; MG/1
1 TABLET ORAL EVERY 8 HOURS PRN
Qty: 40 TABLET | Refills: 0 | Status: SHIPPED | OUTPATIENT
Start: 2025-05-19 | End: 2025-06-01

## 2025-05-19 RX ORDER — CLINDAMYCIN HYDROCHLORIDE 300 MG/1
300 CAPSULE ORAL 4 TIMES DAILY
Qty: 40 CAPSULE | Refills: 0 | Status: SHIPPED | OUTPATIENT
Start: 2025-05-19 | End: 2025-05-29

## 2025-05-19 ASSESSMENT — PATIENT HEALTH QUESTIONNAIRE - PHQ9
2. FEELING DOWN, DEPRESSED OR HOPELESS: NOT AT ALL
SUM OF ALL RESPONSES TO PHQ QUESTIONS 1-9: 0

## 2025-05-19 NOTE — PROGRESS NOTES
Identified pt with two pt identifiers (name and ). Reviewed chart in preparation for visit and have obtained necessary documentation.    Laury Schuster is a 64 y.o. female  Chief Complaint   Patient presents with    Follow-up    Wound Check     Bilateral lower legs      /79 (BP Site: Right Upper Arm, Patient Position: Sitting, BP Cuff Size: Small Adult)   Pulse 87   Temp 98.2 °F (36.8 °C) (Temporal)   Resp 16   Ht 1.524 m (5')   Wt 52.2 kg (115 lb) Comment: wheelchair  LMP  (LMP Unknown)   SpO2 98%   BMI 22.46 kg/m²     1. Have you been to the ER, urgent care clinic since your last visit?  Hospitalized since your last visit?no    2. Have you seen or consulted any other health care providers outside of the [facility] since your last visit?  Include any pap smears or colon screening. no

## 2025-05-26 NOTE — PROGRESS NOTES
Vascular History and Physical    Patient: Laury Schuster  MRN: [id number]    YOB: 1960  Age: 64 y.o.  Sex: female     Chief Complaint:  Chief Complaint   Patient presents with    Follow-up    Wound Check     Bilateral lower legs        History of Present Illness: Laury Schuster is a 64 y.o. very pleasant woman is here today follow-up reexamine the right BKA.  Still has a significant pain right BKA and as well as left leg.  Patient developed wounds on the left leg as well we are treating the with Medihoney ointments and Aquacel Ag.    Social History:  Social Connections: Not on file       Past Medical History:  Past Medical History:   Diagnosis Date    Arthritis     Coagulation disorder     possible , pt had surgery on leg and bled out    Diabetes (HCC) 2019    Hypertension     Left-sided weakness     Nodule of kidney     left kidney    Peripheral vascular disease     Stroke ([facility])     recent stroke on 10/16/2021       Surgical History:  Past Surgical History:   Procedure Laterality Date    CATARACT REMOVAL  2019    COLONOSCOPY      FEMORAL BYPASS Right 10/18/2023    Right common femoral endarterectomy. 2.Right common femoral artery to below-knee popliteal bypass with 6 mm PTFE graft. performed by Marko Cantu MD at [facility] MAIN OR    FEMORAL BYPASS Right 4/7/2024    1.  Right common femoral artery to below-knee popliteal bypass graft thrombectomy using 3 Kyrgyz a 4 Kyrgyz Walker catheter. 2.  Right leg angiogram. 3.  Right leg bypass graft tPA infusion catheter placement. performed by Marko Cantu MD at [facility] MAIN OR    INVASIVE VASCULAR Right 1/7/2024    RIGHT LEG GRAFT THROMBECTOMY AND ANGIOGRAM performed by Marko Cantu MD at [facility] MAIN OR    INVASIVE VASCULAR N/A 4/8/2024    Angiography lower ext right performed by Marko Cantu MD at [facility] CARDIAC CATH LAB    INVASIVE VASCULAR N/A 4/8/2024    Angioplasty ant tib artery performed by Marko Cantu MD at [facility] CARDIAC CATH LAB    LEG AMPUTATION

## 2025-06-02 ENCOUNTER — APPOINTMENT (OUTPATIENT)
Facility: HOSPITAL | Age: 65
DRG: 629 | End: 2025-06-02
Attending: SURGERY
Payer: MEDICARE

## 2025-06-02 ENCOUNTER — HOSPITAL ENCOUNTER (INPATIENT)
Facility: HOSPITAL | Age: 65
LOS: 5 days | Discharge: HOME | DRG: 629 | End: 2025-06-07
Attending: SURGERY | Admitting: SURGERY
Payer: MEDICARE

## 2025-06-02 ENCOUNTER — OFFICE VISIT (OUTPATIENT)
Age: 65
End: 2025-06-02

## 2025-06-02 VITALS
TEMPERATURE: 98.2 F | HEIGHT: 60 IN | RESPIRATION RATE: 19 BRPM | DIASTOLIC BLOOD PRESSURE: 72 MMHG | OXYGEN SATURATION: 93 % | HEART RATE: 100 BPM | BODY MASS INDEX: 22.46 KG/M2 | SYSTOLIC BLOOD PRESSURE: 133 MMHG

## 2025-06-02 DIAGNOSIS — I73.9 PVD (PERIPHERAL VASCULAR DISEASE): ICD-10-CM

## 2025-06-02 DIAGNOSIS — L08.9 WOUND INFECTION: Primary | ICD-10-CM

## 2025-06-02 DIAGNOSIS — T14.8XXA WOUND INFECTION: Primary | ICD-10-CM

## 2025-06-02 DIAGNOSIS — I99.8 ISCHEMIA OF RIGHT LOWER EXTREMITY: Primary | ICD-10-CM

## 2025-06-02 PROBLEM — S86.902A: Status: ACTIVE | Noted: 2025-06-02

## 2025-06-02 PROBLEM — S81.002A: Status: ACTIVE | Noted: 2025-06-02

## 2025-06-02 PROBLEM — S81.802A: Status: ACTIVE | Noted: 2025-06-02

## 2025-06-02 PROBLEM — S96.902A: Status: ACTIVE | Noted: 2025-06-02

## 2025-06-02 PROBLEM — S91.002A: Status: ACTIVE | Noted: 2025-06-02

## 2025-06-02 LAB
ALBUMIN SERPL-MCNC: 3 G/DL (ref 3.5–5)
ALBUMIN/GLOB SERPL: 0.7 (ref 1.1–2.2)
ALP SERPL-CCNC: 91 U/L (ref 45–117)
ALT SERPL-CCNC: 15 U/L (ref 12–78)
ANION GAP SERPL CALC-SCNC: 6 MMOL/L (ref 2–12)
AST SERPL W P-5'-P-CCNC: 15 U/L (ref 15–37)
BASOPHILS # BLD: 0.07 K/UL (ref 0–0.1)
BASOPHILS NFR BLD: 0.8 % (ref 0–1)
BILIRUB SERPL-MCNC: 0.2 MG/DL (ref 0.2–1)
BUN SERPL-MCNC: 12 MG/DL (ref 6–20)
BUN/CREAT SERPL: 12 (ref 12–20)
CA-I BLD-MCNC: 9.4 MG/DL (ref 8.5–10.1)
CHLORIDE SERPL-SCNC: 103 MMOL/L (ref 97–108)
CO2 SERPL-SCNC: 23 MMOL/L (ref 21–32)
CREAT SERPL-MCNC: 0.98 MG/DL (ref 0.55–1.02)
CRP SERPL-MCNC: 0.68 MG/DL (ref 0–0.3)
DIFFERENTIAL METHOD BLD: ABNORMAL
EOSINOPHIL # BLD: 0.57 K/UL (ref 0–0.4)
EOSINOPHIL NFR BLD: 6.6 % (ref 0–7)
ERYTHROCYTE [DISTWIDTH] IN BLOOD BY AUTOMATED COUNT: 14.4 % (ref 11.5–14.5)
GLOBULIN SER CALC-MCNC: 4.2 G/DL (ref 2–4)
GLUCOSE BLD STRIP.AUTO-MCNC: 290 MG/DL (ref 65–100)
GLUCOSE BLD STRIP.AUTO-MCNC: 360 MG/DL (ref 65–100)
GLUCOSE SERPL-MCNC: 389 MG/DL (ref 65–100)
HCT VFR BLD AUTO: 33.9 % (ref 35–47)
HGB BLD-MCNC: 10.4 G/DL (ref 11.5–16)
IMM GRANULOCYTES # BLD AUTO: 0.03 K/UL (ref 0–0.04)
IMM GRANULOCYTES NFR BLD AUTO: 0.3 % (ref 0–0.5)
LYMPHOCYTES # BLD: 2.11 K/UL (ref 0.8–3.5)
LYMPHOCYTES NFR BLD: 24.6 % (ref 12–49)
MCH RBC QN AUTO: 24.6 PG (ref 26–34)
MCHC RBC AUTO-ENTMCNC: 30.7 G/DL (ref 30–36.5)
MCV RBC AUTO: 80.3 FL (ref 80–99)
MONOCYTES # BLD: 0.9 K/UL (ref 0–1)
MONOCYTES NFR BLD: 10.5 % (ref 5–13)
NEUTS SEG # BLD: 4.91 K/UL (ref 1.8–8)
NEUTS SEG NFR BLD: 57.2 % (ref 32–75)
NRBC # BLD: 0 K/UL (ref 0–0.01)
NRBC BLD-RTO: 0 PER 100 WBC
PERFORMED BY:: ABNORMAL
PERFORMED BY:: ABNORMAL
PLATELET # BLD AUTO: 464 K/UL (ref 150–400)
PMV BLD AUTO: 12.2 FL (ref 8.9–12.9)
POTASSIUM SERPL-SCNC: 4.7 MMOL/L (ref 3.5–5.1)
PROCALCITONIN SERPL-MCNC: <0.05 NG/ML
PROT SERPL-MCNC: 7.2 G/DL (ref 6.4–8.2)
RBC # BLD AUTO: 4.22 M/UL (ref 3.8–5.2)
SODIUM SERPL-SCNC: 132 MMOL/L (ref 136–145)
WBC # BLD AUTO: 8.6 K/UL (ref 3.6–11)

## 2025-06-02 PROCEDURE — 6370000000 HC RX 637 (ALT 250 FOR IP): Performed by: SURGERY

## 2025-06-02 PROCEDURE — 94640 AIRWAY INHALATION TREATMENT: CPT

## 2025-06-02 PROCEDURE — 73610 X-RAY EXAM OF ANKLE: CPT

## 2025-06-02 PROCEDURE — 87205 SMEAR GRAM STAIN: CPT

## 2025-06-02 PROCEDURE — 2580000003 HC RX 258: Performed by: SURGERY

## 2025-06-02 PROCEDURE — 99223 1ST HOSP IP/OBS HIGH 75: CPT | Performed by: INTERNAL MEDICINE

## 2025-06-02 PROCEDURE — 82962 GLUCOSE BLOOD TEST: CPT

## 2025-06-02 PROCEDURE — 80053 COMPREHEN METABOLIC PANEL: CPT

## 2025-06-02 PROCEDURE — 85025 COMPLETE CBC W/AUTO DIFF WBC: CPT

## 2025-06-02 PROCEDURE — 6360000002 HC RX W HCPCS: Performed by: SURGERY

## 2025-06-02 PROCEDURE — 94761 N-INVAS EAR/PLS OXIMETRY MLT: CPT

## 2025-06-02 PROCEDURE — 73590 X-RAY EXAM OF LOWER LEG: CPT

## 2025-06-02 PROCEDURE — 86140 C-REACTIVE PROTEIN: CPT

## 2025-06-02 PROCEDURE — 84145 PROCALCITONIN (PCT): CPT

## 2025-06-02 PROCEDURE — 73562 X-RAY EXAM OF KNEE 3: CPT

## 2025-06-02 PROCEDURE — 1100000000 HC RM PRIVATE

## 2025-06-02 PROCEDURE — 99222 1ST HOSP IP/OBS MODERATE 55: CPT | Performed by: SURGERY

## 2025-06-02 PROCEDURE — 87070 CULTURE OTHR SPECIMN AEROBIC: CPT

## 2025-06-02 RX ORDER — SODIUM CHLORIDE 0.9 % (FLUSH) 0.9 %
5-40 SYRINGE (ML) INJECTION PRN
Status: DISCONTINUED | OUTPATIENT
Start: 2025-06-02 | End: 2025-06-07 | Stop reason: HOSPADM

## 2025-06-02 RX ORDER — ALBUTEROL SULFATE 90 UG/1
2 INHALANT RESPIRATORY (INHALATION) EVERY 4 HOURS PRN
Status: DISCONTINUED | OUTPATIENT
Start: 2025-06-02 | End: 2025-06-07 | Stop reason: HOSPADM

## 2025-06-02 RX ORDER — POLYETHYLENE GLYCOL 3350 17 G/17G
17 POWDER, FOR SOLUTION ORAL DAILY PRN
Status: DISCONTINUED | OUTPATIENT
Start: 2025-06-02 | End: 2025-06-07 | Stop reason: HOSPADM

## 2025-06-02 RX ORDER — GLUCAGON 1 MG/ML
1 KIT INJECTION PRN
Status: DISCONTINUED | OUTPATIENT
Start: 2025-06-02 | End: 2025-06-07 | Stop reason: HOSPADM

## 2025-06-02 RX ORDER — FOLIC ACID 1 MG/1
1 TABLET ORAL DAILY
Status: DISCONTINUED | OUTPATIENT
Start: 2025-06-03 | End: 2025-06-07 | Stop reason: HOSPADM

## 2025-06-02 RX ORDER — INSULIN LISPRO 100 [IU]/ML
0-8 INJECTION, SOLUTION INTRAVENOUS; SUBCUTANEOUS
Status: DISCONTINUED | OUTPATIENT
Start: 2025-06-02 | End: 2025-06-07 | Stop reason: HOSPADM

## 2025-06-02 RX ORDER — GABAPENTIN 100 MG/1
100 CAPSULE ORAL 3 TIMES DAILY
Status: DISCONTINUED | OUTPATIENT
Start: 2025-06-02 | End: 2025-06-07 | Stop reason: HOSPADM

## 2025-06-02 RX ORDER — ONDANSETRON 2 MG/ML
4 INJECTION INTRAMUSCULAR; INTRAVENOUS EVERY 6 HOURS PRN
Status: DISCONTINUED | OUTPATIENT
Start: 2025-06-02 | End: 2025-06-07 | Stop reason: HOSPADM

## 2025-06-02 RX ORDER — SODIUM CHLORIDE 9 MG/ML
INJECTION, SOLUTION INTRAVENOUS PRN
Status: DISCONTINUED | OUTPATIENT
Start: 2025-06-02 | End: 2025-06-07 | Stop reason: HOSPADM

## 2025-06-02 RX ORDER — ENOXAPARIN SODIUM 100 MG/ML
40 INJECTION SUBCUTANEOUS DAILY
Status: DISCONTINUED | OUTPATIENT
Start: 2025-06-02 | End: 2025-06-03

## 2025-06-02 RX ORDER — TRAZODONE HYDROCHLORIDE 50 MG/1
100 TABLET ORAL NIGHTLY
Status: DISCONTINUED | OUTPATIENT
Start: 2025-06-02 | End: 2025-06-07 | Stop reason: HOSPADM

## 2025-06-02 RX ORDER — POTASSIUM CHLORIDE 1500 MG/1
40 TABLET, EXTENDED RELEASE ORAL PRN
Status: DISCONTINUED | OUTPATIENT
Start: 2025-06-02 | End: 2025-06-07 | Stop reason: HOSPADM

## 2025-06-02 RX ORDER — SODIUM CHLORIDE 0.9 % (FLUSH) 0.9 %
5-40 SYRINGE (ML) INJECTION EVERY 12 HOURS SCHEDULED
Status: DISCONTINUED | OUTPATIENT
Start: 2025-06-02 | End: 2025-06-07 | Stop reason: HOSPADM

## 2025-06-02 RX ORDER — OXYCODONE AND ACETAMINOPHEN 10; 325 MG/1; MG/1
1 TABLET ORAL EVERY 4 HOURS PRN
Refills: 0 | Status: DISCONTINUED | OUTPATIENT
Start: 2025-06-02 | End: 2025-06-07 | Stop reason: HOSPADM

## 2025-06-02 RX ORDER — ONDANSETRON 4 MG/1
4 TABLET, ORALLY DISINTEGRATING ORAL EVERY 8 HOURS PRN
Status: DISCONTINUED | OUTPATIENT
Start: 2025-06-02 | End: 2025-06-07 | Stop reason: HOSPADM

## 2025-06-02 RX ORDER — BUDESONIDE AND FORMOTEROL FUMARATE DIHYDRATE 80; 4.5 UG/1; UG/1
2 AEROSOL RESPIRATORY (INHALATION)
Status: DISCONTINUED | OUTPATIENT
Start: 2025-06-02 | End: 2025-06-07 | Stop reason: HOSPADM

## 2025-06-02 RX ORDER — SENNOSIDES 8.6 MG
325 CAPSULE ORAL DAILY
Status: DISCONTINUED | OUTPATIENT
Start: 2025-06-03 | End: 2025-06-07 | Stop reason: HOSPADM

## 2025-06-02 RX ORDER — ACETAMINOPHEN 325 MG/1
650 TABLET ORAL EVERY 6 HOURS PRN
Status: DISCONTINUED | OUTPATIENT
Start: 2025-06-02 | End: 2025-06-02 | Stop reason: SDUPTHER

## 2025-06-02 RX ORDER — PANTOPRAZOLE SODIUM 40 MG/1
40 TABLET, DELAYED RELEASE ORAL DAILY
Status: DISCONTINUED | OUTPATIENT
Start: 2025-06-03 | End: 2025-06-07 | Stop reason: HOSPADM

## 2025-06-02 RX ORDER — CYCLOBENZAPRINE HCL 10 MG
10 TABLET ORAL NIGHTLY PRN
Status: DISCONTINUED | OUTPATIENT
Start: 2025-06-02 | End: 2025-06-07 | Stop reason: HOSPADM

## 2025-06-02 RX ORDER — ACETAMINOPHEN 650 MG/1
650 SUPPOSITORY RECTAL EVERY 6 HOURS PRN
Status: DISCONTINUED | OUTPATIENT
Start: 2025-06-02 | End: 2025-06-07 | Stop reason: HOSPADM

## 2025-06-02 RX ORDER — DEXTROSE MONOHYDRATE 100 MG/ML
INJECTION, SOLUTION INTRAVENOUS CONTINUOUS PRN
Status: DISCONTINUED | OUTPATIENT
Start: 2025-06-02 | End: 2025-06-07 | Stop reason: HOSPADM

## 2025-06-02 RX ORDER — SODIUM CHLORIDE 9 MG/ML
INJECTION, SOLUTION INTRAVENOUS CONTINUOUS
Status: DISPENSED | OUTPATIENT
Start: 2025-06-02 | End: 2025-06-05

## 2025-06-02 RX ORDER — FUROSEMIDE 40 MG/1
20 TABLET ORAL DAILY
Status: DISCONTINUED | OUTPATIENT
Start: 2025-06-03 | End: 2025-06-07 | Stop reason: HOSPADM

## 2025-06-02 RX ORDER — POTASSIUM CHLORIDE 7.45 MG/ML
10 INJECTION INTRAVENOUS PRN
Status: DISCONTINUED | OUTPATIENT
Start: 2025-06-02 | End: 2025-06-07 | Stop reason: HOSPADM

## 2025-06-02 RX ORDER — MAGNESIUM SULFATE IN WATER 40 MG/ML
2000 INJECTION, SOLUTION INTRAVENOUS PRN
Status: DISCONTINUED | OUTPATIENT
Start: 2025-06-02 | End: 2025-06-07 | Stop reason: HOSPADM

## 2025-06-02 RX ORDER — INSULIN GLARGINE 100 [IU]/ML
28 INJECTION, SOLUTION SUBCUTANEOUS NIGHTLY
Status: DISCONTINUED | OUTPATIENT
Start: 2025-06-02 | End: 2025-06-07 | Stop reason: HOSPADM

## 2025-06-02 RX ORDER — ACETAMINOPHEN 325 MG/1
650 TABLET ORAL EVERY 6 HOURS PRN
Status: DISCONTINUED | OUTPATIENT
Start: 2025-06-02 | End: 2025-06-03

## 2025-06-02 RX ADMIN — TRAZODONE HYDROCHLORIDE 100 MG: 50 TABLET ORAL at 20:31

## 2025-06-02 RX ADMIN — OXYCODONE HYDROCHLORIDE AND ACETAMINOPHEN 1 TABLET: 10; 325 TABLET ORAL at 20:32

## 2025-06-02 RX ADMIN — GABAPENTIN 100 MG: 100 CAPSULE ORAL at 20:32

## 2025-06-02 RX ADMIN — ENOXAPARIN SODIUM 40 MG: 100 INJECTION SUBCUTANEOUS at 17:36

## 2025-06-02 RX ADMIN — INSULIN GLARGINE 28 UNITS: 100 INJECTION, SOLUTION SUBCUTANEOUS at 20:32

## 2025-06-02 RX ADMIN — OXYCODONE HYDROCHLORIDE AND ACETAMINOPHEN 1 TABLET: 10; 325 TABLET ORAL at 16:58

## 2025-06-02 RX ADMIN — INSULIN LISPRO 8 UNITS: 100 INJECTION, SOLUTION INTRAVENOUS; SUBCUTANEOUS at 20:32

## 2025-06-02 RX ADMIN — GABAPENTIN 100 MG: 100 CAPSULE ORAL at 17:36

## 2025-06-02 RX ADMIN — POTASSIUM CHLORIDE 40 MEQ: 1500 TABLET, EXTENDED RELEASE ORAL at 20:32

## 2025-06-02 RX ADMIN — Medication 2 PUFF: at 19:08

## 2025-06-02 RX ADMIN — SODIUM CHLORIDE: 0.9 INJECTION, SOLUTION INTRAVENOUS at 18:05

## 2025-06-02 RX ADMIN — INSULIN LISPRO 4 UNITS: 100 INJECTION, SOLUTION INTRAVENOUS; SUBCUTANEOUS at 18:07

## 2025-06-02 ASSESSMENT — PATIENT HEALTH QUESTIONNAIRE - PHQ9
SUM OF ALL RESPONSES TO PHQ QUESTIONS 1-9: 0
1. LITTLE INTEREST OR PLEASURE IN DOING THINGS: NOT AT ALL
SUM OF ALL RESPONSES TO PHQ QUESTIONS 1-9: 0
2. FEELING DOWN, DEPRESSED OR HOPELESS: NOT AT ALL
SUM OF ALL RESPONSES TO PHQ QUESTIONS 1-9: 0
SUM OF ALL RESPONSES TO PHQ QUESTIONS 1-9: 0

## 2025-06-02 ASSESSMENT — PAIN SCALES - GENERAL
PAINLEVEL_OUTOF10: 0
PAINLEVEL_OUTOF10: 7
PAINLEVEL_OUTOF10: 1

## 2025-06-02 ASSESSMENT — ENCOUNTER SYMPTOMS
RESPIRATORY NEGATIVE: 1
EYES NEGATIVE: 1
GASTROINTESTINAL NEGATIVE: 1
ALLERGIC/IMMUNOLOGIC NEGATIVE: 1

## 2025-06-02 ASSESSMENT — PAIN DESCRIPTION - ORIENTATION: ORIENTATION: LEFT

## 2025-06-02 ASSESSMENT — PAIN DESCRIPTION - DESCRIPTORS: DESCRIPTORS: BURNING;THROBBING;OTHER (COMMENT)

## 2025-06-02 ASSESSMENT — PAIN DESCRIPTION - LOCATION: LOCATION: ANKLE;LEG

## 2025-06-02 NOTE — CONSULTS
Consult Note            Date:6/2/2025        Patient Name:Laury Schuster     YOB: 1960     Age:64 y.o.    Consults  Infectious Disease    Chief Complaint   No chief complaint on file.     Left leg ulcers     History Obtained From   patient    History of Present Illness   This is a 64 year old female with PAD and prior right BKA, followed by Vascular Surgery with two nonhealing wounds right BKA stump and two left calf ulcers that are nonhealing. Wound cultures have grown unidentified aerobic bacteria and anaerobic bacteria Cutibacterium.  Patient has been on oral Ciprofloxacin and Flagy without resolution. She denies any fever or chills.    Past Medical History     Past Medical History:   Diagnosis Date    Arthritis     Coagulation disorder     possible , pt had surgery on leg and bled out    Diabetes (HCC) 2019    Hypertension     Left-sided weakness     Nodule of kidney     left kidney    Peripheral vascular disease     Stroke ([facility])     recent stroke on 10/16/2021        Past Surgical History     Past Surgical History:   Procedure Laterality Date    CATARACT REMOVAL  2019    COLONOSCOPY      FEMORAL BYPASS Right 10/18/2023    Right common femoral endarterectomy. 2.Right common femoral artery to below-knee popliteal bypass with 6 mm PTFE graft. performed by Marko Cantu MD at [facility] MAIN OR    FEMORAL BYPASS Right 4/7/2024    1.  Right common femoral artery to below-knee popliteal bypass graft thrombectomy using 3 Indonesian a 4 Indonesian Walker catheter. 2.  Right leg angiogram. 3.  Right leg bypass graft tPA infusion catheter placement. performed by Marko Cantu MD at [facility] MAIN OR    INVASIVE VASCULAR Right 1/7/2024    RIGHT LEG GRAFT THROMBECTOMY AND ANGIOGRAM performed by Marko Cantu MD at [facility] MAIN OR    INVASIVE VASCULAR N/A 4/8/2024    Angiography lower ext right performed by Marko Cantu MD at [facility] CARDIAC CATH LAB    INVASIVE VASCULAR N/A 4/8/2024    Angioplasty ant tib artery performed by  ml/min/1.73m2 65   Glucose 65 - 100 mg/dL 389 (H)   Calcium 8.5 - 10.1 mg/dL 9.4   Albumin/Globulin Ratio 1.1 - 2.2   0.7 (L)   Total Protein 6.4 - 8.2 g/dL 7.2   Procalcitonin 0 ng/mL <0.05 (H)   CRP 0.00 - 0.30 mg/dL 0.68 (H)      Imaging/Diagnostics   Personally reviewed by me     XR Knee Right (6/2/25)     IMPRESSION:  No acute bony abnormality. BKA surgical changes. Soft tissue  swelling and ulceration are noted.      XR Tibia Fibula Left (6/2/25)    No acute abnormality    XR Ankle Left (6/2/25)  No acute abnormality  Assessment      Hospital Problems           Last Modified POA    * (Principal) Open wound knee/leg with tendon involvment, left, initial encounter 6/2/2025 Yes     Chronic nonhealing nonsurgical wound left anterior shin and medial left ankle, rule out superinfection  Chonic nonheading nonsurgical wounds right BKA stump, rule out superinfection  Elevated CRP  PAD  Thrombocytosis, probably reactive  Uncontrolled diabetes mellitus  Plan   1. Wound cultures taken from right BKA stump, left ankle and anterior shin  2. Continue IV Zosyn alone unless MRSA isolated  3. In am, repeat CRP, check ESR    Electronically signed by Devin Torres MD on 6/2/25 at 7:01 PM EDT

## 2025-06-02 NOTE — PLAN OF CARE
Problem: Safety - Adult  Goal: Free from fall injury  Outcome: Progressing  Flowsheets (Taken 6/2/2025 1611)  Free From Fall Injury: Instruct family/caregiver on patient safety     Problem: Pain  Goal: Verbalizes/displays adequate comfort level or baseline comfort level  Outcome: Progressing  Flowsheets (Taken 6/2/2025 1611)  Verbalizes/displays adequate comfort level or baseline comfort level:   Encourage patient to monitor pain and request assistance   Assess pain using appropriate pain scale   Administer analgesics based on type and severity of pain and evaluate response

## 2025-06-02 NOTE — PROGRESS NOTES
Pharmacy Note - Zosyn    Zosyn 3.375 gm IVPB q 6 h (30 min influsion) ordered for treatment of Skin and Soft Tissue Infection. Per [facility] Policy, Zosyn will be changed to 4.5 gm IVPB x 1 over 30 min to be followed in 6 hrs by Zosyn 3.375 gm IVPB q 8 h (4 hr infusion).     Estimated Creatinine Clearance: CrCl cannot be calculated (Patient's most recent lab result is older than the maximum 30 days allowed.).  Dialysis Status, ALONSO, CKD: n/a    BMI:  Body mass index is 26.18 kg/m².    Rationale for Adjustment:  [facility] B-Lactam extended infusion policy    Pharmacy will continue to monitor and adjust dose as necessary.      Please call inpatient pharmacy with any questions.    Thank you,  DUSTIN HANLEY, [facility] MS

## 2025-06-02 NOTE — PROGRESS NOTES
Identified pt with two pt identifiers (name and ). Reviewed chart in preparation for visit and have obtained necessary documentation.    Laury Schuster is a 64 y.o. female  Chief Complaint   Patient presents with    Follow-up     Bilateral legs      /72 (BP Site: Left Upper Arm, Patient Position: Sitting, BP Cuff Size: Large Adult)   Pulse 100   Temp 98.2 °F (36.8 °C) (Oral)   Resp 19   Ht 1.524 m (5')   LMP  (LMP Unknown)   SpO2 93%   BMI 22.46 kg/m²     1. Have you been to the ER, urgent care clinic since your last visit?  Hospitalized since your last visit?NO    2. Have you seen or consulted any other health care providers outside of the [facility] since your last visit?  Include any pap smears or colon screening. NO

## 2025-06-03 ENCOUNTER — APPOINTMENT (OUTPATIENT)
Facility: HOSPITAL | Age: 65
DRG: 629 | End: 2025-06-03
Attending: SURGERY
Payer: MEDICARE

## 2025-06-03 LAB
CRP SERPL-MCNC: 0.57 MG/DL (ref 0–0.3)
ECHO BSA: 1.6 M2
ERYTHROCYTE [SEDIMENTATION RATE] IN BLOOD: 44 MM/HR (ref 0–30)
GLUCOSE BLD STRIP.AUTO-MCNC: 160 MG/DL (ref 65–100)
GLUCOSE BLD STRIP.AUTO-MCNC: 170 MG/DL (ref 65–100)
GLUCOSE BLD STRIP.AUTO-MCNC: 177 MG/DL (ref 65–100)
GLUCOSE BLD STRIP.AUTO-MCNC: 182 MG/DL (ref 65–100)
GLUCOSE BLD STRIP.AUTO-MCNC: 193 MG/DL (ref 65–100)
GLUCOSE BLD STRIP.AUTO-MCNC: 214 MG/DL (ref 65–100)
PERFORMED BY:: ABNORMAL

## 2025-06-03 PROCEDURE — 75710 ARTERY X-RAYS ARM/LEG: CPT | Performed by: SURGERY

## 2025-06-03 PROCEDURE — 6370000000 HC RX 637 (ALT 250 FOR IP): Performed by: SURGERY

## 2025-06-03 PROCEDURE — C1884 EMBOLIZATION PROTECT SYST: HCPCS | Performed by: SURGERY

## 2025-06-03 PROCEDURE — C1769 GUIDE WIRE: HCPCS | Performed by: SURGERY

## 2025-06-03 PROCEDURE — C1894 INTRO/SHEATH, NON-LASER: HCPCS | Performed by: SURGERY

## 2025-06-03 PROCEDURE — 99153 MOD SED SAME PHYS/QHP EA: CPT | Performed by: SURGERY

## 2025-06-03 PROCEDURE — 85652 RBC SED RATE AUTOMATED: CPT

## 2025-06-03 PROCEDURE — 0238T TRLUML PERIP ATHRC ILIAC ART: CPT | Performed by: SURGERY

## 2025-06-03 PROCEDURE — C1887 CATHETER, GUIDING: HCPCS | Performed by: SURGERY

## 2025-06-03 PROCEDURE — 37225 PR REVSC OPN/PRQ FEM/POP W/ATHRC/ANGIOP SM VSL: CPT | Performed by: SURGERY

## 2025-06-03 PROCEDURE — 2580000003 HC RX 258: Performed by: INTERNAL MEDICINE

## 2025-06-03 PROCEDURE — C1714 CATH, TRANS ATHERECTOMY, DIR: HCPCS | Performed by: SURGERY

## 2025-06-03 PROCEDURE — C1725 CATH, TRANSLUMIN NON-LASER: HCPCS | Performed by: SURGERY

## 2025-06-03 PROCEDURE — 75635 CT ANGIO ABDOMINAL ARTERIES: CPT

## 2025-06-03 PROCEDURE — 75625 CONTRAST EXAM ABDOMINL AORTA: CPT | Performed by: SURGERY

## 2025-06-03 PROCEDURE — 6360000002 HC RX W HCPCS: Performed by: SURGERY

## 2025-06-03 PROCEDURE — 37220 PR REVASCULARIZATION ILIAC ARTERY ANGIOP 1ST VSL: CPT | Performed by: SURGERY

## 2025-06-03 PROCEDURE — 37225 HC ATHERECTOMY FEM/POP: CPT | Performed by: SURGERY

## 2025-06-03 PROCEDURE — 6360000004 HC RX CONTRAST MEDICATION: Performed by: SURGERY

## 2025-06-03 PROCEDURE — 2709999900 HC NON-CHARGEABLE SUPPLY: Performed by: SURGERY

## 2025-06-03 PROCEDURE — 04CL3ZZ EXTIRPATION OF MATTER FROM LEFT FEMORAL ARTERY, PERCUTANEOUS APPROACH: ICD-10-PCS | Performed by: SURGERY

## 2025-06-03 PROCEDURE — 36415 COLL VENOUS BLD VENIPUNCTURE: CPT

## 2025-06-03 PROCEDURE — 7100000001 HC PACU RECOVERY - ADDTL 15 MIN: Performed by: SURGERY

## 2025-06-03 PROCEDURE — 6360000002 HC RX W HCPCS: Performed by: INTERNAL MEDICINE

## 2025-06-03 PROCEDURE — 99152 MOD SED SAME PHYS/QHP 5/>YRS: CPT | Performed by: SURGERY

## 2025-06-03 PROCEDURE — 86140 C-REACTIVE PROTEIN: CPT

## 2025-06-03 PROCEDURE — 04CH3ZZ EXTIRPATION OF MATTER FROM RIGHT EXTERNAL ILIAC ARTERY, PERCUTANEOUS APPROACH: ICD-10-PCS | Performed by: SURGERY

## 2025-06-03 PROCEDURE — 7100000000 HC PACU RECOVERY - FIRST 15 MIN: Performed by: SURGERY

## 2025-06-03 PROCEDURE — 047H3ZZ DILATION OF RIGHT EXTERNAL ILIAC ARTERY, PERCUTANEOUS APPROACH: ICD-10-PCS | Performed by: SURGERY

## 2025-06-03 PROCEDURE — 36245 INS CATH ABD/L-EXT ART 1ST: CPT | Performed by: SURGERY

## 2025-06-03 PROCEDURE — 2580000003 HC RX 258: Performed by: SURGERY

## 2025-06-03 PROCEDURE — 94761 N-INVAS EAR/PLS OXIMETRY MLT: CPT

## 2025-06-03 PROCEDURE — 87086 URINE CULTURE/COLONY COUNT: CPT

## 2025-06-03 PROCEDURE — 76937 US GUIDE VASCULAR ACCESS: CPT | Performed by: SURGERY

## 2025-06-03 PROCEDURE — C1760 CLOSURE DEV, VASC: HCPCS | Performed by: SURGERY

## 2025-06-03 PROCEDURE — 2500000003 HC RX 250 WO HCPCS: Performed by: SURGERY

## 2025-06-03 PROCEDURE — 82962 GLUCOSE BLOOD TEST: CPT

## 2025-06-03 PROCEDURE — 1100000000 HC RM PRIVATE

## 2025-06-03 PROCEDURE — 99232 SBSQ HOSP IP/OBS MODERATE 35: CPT | Performed by: INTERNAL MEDICINE

## 2025-06-03 PROCEDURE — C2623 CATH, TRANSLUMIN, DRUG-COAT: HCPCS | Performed by: SURGERY

## 2025-06-03 PROCEDURE — 047L3Z1 DILATION OF LEFT FEMORAL ARTERY USING DRUG-COATED BALLOON, PERCUTANEOUS APPROACH: ICD-10-PCS | Performed by: SURGERY

## 2025-06-03 PROCEDURE — 94640 AIRWAY INHALATION TREATMENT: CPT

## 2025-06-03 RX ORDER — IOPAMIDOL 755 MG/ML
100 INJECTION, SOLUTION INTRAVASCULAR
Status: COMPLETED | OUTPATIENT
Start: 2025-06-03 | End: 2025-06-03

## 2025-06-03 RX ORDER — MIDAZOLAM HYDROCHLORIDE 1 MG/ML
INJECTION, SOLUTION INTRAMUSCULAR; INTRAVENOUS PRN
Status: DISCONTINUED | OUTPATIENT
Start: 2025-06-03 | End: 2025-06-03 | Stop reason: HOSPADM

## 2025-06-03 RX ORDER — IOPAMIDOL 755 MG/ML
INJECTION, SOLUTION INTRAVASCULAR PRN
Status: DISCONTINUED | OUTPATIENT
Start: 2025-06-03 | End: 2025-06-03 | Stop reason: HOSPADM

## 2025-06-03 RX ORDER — ACETAMINOPHEN 325 MG/1
650 TABLET ORAL EVERY 4 HOURS PRN
Status: DISCONTINUED | OUTPATIENT
Start: 2025-06-03 | End: 2025-06-07 | Stop reason: HOSPADM

## 2025-06-03 RX ORDER — SODIUM CHLORIDE 0.9 % (FLUSH) 0.9 %
5-40 SYRINGE (ML) INJECTION PRN
Status: DISCONTINUED | OUTPATIENT
Start: 2025-06-03 | End: 2025-06-07 | Stop reason: HOSPADM

## 2025-06-03 RX ORDER — SODIUM CHLORIDE 9 MG/ML
INJECTION, SOLUTION INTRAVENOUS PRN
Status: DISCONTINUED | OUTPATIENT
Start: 2025-06-03 | End: 2025-06-07 | Stop reason: HOSPADM

## 2025-06-03 RX ORDER — NITROGLYCERIN 20 MG/100ML
INJECTION INTRAVENOUS PRN
Status: DISCONTINUED | OUTPATIENT
Start: 2025-06-03 | End: 2025-06-03 | Stop reason: HOSPADM

## 2025-06-03 RX ORDER — SODIUM CHLORIDE 0.9 % (FLUSH) 0.9 %
5-40 SYRINGE (ML) INJECTION EVERY 12 HOURS SCHEDULED
Status: DISCONTINUED | OUTPATIENT
Start: 2025-06-03 | End: 2025-06-07 | Stop reason: HOSPADM

## 2025-06-03 RX ORDER — DIPHENHYDRAMINE HYDROCHLORIDE 50 MG/ML
INJECTION, SOLUTION INTRAMUSCULAR; INTRAVENOUS PRN
Status: DISCONTINUED | OUTPATIENT
Start: 2025-06-03 | End: 2025-06-03 | Stop reason: HOSPADM

## 2025-06-03 RX ORDER — HEPARIN SODIUM 1000 [USP'U]/ML
INJECTION, SOLUTION INTRAVENOUS; SUBCUTANEOUS PRN
Status: DISCONTINUED | OUTPATIENT
Start: 2025-06-03 | End: 2025-06-03 | Stop reason: HOSPADM

## 2025-06-03 RX ORDER — HYDRALAZINE HYDROCHLORIDE 20 MG/ML
INJECTION INTRAMUSCULAR; INTRAVENOUS PRN
Status: DISCONTINUED | OUTPATIENT
Start: 2025-06-03 | End: 2025-06-03 | Stop reason: HOSPADM

## 2025-06-03 RX ORDER — LABETALOL HYDROCHLORIDE 5 MG/ML
10 INJECTION, SOLUTION INTRAVENOUS EVERY 4 HOURS PRN
Status: DISCONTINUED | OUTPATIENT
Start: 2025-06-03 | End: 2025-06-07 | Stop reason: HOSPADM

## 2025-06-03 RX ORDER — INSULIN LISPRO 100 [IU]/ML
3 INJECTION, SOLUTION INTRAVENOUS; SUBCUTANEOUS
Status: DISCONTINUED | OUTPATIENT
Start: 2025-06-03 | End: 2025-06-07

## 2025-06-03 RX ORDER — FENTANYL CITRATE 50 UG/ML
INJECTION, SOLUTION INTRAMUSCULAR; INTRAVENOUS PRN
Status: DISCONTINUED | OUTPATIENT
Start: 2025-06-03 | End: 2025-06-03 | Stop reason: HOSPADM

## 2025-06-03 RX ORDER — HEPARIN SODIUM 10000 [USP'U]/100ML
600 INJECTION, SOLUTION INTRAVENOUS CONTINUOUS
Status: DISCONTINUED | OUTPATIENT
Start: 2025-06-03 | End: 2025-06-07 | Stop reason: HOSPADM

## 2025-06-03 RX ORDER — HEPARIN SODIUM 200 [USP'U]/100ML
INJECTION, SOLUTION INTRAVENOUS CONTINUOUS PRN
Status: COMPLETED | OUTPATIENT
Start: 2025-06-03 | End: 2025-06-03

## 2025-06-03 RX ADMIN — FOLIC ACID 1 MG: 1 TABLET ORAL at 11:19

## 2025-06-03 RX ADMIN — SODIUM CHLORIDE, PRESERVATIVE FREE 10 ML: 5 INJECTION INTRAVENOUS at 11:25

## 2025-06-03 RX ADMIN — PANTOPRAZOLE SODIUM 40 MG: 40 TABLET, DELAYED RELEASE ORAL at 11:19

## 2025-06-03 RX ADMIN — GABAPENTIN 100 MG: 100 CAPSULE ORAL at 11:24

## 2025-06-03 RX ADMIN — OXYCODONE HYDROCHLORIDE AND ACETAMINOPHEN 1 TABLET: 10; 325 TABLET ORAL at 11:19

## 2025-06-03 RX ADMIN — INSULIN GLARGINE 28 UNITS: 100 INJECTION, SOLUTION SUBCUTANEOUS at 21:19

## 2025-06-03 RX ADMIN — SODIUM CHLORIDE: 0.9 INJECTION, SOLUTION INTRAVENOUS at 10:33

## 2025-06-03 RX ADMIN — IOPAMIDOL 100 ML: 755 INJECTION, SOLUTION INTRAVENOUS at 07:17

## 2025-06-03 RX ADMIN — Medication 2 PUFF: at 20:32

## 2025-06-03 RX ADMIN — FUROSEMIDE 20 MG: 40 TABLET ORAL at 11:19

## 2025-06-03 RX ADMIN — HEPARIN SODIUM 600 UNITS/HR: 10000 INJECTION, SOLUTION INTRAVENOUS at 15:35

## 2025-06-03 RX ADMIN — GABAPENTIN 100 MG: 100 CAPSULE ORAL at 21:11

## 2025-06-03 RX ADMIN — OXYCODONE HYDROCHLORIDE AND ACETAMINOPHEN 1 TABLET: 10; 325 TABLET ORAL at 00:26

## 2025-06-03 RX ADMIN — ALTEPLASE 5 MG: 2.2 INJECTION, POWDER, LYOPHILIZED, FOR SOLUTION INTRAVENOUS at 14:58

## 2025-06-03 RX ADMIN — OXYCODONE HYDROCHLORIDE AND ACETAMINOPHEN 1 TABLET: 10; 325 TABLET ORAL at 04:21

## 2025-06-03 RX ADMIN — Medication 2 PUFF: at 08:19

## 2025-06-03 RX ADMIN — OXYCODONE HYDROCHLORIDE AND ACETAMINOPHEN 1 TABLET: 10; 325 TABLET ORAL at 21:09

## 2025-06-03 RX ADMIN — TRAZODONE HYDROCHLORIDE 100 MG: 50 TABLET ORAL at 21:11

## 2025-06-03 RX ADMIN — CYCLOBENZAPRINE 10 MG: 10 TABLET, FILM COATED ORAL at 00:26

## 2025-06-03 RX ADMIN — MEROPENEM 1000 MG: 1 INJECTION INTRAVENOUS at 10:38

## 2025-06-03 ASSESSMENT — PAIN SCALES - GENERAL
PAINLEVEL_OUTOF10: 6
PAINLEVEL_OUTOF10: 0
PAINLEVEL_OUTOF10: 8
PAINLEVEL_OUTOF10: 0
PAINLEVEL_OUTOF10: 0
PAINLEVEL_OUTOF10: 4
PAINLEVEL_OUTOF10: 10
PAINLEVEL_OUTOF10: 0
PAINLEVEL_OUTOF10: 3
PAINLEVEL_OUTOF10: 8

## 2025-06-03 ASSESSMENT — PAIN DESCRIPTION - ORIENTATION
ORIENTATION: LEFT
ORIENTATION: RIGHT
ORIENTATION: LEFT
ORIENTATION: LEFT

## 2025-06-03 ASSESSMENT — PAIN DESCRIPTION - DESCRIPTORS
DESCRIPTORS: BURNING;SPASM;THROBBING
DESCRIPTORS: ACHING;SPASM;THROBBING
DESCRIPTORS: ACHING

## 2025-06-03 ASSESSMENT — PAIN DESCRIPTION - LOCATION
LOCATION: ANKLE;LEG
LOCATION: GROIN

## 2025-06-03 ASSESSMENT — PAIN SCALES - WONG BAKER: WONGBAKER_NUMERICALRESPONSE: HURTS A LITTLE BIT

## 2025-06-03 NOTE — CONSULTS
Hospitalist Progress Note               Daily Progress Note: 6/3/2025      Hospital Day: 2     Chief complaint: No chief complaint on file.       Brief HPI/ Hospital course to date:  Laury Schuster is a 64 y.o. very pleasant woman with a longstanding history of cardiovascular atherosclerotic disease.  Patient had a previous right BKA.  Also known history of severe PAD left leg. Presented to the ED with chief complaint of multiple wound on left leg, failed outpatient therapy. Patient also has a slow healing right stump wound, however patient reports no new pain or purulent discharge at that site. Medicine service consulted to manage medical conditions including diabetes. ID already on board, currently on IV zosyn and meropenem. CTA showed severe multifocal stenosis/occlusions involving the SFA. Patent three-vessel runoff to the left foot, although calcific stenosis involving DEB and PTA. Also has occluded right femoropopliteal and right fem-distal bypass grafts, no flow is seen in the popliteal artery.     --------  Patient is seen today at bedside with nursing staff for follow-up. Patient reports no symptoms for now. /82, will continue current regimen. BS elevated overnight, however improved with PTA lantus and sliding scale insulin, I will add 3u lispro in addition to sliding scale for better BS control. . Will check HbA1c.         All ROS negative otherwise mentioned above.      /82   Pulse 89   Temp 98 °F (36.7 °C) (Oral)   Resp 17   Ht 1.524 m (5')   Wt 60.8 kg (134 lb 0.6 oz)   LMP  (LMP Unknown)   SpO2 100%   BMI 26.18 kg/m²    O2 Device: None (Room air)    Temp (24hrs), Av.7 °F (36.5 °C), Min:97.5 °F (36.4 °C), Max:98.1 °F (36.7 °C)    No intake/output data recorded.    0701 -  1900  In: -   Out: 2280 [Urine:2250]    Physical Exam:  General:  Awake and alert   Lungs:   Clear to auscultation bilaterally.   Heart:  Regular rate and rhythm, S1, S2 normal, no murmur. No  acetaminophen (TYLENOL) tablet 650 mg  650 mg Oral Q4H PRN    sodium chloride flush 0.9 % injection 5-40 mL  5-40 mL IntraVENous 2 times per day    sodium chloride flush 0.9 % injection 5-40 mL  5-40 mL IntraVENous PRN    0.9 % sodium chloride infusion   IntraVENous PRN    potassium chloride (KLOR-CON M) extended release tablet 40 mEq  40 mEq Oral PRN    Or    potassium bicarb-citric acid (EFFER-K) effervescent tablet 40 mEq  40 mEq Oral PRN    Or    potassium chloride 10 mEq/100 mL IVPB (Peripheral Line)  10 mEq IntraVENous PRN    magnesium sulfate 2000 mg in 50 mL IVPB premix  2,000 mg IntraVENous PRN    ondansetron (ZOFRAN-ODT) disintegrating tablet 4 mg  4 mg Oral Q8H PRN    Or    ondansetron (ZOFRAN) injection 4 mg  4 mg IntraVENous Q6H PRN    polyethylene glycol (GLYCOLAX) packet 17 g  17 g Oral Daily PRN    acetaminophen (TYLENOL) suppository 650 mg  650 mg Rectal Q6H PRN    0.9 % sodium chloride infusion   IntraVENous Continuous    oxyCODONE-acetaminophen (PERCOCET)  MG per tablet 1 tablet  1 tablet Oral Q4H PRN    albuterol sulfate HFA (PROVENTIL;VENTOLIN;PROAIR) 108 (90 Base) MCG/ACT inhaler 2 puff  2 puff Inhalation Q4H PRN    aspirin EC tablet 325 mg  325 mg Oral Daily    cyclobenzaprine (FLEXERIL) tablet 10 mg  10 mg Oral Nightly PRN    budesonide-formoterol (SYMBICORT) 80-4.5 MCG/ACT inhaler 2 puff  2 puff Inhalation BID RT    folic acid (FOLVITE) tablet 1 mg  1 mg Oral Daily    furosemide (LASIX) tablet 20 mg  20 mg Oral Daily    gabapentin (NEURONTIN) capsule 100 mg  100 mg Oral TID    insulin glargine (LANTUS) injection vial 28 Units  28 Units SubCUTAneous Nightly    pantoprazole (PROTONIX) tablet 40 mg  40 mg Oral Daily    traZODone (DESYREL) tablet 100 mg  100 mg Oral Nightly    glucose chewable tablet 16 g  4 tablet Oral PRN    dextrose bolus 10% 125 mL  125 mL IntraVENous PRN    Or    dextrose bolus 10% 250 mL  250 mL IntraVENous PRN    glucagon injection 1 mg  1 mg SubCUTAneous PRN

## 2025-06-03 NOTE — PROGRESS NOTES
Pt returned from procedure on continuous heparin drip without a second IV; Meropenem ordered; pt does not want a second IV r/t to infiltration experienced this morning; contacted pharmacy for verification of infusing the antibiotic with the heparin; pharmacy stated this looks to be contraindicated and will reach out to the provider to see if the Meropenem can be changed to a push.  Will hold administration at this time pending updated order.

## 2025-06-03 NOTE — PROGRESS NOTES
Pt remains NPO anticipating surgical intervention today; CHG bath completed along with new gown and complete linen change.

## 2025-06-03 NOTE — H&P
General surgery history and Physical    Patient: Laury Schuster  MRN: [id number]    YOB: 1960  Age: 64 y.o.  Sex: female     Chief Complaint:  No chief complaint on file.      History of Present Illness: Laury Schuster is a 64 y.o. very pleasant woman with a longstanding history of cardiovascular atherosclerotic disease.  Patient had a previous right BKA.  Also known history of severe PAD left leg.  Patient developed multiple wounds on the left leg a few months ago now with recurrent multiple ulcers involving left pretibial area and left medial malleolus area nonhealing with worsening cellulitis.  Patient being admitted hospital for outpatient management failure.  Patient also complaining of significant pain on the left leg.  Patient has no fever.  Currently she has 2 open wounds as described below.    Social History:  Social Connections: Not on file       Past Medical History:  Past Medical History:   Diagnosis Date    Arthritis     Coagulation disorder     possible , pt had surgery on leg and bled out    Diabetes (HCC) 2019    Hypertension     Left-sided weakness     Nodule of kidney     left kidney    Peripheral vascular disease     Stroke ([facility])     recent stroke on 10/16/2021     Surgical History:  Past Surgical History:   Procedure Laterality Date    CATARACT REMOVAL  2019    COLONOSCOPY      FEMORAL BYPASS Right 10/18/2023    Right common femoral endarterectomy. 2.Right common femoral artery to below-knee popliteal bypass with 6 mm PTFE graft. performed by Marko Cantu MD at [facility] MAIN OR    FEMORAL BYPASS Right 4/7/2024    1.  Right common femoral artery to below-knee popliteal bypass graft thrombectomy using 3 Malian a 4 Malian Walker catheter. 2.  Right leg angiogram. 3.  Right leg bypass graft tPA infusion catheter placement. performed by Marko Cantu MD at [facility] MAIN OR    INVASIVE VASCULAR Right 1/7/2024    RIGHT LEG GRAFT THROMBECTOMY AND ANGIOGRAM performed by Marko Cantu MD at

## 2025-06-03 NOTE — CARE COORDINATION
06/03/25 1325   Service Assessment   Patient Orientation Alert and Oriented;Person;Place;Situation;Self   Cognition Alert   History Provided By Medical Record   Primary Caregiver Family   Accompanied By/Relationship no one at bedside   Support Systems Children   Patient's Healthcare Decision Maker is: Legal Next of Kin   PCP Verified by CM Yes   Last Visit to PCP Within last year   Prior Functional Level Mobility  (patient has BKA)   Can patient return to prior living arrangement Yes   Ability to make needs known: Good   Family able to assist with home care needs: Yes   Financial Resources Medicare   Social/Functional History   Lives With Family   Type of Home Trailer   Active  No   Occupation Unemployed     CM unable to speak with patient, information taken from medical record.    Advance Care Planning   Healthcare Decision Maker:    Primary Decision Maker: Ricarda New - Other Relative - [phone]    Click here to complete Healthcare Decision Makers including selection of the Healthcare Decision Maker Relationship (ie \"Primary\").

## 2025-06-03 NOTE — FLOWSHEET NOTE
06/03/25 1722   Incision 06/03/25 Groin Medial;Right;Anterior   Date First Assessed/Time First Assessed: 06/03/25 1732   Present on Original Admission: No  Location: Groin  Incision Location Orientation: Medial;Right;Anterior  Incision Description (Comments): Angiogram access site   Dressing Status Clean;Dry;Intact   Dressing Change Due   (Will be done by general surgery on 6/4/25)   Dressing/Treatment Pressure dressing   No-incision Assessment Intact;Warm;Other (Comment)  (Soft; no signs of hematoma)

## 2025-06-03 NOTE — PROGRESS NOTES
Patient transferred to room via bed in stable condition.  Bedside report given, SBAR reviewed, sites viewed. Dopplered L pedal pulse.

## 2025-06-03 NOTE — FLOWSHEET NOTE
06/03/25 1722   LLE Neurovascular Assessment   Capillary Refill Greater than 3 seconds   Color Dusky   Temperature Warm   LLE Sensation  Full sensation   L Pedal Pulse Doppler  (P=123)     Q4h doppler checks initiated; bear hugger in place LLE.

## 2025-06-03 NOTE — PROGRESS NOTES
Nursing supervisor notified writer that I could send the patient back to [facility] on the lower extremity bear hugger for circulation purposes. Q4 hour pulse checks ordered by MD Cantu.

## 2025-06-03 NOTE — PROGRESS NOTES
Progress Note  Date:6/3/2025       Room:[facility]  Patient Name:Laury Schuster     YOB: 1960     Age:64 y.o.        Subjective    Subjective  Patient followed for chronic nonhealing wounds left anterior shin and left ankle, along with right BKA chronic wounds. Cultures still in process. CRP decreasing. Earlier today she apparently underwent a vascular procedure and complaining about having to remain supine.  She is currently under a warming blanket.      Vitals Last 24 Hours:  TEMPERATURE:  Temp  Av.7 °F (36.5 °C)  Min: 97.5 °F (36.4 °C)  Max: 98.1 °F (36.7 °C)  RESPIRATIONS RANGE: Resp  Avg: 15.4  Min: 12  Max: 18  PULSE OXIMETRY RANGE: SpO2  Av.3 %  Min: 95 %  Max: 100 %  PULSE RANGE: Pulse  Av.5  Min: 85  Max: 105  BLOOD PRESSURE RANGE: Systolic (24hrs), Av , Min:117 , Max:150   ; Diastolic (24hrs), Av, Min:56, Max:93         Objective  Vitals and nursing note reviewed.  Warming blanket  Constitutional:       Appearance: She is not ill-appearing.   HENT:      Head: Normocephalic and atraumatic.      Right Ear: External ear normal.      Left Ear: External ear normal.      Nose: Nose normal.      Mouth/Throat:      Pharynx: Oropharynx is clear.   Eyes:      Extraocular Movements: Extraocular movements intact.      Pupils: Pupils are equal, round, and reactive to light.   Cardiovascular:      Rate and Rhythm: Normal rate and regular rhythm.      Heart sounds: Normal heart sounds. No murmur heard.  Pulmonary:      Effort: Pulmonary effort is normal.      Breath sounds: Normal breath sounds.   Abdominal:      General: Bowel sounds are normal. There is no distension.      Palpations: Abdomen is soft.      Tenderness: There is no abdominal tenderness.   Genitourinary:     Comments: No Walker catheter  Musculoskeletal:      Cervical back: Neck supple.      Right lower leg: No edema.      Left lower leg: Edema present.        Legs:       Comments: Chronic wound laterally right BKA stump  celiac artery. Patent SMA and bilateral renal arteries. Patent CORINNA. Right: Severe multifocal long segment stenosis of common and external iliac artery. Patent internal iliac artery. Postoperative changes involving the common femoral artery. Patent but stenotic profunda branches. Occluded common femoral to popliteal bypass graft. Occluded common femoral to distal bypass graft. Occluded native SFA. Patient is status post right BKA. No flow is seen in the popliteal artery. Left: Moderate multifocal stenosis of common and external iliac artery. Patent but stenotic common femoral artery. Patent profunda artery. Severe stenosis/multifocal occlusion involving the SFA. Stenotic but patent popliteal artery. Patent three-vessel runoff to the left foot, although with multifocal calcification/stenosis of DEB and PTA. LUNG BASES: Unremarkable. LIVER: No mass. BILIARY TREE: Gallbladder is within normal limits. CBD is not dilated. SPLEEN: Splenectomy. PANCREAS: No mass or ductal dilatation. Distal pancreatectomy. ADRENALS: Unremarkable. KIDNEYS: No mass, calculus, or hydronephrosis. STOMACH: Unremarkable. SMALL BOWEL: No dilatation or wall thickening. COLON: No dilatation or wall thickening. APPENDIX: Not seen. PERITONEUM: No ascites or pneumoperitoneum. RETROPERITONEUM: No lymphadenopathy. REPRODUCTIVE ORGANS: Unremarkable. URINARY BLADDER: No mass or calculus. BONES: No destructive bone lesion. ABDOMINAL WALL: No mass or hernia. ADDITIONAL COMMENTS: N/A     1.  Status post right BKA. 2.  RIGHT: Occluded right femoropopliteal and right fem-distal bypass grafts. Patent profunda. No flow is seen in the popliteal artery. 3.  LEFT: Severe multifocal stenosis/occlusions involving the SFA. Patent three-vessel runoff to the left foot, although calcific stenosis involving DEB and PTA. 4.  Status post splenectomy and distal pancreatectomy. Electronically signed by LAVINIA BARDALES Knee Right (6/2/25)     IMPRESSION:  No acute bony

## 2025-06-04 PROBLEM — S81.802S: Status: ACTIVE | Noted: 2025-06-04

## 2025-06-04 PROBLEM — L08.9 WOUND INFECTION: Status: ACTIVE | Noted: 2025-06-04

## 2025-06-04 PROBLEM — S81.002S: Status: ACTIVE | Noted: 2025-06-04

## 2025-06-04 PROBLEM — S91.002S: Status: ACTIVE | Noted: 2025-06-04

## 2025-06-04 PROBLEM — R79.82 CRP ELEVATED: Status: ACTIVE | Noted: 2025-06-04

## 2025-06-04 PROBLEM — T14.8XXA WOUND INFECTION: Status: ACTIVE | Noted: 2025-06-04

## 2025-06-04 LAB
ALBUMIN SERPL-MCNC: 2.8 G/DL (ref 3.5–5)
ALBUMIN/GLOB SERPL: 0.8 (ref 1.1–2.2)
ALP SERPL-CCNC: 87 U/L (ref 45–117)
ALT SERPL-CCNC: 15 U/L (ref 12–78)
ANION GAP SERPL CALC-SCNC: 7 MMOL/L (ref 2–12)
APTT PPP: 30 SEC (ref 21.2–34.1)
AST SERPL W P-5'-P-CCNC: 26 U/L (ref 15–37)
BACTERIA SPEC CULT: NORMAL
BACTERIA SPEC CULT: NORMAL
BASOPHILS # BLD: 0.08 K/UL (ref 0–0.1)
BASOPHILS NFR BLD: 0.7 % (ref 0–1)
BILIRUB SERPL-MCNC: 0.2 MG/DL (ref 0.2–1)
BUN SERPL-MCNC: 7 MG/DL (ref 6–20)
BUN/CREAT SERPL: 11 (ref 12–20)
CA-I BLD-MCNC: 8.6 MG/DL (ref 8.5–10.1)
CHLORIDE SERPL-SCNC: 108 MMOL/L (ref 97–108)
CO2 SERPL-SCNC: 23 MMOL/L (ref 21–32)
CREAT SERPL-MCNC: 0.66 MG/DL (ref 0.55–1.02)
CRP SERPL-MCNC: 1.06 MG/DL (ref 0–0.3)
DIFFERENTIAL METHOD BLD: ABNORMAL
EOSINOPHIL # BLD: 0.24 K/UL (ref 0–0.4)
EOSINOPHIL NFR BLD: 2 % (ref 0–7)
ERYTHROCYTE [DISTWIDTH] IN BLOOD BY AUTOMATED COUNT: 14.6 % (ref 11.5–14.5)
ERYTHROCYTE [SEDIMENTATION RATE] IN BLOOD: 52 MM/HR (ref 0–30)
EST. AVERAGE GLUCOSE BLD GHB EST-MCNC: 332 MG/DL
GLOBULIN SER CALC-MCNC: 3.6 G/DL (ref 2–4)
GLUCOSE BLD STRIP.AUTO-MCNC: 180 MG/DL (ref 65–100)
GLUCOSE BLD STRIP.AUTO-MCNC: 215 MG/DL (ref 65–100)
GLUCOSE BLD STRIP.AUTO-MCNC: 259 MG/DL (ref 65–100)
GLUCOSE BLD STRIP.AUTO-MCNC: 306 MG/DL (ref 65–100)
GLUCOSE SERPL-MCNC: 74 MG/DL (ref 65–100)
GRAM STN SPEC: NORMAL
HBA1C MFR BLD: 13.2 % (ref 4–5.6)
HCT VFR BLD AUTO: 33 % (ref 35–47)
HGB BLD-MCNC: 10 G/DL (ref 11.5–16)
IMM GRANULOCYTES # BLD AUTO: 0.05 K/UL (ref 0–0.04)
IMM GRANULOCYTES NFR BLD AUTO: 0.4 % (ref 0–0.5)
LYMPHOCYTES # BLD: 3.58 K/UL (ref 0.8–3.5)
LYMPHOCYTES NFR BLD: 30.2 % (ref 12–49)
Lab: NORMAL
MCH RBC QN AUTO: 24.9 PG (ref 26–34)
MCHC RBC AUTO-ENTMCNC: 30.3 G/DL (ref 30–36.5)
MCV RBC AUTO: 82.3 FL (ref 80–99)
MONOCYTES # BLD: 1 K/UL (ref 0–1)
MONOCYTES NFR BLD: 8.4 % (ref 5–13)
NEUTS SEG # BLD: 6.89 K/UL (ref 1.8–8)
NEUTS SEG NFR BLD: 58.3 % (ref 32–75)
NRBC # BLD: 0 K/UL (ref 0–0.01)
NRBC BLD-RTO: 0 PER 100 WBC
PERFORMED BY:: ABNORMAL
PLATELET # BLD AUTO: 411 K/UL (ref 150–400)
PMV BLD AUTO: 12.3 FL (ref 8.9–12.9)
POTASSIUM SERPL-SCNC: 4.7 MMOL/L (ref 3.5–5.1)
PROT SERPL-MCNC: 6.4 G/DL (ref 6.4–8.2)
RBC # BLD AUTO: 4.01 M/UL (ref 3.8–5.2)
SODIUM SERPL-SCNC: 138 MMOL/L (ref 136–145)
THERAPEUTIC RANGE: NORMAL SEC (ref 82–109)
WBC # BLD AUTO: 11.8 K/UL (ref 3.6–11)

## 2025-06-04 PROCEDURE — 2580000003 HC RX 258: Performed by: INTERNAL MEDICINE

## 2025-06-04 PROCEDURE — 36415 COLL VENOUS BLD VENIPUNCTURE: CPT

## 2025-06-04 PROCEDURE — 85652 RBC SED RATE AUTOMATED: CPT

## 2025-06-04 PROCEDURE — 2500000003 HC RX 250 WO HCPCS: Performed by: SURGERY

## 2025-06-04 PROCEDURE — 99232 SBSQ HOSP IP/OBS MODERATE 35: CPT | Performed by: INTERNAL MEDICINE

## 2025-06-04 PROCEDURE — 1100000000 HC RM PRIVATE

## 2025-06-04 PROCEDURE — 99232 SBSQ HOSP IP/OBS MODERATE 35: CPT | Performed by: SURGERY

## 2025-06-04 PROCEDURE — 6360000002 HC RX W HCPCS: Performed by: INTERNAL MEDICINE

## 2025-06-04 PROCEDURE — 82962 GLUCOSE BLOOD TEST: CPT

## 2025-06-04 PROCEDURE — 6370000000 HC RX 637 (ALT 250 FOR IP): Performed by: INTERNAL MEDICINE

## 2025-06-04 PROCEDURE — 6370000000 HC RX 637 (ALT 250 FOR IP)

## 2025-06-04 PROCEDURE — 6370000000 HC RX 637 (ALT 250 FOR IP): Performed by: SURGERY

## 2025-06-04 PROCEDURE — 80053 COMPREHEN METABOLIC PANEL: CPT

## 2025-06-04 PROCEDURE — 85025 COMPLETE CBC W/AUTO DIFF WBC: CPT

## 2025-06-04 PROCEDURE — 94640 AIRWAY INHALATION TREATMENT: CPT

## 2025-06-04 PROCEDURE — 2580000003 HC RX 258: Performed by: SURGERY

## 2025-06-04 PROCEDURE — 85730 THROMBOPLASTIN TIME PARTIAL: CPT

## 2025-06-04 PROCEDURE — 83036 HEMOGLOBIN GLYCOSYLATED A1C: CPT

## 2025-06-04 PROCEDURE — 86140 C-REACTIVE PROTEIN: CPT

## 2025-06-04 PROCEDURE — 94761 N-INVAS EAR/PLS OXIMETRY MLT: CPT

## 2025-06-04 RX ORDER — LISINOPRIL 20 MG/1
20 TABLET ORAL DAILY
Status: DISCONTINUED | OUTPATIENT
Start: 2025-06-04 | End: 2025-06-07 | Stop reason: HOSPADM

## 2025-06-04 RX ADMIN — Medication 2 PUFF: at 22:18

## 2025-06-04 RX ADMIN — INSULIN LISPRO 2 UNITS: 100 INJECTION, SOLUTION INTRAVENOUS; SUBCUTANEOUS at 12:36

## 2025-06-04 RX ADMIN — INSULIN LISPRO 3 UNITS: 100 INJECTION, SOLUTION INTRAVENOUS; SUBCUTANEOUS at 08:53

## 2025-06-04 RX ADMIN — SODIUM CHLORIDE: 0.9 INJECTION, SOLUTION INTRAVENOUS at 04:35

## 2025-06-04 RX ADMIN — MEROPENEM 1000 MG: 1 INJECTION INTRAVENOUS at 00:57

## 2025-06-04 RX ADMIN — OXYCODONE HYDROCHLORIDE AND ACETAMINOPHEN 1 TABLET: 10; 325 TABLET ORAL at 08:54

## 2025-06-04 RX ADMIN — MEROPENEM 1000 MG: 1 INJECTION INTRAVENOUS at 15:52

## 2025-06-04 RX ADMIN — SODIUM CHLORIDE: 0.9 INJECTION, SOLUTION INTRAVENOUS at 18:18

## 2025-06-04 RX ADMIN — FOLIC ACID 1 MG: 1 TABLET ORAL at 08:54

## 2025-06-04 RX ADMIN — OXYCODONE HYDROCHLORIDE AND ACETAMINOPHEN 1 TABLET: 10; 325 TABLET ORAL at 17:51

## 2025-06-04 RX ADMIN — SODIUM CHLORIDE, PRESERVATIVE FREE 10 ML: 5 INJECTION INTRAVENOUS at 09:00

## 2025-06-04 RX ADMIN — INSULIN LISPRO 3 UNITS: 100 INJECTION, SOLUTION INTRAVENOUS; SUBCUTANEOUS at 17:46

## 2025-06-04 RX ADMIN — FUROSEMIDE 20 MG: 40 TABLET ORAL at 08:54

## 2025-06-04 RX ADMIN — INSULIN LISPRO 4 UNITS: 100 INJECTION, SOLUTION INTRAVENOUS; SUBCUTANEOUS at 22:17

## 2025-06-04 RX ADMIN — OXYCODONE HYDROCHLORIDE AND ACETAMINOPHEN 1 TABLET: 10; 325 TABLET ORAL at 12:38

## 2025-06-04 RX ADMIN — LISINOPRIL 20 MG: 20 TABLET ORAL at 17:48

## 2025-06-04 RX ADMIN — INSULIN LISPRO 3 UNITS: 100 INJECTION, SOLUTION INTRAVENOUS; SUBCUTANEOUS at 12:37

## 2025-06-04 RX ADMIN — INSULIN GLARGINE 28 UNITS: 100 INJECTION, SOLUTION SUBCUTANEOUS at 22:16

## 2025-06-04 RX ADMIN — MEROPENEM 1000 MG: 1 INJECTION INTRAVENOUS at 09:08

## 2025-06-04 RX ADMIN — OXYCODONE HYDROCHLORIDE AND ACETAMINOPHEN 1 TABLET: 10; 325 TABLET ORAL at 22:14

## 2025-06-04 RX ADMIN — SODIUM CHLORIDE, PRESERVATIVE FREE 10 ML: 5 INJECTION INTRAVENOUS at 09:01

## 2025-06-04 RX ADMIN — GABAPENTIN 100 MG: 100 CAPSULE ORAL at 08:54

## 2025-06-04 RX ADMIN — ASPIRIN 325 MG: 325 TABLET, COATED ORAL at 09:03

## 2025-06-04 RX ADMIN — OXYCODONE HYDROCHLORIDE AND ACETAMINOPHEN 1 TABLET: 10; 325 TABLET ORAL at 03:59

## 2025-06-04 RX ADMIN — TRAZODONE HYDROCHLORIDE 100 MG: 50 TABLET ORAL at 21:31

## 2025-06-04 RX ADMIN — VANCOMYCIN HYDROCHLORIDE 1500 MG: 750 INJECTION, POWDER, LYOPHILIZED, FOR SOLUTION INTRAVENOUS at 21:37

## 2025-06-04 RX ADMIN — PANTOPRAZOLE SODIUM 40 MG: 40 TABLET, DELAYED RELEASE ORAL at 08:54

## 2025-06-04 RX ADMIN — GABAPENTIN 100 MG: 100 CAPSULE ORAL at 13:41

## 2025-06-04 RX ADMIN — GABAPENTIN 100 MG: 100 CAPSULE ORAL at 21:31

## 2025-06-04 ASSESSMENT — PAIN DESCRIPTION - DESCRIPTORS
DESCRIPTORS: ACHING;DISCOMFORT
DESCRIPTORS: ACHING;DISCOMFORT
DESCRIPTORS: ACHING
DESCRIPTORS: ACHING
DESCRIPTORS: ACHING;DISCOMFORT

## 2025-06-04 ASSESSMENT — PAIN - FUNCTIONAL ASSESSMENT
PAIN_FUNCTIONAL_ASSESSMENT: PREVENTS OR INTERFERES SOME ACTIVE ACTIVITIES AND ADLS
PAIN_FUNCTIONAL_ASSESSMENT: ACTIVITIES ARE NOT PREVENTED
PAIN_FUNCTIONAL_ASSESSMENT: PREVENTS OR INTERFERES SOME ACTIVE ACTIVITIES AND ADLS

## 2025-06-04 ASSESSMENT — PAIN DESCRIPTION - ORIENTATION
ORIENTATION: LEFT
ORIENTATION: LEFT
ORIENTATION: RIGHT
ORIENTATION: LEFT
ORIENTATION: LEFT

## 2025-06-04 ASSESSMENT — PAIN DESCRIPTION - LOCATION
LOCATION: GROIN
LOCATION: ANKLE;KNEE
LOCATION: LEG;GROIN

## 2025-06-04 ASSESSMENT — PAIN SCALES - GENERAL
PAINLEVEL_OUTOF10: 8
PAINLEVEL_OUTOF10: 5
PAINLEVEL_OUTOF10: 6
PAINLEVEL_OUTOF10: 4
PAINLEVEL_OUTOF10: 7

## 2025-06-04 ASSESSMENT — PAIN SCALES - WONG BAKER: WONGBAKER_NUMERICALRESPONSE: HURTS A LITTLE BIT

## 2025-06-04 NOTE — PLAN OF CARE
Problem: Chronic Conditions and Co-morbidities  Goal: Patient's chronic conditions and co-morbidity symptoms are monitored and maintained or improved  6/4/2025 0025 by Mary Patel RN  Outcome: Progressing  6/3/2025 1427 by Clara Saunders RN  Outcome: Progressing  Flowsheets (Taken 6/3/2025 0900)  Care Plan - Patient's Chronic Conditions and Co-Morbidity Symptoms are Monitored and Maintained or Improved: Monitor and assess patient's chronic conditions and comorbid symptoms for stability, deterioration, or improvement     Problem: Discharge Planning  Goal: Discharge to home or other facility with appropriate resources  6/4/2025 0025 by Mary Patel RN  Outcome: Progressing  6/3/2025 1427 by Clara Saunders RN  Outcome: Progressing  Flowsheets (Taken 6/3/2025 0900)  Discharge to home or other facility with appropriate resources: Identify barriers to discharge with patient and caregiver     Problem: Skin/Tissue Integrity  Goal: Skin integrity remains intact  Description: 1.  Monitor for areas of redness and/or skin breakdown2.  Assess vascular access sites hourly3.  Every 4-6 hours minimum:  Change oxygen saturation probe site4.  Every 4-6 hours:  If on nasal continuous positive airway pressure, respiratory therapy assess nares and determine need for appliance change or resting period  6/4/2025 0025 by Mary Patel RN  Outcome: Progressing  6/3/2025 1427 by Clara Saunders RN  Outcome: Progressing  Flowsheets (Taken 6/3/2025 0900)  Skin Integrity Remains Intact: Monitor for areas of redness and/or skin breakdown     Problem: Safety - Adult  Goal: Free from fall injury  6/4/2025 0025 by Mary Patel RN  Outcome: Progressing  6/3/2025 1427 by Clara Saunders RN  Outcome: Progressing     Problem: ABCDS Injury Assessment  Goal: Absence of physical injury  6/4/2025 0025 by Mary Patel RN  Outcome: Progressing  6/3/2025 1427 by Clara Saunders RN  Outcome: Progressing     Problem:  Pain  Goal: Verbalizes/displays adequate comfort level or baseline comfort level  6/4/2025 0025 by Mary Patel, RN  Outcome: Progressing  6/3/2025 1427 by Clara Saunders, RN  Outcome: Progressing   Patient is alert and oriented x 4. IV fluids and heparin infusing as ordered.  Bedrest maintained, patient remains laying flat  and pressure dressing remains in place to right groin.  Radha Hugger in place to left lower extremity.  Walker catheter in place draining large amounts of yellow urine. Medicated for pain with good  result. Bed locked and I  low position. Fall and safety precautions maintained.

## 2025-06-04 NOTE — ACP (ADVANCE CARE PLANNING)
Advance Care Planning     Advance Care Planning Inpatient Note  [facility] Department    Today's Date: 6/4/2025  Unit: [facility]    Received request from admission screening.  Upon review of chart and communication with care team, patient's decision making abilities are not in question.. Patient was/were present in the room during visit.    Goals of ACP Conversation:  Discuss advance care planning documents  Facilitate a discussion related to patient's goals of care as they align with the patient's values and beliefs.    Health Care Decision Makers:       Primary Decision Maker: Ricarda New - Other Relative - [phone]  Summary:  Verified Documents    Advance Care Planning Documents (Patient Wishes):  Healthcare Power of /Advance Directive Appointment of Health Care Agent  Living Will/Advance Directive  Anatomical Gift/Organ Donation     Assessment:   is responding to ACP consult for the patient in 520. Per chart review, Laury has a pre-existing document on file at the time.  discussed with the patient and clarified her prior healthcare instructions and medical decision maker. She does not wish to change anything at this time and is happy to keep her prior directive as it stands.     Interventions:  Provided education on documents for clarity and greater understanding  Discussed and provided education on state decision maker hierarchy  Encouraged ongoing ACP conversation with future decision makers and loved ones    Care Preferences Communicated:   No    Outcomes/Plan:  ACP Discussion: Completed  Existing advance directive reviewed with patient; no changes to patient's previously recorded wishes.  Teach Back Method used to verify the patient's and/or Healthcare Decision Maker's understanding of key information in the advance directive documents    Electronically signed by ELYSIA SWEENEY on 6/4/2025 at 12:12 PM

## 2025-06-04 NOTE — PROGRESS NOTES
Spiritual Health History and Assessment/Progress Note  [facility]    Advance Care Planning,  ,  ,      Name: Laury Schuster MRN: [id number]    Age: 64 y.o.     Sex: female   Language: English   Pentecostalism: Sabianism   Open wound knee/leg with tendon involvment, left, initial encounter     Date: 6/4/2025            Total Time Calculated: 81 min              Spiritual Assessment began in [facility]        Referral/Consult From: Nurse   Encounter Overview/Reason: Advance Care Planning  Service Provided For: Patient    Loli, Belief, Meaning:   Patient identifies as spiritual, is connected with a loli tradition or spiritual practice, and has beliefs or practices that help with coping during difficult times  Family/Friends No family/friends present      Importance and Influence:  Patient has spiritual/personal beliefs that influence decisions regarding their health  Family/Friends No family/friends present    Community:  Patient feels well-supported. Support system includes: Friends and expresses feelings of isolation: Other: Disconnected from family  Family/Friends No family/friends present    Assessment and Plan of Care:     Patient Interventions include: Facilitated expression of thoughts and feelings, Explored spiritual coping/struggle/distress, Engaged in theological reflection, Affirmed coping skills/support systems, Facilitated life review and/ or legacy, and Assisted in Advance Care Planning conversation  Family/Friends Interventions include: No family/friends present    Patient Plan of Care: Spiritual Care available upon further referral  Family/Friends Plan of Care: Spiritual Care available upon further referral     is responding to ACP consult for the patient in 520. Per chart review, Laury has a pre-existing document on file at the time.  discussed with the patient and clarified her prior healthcare instructions and medical decision maker. She does not wish to change

## 2025-06-04 NOTE — PROGRESS NOTES
PROGRESS NOTE      Chief Complaints:  No new complaints.  HPI and  Objective:    No fever.  Pain is better on the left foot.  Review of Systems:  Rest of review of system reviewed personally and they are negative.    EXAM:  /68   Pulse 100   Temp 97.9 °F (36.6 °C) (Oral)   Resp 18   Ht 1.524 m (5')   Wt 60.8 kg (134 lb 0.6 oz)   LMP  (LMP Unknown)   SpO2 95%   BMI 26.18 kg/m²     Patient is awake   Head and neck atraumatic, normocephalic.  ENT: No hoarse voice, gaze appropriate.  Cardiac system regular rate rhythm.  Pulmonary no audible wheeze, no cyanosis.  Chest wall excursion normal with respiration cycle  Abdomen is soft not particularly distended.  Neurologically nonfocal.  Hematology system: No bruising noted.  Musculoskeletal system: No joint deformity noted.  Genitourinary system: No hematuria noted.  Skin is warm and moist.  Psychosocial: Cooperative.  Vascular examination as previously noted no changes.    Current Facility-Administered Medications   Medication Dose Route Frequency Provider Last Rate Last Admin    meropenem (MERREM) 1,000 mg in sodium chloride 0.9 % 100 mL IVPB (addEASE)  1,000 mg IntraVENous Q8H Devin Torres MD   Stopped at 06/04/25 1208    labetalol (NORMODYNE;TRANDATE) injection 10 mg  10 mg IntraVENous Q4H PRN Fei Pina MD        heparin 25,000 units in dextrose 5% 250 mL (premix) infusion  600 Units/hr IntraVENous Continuous Marko Cantu MD 6 mL/hr at 06/03/25 1828 600 Units/hr at 06/03/25 1828    sodium chloride flush 0.9 % injection 5-40 mL  5-40 mL IntraVENous 2 times per day Marko Cantu MD   10 mL at 06/04/25 0900    sodium chloride flush 0.9 % injection 5-40 mL  5-40 mL IntraVENous PRN Marko Cantu MD        0.9 % sodium chloride infusion   IntraVENous PRN Marko Cantu MD        acetaminophen (TYLENOL) tablet 650 mg  650 mg Oral Q4H PRN Marko Cantu MD        insulin lispro (HUMALOG,ADMELOG) injection vial 3 Units  3 Units SubCUTAneous TID  9:09 PM   Result Value Ref Range    POC Glucose 160 (H) 65 - 100 mg/dL    Performed by: Jorge Hernandez    C-Reactive Protein    Collection Time: 06/04/25  8:07 AM   Result Value Ref Range    CRP 1.06 (H) 0.00 - 0.30 mg/dL   Sedimentation Rate    Collection Time: 06/04/25  8:07 AM   Result Value Ref Range    Sed Rate, Automated 52 (H) 0 - 30 mm/hr   CBC with Auto Differential    Collection Time: 06/04/25  8:07 AM   Result Value Ref Range    WBC 11.8 (H) 3.6 - 11.0 K/uL    RBC 4.01 3.80 - 5.20 M/uL    Hemoglobin 10.0 (L) 11.5 - 16.0 g/dL    Hematocrit 33.0 (L) 35.0 - 47.0 %    MCV 82.3 80.0 - 99.0 FL    MCH 24.9 (L) 26.0 - 34.0 PG    MCHC 30.3 30.0 - 36.5 g/dL    RDW 14.6 (H) 11.5 - 14.5 %    Platelets 411 (H) 150 - 400 K/uL    MPV 12.3 8.9 - 12.9 FL    Nucleated RBCs 0.0 0.0  WBC    nRBC 0.00 0.00 - 0.01 K/uL    Neutrophils % 58.3 32.0 - 75.0 %    Lymphocytes % 30.2 12.0 - 49.0 %    Monocytes % 8.4 5.0 - 13.0 %    Eosinophils % 2.0 0.0 - 7.0 %    Basophils % 0.7 0.0 - 1.0 %    Immature Granulocytes % 0.4 0 - 0.5 %    Neutrophils Absolute 6.89 1.80 - 8.00 K/UL    Lymphocytes Absolute 3.58 (H) 0.80 - 3.50 K/UL    Monocytes Absolute 1.00 0.00 - 1.00 K/UL    Eosinophils Absolute 0.24 0.00 - 0.40 K/UL    Basophils Absolute 0.08 0.00 - 0.10 K/UL    Immature Granulocytes Absolute 0.05 (H) 0.00 - 0.04 K/UL    Differential Type AUTOMATED     Comprehensive Metabolic Panel    Collection Time: 06/04/25  8:07 AM   Result Value Ref Range    Sodium 138 136 - 145 mmol/L    Potassium 4.7 3.5 - 5.1 mmol/L    Chloride 108 97 - 108 mmol/L    CO2 23 21 - 32 mmol/L    Anion Gap 7 2 - 12 mmol/L    Glucose 74 65 - 100 mg/dL    BUN 7 6 - 20 mg/dL    Creatinine 0.66 0.55 - 1.02 mg/dL    BUN/Creatinine Ratio 11 (L) 12 - 20      Est, Glom Filt Rate >90 >60 ml/min/1.73m2    Calcium 8.6 8.5 - 10.1 mg/dL    Total Bilirubin 0.2 0.2 - 1.0 mg/dL    AST 26 15 - 37 U/L    ALT 15 12 - 78 U/L    Alk Phosphatase 87 45 - 117 U/L    Total Protein  6.4 6.4 - 8.2 g/dL    Albumin 2.8 (L) 3.5 - 5.0 g/dL    Globulin 3.6 2.0 - 4.0 g/dL    Albumin/Globulin Ratio 0.8 (L) 1.1 - 2.2     APTT    Collection Time: 06/04/25  8:07 AM   Result Value Ref Range    APTT 30.0 21.2 - 34.1 sec    Therapeutic Range   82 - 109 sec   POCT Glucose    Collection Time: 06/04/25  9:10 AM   Result Value Ref Range    POC Glucose 215 (H) 65 - 100 mg/dL    Performed by: Lux Corcoran (Trupti)    POCT Glucose    Collection Time: 06/04/25 12:10 PM   Result Value Ref Range    POC Glucose 180 (H) 65 - 100 mg/dL    Performed by: Lux Corcoran (Trupti)        ASSESSMENT:   Patient is 64 y.o. with diagnosis of : Principal Problem:    Open wound knee/leg with tendon involvment, left, initial encounter  Active Problems:    Open wound of knee, leg, and ankle, left, sequela    Wound infection    CRP elevated  Resolved Problems:    * No resolved hospital problems. *      PLAN:                 I changed dressing on the BKA stump on the right side and also left leg wound this morning.    Patient had  successful left SFA intervention as well as a right iliac partial intervention.    Both feels better perfused.    Awaiting for Dr. Torres recommendation antibiotic regimen prior to discharge.

## 2025-06-04 NOTE — PROGRESS NOTES
Progress Note  Date:2025       Room:[facility]  Patient Name:Laury Schuster     YOB: 1960     Age:64 y.o.        Subjective    Subjective  Patient followed for chronic nonhealing wounds left anterior shin and left ankle, along with right BKA chronic wounds. Cultures still in process. CRP decreasing.  Yesterday she underwent vascular procedure.      Vitals Last 24 Hours:  TEMPERATURE:  Temp  Av.9 °F (36.6 °C)  Min: 97.9 °F (36.6 °C)  Max: 97.9 °F (36.6 °C)  RESPIRATIONS RANGE: Resp  Av  Min: 18  Max: 18  PULSE OXIMETRY RANGE: SpO2  Av %  Min: 93 %  Max: 95 %  PULSE RANGE: Pulse  Av.7  Min: 99  Max: 100  BLOOD PRESSURE RANGE: Systolic (24hrs), Av , Min:114 , Max:145   ; Diastolic (24hrs), Av, Min:68, Max:82         Objective:  Vital signs: (most recent): Blood pressure 123/76, pulse 100, temperature 97.9 °F (36.6 °C), temperature source Oral, resp. rate 18, height 1.524 m (5'), weight 60.8 kg (134 lb 0.6 oz), SpO2 95%.      Vitals and nursing note reviewed.  Warming blanket  Constitutional:       Appearance: She is not ill-appearing.   HENT:      Head: Normocephalic and atraumatic.      Right Ear: External ear normal.      Left Ear: External ear normal.      Nose: Nose normal.      Mouth/Throat:      Pharynx: Oropharynx is clear.   Eyes:      Extraocular Movements: Extraocular movements intact.      Pupils: Pupils are equal, round, and reactive to light.   Cardiovascular:      Rate and Rhythm: Normal rate and regular rhythm.      Heart sounds: Normal heart sounds. No murmur heard.  Pulmonary:      Effort: Pulmonary effort is normal.      Breath sounds: Normal breath sounds.   Abdominal:      General: Bowel sounds are normal. There is no distension.      Palpations: Abdomen is soft.      Tenderness: There is no abdominal tenderness.   Genitourinary:     Comments: No Walker catheter  Musculoskeletal:      Cervical back: Neck supple.      Right lower leg: No edema.      Left  ulceration are noted.       XR Tibia Fibula Left (6/2/25)     No acute abnormality      Hospital Problems           Last Modified POA    * (Principal) Open wound knee/leg with tendon involvment, left, initial encounter 6/2/2025 Yes    Open wound of knee, leg, and ankle, left, sequela 6/4/2025 Yes    Wound infection 6/4/2025 Yes    CRP elevated 6/4/2025 Yes      Chronic nonhealing nonsurgical wound left anterior shin and medial left ankle, ruling out superinfection  Chonic nonheading nonsurgical wounds right BKA stump, ruling out superinfection  Elevated CRP and ESR  PAD  Thrombocytosis, probably reactive  Uncontrolled diabetes mellitus  7.  Penicillin allergy    Comment:  WBC, CRP and ESR all increasing raising concern for MRSA since Gram stains for left leg wound cultures showing Gram positive cocci consistent with  staphylococci or streptococci.    Plan   1. Continue IV Meropenem    2. Start IV Vancomycin  3. Follow-up blood and wound cultures taken from right BKA stump, left ankle and anterior shin  3. In am, repeat CBC, CRP, ESR  4. Awaiting NM 3 phase bone scan    Electronically signed by Dvein Torres MD on 6/3/25 at 5:56 PM EDT

## 2025-06-04 NOTE — CARE COORDINATION
CM reviewed chart. Patient is s/p SFA intervention by surgery today. Patient continues on IV ABX. Awaiting final recommendations from ID, surgery,  PT eval, and results of bone scan.     DCP home once medically stable.     CM will continue to follow.

## 2025-06-04 NOTE — PROGRESS NOTES
.      Hospitalist Progress Note    NAME:   Laury Schuster   : 1960   MRN: [id number]     Date/Time: 2025 9:52 AM  Patient PCP: Trenton Escudero MD        Assessment / Plan:    # Diabetes  - Continue home dose lantus + 3u lispro TID   - POC + correctional insulin   - Monitor for hypoglycemia due to insulin with serial glucose checks  - Hypoglycemia protocol  -  pt requesting CGM to be sent to her [facility] [place]      # Essential hypertension   - BP now at goal, continue current regimen.    - creatinine now 0.98, baseline 0.4-0.05.  - continue lisinopril      # Infected left leg wound   # Chronic BKA stump wound   # S/p Right iliac partial intervention   # S/p Left SFA intervention   - management per ID and surgery  - currently IV meropenem   - Pending finalization of IV antibiotics  - Blood culture and wound culture     # PAD  - management per surgery      # History of CVA with left sided weakness       Rest per primary Dr. Cantu       Medical Decision Making     [x] High (any 2)     A. Problems (any 1)  [x] Acute/Chronic Illness/injury posing threat to life or bodily function:    [] Severe exacerbation of chronic illness:    ---------------------------------------------------------------------  B. Risk of Treatment (any 1)   [x] Drugs/treatments that require intensive monitoring for toxicity include:    [] IV ABX requiring serial renal monitoring for nephrotoxicity:     [] IV Narcotic analgesia for adverse drug reaction  [] Aggressive IV diuresis requiring serial monitoring for renal impairment and electrolyte derangements  [] Critical electrolyte abnormalities requiring IV replacement and close serial monitoring  [] Insulin - monitoring serial FSBS for Hypoglycemic adverse drug reaction  [] Other -   [] Change in code status:    [] Decision to escalate care:    [] Major surgery/procedure with associated risk factors:     ----------------------------------------------------------------------  C. Data (any

## 2025-06-04 NOTE — PROGRESS NOTES
Vancomycin Dosing Consult  Laury Schuster is a 64 y.o. female with SSTI. Pharmacy was consulted by Dr. Torres to dose and monitor vancomycin. Today is day 1.    Antibiotic regimen: Vancomycin + Meropenem    Temp (24hrs), Av.9 °F (36.6 °C), Min:97.9 °F (36.6 °C), Max:97.9 °F (36.6 °C)    Recent Labs     25  1851 25  0807   WBC 8.6 11.8*   CREATININE 0.98 0.66   BUN 12 7     Est CrCl: 71 mL/min  Concomitant nephrotoxic drugs: NSAIDs and Loop diuretics    Cultures:   6 Wound (leg): GPC in pairs, prelim  6/2 Wound (knee): Heavy Diphteroids, prelim   6/2 Wound (knee, right): NTGD,  prelim  6/2 Wound (ankle):Rare GPC, prelim  /3 Urine: pending    MRSA Swab: Pending    Target range: AUC/LAURIE 400-600    Recent level history:  Date/Time Dose & Interval Measured Level (mcg/mL) Associated AUC/LAURIE              Assessment/Plan:   Chronic nonhealing nonsurgical wounds.  Leukocytosis, elevated ESR and CRP  Scr appears at baseline, will start AUC guided dosing. Used model MAP [place] with flattened prior for more conservative approach given PMH for right BKA.  Ordered vancomycin 1500 mg LD and MD of 750 mg q12h with predicted AUC of 434  Schedule level for  @ 0600  Antimicrobial stop date 7 days       Adult

## 2025-06-05 ENCOUNTER — APPOINTMENT (OUTPATIENT)
Facility: HOSPITAL | Age: 65
DRG: 629 | End: 2025-06-05
Attending: SURGERY
Payer: MEDICARE

## 2025-06-05 LAB
ALBUMIN SERPL-MCNC: 2.5 G/DL (ref 3.5–5)
ALBUMIN/GLOB SERPL: 0.7 (ref 1.1–2.2)
ALP SERPL-CCNC: 77 U/L (ref 45–117)
ALT SERPL-CCNC: 11 U/L (ref 12–78)
ANION GAP SERPL CALC-SCNC: 7 MMOL/L (ref 2–12)
APTT PPP: 31.6 SEC (ref 21.2–34.1)
AST SERPL W P-5'-P-CCNC: 16 U/L (ref 15–37)
BACTERIA SPEC CULT: NORMAL
BASOPHILS # BLD: 0.07 K/UL (ref 0–0.1)
BASOPHILS NFR BLD: 0.6 % (ref 0–1)
BILIRUB SERPL-MCNC: 0.2 MG/DL (ref 0.2–1)
BUN SERPL-MCNC: 6 MG/DL (ref 6–20)
BUN/CREAT SERPL: 10 (ref 12–20)
CA-I BLD-MCNC: 8.3 MG/DL (ref 8.5–10.1)
CHLORIDE SERPL-SCNC: 110 MMOL/L (ref 97–108)
CO2 SERPL-SCNC: 23 MMOL/L (ref 21–32)
CREAT SERPL-MCNC: 0.6 MG/DL (ref 0.55–1.02)
CRP SERPL-MCNC: 1.26 MG/DL (ref 0–0.3)
DIFFERENTIAL METHOD BLD: ABNORMAL
EOSINOPHIL # BLD: 0.51 K/UL (ref 0–0.4)
EOSINOPHIL NFR BLD: 4.1 % (ref 0–7)
ERYTHROCYTE [DISTWIDTH] IN BLOOD BY AUTOMATED COUNT: 14.5 % (ref 11.5–14.5)
ERYTHROCYTE [SEDIMENTATION RATE] IN BLOOD: 50 MM/HR (ref 0–30)
GLOBULIN SER CALC-MCNC: 3.7 G/DL (ref 2–4)
GLUCOSE BLD STRIP.AUTO-MCNC: 152 MG/DL (ref 65–100)
GLUCOSE BLD STRIP.AUTO-MCNC: 225 MG/DL (ref 65–100)
GLUCOSE BLD STRIP.AUTO-MCNC: 302 MG/DL (ref 65–100)
GLUCOSE BLD STRIP.AUTO-MCNC: 310 MG/DL (ref 65–100)
GLUCOSE SERPL-MCNC: 136 MG/DL (ref 65–100)
GRAM STN SPEC: NORMAL
HCT VFR BLD AUTO: 29.4 % (ref 35–47)
HGB BLD-MCNC: 8.9 G/DL (ref 11.5–16)
IMM GRANULOCYTES # BLD AUTO: 0.05 K/UL (ref 0–0.04)
IMM GRANULOCYTES NFR BLD AUTO: 0.4 % (ref 0–0.5)
LYMPHOCYTES # BLD: 2.22 K/UL (ref 0.8–3.5)
LYMPHOCYTES NFR BLD: 18 % (ref 12–49)
Lab: NORMAL
MCH RBC QN AUTO: 24.5 PG (ref 26–34)
MCHC RBC AUTO-ENTMCNC: 30.3 G/DL (ref 30–36.5)
MCV RBC AUTO: 80.8 FL (ref 80–99)
MONOCYTES # BLD: 1.46 K/UL (ref 0–1)
MONOCYTES NFR BLD: 11.8 % (ref 5–13)
NEUTS SEG # BLD: 8.02 K/UL (ref 1.8–8)
NEUTS SEG NFR BLD: 65.1 % (ref 32–75)
NRBC # BLD: 0 K/UL (ref 0–0.01)
NRBC BLD-RTO: 0 PER 100 WBC
PERFORMED BY:: ABNORMAL
PLATELET # BLD AUTO: 378 K/UL (ref 150–400)
PMV BLD AUTO: 12.6 FL (ref 8.9–12.9)
POTASSIUM SERPL-SCNC: 3.9 MMOL/L (ref 3.5–5.1)
PROT SERPL-MCNC: 6.2 G/DL (ref 6.4–8.2)
RBC # BLD AUTO: 3.64 M/UL (ref 3.8–5.2)
SODIUM SERPL-SCNC: 140 MMOL/L (ref 136–145)
THERAPEUTIC RANGE: NORMAL SEC (ref 82–109)
WBC # BLD AUTO: 12.3 K/UL (ref 3.6–11)

## 2025-06-05 PROCEDURE — 2580000003 HC RX 258: Performed by: INTERNAL MEDICINE

## 2025-06-05 PROCEDURE — 2500000003 HC RX 250 WO HCPCS: Performed by: SURGERY

## 2025-06-05 PROCEDURE — 85025 COMPLETE CBC W/AUTO DIFF WBC: CPT

## 2025-06-05 PROCEDURE — 6360000002 HC RX W HCPCS: Performed by: INTERNAL MEDICINE

## 2025-06-05 PROCEDURE — 6370000000 HC RX 637 (ALT 250 FOR IP): Performed by: INTERNAL MEDICINE

## 2025-06-05 PROCEDURE — 99232 SBSQ HOSP IP/OBS MODERATE 35: CPT | Performed by: INTERNAL MEDICINE

## 2025-06-05 PROCEDURE — A9503 TC99M MEDRONATE: HCPCS | Performed by: INTERNAL MEDICINE

## 2025-06-05 PROCEDURE — 6370000000 HC RX 637 (ALT 250 FOR IP): Performed by: SURGERY

## 2025-06-05 PROCEDURE — 82962 GLUCOSE BLOOD TEST: CPT

## 2025-06-05 PROCEDURE — 85730 THROMBOPLASTIN TIME PARTIAL: CPT

## 2025-06-05 PROCEDURE — 94761 N-INVAS EAR/PLS OXIMETRY MLT: CPT

## 2025-06-05 PROCEDURE — 3430000000 HC RX DIAGNOSTIC RADIOPHARMACEUTICAL: Performed by: INTERNAL MEDICINE

## 2025-06-05 PROCEDURE — 99232 SBSQ HOSP IP/OBS MODERATE 35: CPT | Performed by: SURGERY

## 2025-06-05 PROCEDURE — 80053 COMPREHEN METABOLIC PANEL: CPT

## 2025-06-05 PROCEDURE — 6370000000 HC RX 637 (ALT 250 FOR IP)

## 2025-06-05 PROCEDURE — 36415 COLL VENOUS BLD VENIPUNCTURE: CPT

## 2025-06-05 PROCEDURE — 86140 C-REACTIVE PROTEIN: CPT

## 2025-06-05 PROCEDURE — 94640 AIRWAY INHALATION TREATMENT: CPT

## 2025-06-05 PROCEDURE — 1100000000 HC RM PRIVATE

## 2025-06-05 PROCEDURE — 85652 RBC SED RATE AUTOMATED: CPT

## 2025-06-05 PROCEDURE — 6360000002 HC RX W HCPCS: Performed by: SURGERY

## 2025-06-05 PROCEDURE — 78315 BONE IMAGING 3 PHASE: CPT

## 2025-06-05 RX ORDER — TC 99M MEDRONATE 20 MG/10ML
23.5 INJECTION, POWDER, LYOPHILIZED, FOR SOLUTION INTRAVENOUS
Status: COMPLETED | OUTPATIENT
Start: 2025-06-05 | End: 2025-06-05

## 2025-06-05 RX ADMIN — TRAZODONE HYDROCHLORIDE 100 MG: 50 TABLET ORAL at 23:29

## 2025-06-05 RX ADMIN — PANTOPRAZOLE SODIUM 40 MG: 40 TABLET, DELAYED RELEASE ORAL at 09:05

## 2025-06-05 RX ADMIN — MEROPENEM 1000 MG: 1 INJECTION INTRAVENOUS at 00:19

## 2025-06-05 RX ADMIN — Medication 2 PUFF: at 20:55

## 2025-06-05 RX ADMIN — INSULIN LISPRO 2 UNITS: 100 INJECTION, SOLUTION INTRAVENOUS; SUBCUTANEOUS at 16:50

## 2025-06-05 RX ADMIN — OXYCODONE HYDROCHLORIDE AND ACETAMINOPHEN 1 TABLET: 10; 325 TABLET ORAL at 23:39

## 2025-06-05 RX ADMIN — SODIUM CHLORIDE, PRESERVATIVE FREE 10 ML: 5 INJECTION INTRAVENOUS at 09:03

## 2025-06-05 RX ADMIN — VANCOMYCIN HYDROCHLORIDE 750 MG: 750 INJECTION, POWDER, LYOPHILIZED, FOR SOLUTION INTRAVENOUS at 23:30

## 2025-06-05 RX ADMIN — FUROSEMIDE 20 MG: 40 TABLET ORAL at 09:05

## 2025-06-05 RX ADMIN — Medication 2 PUFF: at 07:45

## 2025-06-05 RX ADMIN — MEROPENEM 1000 MG: 1 INJECTION INTRAVENOUS at 09:00

## 2025-06-05 RX ADMIN — OXYCODONE HYDROCHLORIDE AND ACETAMINOPHEN 1 TABLET: 10; 325 TABLET ORAL at 09:08

## 2025-06-05 RX ADMIN — HEPARIN SODIUM 600 UNITS/HR: 10000 INJECTION, SOLUTION INTRAVENOUS at 10:58

## 2025-06-05 RX ADMIN — GABAPENTIN 100 MG: 100 CAPSULE ORAL at 09:05

## 2025-06-05 RX ADMIN — MEROPENEM 1000 MG: 1 INJECTION INTRAVENOUS at 16:50

## 2025-06-05 RX ADMIN — INSULIN GLARGINE 28 UNITS: 100 INJECTION, SOLUTION SUBCUTANEOUS at 23:28

## 2025-06-05 RX ADMIN — LISINOPRIL 20 MG: 20 TABLET ORAL at 09:05

## 2025-06-05 RX ADMIN — SODIUM CHLORIDE, PRESERVATIVE FREE 10 ML: 5 INJECTION INTRAVENOUS at 09:05

## 2025-06-05 RX ADMIN — SODIUM CHLORIDE, PRESERVATIVE FREE 10 ML: 5 INJECTION INTRAVENOUS at 23:39

## 2025-06-05 RX ADMIN — GABAPENTIN 100 MG: 100 CAPSULE ORAL at 23:29

## 2025-06-05 RX ADMIN — ASPIRIN 325 MG: 325 TABLET, COATED ORAL at 09:05

## 2025-06-05 RX ADMIN — INSULIN LISPRO 3 UNITS: 100 INJECTION, SOLUTION INTRAVENOUS; SUBCUTANEOUS at 16:50

## 2025-06-05 RX ADMIN — TC 99M MEDRONATE 23.5 MILLICURIE: 20 INJECTION, POWDER, LYOPHILIZED, FOR SOLUTION INTRAVENOUS at 11:25

## 2025-06-05 RX ADMIN — GABAPENTIN 100 MG: 100 CAPSULE ORAL at 14:07

## 2025-06-05 RX ADMIN — FOLIC ACID 1 MG: 1 TABLET ORAL at 09:05

## 2025-06-05 RX ADMIN — INSULIN LISPRO 3 UNITS: 100 INJECTION, SOLUTION INTRAVENOUS; SUBCUTANEOUS at 09:06

## 2025-06-05 RX ADMIN — VANCOMYCIN HYDROCHLORIDE 750 MG: 750 INJECTION, POWDER, LYOPHILIZED, FOR SOLUTION INTRAVENOUS at 08:59

## 2025-06-05 RX ADMIN — INSULIN LISPRO 6 UNITS: 100 INJECTION, SOLUTION INTRAVENOUS; SUBCUTANEOUS at 23:27

## 2025-06-05 ASSESSMENT — PAIN DESCRIPTION - ORIENTATION: ORIENTATION: LEFT

## 2025-06-05 ASSESSMENT — PAIN SCALES - WONG BAKER: WONGBAKER_NUMERICALRESPONSE: HURTS A LITTLE BIT

## 2025-06-05 ASSESSMENT — PAIN DESCRIPTION - LOCATION: LOCATION: LEG

## 2025-06-05 ASSESSMENT — PAIN SCALES - GENERAL
PAINLEVEL_OUTOF10: 8
PAINLEVEL_OUTOF10: 8

## 2025-06-05 ASSESSMENT — PAIN DESCRIPTION - DESCRIPTORS: DESCRIPTORS: ACHING;DISCOMFORT

## 2025-06-05 NOTE — PROGRESS NOTES
.      Hospitalist Progress Note    NAME:   Laury Schuster   : 1960   MRN: [id number]     Date/Time: 2025 12:14 PM  Patient PCP: Trenton Escudero MD        Assessment / Plan:    # Diabetes  - Continue home dose lantus + 3u lispro TID   - POC + correctional insulin   - Monitor for hypoglycemia due to insulin with serial glucose checks  - Hypoglycemia protocol  -  pt requesting CGM to be sent [facility] [place]      # Essential hypertension   - BP now at goal, continue current regimen.    - creatinine now 0.98, baseline 0.4-0.05.  - continue lisinopril      # Infected left leg wound   # Chronic BKA stump wound   # S/p Right iliac partial intervention   # S/p Left SFA intervention   - management per ID and surgery  - currently IV meropenem   - Pending finalization of IV antibiotics  - Blood culture and wound culture     # PAD  - management per surgery      # History of CVA with left sided weakness       Rest per primary Dr. Cantu       Medical Decision Making     [x] High (any 2)     A. Problems (any 1)  [x] Acute/Chronic Illness/injury posing threat to life or bodily function:    [] Severe exacerbation of chronic illness:    ---------------------------------------------------------------------  B. Risk of Treatment (any 1)   [x] Drugs/treatments that require intensive monitoring for toxicity include:    [] IV ABX requiring serial renal monitoring for nephrotoxicity:     [] IV Narcotic analgesia for adverse drug reaction  [] Aggressive IV diuresis requiring serial monitoring for renal impairment and electrolyte derangements  [] Critical electrolyte abnormalities requiring IV replacement and close serial monitoring  [] Insulin - monitoring serial FSBS for Hypoglycemic adverse drug reaction  [] Other -   [] Change in code status:    [] Decision to escalate care:    [] Major surgery/procedure with associated risk factors:     ----------------------------------------------------------------------  C. Data (any  2)  [x] Discussed current management and discharge planning options with Case Management.  [] Discussed management of the case with:    [] Telemetry personally reviewed and interpreted as documented above    [] Imaging personally reviewed and interpreted, includes:     [x] Data Review (any 3)  [x] All available Consultant notes from yesterday/today were reviewed  [x] All current labs were reviewed and interpreted for clinical significance   [x] Appropriate follow-up labs were ordered  [] Collateral history obtained from:           Code Status: Full   DVT Prophylaxis: Lovenox   GI Prophylaxis: not needed     Subjective:     Chief Complaint / Reason for Physician Visit  Patient seen and examined at bedside, overnight events reviewed, patient currently has no new complaints, discussed with RN      Objective:     VITALS:   Last 24hrs VS reviewed since prior progress note. Most recent are:  Patient Vitals for the past 24 hrs:   BP Temp Temp src Pulse Resp SpO2   06/05/25 0938 -- -- -- -- 18 --   06/05/25 0755 116/72 98.1 °F (36.7 °C) Oral 99 18 95 %   06/05/25 0745 -- -- -- -- -- 97 %   06/05/25 0211 127/71 97.5 °F (36.4 °C) -- 88 18 96 %   06/04/25 2244 -- -- -- -- 18 --   06/04/25 2222 -- -- -- 87 18 99 %   06/04/25 2218 -- -- -- 86 17 98 %   06/04/25 2214 -- -- -- -- 18 --   06/04/25 2041 119/69 97.9 °F (36.6 °C) Oral 95 18 95 %   06/04/25 1748 123/76 -- -- -- 18 --   06/04/25 1308 -- -- -- -- 18 --   06/04/25 1238 -- -- -- -- 18 --         Intake/Output Summary (Last 24 hours) at 6/5/2025 1214  Last data filed at 6/5/2025 0605  Gross per 24 hour   Intake 205 ml   Output 1700 ml   Net -1495 ml        I had a face to face encounter and independently examined this patient on 6/5/2025, as outlined below:    Review of Systems   Constitutional: Negative.    HENT: Negative.     Musculoskeletal:         Pain in the right groin         PHYSICAL EXAM:  Physical Exam     General: alert, awake, no acute distress.  HEENT: EOMI,

## 2025-06-05 NOTE — CARE COORDINATION
CRITICAL CARE NOTE    Name: Sumit Barrett   : 1972   MRN: [id number]   Date: 2/3/CM reviewed chart. Surgery, ID following. Patient continues on IV ABX, awaiting ID recs. Bone scan completed, awaiting results. PT eval to be done when appropriate.    DCP: Home once clinically stable.     CM will continue to monitor.

## 2025-06-05 NOTE — PLAN OF CARE
Problem: Chronic Conditions and Co-morbidities  Goal: Patient's chronic conditions and co-morbidity symptoms are monitored and maintained or improved  6/5/2025 0047 by Mary Patel RN  Outcome: Progressing  6/4/2025 1658 by Clara Saunders RN  Outcome: Progressing  Flowsheets (Taken 6/4/2025 0900)  Care Plan - Patient's Chronic Conditions and Co-Morbidity Symptoms are Monitored and Maintained or Improved: Monitor and assess patient's chronic conditions and comorbid symptoms for stability, deterioration, or improvement     Problem: Discharge Planning  Goal: Discharge to home or other facility with appropriate resources  6/5/2025 0047 by Mary Patel RN  Outcome: Progressing  6/4/2025 1658 by Clara Saunders RN  Outcome: Progressing  Flowsheets (Taken 6/4/2025 0900)  Discharge to home or other facility with appropriate resources: Identify barriers to discharge with patient and caregiver     Problem: Skin/Tissue Integrity  Goal: Skin integrity remains intact  Description: 1.  Monitor for areas of redness and/or skin breakdown2.  Assess vascular access sites hourly3.  Every 4-6 hours minimum:  Change oxygen saturation probe site4.  Every 4-6 hours:  If on nasal continuous positive airway pressure, respiratory therapy assess nares and determine need for appliance change or resting period  6/5/2025 0047 by Mary Patel RN  Outcome: Progressing  6/4/2025 1658 by Clara Saunders RN  Outcome: Progressing  Flowsheets  Taken 6/4/2025 1657  Skin Integrity Remains Intact: Monitor for areas of redness and/or skin breakdown  Taken 6/4/2025 0900  Skin Integrity Remains Intact: Monitor for areas of redness and/or skin breakdown     Problem: Safety - Adult  Goal: Free from fall injury  6/5/2025 0047 by Mary Patel RN  Outcome: Progressing  6/4/2025 1658 by Clara Saunders RN  Outcome: Progressing     Problem: ABCDS Injury Assessment  Goal: Absence of physical injury  6/5/2025 0047 by Mary Patel  RN  Outcome: Progressing  6/4/2025 1658 by Clara Saunders RN  Outcome: Progressing  Goal: Maintains adequate nutritional intake  6/5/2025 0047 by Mary Patel RN  Outcome: Progressing  6/4/2025 1658 by Clara Saunders, RN  Outcome: Progressing

## 2025-06-05 NOTE — PROGRESS NOTES
Physician Progress Note      PATIENT:               JOHN SALINAS  CSN #:                  [id number]  :                       1960  ADMIT DATE:       2025 2:32 PM  DISCH DATE:  RESPONDING  PROVIDER #:        Marko Cantu MD          QUERY TEXT:    Please document the relationship, if any, between the cellulitis and diabetes:    The clinical indicators include:  H&P: \"history of severe PAD left leg.  Patient developed multiple wounds on   the left leg a few months ago now with recurrent multiple ulcers involving   left pretibial area and left medial malleolus area nonhealing with worsening   cellulitis. Left leg has a full-thickness necrotic wound 1 cm diameter at the   medial malleolus area.  Patient also has superficial wound pretibial area left   mid calf with a cellulitis and edema.\"    Brian PN 6/3: \"Uncontrolled diabetes mellitus\"    POC Glu [phone] (labs)  a1c 04: 7.4 (labs)  Options provided:  -- LLE cellulitis related to Diabetes  -- LLE cellulitis related to PAD  -- LLE cellulitis related to PAD and Diabetes  -- Other - I will add my own diagnosis  -- Disagree - Not applicable / Not valid  -- Disagree - Clinically unable to determine / Unknown  -- Refer to Clinical Documentation Reviewer    PROVIDER RESPONSE TEXT:    LLE cellulitis related to Diabetes.    Query created by: Sheridan Damon on 2025 2:05 PM      Electronically signed by:  Marko Cantu MD 2025 4:33 AM

## 2025-06-05 NOTE — PROGRESS NOTES
PT eval order received and acknowledged. PT eval attempted at 1120 however pt off the floor in nuc med. Will continue to follow patient and attempt PT eval at a later time. Thank you.

## 2025-06-05 NOTE — PROGRESS NOTES
PROGRESS NOTE      Chief Complaints:  No new complaint.  HPI and  Objective:    Pain better.  Review of Systems:  Rest of review of system reviewed personally and they are negative.    EXAM:  /71   Pulse 88   Temp 97.5 °F (36.4 °C)   Resp 18   Ht 1.524 m (5')   Wt 60.8 kg (134 lb 0.6 oz)   LMP  (LMP Unknown)   SpO2 96%   BMI 26.18 kg/m²     Patient is awake   Head and neck atraumatic, normocephalic.  ENT: No hoarse voice, gaze appropriate.  Cardiac system regular rate rhythm.  Pulmonary no audible wheeze, no cyanosis.  Chest wall excursion normal with respiration cycle  Abdomen is soft not particularly distended.  Neurologically nonfocal.  Hematology system: No bruising noted.  Musculoskeletal system: No joint deformity noted.  Genitourinary system: No hematuria noted.  Skin is warm and moist.  Psychosocial: Cooperative.  Vascular examination as previously noted no changes.    Current Facility-Administered Medications   Medication Dose Route Frequency Provider Last Rate Last Admin    lisinopril (PRINIVIL;ZESTRIL) tablet 20 mg  20 mg Oral Daily Xi Cuevas MD   20 mg at 06/04/25 1748    vancomycin (VANCOCIN) 750 mg in sodium chloride 0.9 % 250 mL IVPB ([id number])  750 mg IntraVENous Q12H Devin Torres MD        [START ON 6/6/2025] Vancomycin - Please  draw level on 6/6 @ 0600 prior to next dose. Thanks!  1 each Other Once Devin Torres MD        Vancomycin - Pharmacy to Dose   Other RX Placeholder Devin Torres MD        meropenem (MERREM) 1,000 mg in sodium chloride 0.9 % 100 mL IVPB (addEASE)  1,000 mg IntraVENous Q8H Devin Torres MD   Stopped at 06/05/25 0319    labetalol (NORMODYNE;TRANDATE) injection 10 mg  10 mg IntraVENous Q4H PRN Fei Pina MD        heparin 25,000 units in dextrose 5% 250 mL (premix) infusion  600 Units/hr IntraVENous Continuous Marko Cantu MD 6 mL/hr at 06/05/25 0605 600 Units/hr at 06/05/25 0605    sodium chloride flush 0.9 % injection 5-40 mL  2 puff  2 puff Inhalation Q4H PRN Marko Cantu MD        aspirin EC tablet 325 mg  325 mg Oral Daily Marko Cantu MD   325 mg at 06/04/25 0903    cyclobenzaprine (FLEXERIL) tablet 10 mg  10 mg Oral Nightly PRN Marko Cantu MD   10 mg at 06/03/25 0026    budesonide-formoterol (SYMBICORT) 80-4.5 MCG/ACT inhaler 2 puff  2 puff Inhalation BID RT Marko Cantu MD   2 puff at 06/04/25 2218    folic acid (FOLVITE) tablet 1 mg  1 mg Oral Daily Marko Cantu MD   1 mg at 06/04/25 0854    furosemide (LASIX) tablet 20 mg  20 mg Oral Daily Pepe, Marko Leyva MD   20 mg at 06/04/25 0854    gabapentin (NEURONTIN) capsule 100 mg  100 mg Oral TID Marko Cantu MD   100 mg at 06/04/25 2131    insulin glargine (LANTUS) injection vial 28 Units  28 Units SubCUTAneous Nightly Marko Cantu MD   28 Units at 06/04/25 2216    pantoprazole (PROTONIX) tablet 40 mg  40 mg Oral Daily Marko Cantu MD   40 mg at 06/04/25 0854    traZODone (DESYREL) tablet 100 mg  100 mg Oral Nightly Marko Cantu MD   100 mg at 06/04/25 2131    glucose chewable tablet 16 g  4 tablet Oral PRN Marko Cantu MD        dextrose bolus 10% 125 mL  125 mL IntraVENous PRN Marko Cantu MD        Or    dextrose bolus 10% 250 mL  250 mL IntraVENous PRN Marko Cantu MD        glucagon injection 1 mg  1 mg SubCUTAneous PRN Marko Cantu MD        dextrose 10 % infusion   IntraVENous Continuous PRN Marko Cantu MD        insulin lispro (HUMALOG,ADMELOG) injection vial 0-8 Units  0-8 Units SubCUTAneous 4x Daily AC & HS Marko Cantu MD   4 Units at 06/04/25 2217           Recent Results (from the past 24 hours)   C-Reactive Protein    Collection Time: 06/04/25  8:07 AM   Result Value Ref Range    CRP 1.06 (H) 0.00 - 0.30 mg/dL   Sedimentation Rate    Collection Time: 06/04/25  8:07 AM   Result Value Ref Range    Sed Rate, Automated 52 (H) 0 - 30 mm/hr   Hemoglobin A1C    Collection Time: 06/04/25  8:07 AM   Result Value Ref Range    Hemoglobin

## 2025-06-05 NOTE — PROGRESS NOTES
4 Eyes Skin Assessment     NAME:  Laury Schuster  YOB: 1960  MEDICAL RECORD NUMBER:  [id number]    The patient is being assessed for  Other Dual skin assessment    I agree that at least one RN has performed a thorough Head to Toe Skin Assessment on the patient. ALL assessment sites listed below have been assessed.      Areas assessed by both nurses:    Head, Face, Ears, Shoulders, Back, Chest, Arms, Elbows, Hands, Sacrum. Buttock, Coccyx, Ischium, Legs. Feet and Heels, Under Medical Devices , and Other          Does the Patient have a Wound? Yes wound(s) were present on assessment. LDA wound assessment was Initiated and completed by RN       Alexx Prevention initiated by RN: Yes  Wound Care Orders initiated by RN: Yes    Pressure Injury (Stage 3,4, Unstageable, DTI, NWPT, and Complex wounds) if present, place Wound referral order by RN under : No    New Ostomies, if present place, Ostomy referral order under : No     Nurse 1 eSignature: Electronically signed by Clara Saunders RN on 6/4/25 at 8:18 PM EDT    **SHARE this note so that the co-signing nurse can place an eSignature**    Nurse 2 eSignature: Electronically signed by Harriet Ronquillo RN on 6/4/25 at 8:20 PM EDT

## 2025-06-05 NOTE — PROGRESS NOTES
Progress Note  Date:2025       Room:[facility]  Patient Name:Laury Schuster     YOB: 1960     Age:64 y.o.        Subjective    Subjective  Patient followed for chronic nonhealing wounds left anterior shin and left ankle, along with right BKA chronic wounds. Cultures still in process. CRP decreasing.  Yesterday she underwent angioplasty.   She just returned from Bone scan.       Vitals Last 24 Hours:  TEMPERATURE:  Temp  Av.8 °F (36.6 °C)  Min: 97.5 °F (36.4 °C)  Max: 98.1 °F (36.7 °C)  RESPIRATIONS RANGE: Resp  Av.9  Min: 17  Max: 18  PULSE OXIMETRY RANGE: SpO2  Av.7 %  Min: 95 %  Max: 99 %  PULSE RANGE: Pulse  Av  Min: 86  Max: 99  BLOOD PRESSURE RANGE: Systolic (24hrs), Av , Min:116 , Max:127   ; Diastolic (24hrs), Av, Min:69, Max:76         Objective:  Vital signs: (most recent): Blood pressure 116/72, pulse 99, temperature 98.1 °F (36.7 °C), temperature source Oral, resp. rate 18, height 1.524 m (5'), weight 60.8 kg (134 lb 0.6 oz), SpO2 95%.      Vitals and nursing note reviewed.     Constitutional:       Appearance: She is not ill-appearing.   HENT:      Head: Normocephalic and atraumatic.      Right Ear: External ear normal.      Left Ear: External ear normal.      Nose: Nose normal.      Mouth/Throat:      Pharynx: Oropharynx is clear.   Eyes:      Extraocular Movements: Extraocular movements intact.      Pupils: Pupils are equal, round, and reactive to light.   Cardiovascular:      Rate and Rhythm: Normal rate and regular rhythm.      Heart sounds: Normal heart sounds. No murmur heard.  Pulmonary:      Effort: Pulmonary effort is normal.      Breath sounds: Normal breath sounds.   Abdominal:      General: Bowel sounds are normal. There is no distension.      Palpations: Abdomen is soft.      Tenderness: There is no abdominal tenderness.   Genitourinary:     Comments: No Walker catheter  Musculoskeletal:      Cervical back: Neck supple.      Right lower leg: No  edema.      Left lower leg: Edema present.        Legs:       Comments: Chronic wound laterally right BKA stump with packing  Chronic wound medially right BKA stump with packing     4x3 cm open wound left anterior shin with surrounding erythema  2x2 cm open wound left medial malleous   Skin:     Findings: No rash.   Neurological:      General: No focal deficit present.      Mental Status: She is alert and oriented to person, place, and time.   Psychiatric:         Mood and Affect: Mood normal.         Behavior: Behavior normal.         Thought Content: Thought content normal.         Judgment: Judgment normal.      Labs/Imaging/Diagnostics    Labs:  CBC:  Recent Labs     06/02/25 1851 06/04/25  0807 06/05/25  0744   WBC 8.6 11.8* 12.3*   RBC 4.22 4.01 3.64*   HGB 10.4* 10.0* 8.9*   HCT 33.9* 33.0* 29.4*   MCV 80.3 82.3 80.8   RDW 14.4 14.6* 14.5   * 411* 378     CHEMISTRIES:  Recent Labs     06/02/25 1851 06/04/25  0807 06/05/25  0744   * 138 140   K 4.7 4.7 3.9    108 110*   CO2 23 23 23   BUN 12 7 6   CREATININE 0.98 0.66 0.60   GLUCOSE 389* 74 136*      Procalcitonin <0.05    CRP 0.68 >0.57 >1.06 >1.26       ESR 44 >52 >50     Knee, right medial wound (6/2) Heavy mixed skin meek FINAL  Ankle, left wound (6/2) Mixed skin meek FINAL  Knee, right lateral wound (6/2) Mixed skin meek FINAL  Left shin wound (6/2) Mixed skin meek FINAL    Urine culture (6/3) No growth FINAL    Imaging Last 24 Hours:      NM 3 phase Bone scan (6/5)   Normal three phase bone scan.  No evidence of inflammation/osteomyelitis of the  right BKA stump or left calf.          CTA ABDOMINAL AORTA W BILAT RUNOFF W WO CONTRAST  Result Date: 6/3/2025      1.  Status post right BKA. 2.  RIGHT: Occluded right femoropopliteal and right fem-distal bypass grafts. Patent profunda. No flow is seen in the popliteal artery. 3.  LEFT: Severe multifocal stenosis/occlusions involving the SFA. Patent three-vessel runoff to the left foot,  although calcific stenosis involving DEB and PTA. 4.  Status post splenectomy and distal pancreatectomy. Electronically signed by LAVINIA JOVEL      XR Knee Right (6/2/25)     IMPRESSION:  No acute bony abnormality. BKA surgical changes. Soft tissue  swelling and ulceration are noted.       XR Tibia Fibula Left (6/2/25)     No acute abnormality      Hospital Problems           Last Modified POA    * (Principal) Open wound knee/leg with tendon involvment, left, initial encounter 6/2/2025 Yes    Open wound of knee, leg, and ankle, left, sequela 6/4/2025 Yes    Wound infection 6/4/2025 Yes    CRP elevated 6/4/2025 Yes     Chronic nonhealing nonsurgical wound left anterior shin and medial left ankle, superinfection likely.  Chonic nonheading nonsurgical wounds right BKA stump, superinfection felt to be less likely  No evidence of osteomyelitis  Sepsis with leukocytosis, elevated CRP and ESR  PAD  Thrombocytosis, probably reactive, resolved  Uncontrolled diabetes mellitus  7.  Penicillin allergy    Comment:  Inflammatory markers all remain elevated.  All wound cultures simply showed mixed skin meek, which mitigates against MRSA involvement.  This gives us reason to stop Vancomycin.  No evidence of osteomyelitis.     Plan   1. Continue IV Meropenem  for 2 more weeks; can be done via [facility] as outpatient; Weekly CBC, CRP, ESR  2. Cleared for discharge from ID standpoint       Electronically signed by Devin Torres MD on 6/3/25 at 5:56 PM EDT

## 2025-06-06 LAB
ANION GAP SERPL CALC-SCNC: 6 MMOL/L (ref 2–12)
BASOPHILS # BLD: 0.08 K/UL (ref 0–0.1)
BASOPHILS NFR BLD: 0.7 % (ref 0–1)
BUN SERPL-MCNC: 9 MG/DL (ref 6–20)
BUN/CREAT SERPL: 15 (ref 12–20)
CA-I BLD-MCNC: 8.9 MG/DL (ref 8.5–10.1)
CHLORIDE SERPL-SCNC: 108 MMOL/L (ref 97–108)
CO2 SERPL-SCNC: 25 MMOL/L (ref 21–32)
CREAT SERPL-MCNC: 0.59 MG/DL (ref 0.55–1.02)
CRP SERPL-MCNC: 1.86 MG/DL (ref 0–0.3)
DIFFERENTIAL METHOD BLD: ABNORMAL
EOSINOPHIL # BLD: 0.6 K/UL (ref 0–0.4)
EOSINOPHIL NFR BLD: 4.9 % (ref 0–7)
ERYTHROCYTE [DISTWIDTH] IN BLOOD BY AUTOMATED COUNT: 14.5 % (ref 11.5–14.5)
ERYTHROCYTE [SEDIMENTATION RATE] IN BLOOD: 58 MM/HR (ref 0–30)
GLUCOSE BLD STRIP.AUTO-MCNC: 178 MG/DL (ref 65–100)
GLUCOSE BLD STRIP.AUTO-MCNC: 229 MG/DL (ref 65–100)
GLUCOSE BLD STRIP.AUTO-MCNC: 337 MG/DL (ref 65–100)
GLUCOSE SERPL-MCNC: 143 MG/DL (ref 65–100)
HCT VFR BLD AUTO: 30.1 % (ref 35–47)
HGB BLD-MCNC: 9.1 G/DL (ref 11.5–16)
IMM GRANULOCYTES # BLD AUTO: 0.06 K/UL (ref 0–0.04)
IMM GRANULOCYTES NFR BLD AUTO: 0.5 % (ref 0–0.5)
LYMPHOCYTES # BLD: 4.27 K/UL (ref 0.8–3.5)
LYMPHOCYTES NFR BLD: 34.8 % (ref 12–49)
MCH RBC QN AUTO: 24.9 PG (ref 26–34)
MCHC RBC AUTO-ENTMCNC: 30.2 G/DL (ref 30–36.5)
MCV RBC AUTO: 82.2 FL (ref 80–99)
MONOCYTES # BLD: 1.51 K/UL (ref 0–1)
MONOCYTES NFR BLD: 12.3 % (ref 5–13)
NEUTS SEG # BLD: 5.74 K/UL (ref 1.8–8)
NEUTS SEG NFR BLD: 46.8 % (ref 32–75)
NRBC # BLD: 0 K/UL (ref 0–0.01)
NRBC BLD-RTO: 0 PER 100 WBC
PERFORMED BY:: ABNORMAL
PLATELET # BLD AUTO: 377 K/UL (ref 150–400)
PMV BLD AUTO: 12.7 FL (ref 8.9–12.9)
POTASSIUM SERPL-SCNC: 3.6 MMOL/L (ref 3.5–5.1)
RBC # BLD AUTO: 3.66 M/UL (ref 3.8–5.2)
SODIUM SERPL-SCNC: 139 MMOL/L (ref 136–145)
WBC # BLD AUTO: 12.3 K/UL (ref 3.6–11)

## 2025-06-06 PROCEDURE — 6370000000 HC RX 637 (ALT 250 FOR IP)

## 2025-06-06 PROCEDURE — 02HV33Z INSERTION OF INFUSION DEVICE INTO SUPERIOR VENA CAVA, PERCUTANEOUS APPROACH: ICD-10-PCS | Performed by: INTERNAL MEDICINE

## 2025-06-06 PROCEDURE — 97165 OT EVAL LOW COMPLEX 30 MIN: CPT

## 2025-06-06 PROCEDURE — 85025 COMPLETE CBC W/AUTO DIFF WBC: CPT

## 2025-06-06 PROCEDURE — 94640 AIRWAY INHALATION TREATMENT: CPT

## 2025-06-06 PROCEDURE — 80048 BASIC METABOLIC PNL TOTAL CA: CPT

## 2025-06-06 PROCEDURE — 82962 GLUCOSE BLOOD TEST: CPT

## 2025-06-06 PROCEDURE — 97161 PT EVAL LOW COMPLEX 20 MIN: CPT

## 2025-06-06 PROCEDURE — 97530 THERAPEUTIC ACTIVITIES: CPT

## 2025-06-06 PROCEDURE — 6370000000 HC RX 637 (ALT 250 FOR IP): Performed by: SURGERY

## 2025-06-06 PROCEDURE — 1100000000 HC RM PRIVATE

## 2025-06-06 PROCEDURE — 6360000002 HC RX W HCPCS: Performed by: INTERNAL MEDICINE

## 2025-06-06 PROCEDURE — 36569 INSJ PICC 5 YR+ W/O IMAGING: CPT

## 2025-06-06 PROCEDURE — 94761 N-INVAS EAR/PLS OXIMETRY MLT: CPT

## 2025-06-06 PROCEDURE — 2500000003 HC RX 250 WO HCPCS: Performed by: SURGERY

## 2025-06-06 PROCEDURE — 6370000000 HC RX 637 (ALT 250 FOR IP): Performed by: INTERNAL MEDICINE

## 2025-06-06 PROCEDURE — 99232 SBSQ HOSP IP/OBS MODERATE 35: CPT | Performed by: SURGERY

## 2025-06-06 PROCEDURE — B548ZZA ULTRASONOGRAPHY OF SUPERIOR VENA CAVA, GUIDANCE: ICD-10-PCS | Performed by: INTERNAL MEDICINE

## 2025-06-06 PROCEDURE — 86140 C-REACTIVE PROTEIN: CPT

## 2025-06-06 PROCEDURE — 85652 RBC SED RATE AUTOMATED: CPT

## 2025-06-06 PROCEDURE — 2580000003 HC RX 258: Performed by: INTERNAL MEDICINE

## 2025-06-06 PROCEDURE — 36415 COLL VENOUS BLD VENIPUNCTURE: CPT

## 2025-06-06 RX ADMIN — SODIUM CHLORIDE, PRESERVATIVE FREE 10 ML: 5 INJECTION INTRAVENOUS at 21:30

## 2025-06-06 RX ADMIN — MEROPENEM 1000 MG: 1 INJECTION INTRAVENOUS at 08:51

## 2025-06-06 RX ADMIN — INSULIN LISPRO 3 UNITS: 100 INJECTION, SOLUTION INTRAVENOUS; SUBCUTANEOUS at 13:19

## 2025-06-06 RX ADMIN — ASPIRIN 325 MG: 325 TABLET, COATED ORAL at 08:50

## 2025-06-06 RX ADMIN — MEROPENEM 1000 MG: 1 INJECTION INTRAVENOUS at 15:12

## 2025-06-06 RX ADMIN — TRAZODONE HYDROCHLORIDE 100 MG: 50 TABLET ORAL at 21:30

## 2025-06-06 RX ADMIN — OXYCODONE HYDROCHLORIDE AND ACETAMINOPHEN 1 TABLET: 10; 325 TABLET ORAL at 08:55

## 2025-06-06 RX ADMIN — PANTOPRAZOLE SODIUM 40 MG: 40 TABLET, DELAYED RELEASE ORAL at 08:50

## 2025-06-06 RX ADMIN — INSULIN LISPRO 2 UNITS: 100 INJECTION, SOLUTION INTRAVENOUS; SUBCUTANEOUS at 13:18

## 2025-06-06 RX ADMIN — INSULIN GLARGINE 28 UNITS: 100 INJECTION, SOLUTION SUBCUTANEOUS at 21:29

## 2025-06-06 RX ADMIN — GABAPENTIN 100 MG: 100 CAPSULE ORAL at 08:50

## 2025-06-06 RX ADMIN — GABAPENTIN 100 MG: 100 CAPSULE ORAL at 21:30

## 2025-06-06 RX ADMIN — INSULIN LISPRO 6 UNITS: 100 INJECTION, SOLUTION INTRAVENOUS; SUBCUTANEOUS at 21:30

## 2025-06-06 RX ADMIN — FUROSEMIDE 20 MG: 40 TABLET ORAL at 08:50

## 2025-06-06 RX ADMIN — Medication 2 PUFF: at 19:29

## 2025-06-06 RX ADMIN — FOLIC ACID 1 MG: 1 TABLET ORAL at 08:50

## 2025-06-06 RX ADMIN — INSULIN LISPRO 3 UNITS: 100 INJECTION, SOLUTION INTRAVENOUS; SUBCUTANEOUS at 08:51

## 2025-06-06 RX ADMIN — SODIUM CHLORIDE, PRESERVATIVE FREE 10 ML: 5 INJECTION INTRAVENOUS at 08:50

## 2025-06-06 RX ADMIN — Medication 2 PUFF: at 09:04

## 2025-06-06 RX ADMIN — MEROPENEM 1000 MG: 1 INJECTION INTRAVENOUS at 02:18

## 2025-06-06 RX ADMIN — LISINOPRIL 20 MG: 20 TABLET ORAL at 08:50

## 2025-06-06 RX ADMIN — OXYCODONE HYDROCHLORIDE AND ACETAMINOPHEN 1 TABLET: 10; 325 TABLET ORAL at 19:04

## 2025-06-06 RX ADMIN — GABAPENTIN 100 MG: 100 CAPSULE ORAL at 15:11

## 2025-06-06 ASSESSMENT — PAIN SCALES - GENERAL
PAINLEVEL_OUTOF10: 6
PAINLEVEL_OUTOF10: 8
PAINLEVEL_OUTOF10: 7
PAINLEVEL_OUTOF10: 8

## 2025-06-06 ASSESSMENT — PAIN DESCRIPTION - LOCATION: LOCATION: LEG

## 2025-06-06 ASSESSMENT — PAIN DESCRIPTION - ORIENTATION: ORIENTATION: LEFT

## 2025-06-06 ASSESSMENT — PAIN DESCRIPTION - DESCRIPTORS: DESCRIPTORS: ACHING;DISCOMFORT

## 2025-06-06 ASSESSMENT — PAIN SCALES - WONG BAKER: WONGBAKER_NUMERICALRESPONSE: HURTS A LITTLE BIT

## 2025-06-06 NOTE — PROGRESS NOTES
PROGRESS NOTE      Chief Complaints:  No new complaint.  HPI and  Objective:    No fever.  Review of Systems:  Rest of review of system reviewed personally and they are negative.    EXAM:  BP (!) 143/69   Pulse 89   Temp 97.9 °F (36.6 °C) (Oral)   Resp 18   Ht 1.524 m (5')   Wt 60.8 kg (134 lb 0.6 oz)   LMP  (LMP Unknown)   SpO2 93%   BMI 26.18 kg/m²     Patient is awake   Head and neck atraumatic, normocephalic.  ENT: No hoarse voice, gaze appropriate.  Cardiac system regular rate rhythm.  Pulmonary no audible wheeze, no cyanosis.  Chest wall excursion normal with respiration cycle  Abdomen is soft not particularly distended.  Neurologically nonfocal.  Hematology system: No bruising noted.  Musculoskeletal system: No joint deformity noted.  Genitourinary system: No hematuria noted.  Skin is warm and moist.  Psychosocial: Cooperative.  Vascular examination as previously noted no changes.    Current Facility-Administered Medications   Medication Dose Route Frequency Provider Last Rate Last Admin    lisinopril (PRINIVIL;ZESTRIL) tablet 20 mg  20 mg Oral Daily Xi Cuevas MD   20 mg at 06/05/25 0905    meropenem (MERREM) 1,000 mg in sodium chloride 0.9 % 100 mL IVPB (addEASE)  1,000 mg IntraVENous Q8H Devin Torres MD   Stopped at 06/06/25 0521    labetalol (NORMODYNE;TRANDATE) injection 10 mg  10 mg IntraVENous Q4H PRN Fei Pina MD        heparin 25,000 units in dextrose 5% 250 mL (premix) infusion  600 Units/hr IntraVENous Continuous Marko Cantu MD 6 mL/hr at 06/05/25 1058 600 Units/hr at 06/05/25 1058    sodium chloride flush 0.9 % injection 5-40 mL  5-40 mL IntraVENous 2 times per day Marko Cantu MD   10 mL at 06/05/25 2339    sodium chloride flush 0.9 % injection 5-40 mL  5-40 mL IntraVENous PRN Marko Cantu MD        0.9 % sodium chloride infusion   IntraVENous PRN Marko Cantu MD        acetaminophen (TYLENOL) tablet 650 mg  650 mg Oral Q4H PRN Marko Cantu MD         82.2 80.0 - 99.0 FL    MCH 24.9 (L) 26.0 - 34.0 PG    MCHC 30.2 30.0 - 36.5 g/dL    RDW 14.5 11.5 - 14.5 %    Platelets 377 150 - 400 K/uL    MPV 12.7 8.9 - 12.9 FL    Nucleated RBCs 0.0 0.0  WBC    nRBC 0.00 0.00 - 0.01 K/uL    Neutrophils % 46.8 32.0 - 75.0 %    Lymphocytes % 34.8 12.0 - 49.0 %    Monocytes % 12.3 5.0 - 13.0 %    Eosinophils % 4.9 0.0 - 7.0 %    Basophils % 0.7 0.0 - 1.0 %    Immature Granulocytes % 0.5 0 - 0.5 %    Neutrophils Absolute 5.74 1.80 - 8.00 K/UL    Lymphocytes Absolute 4.27 (H) 0.80 - 3.50 K/UL    Monocytes Absolute 1.51 (H) 0.00 - 1.00 K/UL    Eosinophils Absolute 0.60 (H) 0.00 - 0.40 K/UL    Basophils Absolute 0.08 0.00 - 0.10 K/UL    Immature Granulocytes Absolute 0.06 (H) 0.00 - 0.04 K/UL    Differential Type AUTOMATED     Basic Metabolic Panel    Collection Time: 06/06/25  6:06 AM   Result Value Ref Range    Sodium 139 136 - 145 mmol/L    Potassium 3.6 3.5 - 5.1 mmol/L    Chloride 108 97 - 108 mmol/L    CO2 25 21 - 32 mmol/L    Anion Gap 6 2 - 12 mmol/L    Glucose 143 (H) 65 - 100 mg/dL    BUN 9 6 - 20 mg/dL    Creatinine 0.59 0.55 - 1.02 mg/dL    BUN/Creatinine Ratio 15 12 - 20      Est, Glom Filt Rate >90 >60 ml/min/1.73m2    Calcium 8.9 8.5 - 10.1 mg/dL   POCT Glucose    Collection Time: 06/06/25  7:53 AM   Result Value Ref Range    POC Glucose 178 (H) 65 - 100 mg/dL    Performed by: Lux Bui)        ASSESSMENT:   Patient is 64 y.o. with diagnosis of : Principal Problem:    Open wound knee/leg with tendon involvment, left, initial encounter  Active Problems:    Open wound of knee, leg, and ankle, left, sequela    Wound infection    CRP elevated  Resolved Problems:    * No resolved hospital problems. *      PLAN:                 I changed the dressing both leg today.    Cellulitis better on the left leg.    Edema better.    Dr. Torres input appreciated.    Midline today.  We need to make arrangement for IV antibiotics per Dr. Torres.   consult  for IV antibiotic arrangement.  Possibly home tomorrow.

## 2025-06-06 NOTE — THERAPY EVALUATION
PHYSICAL THERAPY EVALUATION  Patient: Laury Schuster (64 y.o. female)  Date: 6/6/2025  Primary Diagnosis: Cellulitis of left foot [L03.116]  Open wound knee/leg with tendon involvment, left, initial encounter [S81.002A, S81.802A, S91.002A, S86.902A, S96.902A]  Procedure(s) (LRB):  Angiography lower ext left (N/A)  Aortagram abdominal (N/A)  Ultrasound guided vascular access (N/A)  Atherectomy external iliac artery right (N/A)  Atherectomy femoral popliteal (N/A)  Angioplasty common femoral artery (N/A) 3 Days Post-Op   Precautions: Restrictions/Precautions  Restrictions/Precautions: Fall Risk     Recommendations for nursing mobility: Out of bed to chair for meals, Frequent repositioning to prevent skin breakdown, Use of BSC for toileting , AD and gt belt for bed to chair , and Assist x1    In place during session: EKG/telemetry     ASSESSMENT  Pt is a 64 y.o. female admitted on 6/2/2025 for left LE wounds and pain; pt currently being treated for severe left PVD, right BKA, pt underwent vascular intervention by Dr. Cantu. Pt semi-supine in bed upon PT arrival, agreeable to evaluation. Pt A&O x 4.     Based on the objective data described below, the patient currently presents with impaired functional mobility, decreased independence in ADLs, decreased ROM, impaired strength, decreased activity tolerance, impaired balance, and impaired posture. (See below for objective details and assist levels).     Overall pt tolerated session fair today with no c/o pain throughout session. Pt required SBA to [facility] for bed mobility and transfers. Pt unable to able at baseline due to h/o right BKA and left LE wounds.  Pt completed WC mobility in room I x 40 feet; required assistance for IV pole navigation. Upon inspection of WC, noted broken vinyl and round edges along seat cushion that are cutting into pt skin, CM made aware of pt need for new WC. Pt will benefit from continued skilled PT to address above deficits and return to PLOF.   SUMMARY:   HISTORY:    Past Medical History:   Diagnosis Date   • Arthritis    • Coagulation disorder     possible , pt had surgery on leg and bled out   • Diabetes (HCC) 2019   • Hypertension    • Left-sided weakness    • Nodule of kidney     left kidney   • Peripheral vascular disease    • Stroke (HCC)     recent stroke on 10/16/2021     Past Surgical History:   Procedure Laterality Date   • CATARACT REMOVAL  2019   • COLONOSCOPY     • FEMORAL BYPASS Right 10/18/2023    Right common femoral endarterectomy. 2.Right common femoral artery to below-knee popliteal bypass with 6 mm PTFE graft. performed by Marko Cantu MD at [facility] MAIN OR   • FEMORAL BYPASS Right 4/7/2024    1.  Right common femoral artery to below-knee popliteal bypass graft thrombectomy using 3 Bengali a 4 Bengali Walker catheter. 2.  Right leg angiogram. 3.  Right leg bypass graft tPA infusion catheter placement. performed by Marko Cantu MD at [facility] MAIN OR   • INVASIVE VASCULAR Right 1/7/2024    RIGHT LEG GRAFT THROMBECTOMY AND ANGIOGRAM performed by Marko Cantu MD at [facility] MAIN OR   • INVASIVE VASCULAR N/A 4/8/2024    Angiography lower ext right performed by Marko Cantu MD at [facility] CARDIAC CATH LAB   • INVASIVE VASCULAR N/A 4/8/2024    Angioplasty ant tib artery performed by Marko Cantu MD at [facility] CARDIAC CATH LAB   • LEG AMPUTATION BELOW KNEE Right 4/13/2024    RIGHT LEG AMPUTATION BELOW KNEE performed by Marko Cantu MD at [facility] MAIN OR   • VASCULAR SURGERY         Home Situation:  Social/Functional History  Lives With: Parent  Type of Home: Trailer  Home Layout: One level  Home Access: Ramped entrance  Home Equipment: Rollator, Walker - Rolling, Wheelchair - Manual  Has the patient had two or more falls in the past year or any fall with injury in the past year?: No  Receives Help From: Family, Personal care attendant (PCA 9-2pm)  Prior Level of Assist for ADLs: Independent  Prior Level of Assist for Homemaking: Independent  Prior Level  discussed with: Occupational therapist, Registered nurse, and Case management     Patient Education  Education Given To: Patient  Education Provided: Role of Therapy;Plan of Care;Home Exercise Program;Transfer Training;Energy Conservation;Equipment;Fall Prevention Strategies  Education Provided Comments: DC recommendations, PT POC, DILIP rodriguez, safety and importance of mobility including up to chair  Education Method: Demonstration;Verbal  Barriers to Learning: None  Education Outcome: Verbalized understanding;Continued education needed    PT/OT sessions occurred together for increased safety of pt and clinician.     Thank you for this referral.  Tonya Luciano, PT, DPT  Minutes: 20

## 2025-06-06 NOTE — PLAN OF CARE
Problem: Chronic Conditions and Co-morbidities  Goal: Patient's chronic conditions and co-morbidity symptoms are monitored and maintained or improved  Outcome: Progressing     Problem: Chronic Conditions and Co-morbidities  Goal: Patient's chronic conditions and co-morbidity symptoms are monitored and maintained or improved  Outcome: Progressing     Problem: Discharge Planning  Goal: Discharge to home or other facility with appropriate resources  6/6/2025 0831 by Yumiko Infante RN  Outcome: Progressing  6/6/2025 0258 by Mira Mcnulty LPN  Outcome: Progressing  6/6/2025 0257 by Mira Mcnulty LPN  Outcome: Progressing     Problem: Discharge Planning  Goal: Discharge to home or other facility with appropriate resources  6/6/2025 0831 by Yumiko Infante RN  Outcome: Progressing  6/6/2025 0258 by Mira Mcnulty LPN  Outcome: Progressing  6/6/2025 0257 by Mira Mcnulty LPN  Outcome: Progressing     Problem: Skin/Tissue Integrity  Goal: Skin integrity remains intact  Description: 1.  Monitor for areas of redness and/or skin breakdown2.  Assess vascular access sites hourly3.  Every 4-6 hours minimum:  Change oxygen saturation probe site4.  Every 4-6 hours:  If on nasal continuous positive airway pressure, respiratory therapy assess nares and determine need for appliance change or resting period  6/6/2025 0831 by Yumiko Infante RN  Outcome: Progressing  6/6/2025 0258 by Mira Mcnulty LPN  Outcome: Progressing     Problem: Skin/Tissue Integrity  Goal: Skin integrity remains intact  Description: 1.  Monitor for areas of redness and/or skin breakdown2.  Assess vascular access sites hourly3.  Every 4-6 hours minimum:  Change oxygen saturation probe site4.  Every 4-6 hours:  If on nasal continuous positive airway pressure, respiratory therapy assess nares and determine need for appliance change or resting period  6/6/2025 0831 by Yumiko Infante RN  Outcome: Progressing  6/6/2025 0258 by Hamlet  Mira DANG LPN  Outcome: Progressing     Problem: Safety - Adult  Goal: Free from fall injury  Outcome: Progressing     Problem: Safety - Adult  Goal: Free from fall injury  Outcome: Progressing

## 2025-06-06 NOTE — PROGRESS NOTES
OCCUPATIONAL THERAPY EVALUATION AND DISCHARGE  Patient: Laury Schuster (64 y.o. female)  Date: 6/6/2025  Primary Diagnosis: Cellulitis of left foot [L03.116]  Open wound knee/leg with tendon involvment, left, initial encounter [S81.002A, S81.802A, S91.002A, S86.902A, S96.902A]  Procedure(s) (LRB):  Angiography lower ext left (N/A)  Aortagram abdominal (N/A)  Ultrasound guided vascular access (N/A)  Atherectomy external iliac artery right (N/A)  Atherectomy femoral popliteal (N/A)  Angioplasty common femoral artery (N/A) 3 Days Post-Op   Precautions: Fall Risk                Recommendations for nursing mobility: Out of bed to chair for meals, Frequent repositioning to prevent skin breakdown, Use of BSC for toileting , AD and gt belt for bed to chair , and Assist x1    In place during session:Peripheral IV, PICC line, and External Catheter  ASSESSMENT  Pt is a 64 y.o. female presenting to [facility] with c/o multiple wounds on LLE, admitted 6/2/2025 and currently being treated for diabetes, HTN, infected LLE wound, chronic R BKA, s/p R iliac partial intervention, s/p L SFA intervention, PAD, hx CVA with L sided weakness. Pt received semi-supine in bed upon arrival, AXO x 4, and agreeable to OT evaluation.     Based on the objective data described below pt currently present at mod I-SBA for ADLs and function mobility/transfers at this time. (See below for objective details and assist levels).     Overall pt tolerated session well today with no c/o pain. Bed mobility completed with mod I and additional time. LB dressing completed with mod I. SPT EOB>wheelchair completed with SBA-CGA and no AD. Pt able to self propel in wheelchair around room without difficulty. Pt has no skilled acute OT needs at this time either noted by OT or reported by pt, will DC skilled OT following evaluation; pt verbalized understanding and agreement.  Current OT DC recommendation No post acute skilled occupational therapy, return to previous living  affect functional and  no verbal  or physical assist needed to complete eval tasks      Based on the above components, the patient evaluation is determined to be of the following complexity level: Low    Pain Ratin/10   Pain Intervention(s):       Activity Tolerance:   Good    After treatment patient left in no apparent distress:    Patient left in no apparent distress sitting up in chair and Call bell within reach, bed locked and in lowest position.    COMMUNICATION/EDUCATION:   The patient’s plan of care was discussed with: Physical therapist and Registered nurse    Patient Education  Education Given To: Patient  Education Provided: Role of Therapy;Fall Prevention Strategies;Transfer Training;Mobility Training  Education Method: Verbal  Barriers to Learning: None  Education Outcome: Verbalized understanding    PT/OT sessions occurred together for increased safety of pt and clinician.     Thank you for this referral,  Clarissa Lopez OT  Minutes: 19

## 2025-06-06 NOTE — CARE COORDINATION
0740: Chart reviewed.    Per notes; ID recs for Meropenem x two weeks.    CM to discuss with patient.    0950: CM met with patient to discuss IV ABX and home health. Patient agreeable to IV ABX administration education with choice letter received, placed on chart and referrals sent.    1115: [facility] accepted and can provide nursing if home health is not secured.    [facility] liaison will provide education shortly.    Line placement pending.    Will check chart for RX.    1130: Referral sent to [employer] for a wheelchair as patient's current wheelchair needs replacing per PT.    RX is not on the chart.    1310: Per general surgery, patient is not cleared for discharge today.     CM updated [facility] who states education has been completed.    1316: If patient goes this weekend CM to call [facility] office at [phone].    1435: PICC report attached in [facility].

## 2025-06-06 NOTE — PROCEDURES
PICC Line Insertion Procedure Note    Procedure: Insertion of #4 FR/18G PICC    Indications:  Long Term IV therapy, Home IV therapy, Pt./Dr. Preference    Procedure Details   Informed consent was obtained for the procedure.  Risks of hemorrhage, thrombus and adverse drug reaction were discussed. Vessel assessment revealed compressible vessels with no evidence of clot.     UE PICC placed without complication for patient.  2ml of Lidocaine 1% administered via infiltration prior to PICC insertion.  Time-Out performed at bedside with ART mcgrath.      #4 FR/18G PICC inserted to the R Basilic vein per hospital protocol.   Blood return:  yes    Catheter length 36 cm, with 0 cm exposed. Mid upper arm circumference is 26 cm.   Catheter was flushed with 20 cc NS. Patient did tolerate procedure well.    PICC tip confirmed in SVC with Sherlock 3CG technology. Positive p wave without negative deflection noted on ECG.  ECG attached below.      Upon completion of procedure, all PICC kit components accounted for and intact.  Post procedure hand-off given to Nurse.  PICC Brochure given to patient with teaching instruction. Bed returned to low locked position with call bell in reach.  PROCEDURE NOTE  Date: 6/6/2025   Name: Laury Schuster  YOB: 1960    Procedures

## 2025-06-06 NOTE — PROGRESS NOTES
.      Hospitalist Progress Note    NAME:   Laury Schuster   : 1960   MRN: [id number]     Date/Time: 2025 12:20 PM  Patient PCP: Trenton Escudero MD        Assessment / Plan:    # Diabetes  - Continue home dose lantus + 3u lispro TID   - POC + correctional insulin   - Monitor for hypoglycemia due to insulin with serial glucose checks  - Hypoglycemia protocol  -  pt requesting CGM to be sent to her [facility] [place]      # Essential hypertension   - BP now at goal, continue current regimen.    - creatinine now 0.98, baseline 0.4-0.05.  - continue lisinopril      # Infected left leg wound   # Chronic BKA stump wound   # S/p Right iliac partial intervention   # S/p Left SFA intervention   - management per ID and surgery  - currently IV meropenem   - Pending finalization of IV antibiotics  - Blood culture and wound culture     # PAD  - management per surgery      # History of CVA with left sided weakness       Rest per primary Dr. Cantu       Medical Decision Making     [x] High (any 2)     A. Problems (any 1)  [x] Acute/Chronic Illness/injury posing threat to life or bodily function:    [] Severe exacerbation of chronic illness:    ---------------------------------------------------------------------  B. Risk of Treatment (any 1)   [x] Drugs/treatments that require intensive monitoring for toxicity include:    [] IV ABX requiring serial renal monitoring for nephrotoxicity:     [] IV Narcotic analgesia for adverse drug reaction  [] Aggressive IV diuresis requiring serial monitoring for renal impairment and electrolyte derangements  [] Critical electrolyte abnormalities requiring IV replacement and close serial monitoring  [] Insulin - monitoring serial FSBS for Hypoglycemic adverse drug reaction  [] Other -   [] Change in code status:    [] Decision to escalate care:    [] Major surgery/procedure with associated risk factors:     ----------------------------------------------------------------------  C. Data (any    HCT 33.0* 29.4* 30.1*   * 378 377     Recent Labs     06/04/25  0807 06/05/25  0744 06/06/25  0606    140 139   K 4.7 3.9 3.6    110* 108   CO2 23 23 25   BUN 7 6 9   ALT 15 11*  --        Signed: Kena Calderón MD

## 2025-06-07 VITALS
DIASTOLIC BLOOD PRESSURE: 66 MMHG | WEIGHT: 134.04 LBS | TEMPERATURE: 97.7 F | BODY MASS INDEX: 26.32 KG/M2 | HEIGHT: 60 IN | HEART RATE: 94 BPM | RESPIRATION RATE: 17 BRPM | SYSTOLIC BLOOD PRESSURE: 106 MMHG | OXYGEN SATURATION: 97 %

## 2025-06-07 LAB
ANION GAP SERPL CALC-SCNC: 3 MMOL/L (ref 2–12)
BASOPHILS # BLD: 0.1 K/UL (ref 0–0.1)
BASOPHILS NFR BLD: 0.8 % (ref 0–1)
BUN SERPL-MCNC: 11 MG/DL (ref 6–20)
BUN/CREAT SERPL: 20 (ref 12–20)
CA-I BLD-MCNC: 8.9 MG/DL (ref 8.5–10.1)
CHLORIDE SERPL-SCNC: 107 MMOL/L (ref 97–108)
CO2 SERPL-SCNC: 27 MMOL/L (ref 21–32)
CREAT SERPL-MCNC: 0.55 MG/DL (ref 0.55–1.02)
CRP SERPL-MCNC: 1.14 MG/DL (ref 0–0.3)
DIFFERENTIAL METHOD BLD: ABNORMAL
EOSINOPHIL # BLD: 1.07 K/UL (ref 0–0.4)
EOSINOPHIL NFR BLD: 8.7 % (ref 0–7)
ERYTHROCYTE [DISTWIDTH] IN BLOOD BY AUTOMATED COUNT: 14.6 % (ref 11.5–14.5)
GLUCOSE BLD STRIP.AUTO-MCNC: 233 MG/DL (ref 65–100)
GLUCOSE BLD STRIP.AUTO-MCNC: 251 MG/DL (ref 65–100)
GLUCOSE BLD STRIP.AUTO-MCNC: 92 MG/DL (ref 65–100)
GLUCOSE SERPL-MCNC: 76 MG/DL (ref 65–100)
HCT VFR BLD AUTO: 30.8 % (ref 35–47)
HGB BLD-MCNC: 9.2 G/DL (ref 11.5–16)
IMM GRANULOCYTES # BLD AUTO: 0.04 K/UL (ref 0–0.04)
IMM GRANULOCYTES NFR BLD AUTO: 0.3 % (ref 0–0.5)
LYMPHOCYTES # BLD: 3.79 K/UL (ref 0.8–3.5)
LYMPHOCYTES NFR BLD: 30.8 % (ref 12–49)
MCH RBC QN AUTO: 24.5 PG (ref 26–34)
MCHC RBC AUTO-ENTMCNC: 29.9 G/DL (ref 30–36.5)
MCV RBC AUTO: 82.1 FL (ref 80–99)
MONOCYTES # BLD: 1.7 K/UL (ref 0–1)
MONOCYTES NFR BLD: 13.8 % (ref 5–13)
NEUTS SEG # BLD: 5.6 K/UL (ref 1.8–8)
NEUTS SEG NFR BLD: 45.6 % (ref 32–75)
NRBC # BLD: 0 K/UL (ref 0–0.01)
NRBC BLD-RTO: 0 PER 100 WBC
PERFORMED BY:: ABNORMAL
PERFORMED BY:: ABNORMAL
PERFORMED BY:: NORMAL
PLATELET # BLD AUTO: 388 K/UL (ref 150–400)
PMV BLD AUTO: 12.8 FL (ref 8.9–12.9)
POTASSIUM SERPL-SCNC: 3.7 MMOL/L (ref 3.5–5.1)
PROCALCITONIN SERPL-MCNC: <0.05 NG/ML
RBC # BLD AUTO: 3.75 M/UL (ref 3.8–5.2)
RBC MORPH BLD: ABNORMAL
SODIUM SERPL-SCNC: 137 MMOL/L (ref 136–145)
WBC # BLD AUTO: 12.3 K/UL (ref 3.6–11)

## 2025-06-07 PROCEDURE — 82962 GLUCOSE BLOOD TEST: CPT

## 2025-06-07 PROCEDURE — 99232 SBSQ HOSP IP/OBS MODERATE 35: CPT | Performed by: INTERNAL MEDICINE

## 2025-06-07 PROCEDURE — 6370000000 HC RX 637 (ALT 250 FOR IP)

## 2025-06-07 PROCEDURE — 6360000002 HC RX W HCPCS: Performed by: SURGERY

## 2025-06-07 PROCEDURE — NBSRV NON-BILLABLE SERVICE: Performed by: SURGERY

## 2025-06-07 PROCEDURE — 80048 BASIC METABOLIC PNL TOTAL CA: CPT

## 2025-06-07 PROCEDURE — 36415 COLL VENOUS BLD VENIPUNCTURE: CPT

## 2025-06-07 PROCEDURE — 6370000000 HC RX 637 (ALT 250 FOR IP): Performed by: SURGERY

## 2025-06-07 PROCEDURE — 84145 PROCALCITONIN (PCT): CPT

## 2025-06-07 PROCEDURE — 86140 C-REACTIVE PROTEIN: CPT

## 2025-06-07 PROCEDURE — 85025 COMPLETE CBC W/AUTO DIFF WBC: CPT

## 2025-06-07 PROCEDURE — 94640 AIRWAY INHALATION TREATMENT: CPT

## 2025-06-07 PROCEDURE — 6360000002 HC RX W HCPCS: Performed by: INTERNAL MEDICINE

## 2025-06-07 PROCEDURE — 99232 SBSQ HOSP IP/OBS MODERATE 35: CPT | Performed by: SURGERY

## 2025-06-07 PROCEDURE — 6370000000 HC RX 637 (ALT 250 FOR IP): Performed by: INTERNAL MEDICINE

## 2025-06-07 PROCEDURE — 2580000003 HC RX 258: Performed by: INTERNAL MEDICINE

## 2025-06-07 PROCEDURE — 97110 THERAPEUTIC EXERCISES: CPT

## 2025-06-07 PROCEDURE — 2500000003 HC RX 250 WO HCPCS: Performed by: SURGERY

## 2025-06-07 RX ORDER — INSULIN LISPRO 100 [IU]/ML
5 INJECTION, SOLUTION INTRAVENOUS; SUBCUTANEOUS
Status: DISCONTINUED | OUTPATIENT
Start: 2025-06-07 | End: 2025-06-07 | Stop reason: HOSPADM

## 2025-06-07 RX ORDER — OXYCODONE AND ACETAMINOPHEN 5; 325 MG/1; MG/1
1 TABLET ORAL EVERY 6 HOURS PRN
Qty: 28 TABLET | Refills: 0 | Status: SHIPPED | OUTPATIENT
Start: 2025-06-07 | End: 2025-06-09 | Stop reason: SDUPTHER

## 2025-06-07 RX ADMIN — INSULIN LISPRO 5 UNITS: 100 INJECTION, SOLUTION INTRAVENOUS; SUBCUTANEOUS at 13:44

## 2025-06-07 RX ADMIN — GABAPENTIN 100 MG: 100 CAPSULE ORAL at 09:31

## 2025-06-07 RX ADMIN — OXYCODONE HYDROCHLORIDE AND ACETAMINOPHEN 1 TABLET: 10; 325 TABLET ORAL at 13:49

## 2025-06-07 RX ADMIN — OXYCODONE HYDROCHLORIDE AND ACETAMINOPHEN 1 TABLET: 10; 325 TABLET ORAL at 03:36

## 2025-06-07 RX ADMIN — INSULIN LISPRO 5 UNITS: 100 INJECTION, SOLUTION INTRAVENOUS; SUBCUTANEOUS at 17:05

## 2025-06-07 RX ADMIN — ASPIRIN 325 MG: 325 TABLET, COATED ORAL at 09:31

## 2025-06-07 RX ADMIN — MEROPENEM 1000 MG: 1 INJECTION INTRAVENOUS at 16:06

## 2025-06-07 RX ADMIN — FUROSEMIDE 20 MG: 40 TABLET ORAL at 09:32

## 2025-06-07 RX ADMIN — LISINOPRIL 20 MG: 20 TABLET ORAL at 09:31

## 2025-06-07 RX ADMIN — INSULIN LISPRO 2 UNITS: 100 INJECTION, SOLUTION INTRAVENOUS; SUBCUTANEOUS at 17:04

## 2025-06-07 RX ADMIN — INSULIN LISPRO 4 UNITS: 100 INJECTION, SOLUTION INTRAVENOUS; SUBCUTANEOUS at 13:45

## 2025-06-07 RX ADMIN — Medication 2 PUFF: at 09:37

## 2025-06-07 RX ADMIN — OXYCODONE HYDROCHLORIDE AND ACETAMINOPHEN 1 TABLET: 10; 325 TABLET ORAL at 09:36

## 2025-06-07 RX ADMIN — SODIUM CHLORIDE, PRESERVATIVE FREE 10 ML: 5 INJECTION INTRAVENOUS at 09:34

## 2025-06-07 RX ADMIN — FOLIC ACID 1 MG: 1 TABLET ORAL at 09:31

## 2025-06-07 RX ADMIN — MEROPENEM 1000 MG: 1 INJECTION INTRAVENOUS at 00:23

## 2025-06-07 RX ADMIN — HEPARIN SODIUM 600 UNITS/HR: 10000 INJECTION, SOLUTION INTRAVENOUS at 05:37

## 2025-06-07 RX ADMIN — GABAPENTIN 100 MG: 100 CAPSULE ORAL at 13:47

## 2025-06-07 RX ADMIN — PANTOPRAZOLE SODIUM 40 MG: 40 TABLET, DELAYED RELEASE ORAL at 09:31

## 2025-06-07 RX ADMIN — MEROPENEM 1000 MG: 1 INJECTION INTRAVENOUS at 09:41

## 2025-06-07 ASSESSMENT — PAIN DESCRIPTION - DESCRIPTORS
DESCRIPTORS: ACHING;DISCOMFORT

## 2025-06-07 ASSESSMENT — PAIN SCALES - GENERAL
PAINLEVEL_OUTOF10: 6
PAINLEVEL_OUTOF10: 6
PAINLEVEL_OUTOF10: 8
PAINLEVEL_OUTOF10: 4
PAINLEVEL_OUTOF10: 8
PAINLEVEL_OUTOF10: 7

## 2025-06-07 ASSESSMENT — PAIN DESCRIPTION - LOCATION
LOCATION: ANKLE;LEG
LOCATION: LEG
LOCATION: LEG
LOCATION: ANKLE;LEG

## 2025-06-07 ASSESSMENT — PAIN DESCRIPTION - ORIENTATION
ORIENTATION: LEFT

## 2025-06-07 NOTE — PROGRESS NOTES
Pt confirmed IV antibiotics have been delivered to her house.  Notified provider so discharge orders can be completed.  Provider expects to have discharge orders in place within a few hours.  Pt currently receiving her last hospital dose of antibiotics which should be completed by the time discharge orders are received. Pt confirmed she will have a ride available.  Charge nurse aware.

## 2025-06-07 NOTE — PROGRESS NOTES
Discharge instructions reviewed with pt; all questions answered.  Pt sent home with additional wound care supplies.  Provider stated he will call in prescription for pain meds to pt's [facility] pharmacy tomorrow.

## 2025-06-07 NOTE — PROGRESS NOTES
Progress Note  Date:2025       Room:[facility] [place]  Patient Name:Laury Schuster     YOB: 1960     Age:64 y.o.        Subjective    Subjective  Patient followed for chronic nonhealing wounds left anterior shin and left ankle, along with right BKA chronic wounds. Cultures still in process. CRP decreasing.  Yesterday she underwent angioplasty.   She just returned from Bone scan.       Vitals Last 24 Hours:  TEMPERATURE:  Temp  Av.9 °F (36.6 °C)  Min: 97.1 °F (36.2 °C)  Max: 98.3 °F (36.8 °C)  RESPIRATIONS RANGE: Resp  Av.8  Min: 16  Max: 20  PULSE OXIMETRY RANGE: SpO2  Av.9 %  Min: 93 %  Max: 99 %  PULSE RANGE: Pulse  Av.4  Min: 78  Max: 93  BLOOD PRESSURE RANGE: Systolic (24hrs), Av , Min:120 , Max:144   ; Diastolic (24hrs), Av, Min:69, Max:83         Objective:  Vital signs: (most recent): Blood pressure 120/79, pulse 89, temperature 98.2 °F (36.8 °C), temperature source Oral, resp. rate 18, height 1.524 m (5'), weight 60.8 kg (134 lb 0.6 oz), SpO2 98%.      Vitals and nursing note reviewed.     Constitutional:       Appearance: She is not ill-appearing.   HENT:      Head: Normocephalic and atraumatic.      Right Ear: External ear normal.      Left Ear: External ear normal.      Nose: Nose normal.      Mouth/Throat:      Pharynx: Oropharynx is clear.   Eyes:      Extraocular Movements: Extraocular movements intact.      Pupils: Pupils are equal, round, and reactive to light.   Cardiovascular:      Rate and Rhythm: Normal rate and regular rhythm.      Heart sounds: Normal heart sounds. No murmur heard.  Pulmonary:      Effort: Pulmonary effort is normal.      Breath sounds: Normal breath sounds.   Abdominal:      General: Bowel sounds are normal. There is no distension.      Palpations: Abdomen is soft.      Tenderness: There is no abdominal tenderness.   Genitourinary:     Comments: No Walker catheter  Musculoskeletal:      Cervical back: Neck supple.      Right lower leg: No  edema.      Left lower leg: Edema present.        Legs:       Comments: Chronic wound laterally right BKA stump with packing  Chronic wound medially right BKA stump with packing     4x3 cm open wound left anterior shin with surrounding erythema  2x2 cm open wound left medial malleous   Skin:     Findings: No rash.   Neurological:      General: No focal deficit present.      Mental Status: She is alert and oriented to person, place, and time.   Psychiatric:         Mood and Affect: Mood normal.         Behavior: Behavior normal.         Thought Content: Thought content normal.         Judgment: Judgment normal.      Labs/Imaging/Diagnostics    Labs:  CBC:  Recent Labs     06/04/25  0807 06/05/25  0744 06/06/25  0606   WBC 11.8* 12.3* 12.3*   RBC 4.01 3.64* 3.66*   HGB 10.0* 8.9* 9.1*   HCT 33.0* 29.4* 30.1*   MCV 82.3 80.8 82.2   RDW 14.6* 14.5 14.5   * 378 377     CHEMISTRIES:  Recent Labs     06/04/25  0807 06/05/25  0744 06/06/25  0606    140 139   K 4.7 3.9 3.6    110* 108   CO2 23 23 25   BUN 7 6 9   CREATININE 0.66 0.60 0.59   GLUCOSE 74 136* 143*      Procalcitonin <0.05    CRP 0.68 >0.57 >1.06 >1.26       ESR 44 >52 >50     Knee, right medial wound (6/2) Heavy mixed skin meek FINAL  Ankle, left wound (6/2) Mixed skin meek FINAL  Knee, right lateral wound (6/2) Mixed skin meek FINAL  Left shin wound (6/2) Mixed skin meek FINAL    Urine culture (6/3) No growth FINAL    Imaging Last 24 Hours:      NM 3 phase Bone scan (6/5)   Normal three phase bone scan.  No evidence of inflammation/osteomyelitis of the  right BKA stump or left calf.          CTA ABDOMINAL AORTA W BILAT RUNOFF W WO CONTRAST  Result Date: 6/3/2025      1.  Status post right BKA. 2.  RIGHT: Occluded right femoropopliteal and right fem-distal bypass grafts. Patent profunda. No flow is seen in the popliteal artery. 3.  LEFT: Severe multifocal stenosis/occlusions involving the SFA. Patent three-vessel runoff to the left foot,  although calcific stenosis involving DEB and PTA. 4.  Status post splenectomy and distal pancreatectomy. Electronically signed by LAVINIA JOVEL      XR Knee Right (6/2/25)     IMPRESSION:  No acute bony abnormality. BKA surgical changes. Soft tissue  swelling and ulceration are noted.       XR Tibia Fibula Left (6/2/25)     No acute abnormality      Hospital Problems           Last Modified POA    * (Principal) Open wound knee/leg with tendon involvment, left, initial encounter 6/2/2025 Yes    Open wound of knee, leg, and ankle, left, sequela 6/4/2025 Yes    Wound infection 6/4/2025 Yes    CRP elevated 6/4/2025 Yes     Chronic nonhealing nonsurgical wound left anterior shin and medial left ankle, superinfection likely.  Chonic nonheading nonsurgical wounds right BKA stump, superinfection felt to be less likely  No evidence of osteomyelitis  Sepsis with leukocytosis, elevated CRP and ESR  PAD  Thrombocytosis, probably reactive, resolved  Uncontrolled diabetes mellitus  7.  Penicillin allergy    Comment:  Inflammatory markers all remain elevated.  All wound cultures simply showed mixed skin meek, which mitigates against MRSA involvement.  This gave us reason to stop Vancomycin.  No evidence of osteomyelitis.     Plan   1. Continue IV Meropenem  for 2 more weeks; can be done via Midline as outpatient; Weekly CBC, CRP, ESR  2. Cleared for discharge from ID standpoint       Electronically signed by Devin Torres MD

## 2025-06-07 NOTE — PLAN OF CARE
Problem: Physical Therapy - Adult  Goal: By Discharge: Performs mobility at highest level of function for planned discharge setting.  See evaluation for individualized goals.  Description: FUNCTIONAL STATUS PRIOR TO ADMISSION: The patient  was modified independent using adaptive equipment for basic and instrumental ADLs. and The patient was functional at the wheelchair level and was modified independent using adaptive equipmentfor transfers to the chair.    HOME SUPPORT PRIOR TO ADMISSION: The patient lived with mother who is currently on hospice care, receives PCA assistance M-F 9-2pm.    Physical Therapy Goals  Initiated 6/6/2025  Pt stated goal: to go home  Pt will be I with LE HEP in 7 days.  Pt will perform bed mobility with [place] in 7 days.  Pt will perform transfers with [place] in 7 days.   Pt will demonstrate improvement in standing balance from Contact Guard Assist to [place] in 7 days.      Outcome: Progressing            PHYSICAL THERAPY TREATMENT     Patient: Laury Schuster (64 y.o. female)  Date: 6/7/2025  Diagnosis: Cellulitis of left foot [L03.116]  Open wound knee/leg with tendon involvment, left, initial encounter [S81.002A, S81.802A, S91.002A, S86.902A, S96.902A] Open wound knee/leg with tendon involvment, left, initial encounter  Procedure(s) (LRB):  Angiography lower ext left (N/A)  Aortagram abdominal (N/A)  Ultrasound guided vascular access (N/A)  Atherectomy external iliac artery right (N/A)  Atherectomy femoral popliteal (N/A)  Angioplasty common femoral artery (N/A) 4 Days Post-Op  Precautions: Fall Risk                      Recommendations for nursing mobility: Out of bed to chair for meals, Encourage HEP in prep for ADLs/mobility; see handout for details, LE elevation for management of edema, Use of BSC for toileting , AD and gt belt for bed to chair , and Assist x1    In place during session: Peripheral IV, External Catheter, and EKG/telemetry   Chart, physical  therapy assessment, plan of care and goals were reviewed.  ASSESSMENT  Patient continues with skilled PT services and is progressing towards goals. Pt semi-supine upon PT arrival, agreeable to session. Pt A&O x 4. (See below for objective details and assist levels).     Overall pt tolerated session fair today with LE therex performed with patient seated EOB. She declined transferring into the wheelchair because nursing just had her up out of bed to change bed linens, but patient was agreeable to sit up and perform exercises for strength training. Mod I with use of bed rails and incr time to come to sitting EOB. Left patient in semi-supine at end of session with all needs in reach. Will continue to benefit from skilled PT services, and will continue to progress as tolerated. Current PT DC recommendation Intermittent physical therapy up to 2-3x/week in previous living setting once medically appropriate.    GOALS:  Initiated 6/6/2025  Pt stated goal: to go home  Pt will be I with LE HEP in 7 days.  Pt will perform bed mobility with [place] in 7 days.  Pt will perform transfers with [place] in 7 days.   Pt will demonstrate improvement in standing balance from Contact Guard Assist to [place] in 7 days.         PLAN :  Patient continues to benefit from skilled intervention to address functional impairments. Continue treatment per established plan of care to address goals.    Recommendation for discharge: (in order for the patient to meet his/her long term goals)  Intermittent physical therapy up to 2-3x/week in previous living setting    Potential barriers for safe discharge: pt is a high fall risk, pt is not safe to be alone, and concern for pt safely navigating or managing the home environment.    IF patient discharges home will need the following DME:none     SUBJECTIVE:   Patient stated “I'm going home when I leave here because I take care of my mom who is on hospice.”    OBJECTIVE DATA SUMMARY:    Critical Behavior:  Orientation  Overall Orientation Status: Within Normal Limits  Orientation Level: Oriented X4  Cognition  Overall Cognitive Status: WNL    Functional Mobility Training:  Bed Mobility:  Bed Mobility Training  Bed Mobility Training: Yes  Overall Level of Assistance: Modified independent  Rolling: Modified independent  Supine to Sit: Modified independent  Sit to Supine: Modified independent  Scooting: Modified independent  Transfers:  Transfer Training  Transfer Training: No  Balance:  Balance  Sitting: Intact  Wheelchair Mobility:  Wheelchair Management  Wheelchair Management: No  Ambulation/Gait Training:  Gait  Gait Training: No    Therapeutic Exercises:   Performed with patient seated EOB without trunk support but occ UE support on bed rails, rest breaks throughout       EXERCISE   Sets   Reps   Active Active Assist   Passive Self ROM   Comments   Ankle Pumps 2 15 [x] [] [] [] LLE only    Quad Sets/Glut Sets   [] [] [] []    Hamstring Sets   [] [] [] []    Short Arc Quads   [] [] [] []    Heel Slides   [] [] [] []    Straight Leg Raises   [] [] [] []    Hip ER/IR 2 15 [x] [] [] []    Long Arc Quads 2 15 [x] [] [] []    Marching 2 15 [x] [] [] []    Seated HR/TR   [] [] [] []       [] [] [] []       Pain Ratin/10 LLE reported    Pain Intervention(s):   patient medicated for pain prior to session, rest, elevation, and repositioning    Activity Tolerance:   Fair , requires frequent rest breaks, and SpO2 stable on room air    After treatment patient left in no apparent distress:   Bed locked and returned to lowest position, Patient left in no apparent distress in bed and placed in chair position., Call bell within reach, and Heels elevated for pressure relief, and nsg updated     COMMUNICATION/COLLABORATION:   The patient’s plan of care was discussed with: Physical therapist and Registered nurse    Patient Education  Education Given To: Patient  Education Provided: Role of Therapy;Plan of  Care;Home Exercise Program  Education Method: Demonstration;Verbal;Teach Back  Barriers to Learning: None  Education Outcome: Verbalized understanding;Continued education needed;Demonstrated understanding    Milla Maurer PTA  Minutes: 28

## 2025-06-07 NOTE — PROGRESS NOTES
.      Hospitalist Progress Note    NAME:   Laury Schuster   : 1960   MRN: [id number]     Date/Time: 2025 9:00 AM  Patient PCP: Trenton Escudero MD        Assessment / Plan:    # Diabetes  - Continue home dose lantus + increase premeal insulin to 5units tid  - POC + correctional insulin   - Monitor for hypoglycemia due to insulin with serial glucose checks  - Hypoglycemia protocol  -  pt requesting CGM to be sent to her [facility] [place]      # Essential hypertension   - BP now at goal, continue current regimen.    - creatinine now 0.98, baseline 0.4-0.05.  - continue lisinopril      # Infected left leg wound   # Chronic BKA stump wound   # S/p Right iliac partial intervention   # S/p Left SFA intervention   - management per ID and surgery  - currently IV meropenem   - Pending finalization of IV antibiotics  - Blood culture and wound culture     # PAD  - management per surgery      # History of CVA with left sided weakness       Rest per primary Dr. Cantu       Medical Decision Making     [x] High (any 2)     A. Problems (any 1)  [x] Acute/Chronic Illness/injury posing threat to life or bodily function:    [] Severe exacerbation of chronic illness:    ---------------------------------------------------------------------  B. Risk of Treatment (any 1)   [x] Drugs/treatments that require intensive monitoring for toxicity include:    [] IV ABX requiring serial renal monitoring for nephrotoxicity:     [] IV Narcotic analgesia for adverse drug reaction  [] Aggressive IV diuresis requiring serial monitoring for renal impairment and electrolyte derangements  [] Critical electrolyte abnormalities requiring IV replacement and close serial monitoring  [] Insulin - monitoring serial FSBS for Hypoglycemic adverse drug reaction  [] Other -   [] Change in code status:    [] Decision to escalate care:    [] Major surgery/procedure with associated risk factors:      Musculoskeletal:         Pain in the right groin         PHYSICAL EXAM:  Physical Exam     General: alert, awake, no acute distress.  HEENT: EOMI, no icterus, no pallor, pupils reactive, mucosa moist.   Neck: Neck is supple, No JVD.  Lungs: breathsounds normal, no respiratory distress on inspection.  CVS: S1, S2 heard, no murmurs, gallops, no JVD, no LE edema  GI: soft, nontender, normal BS.  Extremities: no clubbing, no cyanosis, no edema.  Neuro: Alert, awake, oriented x3, moving all extremities   Skin: normal skin turgor, no skin rashes or ulcerations.  Musculoskeletal: Right BKA noted, covered with bandages, right groin, no hematoma noted, left lower extremity covered in bandage             Reviewed most current lab test results and cultures  YES  Reviewed most current radiology test results   YES  Review and summation of old records today    NO  Reviewed patient's current orders and MAR    YES  PMH/SH reviewed - no change compared to H&P  ________________________________________________________________________  Care Plan discussed with:    Comments   Patient x    Family      RN x    Care Manager     Consultant                        Multidiciplinary team rounds were held today with , nursing, pharmacist and clinical coordinator.  Patient's plan of care was discussed; medications were reviewed and discharge planning was addressed.     ________________________________________________________________________  Total NON critical care TIME:  35  Minutes    Total CRITICAL CARE TIME Spent:   Minutes non procedure based      Comments   >50% of visit spent in counseling and coordination of care     ________________________________________________________________________  Kena Calderón MD     Procedures: see electronic medical records for all procedures/Xrays and details which were not copied into this note but were reviewed prior to creation of Plan.      LABS:  I reviewed today's most current  labs and imaging studies.  Pertinent labs include:  Recent Labs     06/05/25  0744 06/06/25  0606 06/07/25  0718   WBC 12.3* 12.3* 12.3*   HGB 8.9* 9.1* 9.2*   HCT 29.4* 30.1* 30.8*    377 388     Recent Labs     06/05/25  0744 06/06/25  0606 06/07/25  0718    139 137   K 3.9 3.6 3.7   * 108 107   CO2 23 25 27   BUN 6 9 11   ALT 11*  --   --        Signed: Kena Calderón MD

## 2025-06-07 NOTE — PROGRESS NOTES
Progress Note  Date:2025       [facility]  Patient Name:Laury Schuster     YOB: 1960     Age:64 y.o.        Subjective    Subjective  Patient followed for chronic nonhealing wounds left anterior shin and left ankle, along with right BKA chronic wounds. Cultures all grew mixed meek. CRP now decreasing.  She has PICC line in her right arm and has been instructed on how to administer IV antibiotics at home.  No complaints.    Vitals Last 24 Hours:  TEMPERATURE:  Temp  Av.8 °F (36.6 °C)  Min: 97.3 °F (36.3 °C)  Max: 98.3 °F (36.8 °C)  RESPIRATIONS RANGE: Resp  Av.8  Min: 16  Max: 18  PULSE OXIMETRY RANGE: SpO2  Av.4 %  Min: 95 %  Max: 99 %  PULSE RANGE: Pulse  Av.3  Min: 78  Max: 89  BLOOD PRESSURE RANGE: Systolic (24hrs), Av , Min:120 , Max:145   ; Diastolic (24hrs), Av, Min:73, Max:87         Objective:  Vital signs: (most recent): Blood pressure 137/76, pulse 84, temperature 97.5 °F (36.4 °C), temperature source Oral, resp. rate 18, height 1.524 m (5'), weight 60.8 kg (134 lb 0.6 oz), SpO2 97%.      Vitals and nursing note reviewed.     Constitutional:       Appearance: She is not ill-appearing.   HENT:      Head: Normocephalic and atraumatic.      Right Ear: External ear normal.      Left Ear: External ear normal.      Nose: Nose normal.      Mouth/Throat:      Pharynx: Oropharynx is clear.   Eyes:      Extraocular Movements: Extraocular movements intact.      Pupils: Pupils are equal, round, and reactive to light.   Cardiovascular:      Rate and Rhythm: Normal rate and regular rhythm.      Heart sounds: Normal heart sounds. No murmur heard.  Pulmonary:      Effort: Pulmonary effort is normal.      Breath sounds: Normal breath sounds.   Abdominal:      General: Bowel sounds are normal. There is no distension.      Palpations: Abdomen is soft.      Tenderness: There is no abdominal tenderness.   Genitourinary:     Comments: No Walker catheter  Musculoskeletal:       involving the SFA. Patent three-vessel runoff to the left foot, although calcific stenosis involving DEB and PTA. 4.  Status post splenectomy and distal pancreatectomy. Electronically signed by LAVINIA JOVEL      XR Knee Right (6/2/25)     IMPRESSION:  No acute bony abnormality. BKA surgical changes. Soft tissue  swelling and ulceration are noted.       XR Tibia Fibula Left (6/2/25)     No acute abnormality      Hospital Problems           Last Modified POA    * (Principal) Open wound knee/leg with tendon involvment, left, initial encounter 6/2/2025 Yes    Open wound of knee, leg, and ankle, left, sequela 6/4/2025 Yes    Wound infection 6/4/2025 Yes    CRP elevated 6/4/2025 Yes     Chronic nonhealing nonsurgical wound left anterior shin and medial left ankle, superinfection likely.  Chonic nonheading nonsurgical wounds right BKA stump, superinfection felt to be less likely  No evidence of osteomyelitis  Sepsis with leukocytosis, elevated CRP and ESR  PAD  Thrombocytosis, probably reactive, resolved  Uncontrolled diabetes mellitus  7.  Penicillin allergy    Comment:  Inflammatory markers all remain elevated but CRP decreased toda.   All wound cultures simply showed mixed skin meek, which mitigates against MRSA involvement.  This gave us reason to stop Vancomycin.  No evidence of osteomyelitis.     Plan   1. Continue IV Meropenem  for 2 more weeks; can be done via Midline or PICC; Rx sent to [facility] and antibiotics can be sent out later today; Weekly CBC, CRP, ESR  2. Cleared for discharge from ID standpoint       Electronically signed by Devin Torres MD

## 2025-06-07 NOTE — PROGRESS NOTES
PROGRESS NOTE      Chief Complaints:  No new complaints.  HPI and  Objective:    No fever.  Review of Systems:  Rest of review of system reviewed personally and they are negative.    EXAM:  /75   Pulse 84   Temp 97.5 °F (36.4 °C) (Oral)   Resp 18   Ht 1.524 m (5')   Wt 60.8 kg (134 lb 0.6 oz)   LMP  (LMP Unknown)   SpO2 97%   BMI 26.18 kg/m²     Patient is awake   Head and neck atraumatic, normocephalic.  ENT: No hoarse voice, gaze appropriate.  Cardiac system regular rate rhythm.  Pulmonary no audible wheeze, no cyanosis.  Chest wall excursion normal with respiration cycle  Abdomen is soft not particularly distended.  Neurologically nonfocal.  Hematology system: No bruising noted.  Musculoskeletal system: No joint deformity noted.  Genitourinary system: No hematuria noted.  Skin is warm and moist.  Psychosocial: Cooperative.  Vascular examination as previously noted no changes.    Current Facility-Administered Medications   Medication Dose Route Frequency Provider Last Rate Last Admin    insulin lispro (HUMALOG,ADMELOG) injection vial 5 Units  5 Units SubCUTAneous TID  Kena Calderón MD        lisinopril (PRINIVIL;ZESTRIL) tablet 20 mg  20 mg Oral Daily Xi Cuevas MD   20 mg at 06/06/25 0850    meropenem (MERREM) 1,000 mg in sodium chloride 0.9 % 100 mL IVPB (addEASE)  1,000 mg IntraVENous Q8H Devin Torres MD   Stopped at 06/07/25 0334    labetalol (NORMODYNE;TRANDATE) injection 10 mg  10 mg IntraVENous Q4H PRN Fei Pina MD        heparin 25,000 units in dextrose 5% 250 mL (premix) infusion  600 Units/hr IntraVENous Continuous Marko Cantu MD 6 mL/hr at 06/07/25 0537 600 Units/hr at 06/07/25 0537    sodium chloride flush 0.9 % injection 5-40 mL  5-40 mL IntraVENous 2 times per day Marko Cantu MD   10 mL at 06/06/25 2130    sodium chloride flush 0.9 % injection 5-40 mL  5-40 mL IntraVENous PRN Marko Cantu MD        0.9 % sodium chloride infusion   IntraVENous  Lymphocytes % 30.8 12.0 - 49.0 %    Monocytes % 13.8 (H) 5.0 - 13.0 %    Eosinophils % 8.7 (H) 0.0 - 7.0 %    Basophils % 0.8 0.0 - 1.0 %    Immature Granulocytes % 0.3 0 - 0.5 %    Neutrophils Absolute 5.60 1.80 - 8.00 K/UL    Lymphocytes Absolute 3.79 (H) 0.80 - 3.50 K/UL    Monocytes Absolute 1.70 (H) 0.00 - 1.00 K/UL    Eosinophils Absolute 1.07 (H) 0.00 - 0.40 K/UL    Basophils Absolute 0.10 0.00 - 0.10 K/UL    Immature Granulocytes Absolute 0.04 0.00 - 0.04 K/UL    Differential Type Smear Scanned      RBC Comment Normocytic, Normochromic     Basic Metabolic Panel    Collection Time: 06/07/25  7:18 AM   Result Value Ref Range    Sodium 137 136 - 145 mmol/L    Potassium 3.7 3.5 - 5.1 mmol/L    Chloride 107 97 - 108 mmol/L    CO2 27 21 - 32 mmol/L    Anion Gap 3 2 - 12 mmol/L    Glucose 76 65 - 100 mg/dL    BUN 11 6 - 20 mg/dL    Creatinine 0.55 0.55 - 1.02 mg/dL    BUN/Creatinine Ratio 20 12 - 20      Est, Glom Filt Rate >90 >60 ml/min/1.73m2    Calcium 8.9 8.5 - 10.1 mg/dL   C-Reactive Protein    Collection Time: 06/07/25  7:18 AM   Result Value Ref Range    CRP 1.14 (H) 0.00 - 0.30 mg/dL   POCT Glucose    Collection Time: 06/07/25  7:50 AM   Result Value Ref Range    POC Glucose 92 65 - 100 mg/dL    Performed by: Ac Ronquillo (CON)        ASSESSMENT:   Patient is 64 y.o. with diagnosis of : Principal Problem:    Open wound knee/leg with tendon involvment, left, initial encounter  Active Problems:    Open wound of knee, leg, and ankle, left, sequela    Wound infection    CRP elevated  Resolved Problems:    * No resolved hospital problems. *      PLAN:                 No fever.    I changed the dressing on the posterior leg.  Cellulitis much better.    I reapplied Medihoney ointments and iodoform packing both leg.    Currently waiting for antibiotics to arrive to her home.    Patient was instructed about how to set of IV antibiotics at home by herself.

## 2025-06-07 NOTE — PLAN OF CARE
Problem: Chronic Conditions and Co-morbidities  Goal: Patient's chronic conditions and co-morbidity symptoms are monitored and maintained or improved  6/6/2025 2138 by Yolande Trevino RN  Outcome: Progressing  6/6/2025 0831 by Yumiko Infante RN  Outcome: Progressing  Flowsheets (Taken 6/6/2025 0743)  Care Plan - Patient's Chronic Conditions and Co-Morbidity Symptoms are Monitored and Maintained or Improved: Monitor and assess patient's chronic conditions and comorbid symptoms for stability, deterioration, or improvement     Problem: Discharge Planning  Goal: Discharge to home or other facility with appropriate resources  6/6/2025 2138 by Yolande Trevino RN  Outcome: Progressing  6/6/2025 0831 by Yumiko Infante RN  Outcome: Progressing  Flowsheets (Taken 6/6/2025 0743)  Discharge to home or other facility with appropriate resources: Identify barriers to discharge with patient and caregiver     Problem: Skin/Tissue Integrity  Goal: Skin integrity remains intact  Description: 1.  Monitor for areas of redness and/or skin breakdown2.  Assess vascular access sites hourly3.  Every 4-6 hours minimum:  Change oxygen saturation probe site4.  Every 4-6 hours:  If on nasal continuous positive airway pressure, respiratory therapy assess nares and determine need for appliance change or resting period  6/6/2025 2138 by Yolande Trevino RN  Outcome: Progressing  6/6/2025 0831 by Yumiko Infante RN  Outcome: Progressing  Flowsheets (Taken 6/6/2025 0743)  Skin Integrity Remains Intact: Monitor for areas of redness and/or skin breakdown     Problem: Safety - Adult  Goal: Free from fall injury  6/6/2025 2138 by Yolande Trevino RN  Outcome: Progressing  6/6/2025 0831 by Yumiko Infante RN  Outcome: Progressing     Problem: ABCDS Injury Assessment  Goal: Absence of physical injury  6/6/2025 2138 by Yolande Trevino RN  Outcome: Progressing  6/6/2025 0831 by Yumiko Infante RN  Outcome: Progressing     Problem:  orders  Goal: Return ADL status to a safe level of function  Outcome: Progressing  Flowsheets (Taken 6/6/2025 0743 by Yumiko Infante, RN)  Return ADL Status to a Safe Level of Function: Administer medication as ordered     Problem: Gastrointestinal - Adult  Goal: Maintains or returns to baseline bowel function  Outcome: Progressing  Flowsheets (Taken 6/6/2025 0743 by Yumiko Infante, RN)  Maintains or returns to baseline bowel function: Assess bowel function  Goal: Maintains adequate nutritional intake  Outcome: Progressing  Flowsheets (Taken 6/6/2025 0743 by Yumiko Infante RN)  Maintains adequate nutritional intake: Monitor percentage of each meal consumed     Problem: Physical Therapy - Adult  Goal: By Discharge: Performs mobility at highest level of function for planned discharge setting.  See evaluation for individualized goals.  Description: FUNCTIONAL STATUS PRIOR TO ADMISSION: The patient  was modified independent using adaptive equipment for basic and instrumental ADLs. and The patient was functional at the wheelchair level and was modified independent using adaptive equipmentfor transfers to the chair.    HOME SUPPORT PRIOR TO ADMISSION: The patient lived with mother who is currently on hospice care, receives PCA assistance M-F 9-2pm.    Physical Therapy Goals  Initiated 6/6/2025  Pt stated goal: to go home  Pt will be I with [facility] in 7 days.  Pt will perform bed mobility with [facility] in 7 days.  Pt will perform transfers with [facility] in 7 days.   Pt will demonstrate improvement in standing balance from Contact Guard Assist to [facility] in 7 days.      6/6/2025 1432 by Tonya Luciano, PT  Outcome: Progressing

## 2025-06-09 ENCOUNTER — TELEPHONE (OUTPATIENT)
Age: 65
End: 2025-06-09

## 2025-06-09 DIAGNOSIS — T14.8XXA WOUND INFECTION: ICD-10-CM

## 2025-06-09 DIAGNOSIS — L08.9 WOUND INFECTION: ICD-10-CM

## 2025-06-09 PROBLEM — I77.1 ILIAC ARTERY STENOSIS, RIGHT: Status: ACTIVE | Noted: 2025-06-09

## 2025-06-09 PROBLEM — I70.202 FEMORAL ARTERY OCCLUSION, LEFT: Status: ACTIVE | Noted: 2025-06-09

## 2025-06-09 LAB — ECHO BSA: 1.6 M2

## 2025-06-09 RX ORDER — OXYCODONE AND ACETAMINOPHEN 5; 325 MG/1; MG/1
1 TABLET ORAL EVERY 8 HOURS PRN
Qty: 40 TABLET | Refills: 0 | Status: SHIPPED | OUTPATIENT
Start: 2025-06-09 | End: 2025-06-23

## 2025-06-09 NOTE — TELEPHONE ENCOUNTER
Patient called in stating she had surgery and was told she would be able to get her Percocet's when she ot out the hospital. She states she's been in pain all night and the pharmacy did not have her percocet's. .

## 2025-06-18 ENCOUNTER — RESULTS FOLLOW-UP (OUTPATIENT)
Age: 65
End: 2025-06-18

## 2025-06-18 LAB
BUN BLDV-MCNC: 11 MG/DL
C-REACTIVE PROTEIN: 1
CALCIUM SERPL-MCNC: 9 MG/DL
CHLORIDE BLD-SCNC: 99 MMOL/L
CO2: 18 MMOL/L
CREAT SERPL-MCNC: 0.7 MG/DL
EGFR: 97
GLUCOSE BLD-MCNC: 193 MG/DL
POTASSIUM SERPL-SCNC: 5.7 MMOL/L
SODIUM BLD-SCNC: 132 MMOL/L

## 2025-06-19 NOTE — DISCHARGE SUMMARY
Discharge Summary     Date:6/19/2025        Patient Name:Laury Schuster     YOB: 1960     Age:64 y.o.    Admit Date:6/2/2025   Admission Condition:good   Discharged Condition:good  Discharge Date: 06/19/25     Discharge Diagnoses   Principal Problem:    Open wound knee/leg with tendon involvment, left, initial encounter  Active Problems:    PVD (peripheral vascular disease)    Open wound of knee, leg, and ankle, left, sequela    Wound infection    CRP elevated    Iliac artery stenosis, right    Femoral artery occlusion, left  Resolved Problems:    * No resolved hospital problems. *      Hospital Stay   Narrative of Hospital Course: Patient was admitted to the hospital for ischemic left leg.  Patient had multiple wounds on the left foot.  Patient underwent angiogram left leg.  Patient did receive IV antibiotic.  Patient also was eval by ID and recommendation will be IV antibiotics outpatient.  Patient had a PICC line in place.  Patient clinically improved and stable for discharge.    Consultants:   IP CONSULT TO SPIRITUAL SERVICES  IP CONSULT TO INFECTIOUS DISEASES  IP CONSULT TO HOSPITALIST  IP CONSULT TO VASCULAR ACCESS TEAM    Time Spent on Discharge:  30 minutes were spent in patient examination, evaluation, counseling as well as medication reconciliation, prescriptions for required medications, discharge plan and follow up.      Surgeries/Procedures Performed:  Procedure(s):  Angiography lower ext left  Aortagram abdominal  Ultrasound guided vascular access  Atherectomy external iliac artery right  Atherectomy femoral popliteal  Angioplasty common femoral artery      Treatments:   As above    Significant Diagnostic Studies:   Recent Labs:  Reviewed    Radiology Last 7 Days:  No results found.    Discharge Plan   Disposition: Home    Provider Follow-Up:   Marko Cantu MD  [address] [place] [id number] [phone]    Follow up in 2 week(s)         Hospital/Incidental

## 2025-06-20 ENCOUNTER — OFFICE VISIT (OUTPATIENT)
Age: 65
End: 2025-06-20
Payer: MEDICARE

## 2025-06-20 VITALS
SYSTOLIC BLOOD PRESSURE: 121 MMHG | DIASTOLIC BLOOD PRESSURE: 77 MMHG | TEMPERATURE: 97.5 F | HEART RATE: 94 BPM | BODY MASS INDEX: 26.18 KG/M2 | RESPIRATION RATE: 16 BRPM | OXYGEN SATURATION: 94 % | HEIGHT: 60 IN

## 2025-06-20 DIAGNOSIS — M79.604 PAIN OF RIGHT LOWER EXTREMITY: Primary | ICD-10-CM

## 2025-06-20 DIAGNOSIS — I73.9 PVD (PERIPHERAL VASCULAR DISEASE): ICD-10-CM

## 2025-06-20 PROCEDURE — 3017F COLORECTAL CA SCREEN DOC REV: CPT | Performed by: SURGERY

## 2025-06-20 PROCEDURE — 1036F TOBACCO NON-USER: CPT | Performed by: SURGERY

## 2025-06-20 PROCEDURE — 99212 OFFICE O/P EST SF 10 MIN: CPT | Performed by: SURGERY

## 2025-06-20 PROCEDURE — 1111F DSCHRG MED/CURRENT MED MERGE: CPT | Performed by: SURGERY

## 2025-06-20 PROCEDURE — G8419 CALC BMI OUT NRM PARAM NOF/U: HCPCS | Performed by: SURGERY

## 2025-06-20 PROCEDURE — G8427 DOCREV CUR MEDS BY ELIG CLIN: HCPCS | Performed by: SURGERY

## 2025-06-20 RX ORDER — OXYCODONE AND ACETAMINOPHEN 5; 325 MG/1; MG/1
1 TABLET ORAL EVERY 8 HOURS PRN
Qty: 40 TABLET | Refills: 0 | Status: SHIPPED | OUTPATIENT
Start: 2025-06-20 | End: 2025-07-04

## 2025-06-20 NOTE — PROGRESS NOTES
Identified patient with two patient identifiers (name and ). Reviewed chart in preparation for visit and have obtained necessary documentation.    Laury Schuster is a 64 y.o. female  Chief Complaint   Patient presents with    Follow-up     Angiography BKA     /77 (BP Site: Left Upper Arm, Patient Position: Sitting, BP Cuff Size: Small Adult)   Pulse 94   Temp 97.5 °F (36.4 °C) (Oral)   Resp 16   Ht 1.524 m (5')   LMP  (LMP Unknown)   SpO2 94%   BMI 26.18 kg/m²     1. Have you been to the ER, urgent care clinic since your last visit?  Hospitalized since your last visit?no    2. Have you seen or consulted any other health care providers outside of the [facility] since your last visit?  Include any pap smears or colon screening. no

## 2025-06-23 ENCOUNTER — TELEPHONE (OUTPATIENT)
Age: 65
End: 2025-06-23

## 2025-06-27 NOTE — PROGRESS NOTES
Vascular History and Physical    Patient: Laury Schuster  MRN: [id number]    YOB: 1960  Age: 64 y.o.  Sex: female     Chief Complaint:  Chief Complaint   Patient presents with    Follow-up     Angiography BKA       History of Present Illness: Laury Schuster is a 64 y.o. very pleasant woman is here today for follow-up reassess bilateral extremity wounds including right BKA wound and left leg wound.  Patient currently doing much better.  Patient also currently getting IV antibiotics as outpatient.  Pain moderate.    Social History:  Social Connections: Not on file       Past Medical History:  Past Medical History:   Diagnosis Date    Arthritis     Coagulation disorder     possible , pt had surgery on leg and bled out    Diabetes (HCC) 2019    Hypertension     Left-sided weakness     Nodule of kidney     left kidney    Peripheral vascular disease     Stroke ([facility])     recent stroke on 10/16/2021       Surgical History:  Past Surgical History:   Procedure Laterality Date    CATARACT REMOVAL  2019    COLONOSCOPY      FEMORAL BYPASS Right 10/18/2023    Right common femoral endarterectomy. 2.Right common femoral artery to below-knee popliteal bypass with 6 mm PTFE graft. performed by Marko Cantu MD at [facility] MAIN OR    FEMORAL BYPASS Right 4/7/2024    1.  Right common femoral artery to below-knee popliteal bypass graft thrombectomy using 3 [place] a 4 [place] Walker catheter. 2.  Right leg angiogram. 3.  Right leg bypass graft tPA infusion catheter placement. performed by Marko Cantu MD at [facility] MAIN OR    INVASIVE VASCULAR Right 1/7/2024    RIGHT LEG GRAFT THROMBECTOMY AND ANGIOGRAM performed by Marko Cantu MD at [facility] MAIN OR    INVASIVE VASCULAR N/A 4/8/2024    Angiography lower ext right performed by Marko Cantu MD at [facility] CARDIAC CATH LAB    INVASIVE VASCULAR N/A 4/8/2024    Angioplasty ant tib artery performed by Marko Cantu MD at [facility] CARDIAC CATH LAB    INVASIVE VASCULAR N/A 6/3/2025

## 2025-07-10 ENCOUNTER — OFFICE VISIT (OUTPATIENT)
Age: 65
End: 2025-07-10
Payer: MEDICARE

## 2025-07-10 ENCOUNTER — TELEPHONE (OUTPATIENT)
Age: 65
End: 2025-07-10

## 2025-07-10 VITALS
WEIGHT: 134 LBS | SYSTOLIC BLOOD PRESSURE: 133 MMHG | RESPIRATION RATE: 16 BRPM | BODY MASS INDEX: 26.31 KG/M2 | OXYGEN SATURATION: 99 % | HEART RATE: 94 BPM | HEIGHT: 60 IN | DIASTOLIC BLOOD PRESSURE: 70 MMHG | TEMPERATURE: 97.7 F

## 2025-07-10 DIAGNOSIS — M79.605 PAIN OF LEFT LOWER EXTREMITY: Primary | ICD-10-CM

## 2025-07-10 DIAGNOSIS — T87.9 BKA STUMP COMPLICATION (HCC): ICD-10-CM

## 2025-07-10 PROCEDURE — G8427 DOCREV CUR MEDS BY ELIG CLIN: HCPCS | Performed by: SURGERY

## 2025-07-10 PROCEDURE — 1036F TOBACCO NON-USER: CPT | Performed by: SURGERY

## 2025-07-10 PROCEDURE — G8419 CALC BMI OUT NRM PARAM NOF/U: HCPCS | Performed by: SURGERY

## 2025-07-10 PROCEDURE — 99212 OFFICE O/P EST SF 10 MIN: CPT | Performed by: SURGERY

## 2025-07-10 PROCEDURE — 3017F COLORECTAL CA SCREEN DOC REV: CPT | Performed by: SURGERY

## 2025-07-10 RX ORDER — OXYCODONE AND ACETAMINOPHEN 5; 325 MG/1; MG/1
1 TABLET ORAL EVERY 6 HOURS PRN
Qty: 40 TABLET | Refills: 0 | Status: SHIPPED | OUTPATIENT
Start: 2025-07-10 | End: 2025-07-24

## 2025-07-10 RX ORDER — METHOCARBAMOL 750 MG/1
750 TABLET, FILM COATED ORAL 4 TIMES DAILY
Qty: 40 TABLET | Refills: 2 | Status: SHIPPED | OUTPATIENT
Start: 2025-07-10 | End: 2025-08-09

## 2025-07-10 RX ORDER — METHOCARBAMOL 750 MG/1
750 TABLET, FILM COATED ORAL 4 TIMES DAILY
Qty: 40 TABLET | Refills: 0 | Status: SHIPPED | OUTPATIENT
Start: 2025-07-10 | End: 2025-07-10

## 2025-07-10 ASSESSMENT — PATIENT HEALTH QUESTIONNAIRE - PHQ9
1. LITTLE INTEREST OR PLEASURE IN DOING THINGS: NOT AT ALL
SUM OF ALL RESPONSES TO PHQ QUESTIONS 1-9: 0
SUM OF ALL RESPONSES TO PHQ QUESTIONS 1-9: 0
2. FEELING DOWN, DEPRESSED OR HOPELESS: NOT AT ALL
SUM OF ALL RESPONSES TO PHQ QUESTIONS 1-9: 0
SUM OF ALL RESPONSES TO PHQ QUESTIONS 1-9: 0

## 2025-07-10 NOTE — TELEPHONE ENCOUNTER
Patient called regarding medication that she said helps her with muscle rexaler. The name is methocarbamol 750 mg. Dr. Cantu said he would order for her once she found out the name. [phone]

## 2025-07-18 NOTE — PROGRESS NOTES
Identified pt with two pt identifiers (name and ). Reviewed chart in preparation for visit and have obtained necessary documentation.    Laury Schuster is a 64 y.o. female  Chief Complaint   Patient presents with    Follow-up     Hospital follow up of Angiography     /70 (BP Site: Left Upper Arm, Patient Position: Sitting, BP Cuff Size: Medium Adult)   Pulse 94   Temp 97.7 °F (36.5 °C) (Oral)   Resp 16   Ht 1.524 m (5')   Wt 60.8 kg (134 lb)   LMP  (LMP Unknown)   SpO2 99%   BMI 26.17 kg/m²     1. Have you been to the ER, urgent care clinic since your last visit?  Hospitalized since your last visit?no    2. Have you seen or consulted any other health care providers outside of the [facility] since your last visit?  Include any pap smears or colon screening. no   
nondistended, normoactive bowel sounds, no hernias, no hepatosplenomegaly   Neuro: sensation and strength grossly intact and symmetrical  Psych: alert and oriented to person, place and time  Vascular examination: Right BKA wound almost closed 99%.  Minimal drainage.  Left foot ankle wound also appears better.  Skin: warm and moist.    Labs:  Orders Only on 06/18/2025   Component Date Value Ref Range Status    Sodium 06/18/2025 132  mmol/L Final    Chloride 06/18/2025 99  mmol/L Final    Potassium 06/18/2025 5.7  mmol/L Final    BUN 06/18/2025 11  mg/dL Final    Creatinine 06/18/2025 0.70  mg/dL Final    Glucose 06/18/2025 193  mg/dL Final    CO2 06/18/2025 18  mmol/L Final    Calcium 06/18/2025 9.0  mg/dL Final    Est, Glom Filt Rate 06/18/2025 97   Final    CRP 06/18/2025 1   Final         Images:      Pepe Zhu MD    Assessments:  Patient Active Problem List   Diagnosis    Postop carotid endarterectomy surveillance, encounter for    Ischemia of right lower extremity    Stroke (HCC)    CVA (cerebral vascular accident) (HCC)    History of bleeding disorder    PVD (peripheral vascular disease)    Adrenal tumor    Pancreatic tumor    Limb ischemia    Ulcer of right foot (HCC)    Femoral artery occlusion, right    Ischemic leg    Arterial occlusion    Occlusion of femoropopliteal bypass graft    Pain of left lower extremity    Pain of right lower extremity    Peripheral arterial disease    Ischemic foot ulcer due to atherosclerosis of native artery of limb (HCC)    BKA stump complication (HCC)    Post-op pain    Wound of right leg    Injury of left leg    Open wound knee/leg with tendon involvment, left, initial encounter    Open wound of knee, leg, and ankle, left, sequela    Wound infection    CRP elevated    Iliac artery stenosis, right    Femoral artery occlusion, left       Plans: Continue wound care Medihoney ointments left ankle and pretibial wound.  And continue Aquacel Ag on the right BKA.  Patient was

## 2025-07-28 ENCOUNTER — TELEPHONE (OUTPATIENT)
Age: 65
End: 2025-07-28

## 2025-07-28 NOTE — TELEPHONE ENCOUNTER
Patient said she canceled her appt last week because her mother passed away. She states she's calling to see if Dr. Cantu can refill her RX because she's out of medication and will call sometime next week and reschedule appt. - - DCR

## 2025-08-11 ENCOUNTER — TELEPHONE (OUTPATIENT)
Age: 65
End: 2025-08-11

## 2025-08-11 DIAGNOSIS — M79.605 PAIN OF LEFT LOWER EXTREMITY: Primary | ICD-10-CM

## 2025-08-11 RX ORDER — OXYCODONE AND ACETAMINOPHEN 5; 325 MG/1; MG/1
1 TABLET ORAL EVERY 8 HOURS PRN
Qty: 40 TABLET | Refills: 0 | Status: SHIPPED | OUTPATIENT
Start: 2025-08-11 | End: 2025-08-25

## 2025-08-15 ENCOUNTER — OFFICE VISIT (OUTPATIENT)
Age: 65
End: 2025-08-15
Payer: MEDICARE

## 2025-08-15 VITALS
DIASTOLIC BLOOD PRESSURE: 66 MMHG | HEART RATE: 94 BPM | SYSTOLIC BLOOD PRESSURE: 110 MMHG | WEIGHT: 134 LBS | OXYGEN SATURATION: 98 % | HEIGHT: 60 IN | TEMPERATURE: 97.3 F | BODY MASS INDEX: 26.31 KG/M2 | RESPIRATION RATE: 17 BRPM

## 2025-08-15 DIAGNOSIS — T87.9 BKA STUMP COMPLICATION (HCC): Primary | ICD-10-CM

## 2025-08-15 PROCEDURE — 1036F TOBACCO NON-USER: CPT | Performed by: SURGERY

## 2025-08-15 PROCEDURE — 3017F COLORECTAL CA SCREEN DOC REV: CPT | Performed by: SURGERY

## 2025-08-15 PROCEDURE — G8427 DOCREV CUR MEDS BY ELIG CLIN: HCPCS | Performed by: SURGERY

## 2025-08-15 PROCEDURE — 99212 OFFICE O/P EST SF 10 MIN: CPT | Performed by: SURGERY

## 2025-08-15 PROCEDURE — G8419 CALC BMI OUT NRM PARAM NOF/U: HCPCS | Performed by: SURGERY

## 2025-08-19 ENCOUNTER — HOSPITAL ENCOUNTER (INPATIENT)
Facility: HOSPITAL | Age: 65
LOS: 3 days | Discharge: HOME | End: 2025-08-22
Attending: EMERGENCY MEDICINE | Admitting: INTERNAL MEDICINE
Payer: MEDICARE

## 2025-08-19 DIAGNOSIS — D64.9 ANEMIA, UNSPECIFIED TYPE: ICD-10-CM

## 2025-08-19 DIAGNOSIS — D64.9 SYMPTOMATIC ANEMIA: Primary | ICD-10-CM

## 2025-08-19 DIAGNOSIS — K92.2 GASTROINTESTINAL HEMORRHAGE, UNSPECIFIED GASTROINTESTINAL HEMORRHAGE TYPE: ICD-10-CM

## 2025-08-19 LAB
ALBUMIN SERPL-MCNC: 3.2 G/DL (ref 3.5–5)
ALBUMIN/GLOB SERPL: 0.8 (ref 1.1–2.2)
ALP SERPL-CCNC: 82 U/L (ref 45–117)
ALT SERPL-CCNC: 16 U/L (ref 12–78)
ANION GAP SERPL CALC-SCNC: 8 MMOL/L (ref 2–12)
AST SERPL W P-5'-P-CCNC: 20 U/L (ref 15–37)
BASOPHILS # BLD: 0.06 K/UL (ref 0–0.1)
BASOPHILS NFR BLD: 0.6 % (ref 0–1)
BILIRUB SERPL-MCNC: 0.2 MG/DL (ref 0.2–1)
BUN SERPL-MCNC: 11 MG/DL (ref 6–20)
BUN/CREAT SERPL: 13 (ref 12–20)
CA-I BLD-MCNC: 9.2 MG/DL (ref 8.5–10.1)
CHLORIDE SERPL-SCNC: 106 MMOL/L (ref 97–108)
CO2 SERPL-SCNC: 22 MMOL/L (ref 21–32)
CREAT SERPL-MCNC: 0.85 MG/DL (ref 0.55–1.02)
DIFFERENTIAL METHOD BLD: ABNORMAL
EOSINOPHIL # BLD: 0.29 K/UL (ref 0–0.4)
EOSINOPHIL NFR BLD: 2.9 % (ref 0–7)
ERYTHROCYTE [DISTWIDTH] IN BLOOD BY AUTOMATED COUNT: 19.1 % (ref 11.5–14.5)
GLOBULIN SER CALC-MCNC: 4.1 G/DL (ref 2–4)
GLUCOSE SERPL-MCNC: 101 MG/DL (ref 65–100)
HCT VFR BLD AUTO: 20.2 % (ref 35–47)
HCT VFR BLD AUTO: 20.8 % (ref 35–47)
HEMOCCULT SP1 STL QL: POSITIVE
HGB BLD-MCNC: 5.4 G/DL (ref 11.5–16)
HGB BLD-MCNC: 5.7 G/DL (ref 11.5–16)
IMM GRANULOCYTES # BLD AUTO: 0.04 K/UL (ref 0–0.04)
IMM GRANULOCYTES NFR BLD AUTO: 0.4 % (ref 0–0.5)
LDH SERPL L TO P-CCNC: 335 U/L (ref 81–246)
LYMPHOCYTES # BLD: 3.31 K/UL (ref 0.8–3.5)
LYMPHOCYTES NFR BLD: 33.4 % (ref 12–49)
MCH RBC QN AUTO: 19.8 PG (ref 26–34)
MCHC RBC AUTO-ENTMCNC: 27.4 G/DL (ref 30–36.5)
MCV RBC AUTO: 72.2 FL (ref 80–99)
MONOCYTES # BLD: 0.99 K/UL (ref 0–1)
MONOCYTES NFR BLD: 10 % (ref 5–13)
NEUTS SEG # BLD: 5.22 K/UL (ref 1.8–8)
NEUTS SEG NFR BLD: 52.7 % (ref 32–75)
NRBC # BLD: 0.46 K/UL (ref 0–0.01)
NRBC BLD-RTO: 4.7 PER 100 WBC
PLATELET # BLD AUTO: 612 K/UL (ref 150–400)
PMV BLD AUTO: 11.9 FL (ref 8.9–12.9)
POTASSIUM SERPL-SCNC: 4.2 MMOL/L (ref 3.5–5.1)
PROT SERPL-MCNC: 7.3 G/DL (ref 6.4–8.2)
RBC # BLD AUTO: 2.88 M/UL (ref 3.8–5.2)
RBC MORPH BLD: ABNORMAL
RETICS # AUTO: 0.08 M/UL (ref 0.02–0.08)
RETICS/RBC NFR AUTO: 2.8 % (ref 0.7–2.1)
SODIUM SERPL-SCNC: 136 MMOL/L (ref 136–145)
WBC # BLD AUTO: 9.9 K/UL (ref 3.6–11)

## 2025-08-19 PROCEDURE — 85045 AUTOMATED RETICULOCYTE COUNT: CPT

## 2025-08-19 PROCEDURE — 82728 ASSAY OF FERRITIN: CPT

## 2025-08-19 PROCEDURE — 6370000000 HC RX 637 (ALT 250 FOR IP): Performed by: INTERNAL MEDICINE

## 2025-08-19 PROCEDURE — 6370000000 HC RX 637 (ALT 250 FOR IP): Performed by: EMERGENCY MEDICINE

## 2025-08-19 PROCEDURE — 30233N1 TRANSFUSION OF NONAUTOLOGOUS RED BLOOD CELLS INTO PERIPHERAL VEIN, PERCUTANEOUS APPROACH: ICD-10-PCS

## 2025-08-19 PROCEDURE — 82607 VITAMIN B-12: CPT

## 2025-08-19 PROCEDURE — 80053 COMPREHEN METABOLIC PANEL: CPT

## 2025-08-19 PROCEDURE — 94640 AIRWAY INHALATION TREATMENT: CPT

## 2025-08-19 PROCEDURE — 86880 COOMBS TEST DIRECT: CPT

## 2025-08-19 PROCEDURE — 82272 OCCULT BLD FECES 1-3 TESTS: CPT

## 2025-08-19 PROCEDURE — 99285 EMERGENCY DEPT VISIT HI MDM: CPT

## 2025-08-19 PROCEDURE — 86901 BLOOD TYPING SEROLOGIC RH(D): CPT

## 2025-08-19 PROCEDURE — 83550 IRON BINDING TEST: CPT

## 2025-08-19 PROCEDURE — 94761 N-INVAS EAR/PLS OXIMETRY MLT: CPT

## 2025-08-19 PROCEDURE — 83540 ASSAY OF IRON: CPT

## 2025-08-19 PROCEDURE — 86902 BLOOD TYPE ANTIGEN DONOR EA: CPT

## 2025-08-19 PROCEDURE — 82746 ASSAY OF FOLIC ACID SERUM: CPT

## 2025-08-19 PROCEDURE — 86921 COMPATIBILITY TEST INCUBATE: CPT

## 2025-08-19 PROCEDURE — 86922 COMPATIBILITY TEST ANTIGLOB: CPT

## 2025-08-19 PROCEDURE — 83615 LACTATE (LD) (LDH) ENZYME: CPT

## 2025-08-19 PROCEDURE — P9040 RBC LEUKOREDUCED IRRADIATED: HCPCS

## 2025-08-19 PROCEDURE — 83010 ASSAY OF HAPTOGLOBIN QUANT: CPT

## 2025-08-19 PROCEDURE — 86900 BLOOD TYPING SEROLOGIC ABO: CPT

## 2025-08-19 PROCEDURE — 2580000003 HC RX 258: Performed by: EMERGENCY MEDICINE

## 2025-08-19 PROCEDURE — 85018 HEMOGLOBIN: CPT

## 2025-08-19 PROCEDURE — 86850 RBC ANTIBODY SCREEN: CPT

## 2025-08-19 PROCEDURE — 86870 RBC ANTIBODY IDENTIFICATION: CPT

## 2025-08-19 PROCEDURE — 85025 COMPLETE CBC W/AUTO DIFF WBC: CPT

## 2025-08-19 PROCEDURE — 85014 HEMATOCRIT: CPT

## 2025-08-19 PROCEDURE — 1100000000 HC RM PRIVATE

## 2025-08-19 PROCEDURE — 36415 COLL VENOUS BLD VENIPUNCTURE: CPT

## 2025-08-19 PROCEDURE — 86920 COMPATIBILITY TEST SPIN: CPT

## 2025-08-19 RX ORDER — GABAPENTIN 300 MG/1
300 CAPSULE ORAL
Status: COMPLETED | OUTPATIENT
Start: 2025-08-19 | End: 2025-08-19

## 2025-08-19 RX ORDER — ACETAMINOPHEN 650 MG/1
650 SUPPOSITORY RECTAL EVERY 6 HOURS PRN
Status: DISCONTINUED | OUTPATIENT
Start: 2025-08-19 | End: 2025-08-22 | Stop reason: HOSPADM

## 2025-08-19 RX ORDER — SODIUM CHLORIDE 0.9 % (FLUSH) 0.9 %
5-40 SYRINGE (ML) INJECTION EVERY 12 HOURS SCHEDULED
Status: DISCONTINUED | OUTPATIENT
Start: 2025-08-19 | End: 2025-08-22 | Stop reason: HOSPADM

## 2025-08-19 RX ORDER — OXYCODONE HYDROCHLORIDE 5 MG/1
5 TABLET ORAL
Refills: 0 | Status: COMPLETED | OUTPATIENT
Start: 2025-08-19 | End: 2025-08-19

## 2025-08-19 RX ORDER — FOLIC ACID 1 MG/1
1 TABLET ORAL DAILY
Status: DISCONTINUED | OUTPATIENT
Start: 2025-08-20 | End: 2025-08-20

## 2025-08-19 RX ORDER — GABAPENTIN 300 MG/1
300 CAPSULE ORAL 3 TIMES DAILY
Status: DISCONTINUED | OUTPATIENT
Start: 2025-08-20 | End: 2025-08-22 | Stop reason: HOSPADM

## 2025-08-19 RX ORDER — SODIUM CHLORIDE 0.9 % (FLUSH) 0.9 %
5-40 SYRINGE (ML) INJECTION PRN
Status: DISCONTINUED | OUTPATIENT
Start: 2025-08-19 | End: 2025-08-22 | Stop reason: HOSPADM

## 2025-08-19 RX ORDER — INSULIN GLARGINE 100 [IU]/ML
28 INJECTION, SOLUTION SUBCUTANEOUS NIGHTLY
Status: DISCONTINUED | OUTPATIENT
Start: 2025-08-20 | End: 2025-08-22 | Stop reason: HOSPADM

## 2025-08-19 RX ORDER — ALBUTEROL SULFATE 90 UG/1
2 INHALANT RESPIRATORY (INHALATION) EVERY 4 HOURS PRN
Status: DISCONTINUED | OUTPATIENT
Start: 2025-08-19 | End: 2025-08-22 | Stop reason: HOSPADM

## 2025-08-19 RX ORDER — ACETAMINOPHEN 325 MG/1
650 TABLET ORAL EVERY 6 HOURS PRN
Status: DISCONTINUED | OUTPATIENT
Start: 2025-08-19 | End: 2025-08-22 | Stop reason: HOSPADM

## 2025-08-19 RX ORDER — 0.9 % SODIUM CHLORIDE 0.9 %
250 INTRAVENOUS SOLUTION INTRAVENOUS ONCE
Status: COMPLETED | OUTPATIENT
Start: 2025-08-19 | End: 2025-08-19

## 2025-08-19 RX ORDER — METHOCARBAMOL 750 MG/1
750 TABLET, FILM COATED ORAL 4 TIMES DAILY
Status: DISCONTINUED | OUTPATIENT
Start: 2025-08-19 | End: 2025-08-22 | Stop reason: HOSPADM

## 2025-08-19 RX ORDER — ONDANSETRON 4 MG/1
4 TABLET, ORALLY DISINTEGRATING ORAL EVERY 8 HOURS PRN
Status: DISCONTINUED | OUTPATIENT
Start: 2025-08-19 | End: 2025-08-22 | Stop reason: HOSPADM

## 2025-08-19 RX ORDER — POTASSIUM CHLORIDE 1500 MG/1
40 TABLET, EXTENDED RELEASE ORAL PRN
Status: DISCONTINUED | OUTPATIENT
Start: 2025-08-19 | End: 2025-08-20

## 2025-08-19 RX ORDER — INSULIN LISPRO 100 [IU]/ML
0-4 INJECTION, SOLUTION INTRAVENOUS; SUBCUTANEOUS
Status: DISCONTINUED | OUTPATIENT
Start: 2025-08-19 | End: 2025-08-22 | Stop reason: HOSPADM

## 2025-08-19 RX ORDER — ONDANSETRON 2 MG/ML
4 INJECTION INTRAMUSCULAR; INTRAVENOUS EVERY 6 HOURS PRN
Status: DISCONTINUED | OUTPATIENT
Start: 2025-08-19 | End: 2025-08-22 | Stop reason: HOSPADM

## 2025-08-19 RX ORDER — GABAPENTIN 100 MG/1
100 CAPSULE ORAL 3 TIMES DAILY
Status: DISCONTINUED | OUTPATIENT
Start: 2025-08-19 | End: 2025-08-19

## 2025-08-19 RX ORDER — SODIUM CHLORIDE 9 MG/ML
INJECTION, SOLUTION INTRAVENOUS PRN
Status: DISCONTINUED | OUTPATIENT
Start: 2025-08-19 | End: 2025-08-22 | Stop reason: HOSPADM

## 2025-08-19 RX ORDER — MAGNESIUM SULFATE IN WATER 40 MG/ML
2000 INJECTION, SOLUTION INTRAVENOUS PRN
Status: DISCONTINUED | OUTPATIENT
Start: 2025-08-19 | End: 2025-08-22 | Stop reason: HOSPADM

## 2025-08-19 RX ORDER — TRAZODONE HYDROCHLORIDE 50 MG/1
100 TABLET ORAL NIGHTLY
Status: DISCONTINUED | OUTPATIENT
Start: 2025-08-19 | End: 2025-08-22 | Stop reason: HOSPADM

## 2025-08-19 RX ORDER — POTASSIUM CHLORIDE 7.45 MG/ML
10 INJECTION INTRAVENOUS PRN
Status: DISCONTINUED | OUTPATIENT
Start: 2025-08-19 | End: 2025-08-20

## 2025-08-19 RX ORDER — POLYETHYLENE GLYCOL 3350 17 G/17G
17 POWDER, FOR SOLUTION ORAL DAILY PRN
Status: DISCONTINUED | OUTPATIENT
Start: 2025-08-19 | End: 2025-08-22 | Stop reason: HOSPADM

## 2025-08-19 RX ORDER — PANTOPRAZOLE SODIUM 40 MG/10ML
40 INJECTION, POWDER, LYOPHILIZED, FOR SOLUTION INTRAVENOUS ONCE
Status: COMPLETED | OUTPATIENT
Start: 2025-08-19 | End: 2025-08-20

## 2025-08-19 RX ORDER — OXYCODONE AND ACETAMINOPHEN 5; 325 MG/1; MG/1
1 TABLET ORAL EVERY 8 HOURS PRN
Status: DISCONTINUED | OUTPATIENT
Start: 2025-08-19 | End: 2025-08-22 | Stop reason: HOSPADM

## 2025-08-19 RX ORDER — LISINOPRIL 20 MG/1
20 TABLET ORAL DAILY
Status: DISCONTINUED | OUTPATIENT
Start: 2025-08-20 | End: 2025-08-22 | Stop reason: HOSPADM

## 2025-08-19 RX ORDER — BUDESONIDE AND FORMOTEROL FUMARATE DIHYDRATE 160; 4.5 UG/1; UG/1
2 AEROSOL RESPIRATORY (INHALATION)
Status: DISCONTINUED | OUTPATIENT
Start: 2025-08-19 | End: 2025-08-22 | Stop reason: HOSPADM

## 2025-08-19 RX ADMIN — GABAPENTIN 300 MG: 300 CAPSULE ORAL at 22:39

## 2025-08-19 RX ADMIN — Medication 2 PUFF: at 23:05

## 2025-08-19 RX ADMIN — OXYCODONE HYDROCHLORIDE 5 MG: 5 TABLET ORAL at 23:28

## 2025-08-19 RX ADMIN — SODIUM CHLORIDE 250 ML: 0.9 INJECTION, SOLUTION INTRAVENOUS at 15:19

## 2025-08-19 ASSESSMENT — PAIN - FUNCTIONAL ASSESSMENT: PAIN_FUNCTIONAL_ASSESSMENT: 0-10

## 2025-08-20 LAB
ANION GAP SERPL CALC-SCNC: 4 MMOL/L (ref 2–12)
BASOPHILS # BLD: 0.09 K/UL (ref 0–0.1)
BASOPHILS NFR BLD: 1.3 % (ref 0–1)
BUN SERPL-MCNC: 9 MG/DL (ref 6–20)
BUN/CREAT SERPL: 13 (ref 12–20)
CA-I BLD-MCNC: 9.2 MG/DL (ref 8.5–10.1)
CHLORIDE SERPL-SCNC: 111 MMOL/L (ref 97–108)
CO2 SERPL-SCNC: 25 MMOL/L (ref 21–32)
CREAT SERPL-MCNC: 0.69 MG/DL (ref 0.55–1.02)
DIFFERENTIAL METHOD BLD: ABNORMAL
EOSINOPHIL # BLD: 0.43 K/UL (ref 0–0.4)
EOSINOPHIL NFR BLD: 6 % (ref 0–7)
ERYTHROCYTE [DISTWIDTH] IN BLOOD BY AUTOMATED COUNT: 20.6 % (ref 11.5–14.5)
FERRITIN SERPL-MCNC: 8 NG/ML (ref 13–252)
FOLATE SERPL-MCNC: 3.7 NG/ML (ref 4.8–24.2)
GLUCOSE BLD STRIP.AUTO-MCNC: 165 MG/DL (ref 65–100)
GLUCOSE BLD STRIP.AUTO-MCNC: 199 MG/DL (ref 65–100)
GLUCOSE BLD STRIP.AUTO-MCNC: 210 MG/DL (ref 65–100)
GLUCOSE BLD STRIP.AUTO-MCNC: 431 MG/DL (ref 65–100)
GLUCOSE SERPL-MCNC: 157 MG/DL (ref 65–100)
HAPTOGLOB SERPL-MCNC: 101 MG/DL (ref 30–200)
HAPTOGLOB SERPL-MCNC: 91 MG/DL (ref 30–200)
HCT VFR BLD AUTO: 23.3 % (ref 35–47)
HCT VFR BLD AUTO: 28.7 % (ref 35–47)
HGB BLD-MCNC: 6.9 G/DL (ref 11.5–16)
HGB BLD-MCNC: 8.7 G/DL (ref 11.5–16)
IMM GRANULOCYTES # BLD AUTO: 0.02 K/UL (ref 0–0.04)
IMM GRANULOCYTES NFR BLD AUTO: 0.3 % (ref 0–0.5)
IRON SATN MFR SERPL: ABNORMAL %
IRON SERPL-MCNC: <6 UG/DL (ref 37–145)
LYMPHOCYTES # BLD: 2.23 K/UL (ref 0.8–3.5)
LYMPHOCYTES NFR BLD: 31.4 % (ref 12–49)
MCH RBC QN AUTO: 23.1 PG (ref 26–34)
MCHC RBC AUTO-ENTMCNC: 30.3 G/DL (ref 30–36.5)
MCV RBC AUTO: 76.1 FL (ref 80–99)
MONOCYTES # BLD: 1.03 K/UL (ref 0–1)
MONOCYTES NFR BLD: 14.5 % (ref 5–13)
NEUTS SEG # BLD: 3.3 K/UL (ref 1.8–8)
NEUTS SEG NFR BLD: 46.5 % (ref 32–75)
NRBC # BLD: 0.43 K/UL (ref 0–0.01)
NRBC BLD-RTO: 6 PER 100 WBC
PERFORMED BY:: ABNORMAL
PLATELET # BLD AUTO: 535 K/UL (ref 150–400)
PMV BLD AUTO: 10.7 FL (ref 8.9–12.9)
POTASSIUM SERPL-SCNC: 4.1 MMOL/L (ref 3.5–5.1)
RBC # BLD AUTO: 3.77 M/UL (ref 3.8–5.2)
RBC MORPH BLD: ABNORMAL
SODIUM SERPL-SCNC: 140 MMOL/L (ref 136–145)
TIBC SERPL-MCNC: ABNORMAL UG/DL (ref 250–450)
UIBC SERPL-MCNC: 512 UG/DL (ref 112–347)
VIT B12 SERPL-MCNC: 345 PG/ML (ref 232–1245)
WBC # BLD AUTO: 7.1 K/UL (ref 3.6–11)

## 2025-08-20 PROCEDURE — 85018 HEMOGLOBIN: CPT

## 2025-08-20 PROCEDURE — 6360000002 HC RX W HCPCS

## 2025-08-20 PROCEDURE — 85025 COMPLETE CBC W/AUTO DIFF WBC: CPT

## 2025-08-20 PROCEDURE — 6360000002 HC RX W HCPCS: Performed by: INTERNAL MEDICINE

## 2025-08-20 PROCEDURE — 6370000000 HC RX 637 (ALT 250 FOR IP): Performed by: INTERNAL MEDICINE

## 2025-08-20 PROCEDURE — 1100000000 HC RM PRIVATE

## 2025-08-20 PROCEDURE — 82962 GLUCOSE BLOOD TEST: CPT

## 2025-08-20 PROCEDURE — 80048 BASIC METABOLIC PNL TOTAL CA: CPT

## 2025-08-20 PROCEDURE — 94640 AIRWAY INHALATION TREATMENT: CPT

## 2025-08-20 PROCEDURE — 94761 N-INVAS EAR/PLS OXIMETRY MLT: CPT

## 2025-08-20 PROCEDURE — 36430 TRANSFUSION BLD/BLD COMPNT: CPT

## 2025-08-20 PROCEDURE — 2500000003 HC RX 250 WO HCPCS: Performed by: INTERNAL MEDICINE

## 2025-08-20 PROCEDURE — 83010 ASSAY OF HAPTOGLOBIN QUANT: CPT

## 2025-08-20 PROCEDURE — P9016 RBC LEUKOCYTES REDUCED: HCPCS

## 2025-08-20 PROCEDURE — 85014 HEMATOCRIT: CPT

## 2025-08-20 RX ORDER — FOLIC ACID 1 MG/1
2 TABLET ORAL DAILY
Status: DISCONTINUED | OUTPATIENT
Start: 2025-08-21 | End: 2025-08-22 | Stop reason: HOSPADM

## 2025-08-20 RX ADMIN — SODIUM CHLORIDE, PRESERVATIVE FREE 10 ML: 5 INJECTION INTRAVENOUS at 09:46

## 2025-08-20 RX ADMIN — GABAPENTIN 300 MG: 300 CAPSULE ORAL at 20:26

## 2025-08-20 RX ADMIN — FOLIC ACID 1 MG: 1 TABLET ORAL at 09:25

## 2025-08-20 RX ADMIN — SODIUM CHLORIDE, PRESERVATIVE FREE 10 ML: 5 INJECTION INTRAVENOUS at 01:09

## 2025-08-20 RX ADMIN — METHOCARBAMOL TABLETS 750 MG: 750 TABLET, COATED ORAL at 13:30

## 2025-08-20 RX ADMIN — LISINOPRIL 20 MG: 20 TABLET ORAL at 09:25

## 2025-08-20 RX ADMIN — OXYCODONE AND ACETAMINOPHEN 1 TABLET: 5; 325 TABLET ORAL at 20:29

## 2025-08-20 RX ADMIN — METHOCARBAMOL TABLETS 750 MG: 750 TABLET, COATED ORAL at 20:27

## 2025-08-20 RX ADMIN — GABAPENTIN 300 MG: 300 CAPSULE ORAL at 13:31

## 2025-08-20 RX ADMIN — Medication 2 PUFF: at 21:13

## 2025-08-20 RX ADMIN — Medication 2 PUFF: at 07:38

## 2025-08-20 RX ADMIN — METHOCARBAMOL TABLETS 750 MG: 750 TABLET, COATED ORAL at 16:36

## 2025-08-20 RX ADMIN — SODIUM CHLORIDE, PRESERVATIVE FREE 40 MG: 5 INJECTION INTRAVENOUS at 09:34

## 2025-08-20 RX ADMIN — INSULIN GLARGINE 28 UNITS: 100 INJECTION, SOLUTION SUBCUTANEOUS at 20:27

## 2025-08-20 RX ADMIN — METHOCARBAMOL TABLETS 750 MG: 750 TABLET, COATED ORAL at 16:22

## 2025-08-20 RX ADMIN — PANTOPRAZOLE SODIUM 40 MG: 40 INJECTION, POWDER, FOR SOLUTION INTRAVENOUS at 00:04

## 2025-08-20 RX ADMIN — IRON SUCROSE 200 MG: 20 INJECTION, SOLUTION INTRAVENOUS at 20:38

## 2025-08-20 RX ADMIN — TRAZODONE HYDROCHLORIDE 100 MG: 50 TABLET ORAL at 20:26

## 2025-08-20 RX ADMIN — INSULIN LISPRO 1 UNITS: 100 INJECTION, SOLUTION INTRAVENOUS; SUBCUTANEOUS at 16:20

## 2025-08-20 RX ADMIN — METHOCARBAMOL TABLETS 750 MG: 750 TABLET, COATED ORAL at 09:33

## 2025-08-20 RX ADMIN — SODIUM CHLORIDE, PRESERVATIVE FREE 10 ML: 5 INJECTION INTRAVENOUS at 20:40

## 2025-08-20 RX ADMIN — GABAPENTIN 300 MG: 300 CAPSULE ORAL at 09:25

## 2025-08-20 RX ADMIN — OXYCODONE AND ACETAMINOPHEN 1 TABLET: 5; 325 TABLET ORAL at 09:25

## 2025-08-20 RX ADMIN — OXYCODONE AND ACETAMINOPHEN 1 TABLET: 5; 325 TABLET ORAL at 02:35

## 2025-08-20 ASSESSMENT — ENCOUNTER SYMPTOMS
ANAL BLEEDING: 0
VOMITING: 0
DIARRHEA: 0
WHEEZING: 0
SHORTNESS OF BREATH: 0
CHEST TIGHTNESS: 0
CONSTIPATION: 0
ABDOMINAL PAIN: 0

## 2025-08-20 ASSESSMENT — PAIN DESCRIPTION - DESCRIPTORS
DESCRIPTORS: SORE
DESCRIPTORS: STABBING
DESCRIPTORS: STABBING
DESCRIPTORS: SHARP
DESCRIPTORS: ACHING;SORE

## 2025-08-20 ASSESSMENT — PAIN SCALES - GENERAL
PAINLEVEL_OUTOF10: 3
PAINLEVEL_OUTOF10: 7
PAINLEVEL_OUTOF10: 0
PAINLEVEL_OUTOF10: 8
PAINLEVEL_OUTOF10: 0
PAINLEVEL_OUTOF10: 8
PAINLEVEL_OUTOF10: 4
PAINLEVEL_OUTOF10: 7

## 2025-08-20 ASSESSMENT — PAIN - FUNCTIONAL ASSESSMENT
PAIN_FUNCTIONAL_ASSESSMENT: 0-10
PAIN_FUNCTIONAL_ASSESSMENT: ACTIVITIES ARE NOT PREVENTED
PAIN_FUNCTIONAL_ASSESSMENT: ACTIVITIES ARE NOT PREVENTED
PAIN_FUNCTIONAL_ASSESSMENT: 0-10
PAIN_FUNCTIONAL_ASSESSMENT: 0-10
PAIN_FUNCTIONAL_ASSESSMENT: ACTIVITIES ARE NOT PREVENTED
PAIN_FUNCTIONAL_ASSESSMENT: 0-10

## 2025-08-20 ASSESSMENT — PAIN DESCRIPTION - ORIENTATION
ORIENTATION: RIGHT

## 2025-08-20 ASSESSMENT — PAIN DESCRIPTION - LOCATION
LOCATION: KNEE;LEG
LOCATION: KNEE
LOCATION: KNEE;LEG
LOCATION: LEG
LOCATION: LEG

## 2025-08-21 ENCOUNTER — ANESTHESIA EVENT (OUTPATIENT)
Facility: HOSPITAL | Age: 65
End: 2025-08-21
Payer: MEDICARE

## 2025-08-21 ENCOUNTER — ANESTHESIA (OUTPATIENT)
Facility: HOSPITAL | Age: 65
End: 2025-08-21
Payer: MEDICARE

## 2025-08-21 LAB
ANION GAP SERPL CALC-SCNC: 4 MMOL/L (ref 2–12)
BASOPHILS # BLD: 0.1 K/UL (ref 0–0.1)
BASOPHILS NFR BLD: 1 % (ref 0–1)
BUN SERPL-MCNC: 13 MG/DL (ref 6–20)
BUN/CREAT SERPL: 19 (ref 12–20)
CA-I BLD-MCNC: 9.4 MG/DL (ref 8.5–10.1)
CHLORIDE SERPL-SCNC: 110 MMOL/L (ref 97–108)
CO2 SERPL-SCNC: 26 MMOL/L (ref 21–32)
CREAT SERPL-MCNC: 0.68 MG/DL (ref 0.55–1.02)
DIFFERENTIAL METHOD BLD: ABNORMAL
EOSINOPHIL # BLD: 0.61 K/UL (ref 0–0.4)
EOSINOPHIL NFR BLD: 6 % (ref 0–7)
ERYTHROCYTE [DISTWIDTH] IN BLOOD BY AUTOMATED COUNT: 20.8 % (ref 11.5–14.5)
FOLATE SERPL-MCNC: 3 NG/ML (ref 4.8–24.2)
GLUCOSE BLD STRIP.AUTO-MCNC: 148 MG/DL (ref 65–100)
GLUCOSE BLD STRIP.AUTO-MCNC: 161 MG/DL (ref 65–100)
GLUCOSE BLD STRIP.AUTO-MCNC: 223 MG/DL (ref 65–100)
GLUCOSE BLD STRIP.AUTO-MCNC: 253 MG/DL (ref 65–100)
GLUCOSE SERPL-MCNC: 128 MG/DL (ref 65–100)
HCT VFR BLD AUTO: 28.3 % (ref 35–47)
HGB BLD-MCNC: 8.5 G/DL (ref 11.5–16)
IMM GRANULOCYTES # BLD AUTO: 0 K/UL
IMM GRANULOCYTES NFR BLD AUTO: 0 %
LYMPHOCYTES # BLD: 4.44 K/UL (ref 0.8–3.5)
LYMPHOCYTES NFR BLD: 44 % (ref 12–49)
MCH RBC QN AUTO: 22.7 PG (ref 26–34)
MCHC RBC AUTO-ENTMCNC: 30 G/DL (ref 30–36.5)
MCV RBC AUTO: 75.7 FL (ref 80–99)
MONOCYTES # BLD: 1.62 K/UL (ref 0–1)
MONOCYTES NFR BLD: 16 % (ref 5–13)
NEUTS SEG # BLD: 3.33 K/UL (ref 1.8–8)
NEUTS SEG NFR BLD: 33 % (ref 32–75)
NRBC # BLD: 0.51 K/UL (ref 0–0.01)
NRBC BLD-RTO: 5 PER 100 WBC
PERFORMED BY:: ABNORMAL
PLATELET # BLD AUTO: 533 K/UL (ref 150–400)
PMV BLD AUTO: 11.2 FL (ref 8.9–12.9)
POTASSIUM SERPL-SCNC: 4.1 MMOL/L (ref 3.5–5.1)
RBC # BLD AUTO: 3.74 M/UL (ref 3.8–5.2)
RBC MORPH BLD: ABNORMAL
RBC MORPH BLD: ABNORMAL
RETICS # AUTO: 0.08 M/UL (ref 0.02–0.08)
RETICS/RBC NFR AUTO: 2.3 % (ref 0.7–2.1)
SODIUM SERPL-SCNC: 140 MMOL/L (ref 136–145)
TSH SERPL DL<=0.05 MIU/L-ACNC: 0.53 UIU/ML (ref 0.36–3.74)
VIT B12 SERPL-MCNC: 349 PG/ML (ref 232–1245)
WBC # BLD AUTO: 10.1 K/UL (ref 3.6–11)

## 2025-08-21 PROCEDURE — 0DB68ZX EXCISION OF STOMACH, VIA NATURAL OR ARTIFICIAL OPENING ENDOSCOPIC, DIAGNOSTIC: ICD-10-PCS | Performed by: INTERNAL MEDICINE

## 2025-08-21 PROCEDURE — 3700000000 HC ANESTHESIA ATTENDED CARE: Performed by: INTERNAL MEDICINE

## 2025-08-21 PROCEDURE — 3600007502: Performed by: INTERNAL MEDICINE

## 2025-08-21 PROCEDURE — 94761 N-INVAS EAR/PLS OXIMETRY MLT: CPT

## 2025-08-21 PROCEDURE — 6370000000 HC RX 637 (ALT 250 FOR IP): Performed by: INTERNAL MEDICINE

## 2025-08-21 PROCEDURE — 2709999900 HC NON-CHARGEABLE SUPPLY: Performed by: INTERNAL MEDICINE

## 2025-08-21 PROCEDURE — 7100000011 HC PHASE II RECOVERY - ADDTL 15 MIN: Performed by: INTERNAL MEDICINE

## 2025-08-21 PROCEDURE — 94640 AIRWAY INHALATION TREATMENT: CPT

## 2025-08-21 PROCEDURE — 88305 TISSUE EXAM BY PATHOLOGIST: CPT

## 2025-08-21 PROCEDURE — 36415 COLL VENOUS BLD VENIPUNCTURE: CPT

## 2025-08-21 PROCEDURE — 84443 ASSAY THYROID STIM HORMONE: CPT

## 2025-08-21 PROCEDURE — 80048 BASIC METABOLIC PNL TOTAL CA: CPT

## 2025-08-21 PROCEDURE — 6370000000 HC RX 637 (ALT 250 FOR IP): Performed by: STUDENT IN AN ORGANIZED HEALTH CARE EDUCATION/TRAINING PROGRAM

## 2025-08-21 PROCEDURE — 3600007512: Performed by: INTERNAL MEDICINE

## 2025-08-21 PROCEDURE — 6360000002 HC RX W HCPCS: Performed by: INTERNAL MEDICINE

## 2025-08-21 PROCEDURE — 1100000000 HC RM PRIVATE

## 2025-08-21 PROCEDURE — 6370000000 HC RX 637 (ALT 250 FOR IP)

## 2025-08-21 PROCEDURE — 2500000003 HC RX 250 WO HCPCS: Performed by: INTERNAL MEDICINE

## 2025-08-21 PROCEDURE — 82962 GLUCOSE BLOOD TEST: CPT

## 2025-08-21 PROCEDURE — 85045 AUTOMATED RETICULOCYTE COUNT: CPT

## 2025-08-21 PROCEDURE — 2580000003 HC RX 258

## 2025-08-21 PROCEDURE — 82607 VITAMIN B-12: CPT

## 2025-08-21 PROCEDURE — 85025 COMPLETE CBC W/AUTO DIFF WBC: CPT

## 2025-08-21 PROCEDURE — 3700000001 HC ADD 15 MINUTES (ANESTHESIA): Performed by: INTERNAL MEDICINE

## 2025-08-21 PROCEDURE — 6360000002 HC RX W HCPCS

## 2025-08-21 PROCEDURE — 7100000010 HC PHASE II RECOVERY - FIRST 15 MIN: Performed by: INTERNAL MEDICINE

## 2025-08-21 PROCEDURE — 82746 ASSAY OF FOLIC ACID SERUM: CPT

## 2025-08-21 RX ORDER — LIDOCAINE HYDROCHLORIDE 20 MG/ML
INJECTION, SOLUTION EPIDURAL; INFILTRATION; INTRACAUDAL; PERINEURAL
Status: DISCONTINUED | OUTPATIENT
Start: 2025-08-21 | End: 2025-08-21 | Stop reason: SDUPTHER

## 2025-08-21 RX ORDER — PROPOFOL 10 MG/ML
INJECTION, EMULSION INTRAVENOUS
Status: DISCONTINUED | OUTPATIENT
Start: 2025-08-21 | End: 2025-08-21 | Stop reason: SDUPTHER

## 2025-08-21 RX ORDER — OXYCODONE HYDROCHLORIDE 5 MG/1
5 TABLET ORAL ONCE
Refills: 0 | Status: COMPLETED | OUTPATIENT
Start: 2025-08-21 | End: 2025-08-21

## 2025-08-21 RX ORDER — SODIUM CHLORIDE, SODIUM LACTATE, POTASSIUM CHLORIDE, CALCIUM CHLORIDE 600; 310; 30; 20 MG/100ML; MG/100ML; MG/100ML; MG/100ML
INJECTION, SOLUTION INTRAVENOUS
Status: DISCONTINUED | OUTPATIENT
Start: 2025-08-21 | End: 2025-08-21 | Stop reason: SDUPTHER

## 2025-08-21 RX ADMIN — PROPOFOL 50 MG: 10 INJECTION, EMULSION INTRAVENOUS at 11:00

## 2025-08-21 RX ADMIN — LIDOCAINE HYDROCHLORIDE 100 MG: 20 INJECTION, SOLUTION EPIDURAL; INFILTRATION; INTRACAUDAL; PERINEURAL at 11:00

## 2025-08-21 RX ADMIN — OXYCODONE 5 MG: 5 TABLET ORAL at 02:10

## 2025-08-21 RX ADMIN — INSULIN GLARGINE 28 UNITS: 100 INJECTION, SOLUTION SUBCUTANEOUS at 21:57

## 2025-08-21 RX ADMIN — INSULIN LISPRO 2 UNITS: 100 INJECTION, SOLUTION INTRAVENOUS; SUBCUTANEOUS at 21:56

## 2025-08-21 RX ADMIN — OXYCODONE AND ACETAMINOPHEN 1 TABLET: 5; 325 TABLET ORAL at 16:31

## 2025-08-21 RX ADMIN — Medication 2 PUFF: at 20:04

## 2025-08-21 RX ADMIN — Medication 2 PUFF: at 08:50

## 2025-08-21 RX ADMIN — TRAZODONE HYDROCHLORIDE 100 MG: 50 TABLET ORAL at 21:55

## 2025-08-21 RX ADMIN — METHOCARBAMOL TABLETS 750 MG: 750 TABLET, COATED ORAL at 16:31

## 2025-08-21 RX ADMIN — METHOCARBAMOL TABLETS 750 MG: 750 TABLET, COATED ORAL at 10:08

## 2025-08-21 RX ADMIN — GABAPENTIN 300 MG: 300 CAPSULE ORAL at 10:08

## 2025-08-21 RX ADMIN — GABAPENTIN 300 MG: 300 CAPSULE ORAL at 13:39

## 2025-08-21 RX ADMIN — SODIUM CHLORIDE, PRESERVATIVE FREE 10 ML: 5 INJECTION INTRAVENOUS at 21:56

## 2025-08-21 RX ADMIN — SODIUM CHLORIDE, PRESERVATIVE FREE 10 ML: 5 INJECTION INTRAVENOUS at 10:08

## 2025-08-21 RX ADMIN — METHOCARBAMOL TABLETS 750 MG: 750 TABLET, COATED ORAL at 13:39

## 2025-08-21 RX ADMIN — PROPOFOL 50 MG: 10 INJECTION, EMULSION INTRAVENOUS at 11:03

## 2025-08-21 RX ADMIN — INSULIN LISPRO 2 UNITS: 100 INJECTION, SOLUTION INTRAVENOUS; SUBCUTANEOUS at 17:30

## 2025-08-21 RX ADMIN — IRON SUCROSE 200 MG: 20 INJECTION, SOLUTION INTRAVENOUS at 16:31

## 2025-08-21 RX ADMIN — OXYCODONE AND ACETAMINOPHEN 1 TABLET: 5; 325 TABLET ORAL at 10:08

## 2025-08-21 RX ADMIN — PROPOFOL 30 MG: 10 INJECTION, EMULSION INTRAVENOUS at 11:02

## 2025-08-21 RX ADMIN — SODIUM CHLORIDE, POTASSIUM CHLORIDE, SODIUM LACTATE AND CALCIUM CHLORIDE: 600; 310; 30; 20 INJECTION, SOLUTION INTRAVENOUS at 10:57

## 2025-08-21 RX ADMIN — LISINOPRIL 20 MG: 20 TABLET ORAL at 10:07

## 2025-08-21 RX ADMIN — SODIUM CHLORIDE, PRESERVATIVE FREE 40 MG: 5 INJECTION INTRAVENOUS at 10:08

## 2025-08-21 RX ADMIN — PROPOFOL 30 MG: 10 INJECTION, EMULSION INTRAVENOUS at 11:05

## 2025-08-21 RX ADMIN — PROPOFOL 20 MG: 10 INJECTION, EMULSION INTRAVENOUS at 11:04

## 2025-08-21 RX ADMIN — PROPOFOL 20 MG: 10 INJECTION, EMULSION INTRAVENOUS at 11:01

## 2025-08-21 RX ADMIN — GABAPENTIN 300 MG: 300 CAPSULE ORAL at 21:55

## 2025-08-21 RX ADMIN — FOLIC ACID 2 MG: 1 TABLET ORAL at 10:07

## 2025-08-21 RX ADMIN — METHOCARBAMOL TABLETS 750 MG: 750 TABLET, COATED ORAL at 21:55

## 2025-08-21 ASSESSMENT — PAIN DESCRIPTION - LOCATION
LOCATION: LEG

## 2025-08-21 ASSESSMENT — PAIN SCALES - GENERAL
PAINLEVEL_OUTOF10: 5
PAINLEVEL_OUTOF10: 3
PAINLEVEL_OUTOF10: 5
PAINLEVEL_OUTOF10: 3
PAINLEVEL_OUTOF10: 9
PAINLEVEL_OUTOF10: 5
PAINLEVEL_OUTOF10: 6
PAINLEVEL_OUTOF10: 8
PAINLEVEL_OUTOF10: 7
PAINLEVEL_OUTOF10: 5
PAINLEVEL_OUTOF10: 5
PAINLEVEL_OUTOF10: 8

## 2025-08-21 ASSESSMENT — PAIN - FUNCTIONAL ASSESSMENT
PAIN_FUNCTIONAL_ASSESSMENT: 0-10
PAIN_FUNCTIONAL_ASSESSMENT: ACTIVITIES ARE NOT PREVENTED

## 2025-08-21 ASSESSMENT — ENCOUNTER SYMPTOMS
DIARRHEA: 0
WHEEZING: 0
VOMITING: 0
CHEST TIGHTNESS: 0
SHORTNESS OF BREATH: 0
ABDOMINAL PAIN: 0
ANAL BLEEDING: 0
CONSTIPATION: 0

## 2025-08-21 ASSESSMENT — PAIN DESCRIPTION - DESCRIPTORS: DESCRIPTORS: SHOOTING;SPASM

## 2025-08-21 ASSESSMENT — PAIN DESCRIPTION - ORIENTATION
ORIENTATION: RIGHT
ORIENTATION: RIGHT

## 2025-08-22 VITALS
BODY MASS INDEX: 26.31 KG/M2 | WEIGHT: 134 LBS | SYSTOLIC BLOOD PRESSURE: 109 MMHG | OXYGEN SATURATION: 97 % | DIASTOLIC BLOOD PRESSURE: 68 MMHG | HEART RATE: 83 BPM | RESPIRATION RATE: 18 BRPM | TEMPERATURE: 98.1 F | HEIGHT: 60 IN

## 2025-08-22 LAB
ANION GAP SERPL CALC-SCNC: 4 MMOL/L (ref 2–12)
BASOPHILS # BLD: 0.07 K/UL (ref 0–0.1)
BASOPHILS NFR BLD: 0.9 % (ref 0–1)
BUN SERPL-MCNC: 9 MG/DL (ref 6–20)
BUN/CREAT SERPL: 12 (ref 12–20)
CA-I BLD-MCNC: 9.3 MG/DL (ref 8.5–10.1)
CHLORIDE SERPL-SCNC: 109 MMOL/L (ref 97–108)
CO2 SERPL-SCNC: 27 MMOL/L (ref 21–32)
CREAT SERPL-MCNC: 0.73 MG/DL (ref 0.55–1.02)
DIFFERENTIAL METHOD BLD: ABNORMAL
EOSINOPHIL # BLD: 0.33 K/UL (ref 0–0.4)
EOSINOPHIL NFR BLD: 4.1 % (ref 0–7)
ERYTHROCYTE [DISTWIDTH] IN BLOOD BY AUTOMATED COUNT: 21.7 % (ref 11.5–14.5)
GLUCOSE BLD STRIP.AUTO-MCNC: 152 MG/DL (ref 65–100)
GLUCOSE BLD STRIP.AUTO-MCNC: 193 MG/DL (ref 65–100)
GLUCOSE BLD STRIP.AUTO-MCNC: 245 MG/DL (ref 65–100)
GLUCOSE SERPL-MCNC: 147 MG/DL (ref 65–100)
HCT VFR BLD AUTO: 27.6 % (ref 35–47)
HGB BLD-MCNC: 8.2 G/DL (ref 11.5–16)
IMM GRANULOCYTES # BLD AUTO: 0.06 K/UL (ref 0–0.04)
IMM GRANULOCYTES NFR BLD AUTO: 0.7 % (ref 0–0.5)
LYMPHOCYTES # BLD: 1.56 K/UL (ref 0.8–3.5)
LYMPHOCYTES NFR BLD: 19.3 % (ref 12–49)
MCH RBC QN AUTO: 22.8 PG (ref 26–34)
MCHC RBC AUTO-ENTMCNC: 29.7 G/DL (ref 30–36.5)
MCV RBC AUTO: 76.9 FL (ref 80–99)
MONOCYTES # BLD: 0.95 K/UL (ref 0–1)
MONOCYTES NFR BLD: 11.7 % (ref 5–13)
NEUTS SEG # BLD: 5.13 K/UL (ref 1.8–8)
NEUTS SEG NFR BLD: 63.3 % (ref 32–75)
NRBC # BLD: 0.56 K/UL (ref 0–0.01)
NRBC BLD-RTO: 6.9 PER 100 WBC
PERFORMED BY:: ABNORMAL
PLATELET # BLD AUTO: 501 K/UL (ref 150–400)
PMV BLD AUTO: 11.3 FL (ref 8.9–12.9)
POTASSIUM SERPL-SCNC: 4.2 MMOL/L (ref 3.5–5.1)
RBC # BLD AUTO: 3.59 M/UL (ref 3.8–5.2)
RBC MORPH BLD: ABNORMAL
SODIUM SERPL-SCNC: 140 MMOL/L (ref 136–145)
WBC # BLD AUTO: 8.1 K/UL (ref 3.6–11)

## 2025-08-22 PROCEDURE — 6370000000 HC RX 637 (ALT 250 FOR IP)

## 2025-08-22 PROCEDURE — 82962 GLUCOSE BLOOD TEST: CPT

## 2025-08-22 PROCEDURE — 94761 N-INVAS EAR/PLS OXIMETRY MLT: CPT

## 2025-08-22 PROCEDURE — 6370000000 HC RX 637 (ALT 250 FOR IP): Performed by: INTERNAL MEDICINE

## 2025-08-22 PROCEDURE — 94640 AIRWAY INHALATION TREATMENT: CPT

## 2025-08-22 PROCEDURE — 6360000002 HC RX W HCPCS: Performed by: INTERNAL MEDICINE

## 2025-08-22 PROCEDURE — 36415 COLL VENOUS BLD VENIPUNCTURE: CPT

## 2025-08-22 PROCEDURE — 6360000002 HC RX W HCPCS

## 2025-08-22 PROCEDURE — 85025 COMPLETE CBC W/AUTO DIFF WBC: CPT

## 2025-08-22 PROCEDURE — 2500000003 HC RX 250 WO HCPCS: Performed by: INTERNAL MEDICINE

## 2025-08-22 PROCEDURE — 97530 THERAPEUTIC ACTIVITIES: CPT

## 2025-08-22 PROCEDURE — 97161 PT EVAL LOW COMPLEX 20 MIN: CPT

## 2025-08-22 PROCEDURE — 80048 BASIC METABOLIC PNL TOTAL CA: CPT

## 2025-08-22 RX ORDER — FOLIC ACID 1 MG/1
2 TABLET ORAL DAILY
Qty: 30 TABLET | Refills: 3 | Status: SHIPPED | OUTPATIENT
Start: 2025-08-23

## 2025-08-22 RX ORDER — FERROUS SULFATE 325(65) MG
325 TABLET ORAL 2 TIMES DAILY
Qty: 60 TABLET | Refills: 0 | Status: SHIPPED | OUTPATIENT
Start: 2025-08-22

## 2025-08-22 RX ORDER — METHOCARBAMOL 750 MG/1
750 TABLET, FILM COATED ORAL 4 TIMES DAILY
Qty: 20 TABLET | Refills: 0 | Status: SHIPPED | OUTPATIENT
Start: 2025-08-22 | End: 2025-08-27

## 2025-08-22 RX ADMIN — FOLIC ACID 2 MG: 1 TABLET ORAL at 09:40

## 2025-08-22 RX ADMIN — ACETAMINOPHEN 650 MG: 325 TABLET ORAL at 04:00

## 2025-08-22 RX ADMIN — INSULIN LISPRO 1 UNITS: 100 INJECTION, SOLUTION INTRAVENOUS; SUBCUTANEOUS at 11:54

## 2025-08-22 RX ADMIN — IRON SUCROSE 200 MG: 20 INJECTION, SOLUTION INTRAVENOUS at 16:44

## 2025-08-22 RX ADMIN — Medication 2 PUFF: at 08:13

## 2025-08-22 RX ADMIN — GABAPENTIN 300 MG: 300 CAPSULE ORAL at 09:42

## 2025-08-22 RX ADMIN — METHOCARBAMOL TABLETS 750 MG: 750 TABLET, COATED ORAL at 14:00

## 2025-08-22 RX ADMIN — SODIUM CHLORIDE, PRESERVATIVE FREE 10 ML: 5 INJECTION INTRAVENOUS at 09:49

## 2025-08-22 RX ADMIN — METHOCARBAMOL TABLETS 750 MG: 750 TABLET, COATED ORAL at 16:44

## 2025-08-22 RX ADMIN — OXYCODONE AND ACETAMINOPHEN 1 TABLET: 5; 325 TABLET ORAL at 12:06

## 2025-08-22 RX ADMIN — METHOCARBAMOL TABLETS 750 MG: 750 TABLET, COATED ORAL at 09:42

## 2025-08-22 RX ADMIN — SODIUM CHLORIDE, PRESERVATIVE FREE 40 MG: 5 INJECTION INTRAVENOUS at 09:49

## 2025-08-22 RX ADMIN — GABAPENTIN 300 MG: 300 CAPSULE ORAL at 14:00

## 2025-08-22 RX ADMIN — INSULIN LISPRO 2 UNITS: 100 INJECTION, SOLUTION INTRAVENOUS; SUBCUTANEOUS at 16:44

## 2025-08-22 RX ADMIN — LISINOPRIL 20 MG: 20 TABLET ORAL at 09:41

## 2025-08-22 RX ADMIN — OXYCODONE AND ACETAMINOPHEN 1 TABLET: 5; 325 TABLET ORAL at 04:00

## 2025-08-22 ASSESSMENT — PAIN SCALES - GENERAL
PAINLEVEL_OUTOF10: 8
PAINLEVEL_OUTOF10: 7
PAINLEVEL_OUTOF10: 5
PAINLEVEL_OUTOF10: 6
PAINLEVEL_OUTOF10: 3
PAINLEVEL_OUTOF10: 8
PAINLEVEL_OUTOF10: 7

## 2025-08-22 ASSESSMENT — PAIN - FUNCTIONAL ASSESSMENT
PAIN_FUNCTIONAL_ASSESSMENT: 0-10
PAIN_FUNCTIONAL_ASSESSMENT: PREVENTS OR INTERFERES SOME ACTIVE ACTIVITIES AND ADLS
PAIN_FUNCTIONAL_ASSESSMENT: 0-10

## 2025-08-22 ASSESSMENT — PAIN DESCRIPTION - ORIENTATION
ORIENTATION: LEFT;RIGHT
ORIENTATION: RIGHT

## 2025-08-22 ASSESSMENT — PAIN DESCRIPTION - LOCATION
LOCATION: LEG
LOCATION: GENERALIZED;LEG

## 2025-08-22 ASSESSMENT — PAIN DESCRIPTION - DESCRIPTORS: DESCRIPTORS: SPASM

## 2025-08-23 LAB
ABO + RH BLD: NORMAL
ANTIGENS PRESENT RBC DONR: NORMAL
BLD PROD TYP BPU: NORMAL
BLOOD BANK BLOOD PRODUCT EXPIRATION DATE: NORMAL
BLOOD BANK BLOOD PRODUCT EXPIRATION DATE: NORMAL
BLOOD BANK CMNT PATIENT-IMP: NORMAL
BLOOD BANK DISPENSE STATUS: NORMAL
BLOOD BANK ISBT PRODUCT BLOOD TYPE: 5100
BLOOD BANK ISBT PRODUCT BLOOD TYPE: 6200
BLOOD BANK PRODUCT CODE: NORMAL
BLOOD BANK PRODUCT CODE: NORMAL
BLOOD BANK UNIT TYPE AND RH: NORMAL
BLOOD BANK UNIT TYPE AND RH: NORMAL
BLOOD GROUP ANTIBODIES SERPL: NORMAL
BLOOD GROUP ANTIBODIES SERPL: POSITIVE
BPU ID: NORMAL
CROSSMATCH RESULT: NORMAL
DAT C3B/C3D, C3D: NEGATIVE
DAT IGG-SP REAG RBC QL: NEGATIVE
DAT POLY-SP REAG RBC QL: POSITIVE
SPECIMEN EXP DATE BLD: NORMAL
TRANSFUSION STATUS PATIENT QL: NORMAL
UNIT DIVISION: 0
UNIT ISSUE DATE/TIME: NORMAL
UNIT ISSUE DATE/TIME: NORMAL

## 2025-08-29 ENCOUNTER — OFFICE VISIT (OUTPATIENT)
Age: 65
End: 2025-08-29
Payer: MEDICARE

## 2025-08-29 VITALS
RESPIRATION RATE: 18 BRPM | HEART RATE: 97 BPM | BODY MASS INDEX: 26.31 KG/M2 | OXYGEN SATURATION: 99 % | WEIGHT: 134 LBS | SYSTOLIC BLOOD PRESSURE: 99 MMHG | TEMPERATURE: 97.3 F | DIASTOLIC BLOOD PRESSURE: 62 MMHG | HEIGHT: 60 IN

## 2025-08-29 DIAGNOSIS — M79.604 PAIN OF RIGHT LOWER EXTREMITY: Primary | ICD-10-CM

## 2025-08-29 PROCEDURE — G8427 DOCREV CUR MEDS BY ELIG CLIN: HCPCS | Performed by: SURGERY

## 2025-08-29 PROCEDURE — 99212 OFFICE O/P EST SF 10 MIN: CPT | Performed by: SURGERY

## 2025-08-29 PROCEDURE — G8419 CALC BMI OUT NRM PARAM NOF/U: HCPCS | Performed by: SURGERY

## 2025-08-29 PROCEDURE — 3017F COLORECTAL CA SCREEN DOC REV: CPT | Performed by: SURGERY

## 2025-08-29 PROCEDURE — 1036F TOBACCO NON-USER: CPT | Performed by: SURGERY

## 2025-08-29 PROCEDURE — 1111F DSCHRG MED/CURRENT MED MERGE: CPT | Performed by: SURGERY

## 2025-08-29 RX ORDER — OXYCODONE HYDROCHLORIDE 5 MG/1
5 TABLET ORAL EVERY 8 HOURS PRN
Qty: 40 TABLET | Refills: 0 | Status: SHIPPED | OUTPATIENT
Start: 2025-08-29 | End: 2025-09-11

## 2025-09-06 ENCOUNTER — TRANSCRIBE ORDERS (OUTPATIENT)
Facility: HOSPITAL | Age: 65
End: 2025-09-06

## 2025-09-06 DIAGNOSIS — D3A.8 PRIMARY PANCREATIC NEUROENDOCRINE TUMOR (HCC): Primary | ICD-10-CM

## (undated) DEVICE — CATHETER PTCA L130CM BLLN L150MM DIA5MM SHTH 6FR DRUG COAT

## (undated) DEVICE — SUTURE MCRYL + SZ 4-0 L27IN ABSRB UD L19MM PS-2 3/8 CIR MCP426H

## (undated) DEVICE — 3M™ MICROFOAM™ TAPE 1528-4: Brand: 3M™ MICROFOAM™

## (undated) DEVICE — GEL US 20GM NONIRRITATING OVERWRAPPED FILE PCH TRNSMIT

## (undated) DEVICE — GARMENT,MEDLINE,DVT,INT,CALF,MED, GEN2: Brand: MEDLINE

## (undated) DEVICE — RADIFOCUS GLIDEWIRE: Brand: GLIDEWIRE

## (undated) DEVICE — DRAPE EQUIP C ARM 74X42 IN MOB XR W/ TIE RUBBER BND LF

## (undated) DEVICE — HYPODERMIC SAFETY NEEDLE: Brand: MONOJECT

## (undated) DEVICE — SYRINGE 20ML LL S/C 50

## (undated) DEVICE — SUTURE PERMAHAND SZ 3-0 L30IN NONABSORBABLE BLK SILK BRAID A304H

## (undated) DEVICE — LULU RADIAL/FEMORAL ANGIOGRAPHY SHEET: Brand: CONVERTORS

## (undated) DEVICE — MICROPUNCTURE INTRODUCER SET SILHOUETTE TRANSITIONLESS PUSH-PLUS DESIGN - STIFFENED CANNULA WITH STAINLESS STEEL WIRE GUIDE: Brand: MICROPUNCTURE

## (undated) DEVICE — 3M™ TEGADERM™ TRANSPARENT FILM DRESSING FRAME STYLE, 1626W, 4 IN X 4-3/4 IN (10 CM X 12 CM), 50/CT 4CT/CASE: Brand: 3M™ TEGADERM™

## (undated) DEVICE — GAUZE,SPONGE,4"X4",16PLY,STRL,LF,10/TRAY: Brand: MEDLINE

## (undated) DEVICE — SYR 10ML LUER LOK 1/5ML GRAD --

## (undated) DEVICE — GOWN SURG LG 43 IN AAMI LEVEL 3 ORBIS

## (undated) DEVICE — DECANTER BAG 9": Brand: MEDLINE INDUSTRIES, INC.

## (undated) DEVICE — MASTISOL ADHESIVE LIQ 2/3ML

## (undated) DEVICE — Device

## (undated) DEVICE — SYR LR LCK 1ML GRAD NSAF 30ML --

## (undated) DEVICE — SOLUTION IV 0.9% NACL IRRIGATION 3000ML BAG

## (undated) DEVICE — BLADE,STAINLESS-STEEL,11,STRL,DISPOSABLE: Brand: MEDLINE

## (undated) DEVICE — SOLUTION IV 1000ML 0.9% SOD CHL PH 5 INJ USP VIAFLX PLAS

## (undated) DEVICE — BASIC SINGLE BASIN-LF: Brand: MEDLINE INDUSTRIES, INC.

## (undated) DEVICE — DEVICE EMBOLIC PROTCT L320MM DIA6MM 0.014IN NIT OTW

## (undated) DEVICE — FOGARTY ARTERIAL EMBOLECTOMY CATHETER 4F 80CM: Brand: FOGARTY

## (undated) DEVICE — APPLIER LIG CLP M L11IN TI STR RNG HNDL FOR 20 CLP DISP

## (undated) DEVICE — SPONGE LAPAROTOMY W18XL18IN WHITE STRUNG RADIOPAQUE STERILE

## (undated) DEVICE — HT WINN 80 GUIDE WIRE .014" X 300 CM: Brand: HI-TORQUE WINN

## (undated) DEVICE — Device: Brand: JELCO

## (undated) DEVICE — TRANSFER SET 3": Brand: MEDLINE INDUSTRIES, INC.

## (undated) DEVICE — PREP PAD BNS: Brand: CONVERTORS

## (undated) DEVICE — SOUTHSIDE TURNOVER: Brand: MEDLINE INDUSTRIES, INC.

## (undated) DEVICE — TOWEL SURG W17XL27IN STD BLU COT NONFENESTRATED PREWASHED

## (undated) DEVICE — SUTURE VICRYL + SZ 2-0 L36IN ABSRB UD L36MM CT-1 1/2 CIR VCP945H

## (undated) DEVICE — SUTURE PROL SZ 5-0 L24IN NONABSORBABLE BLU L9.3MM BV-1 3/8 9702H

## (undated) DEVICE — APPLIER CLP L9.375IN APER 2.1MM CLS L3.8MM 20 SM TI CLP

## (undated) DEVICE — TRAY URIN CATH PED 16FR BLLN 5CC 2 CONN SIL STR TIP W/ URIN

## (undated) DEVICE — TRAY SURG CUST CRD CATH 3 PRT SSR

## (undated) DEVICE — SYRINGE MED 30ML STD CLR PLAS LUERLOCK TIP N CTRL DISP

## (undated) DEVICE — BITE BLOCK ENDOSCP AD 60 FR W/ ADJ STRP PLAS GRN BLOX

## (undated) DEVICE — BANDAGE,GAUZE,BULKEE II,4.5"X4.1YD,STRL: Brand: MEDLINE

## (undated) DEVICE — BOWL UTIL 16OZ STRL --

## (undated) DEVICE — SOLUTION IRRIG 500ML 0.9% SOD CHL USP POUR PLAS BTL

## (undated) DEVICE — DRAPE,REIN 53X77,STERILE: Brand: MEDLINE

## (undated) DEVICE — DEVICE INFL 20ML BRL POLYCARB ANALG LUMINESCENT DISPLAY ANG

## (undated) DEVICE — GLOVE SURG SZ 65 THK91MIL LTX FREE SYN POLYISOPRENE

## (undated) DEVICE — STRYKER PERFORMANCE SERIES SAGITTAL BLADE: Brand: STRYKER PERFORMANCE SERIES

## (undated) DEVICE — GLOVE ORANGE PI 7 1/2   MSG9075

## (undated) DEVICE — STRAP,POSITIONING,KNEE/BODY,FOAM,4X60": Brand: MEDLINE

## (undated) DEVICE — ULNAR NERVE PROTECTOR FOAM POSITIONER: Brand: CARDINAL HEALTH

## (undated) DEVICE — NAVICROSS SUPPORT CATHETER: Brand: NAVICROSS

## (undated) DEVICE — COVER PRB INTOP 96X6 IN LF

## (undated) DEVICE — APPLICATOR MEDICATED 26 CC SOLUTION HI LT ORNG CHLORAPREP

## (undated) DEVICE — SYRINGE TB 1ML NDL 27GA L0.5IN PLAS SLIP TIP CONVENTIONAL

## (undated) DEVICE — 3M™ COBAN™ NL STERILE NON-LATEX SELF-ADHERENT WRAP, 2084S, 4 IN X 5 YD (10 CM X 4,5 M), 18 ROLLS/CASE: Brand: 3M™ COBAN™

## (undated) DEVICE — SYSTEM ATHRCTMY SHTH 7FR L114CM TIP L9.6CM VES DIA3.5-7MM

## (undated) DEVICE — INTENDED FOR TISSUE SEPARATION, AND OTHER PROCEDURES THAT REQUIRE A SHARP SURGICAL BLADE TO PUNCTURE OR CUT.: Brand: BARD-PARKER SAFETY BLADES SIZE 15, STERILE

## (undated) DEVICE — (D)PREP SKN CHLRAPRP APPL 26ML -- CONVERT TO ITEM 371833

## (undated) DEVICE — REM POLYHESIVE ADULT PATIENT RETURN ELECTRODE: Brand: VALLEYLAB

## (undated) DEVICE — CATHETER ANGIO 5FR L65CM 0.035IN RIM SEL BRAID SFT RADPQ

## (undated) DEVICE — SUT MONOCRYL PLUS UD 4-0 --

## (undated) DEVICE — SUTURE NONABSORBABLE MONOFILAMENT 6-0 BV-1 1X30 IN PROLENE 8709H

## (undated) DEVICE — SOLUTION IRRIG 1000ML H2O STRL BLT

## (undated) DEVICE — SPONGE LAP SFT 18X18 IN X RAY DETECTABLE

## (undated) DEVICE — LINE SAMPLING ADVANCE ORAL NASAL MICROSTREAM O2 TUBING 6.5'

## (undated) DEVICE — INTENDED TO BE USED TO OCCLUDE, RETRACT AND IDENTIFY ARTERIES, VEINS, TENDONS AND NERVES IN SURGICAL PROCEDURES: Brand: STERION®  VESSEL LOOP

## (undated) DEVICE — CATHETER ANGIOPLASTY OTW 0.018 IN 130CM 6X150MM  018 DCB

## (undated) DEVICE — STARCLOSE SE VASCULAR CLOSURE SYSTEM: Brand: STARCLOSE SE

## (undated) DEVICE — BLADE,CARBON-STEEL,10,STRL,DISPOSABLE,TB: Brand: MEDLINE

## (undated) DEVICE — 3M™ TEGADERM™ TRANSPARENT FILM DRESSING FIRST AID STYLE, 1621, 4 IN X 4-3/4 IN, 50 BAGS/CARTON, 4 CARTONS/CASE: Brand: 3M™ TEGADERM™

## (undated) DEVICE — SOL INJ SOD CL 0.9% 500ML BG --

## (undated) DEVICE — STOCKINETTE,IMPERVIOUS,12X48,STERILE: Brand: MEDLINE

## (undated) DEVICE — SYRINGE IRRIG 60ML SFT PLIABLE BLB EZ TO GRP 1 HND USE W/

## (undated) DEVICE — LAMINECTOMY ARM CRADLE FOAM POSITIONER: Brand: CARDINAL HEALTH

## (undated) DEVICE — SYRINGE MED 10ML LUERLOCK TIP W/O SFTY DISP

## (undated) DEVICE — DECANTER FLD 9IN ST BG FOR ASEP TRNSF OF FLD

## (undated) DEVICE — GLOVE SURG SZ 7.5 L11.73IN FNGR THK9.8MIL STRW LTX POLYMER

## (undated) DEVICE — HANDLE LT SNAP ON ULT DURABLE LENS FOR TRUMPF ALC DISPOSABLE

## (undated) DEVICE — SOLUTION IRRIG 500ML 0.9% SOD CHLO USP POUR PLAS BTL

## (undated) DEVICE — PINNACLE INTRODUCER SHEATH: Brand: PINNACLE

## (undated) DEVICE — SHEET, DRAPE, SPLIT, STERILE: Brand: MEDLINE

## (undated) DEVICE — SUTURE MONOCRYL + SZ 4-0 L27IN ABSRB UD L19MM PS-2 3/8 CIR MCP426H

## (undated) DEVICE — MINOR GENERAL PACK: Brand: MEDLINE INDUSTRIES, INC.

## (undated) DEVICE — TOTAL TRAY, 16FR 10ML SIL FOLEY, URN: Brand: MEDLINE

## (undated) DEVICE — INTENDED FOR TISSUE SEPARATION, AND OTHER PROCEDURES THAT REQUIRE A SHARP SURGICAL BLADE TO PUNCTURE OR CUT.: Brand: BARD-PARKER SAFETY BLADES SIZE 11, STERILE

## (undated) DEVICE — DRAPE,TOP,102X53,STERILE: Brand: MEDLINE

## (undated) DEVICE — 1LYRTR 16FR10ML100%SILTMPS SNP: Brand: MEDLINE INDUSTRIES, INC.

## (undated) DEVICE — SPONGE GZ W4XL4IN COT 12 PLY TYP VII WVN C FLD DSGN STERILE

## (undated) DEVICE — DRESSING,GAUZE,XEROFORM,CURAD,5"X9",ST: Brand: CURAD

## (undated) DEVICE — MAJOR VASCULAR PROCEDURE PACK: Brand: MEDLINE INDUSTRIES, INC.

## (undated) DEVICE — ELECTRODE PT RET AD L9FT HI MOIST COND ADH HYDRGEL CORDED

## (undated) DEVICE — VASCULAR-RICHMOND-LF: Brand: MEDLINE INDUSTRIES, INC.

## (undated) DEVICE — SUTURE VCRL + SZ 2-0 L36IN ABSRB UD L36MM CT-1 1/2 CIR VCP945H

## (undated) DEVICE — SUTURE VCRL SZ 2-0 L36IN ABSRB UD L40MM CT 1/2 CIR J957H

## (undated) DEVICE — SUTURE NONABSORBABLE MONOFILAMENT 7-0 BV-1 24 IN PROLENE D8228

## (undated) DEVICE — HEAD DONUT FOAM POSITIONER: Brand: CARDINAL HEALTH

## (undated) DEVICE — Device: Brand: QUICK-CROSS SUPPORT CATHETER

## (undated) DEVICE — 48" PROBE COVER W/GEL, ULTRASOUND, STERILE: Brand: SITE-RITE

## (undated) DEVICE — SOLUTION IV 500ML 0.9% SOD CHL PH 5 INJ USP VIAFLX PLAS

## (undated) DEVICE — SUTURE PROL SZ 5-0 L30IN NONABSORBABLE BLU L13MM RB-2 1/2 8710H

## (undated) DEVICE — RELI® BLUNT FILL NEEDLE, 18G X 1.5", 100EA/BX, 100BX/CS: Brand: RELI®

## (undated) DEVICE — 8F PRUITT F3 OUTLYING SHUNT WITH T-PORT, EIFU
Type: IMPLANTABLE DEVICE | Site: CAROTID | Status: NON-FUNCTIONAL
Brand: PRUITT F3 CAROTID SHUNT
Removed: 2021-12-01

## (undated) DEVICE — BANDAGE E W6INXL11YD CLP CLSR DBL LEN FLEX-MASTER

## (undated) DEVICE — STERILE POLYISOPRENE POWDER-FREE SURGICAL GLOVES WITH EMOLLIENT COATING: Brand: PROTEXIS

## (undated) DEVICE — FORCEPS BX L240CM JAW DIA2.8MM L CAP W/ NDL MIC MESH TOOTH

## (undated) DEVICE — AGENT HEMSTAT W4XL4IN OXIDIZED REGENERATED CELOS ABSRB SFT

## (undated) DEVICE — HI-TORQUE BALANCE MIDDLEWEIGHT UNIVERSAL GUIDE WIRE .014 J TIP 3.0 CM X 300 CM: Brand: HI-TORQUE BALANCE MIDDLEWEIGHT UNIVERSAL

## (undated) DEVICE — WET SKIN PREP TRAY: Brand: MEDLINE INDUSTRIES, INC.

## (undated) DEVICE — MINOR VASCULAR PROCEDURE: Brand: MEDLINE INDUSTRIES, INC.

## (undated) DEVICE — 3M™ STERI-STRIP™ REINFORCED ADHESIVE SKIN CLOSURES, R1547, 1/2 IN X 4 IN (12 MM X 100 MM), 6 STRIPS/ENVELOPE: Brand: 3M™ STERI-STRIP™

## (undated) DEVICE — SUTURE NONABSORBABLE MONOFILAMENT 2-0 FS 18 IN ETHILON 664H

## (undated) DEVICE — NEEDLE HYPO 25GA L1.5IN BLU POLYPR HUB S STL REG BVL STR

## (undated) DEVICE — CONTAINER,SPECIMEN,3OZ,OR STRL: Brand: MEDLINE

## (undated) DEVICE — PADDING CAST W6INXL4YD ST COT RAYON MICROPLEATED HIGHLY

## (undated) DEVICE — CATHETER PTCA L150CM BLLN L150MM OD2.5MM SHTH OD4FR 0.014IN

## (undated) DEVICE — INFECTION CONTROL KIT SYS

## (undated) DEVICE — SUT ETHLN 3-0 18IN FS1 BLK --

## (undated) DEVICE — SUTURE VICRYL + SZ 2-0 L27IN ABSRB WHT SH 1/2 CIR TAPERCUT VCP417H

## (undated) DEVICE — FOGARTY BILIARY BALLOON PROBE 5F 23CM: Brand: FOGARTY

## (undated) DEVICE — DRAIN SURG 7FR END PERF 1/8IN SIL RND SMOOTH HEAT POLISHED

## (undated) DEVICE — SUTURE VICRYL + SZ 0 L27IN ABSRB UD CT-1 L36MM 1/2 CIR TAPR VCP260H

## (undated) DEVICE — COUNTER NEEDLEXL 60 COUNT HLD 60 COUNT DBL BLK MAG BX LCK W/

## (undated) DEVICE — ARMADA 35 PTA CATHETER 6 MM X 80 MM X 135 CM / OVER-THE-WIRE: Brand: ARMADA

## (undated) DEVICE — PADDING CAST W6INXL4YD NONSTERILE COT RAYON MICROPLEATED

## (undated) DEVICE — SUTURE BOOT: Brand: DEROYAL

## (undated) DEVICE — T-DRAPE,EXTREMITY,STERILE: Brand: MEDLINE

## (undated) DEVICE — GOWN,SIRUS,NONRNF,SETINSLV,2XL,18/CS: Brand: MEDLINE

## (undated) DEVICE — TOTAL TRAY, DB, 100% SILI FOLEY, 16FR 10: Brand: MEDLINE

## (undated) DEVICE — GLOW 'N TELL 55CM TAPE (20 STRIPS): Brand: VASCUTAPE RADIOPAQUE TAPE

## (undated) DEVICE — SUT PROL 6-0 18IN BV1 DA BLU --

## (undated) DEVICE — HT WINN 200T GUIDE WIRE .014" X 300 CM: Brand: HI-TORQUE WINN

## (undated) DEVICE — FOGARTY ARTERIAL EMBOLECTOMY CATHETER 6F 80CM: Brand: FOGARTY

## (undated) DEVICE — 3M™ SURGICAL CLIPPER PROFESSIONAL BLADE 9680: Brand: 3M™

## (undated) DEVICE — HI-TORQUE BALANCE MIDDLEWEIGHT UNIVERSAL II GUIDE WIRE STRAIGHT TIP PAK  300 CM: Brand: HI-TORQUE BALANCE MIDDLEWEIGHT UNIVERSAL II

## (undated) DEVICE — PREP SKN CHLRAPRP APL 26ML STR --

## (undated) DEVICE — SUTURE PROL 5-0 L18IN NONABSORBABLE BLU RB-2 L13MM 1/2 CIR 8713H

## (undated) DEVICE — FOGARTY ARTERIAL EMBOLECTOMY CATHETER 3F 80CM: Brand: FOGARTY

## (undated) DEVICE — CATHETER ANGIOPLSTY OTW 8ATM 4FRX150CM 4X100MM NANOCROSS

## (undated) DEVICE — BANDAGE COMPR M W6INXL10YD WHT BGE VELC E MTRX HK AND LOOP

## (undated) DEVICE — Z DISCONTINUED USE 2425483 (LOW STOCK PER MEDLINE) TAPE UMB L18IN DIA1/8IN WHT COT NONABSORBABLE W/O NDL FOR

## (undated) DEVICE — 3M™ BAIR HUGGER® CHEST ACCESS BLANKET, FULL BODY, 10 PER CASE 30000: Brand: BAIR HUGGER™

## (undated) DEVICE — SUTURE PERMAHAND SZ 0 L18IN NONABSORBABLE BLK SILK BRAID A186H

## (undated) DEVICE — MAGNETIC INSTR DRAPE 20X16: Brand: MEDLINE INDUSTRIES, INC.

## (undated) DEVICE — KIT COLON WITH 1.1 OZ ORCA HYDRA SEAL 2 GOWN ADAPT SECONDARY

## (undated) DEVICE — KIT NDL 5FR L10CM GWIRE L40CM DIA0.018IN NDL 21GA L7CM NIT

## (undated) DEVICE — TOWEL,OR,DSP,ST,BLUE,DLX,6/PK,12PK/CS: Brand: MEDLINE

## (undated) DEVICE — DESTINATION PERIPHERAL GUIDING SHEATH: Brand: DESTINATION

## (undated) DEVICE — PERCUTANEOUS ENTRY THINWALL NEEDLE  ONE-PART: Brand: COOK

## (undated) DEVICE — SUT ETHLN 3-0 18IN PS2 BLK --

## (undated) DEVICE — DEVICE TORQ DIA0.018-0.038IN GRN ERGO 1 HND FOR PTFE GWIRE

## (undated) DEVICE — LOOP,VESSEL,MAXI,BLUE,2/PK,STERILE: Brand: MEDLINE

## (undated) DEVICE — CATHETER MARINER BRAID RIM 5FX65 CM .035 IN.DIAGNOSTIC

## (undated) DEVICE — SUTURE PERMAHAND SZ 2-0 L18IN NONABSORBABLE BLK L26MM FS 685G

## (undated) DEVICE — SURGIFOAM SPNG SZ 100

## (undated) DEVICE — SOLUTION IV 500ML 0.9% SOD CHL FLX CONT

## (undated) DEVICE — 3-0 COATED VICRYL PLUS UNDYED 1X27" SH --